# Patient Record
Sex: FEMALE | Race: WHITE | NOT HISPANIC OR LATINO | URBAN - METROPOLITAN AREA
[De-identification: names, ages, dates, MRNs, and addresses within clinical notes are randomized per-mention and may not be internally consistent; named-entity substitution may affect disease eponyms.]

---

## 2017-07-25 ENCOUNTER — HOSPITAL ENCOUNTER (OUTPATIENT)
Dept: OTHER | Facility: HOSPITAL | Age: 62
Discharge: HOME | End: 2017-07-25
Attending: NURSE PRACTITIONER | Admitting: NURSE PRACTITIONER

## 2017-09-12 ENCOUNTER — HOSPITAL ENCOUNTER (OUTPATIENT)
Dept: OTHER | Facility: HOSPITAL | Age: 62
Discharge: HOME | End: 2017-09-12
Attending: NURSE PRACTITIONER | Admitting: NURSE PRACTITIONER

## 2017-11-03 ENCOUNTER — HOSPITAL ENCOUNTER (OUTPATIENT)
Dept: OTHER | Facility: HOSPITAL | Age: 62
Discharge: HOME | End: 2017-11-03
Attending: NURSE PRACTITIONER | Admitting: NURSE PRACTITIONER

## 2019-10-29 RX ORDER — LORATADINE 10 MG/1
TABLET ORAL
Qty: 90 TABLET | Refills: 4 | OUTPATIENT
Start: 2019-10-29

## 2023-09-08 ENCOUNTER — HOSPITAL ENCOUNTER (EMERGENCY)
Facility: HOSPITAL | Age: 68
Discharge: HOME | End: 2023-09-08
Payer: MEDICARE

## 2023-09-08 ENCOUNTER — APPOINTMENT (OUTPATIENT)
Dept: GENERAL RADIOLOGY | Facility: HOSPITAL | Age: 68
End: 2023-09-08
Payer: MEDICARE

## 2023-09-08 VITALS
RESPIRATION RATE: 18 BRPM | SYSTOLIC BLOOD PRESSURE: 137 MMHG | OXYGEN SATURATION: 97 % | HEART RATE: 77 BPM | HEIGHT: 68 IN | DIASTOLIC BLOOD PRESSURE: 75 MMHG | WEIGHT: 210.1 LBS | BODY MASS INDEX: 31.84 KG/M2 | TEMPERATURE: 97.9 F

## 2023-09-08 DIAGNOSIS — M54.6 ACUTE BILATERAL THORACIC BACK PAIN: Primary | ICD-10-CM

## 2023-09-08 LAB
INR PPP: 1.01 (ref 2–3)
PROTHROMBIN TIME: 10.8 SECONDS (ref 19.4–28.5)

## 2023-09-08 PROCEDURE — 36415 COLL VENOUS BLD VENIPUNCTURE: CPT

## 2023-09-08 PROCEDURE — 85610 PROTHROMBIN TIME: CPT | Performed by: PHYSICIAN ASSISTANT

## 2023-09-08 PROCEDURE — 96375 TX/PRO/DX INJ NEW DRUG ADDON: CPT

## 2023-09-08 PROCEDURE — 25010000002 MORPHINE PER 10 MG: Performed by: PHYSICIAN ASSISTANT

## 2023-09-08 PROCEDURE — 96372 THER/PROPH/DIAG INJ SC/IM: CPT

## 2023-09-08 PROCEDURE — 99283 EMERGENCY DEPT VISIT LOW MDM: CPT

## 2023-09-08 PROCEDURE — 72072 X-RAY EXAM THORAC SPINE 3VWS: CPT

## 2023-09-08 PROCEDURE — 25010000002 HYDROMORPHONE 1 MG/ML SOLUTION: Performed by: PHYSICIAN ASSISTANT

## 2023-09-08 PROCEDURE — 72110 X-RAY EXAM L-2 SPINE 4/>VWS: CPT

## 2023-09-08 PROCEDURE — 63710000001 ONDANSETRON ODT 4 MG TABLET DISPERSIBLE: Performed by: PHYSICIAN ASSISTANT

## 2023-09-08 RX ORDER — HYDROCODONE BITARTRATE AND ACETAMINOPHEN 7.5; 325 MG/1; MG/1
1 TABLET ORAL EVERY 6 HOURS PRN
Qty: 15 TABLET | Refills: 0 | Status: SHIPPED | OUTPATIENT
Start: 2023-09-08

## 2023-09-08 RX ORDER — ONDANSETRON 4 MG/1
4 TABLET, ORALLY DISINTEGRATING ORAL ONCE
Status: COMPLETED | OUTPATIENT
Start: 2023-09-08 | End: 2023-09-08

## 2023-09-08 RX ORDER — TIZANIDINE HYDROCHLORIDE 4 MG/1
4 CAPSULE, GELATIN COATED ORAL 3 TIMES DAILY
Qty: 15 CAPSULE | Refills: 0 | Status: SHIPPED | OUTPATIENT
Start: 2023-09-08

## 2023-09-08 RX ADMIN — HYDROMORPHONE HYDROCHLORIDE 0.5 MG: 1 INJECTION, SOLUTION INTRAMUSCULAR; INTRAVENOUS; SUBCUTANEOUS at 11:05

## 2023-09-08 RX ADMIN — MORPHINE SULFATE 4 MG: 4 INJECTION INTRAVENOUS at 10:07

## 2023-09-08 RX ADMIN — ONDANSETRON 4 MG: 4 TABLET, ORALLY DISINTEGRATING ORAL at 10:07

## 2023-09-08 NOTE — CASE MANAGEMENT/SOCIAL WORK
Continued Stay Note   [facility]     Patient Name: Laura Mejia  MRN: [id number]  Today's Date: 9/8/2023    Admit Date: 9/8/2023        Discharge Plan       Row Name 09/08/23 1221       Plan    Provided Post Acute Provider List? Yes    Post Acute Provider List Outpatient Therapy    Plan Comments Notified Patient needed OP PT patient selected [facility]. Referral sent via Epic. Patient notified to call if she does not hear from them by Monday.                        Sandra Martinez RN

## 2023-09-08 NOTE — DISCHARGE INSTRUCTIONS
Take medication as directed.    Follow-up with your primary care provider in 3-5 days.  If you do not have a primary care provider call [phone] for help in finding one, or you may follow up with [facility] at [phone].    Follow-up with neurosurgery if your back pain continues.  Follow-up with physical therapy on an outpatient basis for further management of your back pain    Return to ED for any new or worsening symptoms.

## 2023-09-08 NOTE — ED PROVIDER NOTES
"Subjective   History of Present Illness  Chief Complaint Back Pain    Patient is a 67-year-old white female with history of CHF presenting today with back pain for the last 5 days.  She reports gradual onset and pain.  She describes the pain as achy in her thoracic/lumbar spine with frequent muscle spasms.  She states the pain has been constant and rates it as a 10/10 in severity worse with standing and certain movements.  She has tried muscle relaxers and oral steroids and lidocaine patches with no relief.  Pertinent negatives include saddle anesthesia, bladder bowel incontinence, urinary complaints, weakness, chest pain, or shortness of breath.  She is currently on warfarin but states she has not taken in the past few days that she has been taking Aleve for the her pain and was concerned that it would thin her blood \"too much\".  Does report history of prior back issues in the past denies any history of surgeries on her back.      Review of Systems   Constitutional: Negative.    Respiratory: Negative.     Cardiovascular: Negative.    Gastrointestinal:  Negative for abdominal distention, abdominal pain, diarrhea, nausea and vomiting.   Genitourinary:  Negative for decreased urine volume, difficulty urinating, dysuria, flank pain, frequency, hematuria and urgency.   Musculoskeletal:  Positive for back pain. Negative for neck pain and neck stiffness.   Skin: Negative.    Neurological:  Negative for weakness and numbness.     Past Medical History:   Diagnosis Date    AAA (abdominal aortic aneurysm)     infrarenal- 3.1cm(2010, 3.7x4.2cm(2016), 3.9cm (2017)    Allergic rhinitis     Aspiration pneumonia 05/2017    CHF (congestive heart failure)     Coronary artery disease     DDD (degenerative disc disease), lumbar     lumbar spine- Dr. Churchill     Depression     Diverticulosis     Frequent UTI     Dr. Daniels    GERD (gastroesophageal reflux disease)     Hiatal hernia     HSV (herpes simplex virus) infection     Oral    " Hyperlipidemia     IBS (irritable bowel syndrome)     Constipation predominant    Ischemic cardiomyopathy 09/2017    AICD     Kidney stones     NSTEMI (non-ST elevated myocardial infarction) 04/26/2017    Obstructive sleep apnea 2011    nfsg 2011, ahi 67    Osteoarthritis     Osteopenia     Peripheral neuropathy     feet    Pulmonary embolism, bilateral 05/2017    Rotator cuff tear     Bilateral- Ortho        Allergies   Allergen Reactions    Oxytetracycline Hives    Oxycodone Itching       Past Surgical History:   Procedure Laterality Date    CARDIAC CATHETERIZATION  04/26/2017    99% mid LAD. 90% mid LCX. 60% RCA. Recommended CABG. Dr. Diaz    CARDIAC DEFIBRILLATOR PLACEMENT  12/26/2017    ICD implant/ Dr. Ellis    CATARACT EXTRACTION      CORONARY ARTERY BYPASS GRAFT  04/26/2017    x4 & ASD Closure    CYSTOSCOPY  2002    negative. Dr. Daniels    LAPAROSCOPIC CHOLECYSTECTOMY  04/17/2013    For biliary dyskinesia. Dr. Mccoy.     REPLACEMENT TOTAL KNEE BILATERAL  11/2004    Dr. Herrera    THORACENTESIS Left 05/08/2017    TONSILLECTOMY      TUBAL ABDOMINAL LIGATION Bilateral        Family History   Problem Relation Age of Onset    Heart disease Mother     Other Mother         Hypoglycemia    Osteoporosis Mother     COPD Father     Stroke Father     Hypertension Brother     Diabetes Brother     Heart disease Brother        Social History     Socioeconomic History    Marital status:    Tobacco Use    Smoking status: Never           Objective   Physical Exam  Vitals and nursing note reviewed.   Constitutional:       General: She is not in acute distress.     Appearance: Normal appearance. She is well-developed. She is obese. She is not ill-appearing, toxic-appearing or diaphoretic.   HENT:      Head: Normocephalic and atraumatic.      Mouth/Throat:      Mouth: Mucous membranes are moist.      Pharynx: Oropharynx is clear.   Eyes:      General: No scleral icterus.     Extraocular Movements: Extraocular  "movements intact.      Pupils: Pupils are equal, round, and reactive to light.   Cardiovascular:      Rate and Rhythm: Normal rate and regular rhythm.      Pulses: Normal pulses.      Heart sounds: No murmur heard.    No friction rub. No gallop.   Pulmonary:      Effort: Pulmonary effort is normal. No respiratory distress.      Breath sounds: Normal breath sounds. No stridor. No wheezing, rhonchi or rales.   Chest:      Chest wall: No tenderness.   Abdominal:      General: Bowel sounds are normal. There is no distension. There are no signs of injury.      Palpations: Abdomen is soft.      Tenderness: There is no abdominal tenderness. There is no right CVA tenderness, left CVA tenderness, guarding or rebound.      Hernia: No hernia is present.   Musculoskeletal:      Comments: Back:  Cervical, thoracic, lumbar spine are midline with no midline tenderness or step-offs.  Spinal musculature soft, tenderness across lower thoracic upper lumbar paraspinal musculature, no palpable mass spasm, no overlying erythema, no ecchymosis. Range of motion is present but decreased secondary to pain with increased discomfort noted performing distal muscle strength is 5/5.     Negative straight leg raise BLE   Skin:     General: Skin is warm.      Capillary Refill: Capillary refill takes less than 2 seconds.      Coloration: Skin is not cyanotic, jaundiced or pale.      Findings: No rash.   Neurological:      General: No focal deficit present.      Mental Status: She is alert and oriented to person, place, and time.   Psychiatric:         Mood and Affect: Mood normal.         Behavior: Behavior normal.       Procedures           ED Course    /62   Pulse 78   Temp 97.8 °F (36.6 °C)   Resp 20   Ht 172.7 cm (68\")   Wt 95.3 kg (210 lb 1.6 oz)   SpO2 99%   BMI 31.95 kg/m²   Medications   HYDROmorphone (DILAUDID) injection 0.5 mg (0.5 mg Intravenous Given 9/8/23 1105)   morphine injection 4 mg (4 mg Subcutaneous Given 9/8/23 " 1007)   ondansetron ODT (ZOFRAN-ODT) disintegrating tablet 4 mg (4 mg Oral Given 9/8/23 1007)     Labs Reviewed   PROTIME-INR - Abnormal; Notable for the following components:       Result Value    Protime 10.8 (*)     INR 1.01 (*)     All other components within normal limits     XR Spine Thoracic 3 View   Final Result   Impression:   Compression deformities of T11-L1 appear most likely old.      Compression deformities of T7 and T8 are of indeterminate age.      Degenerative disc disease as detailed.      Electronically Signed: Monica George MD     9/8/2023 11:04 AM EDT     Workstation ID: [id number]      XR Spine Lumbar Complete 4+VW   Final Result   Impression:   Compression deformities of T11-L1 appear most likely old.      Compression deformities of T7 and T8 are of indeterminate age.      Degenerative disc disease as detailed.      Electronically Signed: Monica George MD     9/8/2023 11:04 AM EDT     Workstation ID: [id number]                                               Medical Decision Making  Chart Review: Patient was seen at [facility] yesterday chart was reviewed she had a negative urinalysis    Comorbidity: As per past medical history of  Differentials: Muscle strain, cauda equina, fracture, disc herniation      ;this list is not all inclusive and does not constitute the entirety of considered causes    Radiology:   XR Spine Thoracic 3 View   Final Result    Impression:    Compression deformities of T11-L1 appear most likely old.    Compression deformities of T7 and T8 are of indeterminate age.    Degenerative disc disease as detailed.        Electronically Signed: Monica George MD      9/8/2023 11:04 AM EDT      Workstation ID: [id number]     XR Spine Lumbar Complete 4+VW   Final Result    Impression:    Compression deformities of T11-L1 appear most likely old.    Compression deformities of T7 and T8 are of indeterminate age.    Degenerative disc disease as detailed.        Electronically Signed: Monica  MD Ariel      9/8/2023 11:04 AM EDT      Workstation ID: [id number]     Disposition/Treatment:  Appropriate PPE was worn during exam and throughout all encounters with the patient.  When the ED IV was placed and labs were obtained INR subtherapeutic at 1.01.  Presented with what appears to be muscle skeletal type pain PE was considered but thought less likely cause of her pain as it is reproducible and does seem to be worse with movement she is also not tachycardic or hypoxic while in the ED and continues to deny any chest pain or shortness of breath.  X-rays were obtained which showed likely old compression deformity at T11/L1 and compression deformity at T7 and 8 which was age-indeterminate which could be contributing factor to her pain.  Otherwise there is chronic degenerative changes noted.  Patient had no urinary complaints and had a negative urinalysis yesterday.  Her abdomen is also soft and nontender.  She was given morphine and additional Dilaudid for her pain while in the ED with significant improvement now sitting on the edge of the bed resting comfortably.  Findings were discussed with the patient at bedside voiced understanding of discharge along with signs and symptoms to return.  She will be referred to neurosurgery as she does have prior fractures she also be referred to outpatient physical therapy.  Inspect was queried she will be given short course of Norco for home along with tizanidine.  She is already on steroids and has lidocaine patches.  She was told to stop Robaxin as she states it was not helping and try the tizanidine instead     This document is intended for medical expert use only. Reading of this document by patients and/or patient's family without participating medical staff guidance may result in misinterpretation and unintended morbidity.  Any interpretation of such data is the responsibility of the patient and/or family member responsible for the patient in concert with their  primary or specialist providers, not to be left for sources of online searches such as [employer], [employer] or similar queries. Relying on these approaches to knowledge may result in misinterpretation, misguided goals of care and even death should patients or family members try recommendations outside of the realm of professional medical care in a supervised inpatient environment.       Amount and/or Complexity of Data Reviewed  Radiology: ordered. Decision-making details documented in ED Course.    Risk  Prescription drug management.        Final diagnoses:   Acute bilateral thoracic back pain       ED Disposition  ED Disposition       ED Disposition   Discharge    Condition   Stable    Comment   --               [facility] EMERGENCY DEPARTMENT  [address] [facility] [id number] [phone]  Go to   If symptoms worsen    PATIENT CONNECTION - [facility] [address]  [phone]  Call   If you do not have a primary care provider    Tasneem Martínez, ALEXANDER  [address] [place] [address] [phone]    Schedule an appointment as soon as possible for a visit            Medication List        New Prescriptions      HYDROcodone-acetaminophen 7.5-325 MG per tablet  Commonly known as: NORCO  Take 1 tablet by mouth Every 6 (Six) Hours As Needed for Moderate Pain.     TiZANidine 4 MG capsule  Commonly known as: ZANAFLEX  Take 1 capsule by mouth 3 (Three) Times a Day.               Where to Get Your Medications        These medications were sent to [facility]/pharmacy #[id number] - [place], [place] - [address] AT [facility] [place], [facility] - [phone]  - [phone] [address] [place] [address]      Phone: [phone]   HYDROcodone-acetaminophen 7.5-325 MG per tablet  TiZANidine 4 MG capsule            Gracia Dailey PA  09/08/23 2376

## 2023-09-08 NOTE — ED NOTES
Pt presents to ED via PV through triage with c/o thoracic back pain on both sides and all the way across worsening with spasms since Saturday. Pt has hx of sciatica and disc problems. Pt able to ambulate, but must take breaks. Able to move extremities well, Pedal pulses equal and normal.   not applicable

## 2023-09-08 NOTE — DISCHARGE PLACEMENT REQUEST
"Laura Mejia \"Annie\" (67 y.o. Female)       Date of Birth   1955    Social Security Number       Address   [address] [place] [address] [place] [facility]    Home Phone   [phone]    MRN   [id number]       [employer]aker   Patient Refused    Marital Status                               Admission Date   9/8/23    Admission Type   Emergency    Admitting Provider       Attending Provider       Department, Room/Bed   [facility] EMERGENCY DEPARTMENT, SA05/SA5       Discharge Date       Discharge Disposition       Discharge Destination                                 Attending Provider: (none)   Allergies: Oxytetracycline, Oxycodone    Isolation: None   Infection: None   Code Status: Not on file    Ht: 172.7 cm (68\")   Wt: 95.3 kg (210 lb 1.6 oz)    Admission Cmt: None   Principal Problem: None                  Active Insurance as of 9/8/2023       Primary Coverage       Payor Plan Insurance Group Employer/Plan Group    HUMANA MEDICARE REPLACEMENT HU[employer]NA MEDICARE REPLACEMENT [id number]       Payor Plan Address Payor Plan Phone Number Payor Plan Fax Number Effective Dates    [address] [address] [phone]  9/1/2023 - None Entered    [facility] [address]         Subscriber Name Subscriber Birth Date Member ID       LAURA MEJIA 1955 [id number]                     Emergency Contacts        (Rel.) Home Phone Work Phone Mobile Phone    MEJIABERNARD BURLESON (Spouse) [phone] -- --              "

## 2023-09-18 ENCOUNTER — TRANSCRIBE ORDERS (OUTPATIENT)
Dept: ADMINISTRATIVE | Facility: HOSPITAL | Age: 68
End: 2023-09-18
Payer: MEDICARE

## 2023-09-18 DIAGNOSIS — S32.000A: ICD-10-CM

## 2023-09-18 DIAGNOSIS — M48.44XA: Primary | ICD-10-CM

## 2023-09-18 DIAGNOSIS — M43.04: Primary | ICD-10-CM

## 2024-01-22 ENCOUNTER — APPOINTMENT (OUTPATIENT)
Dept: GENERAL RADIOLOGY | Facility: HOSPITAL | Age: 69
DRG: 309 | End: 2024-01-22
Payer: MEDICARE

## 2024-01-22 ENCOUNTER — APPOINTMENT (OUTPATIENT)
Dept: ULTRASOUND IMAGING | Facility: HOSPITAL | Age: 69
DRG: 309 | End: 2024-01-22
Payer: MEDICARE

## 2024-01-22 ENCOUNTER — HOSPITAL ENCOUNTER (INPATIENT)
Facility: HOSPITAL | Age: 69
LOS: 3 days | Discharge: HOME | DRG: 309 | End: 2024-01-26
Attending: EMERGENCY MEDICINE | Admitting: INTERNAL MEDICINE
Payer: MEDICARE

## 2024-01-22 DIAGNOSIS — R55 NEAR SYNCOPE: Primary | ICD-10-CM

## 2024-01-22 DIAGNOSIS — R94.39 ABNORMAL NUCLEAR STRESS TEST: ICD-10-CM

## 2024-01-22 DIAGNOSIS — I48.91 NEW ONSET ATRIAL FIBRILLATION: ICD-10-CM

## 2024-01-22 DIAGNOSIS — Z95.1 S/P CABG (CORONARY ARTERY BYPASS GRAFT): ICD-10-CM

## 2024-01-22 LAB
ALBUMIN SERPL-MCNC: 4.2 G/DL (ref 3.5–5.2)
ALBUMIN/GLOB SERPL: 1.3 G/DL
ALP SERPL-CCNC: 135 U/L (ref 39–117)
ALT SERPL W P-5'-P-CCNC: 28 U/L (ref 1–33)
ANION GAP SERPL CALCULATED.3IONS-SCNC: 13 MMOL/L (ref 5–15)
AST SERPL-CCNC: 37 U/L (ref 1–32)
BACTERIA UR QL AUTO: ABNORMAL /HPF
BASOPHILS # BLD AUTO: 0.1 10*3/MM3 (ref 0–0.2)
BASOPHILS NFR BLD AUTO: 1.3 % (ref 0–1.5)
BILIRUB SERPL-MCNC: 0.3 MG/DL (ref 0–1.2)
BILIRUB UR QL STRIP: NEGATIVE
BUN SERPL-MCNC: 24 MG/DL (ref 8–23)
BUN/CREAT SERPL: 14.1 (ref 7–25)
CALCIUM SPEC-SCNC: 9.3 MG/DL (ref 8.6–10.5)
CHLORIDE SERPL-SCNC: 108 MMOL/L (ref 98–107)
CHOLEST SERPL-MCNC: 151 MG/DL (ref 0–200)
CK SERPL-CCNC: 38 U/L (ref 20–180)
CLARITY UR: ABNORMAL
CO2 SERPL-SCNC: 19 MMOL/L (ref 22–29)
COLOR UR: YELLOW
CREAT SERPL-MCNC: 1.7 MG/DL (ref 0.57–1)
D DIMER PPP FEU-MCNC: 3.12 MG/L (FEU) (ref 0–0.69)
DEPRECATED RDW RBC AUTO: 56.9 FL (ref 37–54)
EGFRCR SERPLBLD CKD-EPI 2021: 32.3 ML/MIN/1.73
EOSINOPHIL # BLD AUTO: 0.2 10*3/MM3 (ref 0–0.4)
EOSINOPHIL NFR BLD AUTO: 2.4 % (ref 0.3–6.2)
ERYTHROCYTE [DISTWIDTH] IN BLOOD BY AUTOMATED COUNT: 17.5 % (ref 12.3–15.4)
FLUAV RNA RESP QL NAA+PROBE: DETECTED
FLUBV RNA RESP QL NAA+PROBE: NOT DETECTED
GLOBULIN UR ELPH-MCNC: 3.3 GM/DL
GLUCOSE SERPL-MCNC: 95 MG/DL (ref 65–99)
GLUCOSE UR STRIP-MCNC: NEGATIVE MG/DL
HCT VFR BLD AUTO: 41.6 % (ref 34–46.6)
HDLC SERPL-MCNC: 32 MG/DL (ref 40–60)
HGB BLD-MCNC: 13.4 G/DL (ref 12–15.9)
HGB UR QL STRIP.AUTO: NEGATIVE
HYALINE CASTS UR QL AUTO: ABNORMAL /LPF
INR PPP: 1.51 (ref 2–3)
KETONES UR QL STRIP: NEGATIVE
LDLC SERPL CALC-MCNC: 81 MG/DL (ref 0–100)
LDLC/HDLC SERPL: 2.31 {RATIO}
LEUKOCYTE ESTERASE UR QL STRIP.AUTO: ABNORMAL
LYMPHOCYTES # BLD AUTO: 1.3 10*3/MM3 (ref 0.7–3.1)
LYMPHOCYTES NFR BLD AUTO: 13.8 % (ref 19.6–45.3)
MAGNESIUM SERPL-MCNC: 2.2 MG/DL (ref 1.6–2.4)
MCH RBC QN AUTO: 30 PG (ref 26.6–33)
MCHC RBC AUTO-ENTMCNC: 32.3 G/DL (ref 31.5–35.7)
MCV RBC AUTO: 92.7 FL (ref 79–97)
MONOCYTES # BLD AUTO: 0.6 10*3/MM3 (ref 0.1–0.9)
MONOCYTES NFR BLD AUTO: 6.3 % (ref 5–12)
NEUTROPHILS NFR BLD AUTO: 7.2 10*3/MM3 (ref 1.7–7)
NEUTROPHILS NFR BLD AUTO: 76.2 % (ref 42.7–76)
NITRITE UR QL STRIP: NEGATIVE
NRBC BLD AUTO-RTO: 0 /100 WBC (ref 0–0.2)
PH UR STRIP.AUTO: 5.5 [PH] (ref 5–8)
PLATELET # BLD AUTO: 230 10*3/MM3 (ref 140–450)
PMV BLD AUTO: 8.3 FL (ref 6–12)
POTASSIUM SERPL-SCNC: 3.7 MMOL/L (ref 3.5–5.2)
PROT SERPL-MCNC: 7.5 G/DL (ref 6–8.5)
PROT UR QL STRIP: ABNORMAL
PROTHROMBIN TIME: 16 SECONDS (ref 19.4–28.5)
RBC # BLD AUTO: 4.48 10*6/MM3 (ref 3.77–5.28)
RBC # UR STRIP: ABNORMAL /HPF
REF LAB TEST METHOD: ABNORMAL
RSV RNA NPH QL NAA+NON-PROBE: NOT DETECTED
SARS-COV-2 RNA RESP QL NAA+PROBE: NOT DETECTED
SODIUM SERPL-SCNC: 140 MMOL/L (ref 136–145)
SP GR UR STRIP: 1.01 (ref 1–1.03)
SQUAMOUS #/AREA URNS HPF: ABNORMAL /HPF
T4 FREE SERPL-MCNC: 0.87 NG/DL (ref 0.93–1.7)
TRIGL SERPL-MCNC: 226 MG/DL (ref 0–150)
TROPONIN T SERPL HS-MCNC: 16 NG/L
TSH SERPL DL<=0.05 MIU/L-ACNC: 9.78 UIU/ML (ref 0.27–4.2)
UROBILINOGEN UR QL STRIP: ABNORMAL
VLDLC SERPL-MCNC: 38 MG/DL (ref 5–40)
WBC # UR STRIP: ABNORMAL /HPF
WBC NRBC COR # BLD AUTO: 9.5 10*3/MM3 (ref 3.4–10.8)
YEAST URNS QL MICRO: ABNORMAL /HPF

## 2024-01-22 PROCEDURE — 93005 ELECTROCARDIOGRAM TRACING: CPT | Performed by: EMERGENCY MEDICINE

## 2024-01-22 PROCEDURE — 99285 EMERGENCY DEPT VISIT HI MDM: CPT

## 2024-01-22 PROCEDURE — 93005 ELECTROCARDIOGRAM TRACING: CPT

## 2024-01-22 PROCEDURE — 87186 SC STD MICRODIL/AGAR DIL: CPT | Performed by: EMERGENCY MEDICINE

## 2024-01-22 PROCEDURE — 76775 US EXAM ABDO BACK WALL LIM: CPT

## 2024-01-22 PROCEDURE — 82550 ASSAY OF CK (CPK): CPT | Performed by: EMERGENCY MEDICINE

## 2024-01-22 PROCEDURE — 71045 X-RAY EXAM CHEST 1 VIEW: CPT

## 2024-01-22 PROCEDURE — 25810000003 SODIUM CHLORIDE 0.9 % SOLUTION: Performed by: INTERNAL MEDICINE

## 2024-01-22 PROCEDURE — 84443 ASSAY THYROID STIM HORMONE: CPT | Performed by: INTERNAL MEDICINE

## 2024-01-22 PROCEDURE — 87637 SARSCOV2&INF A&B&RSV AMP PRB: CPT | Performed by: INTERNAL MEDICINE

## 2024-01-22 PROCEDURE — 85025 COMPLETE CBC W/AUTO DIFF WBC: CPT | Performed by: EMERGENCY MEDICINE

## 2024-01-22 PROCEDURE — 85379 FIBRIN DEGRADATION QUANT: CPT | Performed by: INTERNAL MEDICINE

## 2024-01-22 PROCEDURE — 87086 URINE CULTURE/COLONY COUNT: CPT | Performed by: EMERGENCY MEDICINE

## 2024-01-22 PROCEDURE — 83735 ASSAY OF MAGNESIUM: CPT | Performed by: EMERGENCY MEDICINE

## 2024-01-22 PROCEDURE — 93005 ELECTROCARDIOGRAM TRACING: CPT | Performed by: INTERNAL MEDICINE

## 2024-01-22 PROCEDURE — 87077 CULTURE AEROBIC IDENTIFY: CPT | Performed by: EMERGENCY MEDICINE

## 2024-01-22 PROCEDURE — 25010000002 ENOXAPARIN PER 10 MG: Performed by: INTERNAL MEDICINE

## 2024-01-22 PROCEDURE — 85610 PROTHROMBIN TIME: CPT | Performed by: EMERGENCY MEDICINE

## 2024-01-22 PROCEDURE — 25010000002 CEFTRIAXONE PER 250 MG: Performed by: EMERGENCY MEDICINE

## 2024-01-22 PROCEDURE — 80061 LIPID PANEL: CPT | Performed by: INTERNAL MEDICINE

## 2024-01-22 PROCEDURE — 84484 ASSAY OF TROPONIN QUANT: CPT | Performed by: EMERGENCY MEDICINE

## 2024-01-22 PROCEDURE — 93010 ELECTROCARDIOGRAM REPORT: CPT | Performed by: INTERNAL MEDICINE

## 2024-01-22 PROCEDURE — 80053 COMPREHEN METABOLIC PANEL: CPT | Performed by: EMERGENCY MEDICINE

## 2024-01-22 PROCEDURE — G0378 HOSPITAL OBSERVATION PER HR: HCPCS

## 2024-01-22 PROCEDURE — 84439 ASSAY OF FREE THYROXINE: CPT | Performed by: INTERNAL MEDICINE

## 2024-01-22 PROCEDURE — 81001 URINALYSIS AUTO W/SCOPE: CPT | Performed by: EMERGENCY MEDICINE

## 2024-01-22 RX ORDER — SODIUM CHLORIDE 0.9 % (FLUSH) 0.9 %
10 SYRINGE (ML) INJECTION AS NEEDED
Status: DISCONTINUED | OUTPATIENT
Start: 2024-01-22 | End: 2024-01-26 | Stop reason: HOSPADM

## 2024-01-22 RX ORDER — WARFARIN SODIUM 3 MG/1
TABLET ORAL SEE ADMIN INSTRUCTIONS
COMMUNITY

## 2024-01-22 RX ORDER — SODIUM CHLORIDE 9 MG/ML
40 INJECTION, SOLUTION INTRAVENOUS AS NEEDED
Status: DISCONTINUED | OUTPATIENT
Start: 2024-01-22 | End: 2024-01-26 | Stop reason: HOSPADM

## 2024-01-22 RX ORDER — ASPIRIN 81 MG/1
81 TABLET, CHEWABLE ORAL DAILY
Status: DISCONTINUED | OUTPATIENT
Start: 2024-01-23 | End: 2024-01-26 | Stop reason: HOSPADM

## 2024-01-22 RX ORDER — AMOXICILLIN 250 MG
2 CAPSULE ORAL 2 TIMES DAILY
Status: DISCONTINUED | OUTPATIENT
Start: 2024-01-22 | End: 2024-01-26 | Stop reason: HOSPADM

## 2024-01-22 RX ORDER — POTASSIUM CHLORIDE 1.5 G/1.58G
20 POWDER, FOR SOLUTION ORAL DAILY
Status: DISCONTINUED | OUTPATIENT
Start: 2024-01-23 | End: 2024-01-26

## 2024-01-22 RX ORDER — TROSPIUM CHLORIDE ER 60 MG/1
20 CAPSULE ORAL EVERY MORNING
COMMUNITY
End: 2024-01-22

## 2024-01-22 RX ORDER — WARFARIN SODIUM 3 MG/1
6 TABLET ORAL 3 TIMES WEEKLY
COMMUNITY
End: 2024-01-26 | Stop reason: HOSPADM

## 2024-01-22 RX ORDER — POLYETHYLENE GLYCOL 3350 17 G/17G
17 POWDER, FOR SOLUTION ORAL DAILY PRN
Status: DISCONTINUED | OUTPATIENT
Start: 2024-01-22 | End: 2024-01-26 | Stop reason: HOSPADM

## 2024-01-22 RX ORDER — ATORVASTATIN CALCIUM 40 MG/1
80 TABLET, FILM COATED ORAL NIGHTLY
Status: DISCONTINUED | OUTPATIENT
Start: 2024-01-22 | End: 2024-01-26 | Stop reason: HOSPADM

## 2024-01-22 RX ORDER — BISACODYL 10 MG
10 SUPPOSITORY, RECTAL RECTAL DAILY PRN
Status: DISCONTINUED | OUTPATIENT
Start: 2024-01-22 | End: 2024-01-26 | Stop reason: HOSPADM

## 2024-01-22 RX ORDER — FUROSEMIDE 40 MG/1
40 TABLET ORAL DAILY PRN
COMMUNITY
End: 2024-01-26 | Stop reason: HOSPADM

## 2024-01-22 RX ORDER — PANTOPRAZOLE SODIUM 40 MG/1
40 TABLET, DELAYED RELEASE ORAL
Status: DISCONTINUED | OUTPATIENT
Start: 2024-01-23 | End: 2024-01-26 | Stop reason: HOSPADM

## 2024-01-22 RX ORDER — PAROXETINE HYDROCHLORIDE 40 MG/1
40 TABLET, FILM COATED ORAL EVERY MORNING
COMMUNITY

## 2024-01-22 RX ORDER — SODIUM CHLORIDE 9 MG/ML
50 INJECTION, SOLUTION INTRAVENOUS CONTINUOUS
Status: DISCONTINUED | OUTPATIENT
Start: 2024-01-22 | End: 2024-01-24

## 2024-01-22 RX ORDER — LEVOCETIRIZINE DIHYDROCHLORIDE 5 MG/1
2.5 TABLET, FILM COATED ORAL EVERY EVENING
COMMUNITY

## 2024-01-22 RX ORDER — SODIUM CHLORIDE 0.9 % (FLUSH) 0.9 %
10 SYRINGE (ML) INJECTION EVERY 12 HOURS SCHEDULED
Status: DISCONTINUED | OUTPATIENT
Start: 2024-01-22 | End: 2024-01-26 | Stop reason: HOSPADM

## 2024-01-22 RX ORDER — ATORVASTATIN CALCIUM 80 MG/1
80 TABLET, FILM COATED ORAL DAILY
COMMUNITY

## 2024-01-22 RX ORDER — NITROGLYCERIN 0.4 MG/1
0.4 TABLET SUBLINGUAL
Status: DISCONTINUED | OUTPATIENT
Start: 2024-01-22 | End: 2024-01-26 | Stop reason: HOSPADM

## 2024-01-22 RX ORDER — TOPIRAMATE 50 MG/1
50 TABLET, FILM COATED ORAL DAILY
COMMUNITY

## 2024-01-22 RX ORDER — OMEPRAZOLE 40 MG/1
40 CAPSULE, DELAYED RELEASE ORAL DAILY
COMMUNITY

## 2024-01-22 RX ORDER — TROSPIUM CHLORIDE 20 MG/1
20 TABLET, FILM COATED ORAL NIGHTLY
COMMUNITY

## 2024-01-22 RX ORDER — PAROXETINE HYDROCHLORIDE 40 MG/1
40 TABLET, FILM COATED ORAL EVERY MORNING
Status: DISCONTINUED | OUTPATIENT
Start: 2024-01-23 | End: 2024-01-26 | Stop reason: HOSPADM

## 2024-01-22 RX ORDER — FAMCICLOVIR 500 MG/1
500 TABLET ORAL 3 TIMES DAILY PRN
COMMUNITY

## 2024-01-22 RX ORDER — ENOXAPARIN SODIUM 100 MG/ML
1 INJECTION SUBCUTANEOUS EVERY 12 HOURS
Status: DISCONTINUED | OUTPATIENT
Start: 2024-01-22 | End: 2024-01-23

## 2024-01-22 RX ORDER — WARFARIN SODIUM 6 MG/1
6 TABLET ORAL
Status: DISCONTINUED | OUTPATIENT
Start: 2024-01-24 | End: 2024-01-22

## 2024-01-22 RX ORDER — ONDANSETRON 2 MG/ML
4 INJECTION INTRAMUSCULAR; INTRAVENOUS EVERY 6 HOURS PRN
Status: DISCONTINUED | OUTPATIENT
Start: 2024-01-22 | End: 2024-01-26 | Stop reason: HOSPADM

## 2024-01-22 RX ORDER — CARVEDILOL 3.12 MG/1
3.12 TABLET ORAL 2 TIMES DAILY WITH MEALS
COMMUNITY

## 2024-01-22 RX ORDER — CHOLECALCIFEROL (VITAMIN D3) 125 MCG
5 CAPSULE ORAL NIGHTLY PRN
Status: DISCONTINUED | OUTPATIENT
Start: 2024-01-22 | End: 2024-01-26 | Stop reason: HOSPADM

## 2024-01-22 RX ORDER — BISACODYL 5 MG/1
5 TABLET, DELAYED RELEASE ORAL DAILY PRN
Status: DISCONTINUED | OUTPATIENT
Start: 2024-01-22 | End: 2024-01-26 | Stop reason: HOSPADM

## 2024-01-22 RX ORDER — CARVEDILOL 3.12 MG/1
3.12 TABLET ORAL 2 TIMES DAILY WITH MEALS
Status: DISCONTINUED | OUTPATIENT
Start: 2024-01-22 | End: 2024-01-26 | Stop reason: HOSPADM

## 2024-01-22 RX ORDER — ASPIRIN 81 MG/1
81 TABLET, CHEWABLE ORAL DAILY
COMMUNITY

## 2024-01-22 RX ORDER — LEVOTHYROXINE SODIUM 0.07 MG/1
75 TABLET ORAL DAILY
COMMUNITY
End: 2024-01-26 | Stop reason: HOSPADM

## 2024-01-22 RX ORDER — EZETIMIBE 10 MG/1
10 TABLET ORAL DAILY
COMMUNITY

## 2024-01-22 RX ORDER — LEVOTHYROXINE SODIUM 88 UG/1
88 TABLET ORAL
Status: DISCONTINUED | OUTPATIENT
Start: 2024-01-23 | End: 2024-01-23

## 2024-01-22 RX ORDER — POTASSIUM CHLORIDE 1.5 G/1.58G
20 POWDER, FOR SOLUTION ORAL DAILY
COMMUNITY
End: 2024-01-26 | Stop reason: HOSPADM

## 2024-01-22 RX ORDER — TOPIRAMATE 25 MG/1
50 TABLET ORAL NIGHTLY
Status: DISCONTINUED | OUTPATIENT
Start: 2024-01-22 | End: 2024-01-26 | Stop reason: HOSPADM

## 2024-01-22 RX ADMIN — SODIUM CHLORIDE 100 ML/HR: 900 INJECTION INTRAVENOUS at 17:21

## 2024-01-22 RX ADMIN — ENOXAPARIN SODIUM 80 MG: 100 INJECTION SUBCUTANEOUS at 18:22

## 2024-01-22 RX ADMIN — TOPIRAMATE 50 MG: 25 TABLET, FILM COATED ORAL at 20:47

## 2024-01-22 RX ADMIN — CEFTRIAXONE 1000 MG: 1 INJECTION, POWDER, FOR SOLUTION INTRAMUSCULAR; INTRAVENOUS at 17:22

## 2024-01-22 RX ADMIN — ATORVASTATIN CALCIUM 80 MG: 40 TABLET, FILM COATED ORAL at 20:47

## 2024-01-22 RX ADMIN — CARVEDILOL 3.12 MG: 3.12 TABLET, FILM COATED ORAL at 18:22

## 2024-01-22 RX ADMIN — WARFARIN SODIUM 9 MG: 6 TABLET ORAL at 20:47

## 2024-01-22 NOTE — ED NOTES
"Pt to ED SA03 accompanied by her . Pt reports numbness of bilateral arms and legs x 3-4 days. She reports that today she tried her husbands home EKG machine and it told her she has possible afib so she came to the ED. Pt reports that she has recently had \"brain fog\" but denies headache and vision changes. Pt reports that her symptoms resolve when she is laying down. Pt reports that she has been getting over bronchitis and has a productive cough with yellow sputum. She has tried using a CBD pen at bedtime and says that does help her symptoms enough to sleep. Pt denies chest pain, abdominal pain, nausea, vomiting and diarrhea. No other complaints at this time.   "

## 2024-01-22 NOTE — ED NOTES
Dr. Keke Casiano at bedside, gives verbal orders for EKG to be done and to discontinue UA due to duplicate orders.

## 2024-01-22 NOTE — H&P
"    Patient Care Team:  Provider, No Known as PCP - General    Chief complaint generalized weakness    Subjective     Patient is a 68 y.o. female with h/o CAD and one prior episode of afib who has felt generally weak x 2 months, following a COVID infection.  [place] she [place] the [employer] [place] and felt extreme weakness in all 4 extremities and was worried she was going to fall.  When she got home, a home (OTC) cardiac monitor showed atrial fib with heart rates from 75 to 130.  She denies any chest pain or palpitations, recent change in weight or other problems.  She recently relocated to [facility] and plans to establish with our office for primary care and with Dr. Olsen for cardiology.  She is chronically anticoagulated with warfarin for h/o bilateral PE and monitors INR at home.  Prior episode of afib was in the immediate post-op period after CABG.  Initial EKG in ER showed sinus rhythm with frequent PAC's.  During my interview she had varying rhythms noted on telemetry, including afib.    Pt reports having CKD in the past - was followed by nephrologist in [place].  \"But then my numbers looked better and he told me my kidneys were back to normal.\"  She has not had any recent bloodwork locally.    Review of Systems   Constitutional:  Positive for activity change and fatigue. Negative for appetite change, chills, diaphoresis and fever.   HENT:  Negative for facial swelling.    Eyes:  Negative for visual disturbance.   Respiratory:  Positive for shortness of breath. Negative for cough, wheezing and stridor.    Cardiovascular:  Negative for chest pain, palpitations and leg swelling.   Gastrointestinal:  Negative for abdominal pain, constipation, diarrhea, nausea and vomiting.   Endocrine: Negative for polyuria.   Genitourinary:  Negative for dysuria.   Musculoskeletal:  Positive for arthralgias, back pain and gait problem.   Skin:  Negative for rash and wound.   Neurological:  Positive for weakness. " Negative for dizziness, tremors and light-headedness.   Psychiatric/Behavioral:  Negative for confusion.           History  Past Medical History:   Diagnosis Date    AAA (abdominal aortic aneurysm)     infrarenal- 3.1cm(2010, 3.7x4.2cm(2016), 3.9cm (2017)    Allergic rhinitis     Aspiration pneumonia 05/2017    CHF (congestive heart failure)     Coronary artery disease     DDD (degenerative disc disease), lumbar     lumbar spine- Dr. Churchill     Depression     Diverticulosis     Frequent UTI     Dr. Daniels    GERD (gastroesophageal reflux disease)     Hiatal hernia     HSV (herpes simplex virus) infection     Oral    Hyperlipidemia     IBS (irritable bowel syndrome)     Constipation predominant    Ischemic cardiomyopathy 09/2017    AICD     Kidney stones     NSTEMI (non-ST elevated myocardial infarction) 04/26/2017    Obstructive sleep apnea 2011    nfsg 2011, ahi 67    Osteoarthritis     Osteopenia     Peripheral neuropathy     feet    Pulmonary embolism, bilateral 05/2017    Rotator cuff tear     Bilateral- Ortho      Past Surgical History:   Procedure Laterality Date    CARDIAC CATHETERIZATION  04/26/2017    99% mid LAD. 90% mid LCX. 60% RCA. Recommended CABG. Dr. Diaz    CARDIAC DEFIBRILLATOR PLACEMENT  12/26/2017    ICD implant/ Dr. Ellis    CATARACT EXTRACTION      CORONARY ARTERY BYPASS GRAFT  04/26/2017    x4 & ASD Closure    CYSTOSCOPY  2002    negative. Dr. Daniels    LAPAROSCOPIC CHOLECYSTECTOMY  04/17/2013    For biliary dyskinesia. Dr. Mccoy.     REPLACEMENT TOTAL KNEE BILATERAL  11/2004    Dr. Herrrea    THORACENTESIS Left 05/08/2017    TONSILLECTOMY      TUBAL ABDOMINAL LIGATION Bilateral      Family History   Problem Relation Age of Onset    Heart disease Mother     Other Mother         Hypoglycemia    Osteoporosis Mother     COPD Father     Stroke Father     Hypertension Brother     Diabetes Brother     Heart disease Brother      Social History     Tobacco Use    Smoking status: Never     (Not in a  hospital admission)    Allergies:  Oxytetracycline and Oxycodone    Objective     Vital Signs  Temp:  [97.7 °F (36.5 °C)] 97.7 °F (36.5 °C)  Heart Rate:  [] 76  Resp:  [13-20] 14  BP: (102-121)/(47-67) 113/67     Physical Exam:      General Appearance:    Alert, cooperative, in no acute distress, pleasant, good historian   Head:    Normocephalic, without obvious abnormality, atraumatic   Eyes:            Lids and lashes normal, conjunctivae and sclerae normal, no   icterus, no pallor, corneas clear, PERRLA   Ears:    Ears appear intact with no abnormalities noted   Throat:   No oral lesions, no thrush, oral mucosa moist   Neck:   No adenopathy, supple, trachea midline, no thyromegaly, no   carotid bruit, no JVD   Lungs:     Clear to auscultation,respirations regular, even and                  unlabored    Heart:    Irregularly irregular   Chest Wall:    No abnormalities observed   Abdomen:     Normal bowel sounds, no masses, no organomegaly, soft        non-tender, non-distended, no guarding, no rebound                tenderness   Extremities:   Moves all extremities well, no edema, no cyanosis, no             redness   Pulses:   Pulses palpable and equal bilaterally   Skin:   No bleeding, bruising or rash   Lymph nodes:   No palpable adenopathy   Neurologic:   Cranial nerves 2 - 12 grossly intact, sensation intact, DTR       present and equal bilaterally       Results Review:     Imaging Results (Last 24 Hours)       Procedure Component Value Units Date/Time    US Renal Bilateral [[id number]] Resulted: 01/22/24 1730     Updated: 01/22/24 1726    XR Chest 1 View [[id number]] Collected: 01/22/24 1344     Updated: 01/22/24 1509    Narrative:      XR CHEST 1 VW    Date of Exam: 1/22/2024 1:40 PM EST    Indication: Weakness Short of breath    Comparison: PA and lateral chest radiograph 9/12/2017    Findings:  The lungs are clear. The heart size is within normal limits with signs of prior median sternotomy and CABG.  Left chest wall pacemaker has been placed since the prior examination with a single lead extending to the expected location of the right   ventricle. Pulmonary vascular distribution is normal without evidence of edema. No pleural effusion, pneumothorax or acute osseous abnormality. Right shoulder replacement changes are new since the prior chest x-ray.      Impression:      Impression:  No acute chest finding.      Electronically Signed: Amber Candelaria MD    1/22/2024 3:07 PM EST    Workstation ID: [id number]             Lab Results (last 24 hours)       Procedure Component Value Units Date/Time    TSH [[id number]]  (Abnormal) Collected: 01/22/24 1355    Specimen: Blood from Arm, Right Updated: 01/22/24 1656     TSH 9.780 uIU/mL     T4, Free [[id number]]  (Abnormal) Collected: 01/22/24 1355    Specimen: Blood from Arm, Right Updated: 01/22/24 1656     Free T4 0.87 ng/dL     Narrative:      Results may be falsely increased if patient taking Biotin.      Lipid Panel [[id number]]  (Abnormal) Collected: 01/22/24 1355    Specimen: Blood from Arm, Right Updated: 01/22/24 1650     Total Cholesterol 151 mg/dL      Triglycerides 226 mg/dL      HDL Cholesterol 32 mg/dL      LDL Cholesterol  81 mg/dL      VLDL Cholesterol 38 mg/dL      LDL/HDL Ratio 2.31    Narrative:      Cholesterol Reference Ranges  (U.S. Department of Health and Human Services ATP III Classifications)    Desirable          <200 mg/dL  Borderline High    200-239 mg/dL  High Risk          >240 mg/dL      Triglyceride Reference Ranges  (U.S. Department of Health and Human Services ATP III Classifications)    Normal           <150 mg/dL  Borderline High  150-199 mg/dL  High             200-499 mg/dL  Very High        >500 mg/dL    HDL Reference Ranges  (U.S. Department of Health and Human Services ATP III Classifications)    Low     <40 mg/dl (major risk factor for CHD)  High    >60 mg/dl ('negative' risk factor for CHD)        LDL Reference Ranges  (U.S.  "Department of Health and Human Services ATP III Classifications)    Optimal          <100 mg/dL  Near Optimal     100-129 mg/dL  Borderline High  130-159 mg/dL  High             160-189 mg/dL  Very High        >189 mg/dL    Protime-INR [[id number]]  (Abnormal) Collected: 01/22/24 1608    Specimen: Blood Updated: 01/22/24 1634     Protime 16.0 Seconds      INR 1.51    D-dimer, Quantitative [[id number]]  (Abnormal) Collected: 01/22/24 1608    Specimen: Blood Updated: 01/22/24 1634     D-Dimer, Quantitative 3.12 mg/L (FEU)     Narrative:      According to the assay 's published package insert, a normal (<0.50 mg/L (FEU)) D-dimer result in conjunction with a non-high clinical probability assessment, excludes deep vein thrombosis (DVT) and pulmonary embolism (PE) with high sensitivity.    D-dimer values increase with age and this can make VTE exclusion of an older population difficult. To address this, the [employer], based on best available evidence and recent guidelines, recommends that clinicians use age-adjusted D-dimer thresholds in patients greater than 50 years of age with: a) a low probability of PE who do not meet all Pulmonary Embolism Rule Out Criteria, or b) in those with intermediate probability of PE.   The formula for an age-adjusted D-dimer cut-off is \"age/100\".  For example, a 60 year old patient would have an age-adjusted cut-off of 0.60 mg/L (FEU) and an 80 year old 0.80 mg/L (FEU).    Urine Culture - Urine, Urine, Clean Catch [[id number]] Collected: 01/22/24 1355    Specimen: Urine, Clean Catch Updated: 01/22/24 1528    Comprehensive Metabolic Panel [[id number]]  (Abnormal) Collected: 01/22/24 1355    Specimen: Blood from Arm, Right Updated: 01/22/24 1443     Glucose 95 mg/dL      BUN 24 mg/dL      Creatinine 1.70 mg/dL      Sodium 140 mmol/L      Potassium 3.7 mmol/L      Comment: Slight hemolysis detected by analyzer. Result may be falsely elevated.        Chloride 108 " mmol/L      CO2 19.0 mmol/L      Calcium 9.3 mg/dL      Total Protein 7.5 g/dL      Albumin 4.2 g/dL      ALT (SGPT) 28 U/L      AST (SGOT) 37 U/L      Comment: Slight hemolysis detected by analyzer. Result may be falsely elevated.        Alkaline Phosphatase 135 U/L      Total Bilirubin 0.3 mg/dL      Globulin 3.3 gm/dL      A/G Ratio 1.3 g/dL      BUN/Creatinine Ratio 14.1     Anion Gap 13.0 mmol/L      eGFR 32.3 mL/min/1.73     Narrative:      GFR Normal >60  Chronic Kidney Disease <60  Kidney Failure <15      Magnesium [[id number]]  (Normal) Collected: 01/22/24 1355    Specimen: Blood from Arm, Right Updated: 01/22/24 1443     Magnesium 2.2 mg/dL     Single High Sensitivity Troponin T [[id number]]  (Abnormal) Collected: 01/22/24 1355    Specimen: Blood from Arm, Right Updated: 01/22/24 1439     HS Troponin T 16 ng/L     Narrative:      High Sensitive Troponin T Reference Range:  <14.0 ng/L- Negative Female for AMI  <22.0 ng/L- Negative Male for AMI  >=14 - Abnormal Female indicating possible myocardial injury.  >=22 - Abnormal Male indicating possible myocardial injury.   Clinicians would have to utilize clinical acumen, EKG, Troponin, and serial changes to determine if it is an Acute Myocardial Infarction or myocardial injury due to an underlying chronic condition.         CK [[id number]]  (Normal) Collected: 01/22/24 1355    Specimen: Blood from Arm, Right Updated: 01/22/24 1439     Creatine Kinase 38 U/L     Urinalysis, Microscopic Only - Urine, Clean Catch [[id number]]  (Abnormal) Collected: 01/22/24 1355    Specimen: Urine, Clean Catch Updated: 01/22/24 1423     RBC, UA 0-2 /HPF      WBC, UA 11-20 /HPF      Bacteria, UA Trace /HPF      Squamous Epithelial Cells, UA 3-6 /HPF      Yeast, UA Small/1+ Yeast /HPF      Hyaline Casts, UA 7-12 /LPF      Methodology Manual Light Microscopy    Urinalysis With Microscopic If Indicated (No Culture) - Urine, Clean Catch [[id number]]  (Abnormal) Collected: 01/22/24 1355     Specimen: Urine, Clean Catch Updated: 01/22/24 1413     Color, UA Yellow     Appearance, UA Cloudy     pH, UA 5.5     Specific Gravity, UA 1.014     Glucose, UA Negative     Ketones, UA Negative     Bilirubin, UA Negative     Blood, UA Negative     Protein, UA Trace     Leuk Esterase, UA Large (3+)     Nitrite, UA Negative     Urobilinogen, UA 1.0 E.U./dL    CBC & Differential [[id number]]  (Abnormal) Collected: 01/22/24 1355    Specimen: Blood from Arm, Right Updated: 01/22/24 1405    Narrative:      The following orders were created for panel order CBC & Differential.  Procedure                               Abnormality         Status                     ---------                               -----------         ------                     CBC Auto Differential[[id number]]        Abnormal            Final result                 Please view results for these tests on the individual orders.    CBC Auto Differential [[id number]]  (Abnormal) Collected: 01/22/24 1355    Specimen: Blood from Arm, Right Updated: 01/22/24 1405     WBC 9.50 10*3/mm3      RBC 4.48 10*6/mm3      Hemoglobin 13.4 g/dL      Hematocrit 41.6 %      MCV 92.7 fL      MCH 30.0 pg      MCHC 32.3 g/dL      RDW 17.5 %      RDW-SD 56.9 fl      MPV 8.3 fL      Platelets 230 10*3/mm3      Neutrophil % 76.2 %      Lymphocyte % 13.8 %      Monocyte % 6.3 %      Eosinophil % 2.4 %      Basophil % 1.3 %      Neutrophils, Absolute 7.20 10*3/mm3      Lymphocytes, Absolute 1.30 10*3/mm3      Monocytes, Absolute 0.60 10*3/mm3      Eosinophils, Absolute 0.20 10*3/mm3      Basophils, Absolute 0.10 10*3/mm3      nRBC 0.0 /100 WBC              I reviewed the patient's new clinical results.    Assessment & Plan     New onset atrial fib - echo pending; rate controlled; will consult cardiology for further management; Lovenox until INR is therapeutic  Chronic CAD - stable without anginal symptoms; continue aspirin, statin, beta blocker  Hypothyroidism - adjust levothyroxine  dose based on TFT's  H/O pulmonary emboli - subtherapeutic INR; Lovenox to bridge  CKD - unclear what baseline renal function is; will avoid nephrotoxins, obtain renal ultrasound;   AAA - needs repeat imaging; ultrasound pending  Chronic migraines - Topamax  Mood disorder - Paxil  GERD - PPI  UTI - Rocephin, follow culture        I discussed the patient's findings and my recommendations with patient.     Keke Casiano MD  01/22/24  17:31 EST

## 2024-01-22 NOTE — Clinical Note
Level of Care: Med/Surg [1]   Diagnosis: New onset atrial fibrillation [060521]   Admitting Physician: VICTOR MANUEL BURT [[id number]]   Attending Physician: VICTOR MANUEL BURT [[id number]]

## 2024-01-22 NOTE — PROGRESS NOTES
"Pharmacy dosing service  Anticoagulant  Warfarin     Subjective:    Laura Mejia is a 68 y.o.female being continued on warfarin for history of PE.    INR Goal: 2 - 3  Home medication?: warfarin 3 mg PO daily, except warfarin 6 mg PO on Mon/Wed/Fri.   Bridge Therapy Present?:  Yes, Enoxaparin 80 mg SQ Q12H  Interacting Medications Evaluation (New/Present/Discontinued): n/a  Additional Contributing Factors: Her PCP in [place] was following with her INR. Just moved to [facility] where she is attempting to get established with PCP      Assessment/Plan:    INR is subtherapeutic at time of consult. Patient reports her diet has changed and she has had a smaller appetite/not eating as much. Patient is due for her higher dose tonight, will give boost dose of 9 mg (increase of 10% of TWD).     Home regimen may need increase with extra day of 6mg added in pending INR trend.    Continue to monitor and adjust based on INR.         Date 1/22           INR 1.51           Dose 9 mg               Objective:  [Ht: 172.7 cm (68\"); Wt: 81.6 kg (180 lb); BMI: Body mass index is 27.37 kg/m².]    Lab Results   Component Value Date    ALBUMIN 4.2 01/22/2024     Lab Results   Component Value Date    INR 1.51 (L) 01/22/2024    INR 1.01 (L) 09/08/2023    INR 3.4 (H) 05/11/2018    PROTIME 16.0 (L) 01/22/2024    PROTIME 10.8 (L) 09/08/2023    PROTIME 34.1 (H) 05/11/2018     Lab Results   Component Value Date    HGB 13.4 01/22/2024    HGB 12.2 05/11/2018    HGB 12.1 09/13/2017     Lab Results   Component Value Date    HCT 41.6 01/22/2024    HCT 37.2 05/11/2018    HCT 36.4 09/13/2017       Yolie Boateng [facility]  01/22/24 18:03 EST     "

## 2024-01-22 NOTE — ED PROVIDER NOTES
Subjective   History of Present Illness  Chief complaint some generalized weakness for like about a pass out    History of present illness this is a 68-year-old female with multiple health problems complaint about a 1 week history of some general weakness every time she stands up she feels like she is going to pass out her arms and legs get numb and tingly.  There is no headache or visual change speech difficulty or paralysis she is able to eat drink talk and walk and function otherwise normally but she has to sit down to relieve this.  There is no chest pain neck arm jaw pain associated with it.  Denies any recent injury illness flus viruses or vaccinations no recent long car ride plane ride immobilization foreign travels denies any urinary symptoms at this time.  Patient states that her  used his Kardia and checked her heart rate today and it showed atrial fibrillation on the monitor.  He states her heart rate fluctuating from 70 up to 130 at times.      Review of Systems   Constitutional:  Negative for chills and fever.   Respiratory:  Negative for chest tightness and shortness of breath.    Cardiovascular:  Negative for chest pain and palpitations.   Gastrointestinal:  Positive for nausea. Negative for abdominal pain and vomiting.   Endocrine: Negative for cold intolerance and heat intolerance.   Genitourinary:  Positive for dysuria. Negative for difficulty urinating.   Musculoskeletal:  Positive for arthralgias.   Skin:  Negative for rash and wound.   Neurological:  Positive for dizziness and light-headedness. Negative for facial asymmetry, speech difficulty and headaches.   Psychiatric/Behavioral:  Negative for confusion.        Past Medical History:   Diagnosis Date    AAA (abdominal aortic aneurysm)     infrarenal- 3.1cm(2010, 3.7x4.2cm(2016), 3.9cm (2017)    Allergic rhinitis     Aspiration pneumonia 05/2017    CHF (congestive heart failure)     Coronary artery disease     DDD (degenerative disc  disease), lumbar     lumbar spine- Dr. Churchill     Depression     Diverticulosis     Frequent UTI     Dr. Daniels    GERD (gastroesophageal reflux disease)     Hiatal hernia     HSV (herpes simplex virus) infection     Oral    Hyperlipidemia     IBS (irritable bowel syndrome)     Constipation predominant    Ischemic cardiomyopathy 09/2017    AICD     Kidney stones     NSTEMI (non-ST elevated myocardial infarction) 04/26/2017    Obstructive sleep apnea 2011    nfsg 2011, ahi 68    Osteoarthritis     Osteopenia     Peripheral neuropathy     feet    Pulmonary embolism, bilateral 05/2017    Rotator cuff tear     Bilateral- Ortho        Allergies   Allergen Reactions    Oxytetracycline Hives    Oxycodone Itching       Past Surgical History:   Procedure Laterality Date    CARDIAC CATHETERIZATION  04/26/2017    99% mid LAD. 90% mid LCX. 60% RCA. Recommended CABG. Dr. Diaz    CARDIAC DEFIBRILLATOR PLACEMENT  12/26/2017    ICD implant/ Dr. Ellis    CATARACT EXTRACTION      CORONARY ARTERY BYPASS GRAFT  04/26/2017    x4 & ASD Closure    CYSTOSCOPY  2002    negative. Dr. Daniels    LAPAROSCOPIC CHOLECYSTECTOMY  04/17/2013    For biliary dyskinesia. Dr. Mccoy.     REPLACEMENT TOTAL KNEE BILATERAL  11/2004    Dr. Herrera    THORACENTESIS Left 05/08/2017    TONSILLECTOMY      TUBAL ABDOMINAL LIGATION Bilateral        Family History   Problem Relation Age of Onset    Heart disease Mother     Other Mother         Hypoglycemia    Osteoporosis Mother     COPD Father     Stroke Father     Hypertension Brother     Diabetes Brother     Heart disease Brother        Social History     Socioeconomic History    Marital status:    Tobacco Use    Smoking status: Never     Prior to Admission medications    Medication Sig Start Date End Date Taking? Authorizing Provider   HYDROcodone-acetaminophen (NORCO) 7.5-325 MG per tablet Take 1 tablet by mouth Every 6 (Six) Hours As Needed for Moderate Pain. 9/8/23   Gracia Dailey PA    TiZANidine (ZANAFLEX) 4 MG capsule Take 1 capsule by mouth 3 (Three) Times a Day. 9/8/23   Gracia Dailey PA   Medications are Lipitor Coreg Famvir Lasix Synthroid warfarin potassium nitroglycerin      Objective   Physical Exam  Constitutional is a 68-year-old female awake alert no acute distress triage vital signs reviewed.  HEENT extraocular muscles are intact pupils equal round react there is no photophobia mouth clear neck supple no adenopathy no JV no bruits lungs clear no retraction heart regular without murmur abdomen soft without tenderness good bowel sounds no peritoneal findings or pulsatile masses extremities pulses equal upper and lower extremities no edema cords or Homans' sign no evidence of DVT skin warm and dry without rashes neurologic awake alert orientated x 4 no facial asymmetry speech normal no drift the arms or legs no truncal ataxia no focal weakness.  Procedures           ED Course      Results for orders placed or performed during the hospital encounter of 01/22/24   Comprehensive Metabolic Panel    Specimen: Arm, Right; Blood   Result Value Ref Range    Glucose 95 65 - 99 mg/dL    BUN 24 (H) 8 - 23 mg/dL    Creatinine 1.70 (H) 0.57 - 1.00 mg/dL    Sodium 140 136 - 145 mmol/L    Potassium 3.7 3.5 - 5.2 mmol/L    Chloride 108 (H) 98 - 107 mmol/L    CO2 19.0 (L) 22.0 - 29.0 mmol/L    Calcium 9.3 8.6 - 10.5 mg/dL    Total Protein 7.5 6.0 - 8.5 g/dL    Albumin 4.2 3.5 - 5.2 g/dL    ALT (SGPT) 28 1 - 33 U/L    AST (SGOT) 37 (H) 1 - 32 U/L    Alkaline Phosphatase 135 (H) 39 - 117 U/L    Total Bilirubin 0.3 0.0 - 1.2 mg/dL    Globulin 3.3 gm/dL    A/G Ratio 1.3 g/dL    BUN/Creatinine Ratio 14.1 7.0 - 25.0    Anion Gap 13.0 5.0 - 15.0 mmol/L    eGFR 32.3 (L) >60.0 mL/min/1.73   Urinalysis With Microscopic If Indicated (No Culture) - Urine, Clean Catch    Specimen: Urine, Clean Catch   Result Value Ref Range    Color, UA Yellow Yellow, Straw    Appearance, UA Cloudy (A) Clear    pH, UA 5.5  5.0 - 8.0    Specific Gravity, UA 1.014 1.005 - 1.030    Glucose, UA Negative Negative    Ketones, UA Negative Negative    Bilirubin, UA Negative Negative    Blood, UA Negative Negative    Protein, UA Trace (A) Negative    Leuk Esterase, UA Large (3+) (A) Negative    Nitrite, UA Negative Negative    Urobilinogen, UA 1.0 E.U./dL 0.2 - 1.0 E.U./dL   Single High Sensitivity Troponin T    Specimen: Arm, Right; Blood   Result Value Ref Range    HS Troponin T 16 (H) <14 ng/L   CK    Specimen: Arm, Right; Blood   Result Value Ref Range    Creatine Kinase 38 20 - 180 U/L   Magnesium    Specimen: Arm, Right; Blood   Result Value Ref Range    Magnesium 2.2 1.6 - 2.4 mg/dL   CBC Auto Differential    Specimen: Arm, Right; Blood   Result Value Ref Range    WBC 9.50 3.40 - 10.80 10*3/mm3    RBC 4.48 3.77 - 5.28 10*6/mm3    Hemoglobin 13.4 12.0 - 15.9 g/dL    Hematocrit 41.6 34.0 - 46.6 %    MCV 92.7 79.0 - 97.0 fL    MCH 30.0 26.6 - 33.0 pg    MCHC 32.3 31.5 - 35.7 g/dL    RDW 17.5 (H) 12.3 - 15.4 %    RDW-SD 56.9 (H) 37.0 - 54.0 fl    MPV 8.3 6.0 - 12.0 fL    Platelets 230 140 - 450 10*3/mm3    Neutrophil % 76.2 (H) 42.7 - 76.0 %    Lymphocyte % 13.8 (L) 19.6 - 45.3 %    Monocyte % 6.3 5.0 - 12.0 %    Eosinophil % 2.4 0.3 - 6.2 %    Basophil % 1.3 0.0 - 1.5 %    Neutrophils, Absolute 7.20 (H) 1.70 - 7.00 10*3/mm3    Lymphocytes, Absolute 1.30 0.70 - 3.10 10*3/mm3    Monocytes, Absolute 0.60 0.10 - 0.90 10*3/mm3    Eosinophils, Absolute 0.20 0.00 - 0.40 10*3/mm3    Basophils, Absolute 0.10 0.00 - 0.20 10*3/mm3    nRBC 0.0 0.0 - 0.2 /100 WBC   Urinalysis, Microscopic Only - Urine, Clean Catch    Specimen: Urine, Clean Catch   Result Value Ref Range    RBC, UA 0-2 None Seen, 0-2 /HPF    WBC, UA 11-20 (A) None Seen, 0-2 /HPF    Bacteria, UA Trace (A) None Seen /HPF    Squamous Epithelial Cells, UA 3-6 (A) None Seen, 0-2 /HPF    Yeast, UA Small/1+ Yeast None Seen /HPF    Hyaline Casts, UA 7-12 None Seen /LPF    Methodology Manual  Light Microscopy    Protime-INR    Specimen: Blood   Result Value Ref Range    Protime 16.0 (L) 19.4 - 28.5 Seconds    INR 1.51 (L) 2.00 - 3.00   D-dimer, Quantitative    Specimen: Blood   Result Value Ref Range    D-Dimer, Quantitative 3.12 (H) 0.00 - 0.69 mg/L (FEU)   ECG 12 Lead Rhythm Change   Result Value Ref Range    QT Interval 390 ms    QTC Interval 489 ms   ECG 12 Lead Rhythm Change   Result Value Ref Range    QT Interval 432 ms    QTC Interval 505 ms     XR Chest 1 View    Result Date: 1/22/2024  Impression: No acute chest finding. Electronically Signed: Amber Candelaria MD  1/22/2024 3:07 PM EST  Workstation ID: [id number]   Medications   sodium chloride 0.9 % flush 10 mL (has no administration in time range)   sodium chloride 0.9 % infusion (has no administration in time range)                                       EKG my interpretation normal sinus rhythm rate of 94 PACs are noted some Q waves anteriorly QTc of 489  Abnormal EKG PVCs are new from previous EKG and this is compared to 4/16/2013       Medical Decision Making  Medical decision making.  IV established monitor placed my review looks like a sinus rhythm EKG my independent review normal sinus rhythm rate 94 PACs with some Q waves anteriorly but really I do not see any significant change from 4/16/2013 other than the PACs are new.  Chest x-ray my independent review I do not see pneumonia pneumothorax or CHF radiology unremarkable labs obtained my independent review comprehensive metabolic profile remarkable BUN of 24 and creatinine 1.7 CO2 of 19 troponin was 16 magnesium CK normal CBC unremarkable.  The patient's urinalysis showed large leukocyte 20 white cells I did culture that.  The patient was given Rocephin 1 g IV INR is 1.5.  All labs chest x-ray EKG obtained independent reviewed by me.  Patient repeat exam is resting comfortably remained a heart rate in the 70s to 80s and looks like sinus rhythm with me.  May be having bouts of A-fib with  RVR.  Certainly the patient's cardia was suspicious for this.  I do not see evidence here of pneumonia pneumothorax stroke meningitis encephalitis acute ST elevation myocardial infarction pericardial effusion DVT pulmonary embolism although not a complete list of all possibilities she will need serial troponins and further workup.  Patient's UTI is currently being treated I talked to Bertha Calixto for Dr. Burt.  Patient is a new patient to Dr. Joseph.  Patient will be admitted for further workup and care stable otherwise unremarkable ER course.  Based on my current clinical findings at this time I do anticipate this being a 2 midnight stay.    Problems Addressed:  Near syncope: complicated acute illness or injury    Amount and/or Complexity of Data Reviewed  External Data Reviewed: ECG.  Labs: ordered. Decision-making details documented in ED Course.  Radiology: ordered and independent interpretation performed.  ECG/medicine tests: ordered and independent interpretation performed. Decision-making details documented in ED Course.    Risk  Prescription drug management.  Decision regarding hospitalization.        Final diagnoses:   Near syncope       ED Disposition  ED Disposition       ED Disposition   Decision to Admit    Condition   --    Comment   Level of Care: Telemetry [5]   Admitting Physician: VICTOR MANUEL BURT [[id number]]   Attending Physician: VICTOR MANUEL BURT [[id number]]                 No follow-up provider specified.       Medication List      No changes were made to your prescriptions during this visit.            Nicholas Phelps MD  01/22/24 [id number]

## 2024-01-23 ENCOUNTER — APPOINTMENT (OUTPATIENT)
Dept: ULTRASOUND IMAGING | Facility: HOSPITAL | Age: 69
DRG: 309 | End: 2024-01-23
Payer: MEDICARE

## 2024-01-23 ENCOUNTER — APPOINTMENT (OUTPATIENT)
Dept: CARDIOLOGY | Facility: HOSPITAL | Age: 69
DRG: 309 | End: 2024-01-23
Payer: MEDICARE

## 2024-01-23 LAB
ANION GAP SERPL CALCULATED.3IONS-SCNC: 9 MMOL/L (ref 5–15)
ANISOCYTOSIS BLD QL: ABNORMAL
BASOPHILS # BLD MANUAL: 0.17 10*3/MM3 (ref 0–0.2)
BASOPHILS NFR BLD MANUAL: 3 % (ref 0–1.5)
BH CV ECHO MEAS - ACS: 1.6 CM
BH CV ECHO MEAS - AI P1/2T: 472.3 MSEC
BH CV ECHO MEAS - AO MAX PG: 15.8 MMHG
BH CV ECHO MEAS - AO MEAN PG: 9 MMHG
BH CV ECHO MEAS - AO V2 MAX: 199 CM/SEC
BH CV ECHO MEAS - AO V2 VTI: 40.3 CM
BH CV ECHO MEAS - AVA(I,D): 1.37 CM2
BH CV ECHO MEAS - EDV(CUBED): 91.1 ML
BH CV ECHO MEAS - EDV(MOD-SP4): 67.9 ML
BH CV ECHO MEAS - EF(MOD-BP): 49 %
BH CV ECHO MEAS - EF(MOD-SP4): 48.5 %
BH CV ECHO MEAS - ESV(CUBED): 42.9 ML
BH CV ECHO MEAS - ESV(MOD-SP4): 35 ML
BH CV ECHO MEAS - FS: 22.2 %
BH CV ECHO MEAS - IVS/LVPW: 1 CM
BH CV ECHO MEAS - IVSD: 1.2 CM
BH CV ECHO MEAS - LA DIMENSION: 4.4 CM
BH CV ECHO MEAS - LAT PEAK E' VEL: 10 CM/SEC
BH CV ECHO MEAS - LV DIASTOLIC VOL/BSA (35-75): 34.7 CM2
BH CV ECHO MEAS - LV MASS(C)D: 198.1 GRAMS
BH CV ECHO MEAS - LV MAX PG: 3.2 MMHG
BH CV ECHO MEAS - LV MEAN PG: 2 MMHG
BH CV ECHO MEAS - LV SYSTOLIC VOL/BSA (12-30): 17.9 CM2
BH CV ECHO MEAS - LV V1 MAX: 89.6 CM/SEC
BH CV ECHO MEAS - LV V1 VTI: 19.5 CM
BH CV ECHO MEAS - LVIDD: 4.5 CM
BH CV ECHO MEAS - LVIDS: 3.5 CM
BH CV ECHO MEAS - LVOT AREA: 2.8 CM2
BH CV ECHO MEAS - LVOT DIAM: 1.9 CM
BH CV ECHO MEAS - LVPWD: 1.2 CM
BH CV ECHO MEAS - MED PEAK E' VEL: 6.5 CM/SEC
BH CV ECHO MEAS - MV A MAX VEL: 103 CM/SEC
BH CV ECHO MEAS - MV DEC SLOPE: 230 CM/SEC2
BH CV ECHO MEAS - MV DEC TIME: 0.25 SEC
BH CV ECHO MEAS - MV E MAX VEL: 73.4 CM/SEC
BH CV ECHO MEAS - MV E/A: 0.71
BH CV ECHO MEAS - MV MAX PG: 4.2 MMHG
BH CV ECHO MEAS - MV MEAN PG: 2 MMHG
BH CV ECHO MEAS - MV P1/2T: 105.4 MSEC
BH CV ECHO MEAS - MV V2 VTI: 27.2 CM
BH CV ECHO MEAS - MVA(P1/2T): 2.09 CM2
BH CV ECHO MEAS - MVA(VTI): 2.03 CM2
BH CV ECHO MEAS - PA ACC TIME: 0.11 SEC
BH CV ECHO MEAS - PA V2 MAX: 103 CM/SEC
BH CV ECHO MEAS - PULM A REVS DUR: 0.14 SEC
BH CV ECHO MEAS - PULM A REVS VEL: 21.4 CM/SEC
BH CV ECHO MEAS - PULM DIAS VEL: 37.9 CM/SEC
BH CV ECHO MEAS - PULM S/D: 1.35
BH CV ECHO MEAS - PULM SYS VEL: 51.1 CM/SEC
BH CV ECHO MEAS - RAP SYSTOLE: 3 MMHG
BH CV ECHO MEAS - RV MAX PG: 2.7 MMHG
BH CV ECHO MEAS - RV V1 MAX: 82.2 CM/SEC
BH CV ECHO MEAS - RV V1 VTI: 13 CM
BH CV ECHO MEAS - RVDD: 3.1 CM
BH CV ECHO MEAS - RVSP: 22.9 MMHG
BH CV ECHO MEAS - SI(MOD-SP4): 16.8 ML/M2
BH CV ECHO MEAS - SV(LVOT): 55.3 ML
BH CV ECHO MEAS - SV(MOD-SP4): 32.9 ML
BH CV ECHO MEAS - TAPSE (>1.6): 1.02 CM
BH CV ECHO MEAS - TR MAX PG: 19.9 MMHG
BH CV ECHO MEAS - TR MAX VEL: 223 CM/SEC
BH CV ECHO MEASUREMENTS AVERAGE E/E' RATIO: 8.9
BH CV XLRA - TDI S': 8.8 CM/SEC
BUN SERPL-MCNC: 19 MG/DL (ref 8–23)
BUN/CREAT SERPL: 15 (ref 7–25)
CALCIUM SPEC-SCNC: 8.2 MG/DL (ref 8.6–10.5)
CHLORIDE SERPL-SCNC: 113 MMOL/L (ref 98–107)
CO2 SERPL-SCNC: 17 MMOL/L (ref 22–29)
CREAT SERPL-MCNC: 1.27 MG/DL (ref 0.57–1)
DEPRECATED RDW RBC AUTO: 53.4 FL (ref 37–54)
EGFRCR SERPLBLD CKD-EPI 2021: 45.9 ML/MIN/1.73
ERYTHROCYTE [DISTWIDTH] IN BLOOD BY AUTOMATED COUNT: 16.7 % (ref 12.3–15.4)
FERRITIN SERPL-MCNC: 358.9 NG/ML (ref 13–150)
GLUCOSE SERPL-MCNC: 83 MG/DL (ref 65–99)
HCT VFR BLD AUTO: 32.9 % (ref 34–46.6)
HGB BLD-MCNC: 10.9 G/DL (ref 12–15.9)
INR PPP: 2.03 (ref 2–3)
IRON 24H UR-MRATE: 54 MCG/DL (ref 37–145)
IRON SATN MFR SERPL: 18 % (ref 20–50)
LEFT ATRIUM VOLUME INDEX: 30.6 ML/M2
LYMPHOCYTES # BLD MANUAL: 1.62 10*3/MM3 (ref 0.7–3.1)
LYMPHOCYTES NFR BLD MANUAL: 1 % (ref 5–12)
MAGNESIUM SERPL-MCNC: 2 MG/DL (ref 1.6–2.4)
MCH RBC QN AUTO: 30.2 PG (ref 26.6–33)
MCHC RBC AUTO-ENTMCNC: 33 G/DL (ref 31.5–35.7)
MCV RBC AUTO: 91.6 FL (ref 79–97)
MONOCYTES # BLD: 0.06 10*3/MM3 (ref 0.1–0.9)
NEUTROPHILS # BLD AUTO: 3.94 10*3/MM3 (ref 1.7–7)
NEUTROPHILS NFR BLD MANUAL: 68 % (ref 42.7–76)
PLAT MORPH BLD: NORMAL
PLATELET # BLD AUTO: 142 10*3/MM3 (ref 140–450)
PMV BLD AUTO: 8.6 FL (ref 6–12)
POTASSIUM SERPL-SCNC: 3.8 MMOL/L (ref 3.5–5.2)
PROTHROMBIN TIME: 21 SECONDS (ref 19.4–28.5)
QT INTERVAL: 390 MS
QT INTERVAL: 432 MS
QTC INTERVAL: 489 MS
QTC INTERVAL: 505 MS
RBC # BLD AUTO: 3.59 10*6/MM3 (ref 3.77–5.28)
SCAN SLIDE: NORMAL
SINUS: 2.9 CM
SODIUM SERPL-SCNC: 139 MMOL/L (ref 136–145)
TIBC SERPL-MCNC: 297 MCG/DL (ref 298–536)
TRANSFERRIN SERPL-MCNC: 199 MG/DL (ref 200–360)
VARIANT LYMPHS NFR BLD MANUAL: 1 % (ref 0–5)
VARIANT LYMPHS NFR BLD MANUAL: 27 % (ref 19.6–45.3)
WBC MORPH BLD: NORMAL
WBC NRBC COR # BLD AUTO: 5.8 10*3/MM3 (ref 3.4–10.8)

## 2024-01-23 PROCEDURE — 25010000002 ONDANSETRON PER 1 MG: Performed by: INTERNAL MEDICINE

## 2024-01-23 PROCEDURE — G0378 HOSPITAL OBSERVATION PER HR: HCPCS

## 2024-01-23 PROCEDURE — 82728 ASSAY OF FERRITIN: CPT | Performed by: INTERNAL MEDICINE

## 2024-01-23 PROCEDURE — 85025 COMPLETE CBC W/AUTO DIFF WBC: CPT | Performed by: INTERNAL MEDICINE

## 2024-01-23 PROCEDURE — 25810000003 SODIUM CHLORIDE 0.9 % SOLUTION: Performed by: INTERNAL MEDICINE

## 2024-01-23 PROCEDURE — 99222 1ST HOSP IP/OBS MODERATE 55: CPT | Performed by: INTERNAL MEDICINE

## 2024-01-23 PROCEDURE — 25010000002 ENOXAPARIN PER 10 MG: Performed by: INTERNAL MEDICINE

## 2024-01-23 PROCEDURE — 93306 TTE W/DOPPLER COMPLETE: CPT

## 2024-01-23 PROCEDURE — 85007 BL SMEAR W/DIFF WBC COUNT: CPT | Performed by: INTERNAL MEDICINE

## 2024-01-23 PROCEDURE — 80048 BASIC METABOLIC PNL TOTAL CA: CPT | Performed by: INTERNAL MEDICINE

## 2024-01-23 PROCEDURE — 25010000002 CEFTRIAXONE PER 250 MG: Performed by: NURSE PRACTITIONER

## 2024-01-23 PROCEDURE — 85610 PROTHROMBIN TIME: CPT | Performed by: INTERNAL MEDICINE

## 2024-01-23 PROCEDURE — 76775 US EXAM ABDO BACK WALL LIM: CPT

## 2024-01-23 PROCEDURE — 93306 TTE W/DOPPLER COMPLETE: CPT | Performed by: INTERNAL MEDICINE

## 2024-01-23 PROCEDURE — 83540 ASSAY OF IRON: CPT | Performed by: INTERNAL MEDICINE

## 2024-01-23 PROCEDURE — 84466 ASSAY OF TRANSFERRIN: CPT | Performed by: INTERNAL MEDICINE

## 2024-01-23 PROCEDURE — 83735 ASSAY OF MAGNESIUM: CPT | Performed by: INTERNAL MEDICINE

## 2024-01-23 RX ORDER — WARFARIN SODIUM 1 MG/1
1.5 TABLET ORAL
Status: COMPLETED | OUTPATIENT
Start: 2024-01-23 | End: 2024-01-23

## 2024-01-23 RX ORDER — OSELTAMIVIR PHOSPHATE 30 MG/1
30 CAPSULE ORAL EVERY 12 HOURS SCHEDULED
Qty: 5 CAPSULE | Refills: 0 | Status: DISCONTINUED | OUTPATIENT
Start: 2024-01-23 | End: 2024-01-25

## 2024-01-23 RX ORDER — OSELTAMIVIR PHOSPHATE 75 MG/1
75 CAPSULE ORAL ONCE
Status: COMPLETED | OUTPATIENT
Start: 2024-01-23 | End: 2024-01-23

## 2024-01-23 RX ORDER — PROCHLORPERAZINE EDISYLATE 5 MG/ML
5 INJECTION INTRAMUSCULAR; INTRAVENOUS EVERY 6 HOURS PRN
Status: DISCONTINUED | OUTPATIENT
Start: 2024-01-23 | End: 2024-01-26 | Stop reason: HOSPADM

## 2024-01-23 RX ORDER — DILTIAZEM HYDROCHLORIDE 240 MG/1
240 CAPSULE, COATED, EXTENDED RELEASE ORAL
Status: DISCONTINUED | OUTPATIENT
Start: 2024-01-23 | End: 2024-01-26 | Stop reason: HOSPADM

## 2024-01-23 RX ORDER — LEVOTHYROXINE SODIUM 0.05 MG/1
100 TABLET ORAL
Status: DISCONTINUED | OUTPATIENT
Start: 2024-01-24 | End: 2024-01-23

## 2024-01-23 RX ORDER — LEVOTHYROXINE SODIUM 88 UG/1
88 TABLET ORAL
Status: DISCONTINUED | OUTPATIENT
Start: 2024-01-24 | End: 2024-01-26 | Stop reason: HOSPADM

## 2024-01-23 RX ADMIN — ENOXAPARIN SODIUM 80 MG: 100 INJECTION SUBCUTANEOUS at 06:33

## 2024-01-23 RX ADMIN — ASPIRIN 81 MG CHEWABLE TABLET 81 MG: 81 TABLET CHEWABLE at 08:45

## 2024-01-23 RX ADMIN — ENOXAPARIN SODIUM 80 MG: 100 INJECTION SUBCUTANEOUS at 16:47

## 2024-01-23 RX ADMIN — OSELTAMIVIR PHOSPHATE 75 MG: 75 CAPSULE ORAL at 16:47

## 2024-01-23 RX ADMIN — LEVOTHYROXINE SODIUM 88 MCG: 0.09 TABLET ORAL at 06:32

## 2024-01-23 RX ADMIN — ATORVASTATIN CALCIUM 80 MG: 40 TABLET, FILM COATED ORAL at 21:10

## 2024-01-23 RX ADMIN — SODIUM CHLORIDE 100 ML/HR: 900 INJECTION INTRAVENOUS at 04:17

## 2024-01-23 RX ADMIN — DILTIAZEM HYDROCHLORIDE 240 MG: 240 CAPSULE, COATED, EXTENDED RELEASE ORAL at 16:54

## 2024-01-23 RX ADMIN — CEFTRIAXONE 1000 MG: 1 INJECTION, POWDER, FOR SOLUTION INTRAMUSCULAR; INTRAVENOUS at 16:47

## 2024-01-23 RX ADMIN — Medication 5 MG: at 19:57

## 2024-01-23 RX ADMIN — OSELTAMIVIR PHOSPHATE 30 MG: 30 CAPSULE ORAL at 21:10

## 2024-01-23 RX ADMIN — ONDANSETRON 4 MG: 2 INJECTION INTRAMUSCULAR; INTRAVENOUS at 19:56

## 2024-01-23 RX ADMIN — TOPIRAMATE 50 MG: 25 TABLET, FILM COATED ORAL at 21:10

## 2024-01-23 RX ADMIN — POTASSIUM CHLORIDE 20 MEQ: 1.5 POWDER, FOR SOLUTION ORAL at 08:44

## 2024-01-23 RX ADMIN — SODIUM CHLORIDE 100 ML/HR: 900 INJECTION INTRAVENOUS at 14:21

## 2024-01-23 RX ADMIN — CARVEDILOL 3.12 MG: 3.12 TABLET, FILM COATED ORAL at 08:45

## 2024-01-23 RX ADMIN — PAROXETINE 40 MG: 40 TABLET, FILM COATED ORAL at 06:32

## 2024-01-23 RX ADMIN — WARFARIN SODIUM 1.5 MG: 1 TABLET ORAL at 21:10

## 2024-01-23 RX ADMIN — PANTOPRAZOLE SODIUM 40 MG: 40 TABLET, DELAYED RELEASE ORAL at 06:32

## 2024-01-23 RX ADMIN — Medication 10 ML: at 21:12

## 2024-01-23 NOTE — CONSULTS
NEPHROLOGY CONSULTATION-----[facility]    [facility]  [phone]  Jozef Otero MD    Patient Care Team:  Luan Joseph FNP as PCP - General (Family Medicine)    CC/REASON FOR CONSULTATION: Elevated creatinine    PHYSICIAN REQUESTING CONSULTATION: Dr Casiano    History of Present Illness  68 year old very pleasant female with PMHx CAD presented to hospital with c/o generalized weakness. She was found to be in a fib. CR 1.7, down to 1.2 today. Renal US without obstructive uropathy. Previously CR 0.9-1.2. Previously followed by Nephrologist in [place] for CKD. No dysuria, gross hematuria, or chest pain. Her BP was soft on admission.    Review of Systems   As noted above, otherwise 10 systems reviewed and were negative    Past Medical History:   Diagnosis Date    AAA (abdominal aortic aneurysm)     infrarenal- 3.1cm(2010, 3.7x4.2cm(2016), 3.9cm (2017)    Allergic rhinitis     Aspiration pneumonia 05/2017    CHF (congestive heart failure)     Coronary artery disease     DDD (degenerative disc disease), lumbar     lumbar spine- Dr. Churchill     Depression     Diverticulosis     Frequent UTI     Dr. Daniels    GERD (gastroesophageal reflux disease)     Hiatal hernia     HSV (herpes simplex virus) infection     Oral    Hyperlipidemia     IBS (irritable bowel syndrome)     Constipation predominant    Ischemic cardiomyopathy 09/2017    AICD     Kidney stones     NSTEMI (non-ST elevated myocardial infarction) 04/26/2017    Obstructive sleep apnea 2011    nfsg 2011, ahi 67    Osteoarthritis     Osteopenia     Peripheral neuropathy     feet    Pulmonary embolism, bilateral 05/2017    Rotator cuff tear     Bilateral- Ortho        Past Surgical History:   Procedure Laterality Date    CARDIAC CATHETERIZATION  04/26/2017    99% mid LAD. 90% mid LCX. 60% RCA. Recommended CABG. Dr. Diaz    CARDIAC DEFIBRILLATOR PLACEMENT  12/26/2017    ICD implant/ Dr. Ellis    CATARACT  EXTRACTION      CORONARY ARTERY BYPASS GRAFT  04/26/2017    x4 & ASD Closure    CYSTOSCOPY  2002    negative. Dr. Daniels    LAPAROSCOPIC CHOLECYSTECTOMY  04/17/2013    For biliary dyskinesia. Dr. Mccoy.     REPLACEMENT TOTAL KNEE BILATERAL  11/2004    Dr. Herrera    THORACENTESIS Left 05/08/2017    TONSILLECTOMY      TUBAL ABDOMINAL LIGATION Bilateral        Family History   Problem Relation Age of Onset    Heart disease Mother     Other Mother         Hypoglycemia    Osteoporosis Mother     COPD Father     Stroke Father     Hypertension Brother     Diabetes Brother     Heart disease Brother        Social History     Tobacco Use    Smoking status: Never   Vaping Use    Vaping Use: Some days    Substances: CBD, nightly, 2-3 weekly   Substance Use Topics    Drug use: Never       Home Meds: (Not in a hospital admission)    Prior to Admission medications    Medication Sig Start Date End Date Taking? Authorizing Provider   aspirin 81 MG chewable tablet Chew 1 tablet Daily.   Yes Amor White MD   atorvastatin (LIPITOR) 80 MG tablet Take 1 tablet by mouth Daily.   Yes ProviderAmor MD   carvedilol (COREG) 3.125 MG tablet Take 1 tablet by mouth 2 (Two) Times a Day With Meals.   Yes ProviderAmor MD   ezetimibe (ZETIA) 10 MG tablet Take 1 tablet by mouth Daily.   Yes ProviderAmor MD   famciclovir (FAMVIR) 500 MG tablet Take 1 tablet by mouth 3 (Three) Times a Day As Needed.   Yes Amor White MD   furosemide (LASIX) 40 MG tablet Take 1 tablet by mouth Daily As Needed.   Yes Amor White MD   levocetirizine (XYZAL) 5 MG tablet Take 0.5 tablets by mouth Every Evening.   Yes Amor White MD   levothyroxine (SYNTHROID, LEVOTHROID) 75 MCG tablet Take 1 tablet by mouth Daily.   Yes Amor White MD   omeprazole (priLOSEC) 40 MG capsule Take 1 capsule by mouth Daily.   Yes Amor White MD   PARoxetine (PAXIL) 40 MG tablet Take 1 tablet by mouth Every Morning.    Yes Amor White MD   potassium chloride (KLOR-CON) 20 MEQ packet Take 20 mEq by mouth Daily.   Yes Amor White MD   topiramate (TOPAMAX) 50 MG tablet Take 1 tablet by mouth Daily.   Yes Amor White MD   trospium (SANCTURA) 20 MG tablet Take 1 tablet by mouth Every Night.   Yes Amor White MD   warfarin (COUMADIN) 3 MG tablet Take 2 tablets by mouth 3 (Three) Times a Week. Take 2 tablet by mouth on Monday, Wednesday, and Friday   Yes Amor White MD   warfarin (COUMADIN) 3 MG tablet See Admin Instructions. Take 1 tablet by mouth on Tuesday, Thursday, Saturday, and Sunday    Amor White MD        Scheduled Meds:  aspirin, 81 mg, Oral, Daily  atorvastatin, 80 mg, Oral, Nightly  carvedilol, 3.125 mg, Oral, BID With Meals  cefTRIAXone, 1,000 mg, Intravenous, Q24H  enoxaparin, 1 mg/kg, Subcutaneous, Q12H  [START ON 1/24/2024] levothyroxine, 88 mcg, Oral, Q AM  oseltamivir, 30 mg, Oral, Q12H  oseltamivir, 75 mg, Oral, Once  pantoprazole, 40 mg, Oral, Q AM  PARoxetine, 40 mg, Oral, QAM  potassium chloride, 20 mEq, Oral, Daily  senna-docusate sodium, 2 tablet, Oral, BID  sodium chloride, 10 mL, Intravenous, Q12H  topiramate, 50 mg, Oral, Nightly        Continuous Infusions:  Pharmacy to dose warfarin,   sodium chloride, 100 mL/hr, Last Rate: 100 mL/hr (01/23/24 1421)        PRN Meds:    senna-docusate sodium **AND** polyethylene glycol **AND** bisacodyl **AND** bisacodyl    melatonin    nitroglycerin    ondansetron    Pharmacy to dose warfarin    [COMPLETED] Insert Peripheral IV **AND** sodium chloride    sodium chloride    sodium chloride    Allergies:  Oxytetracycline and Oxycodone    OBJECTIVE    Vital Signs  Temp:  [97.5 °F (36.4 °C)-98.2 °F (36.8 °C)] 97.5 °F (36.4 °C)  Heart Rate:  [62-79] 66  Resp:  [13-21] 18  BP: ()/(55-67) 93/55    I/O this shift:  In: 240 [P.O.:240]  Out: -   I/O last 3 completed shifts:  In: 120 [P.O.:120]  Out: -     Physical  "Exam:  General Appearance: alert, appears stated age and cooperative  Head: normocephalic, without obvious abnormality and atraumatic  Eyes: conjunctivae and sclerae normal and no icterus  Neck: supple and no JVD  Lungs: clear to auscultation and respirations regular  Heart: regular rhythm & normal rate and normal S1, S2  Chest Wall: no abnormalities observed  Abdomen: normal bowel sounds and soft, nontender  Extremities: moves extremities well, no edema, no cyanosis  Skin: no bleeding, bruising or rash  Neurologic: Alert, and oriented. No focal deficits    Results Review:    I reviewed the patient's new clinical results.    WBC WBC   Date Value Ref Range Status   01/22/2024 9.50 3.40 - 10.80 10*3/mm3 Final      HGB Hemoglobin   Date Value Ref Range Status   01/22/2024 13.4 12.0 - 15.9 g/dL Final      HCT Hematocrit   Date Value Ref Range Status   01/22/2024 41.6 34.0 - 46.6 % Final      Platelets No results found for: \"LABPLAT\"   MCV MCV   Date Value Ref Range Status   01/22/2024 92.7 79.0 - 97.0 fL Final          Sodium Sodium   Date Value Ref Range Status   01/23/2024 139 136 - 145 mmol/L Final   01/22/2024 140 136 - 145 mmol/L Final      Potassium Potassium   Date Value Ref Range Status   01/23/2024 3.8 3.5 - 5.2 mmol/L Final     Comment:     Slight hemolysis detected by analyzer. Result may be falsely elevated.   01/22/2024 3.7 3.5 - 5.2 mmol/L Final     Comment:     Slight hemolysis detected by analyzer. Result may be falsely elevated.      Chloride Chloride   Date Value Ref Range Status   01/23/2024 113 (H) 98 - 107 mmol/L Final   01/22/2024 108 (H) 98 - 107 mmol/L Final      CO2 CO2   Date Value Ref Range Status   01/23/2024 17.0 (L) 22.0 - 29.0 mmol/L Final   01/22/2024 19.0 (L) 22.0 - 29.0 mmol/L Final      BUN BUN   Date Value Ref Range Status   01/23/2024 19 8 - 23 mg/dL Final   01/22/2024 24 (H) 8 - 23 mg/dL Final      Creatinine Creatinine   Date Value Ref Range Status   01/23/2024 1.27 (H) 0.57 - 1.00 " "mg/dL Final   01/22/2024 1.70 (H) 0.57 - 1.00 mg/dL Final      Calcium Calcium   Date Value Ref Range Status   01/23/2024 8.2 (L) 8.6 - 10.5 mg/dL Final   01/22/2024 9.3 8.6 - 10.5 mg/dL Final      PO4 No results found for: \"CAPO4\"   Albumin Albumin   Date Value Ref Range Status   01/22/2024 4.2 3.5 - 5.2 g/dL Final      Magnesium Magnesium   Date Value Ref Range Status   01/23/2024 2.0 1.6 - 2.4 mg/dL Final   01/22/2024 2.2 1.6 - 2.4 mg/dL Final      Uric Acid No results found for: \"URICACID\"       Imaging Results (Last 72 Hours)       Procedure Component Value Units Date/Time    US Aorta Limited [[id number]] Collected: 01/23/24 0723     Updated: 01/23/24 0730    Narrative:      US AORTA LIMITED    Date of Exam: 1/23/2024 5:50 AM EST    Indication: Follow-up abdominal aortic aneurysm, status post repair..    Comparison: No comparisons available.    Technique: Routine gray scale, color flow and spectral Doppler analysis of the abdominal aorta and proximal common iliac arteries was performed.      Findings:  The patient has a abdominal aortic aneurysm with a visible stent graft. The proximal abdominal aorta measures 3.7 x 3.3 cm. The endograft at this level measures 1.6 x 1.8 cm. The mid abdominal aorta measures 2.7 x 2.3 cm. The endograft measures 1.1 x 1.8   cm at this level. The distal abdominal aorta including the endograft have a diameter of 2.6 x 2.8 cm. There is no visible color Doppler flow in the thrombosed aneurysm sac. There is antegrade color Doppler flow within the endograft. The right common   iliac artery has a maximum AP diameter of 1.2 cm. The left common iliac artery has a maximum AP diameter of 1.1 cm. There is antegrade color Doppler flow in both common iliac arteries.        Impression:      Impression:  Status post placement of a stent graft. There is normal antegrade color Doppler flow including the common iliac arteries. There is thrombus within the aneurysm sac. Dimensions are discussed in " detail above.        Electronically Signed: Zheng Doe MD    1/23/2024 7:28 AM EST    Workstation ID: [id number]    US Renal Bilateral [[id number]] Collected: 01/22/24 1730     Updated: 01/22/24 1736    Narrative:      US RENAL BILATERAL    Date of Exam: 1/22/2024 4:05 PM CST    Indication: elevated creatinine.    Comparison: No comparisons available.    Technique: Grayscale and color Doppler ultrasound evaluation of the kidneys and urinary bladder was performed.      Findings:  RIGHT kidney measures 8.1 cm.  Kidney echogenicity, size, and vascularity appear within normal limits. There is no solid kidney mass.  No echogenic shadowing stone.  No hydronephrosis.    LEFT kidney measures 8.2 cm. Kidney echogenicity, size, and vascularity appear within normal limits. There is no solid kidney mass.  No echogenic shadowing stone.  No hydronephrosis.    Limited visualization of the urinary bladder is unremarkable. Total volume is 182 mL.    Incidental note is made of right pleural effusion.        Impression:      Impression:  Unremarkable renal ultrasound.    Right pleural effusion.        Electronically Signed: Nela Elder MD    1/22/2024 4:30 PM CST    Workstation ID: [id number]    XR Chest 1 View [[id number]] Collected: 01/22/24 1344     Updated: 01/22/24 1509    Narrative:      XR CHEST 1 VW    Date of Exam: 1/22/2024 1:40 PM EST    Indication: Weakness Short of breath    Comparison: PA and lateral chest radiograph 9/12/2017    Findings:  The lungs are clear. The heart size is within normal limits with signs of prior median sternotomy and CABG. Left chest wall pacemaker has been placed since the prior examination with a single lead extending to the expected location of the right   ventricle. Pulmonary vascular distribution is normal without evidence of edema. No pleural effusion, pneumothorax or acute osseous abnormality. Right shoulder replacement changes are new since the prior chest x-ray.      Impression:       Impression:  No acute chest finding.      Electronically Signed: Amber Candelaria MD    1/22/2024 3:07 PM EST    Workstation ID: [id number]              Results for orders placed during the hospital encounter of 01/22/24    XR Chest 1 View    Narrative  XR CHEST 1 VW    Date of Exam: 1/22/2024 1:40 PM EST    Indication: Weakness Short of breath    Comparison: PA and lateral chest radiograph 9/12/2017    Findings:  The lungs are clear. The heart size is within normal limits with signs of prior median sternotomy and CABG. Left chest wall pacemaker has been placed since the prior examination with a single lead extending to the expected location of the right  ventricle. Pulmonary vascular distribution is normal without evidence of edema. No pleural effusion, pneumothorax or acute osseous abnormality. Right shoulder replacement changes are new since the prior chest x-ray.    Impression  Impression:  No acute chest finding.      Electronically Signed: Amber Candelaria MD  1/22/2024 3:07 PM EST  Workstation ID: [id number]      Results for orders placed during the hospital encounter of 09/08/23    XR Spine Lumbar Complete 4+VW    Narrative  XR SPINE LUMBAR COMPLETE 4+VW, XR SPINE THORACIC 3 VW    Date of Exam: 9/8/2023 10:25 AM EDT    Indication: low back pain upper and lower back pain    Comparison: None available.    Findings:  Lumbar spine with oblique views: There is moderately severe central and anterior wedging of L1 which appears old. There is mild narrowing of the T12-L1 and L1-2 disc interspaces. There is slight retrolisthesis of L1 on L2 and of L2 on L3. There is mild  narrowing of the L2-3 disc. There is moderate narrowing of the L4-5 disc with grade 1 spondylolisthesis of L4 on L5. There are no definite pars defects. There is a radiopaque aortic biiliac stent.    Thoracic spine: There is a mild right convexity scoliosis. There is severe central and anterior wedging of T7 and T8. There is old mild wedging of T11 and T12.  There is moderate narrowing of mid and lower thoracic disc interspaces with mild spurring.    Impression  Impression:  Compression deformities of T11-L1 appear most likely old.    Compression deformities of T7 and T8 are of indeterminate age.    Degenerative disc disease as detailed.    Electronically Signed: Monica George MD  9/8/2023 11:04 AM EDT  Workstation ID: [id number]      XR Spine Thoracic 3 View    Narrative  XR SPINE LUMBAR COMPLETE 4+VW, XR SPINE THORACIC 3 VW    Date of Exam: 9/8/2023 10:25 AM EDT    Indication: low back pain upper and lower back pain    Comparison: None available.    Findings:  Lumbar spine with oblique views: There is moderately severe central and anterior wedging of L1 which appears old. There is mild narrowing of the T12-L1 and L1-2 disc interspaces. There is slight retrolisthesis of L1 on L2 and of L2 on L3. There is mild  narrowing of the L2-3 disc. There is moderate narrowing of the L4-5 disc with grade 1 spondylolisthesis of L4 on L5. There are no definite pars defects. There is a radiopaque aortic biiliac stent.    Thoracic spine: There is a mild right convexity scoliosis. There is severe central and anterior wedging of T7 and T8. There is old mild wedging of T11 and T12. There is moderate narrowing of mid and lower thoracic disc interspaces with mild spurring.    Impression  Impression:  Compression deformities of T11-L1 appear most likely old.    Compression deformities of T7 and T8 are of indeterminate age.    Degenerative disc disease as detailed.    Electronically Signed: Monica George MD  9/8/2023 11:04 AM EDT  Workstation ID: [id number]            ASSESSMENT / PLAN      New onset atrial fibrillation    TRACEY--likely pre-renal in setting of hypotension and new onset a fib causing reduced renal perfusion  CKD stage 3a--CKD due to nephrosclerosis  Hx CAD s/p CABG  New onset a f ib--per cards  ? UTI--Cx pending  Hx PE  Anemia--check iron/ferritin    CR better, renal US ok, UA  trace bacteria, Cx pending, min protein  Avoid ACEi/ARBs for now  Decrease fluids  Labs in am          I discussed the patient's findings and my recommendations with patient and consulting provider    Will follow along closely. Thank you for allowing us to see this patient in renal consultation.    [facility]  [phone]  MD Jozef Dupree MD  01/23/24  15:36 EST

## 2024-01-23 NOTE — PROGRESS NOTES
"Pharmacy dosing service  Anticoagulant  Warfarin     Subjective:    Laura Mejia is a 68 y.o.female being continued on warfarin for history of PE.    INR Goal: 2 - 3  Home medication?: warfarin 3 mg PO daily, except warfarin 6 mg PO on Mon/Wed/Fri.   Bridge Therapy Present?:  Yes, Enoxaparin 80 mg SQ Q12H  Interacting Medications Evaluation (New/Present/Discontinued): n/a  Additional Contributing Factors: Her PCP in [place] was following with her INR. Just moved to [facility] where she is attempting to get established with PCP      Assessment/Plan:    INR is therapeutic. INR increased sharply. Will give 1.5 mg tonight due to increase in INR. Repeat INR tomorrow to evaluate trend prior to ordering any subsequent doses.    Continue to monitor and adjust based on INR.         Date 1/22 1/23          INR 1.51 2.03          Dose 9 mg 1.5 mg              Objective:  [Ht: 172.7 cm (68\"); Wt: 81.6 kg (180 lb); BMI: Body mass index is 27.37 kg/m².]    Lab Results   Component Value Date    ALBUMIN 4.2 01/22/2024     Lab Results   Component Value Date    INR 2.03 01/23/2024    INR 1.51 (L) 01/22/2024    INR 1.01 (L) 09/08/2023    PROTIME 21.0 01/23/2024    PROTIME 16.0 (L) 01/22/2024    PROTIME 10.8 (L) 09/08/2023     Lab Results   Component Value Date    HGB 10.9 (L) 01/23/2024    HGB 13.4 01/22/2024    HGB 12.2 05/11/2018     Lab Results   Component Value Date    HCT 32.9 (L) 01/23/2024    HCT 41.6 01/22/2024    HCT 37.2 05/11/2018       Yolie Boateng [facility]  01/23/24 17:13 EST       "

## 2024-01-23 NOTE — CONSULTS
Initial spiritual care consult. Pt welcomed  and asked for prayer. Pt had moved to [place] from [place] and has been sick with Afib. She has had it before and is anxious that it's back and doctor's are not sure why. Pt is a [employer] and and asked for prayer that there would be answers.  prayed accordingly and the pt was thankful. She had no other needs after the prayer.

## 2024-01-23 NOTE — CASE MANAGEMENT/SOCIAL WORK
Continued Stay Note   [facility]     Patient Name: Laura Mejia  MRN: [id number]  Today's Date: 1/23/2024    Admit Date: 1/22/2024    Plan: DC PLAN: Return home with      Discharge Plan       Row Name 01/23/24 0933       Plan    Plan DC PLAN: Return home with     Plan Comments DC PLAN: NPO For Aorta ultrasound      Row Name 01/23/24 0921       Plan    Plan DC PLAN: Return home with .    Plan Comments Met with patient at bedside, from routine home with . Independent with ADL's no DME. PCP is Gilberto and pharmacy is [facility] in [place], [place]. Able to afford medications, denies any concerns with food or utilities. Denies any transportation issues, still drives.  will  be the one to transport at discharge. Denies any needs for HHC or SNF. Denies any concerns about return home.     DC BARRIERS: NPO for Aorta ultrasound                      Expected Discharge Date and Time       Expected Discharge Date Expected Discharge Time    Jan 23, 2024           Zaira Vazquez RN   Case Management  [phone]

## 2024-01-23 NOTE — PLAN OF CARE
Goal Outcome Evaluation:  Plan of Care Reviewed With: patient        Progress: no change                                Patient has slept on and off throughout the night with no complaints. Her vitals have been stable. She continues to have a productive cough with thick yellow sputum. Patient has been NPO since midnight awaiting to have an aorta ultrasound. Patient did inform RN that she has not been wearing her home CPAP recently while she's been sick because she can't breathe with it on. Patient currently appears to be resting comfortably at this time.

## 2024-01-23 NOTE — CONSULTS
Referring Provider: Keke Casiano MD  Reason for Consultation:  New onset atrial fibrillation  Status post CABG  Ischemic cardiomyopathy  Status post ICD    Patient Care Team:  Luan Joseph FNP as PCP - General (Family Medicine)    Chief complaint  Weakness and palpitations    Subjective .     History of present illness:  Laura Mejia is a 68 y.o. female who presents with history of multiple cardiac and noncardiac problems was admitted to the hospital with history of generalized weakness for last couple months.  Patient had COVID infection.  Prior to admission patient was standing and she had severe weakness and palpitations.  Patient came to the emergency room and was found to be in atrial fibrillation with RVR.  Patient was started on intravenous Cardizem and has converted to sinus rhythm.  Patient had CABG at [facility] and patient has been followed by Dr. Olsen after she returned from [place].  Patient denies having any chest discomfort but has some shortness of breath.  No other associated aggravating or elevating factors.  Cardiology consultation was requested.        ROS      He patient has been without any chest discomfort, shortness of breath,, dizziness or syncope.  Denies having any headache, abdominal pain, nausea, vomiting, diarrhea, constipation, loss of weight or loss of appetite.  Denies having any excessive bruising, hematuria or blood in the stool.    Review of all systems negative except as indicated      History  Past Medical History:   Diagnosis Date    AAA (abdominal aortic aneurysm)     infrarenal- 3.1cm(2010, 3.7x4.2cm(2016), 3.9cm (2017)    Allergic rhinitis     Aspiration pneumonia 05/2017    CHF (congestive heart failure)     Coronary artery disease     DDD (degenerative disc disease), lumbar     lumbar spine- Dr. Churchill     Depression     Diverticulosis     Frequent UTI     Dr. Daniels    GERD (gastroesophageal reflux disease)     Hiatal hernia     HSV (herpes simplex  virus) infection     Oral    Hyperlipidemia     IBS (irritable bowel syndrome)     Constipation predominant    Ischemic cardiomyopathy 09/2017    AICD     Kidney stones     NSTEMI (non-ST elevated myocardial infarction) 04/26/2017    Obstructive sleep apnea 2011    nfsg 2011, ahi 67    Osteoarthritis     Osteopenia     Peripheral neuropathy     feet    Pulmonary embolism, bilateral 05/2017    Rotator cuff tear     Bilateral- Ortho        Past Surgical History:   Procedure Laterality Date    CARDIAC CATHETERIZATION  04/26/2017    99% mid LAD. 90% mid LCX. 60% RCA. Recommended CABG. Dr. Diaz    CARDIAC DEFIBRILLATOR PLACEMENT  12/26/2017    ICD implant/ Dr. Ellis    CATARACT EXTRACTION      CORONARY ARTERY BYPASS GRAFT  04/26/2017    x4 & ASD Closure    CYSTOSCOPY  2002    negative. Dr. Daniels    LAPAROSCOPIC CHOLECYSTECTOMY  04/17/2013    For biliary dyskinesia. Dr. Mccoy.     REPLACEMENT TOTAL KNEE BILATERAL  11/2004    Dr. Herrera    THORACENTESIS Left 05/08/2017    TONSILLECTOMY      TUBAL ABDOMINAL LIGATION Bilateral        Family History   Problem Relation Age of Onset    Heart disease Mother     Other Mother         Hypoglycemia    Osteoporosis Mother     COPD Father     Stroke Father     Hypertension Brother     Diabetes Brother     Heart disease Brother        Social History     Tobacco Use    Smoking status: Never   Vaping Use    Vaping Use: Some days    Substances: CBD, nightly, 2-3 weekly   Substance Use Topics    Drug use: Never        (Not in a hospital admission)        Oxytetracycline and Oxycodone    Scheduled Meds:aspirin, 81 mg, Oral, Daily  atorvastatin, 80 mg, Oral, Nightly  carvedilol, 3.125 mg, Oral, BID With Meals  enoxaparin, 1 mg/kg, Subcutaneous, Q12H  levothyroxine, 88 mcg, Oral, Q AM  pantoprazole, 40 mg, Oral, Q AM  PARoxetine, 40 mg, Oral, QAM  potassium chloride, 20 mEq, Oral, Daily  senna-docusate sodium, 2 tablet, Oral, BID  sodium chloride, 10 mL, Intravenous, Q12H  topiramate,  "50 mg, Oral, Nightly      Continuous Infusions:Pharmacy to dose warfarin,   sodium chloride, 100 mL/hr, Last Rate: 100 mL/hr (01/23/24 0634)      PRN Meds:.  senna-docusate sodium **AND** polyethylene glycol **AND** bisacodyl **AND** bisacodyl    melatonin    nitroglycerin    ondansetron    Pharmacy to dose warfarin    [COMPLETED] Insert Peripheral IV **AND** sodium chloride    sodium chloride    sodium chloride    Objective     VITAL SIGNS  Vitals:    01/22/24 1749 01/22/24 1943 01/23/24 0000 01/23/24 0418   BP: 107/58 109/67 99/55 101/62   BP Location:  Left arm Left arm Left arm   Patient Position: Lying Lying Lying Lying   Pulse: 72 79 72 69   Resp: 21 19 15 13   Temp: 97.8 °F (36.6 °C) 97.6 °F (36.4 °C) 98.2 °F (36.8 °C) 97.5 °F (36.4 °C)   TempSrc: Oral Oral Oral Oral   SpO2: 92% 93% 96% 96%   Weight:       Height:           Flowsheet Rows      Flowsheet Row First Filed Value   Admission Height 172.7 cm (68\") Documented at 01/22/2024 1240   Admission Weight 81.6 kg (180 lb) Documented at 01/22/2024 1240              Intake/Output Summary (Last 24 hours) at 1/23/2024 0641  Last data filed at 1/23/2024 0000  Gross per 24 hour   Intake 120 ml   Output --   Net 120 ml        TELEMETRY: Sinus rhythm    Physical Exam:  The patient is alert, oriented and in no distress.  Vital signs as noted above.  Head and neck revealed no carotid bruits or jugular venous distention.  No thyromegaly or lymphadenopathy is present  Lungs clear.  No wheezing.  Breath sounds are normal bilaterally.  Heart normal first and second heart sounds. No murmur.  No precordial rub is present.  No gallop is present.  Abdomen soft and nontender.  No organomegaly is present.  Extremities with good peripheral pulses without any pedal edema.  Skin warm and dry.  Musculoskeletal system is grossly normal  CNS grossly normal    Reviewed and updated.    Results Review:   I reviewed the patient's new clinical results.  Lab Results (last 24 hours)       " Procedure Component Value Units Date/Time    COVID-19, FLU A/B, RSV PCR 1 HR TAT - Swab, Nasopharynx [[id number]]  (Abnormal) Collected: 01/22/24 1745    Specimen: Swab from Nasopharynx Updated: 01/22/24 1852     COVID19 Not Detected     Influenza A PCR Detected     Influenza B PCR Not Detected     RSV, PCR Not Detected    Narrative:      Fact sheet for providers: https://www.fda.gov/media/157591/download    Fact sheet for patients: https://www.fda.gov/media/894058/download    Test performed by PCR.    TSH [[id number]]  (Abnormal) Collected: 01/22/24 1355    Specimen: Blood from Arm, Right Updated: 01/22/24 1656     TSH 9.780 uIU/mL     T4, Free [[id number]]  (Abnormal) Collected: 01/22/24 1355    Specimen: Blood from Arm, Right Updated: 01/22/24 1656     Free T4 0.87 ng/dL     Narrative:      Results may be falsely increased if patient taking Biotin.      Lipid Panel [[id number]]  (Abnormal) Collected: 01/22/24 1355    Specimen: Blood from Arm, Right Updated: 01/22/24 1650     Total Cholesterol 151 mg/dL      Triglycerides 226 mg/dL      HDL Cholesterol 32 mg/dL      LDL Cholesterol  81 mg/dL      VLDL Cholesterol 38 mg/dL      LDL/HDL Ratio 2.31    Narrative:      Cholesterol Reference Ranges  (U.S. Department of Health and Human Services ATP III Classifications)    Desirable          <200 mg/dL  Borderline High    200-239 mg/dL  High Risk          >240 mg/dL      Triglyceride Reference Ranges  (U.S. Department of Health and Human Services ATP III Classifications)    Normal           <150 mg/dL  Borderline High  150-199 mg/dL  High             200-499 mg/dL  Very High        >500 mg/dL    HDL Reference Ranges  (U.S. Department of Health and Human Services ATP III Classifications)    Low     <40 mg/dl (major risk factor for CHD)  High    >60 mg/dl ('negative' risk factor for CHD)        LDL Reference Ranges  (U.S. Department of Health and Human Services ATP III Classifications)    Optimal          <100 mg/dL  Near  "Optimal     100-129 mg/dL  Borderline High  130-159 mg/dL  High             160-189 mg/dL  Very High        >189 mg/dL    Protime-INR [[id number]]  (Abnormal) Collected: 01/22/24 1608    Specimen: Blood Updated: 01/22/24 1634     Protime 16.0 Seconds      INR 1.51    D-dimer, Quantitative [[id number]]  (Abnormal) Collected: 01/22/24 1608    Specimen: Blood Updated: 01/22/24 1634     D-Dimer, Quantitative 3.12 mg/L (FEU)     Narrative:      According to the assay 's published package insert, a normal (<0.50 mg/L (FEU)) D-dimer result in conjunction with a non-high clinical probability assessment, excludes deep vein thrombosis (DVT) and pulmonary embolism (PE) with high sensitivity.    D-dimer values increase with age and this can make VTE exclusion of an older population difficult. To address this, the [employer], based on best available evidence and recent guidelines, recommends that clinicians use age-adjusted D-dimer thresholds in patients greater than 50 years of age with: a) a low probability of PE who do not meet all Pulmonary Embolism Rule Out Criteria, or b) in those with intermediate probability of PE.   The formula for an age-adjusted D-dimer cut-off is \"age/100\".  For example, a 60 year old patient would have an age-adjusted cut-off of 0.60 mg/L (FEU) and an 80 year old 0.80 mg/L (FEU).    Urine Culture - Urine, Urine, Clean Catch [[id number]] Collected: 01/22/24 1355    Specimen: Urine, Clean Catch Updated: 01/22/24 1528    Comprehensive Metabolic Panel [[id number]]  (Abnormal) Collected: 01/22/24 1355    Specimen: Blood from Arm, Right Updated: 01/22/24 1443     Glucose 95 mg/dL      BUN 24 mg/dL      Creatinine 1.70 mg/dL      Sodium 140 mmol/L      Potassium 3.7 mmol/L      Comment: Slight hemolysis detected by analyzer. Result may be falsely elevated.        Chloride 108 mmol/L      CO2 19.0 mmol/L      Calcium 9.3 mg/dL      Total Protein 7.5 g/dL      Albumin 4.2 g/dL     "  ALT (SGPT) 28 U/L      AST (SGOT) 37 U/L      Comment: Slight hemolysis detected by analyzer. Result may be falsely elevated.        Alkaline Phosphatase 135 U/L      Total Bilirubin 0.3 mg/dL      Globulin 3.3 gm/dL      A/G Ratio 1.3 g/dL      BUN/Creatinine Ratio 14.1     Anion Gap 13.0 mmol/L      eGFR 32.3 mL/min/1.73     Narrative:      GFR Normal >60  Chronic Kidney Disease <60  Kidney Failure <15      Magnesium [[id number]]  (Normal) Collected: 01/22/24 1355    Specimen: Blood from Arm, Right Updated: 01/22/24 1443     Magnesium 2.2 mg/dL     Single High Sensitivity Troponin T [[id number]]  (Abnormal) Collected: 01/22/24 1355    Specimen: Blood from Arm, Right Updated: 01/22/24 1439     HS Troponin T 16 ng/L     Narrative:      High Sensitive Troponin T Reference Range:  <14.0 ng/L- Negative Female for AMI  <22.0 ng/L- Negative Male for AMI  >=14 - Abnormal Female indicating possible myocardial injury.  >=22 - Abnormal Male indicating possible myocardial injury.   Clinicians would have to utilize clinical acumen, EKG, Troponin, and serial changes to determine if it is an Acute Myocardial Infarction or myocardial injury due to an underlying chronic condition.         CK [[id number]]  (Normal) Collected: 01/22/24 1355    Specimen: Blood from Arm, Right Updated: 01/22/24 1439     Creatine Kinase 38 U/L     Urinalysis, Microscopic Only - Urine, Clean Catch [[id number]]  (Abnormal) Collected: 01/22/24 1355    Specimen: Urine, Clean Catch Updated: 01/22/24 1423     RBC, UA 0-2 /HPF      WBC, UA 11-20 /HPF      Bacteria, UA Trace /HPF      Squamous Epithelial Cells, UA 3-6 /HPF      Yeast, UA Small/1+ Yeast /HPF      Hyaline Casts, UA 7-12 /LPF      Methodology Manual Light Microscopy    Urinalysis With Microscopic If Indicated (No Culture) - Urine, Clean Catch [[id number]]  (Abnormal) Collected: 01/22/24 1355    Specimen: Urine, Clean Catch Updated: 01/22/24 1413     Color, UA Yellow     Appearance, UA Cloudy     pH,  UA 5.5     Specific Gravity, UA 1.014     Glucose, UA Negative     Ketones, UA Negative     Bilirubin, UA Negative     Blood, UA Negative     Protein, UA Trace     Leuk Esterase, UA Large (3+)     Nitrite, UA Negative     Urobilinogen, UA 1.0 E.U./dL    CBC & Differential [[id number]]  (Abnormal) Collected: 01/22/24 1355    Specimen: Blood from Arm, Right Updated: 01/22/24 1405    Narrative:      The following orders were created for panel order CBC & Differential.  Procedure                               Abnormality         Status                     ---------                               -----------         ------                     CBC Auto Differential[[id number]]        Abnormal            Final result                 Please view results for these tests on the individual orders.    CBC Auto Differential [[id number]]  (Abnormal) Collected: 01/22/24 1355    Specimen: Blood from Arm, Right Updated: 01/22/24 1405     WBC 9.50 10*3/mm3      RBC 4.48 10*6/mm3      Hemoglobin 13.4 g/dL      Hematocrit 41.6 %      MCV 92.7 fL      MCH 30.0 pg      MCHC 32.3 g/dL      RDW 17.5 %      RDW-SD 56.9 fl      MPV 8.3 fL      Platelets 230 10*3/mm3      Neutrophil % 76.2 %      Lymphocyte % 13.8 %      Monocyte % 6.3 %      Eosinophil % 2.4 %      Basophil % 1.3 %      Neutrophils, Absolute 7.20 10*3/mm3      Lymphocytes, Absolute 1.30 10*3/mm3      Monocytes, Absolute 0.60 10*3/mm3      Eosinophils, Absolute 0.20 10*3/mm3      Basophils, Absolute 0.10 10*3/mm3      nRBC 0.0 /100 WBC             Imaging Results (Last 24 Hours)       Procedure Component Value Units Date/Time    US Aorta Limited [[id number]] Resulted: 01/23/24 0550     Updated: 01/23/24 0602    US Renal Bilateral [[id number]] Collected: 01/22/24 1730     Updated: 01/22/24 1736    Narrative:      US RENAL BILATERAL    Date of Exam: 1/22/2024 4:05 PM CST    Indication: elevated creatinine.    Comparison: No comparisons available.    Technique: Grayscale and color  Doppler ultrasound evaluation of the kidneys and urinary bladder was performed.      Findings:  RIGHT kidney measures 8.1 cm.  Kidney echogenicity, size, and vascularity appear within normal limits. There is no solid kidney mass.  No echogenic shadowing stone.  No hydronephrosis.    LEFT kidney measures 8.2 cm. Kidney echogenicity, size, and vascularity appear within normal limits. There is no solid kidney mass.  No echogenic shadowing stone.  No hydronephrosis.    Limited visualization of the urinary bladder is unremarkable. Total volume is 182 mL.    Incidental note is made of right pleural effusion.        Impression:      Impression:  Unremarkable renal ultrasound.    Right pleural effusion.        Electronically Signed: Nela Elder MD    1/22/2024 4:30 PM CST    Workstation ID: [id number]    XR Chest 1 View [[id number]] Collected: 01/22/24 1344     Updated: 01/22/24 1509    Narrative:      XR CHEST 1 VW    Date of Exam: 1/22/2024 1:40 PM EST    Indication: Weakness Short of breath    Comparison: PA and lateral chest radiograph 9/12/2017    Findings:  The lungs are clear. The heart size is within normal limits with signs of prior median sternotomy and CABG. Left chest wall pacemaker has been placed since the prior examination with a single lead extending to the expected location of the right   ventricle. Pulmonary vascular distribution is normal without evidence of edema. No pleural effusion, pneumothorax or acute osseous abnormality. Right shoulder replacement changes are new since the prior chest x-ray.      Impression:      Impression:  No acute chest finding.      Electronically Signed: Amber Candelaria MD    1/22/2024 3:07 PM EST    Workstation ID: [id number]        LAB RESULTS (LAST 7 DAYS)    CBC  Results from last 7 days   Lab Units 01/22/24  1355   WBC 10*3/mm3 9.50   RBC 10*6/mm3 4.48   HEMOGLOBIN g/dL 13.4   HEMATOCRIT % 41.6   MCV fL 92.7   PLATELETS 10*3/mm3 230       BMP  Results from last 7 days    Lab Units 01/22/24  1355   SODIUM mmol/L 140   POTASSIUM mmol/L 3.7   CHLORIDE mmol/L 108*   CO2 mmol/L 19.0*   BUN mg/dL 24*   CREATININE mg/dL 1.70*   GLUCOSE mg/dL 95   MAGNESIUM mg/dL 2.2       CMP   Results from last 7 days   Lab Units 01/22/24  1355   SODIUM mmol/L 140   POTASSIUM mmol/L 3.7   CHLORIDE mmol/L 108*   CO2 mmol/L 19.0*   BUN mg/dL 24*   CREATININE mg/dL 1.70*   GLUCOSE mg/dL 95   ALBUMIN g/dL 4.2   BILIRUBIN mg/dL 0.3   ALK PHOS U/L 135*   AST (SGOT) U/L 37*   ALT (SGPT) U/L 28         BNP        TROPONIN  Results from last 7 days   Lab Units 01/22/24  1355   CK TOTAL U/L 38   HSTROP T ng/L 16*       CoAg  Results from last 7 days   Lab Units 01/22/24  1608   INR  1.51*       Creatinine Clearance  Estimated Creatinine Clearance: 35 mL/min (A) (by C-G formula based on SCr of 1.7 mg/dL (H)).    ABG        Radiology  US Renal Bilateral    Result Date: 1/22/2024  Impression: Unremarkable renal ultrasound. Right pleural effusion. Electronically Signed: Nela Elder MD  1/22/2024 4:30 PM CST  Workstation ID: [id number]    XR Chest 1 View    Result Date: 1/22/2024  Impression: No acute chest finding. Electronically Signed: Amber Candelaria MD  1/22/2024 3:07 PM EST  Workstation ID: [id number]       EKG                                  I personally viewed and interpreted the patient's EKG/Telemetry data: Atrial fibrillation followed by sinus rhythm.    ECHOCARDIOGRAM:        STRESS TEST        Cardiolite (Tc-99m sestamibi) stress test    HEART CATHETERIZATION  No results found for this or any previous visit.      OTHER:     Assessment & Plan     Principal Problem:    New onset atrial fibrillation      ]]]]]]]]]]]]]]]]]]  Impression   =========  -Recent weakness and palpitations.    - Atrial fibrillation with RVR.  Has converted to sinus rhythm with intravenous Cardizem.  Patient had postop atrial fibrillation after she had CABG in 2017.    - Status post CABG and ASD closure 2017    - Ischemic  cardiomyopathy    - Status post ICD (single-chamber)-details of the device is not available at this time.    - Chronic anticoagulation-patient on Coumadin.  Home monitoring.  INR 1.51-1/22/2024-subtherapeutic.    - Recent COVID infection.    - Hypothyroidism dyslipidemia obstructive sleep apnea    - Past history of pulmonary emboli    - CKD 3  20/1.7-1/22/2024    - Status post AAA stent repair, cholecystectomy bilateral total knee replacement tonsillectomy abdominal tubal ligation    - Family history of coronary artery disease    - Non-smoker    - Allergic to oxytetracycline and oxycodone.    Echocardiogram 1/23/2024 revealed  Mild tricuspid regurgitation.  Mild to moderate aortic valve stenosis with gradient of 16/9 mmHg and valve area of 1.37 cm².  Left atrial enlargement.  Left ventricle is enlarged with septal anterior wall and apical severe hypokinesis with ejection fraction of 40%.  Possible left ventricular apical thrombus.  ICD lead is present in the right ventricle.  No evidence for pulmonary hypertension is present.  =============  Plan  =============  A-fib with RVR.  Patient has converted to sinus rhythm with intravenous Cardizem.  Start p.o. Cardizem.    Echocardiogram-as above.  Stress Cardiolite test    Current medications include  Aspirin atorvastatin Coreg subcu therapeutic Lovenox levothyroxine Tamiflu pantoprazole paroxetine potassium supplements.  Patient is off warfarin at this time.    Further plan will depend on patient's progress.  ]]]]]]]]]]]]]]]]]]]]]     Allie Hurley MD  01/23/24  06:41 EST

## 2024-01-23 NOTE — PROGRESS NOTES
LOS: 0 days   Patient Care Team:  Luan Joseph FNP as PCP - General (Family Medicine)    Subjective     Patient c/o of diarrhea and anorexia    Review of Systems   Constitutional:  Positive for activity change, appetite change and fatigue.   HENT: Negative.     Respiratory: Negative.     Cardiovascular: Negative.    Gastrointestinal:  Positive for diarrhea.   Genitourinary: Negative.    Musculoskeletal: Negative.    Neurological:  Positive for weakness.   Psychiatric/Behavioral: Negative.             Objective     Vital Signs  Temp:  [97.5 °F (36.4 °C)-98.2 °F (36.8 °C)] 97.5 °F (36.4 °C)  Heart Rate:  [62-80] 66  Resp:  [13-21] 18  BP: ()/(47-67) 93/55      Physical Exam  Vitals reviewed.   Constitutional:       Appearance: She is ill-appearing.   HENT:      Head: Normocephalic and atraumatic.      Right Ear: External ear normal.      Left Ear: External ear normal.      Nose: Nose normal.      Mouth/Throat:      Mouth: Mucous membranes are moist.   Eyes:      General:         Right eye: No discharge.         Left eye: No discharge.   Cardiovascular:      Rate and Rhythm: Normal rate and regular rhythm.      Pulses: Normal pulses.      Heart sounds: Normal heart sounds.   Pulmonary:      Effort: Pulmonary effort is normal.      Breath sounds: Normal breath sounds.   Abdominal:      General: Bowel sounds are normal.      Palpations: Abdomen is soft.   Musculoskeletal:         General: Normal range of motion.      Cervical back: Normal range of motion.   Skin:     General: Skin is warm.   Neurological:      Mental Status: She is alert and oriented to person, place, and time.   Psychiatric:         Behavior: Behavior normal.              Results Review:    Lab Results (last 24 hours)       Procedure Component Value Units Date/Time    Urine Culture - Urine, Urine, Clean Catch [[id number]]  (Abnormal) Collected: 01/22/24 1355    Specimen: Urine, Clean Catch Updated: 01/23/24 0953     Urine Culture >100,000  CFU/mL Gram Negative Bacilli    Narrative:      Colonization of the urinary tract without infection is common. Treatment is discouraged unless the patient is symptomatic, pregnant, or undergoing an invasive urologic procedure.    COVID-19, FLU A/B, RSV PCR 1 HR TAT - Swab, Nasopharynx [[id number]]  (Abnormal) Collected: 01/22/24 1745    Specimen: Swab from Nasopharynx Updated: 01/22/24 1852     COVID19 Not Detected     Influenza A PCR Detected     Influenza B PCR Not Detected     RSV, PCR Not Detected    Narrative:      Fact sheet for providers: https://www.fda.gov/media/201826/download    Fact sheet for patients: https://www.fda.gov/media/289897/download    Test performed by PCR.    TSH [[id number]]  (Abnormal) Collected: 01/22/24 1355    Specimen: Blood from Arm, Right Updated: 01/22/24 1656     TSH 9.780 uIU/mL     T4, Free [[id number]]  (Abnormal) Collected: 01/22/24 1355    Specimen: Blood from Arm, Right Updated: 01/22/24 1656     Free T4 0.87 ng/dL     Narrative:      Results may be falsely increased if patient taking Biotin.      Lipid Panel [[id number]]  (Abnormal) Collected: 01/22/24 1355    Specimen: Blood from Arm, Right Updated: 01/22/24 1650     Total Cholesterol 151 mg/dL      Triglycerides 226 mg/dL      HDL Cholesterol 32 mg/dL      LDL Cholesterol  81 mg/dL      VLDL Cholesterol 38 mg/dL      LDL/HDL Ratio 2.31    Narrative:      Cholesterol Reference Ranges  (U.S. Department of Health and Human Services ATP III Classifications)    Desirable          <200 mg/dL  Borderline High    200-239 mg/dL  High Risk          >240 mg/dL      Triglyceride Reference Ranges  (U.S. Department of Health and Human Services ATP III Classifications)    Normal           <150 mg/dL  Borderline High  150-199 mg/dL  High             200-499 mg/dL  Very High        >500 mg/dL    HDL Reference Ranges  (U.S. Department of Health and Human Services ATP III Classifications)    Low     <40 mg/dl (major risk factor for CHD)  High     ">60 mg/dl ('negative' risk factor for CHD)        LDL Reference Ranges  (U.S. Department of Health and Human Services ATP III Classifications)    Optimal          <100 mg/dL  Near Optimal     100-129 mg/dL  Borderline High  130-159 mg/dL  High             160-189 mg/dL  Very High        >189 mg/dL    Protime-INR [[id number]]  (Abnormal) Collected: 01/22/24 1608    Specimen: Blood Updated: 01/22/24 1634     Protime 16.0 Seconds      INR 1.51    D-dimer, Quantitative [[id number]]  (Abnormal) Collected: 01/22/24 1608    Specimen: Blood Updated: 01/22/24 1634     D-Dimer, Quantitative 3.12 mg/L (FEU)     Narrative:      According to the assay 's published package insert, a normal (<0.50 mg/L (FEU)) D-dimer result in conjunction with a non-high clinical probability assessment, excludes deep vein thrombosis (DVT) and pulmonary embolism (PE) with high sensitivity.    D-dimer values increase with age and this can make VTE exclusion of an older population difficult. To address this, the [employer], based on best available evidence and recent guidelines, recommends that clinicians use age-adjusted D-dimer thresholds in patients greater than 50 years of age with: a) a low probability of PE who do not meet all Pulmonary Embolism Rule Out Criteria, or b) in those with intermediate probability of PE.   The formula for an age-adjusted D-dimer cut-off is \"age/100\".  For example, a 60 year old patient would have an age-adjusted cut-off of 0.60 mg/L (FEU) and an 80 year old 0.80 mg/L (FEU).    Comprehensive Metabolic Panel [[id number]]  (Abnormal) Collected: 01/22/24 1355    Specimen: Blood from Arm, Right Updated: 01/22/24 1443     Glucose 95 mg/dL      BUN 24 mg/dL      Creatinine 1.70 mg/dL      Sodium 140 mmol/L      Potassium 3.7 mmol/L      Comment: Slight hemolysis detected by analyzer. Result may be falsely elevated.        Chloride 108 mmol/L      CO2 19.0 mmol/L      Calcium 9.3 mg/dL      Total " Protein 7.5 g/dL      Albumin 4.2 g/dL      ALT (SGPT) 28 U/L      AST (SGOT) 37 U/L      Comment: Slight hemolysis detected by analyzer. Result may be falsely elevated.        Alkaline Phosphatase 135 U/L      Total Bilirubin 0.3 mg/dL      Globulin 3.3 gm/dL      A/G Ratio 1.3 g/dL      BUN/Creatinine Ratio 14.1     Anion Gap 13.0 mmol/L      eGFR 32.3 mL/min/1.73     Narrative:      GFR Normal >60  Chronic Kidney Disease <60  Kidney Failure <15      Magnesium [[id number]]  (Normal) Collected: 01/22/24 1355    Specimen: Blood from Arm, Right Updated: 01/22/24 1443     Magnesium 2.2 mg/dL     Single High Sensitivity Troponin T [[id number]]  (Abnormal) Collected: 01/22/24 1355    Specimen: Blood from Arm, Right Updated: 01/22/24 1439     HS Troponin T 16 ng/L     Narrative:      High Sensitive Troponin T Reference Range:  <14.0 ng/L- Negative Female for AMI  <22.0 ng/L- Negative Male for AMI  >=14 - Abnormal Female indicating possible myocardial injury.  >=22 - Abnormal Male indicating possible myocardial injury.   Clinicians would have to utilize clinical acumen, EKG, Troponin, and serial changes to determine if it is an Acute Myocardial Infarction or myocardial injury due to an underlying chronic condition.         CK [[id number]]  (Normal) Collected: 01/22/24 1355    Specimen: Blood from Arm, Right Updated: 01/22/24 1439     Creatine Kinase 38 U/L     Urinalysis, Microscopic Only - Urine, Clean Catch [[id number]]  (Abnormal) Collected: 01/22/24 1355    Specimen: Urine, Clean Catch Updated: 01/22/24 1423     RBC, UA 0-2 /HPF      WBC, UA 11-20 /HPF      Bacteria, UA Trace /HPF      Squamous Epithelial Cells, UA 3-6 /HPF      Yeast, UA Small/1+ Yeast /HPF      Hyaline Casts, UA 7-12 /LPF      Methodology Manual Light Microscopy    Urinalysis With Microscopic If Indicated (No Culture) - Urine, Clean Catch [[id number]]  (Abnormal) Collected: 01/22/24 1355    Specimen: Urine, Clean Catch Updated: 01/22/24 1413     Color,  UA Yellow     Appearance, UA Cloudy     pH, UA 5.5     Specific Gravity, UA 1.014     Glucose, UA Negative     Ketones, UA Negative     Bilirubin, UA Negative     Blood, UA Negative     Protein, UA Trace     Leuk Esterase, UA Large (3+)     Nitrite, UA Negative     Urobilinogen, UA 1.0 E.U./dL    CBC & Differential [[id number]]  (Abnormal) Collected: 01/22/24 1355    Specimen: Blood from Arm, Right Updated: 01/22/24 1405    Narrative:      The following orders were created for panel order CBC & Differential.  Procedure                               Abnormality         Status                     ---------                               -----------         ------                     CBC Auto Differential[[id number]]        Abnormal            Final result                 Please view results for these tests on the individual orders.    CBC Auto Differential [[id number]]  (Abnormal) Collected: 01/22/24 1355    Specimen: Blood from Arm, Right Updated: 01/22/24 1405     WBC 9.50 10*3/mm3      RBC 4.48 10*6/mm3      Hemoglobin 13.4 g/dL      Hematocrit 41.6 %      MCV 92.7 fL      MCH 30.0 pg      MCHC 32.3 g/dL      RDW 17.5 %      RDW-SD 56.9 fl      MPV 8.3 fL      Platelets 230 10*3/mm3      Neutrophil % 76.2 %      Lymphocyte % 13.8 %      Monocyte % 6.3 %      Eosinophil % 2.4 %      Basophil % 1.3 %      Neutrophils, Absolute 7.20 10*3/mm3      Lymphocytes, Absolute 1.30 10*3/mm3      Monocytes, Absolute 0.60 10*3/mm3      Eosinophils, Absolute 0.20 10*3/mm3      Basophils, Absolute 0.10 10*3/mm3      nRBC 0.0 /100 WBC              Imaging Results (Last 24 Hours)       Procedure Component Value Units Date/Time     Aorta Limited [[id number]] Collected: 01/23/24 0723     Updated: 01/23/24 0730    Narrative:      US AORTA LIMITED    Date of Exam: 1/23/2024 5:50 AM EST    Indication: Follow-up abdominal aortic aneurysm, status post repair..    Comparison: No comparisons available.    Technique: Routine gray scale, color  flow and spectral Doppler analysis of the abdominal aorta and proximal common iliac arteries was performed.      Findings:  The patient has a abdominal aortic aneurysm with a visible stent graft. The proximal abdominal aorta measures 3.7 x 3.3 cm. The endograft at this level measures 1.6 x 1.8 cm. The mid abdominal aorta measures 2.7 x 2.3 cm. The endograft measures 1.1 x 1.8   cm at this level. The distal abdominal aorta including the endograft have a diameter of 2.6 x 2.8 cm. There is no visible color Doppler flow in the thrombosed aneurysm sac. There is antegrade color Doppler flow within the endograft. The right common   iliac artery has a maximum AP diameter of 1.2 cm. The left common iliac artery has a maximum AP diameter of 1.1 cm. There is antegrade color Doppler flow in both common iliac arteries.        Impression:      Impression:  Status post placement of a stent graft. There is normal antegrade color Doppler flow including the common iliac arteries. There is thrombus within the aneurysm sac. Dimensions are discussed in detail above.        Electronically Signed: Zheng Doe MD    1/23/2024 7:28 AM EST    Workstation ID: [id number]    US Renal Bilateral [[id number]] Collected: 01/22/24 1730     Updated: 01/22/24 1736    Narrative:      US RENAL BILATERAL    Date of Exam: 1/22/2024 4:05 PM CST    Indication: elevated creatinine.    Comparison: No comparisons available.    Technique: Grayscale and color Doppler ultrasound evaluation of the kidneys and urinary bladder was performed.      Findings:  RIGHT kidney measures 8.1 cm.  Kidney echogenicity, size, and vascularity appear within normal limits. There is no solid kidney mass.  No echogenic shadowing stone.  No hydronephrosis.    LEFT kidney measures 8.2 cm. Kidney echogenicity, size, and vascularity appear within normal limits. There is no solid kidney mass.  No echogenic shadowing stone.  No hydronephrosis.    Limited visualization of the urinary bladder  is unremarkable. Total volume is 182 mL.    Incidental note is made of right pleural effusion.        Impression:      Impression:  Unremarkable renal ultrasound.    Right pleural effusion.        Electronically Signed: Nela Elder MD    1/22/2024 4:30 PM CST    Workstation ID: [id number]    XR Chest 1 View [[id number]] Collected: 01/22/24 1344     Updated: 01/22/24 1509    Narrative:      XR CHEST 1 VW    Date of Exam: 1/22/2024 1:40 PM EST    Indication: Weakness Short of breath    Comparison: PA and lateral chest radiograph 9/12/2017    Findings:  The lungs are clear. The heart size is within normal limits with signs of prior median sternotomy and CABG. Left chest wall pacemaker has been placed since the prior examination with a single lead extending to the expected location of the right   ventricle. Pulmonary vascular distribution is normal without evidence of edema. No pleural effusion, pneumothorax or acute osseous abnormality. Right shoulder replacement changes are new since the prior chest x-ray.      Impression:      Impression:  No acute chest finding.      Electronically Signed: Amber Candelaria MD    1/22/2024 3:07 PM EST    Workstation ID: [id number]                 I reviewed the patient's new clinical results.    Medication Review:   Scheduled Meds:aspirin, 81 mg, Oral, Daily  atorvastatin, 80 mg, Oral, Nightly  carvedilol, 3.125 mg, Oral, BID With Meals  cefTRIAXone, 1,000 mg, Intravenous, Q24H  enoxaparin, 1 mg/kg, Subcutaneous, Q12H  levothyroxine, 88 mcg, Oral, Q AM  oseltamivir, 75 mg, Oral, Q12H  pantoprazole, 40 mg, Oral, Q AM  PARoxetine, 40 mg, Oral, QAM  potassium chloride, 20 mEq, Oral, Daily  senna-docusate sodium, 2 tablet, Oral, BID  sodium chloride, 10 mL, Intravenous, Q12H  topiramate, 50 mg, Oral, Nightly      Continuous Infusions:Pharmacy to dose warfarin,   sodium chloride, 100 mL/hr, Last Rate: 100 mL/hr (01/23/24 0634)      PRN Meds:.  senna-docusate sodium **AND** polyethylene  glycol **AND** bisacodyl **AND** bisacodyl    melatonin    nitroglycerin    ondansetron    Pharmacy to dose warfarin    [COMPLETED] Insert Peripheral IV **AND** sodium chloride    sodium chloride    sodium chloride     Interval History:    Assessment & Plan     New onset atrial fib  - echo noted rate controlled   - Lovenox until INR is therapeutic  -cardiology following    UTI-continue rocephin monitor culture  Influenza A-tamiflu    Chronic CAD - stable without anginal symptoms; continue aspirin, statin, beta blocker    Hypothyroidism -  levothyroxine increased with TSH elevated     H/O pulmonary emboli - subtherapeutic INR; Lovenox to bridge    TRACEY/CKD - renal ultrasound unremarkable worsening creatinine  -neph consutl    AAA -u/s Status post placement of a stent graft. There is normal antegrade color Doppler flow including the common iliac arteries. There is thrombus within the aneurysm sac.    Chronic migraines - Topamax    Mood disorder - Paxil    GERD - PPI           Plan for disposition:Home    Bertha Calixto, APRN  01/23/24  13:51 EST

## 2024-01-23 NOTE — CASE MANAGEMENT/SOCIAL WORK
Discharge Planning Assessment   [facility]     Patient Name: Laura Mejia  MRN: [id number]  Today's Date: 1/22/2024    Admit Date: 1/22/2024    Plan: Home with    Discharge Needs Assessment       Row Name 01/22/24 1925       Living Environment    People in Home spouse    Current Living Arrangements home    Potentially Unsafe Housing Conditions none    In the past 12 months has the electric, gas, oil, or water company threatened to shut off services in your home? No    Primary Care Provided by self    Provides Primary Care For no one    Family Caregiver if Needed spouse    Family Caregiver Names  Lamont    Quality of Family Relationships supportive       Resource/Environmental Concerns    Resource/Environmental Concerns none    Transportation Concerns none       Transportation Needs    In the past 12 months, has lack of transportation kept you from medical appointments or from getting medications? no    In the past 12 months, has lack of transportation kept you from meetings, work, or from getting things needed for daily living? No       Food Insecurity    Within the past 12 months, you worried that your food would run out before you got the money to buy more. Never true    Within the past 12 months, the food you bought just didn't last and you didn't have money to get more. Never true       Transition Planning    Patient/Family Anticipates Transition to home with family    Patient/Family Anticipated Services at Transition none    Transportation Anticipated family or friend will provide       Discharge Needs Assessment    Readmission Within the Last 30 Days no previous admission in last 30 days    Equipment Currently Used at Home walker, rolling;wheelchair, motorized;cpap    Concerns to be Addressed denies needs/concerns at this time    Anticipated Changes Related to Illness none    Equipment Needed After Discharge none                   Discharge Plan       Row Name 01/22/24 1926       Plan     "Plan Home with     Plan Comments Met with Patient at bedside Lives at home with . IADL\"s PCP and Pharamcy verified, able to afford medications. Recently relocated from [place]. Home INR stephanie. Has new patient appointments with [facility] and Dr Olsen for cardiology. DC barriers: Cardiology consult, IVF, Echo                  Continued Care and Services - Admitted Since 1/22/2024    Coordination has not been started for this encounter.       Expected Discharge Date and Time       Expected Discharge Date Expected Discharge Time    Jan 23, 2024            Demographic Summary       Row Name 01/22/24 1925       General Information    Admission Type observation    Arrived From emergency department    Required Notices Provided Observation Status Notice    Referral Source admission list    Reason for Consult discharge planning    Preferred Language English                   Functional Status       Row Name 01/22/24 1925       Functional Status    Usual Activity Tolerance good    Current Activity Tolerance good       Functional Status, IADL    Medications independent    Meal Preparation independent    Housekeeping independent    Laundry independent    Shopping independent       Mental Status    General Appearance WDL WDL       Mental Status Summary    Recent Changes in Mental Status/Cognitive Functioning no changes                          Sandra Martinez RN    "

## 2024-01-24 ENCOUNTER — APPOINTMENT (OUTPATIENT)
Dept: NUCLEAR MEDICINE | Facility: HOSPITAL | Age: 69
DRG: 309 | End: 2024-01-24
Payer: MEDICARE

## 2024-01-24 PROBLEM — R55 NEAR SYNCOPE: Status: ACTIVE | Noted: 2024-01-22

## 2024-01-24 PROBLEM — Z95.1 S/P CABG (CORONARY ARTERY BYPASS GRAFT): Status: ACTIVE | Noted: 2024-01-22

## 2024-01-24 PROBLEM — R94.39 ABNORMAL NUCLEAR STRESS TEST: Status: ACTIVE | Noted: 2024-01-22

## 2024-01-24 LAB
ALBUMIN SERPL-MCNC: 3.1 G/DL (ref 3.5–5.2)
ANION GAP SERPL CALCULATED.3IONS-SCNC: 7 MMOL/L (ref 5–15)
BACTERIA SPEC AEROBE CULT: ABNORMAL
BASOPHILS # BLD AUTO: 0.1 10*3/MM3 (ref 0–0.2)
BASOPHILS NFR BLD AUTO: 1.1 % (ref 0–1.5)
BH CV NUCLEAR PRIOR STUDY: 3
BH CV REST NUCLEAR ISOTOPE DOSE: 11 MCI
BH CV STRESS BP STAGE 1: NORMAL
BH CV STRESS BP STAGE 2: NORMAL
BH CV STRESS COMMENTS STAGE 1: NORMAL
BH CV STRESS COMMENTS STAGE 2: NORMAL
BH CV STRESS DOSE REGADENOSON STAGE 1: 0.4
BH CV STRESS DURATION MIN STAGE 1: 0
BH CV STRESS DURATION MIN STAGE 2: 4
BH CV STRESS DURATION SEC STAGE 1: 10
BH CV STRESS DURATION SEC STAGE 2: 0
BH CV STRESS HR STAGE 1: 84
BH CV STRESS HR STAGE 2: 91
BH CV STRESS NUCLEAR ISOTOPE DOSE: 33 MCI
BH CV STRESS PROTOCOL 1: NORMAL
BH CV STRESS RECOVERY BP: NORMAL MMHG
BH CV STRESS RECOVERY HR: 75 BPM
BH CV STRESS STAGE 1: 1
BH CV STRESS STAGE 2: 2
BUN SERPL-MCNC: 12 MG/DL (ref 8–23)
BUN/CREAT SERPL: 12 (ref 7–25)
CALCIUM SPEC-SCNC: 8.2 MG/DL (ref 8.6–10.5)
CHLORIDE SERPL-SCNC: 118 MMOL/L (ref 98–107)
CO2 SERPL-SCNC: 18 MMOL/L (ref 22–29)
CREAT SERPL-MCNC: 1 MG/DL (ref 0.57–1)
DEPRECATED RDW RBC AUTO: 56.4 FL (ref 37–54)
EGFRCR SERPLBLD CKD-EPI 2021: 61.1 ML/MIN/1.73
EOSINOPHIL # BLD AUTO: 0.1 10*3/MM3 (ref 0–0.4)
EOSINOPHIL NFR BLD AUTO: 2.4 % (ref 0.3–6.2)
ERYTHROCYTE [DISTWIDTH] IN BLOOD BY AUTOMATED COUNT: 17.3 % (ref 12.3–15.4)
GEN 5 2HR TROPONIN T REFLEX: 12 NG/L
GLUCOSE SERPL-MCNC: 98 MG/DL (ref 65–99)
HCT VFR BLD AUTO: 30.6 % (ref 34–46.6)
HGB BLD-MCNC: 9.9 G/DL (ref 12–15.9)
INR PPP: 2.74 (ref 2–3)
LYMPHOCYTES # BLD AUTO: 1.1 10*3/MM3 (ref 0.7–3.1)
LYMPHOCYTES NFR BLD AUTO: 21.4 % (ref 19.6–45.3)
MAGNESIUM SERPL-MCNC: 2 MG/DL (ref 1.6–2.4)
MAGNESIUM SERPL-MCNC: 2 MG/DL (ref 1.6–2.4)
MAXIMAL PREDICTED HEART RATE: 151 BPM
MCH RBC QN AUTO: 29.9 PG (ref 26.6–33)
MCHC RBC AUTO-ENTMCNC: 32.3 G/DL (ref 31.5–35.7)
MCV RBC AUTO: 92.4 FL (ref 79–97)
MONOCYTES # BLD AUTO: 0.3 10*3/MM3 (ref 0.1–0.9)
MONOCYTES NFR BLD AUTO: 5.3 % (ref 5–12)
NEUTROPHILS NFR BLD AUTO: 3.7 10*3/MM3 (ref 1.7–7)
NEUTROPHILS NFR BLD AUTO: 69.8 % (ref 42.7–76)
NRBC BLD AUTO-RTO: 0 /100 WBC (ref 0–0.2)
PERCENT MAX PREDICTED HR: 60.26 %
PHOSPHATE SERPL-MCNC: 2.8 MG/DL (ref 2.5–4.5)
PLATELET # BLD AUTO: 109 10*3/MM3 (ref 140–450)
PMV BLD AUTO: 8.3 FL (ref 6–12)
POTASSIUM SERPL-SCNC: 3.5 MMOL/L (ref 3.5–5.2)
PROTHROMBIN TIME: 27.7 SECONDS (ref 19.4–28.5)
RBC # BLD AUTO: 3.31 10*6/MM3 (ref 3.77–5.28)
SODIUM SERPL-SCNC: 143 MMOL/L (ref 136–145)
STRESS BASELINE BP: NORMAL MMHG
STRESS BASELINE HR: 84 BPM
STRESS PERCENT HR: 71 %
STRESS POST PEAK BP: NORMAL MMHG
STRESS POST PEAK HR: 91 BPM
STRESS TARGET HR: 128 BPM
TROPONIN T DELTA: -1 NG/L
TROPONIN T SERPL HS-MCNC: 12 NG/L
TROPONIN T SERPL HS-MCNC: 13 NG/L
WBC NRBC COR # BLD AUTO: 5.3 10*3/MM3 (ref 3.4–10.8)

## 2024-01-24 PROCEDURE — 85610 PROTHROMBIN TIME: CPT | Performed by: INTERNAL MEDICINE

## 2024-01-24 PROCEDURE — 93017 CV STRESS TEST TRACING ONLY: CPT

## 2024-01-24 PROCEDURE — 25810000003 SODIUM CHLORIDE 0.9 % SOLUTION: Performed by: INTERNAL MEDICINE

## 2024-01-24 PROCEDURE — 84484 ASSAY OF TROPONIN QUANT: CPT | Performed by: INTERNAL MEDICINE

## 2024-01-24 PROCEDURE — 25810000003 SODIUM CHLORIDE 0.9 % SOLUTION

## 2024-01-24 PROCEDURE — 0 TECHNETIUM TETROFOSMIN KIT: Performed by: INTERNAL MEDICINE

## 2024-01-24 PROCEDURE — 36415 COLL VENOUS BLD VENIPUNCTURE: CPT | Performed by: INTERNAL MEDICINE

## 2024-01-24 PROCEDURE — 85025 COMPLETE CBC W/AUTO DIFF WBC: CPT | Performed by: INTERNAL MEDICINE

## 2024-01-24 PROCEDURE — 78452 HT MUSCLE IMAGE SPECT MULT: CPT

## 2024-01-24 PROCEDURE — 25010000002 CEFTRIAXONE PER 250 MG: Performed by: NURSE PRACTITIONER

## 2024-01-24 PROCEDURE — A9502 TC99M TETROFOSMIN: HCPCS | Performed by: INTERNAL MEDICINE

## 2024-01-24 PROCEDURE — 93005 ELECTROCARDIOGRAM TRACING: CPT | Performed by: INTERNAL MEDICINE

## 2024-01-24 PROCEDURE — 83735 ASSAY OF MAGNESIUM: CPT | Performed by: INTERNAL MEDICINE

## 2024-01-24 PROCEDURE — 25010000002 REGADENOSON 0.4 MG/5ML SOLUTION: Performed by: INTERNAL MEDICINE

## 2024-01-24 PROCEDURE — 78452 HT MUSCLE IMAGE SPECT MULT: CPT | Performed by: INTERNAL MEDICINE

## 2024-01-24 PROCEDURE — 99233 SBSQ HOSP IP/OBS HIGH 50: CPT | Performed by: INTERNAL MEDICINE

## 2024-01-24 PROCEDURE — 93018 CV STRESS TEST I&R ONLY: CPT | Performed by: INTERNAL MEDICINE

## 2024-01-24 PROCEDURE — 80069 RENAL FUNCTION PANEL: CPT | Performed by: INTERNAL MEDICINE

## 2024-01-24 PROCEDURE — 93010 ELECTROCARDIOGRAM REPORT: CPT | Performed by: INTERNAL MEDICINE

## 2024-01-24 RX ORDER — SODIUM BICARBONATE 650 MG/1
650 TABLET ORAL 3 TIMES DAILY
Status: DISCONTINUED | OUTPATIENT
Start: 2024-01-24 | End: 2024-01-26

## 2024-01-24 RX ORDER — MIDODRINE HYDROCHLORIDE 2.5 MG/1
2.5 TABLET ORAL
Status: DISCONTINUED | OUTPATIENT
Start: 2024-01-24 | End: 2024-01-24

## 2024-01-24 RX ORDER — IBUPROFEN 400 MG/1
400 TABLET ORAL EVERY 6 HOURS PRN
Status: DISCONTINUED | OUTPATIENT
Start: 2024-01-24 | End: 2024-01-24

## 2024-01-24 RX ORDER — MIDODRINE HYDROCHLORIDE 5 MG/1
5 TABLET ORAL EVERY 8 HOURS
Status: DISCONTINUED | OUTPATIENT
Start: 2024-01-24 | End: 2024-01-25

## 2024-01-24 RX ORDER — REGADENOSON 0.08 MG/ML
0.4 INJECTION, SOLUTION INTRAVENOUS
Status: COMPLETED | OUTPATIENT
Start: 2024-01-24 | End: 2024-01-24

## 2024-01-24 RX ORDER — BUTALBITAL, ACETAMINOPHEN AND CAFFEINE 50; 325; 40 MG/1; MG/1; MG/1
1 TABLET ORAL EVERY 4 HOURS PRN
Status: DISCONTINUED | OUTPATIENT
Start: 2024-01-24 | End: 2024-01-26 | Stop reason: HOSPADM

## 2024-01-24 RX ADMIN — MIDODRINE HYDROCHLORIDE 2.5 MG: 2.5 TABLET ORAL at 13:44

## 2024-01-24 RX ADMIN — ATORVASTATIN CALCIUM 80 MG: 40 TABLET, FILM COATED ORAL at 21:26

## 2024-01-24 RX ADMIN — TETROFOSMIN 1 DOSE: 1.38 INJECTION, POWDER, LYOPHILIZED, FOR SOLUTION INTRAVENOUS at 07:00

## 2024-01-24 RX ADMIN — REGADENOSON 0.4 MG: 0.08 INJECTION, SOLUTION INTRAVENOUS at 09:06

## 2024-01-24 RX ADMIN — MIDODRINE HYDROCHLORIDE 2.5 MG: 2.5 TABLET ORAL at 17:17

## 2024-01-24 RX ADMIN — CEFTRIAXONE 1000 MG: 1 INJECTION, POWDER, FOR SOLUTION INTRAMUSCULAR; INTRAVENOUS at 17:51

## 2024-01-24 RX ADMIN — CARVEDILOL 3.12 MG: 3.12 TABLET, FILM COATED ORAL at 10:55

## 2024-01-24 RX ADMIN — OSELTAMIVIR PHOSPHATE 30 MG: 30 CAPSULE ORAL at 21:25

## 2024-01-24 RX ADMIN — TOPIRAMATE 50 MG: 25 TABLET, FILM COATED ORAL at 21:26

## 2024-01-24 RX ADMIN — PAROXETINE 40 MG: 40 TABLET, FILM COATED ORAL at 05:37

## 2024-01-24 RX ADMIN — Medication 10 ML: at 21:33

## 2024-01-24 RX ADMIN — MIDODRINE HYDROCHLORIDE 5 MG: 5 TABLET ORAL at 21:25

## 2024-01-24 RX ADMIN — SODIUM CHLORIDE 500 ML: 0.9 INJECTION, SOLUTION INTRAVENOUS at 21:32

## 2024-01-24 RX ADMIN — LEVOTHYROXINE SODIUM 88 MCG: 0.09 TABLET ORAL at 05:37

## 2024-01-24 RX ADMIN — CARVEDILOL 3.12 MG: 3.12 TABLET, FILM COATED ORAL at 17:17

## 2024-01-24 RX ADMIN — SODIUM BICARBONATE 650 MG: 650 TABLET ORAL at 10:55

## 2024-01-24 RX ADMIN — SODIUM CHLORIDE 50 ML/HR: 900 INJECTION INTRAVENOUS at 05:37

## 2024-01-24 RX ADMIN — TETROFOSMIN 1 DOSE: 1.38 INJECTION, POWDER, LYOPHILIZED, FOR SOLUTION INTRAVENOUS at 09:06

## 2024-01-24 RX ADMIN — DILTIAZEM HYDROCHLORIDE 240 MG: 240 CAPSULE, COATED, EXTENDED RELEASE ORAL at 10:55

## 2024-01-24 RX ADMIN — SODIUM BICARBONATE 650 MG: 650 TABLET ORAL at 17:17

## 2024-01-24 RX ADMIN — PANTOPRAZOLE SODIUM 40 MG: 40 TABLET, DELAYED RELEASE ORAL at 05:37

## 2024-01-24 RX ADMIN — ASPIRIN 81 MG CHEWABLE TABLET 81 MG: 81 TABLET CHEWABLE at 10:54

## 2024-01-24 RX ADMIN — SODIUM BICARBONATE 650 MG: 650 TABLET ORAL at 21:25

## 2024-01-24 NOTE — PLAN OF CARE
Problem: Adult Inpatient Plan of Care  Goal: Plan of Care Review  Outcome: Ongoing, Progressing  Flowsheets (Taken 1/24/2024 0349)  Progress: no change  Plan of Care Reviewed With: patient  Outcome Evaluation: Patients complaints of nausea treated with PRN nausea medications. No other complaints throughout shift. Pt was informed about stress test scheduled im AM and the importance of no caffiene x12 hours and being NPO after midnight, with understanding stated. Will continue to monitor.  Goal: Patient-Specific Goal (Individualized)  Outcome: Ongoing, Progressing  Goal: Absence of Hospital-Acquired Illness or Injury  Outcome: Ongoing, Progressing  Intervention: Identify and Manage Fall Risk  Recent Flowsheet Documentation  Taken 1/24/2024 0200 by Renuka Pastrana RN  Safety Promotion/Fall Prevention: safety round/check completed  Taken 1/24/2024 0022 by Renuka Pastrana RN  Safety Promotion/Fall Prevention: safety round/check completed  Taken 1/23/2024 2200 by Reunka Pastrana RN  Safety Promotion/Fall Prevention: safety round/check completed  Taken 1/23/2024 2026 by Renuka Pastrana RN  Safety Promotion/Fall Prevention: safety round/check completed  Intervention: Prevent Skin Injury  Recent Flowsheet Documentation  Taken 1/24/2024 0022 by Renuka Pastrana RN  Body Position: position changed independently  Taken 1/23/2024 2026 by Renuka Pastrana RN  Body Position: position changed independently  Skin Protection:   adhesive use limited   tubing/devices free from skin contact  Intervention: Prevent and Manage VTE (Venous Thromboembolism) Risk  Recent Flowsheet Documentation  Taken 1/23/2024 2026 by Renuka Pastrana RN  VTE Prevention/Management: (on lovenox and coumadin)   sequential compression devices off   other (see comments)  Intervention: Prevent Infection  Recent Flowsheet Documentation  Taken 1/24/2024 0022 by Renuka Pastrana RN  Infection Prevention: hand hygiene  promoted  Taken 1/23/2024 2026 by Renuka Pastrana RN  Infection Prevention:   hand hygiene promoted   rest/sleep promoted   single patient room provided  Goal: Optimal Comfort and Wellbeing  Outcome: Ongoing, Progressing  Intervention: Provide Person-Centered Care  Recent Flowsheet Documentation  Taken 1/23/2024 2026 by Renuka Pastrana RN  Trust Relationship/Rapport:   care explained   choices provided   questions answered   questions encouraged   reassurance provided   thoughts/feelings acknowledged  Goal: Readiness for Transition of Care  Outcome: Ongoing, Progressing     Problem: Activity Intolerance  Goal: Enhanced Capacity and Energy  Outcome: Ongoing, Progressing     Problem: Fall Injury Risk  Goal: Absence of Fall and Fall-Related Injury  Outcome: Ongoing, Progressing  Intervention: Promote Injury-Free Environment  Recent Flowsheet Documentation  Taken 1/24/2024 0200 by Renuka Pastrana RN  Safety Promotion/Fall Prevention: safety round/check completed  Taken 1/24/2024 0022 by Renuka Pastrana RN  Safety Promotion/Fall Prevention: safety round/check completed  Taken 1/23/2024 2200 by Renuka Pastrana RN  Safety Promotion/Fall Prevention: safety round/check completed  Taken 1/23/2024 2026 by Renuka Pastrana RN  Safety Promotion/Fall Prevention: safety round/check completed     Problem: Nausea and Vomiting  Goal: Fluid and Electrolyte Balance  Outcome: Ongoing, Progressing   Goal Outcome Evaluation:  Plan of Care Reviewed With: patient        Progress: no change  Outcome Evaluation: Patients complaints of nausea treated with PRN nausea medications. No other complaints throughout shift. Pt was informed about stress test scheduled im AM and the importance of no caffiene x12 hours and being NPO after midnight, with understanding stated. Will continue to monitor.

## 2024-01-24 NOTE — PROGRESS NOTES
Referring Provider: Keke Casiano MD    Reason for follow-up:  Atrial fibrillation  Status post CABG  Status post ICD  Near syncope     Patient Care Team:  Luan Joseph FNP as PCP - General (Family Medicine)  Jozef Otero MD as Consulting Physician (Nephrology)    Subjective .      ROS    Since I have last seen, the patient has been without any chest discomfort ,shortness of breath, palpitations, dizziness or syncope.  Denies having any headache ,abdominal pain ,nausea, vomiting , diarrhea constipation, loss of weight or loss of appetite.  Denies having any excessive bruising ,hematuria or blood in the stool.    Review of all systems negative except as indicated    History  Past Medical History:   Diagnosis Date    AAA (abdominal aortic aneurysm)     infrarenal- 3.1cm(2010, 3.7x4.2cm(2016), 3.9cm (2017)    Allergic rhinitis     Aspiration pneumonia 05/2017    CHF (congestive heart failure)     Coronary artery disease     DDD (degenerative disc disease), lumbar     lumbar spine- Dr. Churchill     Depression     Diverticulosis     Frequent UTI     Dr. Daniels    GERD (gastroesophageal reflux disease)     Hiatal hernia     HSV (herpes simplex virus) infection     Oral    Hyperlipidemia     IBS (irritable bowel syndrome)     Constipation predominant    Ischemic cardiomyopathy 09/2017    AICD     Kidney stones     NSTEMI (non-ST elevated myocardial infarction) 04/26/2017    Obstructive sleep apnea 2011    nfsg 2011, ahi 67    Osteoarthritis     Osteopenia     Peripheral neuropathy     feet    Pulmonary embolism, bilateral 05/2017    Rotator cuff tear     Bilateral- Ortho        Past Surgical History:   Procedure Laterality Date    CARDIAC CATHETERIZATION  04/26/2017    99% mid LAD. 90% mid LCX. 60% RCA. Recommended CABG. Dr. Diaz    CARDIAC DEFIBRILLATOR PLACEMENT  12/26/2017    ICD implant/ Dr. Ellis    CATARACT EXTRACTION      CORONARY ARTERY BYPASS GRAFT  04/26/2017    x4 & ASD Closure    CYSTOSCOPY  2002     negative. Dr. Daniels    LAPAROSCOPIC CHOLECYSTECTOMY  04/17/2013    For biliary dyskinesia. Dr. Mccoy.     REPLACEMENT TOTAL KNEE BILATERAL  11/2004    Dr. Herrera    THORACENTESIS Left 05/08/2017    TONSILLECTOMY      TUBAL ABDOMINAL LIGATION Bilateral        Family History   Problem Relation Age of Onset    Heart disease Mother     Other Mother         Hypoglycemia    Osteoporosis Mother     COPD Father     Stroke Father     Hypertension Brother     Diabetes Brother     Heart disease Brother        Social History     Tobacco Use    Smoking status: Never   Vaping Use    Vaping Use: Some days    Substances: CBD, nightly, 2-3 weekly   Substance Use Topics    Drug use: Never        (Not in a hospital admission)      Allergies  Oxytetracycline and Oxycodone    Scheduled Meds:aspirin, 81 mg, Oral, Daily  atorvastatin, 80 mg, Oral, Nightly  carvedilol, 3.125 mg, Oral, BID With Meals  cefTRIAXone, 1,000 mg, Intravenous, Q24H  dilTIAZem CD, 240 mg, Oral, Q24H  levothyroxine, 88 mcg, Oral, Q AM  oseltamivir, 30 mg, Oral, Q12H  pantoprazole, 40 mg, Oral, Q AM  PARoxetine, 40 mg, Oral, QAM  potassium chloride, 20 mEq, Oral, Daily  senna-docusate sodium, 2 tablet, Oral, BID  sodium chloride, 10 mL, Intravenous, Q12H  topiramate, 50 mg, Oral, Nightly      Continuous Infusions:Pharmacy to dose warfarin,   sodium chloride, 50 mL/hr, Last Rate: 50 mL/hr (01/24/24 0537)      PRN Meds:.  senna-docusate sodium **AND** polyethylene glycol **AND** bisacodyl **AND** bisacodyl    melatonin    nitroglycerin    ondansetron    Pharmacy to dose warfarin    prochlorperazine    [COMPLETED] Insert Peripheral IV **AND** sodium chloride    sodium chloride    sodium chloride    Objective     VITAL SIGNS  Vitals:    01/23/24 1639 01/23/24 2011 01/24/24 0022 01/24/24 0500   BP: 97/52 104/61 113/45 91/52   BP Location: Right arm Left arm Right arm Right arm   Patient Position: Sitting Sitting Lying Lying   Pulse: 63 67 63 67   Resp:  18 16 18   Temp:  97  "°F (36.1 °C) 97.6 °F (36.4 °C)    TempSrc:  Oral Axillary    SpO2: 94% 93% 98% 99%   Weight:       Height:           Flowsheet Rows      Flowsheet Row First Filed Value   Admission Height 172.7 cm (68\") Documented at 01/22/2024 1240   Admission Weight 81.6 kg (180 lb) Documented at 01/22/2024 1240              Intake/Output Summary (Last 24 hours) at 1/24/2024 0641  Last data filed at 1/23/2024 0900  Gross per 24 hour   Intake 240 ml   Output --   Net 240 ml        TELEMETRY: Sinus rhythm.    Physical Exam:  The patient is alert, oriented and in no distress.  Vital signs as noted above.  Head and neck revealed no carotid bruits or jugular venous distention.  No thyromegaly or lymphadenopathy is present  Lungs clear.  No wheezing.  Breath sounds are normal bilaterally.  Heart normal first and second heart sounds.  No murmur. No precordial rub is present.  No gallop is present.  Abdomen soft and nontender.  No organomegaly is present.  Extremities with good peripheral pulses without any pedal edema.  Skin warm and dry.  ICD site looks normal.  Musculoskeletal system is grossly normal  CNS grossly normal    Reviewed and updated.    Results Review:   I reviewed the patient's new clinical results.  Lab Results (last 24 hours)       Procedure Component Value Units Date/Time    High Sensitivity Troponin T [[id number]]  (Normal) Collected: 01/24/24 0549    Specimen: Blood from Arm, Right Updated: 01/24/24 0636     HS Troponin T 13 ng/L     Narrative:      High Sensitive Troponin T Reference Range:  <14.0 ng/L- Negative Female for AMI  <22.0 ng/L- Negative Male for AMI  >=14 - Abnormal Female indicating possible myocardial injury.  >=22 - Abnormal Male indicating possible myocardial injury.   Clinicians would have to utilize clinical acumen, EKG, Troponin, and serial changes to determine if it is an Acute Myocardial Infarction or myocardial injury due to an underlying chronic condition.         Magnesium [[id number]]  (Normal) " Collected: 01/24/24 0549    Specimen: Blood from Arm, Right Updated: 01/24/24 0628     Magnesium 2.0 mg/dL     Renal Function Panel [[id number]]  (Abnormal) Collected: 01/24/24 0549    Specimen: Blood from Arm, Right Updated: 01/24/24 0628     Glucose 98 mg/dL      BUN 12 mg/dL      Creatinine 1.00 mg/dL      Sodium 143 mmol/L      Potassium 3.5 mmol/L      Chloride 118 mmol/L      CO2 18.0 mmol/L      Calcium 8.2 mg/dL      Albumin 3.1 g/dL      Phosphorus 2.8 mg/dL      Anion Gap 7.0 mmol/L      BUN/Creatinine Ratio 12.0     eGFR 61.1 mL/min/1.73     Narrative:      GFR Normal >60  Chronic Kidney Disease <60  Kidney Failure <15      CBC & Differential [[id number]]  (Abnormal) Collected: 01/24/24 0549    Specimen: Blood from Arm, Right Updated: 01/24/24 0559    Narrative:      The following orders were created for panel order CBC & Differential.  Procedure                               Abnormality         Status                     ---------                               -----------         ------                     CBC Auto Differential[[id number]]        Abnormal            Final result                 Please view results for these tests on the individual orders.    CBC Auto Differential [[id number]]  (Abnormal) Collected: 01/24/24 0549    Specimen: Blood from Arm, Right Updated: 01/24/24 0559     WBC 5.30 10*3/mm3      RBC 3.31 10*6/mm3      Hemoglobin 9.9 g/dL      Hematocrit 30.6 %      MCV 92.4 fL      MCH 29.9 pg      MCHC 32.3 g/dL      RDW 17.3 %      RDW-SD 56.4 fl      MPV 8.3 fL      Platelets 109 10*3/mm3      Neutrophil % 69.8 %      Lymphocyte % 21.4 %      Monocyte % 5.3 %      Eosinophil % 2.4 %      Basophil % 1.1 %      Neutrophils, Absolute 3.70 10*3/mm3      Lymphocytes, Absolute 1.10 10*3/mm3      Monocytes, Absolute 0.30 10*3/mm3      Eosinophils, Absolute 0.10 10*3/mm3      Basophils, Absolute 0.10 10*3/mm3      nRBC 0.0 /100 WBC     Urine Culture - Urine, Urine, Clean Catch [[id number]]   (Abnormal)  (Susceptibility) Collected: 01/22/24 1355    Specimen: Urine, Clean Catch Updated: 01/24/24 0404     Urine Culture >100,000 CFU/mL Escherichia coli    Narrative:      Colonization of the urinary tract without infection is common. Treatment is discouraged unless the patient is symptomatic, pregnant, or undergoing an invasive urologic procedure.    Susceptibility        Escherichia coli      FRANK      Amoxicillin + Clavulanate Resistant      Ampicillin Resistant      Ampicillin + Sulbactam Resistant      Cefazolin Susceptible      Cefepime Susceptible      Ceftazidime Susceptible      Ceftriaxone Susceptible      Gentamicin Susceptible      Levofloxacin Resistant      Nitrofurantoin Susceptible      Trimethoprim + Sulfamethoxazole Resistant                           Ferritin [[id number]]  (Abnormal) Collected: 01/23/24 1446    Specimen: Blood from Arm, Right Updated: 01/23/24 1714     Ferritin 358.90 ng/mL     Narrative:      Results may be falsely decreased if patient taking Biotin.      Iron Profile [[id number]]  (Abnormal) Collected: 01/23/24 1446    Specimen: Blood from Arm, Right Updated: 01/23/24 1711     Iron 54 mcg/dL      Iron Saturation (TSAT) 18 %      Transferrin 199 mg/dL      TIBC 297 mcg/dL     CBC & Differential [[id number]]  (Abnormal) Collected: 01/23/24 1446    Specimen: Blood from Arm, Right Updated: 01/23/24 1541    Narrative:      The following orders were created for panel order CBC & Differential.  Procedure                               Abnormality         Status                     ---------                               -----------         ------                     CBC Auto Differential[[id number]]        Abnormal            Final result               Scan Slide[[id number]]                                       Final result                 Please view results for these tests on the individual orders.    CBC Auto Differential [[id number]]  (Abnormal) Collected: 01/23/24 1446     Specimen: Blood from Arm, Right Updated: 01/23/24 1541     WBC 5.80 10*3/mm3      RBC 3.59 10*6/mm3      Hemoglobin 10.9 g/dL      Hematocrit 32.9 %      MCV 91.6 fL      MCH 30.2 pg      MCHC 33.0 g/dL      RDW 16.7 %      RDW-SD 53.4 fl      MPV 8.6 fL      Platelets 142 10*3/mm3     Scan Slide [[id number]] Collected: 01/23/24 1446    Specimen: Blood from Arm, Right Updated: 01/23/24 1541     Scan Slide --     Comment: See Manual Differential Results       Manual Differential [[id number]]  (Abnormal) Collected: 01/23/24 1446    Specimen: Blood from Arm, Right Updated: 01/23/24 1541     Neutrophil % 68.0 %      Lymphocyte % 27.0 %      Monocyte % 1.0 %      Basophil % 3.0 %      Atypical Lymphocyte % 1.0 %      Neutrophils Absolute 3.94 10*3/mm3      Lymphocytes Absolute 1.62 10*3/mm3      Monocytes Absolute 0.06 10*3/mm3      Basophils Absolute 0.17 10*3/mm3      Anisocytosis Slight/1+     WBC Morphology Normal     Platelet Morphology Normal    Basic Metabolic Panel [[id number]]  (Abnormal) Collected: 01/23/24 1446    Specimen: Blood from Arm, Right Updated: 01/23/24 1517     Glucose 83 mg/dL      BUN 19 mg/dL      Creatinine 1.27 mg/dL      Sodium 139 mmol/L      Potassium 3.8 mmol/L      Comment: Slight hemolysis detected by analyzer. Result may be falsely elevated.        Chloride 113 mmol/L      CO2 17.0 mmol/L      Calcium 8.2 mg/dL      BUN/Creatinine Ratio 15.0     Anion Gap 9.0 mmol/L      eGFR 45.9 mL/min/1.73     Narrative:      GFR Normal >60  Chronic Kidney Disease <60  Kidney Failure <15      Magnesium [[id number]]  (Normal) Collected: 01/23/24 1446    Specimen: Blood from Arm, Right Updated: 01/23/24 1517     Magnesium 2.0 mg/dL     Protime-INR [[id number]]  (Normal) Collected: 01/23/24 1446    Specimen: Blood from Arm, Right Updated: 01/23/24 1510     Protime 21.0 Seconds      INR 2.03            Imaging Results (Last 24 Hours)       Procedure Component Value Units Date/Time    US Aorta Limited  [[id number]] Collected: 01/23/24 0723     Updated: 01/23/24 0730    Narrative:      US AORTA LIMITED    Date of Exam: 1/23/2024 5:50 AM EST    Indication: Follow-up abdominal aortic aneurysm, status post repair..    Comparison: No comparisons available.    Technique: Routine gray scale, color flow and spectral Doppler analysis of the abdominal aorta and proximal common iliac arteries was performed.      Findings:  The patient has a abdominal aortic aneurysm with a visible stent graft. The proximal abdominal aorta measures 3.7 x 3.3 cm. The endograft at this level measures 1.6 x 1.8 cm. The mid abdominal aorta measures 2.7 x 2.3 cm. The endograft measures 1.1 x 1.8   cm at this level. The distal abdominal aorta including the endograft have a diameter of 2.6 x 2.8 cm. There is no visible color Doppler flow in the thrombosed aneurysm sac. There is antegrade color Doppler flow within the endograft. The right common   iliac artery has a maximum AP diameter of 1.2 cm. The left common iliac artery has a maximum AP diameter of 1.1 cm. There is antegrade color Doppler flow in both common iliac arteries.        Impression:      Impression:  Status post placement of a stent graft. There is normal antegrade color Doppler flow including the common iliac arteries. There is thrombus within the aneurysm sac. Dimensions are discussed in detail above.        Electronically Signed: Zheng Doe MD    1/23/2024 7:28 AM EST    Workstation ID: [id number]        LAB RESULTS (LAST 7 DAYS)    CBC  Results from last 7 days   Lab Units 01/24/24  0549 01/23/24  1446 01/22/24  1355   WBC 10*3/mm3 5.30 5.80 9.50   RBC 10*6/mm3 3.31* 3.59* 4.48   HEMOGLOBIN g/dL 9.9* 10.9* 13.4   HEMATOCRIT % 30.6* 32.9* 41.6   MCV fL 92.4 91.6 92.7   PLATELETS 10*3/mm3 109* 142 230       BMP  Results from last 7 days   Lab Units 01/24/24  0549 01/23/24  1446 01/22/24  1355   SODIUM mmol/L 143 139 140   POTASSIUM mmol/L 3.5 3.8 3.7   CHLORIDE mmol/L 118* 113* 108*    CO2 mmol/L 18.0* 17.0* 19.0*   BUN mg/dL 12 19 24*   CREATININE mg/dL 1.00 1.27* 1.70*   GLUCOSE mg/dL 98 83 95   MAGNESIUM mg/dL 2.0 2.0 2.2   PHOSPHORUS mg/dL 2.8  --   --        CMP   Results from last 7 days   Lab Units 01/24/24  0549 01/23/24  1446 01/22/24  1355   SODIUM mmol/L 143 139 140   POTASSIUM mmol/L 3.5 3.8 3.7   CHLORIDE mmol/L 118* 113* 108*   CO2 mmol/L 18.0* 17.0* 19.0*   BUN mg/dL 12 19 24*   CREATININE mg/dL 1.00 1.27* 1.70*   GLUCOSE mg/dL 98 83 95   ALBUMIN g/dL 3.1*  --  4.2   BILIRUBIN mg/dL  --   --  0.3   ALK PHOS U/L  --   --  135*   AST (SGOT) U/L  --   --  37*   ALT (SGPT) U/L  --   --  28         BNP        TROPONIN  Results from last 7 days   Lab Units 01/24/24  0549 01/22/24  1355   CK TOTAL U/L  --  38   HSTROP T ng/L 13 16*       CoAg  Results from last 7 days   Lab Units 01/23/24  1446 01/22/24  1608   INR  2.03 1.51*       Creatinine Clearance  Estimated Creatinine Clearance: 59.5 mL/min (by C-G formula based on SCr of 1 mg/dL).    ABG        Radiology  US Aorta Limited    Result Date: 1/23/2024  Impression: Status post placement of a stent graft. There is normal antegrade color Doppler flow including the common iliac arteries. There is thrombus within the aneurysm sac. Dimensions are discussed in detail above. Electronically Signed: Zheng Doe MD  1/23/2024 7:28 AM EST  Workstation ID: [id number]    US Renal Bilateral    Result Date: 1/22/2024  Impression: Unremarkable renal ultrasound. Right pleural effusion. Electronically Signed: Nela Elder MD  1/22/2024 4:30 PM CST  Workstation ID: [id number]    XR Chest 1 View    Result Date: 1/22/2024  Impression: No acute chest finding. Electronically Signed: Amber Candelaria MD  1/22/2024 3:07 PM EST  Workstation ID: [id number]         EKG                            I personally viewed and interpreted the patient's EKG/Telemetry data: Atrial fibrillation followed by sinus rhythm.      ECHOCARDIOGRAM:    Results for orders placed during  the hospital encounter of 01/22/24    Adult Transthoracic Echo Complete W/ Cont if Necessary Per Protocol    Interpretation Summary    Left ventricular ejection fraction appears to be 56 - 60%.    Estimated right ventricular systolic pressure from tricuspid regurgitation is normal (<35 mmHg).    Indication  Dyspnea  Palpitations    Technically satisfactory study.  Mitral valve is structurally normal.  Tricuspid valve is structurally normal.  Mild tricuspid regurgitation is present.  Aortic valve is aortic valve with decreased opening motion.  Gradient across the aortic valve is 16/9 mmHg.  Valve area 1.37 cm².  Pulmonic valve could not be well visualized.  Left atrium is enlarged.  Right atrium is normal in size.  Left ventricle is enlarged with septal anterior wall and apical severe hypokinesis with ejection fraction of 40%.  Possible left ventricular apical thrombus.  Right ventricle is normal in size.  Atrial septum is intact.  Aorta is normal.  No pericardial effusion or intracardiac thrombus is seen.  ICD lead is present in the right ventricle.    Impression  Mild tricuspid regurgitation.  Mild to moderate aortic valve stenosis with gradient of 16/9 mmHg and valve area of 1.37 cm².  Left atrial enlargement.  Left ventricle is enlarged with septal anterior wall and apical severe hypokinesis with ejection fraction of 40%.  Possible left ventricular apical thrombus.  ICD lead is present in the right ventricle.  No evidence for pulmonary hypertension is present.          STRESS TEST        Cardiolite (Tc-99m sestamibi) stress test    CARDIAC CATHETERIZATION  No results found for this or any previous visit.                OTHER:         Assessment & Plan     Principal Problem:    New onset atrial fibrillation      ]]]]]]]]]]]]]]]]]]  Impression   =========  -Recent weakness and palpitations.     - Atrial fibrillation with RVR.  Has converted to sinus rhythm with intravenous Cardizem.  Patient had postop atrial  fibrillation after she had CABG in 2017.     - Status post CABG and ASD closure 2017     - Ischemic cardiomyopathy     - Status post ICD (single-chamber)-2017-[place].     - Chronic anticoagulation-patient on Coumadin.  Home monitoring.  INR 1.51-1/22/2024-subtherapeutic.     - Recent COVID infection.     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 There is normal antegrade color Doppler flow including the common iliac arteries. There is thrombus within the aneurysm sac. Dimensions are discussed in detail above.     - Family history of coronary artery disease     - Non-smoker     - Allergic to oxytetracycline and oxycodone.     Echocardiogram 1/23/2024 revealed  Mild tricuspid regurgitation.  Mild to moderate aortic valve stenosis with gradient of 16/9 mmHg and valve area of 1.37 cm².  Left atrial enlargement.  Left ventricle is enlarged with septal anterior wall and apical severe hypokinesis with ejection fraction of 40%.  Possible left ventricular apical thrombus.  ICD lead is present in the right ventricle.  No evidence for pulmonary hypertension is present.  =============  Plan  =============  A-fib with RVR.  Patient has converted to sinus rhythm with intravenous Cardizem.  Patient is on p.o. Cardizem.    Status post CABG  Ischemic cardiomyopathy 2.03    Mild to moderate aortic valve stenosis    Echocardiogram 1/23/2024 revealed  Mild tricuspid regurgitation.  Mild to moderate aortic valve stenosis with gradient of 16/9 mmHg and valve area of 1.37 cm².  Left atrial enlargement.  Left ventricle is enlarged with septal anterior wall and apical severe hypokinesis with ejection fraction of 40%.  Possible left  ventricular apical thrombus.  ICD lead is present in the right ventricle.  No evidence for pulmonary hypertension is present.     Stress Cardiolite test-today    Status post ICD ([employer]) 2017.  Single-chamber.    Anticoagulation  Patient is on midodrine.  INR 2.03.  Will hold Coumadin in anticipation of possibly needing an intervention.     Current medications include  Aspirin atorvastatin Coreg subcu therapeutic Lovenox levothyroxine Tamiflu pantoprazole paroxetine potassium supplements.  Cardizem CD.    Hold warfarin  .  Follow-up labs ordered.     Further plan will depend on patient's progress.  ]]]]]]]]]]]]]]]]]]]]]          Allie Hurley MD  01/24/24  06:41 EST

## 2024-01-24 NOTE — PROGRESS NOTES
"NEPHROLOGY PROGRESS NOTE------[facility]    [facility]  [phone]  Jozef Otero MD      Patient Care Team:  Luan Joseph FNP as PCP - General (Family Medicine)  Jozef Otero MD as Consulting Physician (Nephrology)      Provider:  Jozef Otero MD  Patient Name: Laura Mejia  :  1955    SUBJECTIVE:  Follow-up TRACEY/CKD  No chest pain shortness of breath  Blood pressure remains soft in the low 100s    Medication:  aspirin, 81 mg, Oral, Daily  atorvastatin, 80 mg, Oral, Nightly  carvedilol, 3.125 mg, Oral, BID With Meals  cefTRIAXone, 1,000 mg, Intravenous, Q24H  dilTIAZem CD, 240 mg, Oral, Q24H  levothyroxine, 88 mcg, Oral, Q AM  oseltamivir, 30 mg, Oral, Q12H  pantoprazole, 40 mg, Oral, Q AM  PARoxetine, 40 mg, Oral, QAM  potassium chloride, 20 mEq, Oral, Daily  senna-docusate sodium, 2 tablet, Oral, BID  sodium bicarbonate, 650 mg, Oral, TID  sodium chloride, 10 mL, Intravenous, Q12H  topiramate, 50 mg, Oral, Nightly      Pharmacy to dose warfarin,   sodium chloride, 50 mL/hr, Last Rate: Stopped (24 0828)        OBJECTIVE    Vital Sign Min/Max for last 24 hours  Temp  Min: 97 °F (36.1 °C)  Max: 97.6 °F (36.4 °C)   BP  Min: 91/52  Max: 115/58   Pulse  Min: 59  Max: 67   Resp  Min: 16  Max: 18   SpO2  Min: 92 %  Max: 99 %   No data recorded   No data recorded     Flowsheet Rows      Flowsheet Row First Filed Value   Admission Height 172.7 cm (68\") Documented at 2024 1240   Admission Weight 81.6 kg (180 lb) Documented at 2024 1240            No intake/output data recorded.  I/O last 3 completed shifts:  In: 360 [P.O.:360]  Out: -     Physical Exam:  General Appearance: alert, appears stated age and cooperative  Head: normocephalic, without obvious abnormality and atraumatic  Eyes: conjunctivae and sclerae normal and no icterus  Neck: supple and no JVD  Lungs: clear to auscultation and respirations regular  Heart: " "regular rhythm & normal rate and normal S1, S2  Chest: Wall no abnormalities observed  Abdomen: normal bowel sounds and soft, nontender  Extremities: moves extremities well, no edema, no cyanosis and no redness  Skin: no bleeding, bruising or rash, turgor normal, color normal and no lesions noted  Neurologic: Alert, and oriented. No focal deficits    Labs:    WBC WBC   Date Value Ref Range Status   01/24/2024 5.30 3.40 - 10.80 10*3/mm3 Final   01/23/2024 5.80 3.40 - 10.80 10*3/mm3 Final   01/22/2024 9.50 3.40 - 10.80 10*3/mm3 Final      HGB Hemoglobin   Date Value Ref Range Status   01/24/2024 9.9 (L) 12.0 - 15.9 g/dL Final   01/23/2024 10.9 (L) 12.0 - 15.9 g/dL Final   01/22/2024 13.4 12.0 - 15.9 g/dL Final      HCT Hematocrit   Date Value Ref Range Status   01/24/2024 30.6 (L) 34.0 - 46.6 % Final   01/23/2024 32.9 (L) 34.0 - 46.6 % Final   01/22/2024 41.6 34.0 - 46.6 % Final      Platelets No results found for: \"LABPLAT\"   MCV MCV   Date Value Ref Range Status   01/24/2024 92.4 79.0 - 97.0 fL Final   01/23/2024 91.6 79.0 - 97.0 fL Final   01/22/2024 92.7 79.0 - 97.0 fL Final          Sodium Sodium   Date Value Ref Range Status   01/24/2024 143 136 - 145 mmol/L Final   01/23/2024 139 136 - 145 mmol/L Final   01/22/2024 140 136 - 145 mmol/L Final      Potassium Potassium   Date Value Ref Range Status   01/24/2024 3.5 3.5 - 5.2 mmol/L Final   01/23/2024 3.8 3.5 - 5.2 mmol/L Final     Comment:     Slight hemolysis detected by analyzer. Result may be falsely elevated.   01/22/2024 3.7 3.5 - 5.2 mmol/L Final     Comment:     Slight hemolysis detected by analyzer. Result may be falsely elevated.      Chloride Chloride   Date Value Ref Range Status   01/24/2024 118 (H) 98 - 107 mmol/L Final   01/23/2024 113 (H) 98 - 107 mmol/L Final   01/22/2024 108 (H) 98 - 107 mmol/L Final      CO2 CO2   Date Value Ref Range Status   01/24/2024 18.0 (L) 22.0 - 29.0 mmol/L Final   01/23/2024 17.0 (L) 22.0 - 29.0 mmol/L Final   01/22/2024 " "19.0 (L) 22.0 - 29.0 mmol/L Final      BUN BUN   Date Value Ref Range Status   01/24/2024 12 8 - 23 mg/dL Final   01/23/2024 19 8 - 23 mg/dL Final   01/22/2024 24 (H) 8 - 23 mg/dL Final      Creatinine Creatinine   Date Value Ref Range Status   01/24/2024 1.00 0.57 - 1.00 mg/dL Final   01/23/2024 1.27 (H) 0.57 - 1.00 mg/dL Final   01/22/2024 1.70 (H) 0.57 - 1.00 mg/dL Final      Calcium Calcium   Date Value Ref Range Status   01/24/2024 8.2 (L) 8.6 - 10.5 mg/dL Final   01/23/2024 8.2 (L) 8.6 - 10.5 mg/dL Final   01/22/2024 9.3 8.6 - 10.5 mg/dL Final      PO4 No components found for: \"PO4\"   Albumin Albumin   Date Value Ref Range Status   01/24/2024 3.1 (L) 3.5 - 5.2 g/dL Final   01/22/2024 4.2 3.5 - 5.2 g/dL Final      Magnesium Magnesium   Date Value Ref Range Status   01/24/2024 2.0 1.6 - 2.4 mg/dL Final   01/23/2024 2.0 1.6 - 2.4 mg/dL Final   01/22/2024 2.2 1.6 - 2.4 mg/dL Final      Uric Acid No components found for: \"URIC ACID\"     Imaging Results (Last 72 Hours)       Procedure Component Value Units Date/Time    US Aorta Limited [[id number]] Collected: 01/23/24 0723     Updated: 01/23/24 0730    Narrative:      US AORTA LIMITED    Date of Exam: 1/23/2024 5:50 AM EST    Indication: Follow-up abdominal aortic aneurysm, status post repair..    Comparison: No comparisons available.    Technique: Routine gray scale, color flow and spectral Doppler analysis of the abdominal aorta and proximal common iliac arteries was performed.      Findings:  The patient has a abdominal aortic aneurysm with a visible stent graft. The proximal abdominal aorta measures 3.7 x 3.3 cm. The endograft at this level measures 1.6 x 1.8 cm. The mid abdominal aorta measures 2.7 x 2.3 cm. The endograft measures 1.1 x 1.8   cm at this level. The distal abdominal aorta including the endograft have a diameter of 2.6 x 2.8 cm. There is no visible color Doppler flow in the thrombosed aneurysm sac. There is antegrade color Doppler flow within the " endograft. The right common   iliac artery has a maximum AP diameter of 1.2 cm. The left common iliac artery has a maximum AP diameter of 1.1 cm. There is antegrade color Doppler flow in both common iliac arteries.        Impression:      Impression:  Status post placement of a stent graft. There is normal antegrade color Doppler flow including the common iliac arteries. There is thrombus within the aneurysm sac. Dimensions are discussed in detail above.        Electronically Signed: Zheng Doe MD    1/23/2024 7:28 AM EST    Workstation ID: [id number]    US Renal Bilateral [[id number]] Collected: 01/22/24 1730     Updated: 01/22/24 1736    Narrative:      US RENAL BILATERAL    Date of Exam: 1/22/2024 4:05 PM CST    Indication: elevated creatinine.    Comparison: No comparisons available.    Technique: Grayscale and color Doppler ultrasound evaluation of the kidneys and urinary bladder was performed.      Findings:  RIGHT kidney measures 8.1 cm.  Kidney echogenicity, size, and vascularity appear within normal limits. There is no solid kidney mass.  No echogenic shadowing stone.  No hydronephrosis.    LEFT kidney measures 8.2 cm. Kidney echogenicity, size, and vascularity appear within normal limits. There is no solid kidney mass.  No echogenic shadowing stone.  No hydronephrosis.    Limited visualization of the urinary bladder is unremarkable. Total volume is 182 mL.    Incidental note is made of right pleural effusion.        Impression:      Impression:  Unremarkable renal ultrasound.    Right pleural effusion.        Electronically Signed: Nela Elder MD    1/22/2024 4:30 PM CST    Workstation ID: [id number]    XR Chest 1 View [[id number]] Collected: 01/22/24 1344     Updated: 01/22/24 1509    Narrative:      XR CHEST 1 VW    Date of Exam: 1/22/2024 1:40 PM EST    Indication: Weakness Short of breath    Comparison: PA and lateral chest radiograph 9/12/2017    Findings:  The lungs are clear. The heart size is  within normal limits with signs of prior median sternotomy and CABG. Left chest wall pacemaker has been placed since the prior examination with a single lead extending to the expected location of the right   ventricle. Pulmonary vascular distribution is normal without evidence of edema. No pleural effusion, pneumothorax or acute osseous abnormality. Right shoulder replacement changes are new since the prior chest x-ray.      Impression:      Impression:  No acute chest finding.      Electronically Signed: Amber Candelaria MD    1/22/2024 3:07 PM EST    Workstation ID: [id number]            Results for orders placed during the hospital encounter of 01/22/24    XR Chest 1 View    Narrative  XR CHEST 1 VW    Date of Exam: 1/22/2024 1:40 PM EST    Indication: Weakness Short of breath    Comparison: PA and lateral chest radiograph 9/12/2017    Findings:  The lungs are clear. The heart size is within normal limits with signs of prior median sternotomy and CABG. Left chest wall pacemaker has been placed since the prior examination with a single lead extending to the expected location of the right  ventricle. Pulmonary vascular distribution is normal without evidence of edema. No pleural effusion, pneumothorax or acute osseous abnormality. Right shoulder replacement changes are new since the prior chest x-ray.    Impression  Impression:  No acute chest finding.      Electronically Signed: Amber Candelaria MD  1/22/2024 3:07 PM EST  Workstation ID: [id number]      Results for orders placed during the hospital encounter of 09/08/23    XR Spine Lumbar Complete 4+VW    Narrative  XR SPINE LUMBAR COMPLETE 4+VW, XR SPINE THORACIC 3 VW    Date of Exam: 9/8/2023 10:25 AM EDT    Indication: low back pain upper and lower back pain    Comparison: None available.    Findings:  Lumbar spine with oblique views: There is moderately severe central and anterior wedging of L1 which appears old. There is mild narrowing of the T12-L1 and L1-2 disc  interspaces. There is slight retrolisthesis of L1 on L2 and of L2 on L3. There is mild  narrowing of the L2-3 disc. There is moderate narrowing of the L4-5 disc with grade 1 spondylolisthesis of L4 on L5. There are no definite pars defects. There is a radiopaque aortic biiliac stent.    Thoracic spine: There is a mild right convexity scoliosis. There is severe central and anterior wedging of T7 and T8. There is old mild wedging of T11 and T12. There is moderate narrowing of mid and lower thoracic disc interspaces with mild spurring.    Impression  Impression:  Compression deformities of T11-L1 appear most likely old.    Compression deformities of T7 and T8 are of indeterminate age.    Degenerative disc disease as detailed.    Electronically Signed: Monica George MD  9/8/2023 11:04 AM EDT  Workstation ID: [id number]      XR Spine Thoracic 3 View    Narrative  XR SPINE LUMBAR COMPLETE 4+VW, XR SPINE THORACIC 3 VW    Date of Exam: 9/8/2023 10:25 AM EDT    Indication: low back pain upper and lower back pain    Comparison: None available.    Findings:  Lumbar spine with oblique views: There is moderately severe central and anterior wedging of L1 which appears old. There is mild narrowing of the T12-L1 and L1-2 disc interspaces. There is slight retrolisthesis of L1 on L2 and of L2 on L3. There is mild  narrowing of the L2-3 disc. There is moderate narrowing of the L4-5 disc with grade 1 spondylolisthesis of L4 on L5. There are no definite pars defects. There is a radiopaque aortic biiliac stent.    Thoracic spine: There is a mild right convexity scoliosis. There is severe central and anterior wedging of T7 and T8. There is old mild wedging of T11 and T12. There is moderate narrowing of mid and lower thoracic disc interspaces with mild spurring.    Impression  Impression:  Compression deformities of T11-L1 appear most likely old.    Compression deformities of T7 and T8 are of indeterminate age.    Degenerative disc disease  as detailed.    Electronically Signed: Monica George MD  9/8/2023 11:04 AM EDT  Workstation ID: [id number]            ASSESSMENT / PLAN      New onset atrial fibrillation    TRACEY--likely pre-renal in setting of hypotension and new onset a fib causing reduced renal perfusion  CKD stage 3a--CKD due to nephrosclerosis  Hx CAD s/p CABG  New onset a fib--per cards  UTI--Cx growing E. coli.  On ceftriaxone  Hx PE  Anemia--check iron/ferritin     Creatinine better  Stop IV fluids  Urine culture growing E. coli, on ceftriaxone  Add low-dose midodrine for hypotension  Had stress test this morning, cardiology following           Jozef Otero MD  [facility]  [phone]  01/24/24  10:21 EST

## 2024-01-24 NOTE — PLAN OF CARE
Goal Outcome Evaluation:              Outcome Evaluation: Pt. is A&Ox4, Ax1. Pt. is positive for flu and in droplet precautions. Pt. had abn stress test; possible stress test on friday. Pt. on RA. Plan to DC home with  when medically stable.

## 2024-01-24 NOTE — CASE MANAGEMENT/SOCIAL WORK
Continued Stay Note  SHELLEY Davey     Patient Name: Laura Mejia  MRN: [id number]  Today's Date: 1/24/2024    Admit Date: 1/22/2024    Plan: DC PLAN: Return home with        Discharge Plan       Row Name 01/24/24 1146       Plan    Plan DC PLAN: Return home with     Plan Comments DC BARRIERS: Nauseated pending stress test.                      Expected Discharge Date and Time       Expected Discharge Date Expected Discharge Time    Jan 23, 2024           Zaira Vazquez RN

## 2024-01-25 LAB
ALBUMIN SERPL-MCNC: 3.1 G/DL (ref 3.5–5.2)
ANION GAP SERPL CALCULATED.3IONS-SCNC: 9 MMOL/L (ref 5–15)
BASOPHILS # BLD AUTO: 0 10*3/MM3 (ref 0–0.2)
BASOPHILS NFR BLD AUTO: 0.6 % (ref 0–1.5)
BUN SERPL-MCNC: 14 MG/DL (ref 8–23)
BUN/CREAT SERPL: 14.3 (ref 7–25)
CALCIUM SPEC-SCNC: 8.2 MG/DL (ref 8.6–10.5)
CHLORIDE SERPL-SCNC: 116 MMOL/L (ref 98–107)
CO2 SERPL-SCNC: 17 MMOL/L (ref 22–29)
CREAT SERPL-MCNC: 0.98 MG/DL (ref 0.57–1)
DEPRECATED RDW RBC AUTO: 59.1 FL (ref 37–54)
EGFRCR SERPLBLD CKD-EPI 2021: 62.6 ML/MIN/1.73
EOSINOPHIL # BLD AUTO: 0.1 10*3/MM3 (ref 0–0.4)
EOSINOPHIL NFR BLD AUTO: 2 % (ref 0.3–6.2)
ERYTHROCYTE [DISTWIDTH] IN BLOOD BY AUTOMATED COUNT: 17.6 % (ref 12.3–15.4)
GLUCOSE SERPL-MCNC: 114 MG/DL (ref 65–99)
HCT VFR BLD AUTO: 29.3 % (ref 34–46.6)
HGB BLD-MCNC: 9.6 G/DL (ref 12–15.9)
INR PPP: 2.98 (ref 0.93–1.1)
LYMPHOCYTES # BLD AUTO: 1 10*3/MM3 (ref 0.7–3.1)
LYMPHOCYTES NFR BLD AUTO: 14.5 % (ref 19.6–45.3)
MAGNESIUM SERPL-MCNC: 1.8 MG/DL (ref 1.6–2.4)
MCH RBC QN AUTO: 29.5 PG (ref 26.6–33)
MCHC RBC AUTO-ENTMCNC: 32.8 G/DL (ref 31.5–35.7)
MCV RBC AUTO: 90.1 FL (ref 79–97)
MONOCYTES # BLD AUTO: 0.3 10*3/MM3 (ref 0.1–0.9)
MONOCYTES NFR BLD AUTO: 4.6 % (ref 5–12)
NEUTROPHILS NFR BLD AUTO: 5.6 10*3/MM3 (ref 1.7–7)
NEUTROPHILS NFR BLD AUTO: 78.3 % (ref 42.7–76)
NRBC BLD AUTO-RTO: 0.1 /100 WBC (ref 0–0.2)
PHOSPHATE SERPL-MCNC: 2.9 MG/DL (ref 2.5–4.5)
PLATELET # BLD AUTO: 127 10*3/MM3 (ref 140–450)
PMV BLD AUTO: 8.8 FL (ref 6–12)
POTASSIUM SERPL-SCNC: 3.4 MMOL/L (ref 3.5–5.2)
POTASSIUM SERPL-SCNC: 3.8 MMOL/L (ref 3.5–5.2)
PROTHROMBIN TIME: 30 SECONDS (ref 9.6–11.7)
QT INTERVAL: 492 MS
QTC INTERVAL: 485 MS
RBC # BLD AUTO: 3.26 10*6/MM3 (ref 3.77–5.28)
SODIUM SERPL-SCNC: 142 MMOL/L (ref 136–145)
WBC NRBC COR # BLD AUTO: 7.2 10*3/MM3 (ref 3.4–10.8)

## 2024-01-25 PROCEDURE — 93010 ELECTROCARDIOGRAM REPORT: CPT | Performed by: INTERNAL MEDICINE

## 2024-01-25 PROCEDURE — 85025 COMPLETE CBC W/AUTO DIFF WBC: CPT | Performed by: INTERNAL MEDICINE

## 2024-01-25 PROCEDURE — 93005 ELECTROCARDIOGRAM TRACING: CPT | Performed by: INTERNAL MEDICINE

## 2024-01-25 PROCEDURE — 84132 ASSAY OF SERUM POTASSIUM: CPT | Performed by: INTERNAL MEDICINE

## 2024-01-25 PROCEDURE — 99233 SBSQ HOSP IP/OBS HIGH 50: CPT | Performed by: INTERNAL MEDICINE

## 2024-01-25 PROCEDURE — 85610 PROTHROMBIN TIME: CPT | Performed by: INTERNAL MEDICINE

## 2024-01-25 PROCEDURE — 80069 RENAL FUNCTION PANEL: CPT | Performed by: INTERNAL MEDICINE

## 2024-01-25 PROCEDURE — 25010000002 NA FERRIC GLUC CPLX PER 12.5 MG: Performed by: INTERNAL MEDICINE

## 2024-01-25 PROCEDURE — 25810000003 SODIUM CHLORIDE 0.9 % SOLUTION: Performed by: INTERNAL MEDICINE

## 2024-01-25 PROCEDURE — 63710000001 ONDANSETRON PER 8 MG: Performed by: INTERNAL MEDICINE

## 2024-01-25 PROCEDURE — 83735 ASSAY OF MAGNESIUM: CPT | Performed by: INTERNAL MEDICINE

## 2024-01-25 PROCEDURE — 25010000002 ONDANSETRON PER 1 MG: Performed by: INTERNAL MEDICINE

## 2024-01-25 RX ORDER — ONDANSETRON 4 MG/1
4 TABLET, FILM COATED ORAL EVERY 4 HOURS PRN
Status: DISCONTINUED | OUTPATIENT
Start: 2024-01-25 | End: 2024-01-26 | Stop reason: HOSPADM

## 2024-01-25 RX ORDER — CEPHALEXIN 500 MG/1
500 CAPSULE ORAL 4 TIMES DAILY
Status: DISCONTINUED | OUTPATIENT
Start: 2024-01-25 | End: 2024-01-26 | Stop reason: HOSPADM

## 2024-01-25 RX ORDER — SODIUM CHLORIDE 9 MG/ML
100 INJECTION, SOLUTION INTRAVENOUS CONTINUOUS
Status: DISCONTINUED | OUTPATIENT
Start: 2024-01-26 | End: 2024-01-26 | Stop reason: HOSPADM

## 2024-01-25 RX ORDER — SODIUM CHLORIDE 9 MG/ML
40 INJECTION, SOLUTION INTRAVENOUS AS NEEDED
Status: DISCONTINUED | OUTPATIENT
Start: 2024-01-25 | End: 2024-01-26 | Stop reason: HOSPADM

## 2024-01-25 RX ORDER — SODIUM CHLORIDE 0.9 % (FLUSH) 0.9 %
3-10 SYRINGE (ML) INJECTION AS NEEDED
Status: DISCONTINUED | OUTPATIENT
Start: 2024-01-25 | End: 2024-01-26 | Stop reason: HOSPADM

## 2024-01-25 RX ORDER — MIDODRINE HYDROCHLORIDE 5 MG/1
10 TABLET ORAL EVERY 8 HOURS
Status: DISCONTINUED | OUTPATIENT
Start: 2024-01-25 | End: 2024-01-26 | Stop reason: HOSPADM

## 2024-01-25 RX ORDER — SODIUM CHLORIDE 0.9 % (FLUSH) 0.9 %
3 SYRINGE (ML) INJECTION EVERY 12 HOURS SCHEDULED
Status: DISCONTINUED | OUTPATIENT
Start: 2024-01-25 | End: 2024-01-26 | Stop reason: HOSPADM

## 2024-01-25 RX ORDER — OSELTAMIVIR PHOSPHATE 75 MG/1
75 CAPSULE ORAL EVERY 12 HOURS SCHEDULED
Status: DISCONTINUED | OUTPATIENT
Start: 2024-01-25 | End: 2024-01-26 | Stop reason: HOSPADM

## 2024-01-25 RX ADMIN — Medication 3 ML: at 20:10

## 2024-01-25 RX ADMIN — TOPIRAMATE 50 MG: 25 TABLET, FILM COATED ORAL at 20:09

## 2024-01-25 RX ADMIN — CARVEDILOL 3.12 MG: 3.12 TABLET, FILM COATED ORAL at 08:39

## 2024-01-25 RX ADMIN — Medication 10 ML: at 08:37

## 2024-01-25 RX ADMIN — LEVOTHYROXINE SODIUM 88 MCG: 0.09 TABLET ORAL at 05:31

## 2024-01-25 RX ADMIN — SODIUM CHLORIDE 250 MG: 9 INJECTION, SOLUTION INTRAVENOUS at 15:17

## 2024-01-25 RX ADMIN — ONDANSETRON 4 MG: 2 INJECTION INTRAMUSCULAR; INTRAVENOUS at 05:39

## 2024-01-25 RX ADMIN — SODIUM BICARBONATE 650 MG: 650 TABLET ORAL at 08:39

## 2024-01-25 RX ADMIN — SODIUM BICARBONATE 650 MG: 650 TABLET ORAL at 15:19

## 2024-01-25 RX ADMIN — OSELTAMIVIR PHOSPHATE 75 MG: 75 CAPSULE ORAL at 20:09

## 2024-01-25 RX ADMIN — CEPHALEXIN 500 MG: 500 CAPSULE ORAL at 16:28

## 2024-01-25 RX ADMIN — SODIUM BICARBONATE 650 MG: 650 TABLET ORAL at 20:09

## 2024-01-25 RX ADMIN — ONDANSETRON HYDROCHLORIDE 4 MG: 4 TABLET, FILM COATED ORAL at 10:33

## 2024-01-25 RX ADMIN — MIDODRINE HYDROCHLORIDE 10 MG: 5 TABLET ORAL at 15:17

## 2024-01-25 RX ADMIN — MIDODRINE HYDROCHLORIDE 10 MG: 5 TABLET ORAL at 21:12

## 2024-01-25 RX ADMIN — ATORVASTATIN CALCIUM 80 MG: 40 TABLET, FILM COATED ORAL at 20:09

## 2024-01-25 RX ADMIN — PAROXETINE 40 MG: 40 TABLET, FILM COATED ORAL at 08:36

## 2024-01-25 RX ADMIN — ASPIRIN 81 MG CHEWABLE TABLET 81 MG: 81 TABLET CHEWABLE at 08:39

## 2024-01-25 RX ADMIN — Medication 10 ML: at 20:10

## 2024-01-25 RX ADMIN — DILTIAZEM HYDROCHLORIDE 240 MG: 240 CAPSULE, COATED, EXTENDED RELEASE ORAL at 08:39

## 2024-01-25 RX ADMIN — ONDANSETRON 4 MG: 2 INJECTION INTRAMUSCULAR; INTRAVENOUS at 19:10

## 2024-01-25 RX ADMIN — OSELTAMIVIR PHOSPHATE 30 MG: 30 CAPSULE ORAL at 08:39

## 2024-01-25 RX ADMIN — PANTOPRAZOLE SODIUM 40 MG: 40 TABLET, DELAYED RELEASE ORAL at 05:31

## 2024-01-25 RX ADMIN — CEPHALEXIN 500 MG: 500 CAPSULE ORAL at 20:09

## 2024-01-25 RX ADMIN — DOCUSATE SODIUM 50MG AND SENNOSIDES 8.6MG 2 TABLET: 8.6; 5 TABLET, FILM COATED ORAL at 08:36

## 2024-01-25 RX ADMIN — MIDODRINE HYDROCHLORIDE 5 MG: 5 TABLET ORAL at 05:31

## 2024-01-25 RX ADMIN — POTASSIUM CHLORIDE 20 MEQ: 1.5 POWDER, FOR SOLUTION ORAL at 05:30

## 2024-01-25 NOTE — NURSING NOTE
Consent for left heart cath has not been signed at this time. Patient and daughter have spoke with MD Tobin at the bedside, per his recommendation to not continue with heart cath d/t outweighing risks versus benefits. MD Hurley will be coming to speak further with risks versus benefits tomorrow.

## 2024-01-25 NOTE — PROGRESS NOTES
Referring Provider: Keke Casiano MD    Reason for follow-up:  Atrial fibrillation  Status post CABG  Status post ICD  Near syncope     Patient Care Team:  Luan Joseph FNP as PCP - General (Family Medicine)  Jozef Otero MD as Consulting Physician (Nephrology)    Subjective .      ROS    Since I have last seen, the patient has been without any chest discomfort ,shortness of breath, palpitations, dizziness or syncope.  Denies having any headache ,abdominal pain ,nausea, vomiting , diarrhea constipation, loss of weight or loss of appetite.  Denies having any excessive bruising ,hematuria or blood in the stool.    Review of all systems negative except as indicated    History  Past Medical History:   Diagnosis Date    AAA (abdominal aortic aneurysm)     infrarenal- 3.1cm(2010, 3.7x4.2cm(2016), 3.9cm (2017)    Allergic rhinitis     Aspiration pneumonia 05/2017    CHF (congestive heart failure)     Coronary artery disease     DDD (degenerative disc disease), lumbar     lumbar spine- Dr. Churchill     Depression     Diverticulosis     Frequent UTI     Dr. Daniels    GERD (gastroesophageal reflux disease)     Hiatal hernia     HSV (herpes simplex virus) infection     Oral    Hyperlipidemia     IBS (irritable bowel syndrome)     Constipation predominant    Ischemic cardiomyopathy 09/2017    AICD     Kidney stones     NSTEMI (non-ST elevated myocardial infarction) 04/26/2017    Obstructive sleep apnea 2011    nfsg 2011, ahi 67    Osteoarthritis     Osteopenia     Peripheral neuropathy     feet    Pulmonary embolism, bilateral 05/2017    Rotator cuff tear     Bilateral- Ortho        Past Surgical History:   Procedure Laterality Date    CARDIAC CATHETERIZATION  04/26/2017    99% mid LAD. 90% mid LCX. 60% RCA. Recommended CABG. Dr. Diaz    CARDIAC DEFIBRILLATOR PLACEMENT  12/26/2017    ICD implant/ Dr. Ellis    CATARACT EXTRACTION      CORONARY ARTERY BYPASS GRAFT  04/26/2017    x4 & ASD Closure    CYSTOSCOPY  2002     negative. Dr. Daniels    LAPAROSCOPIC CHOLECYSTECTOMY  04/17/2013    For biliary dyskinesia. Dr. Mccoy.     REPLACEMENT TOTAL KNEE BILATERAL  11/2004    Dr. Herrera    THORACENTESIS Left 05/08/2017    TONSILLECTOMY      TUBAL ABDOMINAL LIGATION Bilateral        Family History   Problem Relation Age of Onset    Heart disease Mother     Other Mother         Hypoglycemia    Osteoporosis Mother     COPD Father     Stroke Father     Hypertension Brother     Diabetes Brother     Heart disease Brother        Social History     Tobacco Use    Smoking status: Never   Vaping Use    Vaping Use: Some days    Substances: CBD, nightly, 2-3 weekly   Substance Use Topics    Drug use: Never        Medications Prior to Admission   Medication Sig Dispense Refill Last Dose    aspirin 81 MG chewable tablet Chew 1 tablet Daily.       atorvastatin (LIPITOR) 80 MG tablet Take 1 tablet by mouth Daily.       carvedilol (COREG) 3.125 MG tablet Take 1 tablet by mouth 2 (Two) Times a Day With Meals.       ezetimibe (ZETIA) 10 MG tablet Take 1 tablet by mouth Daily.       famciclovir (FAMVIR) 500 MG tablet Take 1 tablet by mouth 3 (Three) Times a Day As Needed.       furosemide (LASIX) 40 MG tablet Take 1 tablet by mouth Daily As Needed.       levocetirizine (XYZAL) 5 MG tablet Take 0.5 tablets by mouth Every Evening.       levothyroxine (SYNTHROID, LEVOTHROID) 75 MCG tablet Take 1 tablet by mouth Daily.       omeprazole (priLOSEC) 40 MG capsule Take 1 capsule by mouth Daily.       PARoxetine (PAXIL) 40 MG tablet Take 1 tablet by mouth Every Morning.       potassium chloride (KLOR-CON) 20 MEQ packet Take 20 mEq by mouth Daily.       topiramate (TOPAMAX) 50 MG tablet Take 1 tablet by mouth Daily.       trospium (SANCTURA) 20 MG tablet Take 1 tablet by mouth Every Night.       warfarin (COUMADIN) 3 MG tablet Take 2 tablets by mouth 3 (Three) Times a Week. Take 2 tablet by mouth on Monday, Wednesday, and Friday       warfarin (COUMADIN) 3 MG tablet See  "Admin Instructions. Take 1 tablet by mouth on Tuesday, Thursday, Saturday, and Sunday          Allergies  Oxytetracycline and Oxycodone    Scheduled Meds:aspirin, 81 mg, Oral, Daily  atorvastatin, 80 mg, Oral, Nightly  carvedilol, 3.125 mg, Oral, BID With Meals  cefTRIAXone, 1,000 mg, Intravenous, Q24H  dilTIAZem CD, 240 mg, Oral, Q24H  levothyroxine, 88 mcg, Oral, Q AM  midodrine, 5 mg, Oral, Q8H  oseltamivir, 30 mg, Oral, Q12H  pantoprazole, 40 mg, Oral, Q AM  PARoxetine, 40 mg, Oral, QAM  potassium chloride, 20 mEq, Oral, Daily  senna-docusate sodium, 2 tablet, Oral, BID  sodium bicarbonate, 650 mg, Oral, TID  sodium chloride, 10 mL, Intravenous, Q12H  topiramate, 50 mg, Oral, Nightly      Continuous Infusions:Pharmacy to dose warfarin,       PRN Meds:.  senna-docusate sodium **AND** polyethylene glycol **AND** bisacodyl **AND** bisacodyl    butalbital-acetaminophen-caffeine    melatonin    nitroglycerin    ondansetron    Pharmacy to dose warfarin    prochlorperazine    [COMPLETED] Insert Peripheral IV **AND** sodium chloride    sodium chloride    sodium chloride    Objective     VITAL SIGNS  Vitals:    01/24/24 1710 01/24/24 2031 01/24/24 2359 01/25/24 0533   BP: 100/70 (!) 81/45 93/55 108/59   BP Location: Left arm Left arm Right arm Left arm   Patient Position: Sitting Lying Lying Sitting   Pulse: 64 61 58 64   Resp: 18  18 18   Temp:  98.3 °F (36.8 °C) 98.4 °F (36.9 °C)    TempSrc:  Oral Oral    SpO2: 98% 95% 95% 93%   Weight:       Height:           Flowsheet Rows      Flowsheet Row First Filed Value   Admission Height 172.7 cm (68\") Documented at 01/22/2024 1240   Admission Weight 81.6 kg (180 lb) Documented at 01/22/2024 1240              Intake/Output Summary (Last 24 hours) at 1/25/2024 0603  Last data filed at 1/24/2024 1031  Gross per 24 hour   Intake 3131.67 ml   Output --   Net 3131.67 ml        TELEMETRY: Sinus rhythm.    Physical Exam:  The patient is alert, oriented and in no distress.  Vital " signs as noted above.  Head and neck revealed no carotid bruits or jugular venous distention.  No thyromegaly or lymphadenopathy is present  Lungs clear.  No wheezing.  Breath sounds are normal bilaterally.  Heart normal first and second heart sounds.  No murmur. No precordial rub is present.  No gallop is present.  Abdomen soft and nontender.  No organomegaly is present.  Extremities with good peripheral pulses without any pedal edema.  Skin warm and dry.  ICD site looks normal.  Musculoskeletal system is grossly normal  CNS grossly normal    Reviewed and updated.    Results Review:   I reviewed the patient's new clinical results.  Lab Results (last 24 hours)       Procedure Component Value Units Date/Time    Protime-INR [[id number]]  (Abnormal) Collected: 01/25/24 0014    Specimen: Blood Updated: 01/25/24 0052     Protime 30.0 Seconds      INR 2.98    Renal Function Panel [[id number]]  (Abnormal) Collected: 01/25/24 0014    Specimen: Blood Updated: 01/25/24 0052     Glucose 114 mg/dL      BUN 14 mg/dL      Creatinine 0.98 mg/dL      Sodium 142 mmol/L      Potassium 3.4 mmol/L      Chloride 116 mmol/L      CO2 17.0 mmol/L      Calcium 8.2 mg/dL      Albumin 3.1 g/dL      Phosphorus 2.9 mg/dL      Anion Gap 9.0 mmol/L      BUN/Creatinine Ratio 14.3     eGFR 62.6 mL/min/1.73     Narrative:      GFR Normal >60  Chronic Kidney Disease <60  Kidney Failure <15      Magnesium [[id number]]  (Normal) Collected: 01/25/24 0014    Specimen: Blood Updated: 01/25/24 0052     Magnesium 1.8 mg/dL     CBC & Differential [[id number]]  (Abnormal) Collected: 01/25/24 0014    Specimen: Blood Updated: 01/25/24 0040    Narrative:      The following orders were created for panel order CBC & Differential.  Procedure                               Abnormality         Status                     ---------                               -----------         ------                     CBC Auto Differential[[id number]]        Abnormal            Final  result                 Please view results for these tests on the individual orders.    CBC Auto Differential [[id number]]  (Abnormal) Collected: 01/25/24 0014    Specimen: Blood Updated: 01/25/24 0040     WBC 7.20 10*3/mm3      RBC 3.26 10*6/mm3      Hemoglobin 9.6 g/dL      Hematocrit 29.3 %      MCV 90.1 fL      MCH 29.5 pg      MCHC 32.8 g/dL      RDW 17.6 %      RDW-SD 59.1 fl      MPV 8.8 fL      Platelets 127 10*3/mm3      Neutrophil % 78.3 %      Lymphocyte % 14.5 %      Monocyte % 4.6 %      Eosinophil % 2.0 %      Basophil % 0.6 %      Neutrophils, Absolute 5.60 10*3/mm3      Lymphocytes, Absolute 1.00 10*3/mm3      Monocytes, Absolute 0.30 10*3/mm3      Eosinophils, Absolute 0.10 10*3/mm3      Basophils, Absolute 0.00 10*3/mm3      nRBC 0.1 /100 WBC     High Sensitivity Troponin T [[id number]]  (Normal) Collected: 01/24/24 1724    Specimen: Blood Updated: 01/24/24 1840     HS Troponin T 12 ng/L     Narrative:      High Sensitive Troponin T Reference Range:  <14.0 ng/L- Negative Female for AMI  <22.0 ng/L- Negative Male for AMI  >=14 - Abnormal Female indicating possible myocardial injury.  >=22 - Abnormal Male indicating possible myocardial injury.   Clinicians would have to utilize clinical acumen, EKG, Troponin, and serial changes to determine if it is an Acute Myocardial Infarction or myocardial injury due to an underlying chronic condition.         Magnesium [[id number]]  (Normal) Collected: 01/24/24 1724    Specimen: Blood Updated: 01/24/24 1835     Magnesium 2.0 mg/dL     Protime-INR [[id number]]  (Normal) Collected: 01/24/24 1724    Specimen: Blood Updated: 01/24/24 1833     Protime 27.7 Seconds      INR 2.74    High Sensitivity Troponin T 2Hr [[id number]]  (Normal) Collected: 01/24/24 1114    Specimen: Blood Updated: 01/24/24 1233     HS Troponin T 12 ng/L      Troponin T Delta -1 ng/L     Narrative:      High Sensitive Troponin T Reference Range:  <14.0 ng/L- Negative Female for AMI  <22.0 ng/L-  Negative Male for AMI  >=14 - Abnormal Female indicating possible myocardial injury.  >=22 - Abnormal Male indicating possible myocardial injury.   Clinicians would have to utilize clinical acumen, EKG, Troponin, and serial changes to determine if it is an Acute Myocardial Infarction or myocardial injury due to an underlying chronic condition.         High Sensitivity Troponin T [[id number]]  (Normal) Collected: 01/24/24 0549    Specimen: Blood from Arm, Right Updated: 01/24/24 0636     HS Troponin T 13 ng/L     Narrative:      High Sensitive Troponin T Reference Range:  <14.0 ng/L- Negative Female for AMI  <22.0 ng/L- Negative Male for AMI  >=14 - Abnormal Female indicating possible myocardial injury.  >=22 - Abnormal Male indicating possible myocardial injury.   Clinicians would have to utilize clinical acumen, EKG, Troponin, and serial changes to determine if it is an Acute Myocardial Infarction or myocardial injury due to an underlying chronic condition.         Magnesium [[id number]]  (Normal) Collected: 01/24/24 0549    Specimen: Blood from Arm, Right Updated: 01/24/24 0628     Magnesium 2.0 mg/dL     Renal Function Panel [[id number]]  (Abnormal) Collected: 01/24/24 0549    Specimen: Blood from Arm, Right Updated: 01/24/24 0628     Glucose 98 mg/dL      BUN 12 mg/dL      Creatinine 1.00 mg/dL      Sodium 143 mmol/L      Potassium 3.5 mmol/L      Chloride 118 mmol/L      CO2 18.0 mmol/L      Calcium 8.2 mg/dL      Albumin 3.1 g/dL      Phosphorus 2.8 mg/dL      Anion Gap 7.0 mmol/L      BUN/Creatinine Ratio 12.0     eGFR 61.1 mL/min/1.73     Narrative:      GFR Normal >60  Chronic Kidney Disease <60  Kidney Failure <15              Imaging Results (Last 24 Hours)       ** No results found for the last 24 hours. **        LAB RESULTS (LAST 7 DAYS)    CBC  Results from last 7 days   Lab Units 01/25/24  0014 01/24/24  0549 01/23/24  1446 01/22/24  1355   WBC 10*3/mm3 7.20 5.30 5.80 9.50   RBC 10*6/mm3 3.26* 3.31*  3.59* 4.48   HEMOGLOBIN g/dL 9.6* 9.9* 10.9* 13.4   HEMATOCRIT % 29.3* 30.6* 32.9* 41.6   MCV fL 90.1 92.4 91.6 92.7   PLATELETS 10*3/mm3 127* 109* 142 230       BMP  Results from last 7 days   Lab Units 01/25/24  0014 01/24/24  1724 01/24/24  0549 01/23/24  1446 01/22/24  1355   SODIUM mmol/L 142  --  143 139 140   POTASSIUM mmol/L 3.4*  --  3.5 3.8 3.7   CHLORIDE mmol/L 116*  --  118* 113* 108*   CO2 mmol/L 17.0*  --  18.0* 17.0* 19.0*   BUN mg/dL 14  --  12 19 24*   CREATININE mg/dL 0.98  --  1.00 1.27* 1.70*   GLUCOSE mg/dL 114*  --  98 83 95   MAGNESIUM mg/dL 1.8 2.0 2.0 2.0 2.2   PHOSPHORUS mg/dL 2.9  --  2.8  --   --        CMP   Results from last 7 days   Lab Units 01/25/24  0014 01/24/24  0549 01/23/24  1446 01/22/24  1355   SODIUM mmol/L 142 143 139 140   POTASSIUM mmol/L 3.4* 3.5 3.8 3.7   CHLORIDE mmol/L 116* 118* 113* 108*   CO2 mmol/L 17.0* 18.0* 17.0* 19.0*   BUN mg/dL 14 12 19 24*   CREATININE mg/dL 0.98 1.00 1.27* 1.70*   GLUCOSE mg/dL 114* 98 83 95   ALBUMIN g/dL 3.1* 3.1*  --  4.2   BILIRUBIN mg/dL  --   --   --  0.3   ALK PHOS U/L  --   --   --  135*   AST (SGOT) U/L  --   --   --  37*   ALT (SGPT) U/L  --   --   --  28         BNP        TROPONIN  Results from last 7 days   Lab Units 01/24/24  1724 01/24/24  0549 01/22/24  1355   CK TOTAL U/L  --   --  38   HSTROP T ng/L 12   < > 16*    < > = values in this interval not displayed.       CoAg  Results from last 7 days   Lab Units 01/25/24  0014 01/24/24  1724 01/23/24  1446 01/22/24  1608   INR  2.98* 2.74 2.03 1.51*       Creatinine Clearance  Estimated Creatinine Clearance: 60.7 mL/min (by C-G formula based on SCr of 0.98 mg/dL).    ABG        Radiology  No radiology results for the last day        EKG                            I personally viewed and interpreted the patient's EKG/Telemetry data: Atrial fibrillation followed by sinus rhythm.      ECHOCARDIOGRAM:    Results for orders placed during the hospital encounter of 01/22/24    Adult  Transthoracic Echo Complete W/ Cont if Necessary Per Protocol    Interpretation Summary    Left ventricular ejection fraction appears to be 56 - 60%.    Estimated right ventricular systolic pressure from tricuspid regurgitation is normal (<35 mmHg).    Indication  Dyspnea  Palpitations    Technically satisfactory study.  Mitral valve is structurally normal.  Tricuspid valve is structurally normal.  Mild tricuspid regurgitation is present.  Aortic valve is aortic valve with decreased opening motion.  Gradient across the aortic valve is 16/9 mmHg.  Valve area 1.37 cm².  Pulmonic valve could not be well visualized.  Left atrium is enlarged.  Right atrium is normal in size.  Left ventricle is enlarged with septal anterior wall and apical severe hypokinesis with ejection fraction of 40%.  Possible left ventricular apical thrombus.  Right ventricle is normal in size.  Atrial septum is intact.  Aorta is normal.  No pericardial effusion or intracardiac thrombus is seen.  ICD lead is present in the right ventricle.    Impression  Mild tricuspid regurgitation.  Mild to moderate aortic valve stenosis with gradient of 16/9 mmHg and valve area of 1.37 cm².  Left atrial enlargement.  Left ventricle is enlarged with septal anterior wall and apical severe hypokinesis with ejection fraction of 40%.  Possible left ventricular apical thrombus.  ICD lead is present in the right ventricle.  No evidence for pulmonary hypertension is present.          STRESS TEST  Results for orders placed during the hospital encounter of 01/22/24    Stress Test With Myocardial Perfusion One Day    Interpretation Summary  Indications  Status post CABG  Atrial fibrillation    This study was performed under the direct supervision of Rohini NOLASCO.    Resting ECG  Sinus rhythm    The patient was injected with Lexiscan intravenously while constantly monitoring electrocardiogram and vital signs.  Patient did not have any chest discomfort ST abnormalities or ectopy  with injection of Lexiscan.    Cardiolite was used as an imaging agent.    Cardiolite images showed significantly decreased radionuclide uptake in the proximal posterior segments with partial redistribution in the resting images.    Gated SPECT images revealed normal left ventricle size and diffuse hypocontractility with calculated ejection fraction of 61%.    Impression  ========  Lexiscan Cardiolite test revealed proximal posterior infarction and ischemia.  Gated SPECT images revealed normal left ventricular size and diffuse hypocontractility with calculated ejection fraction of 61%.        Cardiolite (Tc-99m sestamibi) stress test    CARDIAC CATHETERIZATION  No results found for this or any previous visit.                OTHER:         Assessment & Plan     Principal Problem:    New onset atrial fibrillation  Active Problems:    Near syncope    S/P CABG (coronary artery bypass graft)    Abnormal nuclear stress test      ]]]]]]]]]]]]]]]]]]  Impression   =========  -Recent weakness and palpitations.     - Atrial fibrillation with RVR.  Has converted to sinus rhythm with intravenous Cardizem.  Patient had postop atrial fibrillation after she had CABG in 2017.     - Status post CABG and ASD closure 2017     - Ischemic cardiomyopathy     - Status post ICD (single-chamber)-2017-[facility].     - Chronic anticoagulation-patient on Coumadin.  Home monitoring.  INR 1.51-1/22/2024-subtherapeutic.     - Recent COVID infection.     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 There is normal antegrade color Doppler flow including the common iliac arteries. There is thrombus within the aneurysm sac. Dimensions are discussed  in detail above.     - Family history of coronary artery disease     - Non-smoker     - Allergic to oxytetracycline and oxycodone.     Echocardiogram 1/23/2024 revealed  Mild tricuspid regurgitation.  Mild to moderate aortic valve stenosis with gradient of 16/9 mmHg and valve area of 1.37 cm².  Left atrial enlargement.  Left ventricle is enlarged with septal anterior wall and apical severe hypokinesis with ejection fraction of 40%.  Possible left ventricular apical thrombus.  ICD lead is present in the right ventricle.  No evidence for pulmonary hypertension is present.  =============  Plan  =============  A-fib with RVR.  Patient has converted to sinus rhythm with intravenous Cardizem.  Patient is on p.o. Cardizem.    Status post CABG  Ischemic cardiomyopathy     Mild to moderate aortic valve stenosis    Echocardiogram 1/23/2024 revealed  Mild tricuspid regurgitation.  Mild to moderate aortic valve stenosis with gradient of 16/9 mmHg and valve area of 1.37 cm².  Left atrial enlargement.  Left ventricle is enlarged with septal anterior wall and apical severe hypokinesis with ejection fraction of 40%.  Possible left ventricular apical thrombus.  ICD lead is present in the right ventricle.  No evidence for pulmonary hypertension is present.     Stress Cardiolite test-1/24/2024  Lexiscan Cardiolite test revealed proximal posterior infarction and ischemia.  Gated SPECT images revealed normal left ventricular size and diffuse hypocontractility with calculated ejection fraction of 61%.    Status post ICD ([employer]) 2017.  Single-chamber.    Anticoagulation  Patient is on midodrine.  INR 2.03.  2.98-1/25/2024  Restart Coumadin depending on INR level tomorrow..    Current medications include  Aspirin atorvastatin Coreg subcu therapeutic Lovenox levothyroxine Tamiflu pantoprazole paroxetine potassium supplements.  Cardizem CD.    I had a lengthy discussion with patient and patient's family as well as   Mahad.  Patient would benefit from cardiac catheterization although patient is not having typical anginal symptoms.  Stress Cardiolite test did not show posterior lateral ischemia.  Also patient has CT scan that showed a thrombus in the aneurysmal sac of the abdominal aorta.  Access from the femoral area would increase risk of thromboembolic problems.  Brachial access can be done but would be difficult in the presence of grafts.  We have discussed with patient's daughter also.    At this time I would favor conservative medical treatment and control dysrhythmia and continuation of anticoagulation with Coumadin.    Follow-up labs ordered.     Further plan will depend on patient's progress.    Reviewed and updated-1/25/2024  ]]]]]]]]]]]]]]]]]]]]]          Allie Hurley MD  01/25/24  06:03 EST

## 2024-01-25 NOTE — CASE MANAGEMENT/SOCIAL WORK
Continued Stay Note  [facility]     Patient Name: Laura Mejia  MRN: [id number]  Today's Date: 1/25/2024    Admit Date: 1/22/2024    Plan: Anticipate routine home with spouse.   Discharge Plan       Row Name 01/25/24 1147       Plan    Plan Anticipate routine home with spouse.    Plan Comments Barrier to D/C: cardiac cath tomorrow, IV abx, anticipate d/c once cleared by cardiology.                      Expected Discharge Date and Time       Expected Discharge Date Expected Discharge Time    Jan 27, 2024           Phone communication or documentation only - no physical contact with patient or family.     LISSETTE FentonN, [place] [address]    Office: [phone]  Fax: [phone]

## 2024-01-25 NOTE — PROGRESS NOTES
"Pharmacy dosing service  Anticoagulant  Warfarin     Subjective:    Laura Mejia is a 68 y.o.female being continued on warfarin for history of PE.    INR Goal: 2 - 3  Home medication?: warfarin 3 mg PO daily, except warfarin 6 mg PO on Mon/Wed/Fri.   Bridge Therapy Present?:  Yes, Enoxaparin 80 mg SQ Q12H  Interacting Medications Evaluation (New/Present/Discontinued): n/a  Additional Contributing Factors: Her PCP in [place] was following with her INR. Just moved to [facility] where she is attempting to get established with PCP      Assessment/Plan:    INR is therapeutic. INR increased sharply again. Will hold warfarin today. Reassess INR prior to ordering any additional warfarin doses.    Continue to monitor and adjust based on INR.         Date 1/22 1/23 1/24         INR 1.51 2.03 2.74         Dose 9 mg 1.5 mg hold             Objective:  [Ht: 172.7 cm (68\"); Wt: 81.6 kg (180 lb); BMI: Body mass index is 27.37 kg/m².]    Lab Results   Component Value Date    ALBUMIN 3.1 (L) 01/24/2024     Lab Results   Component Value Date    INR 2.74 01/24/2024    INR 2.03 01/23/2024    INR 1.51 (L) 01/22/2024    PROTIME 27.7 01/24/2024    PROTIME 21.0 01/23/2024    PROTIME 16.0 (L) 01/22/2024     Lab Results   Component Value Date    HGB 9.9 (L) 01/24/2024    HGB 10.9 (L) 01/23/2024    HGB 13.4 01/22/2024     Lab Results   Component Value Date    HCT 30.6 (L) 01/24/2024    HCT 32.9 (L) 01/23/2024    HCT 41.6 01/22/2024     Sid Rosario [facility]  01/24/24 19:01 EST     "

## 2024-01-25 NOTE — PROGRESS NOTES
"NEPHROLOGY PROGRESS NOTE------[facility]    [facility]  [phone]  Jozef Otero MD      Patient Care Team:  Luan Joseph FNP as PCP - General (Family Medicine)  Jozef Otero MD as Consulting Physician (Nephrology)      Provider:  Jozef Otero MD  Patient Name: Laura Mejia  :  1955    SUBJECTIVE:  Follow-up TRACEY/CKD  No chest pain shortness of breath  Blood pressure remains soft, on midodrine    Medication:  aspirin, 81 mg, Oral, Daily  atorvastatin, 80 mg, Oral, Nightly  carvedilol, 3.125 mg, Oral, BID With Meals  cefTRIAXone, 1,000 mg, Intravenous, Q24H  dilTIAZem CD, 240 mg, Oral, Q24H  levothyroxine, 88 mcg, Oral, Q AM  midodrine, 5 mg, Oral, Q8H  oseltamivir, 30 mg, Oral, Q12H  pantoprazole, 40 mg, Oral, Q AM  PARoxetine, 40 mg, Oral, QAM  potassium chloride, 20 mEq, Oral, Daily  senna-docusate sodium, 2 tablet, Oral, BID  sodium bicarbonate, 650 mg, Oral, TID  sodium chloride, 10 mL, Intravenous, Q12H  topiramate, 50 mg, Oral, Nightly      Pharmacy to dose warfarin,         OBJECTIVE    Vital Sign Min/Max for last 24 hours  Temp  Min: 97.4 °F (36.3 °C)  Max: 98.4 °F (36.9 °C)   BP  Min: 77/45  Max: 111/53   Pulse  Min: 58  Max: 64   Resp  Min: 11  Max: 18   SpO2  Min: 93 %  Max: 98 %   No data recorded   No data recorded     Flowsheet Rows      Flowsheet Row First Filed Value   Admission Height 172.7 cm (68\") Documented at 2024 1240   Admission Weight 81.6 kg (180 lb) Documented at 2024 1240            No intake/output data recorded.  I/O last 3 completed shifts:  In: 3131.7 [I.V.:3131.7]  Out: -     Physical Exam:  General Appearance: alert, appears stated age and cooperative  Head: normocephalic, without obvious abnormality and atraumatic  Eyes: conjunctivae and sclerae normal and no icterus  Neck: supple and no JVD  Lungs: clear to auscultation and respirations regular  Heart: regular rhythm & normal rate " "and normal S1, S2  Chest: Wall no abnormalities observed  Abdomen: normal bowel sounds and soft, nontender  Extremities: moves extremities well, no edema, no cyanosis and no redness  Skin: no bleeding, bruising or rash, turgor normal, color normal and no lesions noted  Neurologic: Alert, and oriented. No focal deficits    Labs:    WBC WBC   Date Value Ref Range Status   01/25/2024 7.20 3.40 - 10.80 10*3/mm3 Final   01/24/2024 5.30 3.40 - 10.80 10*3/mm3 Final   01/23/2024 5.80 3.40 - 10.80 10*3/mm3 Final   01/22/2024 9.50 3.40 - 10.80 10*3/mm3 Final      HGB Hemoglobin   Date Value Ref Range Status   01/25/2024 9.6 (L) 12.0 - 15.9 g/dL Final   01/24/2024 9.9 (L) 12.0 - 15.9 g/dL Final   01/23/2024 10.9 (L) 12.0 - 15.9 g/dL Final   01/22/2024 13.4 12.0 - 15.9 g/dL Final      HCT Hematocrit   Date Value Ref Range Status   01/25/2024 29.3 (L) 34.0 - 46.6 % Final   01/24/2024 30.6 (L) 34.0 - 46.6 % Final   01/23/2024 32.9 (L) 34.0 - 46.6 % Final   01/22/2024 41.6 34.0 - 46.6 % Final      Platelets No results found for: \"LABPLAT\"   MCV MCV   Date Value Ref Range Status   01/25/2024 90.1 79.0 - 97.0 fL Final   01/24/2024 92.4 79.0 - 97.0 fL Final   01/23/2024 91.6 79.0 - 97.0 fL Final   01/22/2024 92.7 79.0 - 97.0 fL Final          Sodium Sodium   Date Value Ref Range Status   01/25/2024 142 136 - 145 mmol/L Final   01/24/2024 143 136 - 145 mmol/L Final   01/23/2024 139 136 - 145 mmol/L Final   01/22/2024 140 136 - 145 mmol/L Final      Potassium Potassium   Date Value Ref Range Status   01/25/2024 3.8 3.5 - 5.2 mmol/L Final   01/25/2024 3.4 (L) 3.5 - 5.2 mmol/L Final   01/24/2024 3.5 3.5 - 5.2 mmol/L Final   01/23/2024 3.8 3.5 - 5.2 mmol/L Final     Comment:     Slight hemolysis detected by analyzer. Result may be falsely elevated.   01/22/2024 3.7 3.5 - 5.2 mmol/L Final     Comment:     Slight hemolysis detected by analyzer. Result may be falsely elevated.      Chloride Chloride   Date Value Ref Range Status   01/25/2024 " "116 (H) 98 - 107 mmol/L Final   01/24/2024 118 (H) 98 - 107 mmol/L Final   01/23/2024 113 (H) 98 - 107 mmol/L Final   01/22/2024 108 (H) 98 - 107 mmol/L Final      CO2 CO2   Date Value Ref Range Status   01/25/2024 17.0 (L) 22.0 - 29.0 mmol/L Final   01/24/2024 18.0 (L) 22.0 - 29.0 mmol/L Final   01/23/2024 17.0 (L) 22.0 - 29.0 mmol/L Final   01/22/2024 19.0 (L) 22.0 - 29.0 mmol/L Final      BUN BUN   Date Value Ref Range Status   01/25/2024 14 8 - 23 mg/dL Final   01/24/2024 12 8 - 23 mg/dL Final   01/23/2024 19 8 - 23 mg/dL Final   01/22/2024 24 (H) 8 - 23 mg/dL Final      Creatinine Creatinine   Date Value Ref Range Status   01/25/2024 0.98 0.57 - 1.00 mg/dL Final   01/24/2024 1.00 0.57 - 1.00 mg/dL Final   01/23/2024 1.27 (H) 0.57 - 1.00 mg/dL Final   01/22/2024 1.70 (H) 0.57 - 1.00 mg/dL Final      Calcium Calcium   Date Value Ref Range Status   01/25/2024 8.2 (L) 8.6 - 10.5 mg/dL Final   01/24/2024 8.2 (L) 8.6 - 10.5 mg/dL Final   01/23/2024 8.2 (L) 8.6 - 10.5 mg/dL Final   01/22/2024 9.3 8.6 - 10.5 mg/dL Final      PO4 No components found for: \"PO4\"   Albumin Albumin   Date Value Ref Range Status   01/25/2024 3.1 (L) 3.5 - 5.2 g/dL Final   01/24/2024 3.1 (L) 3.5 - 5.2 g/dL Final   01/22/2024 4.2 3.5 - 5.2 g/dL Final      Magnesium Magnesium   Date Value Ref Range Status   01/25/2024 1.8 1.6 - 2.4 mg/dL Final   01/24/2024 2.0 1.6 - 2.4 mg/dL Final   01/24/2024 2.0 1.6 - 2.4 mg/dL Final   01/23/2024 2.0 1.6 - 2.4 mg/dL Final   01/22/2024 2.2 1.6 - 2.4 mg/dL Final      Uric Acid No components found for: \"URIC ACID\"     Imaging Results (Last 72 Hours)       Procedure Component Value Units Date/Time     Aorta Limited [[id number]] Collected: 01/23/24 0723     Updated: 01/23/24 0730    Narrative:      US AORTA LIMITED    Date of Exam: 1/23/2024 5:50 AM EST    Indication: Follow-up abdominal aortic aneurysm, status post repair..    Comparison: No comparisons available.    Technique: Routine gray scale, color flow " and spectral Doppler analysis of the abdominal aorta and proximal common iliac arteries was performed.      Findings:  The patient has a abdominal aortic aneurysm with a visible stent graft. The proximal abdominal aorta measures 3.7 x 3.3 cm. The endograft at this level measures 1.6 x 1.8 cm. The mid abdominal aorta measures 2.7 x 2.3 cm. The endograft measures 1.1 x 1.8   cm at this level. The distal abdominal aorta including the endograft have a diameter of 2.6 x 2.8 cm. There is no visible color Doppler flow in the thrombosed aneurysm sac. There is antegrade color Doppler flow within the endograft. The right common   iliac artery has a maximum AP diameter of 1.2 cm. The left common iliac artery has a maximum AP diameter of 1.1 cm. There is antegrade color Doppler flow in both common iliac arteries.        Impression:      Impression:  Status post placement of a stent graft. There is normal antegrade color Doppler flow including the common iliac arteries. There is thrombus within the aneurysm sac. Dimensions are discussed in detail above.        Electronically Signed: Zheng Doe MD    1/23/2024 7:28 AM EST    Workstation ID: [id number]    US Renal Bilateral [[id number]] Collected: 01/22/24 1730     Updated: 01/22/24 1736    Narrative:      US RENAL BILATERAL    Date of Exam: 1/22/2024 4:05 PM CST    Indication: elevated creatinine.    Comparison: No comparisons available.    Technique: Grayscale and color Doppler ultrasound evaluation of the kidneys and urinary bladder was performed.      Findings:  RIGHT kidney measures 8.1 cm.  Kidney echogenicity, size, and vascularity appear within normal limits. There is no solid kidney mass.  No echogenic shadowing stone.  No hydronephrosis.    LEFT kidney measures 8.2 cm. Kidney echogenicity, size, and vascularity appear within normal limits. There is no solid kidney mass.  No echogenic shadowing stone.  No hydronephrosis.    Limited visualization of the urinary bladder is  unremarkable. Total volume is 182 mL.    Incidental note is made of right pleural effusion.        Impression:      Impression:  Unremarkable renal ultrasound.    Right pleural effusion.        Electronically Signed: Nela Elder MD    1/22/2024 4:30 PM CST    Workstation ID: [id number]    XR Chest 1 View [[id number]] Collected: 01/22/24 1344     Updated: 01/22/24 1509    Narrative:      XR CHEST 1 VW    Date of Exam: 1/22/2024 1:40 PM EST    Indication: Weakness Short of breath    Comparison: PA and lateral chest radiograph 9/12/2017    Findings:  The lungs are clear. The heart size is within normal limits with signs of prior median sternotomy and CABG. Left chest wall pacemaker has been placed since the prior examination with a single lead extending to the expected location of the right   ventricle. Pulmonary vascular distribution is normal without evidence of edema. No pleural effusion, pneumothorax or acute osseous abnormality. Right shoulder replacement changes are new since the prior chest x-ray.      Impression:      Impression:  No acute chest finding.      Electronically Signed: Amber Candelaria MD    1/22/2024 3:07 PM EST    Workstation ID: [id number]            Results for orders placed during the hospital encounter of 01/22/24    XR Chest 1 View    Narrative  XR CHEST 1 VW    Date of Exam: 1/22/2024 1:40 PM EST    Indication: Weakness Short of breath    Comparison: PA and lateral chest radiograph 9/12/2017    Findings:  The lungs are clear. The heart size is within normal limits with signs of prior median sternotomy and CABG. Left chest wall pacemaker has been placed since the prior examination with a single lead extending to the expected location of the right  ventricle. Pulmonary vascular distribution is normal without evidence of edema. No pleural effusion, pneumothorax or acute osseous abnormality. Right shoulder replacement changes are new since the prior chest x-ray.    Impression  Impression:  No  acute chest finding.      Electronically Signed: Amber Candelaria MD  1/22/2024 3:07 PM EST  Workstation ID: [id number]      Results for orders placed during the hospital encounter of 09/08/23    XR Spine Lumbar Complete 4+VW    Narrative  XR SPINE LUMBAR COMPLETE 4+VW, XR SPINE THORACIC 3 VW    Date of Exam: 9/8/2023 10:25 AM EDT    Indication: low back pain upper and lower back pain    Comparison: None available.    Findings:  Lumbar spine with oblique views: There is moderately severe central and anterior wedging of L1 which appears old. There is mild narrowing of the T12-L1 and L1-2 disc interspaces. There is slight retrolisthesis of L1 on L2 and of L2 on L3. There is mild  narrowing of the L2-3 disc. There is moderate narrowing of the L4-5 disc with grade 1 spondylolisthesis of L4 on L5. There are no definite pars defects. There is a radiopaque aortic biiliac stent.    Thoracic spine: There is a mild right convexity scoliosis. There is severe central and anterior wedging of T7 and T8. There is old mild wedging of T11 and T12. There is moderate narrowing of mid and lower thoracic disc interspaces with mild spurring.    Impression  Impression:  Compression deformities of T11-L1 appear most likely old.    Compression deformities of T7 and T8 are of indeterminate age.    Degenerative disc disease as detailed.    Electronically Signed: Monica George MD  9/8/2023 11:04 AM EDT  Workstation ID: [id number]      XR Spine Thoracic 3 View    Narrative  XR SPINE LUMBAR COMPLETE 4+VW, XR SPINE THORACIC 3 VW    Date of Exam: 9/8/2023 10:25 AM EDT    Indication: low back pain upper and lower back pain    Comparison: None available.    Findings:  Lumbar spine with oblique views: There is moderately severe central and anterior wedging of L1 which appears old. There is mild narrowing of the T12-L1 and L1-2 disc interspaces. There is slight retrolisthesis of L1 on L2 and of L2 on L3. There is mild  narrowing of the L2-3 disc. There is  moderate narrowing of the L4-5 disc with grade 1 spondylolisthesis of L4 on L5. There are no definite pars defects. There is a radiopaque aortic biiliac stent.    Thoracic spine: There is a mild right convexity scoliosis. There is severe central and anterior wedging of T7 and T8. There is old mild wedging of T11 and T12. There is moderate narrowing of mid and lower thoracic disc interspaces with mild spurring.    Impression  Impression:  Compression deformities of T11-L1 appear most likely old.    Compression deformities of T7 and T8 are of indeterminate age.    Degenerative disc disease as detailed.    Electronically Signed: Monica George MD  9/8/2023 11:04 AM EDT  Workstation ID: [id number]            ASSESSMENT / PLAN      New onset atrial fibrillation    Near syncope    S/P CABG (coronary artery bypass graft)    Abnormal nuclear stress test    TRACEY--likely pre-renal in setting of hypotension and new onset a fib causing reduced renal perfusion  CKD stage 3a--CKD due to nephrosclerosis  Hx CAD s/p CABG  New onset a fib--per cards  UTI--Cx growing E. coli.  On ceftriaxone  Hx PE  Anemia--TSAT 18.     Creatinine stable, replace potassium  Increase midodrine to 10 mg p.o. 3 times daily for hypotension  Had abnormal stress test, cardiology contemplating heart cath.    Iron and EPO for anemia           Jozef Otero MD  [facility]  [phone]  01/25/24  12:30 EST

## 2024-01-25 NOTE — PROGRESS NOTES
LOS: 1 day   Patient Care Team:  Luan Joseph FNP as PCP - General (Family Medicine)  Jozef Otero MD as Consulting Physician (Nephrology)    Subjective     Interval History: Stable overnight. Converted to sinus rhythm    Patient Complaints: Headache    History taken from: patient    Review of Systems   Constitutional:  Positive for activity change. Negative for appetite change, chills, diaphoresis, fatigue and fever.   HENT:  Negative for facial swelling.    Eyes:  Negative for visual disturbance.   Respiratory:  Positive for cough and shortness of breath. Negative for wheezing and stridor.    Gastrointestinal:  Negative for abdominal pain, constipation, diarrhea and nausea.   Genitourinary:  Negative for urgency.   Musculoskeletal:  Positive for arthralgias.   Neurological:  Negative for tremors and weakness.   Psychiatric/Behavioral:  Negative for confusion.            Objective     Vital Signs  Temp:  [97 °F (36.1 °C)-99.1 °F (37.3 °C)] 99.1 °F (37.3 °C)  Heart Rate:  [58-67] 64  Resp:  [16-18] 18  BP: ()/(45-80) 100/70    Physical Exam:     General Appearance:    Alert, cooperative, in no acute distress,   Head:    Normocephalic, without obvious abnormality, atraumatic   Eyes:            Lids and lashes normal, conjunctivae and sclerae normal, no   icterus, no pallor, corneas clear, PERRLA   Ears:    Ears appear intact with no abnormalities noted   Throat:   No oral lesions, no thrush, oral mucosa moist   Neck:   No adenopathy, supple, trachea midline, no thyromegaly, no   carotid bruit, no JVD   Lungs:     Clear to auscultation,respirations regular, even and                  unlabored    Heart:    Regular rhythm and normal rate, normal S1 and S2, no            murmur, no gallop, no rub, no click   Chest Wall:    No abnormalities observed   Abdomen:     Normal bowel sounds, no masses, no organomegaly, soft        Non-tender non-distended, no guarding,   Extremities:   Moves all extremities  well, no edema, no cyanosis, no             Redness   Pulses:   Pulses palpable and equal bilaterally   Skin:   No bleeding, bruising or rash   Lymph nodes:   No palpable adenopathy   Neurologic:   Cranial nerves 2 - 12 grossly intact, sensation intact, DTR       present and equal bilaterally        Results Review:    Lab Results (last 24 hours)       Procedure Component Value Units Date/Time    High Sensitivity Troponin T [[id number]]  (Normal) Collected: 01/24/24 1724    Specimen: Blood Updated: 01/24/24 1840     HS Troponin T 12 ng/L     Narrative:      High Sensitive Troponin T Reference Range:  <14.0 ng/L- Negative Female for AMI  <22.0 ng/L- Negative Male for AMI  >=14 - Abnormal Female indicating possible myocardial injury.  >=22 - Abnormal Male indicating possible myocardial injury.   Clinicians would have to utilize clinical acumen, EKG, Troponin, and serial changes to determine if it is an Acute Myocardial Infarction or myocardial injury due to an underlying chronic condition.         Magnesium [[id number]]  (Normal) Collected: 01/24/24 1724    Specimen: Blood Updated: 01/24/24 1835     Magnesium 2.0 mg/dL     Protime-INR [[id number]]  (Normal) Collected: 01/24/24 1724    Specimen: Blood Updated: 01/24/24 1833     Protime 27.7 Seconds      INR 2.74    High Sensitivity Troponin T 2Hr [[id number]]  (Normal) Collected: 01/24/24 1114    Specimen: Blood Updated: 01/24/24 1233     HS Troponin T 12 ng/L      Troponin T Delta -1 ng/L     Narrative:      High Sensitive Troponin T Reference Range:  <14.0 ng/L- Negative Female for AMI  <22.0 ng/L- Negative Male for AMI  >=14 - Abnormal Female indicating possible myocardial injury.  >=22 - Abnormal Male indicating possible myocardial injury.   Clinicians would have to utilize clinical acumen, EKG, Troponin, and serial changes to determine if it is an Acute Myocardial Infarction or myocardial injury due to an underlying chronic condition.         High Sensitivity Troponin  T [[id number]]  (Normal) Collected: 01/24/24 0549    Specimen: Blood from Arm, Right Updated: 01/24/24 0636     HS Troponin T 13 ng/L     Narrative:      High Sensitive Troponin T Reference Range:  <14.0 ng/L- Negative Female for AMI  <22.0 ng/L- Negative Male for AMI  >=14 - Abnormal Female indicating possible myocardial injury.  >=22 - Abnormal Male indicating possible myocardial injury.   Clinicians would have to utilize clinical acumen, EKG, Troponin, and serial changes to determine if it is an Acute Myocardial Infarction or myocardial injury due to an underlying chronic condition.         Magnesium [[id number]]  (Normal) Collected: 01/24/24 0549    Specimen: Blood from Arm, Right Updated: 01/24/24 0628     Magnesium 2.0 mg/dL     Renal Function Panel [[id number]]  (Abnormal) Collected: 01/24/24 0549    Specimen: Blood from Arm, Right Updated: 01/24/24 0628     Glucose 98 mg/dL      BUN 12 mg/dL      Creatinine 1.00 mg/dL      Sodium 143 mmol/L      Potassium 3.5 mmol/L      Chloride 118 mmol/L      CO2 18.0 mmol/L      Calcium 8.2 mg/dL      Albumin 3.1 g/dL      Phosphorus 2.8 mg/dL      Anion Gap 7.0 mmol/L      BUN/Creatinine Ratio 12.0     eGFR 61.1 mL/min/1.73     Narrative:      GFR Normal >60  Chronic Kidney Disease <60  Kidney Failure <15      CBC & Differential [[id number]]  (Abnormal) Collected: 01/24/24 0549    Specimen: Blood from Arm, Right Updated: 01/24/24 0559    Narrative:      The following orders were created for panel order CBC & Differential.  Procedure                               Abnormality         Status                     ---------                               -----------         ------                     CBC Auto Differential[[id number]]        Abnormal            Final result                 Please view results for these tests on the individual orders.    CBC Auto Differential [[id number]]  (Abnormal) Collected: 01/24/24 0549    Specimen: Blood from Arm, Right Updated: 01/24/24 0559      WBC 5.30 10*3/mm3      RBC 3.31 10*6/mm3      Hemoglobin 9.9 g/dL      Hematocrit 30.6 %      MCV 92.4 fL      MCH 29.9 pg      MCHC 32.3 g/dL      RDW 17.3 %      RDW-SD 56.4 fl      MPV 8.3 fL      Platelets 109 10*3/mm3      Neutrophil % 69.8 %      Lymphocyte % 21.4 %      Monocyte % 5.3 %      Eosinophil % 2.4 %      Basophil % 1.1 %      Neutrophils, Absolute 3.70 10*3/mm3      Lymphocytes, Absolute 1.10 10*3/mm3      Monocytes, Absolute 0.30 10*3/mm3      Eosinophils, Absolute 0.10 10*3/mm3      Basophils, Absolute 0.10 10*3/mm3      nRBC 0.0 /100 WBC     Urine Culture - Urine, Urine, Clean Catch [[id number]]  (Abnormal)  (Susceptibility) Collected: 01/22/24 1355    Specimen: Urine, Clean Catch Updated: 01/24/24 0404     Urine Culture >100,000 CFU/mL Escherichia coli    Narrative:      Colonization of the urinary tract without infection is common. Treatment is discouraged unless the patient is symptomatic, pregnant, or undergoing an invasive urologic procedure.    Susceptibility        Escherichia coli      FRANK      Amoxicillin + Clavulanate Resistant      Ampicillin Resistant      Ampicillin + Sulbactam Resistant      Cefazolin Susceptible      Cefepime Susceptible      Ceftazidime Susceptible      Ceftriaxone Susceptible      Gentamicin Susceptible      Levofloxacin Resistant      Nitrofurantoin Susceptible      Trimethoprim + Sulfamethoxazole Resistant                                    Imaging Results (Last 24 Hours)       ** No results found for the last 24 hours. **                 I reviewed the patient's new clinical results.    Medication Review:   Scheduled Meds:aspirin, 81 mg, Oral, Daily  atorvastatin, 80 mg, Oral, Nightly  carvedilol, 3.125 mg, Oral, BID With Meals  cefTRIAXone, 1,000 mg, Intravenous, Q24H  dilTIAZem CD, 240 mg, Oral, Q24H  levothyroxine, 88 mcg, Oral, Q AM  midodrine, 2.5 mg, Oral, TID AC  oseltamivir, 30 mg, Oral, Q12H  pantoprazole, 40 mg, Oral, Q AM  PARoxetine, 40 mg, Oral,  QAM  potassium chloride, 20 mEq, Oral, Daily  senna-docusate sodium, 2 tablet, Oral, BID  sodium bicarbonate, 650 mg, Oral, TID  sodium chloride, 10 mL, Intravenous, Q12H  topiramate, 50 mg, Oral, Nightly      Continuous Infusions:Pharmacy to dose warfarin,       PRN Meds:.  senna-docusate sodium **AND** polyethylene glycol **AND** bisacodyl **AND** bisacodyl    butalbital-acetaminophen-caffeine    melatonin    nitroglycerin    ondansetron    Pharmacy to dose warfarin    prochlorperazine    [COMPLETED] Insert Peripheral IV **AND** sodium chloride    sodium chloride    sodium chloride     Assessment & Plan       New onset atrial fibrillation    Near syncope    S/P CABG (coronary artery bypass graft)    Abnormal nuclear stress test  - may need heart cath - will discuss with Dr. Mei Molina - Tamiflu  Atrial fib - already anticoagulated with warfarin; now back in sinus rhythm; continue telemetry. Continue rate controlling agents  UTI - Rocephin  AAA - s/p repair with graft  Elevated creatinine - improved; renal ultrasound was normal; stopping IV fluids  Old vertebral compression fractures - will need outpatient DEXA if not already done    Plan for disposition:Home at discharge    Keke Casiano MD  01/24/24  19:29 EST

## 2024-01-25 NOTE — PROGRESS NOTES
"Pharmacy dosing service  Anticoagulant  Warfarin     Subjective:    Laura Mejia is a 68 y.o.female being continued on warfarin for history of PE.    INR Goal: 2 - 3  Home medication?: warfarin 3 mg PO daily, except warfarin 6 mg PO on Mon/Wed/Fri.   Bridge Therapy Present?:  No  Interacting Medications Evaluation (New/Present/Discontinued): n/a  Additional Contributing Factors: Her PCP in [place] was following with her INR. Just moved to [facility] where she is attempting to get established with PCP      Assessment/Plan:    INR is therapeutic today however there has been a large jump in the INR from 1/22 to today. Will hold warfarin today.    Continue to monitor and adjust based on INR.         Date 1/22 1/23 1/24 1/25        INR 1.51 2.03 2.74 2.98        Dose 9 mg 1.5 mg hold hold            Objective:  [Ht: 172.7 cm (68\"); Wt: 81.6 kg (180 lb); BMI: Body mass index is 27.37 kg/m².]    Lab Results   Component Value Date    ALBUMIN 3.1 (L) 01/25/2024     Lab Results   Component Value Date    INR 2.98 (C) 01/25/2024    INR 2.74 01/24/2024    INR 2.03 01/23/2024    PROTIME 30.0 (H) 01/25/2024    PROTIME 27.7 01/24/2024    PROTIME 21.0 01/23/2024     Lab Results   Component Value Date    HGB 9.6 (L) 01/25/2024    HGB 9.9 (L) 01/24/2024    HGB 10.9 (L) 01/23/2024     Lab Results   Component Value Date    HCT 29.3 (L) 01/25/2024    HCT 30.6 (L) 01/24/2024    HCT 32.9 (L) 01/23/2024     Holli Flores, PharmD  01/25/24 11:46 EST       "

## 2024-01-25 NOTE — PLAN OF CARE
Goal Outcome Evaluation:  Patient is pleasant. Patient was expected to have left heart cath tomorrow, but after speaking with MD Tobin and MD Hurley, patient and family agree that risks of heart cath outweigh benefit. Droplet precautions maintained- Flu A+. PO Keflex continued. No complaints throughout the shift. Will continue to monitor.

## 2024-01-25 NOTE — PROGRESS NOTES
LOS: 2 days   Patient Care Team:  Luan Joseph FNP as PCP - General (Family Medicine)  Jozef Otero MD as Consulting Physician (Nephrology)    Subjective     Interval History: Flu symptoms resolving; remains in sinus rhythm    Patient Complaints: No concerns    History taken from: patient    Review of Systems   Constitutional:  Positive for activity change. Negative for appetite change, chills, diaphoresis, fatigue and fever.   HENT:  Negative for facial swelling.    Eyes:  Negative for visual disturbance.   Respiratory:  Negative for cough and shortness of breath.    Cardiovascular:  Negative for chest pain, palpitations and leg swelling.   Gastrointestinal:  Negative for abdominal distention, blood in stool, constipation and diarrhea.   Genitourinary:  Negative for frequency and urgency.   Musculoskeletal:  Negative for arthralgias, back pain and gait problem.   Skin:  Negative for rash and wound.   Neurological:  Negative for weakness.   Psychiatric/Behavioral:  Negative for confusion.            Objective     Vital Signs  Temp:  [97.3 °F (36.3 °C)-98.4 °F (36.9 °C)] 97.3 °F (36.3 °C)  Heart Rate:  [58-64] 64  Resp:  [11-18] 16  BP: ()/(45-65) 92/52    Physical Exam:     General Appearance:    Alert, cooperative, in no acute distress,   Head:    Normocephalic, without obvious abnormality, atraumatic   Eyes:            Lids and lashes normal, conjunctivae and sclerae normal, no   icterus, no pallor, corneas clear, PERRLA   Ears:    Ears appear intact with no abnormalities noted   Throat:   No oral lesions, no thrush, oral mucosa moist   Neck:   No adenopathy, supple, trachea midline, no thyromegaly, no   carotid bruit, no JVD   Lungs:     Clear to auscultation,respirations regular, even and                  unlabored    Heart:    Regular rhythm and normal rate, normal S1 and S2, no            murmur, no gallop, no rub, no click   Chest Wall:    No abnormalities observed   Abdomen:     Normal  bowel sounds, no masses, no organomegaly, soft        Non-tender non-distended, no guarding,   Extremities:   Moves all extremities well, no edema, no cyanosis, no             Redness   Pulses:   Pulses palpable and equal bilaterally   Skin:   No bleeding, bruising or rash   Lymph nodes:   No palpable adenopathy   Neurologic:   Cranial nerves 2 - 12 grossly intact, sensation intact, DTR       present and equal bilaterally        Results Review:    Lab Results (last 24 hours)       Procedure Component Value Units Date/Time    Potassium [[id number]]  (Normal) Collected: 01/25/24 0814    Specimen: Blood Updated: 01/25/24 0903     Potassium 3.8 mmol/L     Protime-INR [[id number]]  (Abnormal) Collected: 01/25/24 0014    Specimen: Blood Updated: 01/25/24 0052     Protime 30.0 Seconds      INR 2.98    Renal Function Panel [[id number]]  (Abnormal) Collected: 01/25/24 0014    Specimen: Blood Updated: 01/25/24 0052     Glucose 114 mg/dL      BUN 14 mg/dL      Creatinine 0.98 mg/dL      Sodium 142 mmol/L      Potassium 3.4 mmol/L      Chloride 116 mmol/L      CO2 17.0 mmol/L      Calcium 8.2 mg/dL      Albumin 3.1 g/dL      Phosphorus 2.9 mg/dL      Anion Gap 9.0 mmol/L      BUN/Creatinine Ratio 14.3     eGFR 62.6 mL/min/1.73     Narrative:      GFR Normal >60  Chronic Kidney Disease <60  Kidney Failure <15      Magnesium [[id number]]  (Normal) Collected: 01/25/24 0014    Specimen: Blood Updated: 01/25/24 0052     Magnesium 1.8 mg/dL     CBC & Differential [[id number]]  (Abnormal) Collected: 01/25/24 0014    Specimen: Blood Updated: 01/25/24 0040    Narrative:      The following orders were created for panel order CBC & Differential.  Procedure                               Abnormality         Status                     ---------                               -----------         ------                     CBC Auto Differential[[id number]]        Abnormal            Final result                 Please view results for these tests on  the individual orders.    CBC Auto Differential [[id number]]  (Abnormal) Collected: 01/25/24 0014    Specimen: Blood Updated: 01/25/24 0040     WBC 7.20 10*3/mm3      RBC 3.26 10*6/mm3      Hemoglobin 9.6 g/dL      Hematocrit 29.3 %      MCV 90.1 fL      MCH 29.5 pg      MCHC 32.8 g/dL      RDW 17.6 %      RDW-SD 59.1 fl      MPV 8.8 fL      Platelets 127 10*3/mm3      Neutrophil % 78.3 %      Lymphocyte % 14.5 %      Monocyte % 4.6 %      Eosinophil % 2.0 %      Basophil % 0.6 %      Neutrophils, Absolute 5.60 10*3/mm3      Lymphocytes, Absolute 1.00 10*3/mm3      Monocytes, Absolute 0.30 10*3/mm3      Eosinophils, Absolute 0.10 10*3/mm3      Basophils, Absolute 0.00 10*3/mm3      nRBC 0.1 /100 WBC              Imaging Results (Last 24 Hours)       ** No results found for the last 24 hours. **                 I reviewed the patient's new clinical results.    Medication Review:   Scheduled Meds:aspirin, 81 mg, Oral, Daily  atorvastatin, 80 mg, Oral, Nightly  carvedilol, 3.125 mg, Oral, BID With Meals  cephalexin, 500 mg, Oral, 4x Daily  dilTIAZem CD, 240 mg, Oral, Q24H  epoetin ally/ally-epbx, 10,000 Units, Subcutaneous, Once  levothyroxine, 88 mcg, Oral, Q AM  midodrine, 10 mg, Oral, Q8H  oseltamivir, 75 mg, Oral, Q12H  pantoprazole, 40 mg, Oral, Q AM  PARoxetine, 40 mg, Oral, QAM  potassium chloride, 20 mEq, Oral, Daily  senna-docusate sodium, 2 tablet, Oral, BID  sodium bicarbonate, 650 mg, Oral, TID  sodium chloride, 10 mL, Intravenous, Q12H  sodium chloride, 3 mL, Intravenous, Q12H  topiramate, 50 mg, Oral, Nightly      Continuous Infusions:Pharmacy to dose warfarin,   [START ON 1/26/2024] sodium chloride, 100 mL/hr      PRN Meds:.  senna-docusate sodium **AND** polyethylene glycol **AND** bisacodyl **AND** bisacodyl    butalbital-acetaminophen-caffeine    melatonin    nitroglycerin    ondansetron    ondansetron    Pharmacy to dose warfarin    prochlorperazine    [COMPLETED] Insert Peripheral IV **AND** sodium  chloride    sodium chloride    sodium chloride    sodium chloride    sodium chloride     Assessment & Plan       New onset atrial fibrillation    Near syncope    S/P CABG (coronary artery bypass graft)    Abnormal nuclear stress test  - defer decision on heart cath to cardiology service  H/o AAA repair with persistent thrombus in aneurysmal sac - warfarin  H/o PE - continue warfarin with home monitoring  Influenza - finish Tamiflu  Vertebral compression fractures - outpatient DEXA  New onset Atrial fib - remains in sinus rhythm; already anticoagulated  Chronic anemia - follow up outpatient  UTI - Rocephin; will not need oral meds at discharge        Plan for disposition:ELLIOT Casiano MD  01/25/24  18:56 EST

## 2024-01-26 ENCOUNTER — READMISSION MANAGEMENT (OUTPATIENT)
Dept: CALL CENTER | Facility: HOSPITAL | Age: 69
End: 2024-01-26
Payer: MEDICARE

## 2024-01-26 VITALS
SYSTOLIC BLOOD PRESSURE: 95 MMHG | OXYGEN SATURATION: 93 % | HEIGHT: 68 IN | WEIGHT: 180 LBS | BODY MASS INDEX: 27.28 KG/M2 | RESPIRATION RATE: 16 BRPM | DIASTOLIC BLOOD PRESSURE: 76 MMHG | TEMPERATURE: 97.5 F | HEART RATE: 71 BPM

## 2024-01-26 LAB
ALBUMIN SERPL-MCNC: 3.2 G/DL (ref 3.5–5.2)
ANION GAP SERPL CALCULATED.3IONS-SCNC: 10 MMOL/L (ref 5–15)
BASOPHILS # BLD AUTO: 0.1 10*3/MM3 (ref 0–0.2)
BASOPHILS NFR BLD AUTO: 1.2 % (ref 0–1.5)
BUN SERPL-MCNC: 11 MG/DL (ref 8–23)
BUN/CREAT SERPL: 12 (ref 7–25)
CALCIUM SPEC-SCNC: 8.3 MG/DL (ref 8.6–10.5)
CHLORIDE SERPL-SCNC: 116 MMOL/L (ref 98–107)
CO2 SERPL-SCNC: 17 MMOL/L (ref 22–29)
CREAT SERPL-MCNC: 0.92 MG/DL (ref 0.57–1)
DEPRECATED RDW RBC AUTO: 59.1 FL (ref 37–54)
EGFRCR SERPLBLD CKD-EPI 2021: 67.5 ML/MIN/1.73
EOSINOPHIL # BLD AUTO: 0.1 10*3/MM3 (ref 0–0.4)
EOSINOPHIL NFR BLD AUTO: 2.3 % (ref 0.3–6.2)
ERYTHROCYTE [DISTWIDTH] IN BLOOD BY AUTOMATED COUNT: 17.9 % (ref 12.3–15.4)
GLUCOSE SERPL-MCNC: 97 MG/DL (ref 65–99)
HCT VFR BLD AUTO: 31.6 % (ref 34–46.6)
HGB BLD-MCNC: 10.2 G/DL (ref 12–15.9)
INR PPP: 1.97 (ref 2–3)
INR PPP: 2.13 (ref 2–3)
LYMPHOCYTES # BLD AUTO: 1 10*3/MM3 (ref 0.7–3.1)
LYMPHOCYTES NFR BLD AUTO: 15.2 % (ref 19.6–45.3)
MCH RBC QN AUTO: 29.9 PG (ref 26.6–33)
MCHC RBC AUTO-ENTMCNC: 32.1 G/DL (ref 31.5–35.7)
MCV RBC AUTO: 93 FL (ref 79–97)
MONOCYTES # BLD AUTO: 0.3 10*3/MM3 (ref 0.1–0.9)
MONOCYTES NFR BLD AUTO: 5.1 % (ref 5–12)
NEUTROPHILS NFR BLD AUTO: 4.8 10*3/MM3 (ref 1.7–7)
NEUTROPHILS NFR BLD AUTO: 76.2 % (ref 42.7–76)
NRBC BLD AUTO-RTO: 0.1 /100 WBC (ref 0–0.2)
PHOSPHATE SERPL-MCNC: 2.6 MG/DL (ref 2.5–4.5)
PLATELET # BLD AUTO: 142 10*3/MM3 (ref 140–450)
PMV BLD AUTO: 8.8 FL (ref 6–12)
POTASSIUM SERPL-SCNC: 3.6 MMOL/L (ref 3.5–5.2)
PROTHROMBIN TIME: 20.4 SECONDS (ref 19.4–28.5)
PROTHROMBIN TIME: 22 SECONDS (ref 19.4–28.5)
QT INTERVAL: 484 MS
QT INTERVAL: 495 MS
QTC INTERVAL: 504 MS
QTC INTERVAL: 614 MS
RBC # BLD AUTO: 3.4 10*6/MM3 (ref 3.77–5.28)
SODIUM SERPL-SCNC: 143 MMOL/L (ref 136–145)
WBC NRBC COR # BLD AUTO: 6.3 10*3/MM3 (ref 3.4–10.8)

## 2024-01-26 PROCEDURE — 85610 PROTHROMBIN TIME: CPT | Performed by: INTERNAL MEDICINE

## 2024-01-26 PROCEDURE — 99232 SBSQ HOSP IP/OBS MODERATE 35: CPT | Performed by: INTERNAL MEDICINE

## 2024-01-26 PROCEDURE — 36415 COLL VENOUS BLD VENIPUNCTURE: CPT | Performed by: INTERNAL MEDICINE

## 2024-01-26 PROCEDURE — 25810000003 SODIUM CHLORIDE 0.9 % SOLUTION: Performed by: INTERNAL MEDICINE

## 2024-01-26 PROCEDURE — 80069 RENAL FUNCTION PANEL: CPT | Performed by: INTERNAL MEDICINE

## 2024-01-26 PROCEDURE — 25010000002 CALCIUM GLUCONATE 2-0.675 GM/100ML-% SOLUTION: Performed by: INTERNAL MEDICINE

## 2024-01-26 PROCEDURE — 85025 COMPLETE CBC W/AUTO DIFF WBC: CPT | Performed by: INTERNAL MEDICINE

## 2024-01-26 PROCEDURE — 25010000002 MAGNESIUM SULFATE IN D5W 1G/100ML (PREMIX) 1-5 GM/100ML-% SOLUTION: Performed by: INTERNAL MEDICINE

## 2024-01-26 RX ORDER — CALCIUM GLUCONATE 20 MG/ML
2000 INJECTION, SOLUTION INTRAVENOUS ONCE
Status: COMPLETED | OUTPATIENT
Start: 2024-01-26 | End: 2024-01-26

## 2024-01-26 RX ORDER — MAGNESIUM SULFATE 1 G/100ML
1 INJECTION INTRAVENOUS ONCE
Status: COMPLETED | OUTPATIENT
Start: 2024-01-26 | End: 2024-01-26

## 2024-01-26 RX ORDER — MIDODRINE HYDROCHLORIDE 10 MG/1
10 TABLET ORAL EVERY 8 HOURS
Qty: 90 TABLET | Refills: 1 | Status: SHIPPED | OUTPATIENT
Start: 2024-01-26

## 2024-01-26 RX ORDER — DILTIAZEM HYDROCHLORIDE 240 MG/1
240 CAPSULE, COATED, EXTENDED RELEASE ORAL
Qty: 30 CAPSULE | Refills: 1 | Status: SHIPPED | OUTPATIENT
Start: 2024-01-27

## 2024-01-26 RX ORDER — OSELTAMIVIR PHOSPHATE 75 MG/1
75 CAPSULE ORAL EVERY 12 HOURS SCHEDULED
Qty: 4 CAPSULE | Refills: 0 | Status: SHIPPED | OUTPATIENT
Start: 2024-01-26 | End: 2024-01-28

## 2024-01-26 RX ORDER — LEVOTHYROXINE SODIUM 88 UG/1
88 TABLET ORAL
Qty: 30 TABLET | Refills: 1 | Status: SHIPPED | OUTPATIENT
Start: 2024-01-27

## 2024-01-26 RX ORDER — POTASSIUM CHLORIDE 20 MEQ/1
40 TABLET, EXTENDED RELEASE ORAL EVERY 4 HOURS
Status: COMPLETED | OUTPATIENT
Start: 2024-01-26 | End: 2024-01-26

## 2024-01-26 RX ORDER — CEPHALEXIN 500 MG/1
500 CAPSULE ORAL 4 TIMES DAILY
Qty: 5 CAPSULE | Refills: 0 | Status: SHIPPED | OUTPATIENT
Start: 2024-01-26 | End: 2024-01-28

## 2024-01-26 RX ORDER — WARFARIN SODIUM 3 MG/1
3 TABLET ORAL
Status: DISCONTINUED | OUTPATIENT
Start: 2024-01-26 | End: 2024-01-26 | Stop reason: HOSPADM

## 2024-01-26 RX ORDER — SODIUM BICARBONATE 650 MG/1
1300 TABLET ORAL 3 TIMES DAILY
Status: DISCONTINUED | OUTPATIENT
Start: 2024-01-26 | End: 2024-01-26 | Stop reason: HOSPADM

## 2024-01-26 RX ORDER — POTASSIUM CHLORIDE 1.5 G/1.58G
20 POWDER, FOR SOLUTION ORAL DAILY
Status: DISCONTINUED | OUTPATIENT
Start: 2024-01-27 | End: 2024-01-26 | Stop reason: HOSPADM

## 2024-01-26 RX ORDER — SODIUM BICARBONATE 650 MG/1
1300 TABLET ORAL 3 TIMES DAILY
Qty: 90 TABLET | Refills: 1 | Status: SHIPPED | OUTPATIENT
Start: 2024-01-26

## 2024-01-26 RX ADMIN — MIDODRINE HYDROCHLORIDE 10 MG: 5 TABLET ORAL at 06:18

## 2024-01-26 RX ADMIN — Medication 10 ML: at 09:22

## 2024-01-26 RX ADMIN — PANTOPRAZOLE SODIUM 40 MG: 40 TABLET, DELAYED RELEASE ORAL at 06:18

## 2024-01-26 RX ADMIN — CALCIUM GLUCONATE 2000 MG: 20 INJECTION, SOLUTION INTRAVENOUS at 09:22

## 2024-01-26 RX ADMIN — MAGNESIUM SULFATE IN DEXTROSE 1 G: 10 INJECTION, SOLUTION INTRAVENOUS at 12:37

## 2024-01-26 RX ADMIN — POTASSIUM CHLORIDE 40 MEQ: 1500 TABLET, EXTENDED RELEASE ORAL at 09:20

## 2024-01-26 RX ADMIN — CEPHALEXIN 500 MG: 500 CAPSULE ORAL at 13:17

## 2024-01-26 RX ADMIN — LEVOTHYROXINE SODIUM 88 MCG: 0.09 TABLET ORAL at 06:18

## 2024-01-26 RX ADMIN — ASPIRIN 81 MG CHEWABLE TABLET 81 MG: 81 TABLET CHEWABLE at 09:21

## 2024-01-26 RX ADMIN — MIDODRINE HYDROCHLORIDE 10 MG: 5 TABLET ORAL at 13:17

## 2024-01-26 RX ADMIN — OSELTAMIVIR PHOSPHATE 75 MG: 75 CAPSULE ORAL at 09:20

## 2024-01-26 RX ADMIN — CEPHALEXIN 500 MG: 500 CAPSULE ORAL at 09:21

## 2024-01-26 RX ADMIN — SODIUM CHLORIDE 100 ML/HR: 9 INJECTION, SOLUTION INTRAVENOUS at 06:57

## 2024-01-26 RX ADMIN — SODIUM BICARBONATE 1300 MG: 650 TABLET ORAL at 09:21

## 2024-01-26 RX ADMIN — POTASSIUM CHLORIDE 40 MEQ: 1500 TABLET, EXTENDED RELEASE ORAL at 13:17

## 2024-01-26 RX ADMIN — Medication 3 ML: at 09:27

## 2024-01-26 RX ADMIN — PAROXETINE 40 MG: 40 TABLET, FILM COATED ORAL at 06:18

## 2024-01-26 NOTE — DISCHARGE SUMMARY
Date of Discharge:  1/26/2024    Discharge Diagnosis:   New onset atrial fibrillation  Near syncope  Abnormal nuclear stress tox  Influenza A  Chronic anemia  Urinary tract infection  Vertebral compression fractures  History of PE  History of AAA repair    Presenting Problem/History of Present Illness  Active Hospital Problems    Diagnosis  POA    **New onset atrial fibrillation [I48.91]  Yes    Near syncope [R55]  Unknown    S/P CABG (coronary artery bypass graft) [Z95.1]  Not Applicable    Abnormal nuclear stress test [R94.39]  Unknown      Resolved Hospital Problems   No resolved problems to display.          Hospital Course    Patient is a 68 y.o. female presented with h/o CAD and one prior episode of afib who has felt generally weak x 2 months, following a COVID infection.  She felt weak when running errand and when she got home, a home (OTC) cardiac monitor showed atrial fib with heart rates from 75 to 130.  Other past medical history includes AAA repair with persistent thrombus in aneurysmal sac, congestive heart failure, frequent UTIs, GERD, hyperlipidemia, ischemic cardiomyopathy with AICD, CABG, obstructive sleep apnea, osteopenia, and PE.  She denies any chest pain or palpitations, recent change in weight or other problems. She is chronically anticoagulated with warfarin for h/o bilateral PE and monitors INR at home.  Prior episode of afib was in the immediate post-op period after CABG.  Initial EKG in ER showed sinus rhythm with frequent PAC's. Cardiology followed with stress test results; Stress Cardiolite test-1/24/24 Lexiscan Cardiolite test revealed proximal posterior infarction and ischemia. Gated SPECT images revealed normal left ventricular size and diffuse hypocontractility with calculated ejection fraction of 61%.  Patient was also noted to have influenza A treated with supportive care and Tamiflu also urinary tract infection treated with antibiotics. Also noted vertebral compression fracture  will need outpatient DEXA.    Procedures Performed    Procedure(s):  Left Heart Cath and coronary angiogram  -------------------       Consults:   Consults       Date and Time Order Name Status Description    1/23/2024  1:58 PM Inpatient Nephrology Consult Completed     1/22/2024  5:41 PM Inpatient Cardiology Consult      1/22/2024  3:57 PM Family Medicine Consult              Pertinent Test Results:    Lab Results (most recent)       Procedure Component Value Units Date/Time    Protime-INR [[id number]]  (Normal) Collected: 01/26/24 1052    Specimen: Blood Updated: 01/26/24 1210     Protime 22.0 Seconds      INR 2.13    Renal Function Panel [[id number]]  (Abnormal) Collected: 01/26/24 0658    Specimen: Blood Updated: 01/26/24 0744     Glucose 97 mg/dL      BUN 11 mg/dL      Creatinine 0.92 mg/dL      Sodium 143 mmol/L      Potassium 3.6 mmol/L      Chloride 116 mmol/L      CO2 17.0 mmol/L      Calcium 8.3 mg/dL      Albumin 3.2 g/dL      Phosphorus 2.6 mg/dL      Anion Gap 10.0 mmol/L      BUN/Creatinine Ratio 12.0     eGFR 67.5 mL/min/1.73     Narrative:      GFR Normal >60  Chronic Kidney Disease <60  Kidney Failure <15      CBC & Differential [[id number]]  (Abnormal) Collected: 01/26/24 0658    Specimen: Blood Updated: 01/26/24 0725    Narrative:      The following orders were created for panel order CBC & Differential.  Procedure                               Abnormality         Status                     ---------                               -----------         ------                     CBC Auto Differential[[id number]]        Abnormal            Final result                 Please view results for these tests on the individual orders.    CBC Auto Differential [[id number]]  (Abnormal) Collected: 01/26/24 0658    Specimen: Blood Updated: 01/26/24 0725     WBC 6.30 10*3/mm3      RBC 3.40 10*6/mm3      Hemoglobin 10.2 g/dL      Hematocrit 31.6 %      MCV 93.0 fL      MCH 29.9 pg      MCHC 32.1 g/dL      RDW 17.9 %       RDW-SD 59.1 fl      MPV 8.8 fL      Platelets 142 10*3/mm3      Neutrophil % 76.2 %      Lymphocyte % 15.2 %      Monocyte % 5.1 %      Eosinophil % 2.3 %      Basophil % 1.2 %      Neutrophils, Absolute 4.80 10*3/mm3      Lymphocytes, Absolute 1.00 10*3/mm3      Monocytes, Absolute 0.30 10*3/mm3      Eosinophils, Absolute 0.10 10*3/mm3      Basophils, Absolute 0.10 10*3/mm3      nRBC 0.1 /100 WBC     Potassium [[id number]]  (Normal) Collected: 01/25/24 0814    Specimen: Blood Updated: 01/25/24 0903     Potassium 3.8 mmol/L     Protime-INR [[id number]]  (Abnormal) Collected: 01/25/24 0014    Specimen: Blood Updated: 01/25/24 0052     Protime 30.0 Seconds      INR 2.98    Renal Function Panel [[id number]]  (Abnormal) Collected: 01/25/24 0014    Specimen: Blood Updated: 01/25/24 0052     Glucose 114 mg/dL      BUN 14 mg/dL      Creatinine 0.98 mg/dL      Sodium 142 mmol/L      Potassium 3.4 mmol/L      Chloride 116 mmol/L      CO2 17.0 mmol/L      Calcium 8.2 mg/dL      Albumin 3.1 g/dL      Phosphorus 2.9 mg/dL      Anion Gap 9.0 mmol/L      BUN/Creatinine Ratio 14.3     eGFR 62.6 mL/min/1.73     Narrative:      GFR Normal >60  Chronic Kidney Disease <60  Kidney Failure <15      Magnesium [[id number]]  (Normal) Collected: 01/25/24 0014    Specimen: Blood Updated: 01/25/24 0052     Magnesium 1.8 mg/dL     CBC & Differential [[id number]]  (Abnormal) Collected: 01/25/24 0014    Specimen: Blood Updated: 01/25/24 0040    Narrative:      The following orders were created for panel order CBC & Differential.  Procedure                               Abnormality         Status                     ---------                               -----------         ------                     CBC Auto Differential[[id number]]        Abnormal            Final result                 Please view results for these tests on the individual orders.    CBC Auto Differential [[id number]]  (Abnormal) Collected: 01/25/24 0014    Specimen: Blood  Updated: 01/25/24 0040     WBC 7.20 10*3/mm3      RBC 3.26 10*6/mm3      Hemoglobin 9.6 g/dL      Hematocrit 29.3 %      MCV 90.1 fL      MCH 29.5 pg      MCHC 32.8 g/dL      RDW 17.6 %      RDW-SD 59.1 fl      MPV 8.8 fL      Platelets 127 10*3/mm3      Neutrophil % 78.3 %      Lymphocyte % 14.5 %      Monocyte % 4.6 %      Eosinophil % 2.0 %      Basophil % 0.6 %      Neutrophils, Absolute 5.60 10*3/mm3      Lymphocytes, Absolute 1.00 10*3/mm3      Monocytes, Absolute 0.30 10*3/mm3      Eosinophils, Absolute 0.10 10*3/mm3      Basophils, Absolute 0.00 10*3/mm3      nRBC 0.1 /100 WBC     High Sensitivity Troponin T [[id number]]  (Normal) Collected: 01/24/24 1724    Specimen: Blood Updated: 01/24/24 1840     HS Troponin T 12 ng/L     Narrative:      High Sensitive Troponin T Reference Range:  <14.0 ng/L- Negative Female for AMI  <22.0 ng/L- Negative Male for AMI  >=14 - Abnormal Female indicating possible myocardial injury.  >=22 - Abnormal Male indicating possible myocardial injury.   Clinicians would have to utilize clinical acumen, EKG, Troponin, and serial changes to determine if it is an Acute Myocardial Infarction or myocardial injury due to an underlying chronic condition.         Magnesium [[id number]]  (Normal) Collected: 01/24/24 1724    Specimen: Blood Updated: 01/24/24 1835     Magnesium 2.0 mg/dL     High Sensitivity Troponin T 2Hr [[id number]]  (Normal) Collected: 01/24/24 1114    Specimen: Blood Updated: 01/24/24 1233     HS Troponin T 12 ng/L      Troponin T Delta -1 ng/L     Narrative:      High Sensitive Troponin T Reference Range:  <14.0 ng/L- Negative Female for AMI  <22.0 ng/L- Negative Male for AMI  >=14 - Abnormal Female indicating possible myocardial injury.  >=22 - Abnormal Male indicating possible myocardial injury.   Clinicians would have to utilize clinical acumen, EKG, Troponin, and serial changes to determine if it is an Acute Myocardial Infarction or myocardial injury due to an underlying  chronic condition.         High Sensitivity Troponin T [[id number]]  (Normal) Collected: 01/24/24 0549    Specimen: Blood from Arm, Right Updated: 01/24/24 0636     HS Troponin T 13 ng/L     Narrative:      High Sensitive Troponin T Reference Range:  <14.0 ng/L- Negative Female for AMI  <22.0 ng/L- Negative Male for AMI  >=14 - Abnormal Female indicating possible myocardial injury.  >=22 - Abnormal Male indicating possible myocardial injury.   Clinicians would have to utilize clinical acumen, EKG, Troponin, and serial changes to determine if it is an Acute Myocardial Infarction or myocardial injury due to an underlying chronic condition.         Urine Culture - Urine, Urine, Clean Catch [[id number]]  (Abnormal)  (Susceptibility) Collected: 01/22/24 1355    Specimen: Urine, Clean Catch Updated: 01/24/24 0404     Urine Culture >100,000 CFU/mL Escherichia coli    Narrative:      Colonization of the urinary tract without infection is common. Treatment is discouraged unless the patient is symptomatic, pregnant, or undergoing an invasive urologic procedure.    Susceptibility        Escherichia coli      FRANK      Amoxicillin + Clavulanate Resistant      Ampicillin Resistant      Ampicillin + Sulbactam Resistant      Cefazolin Susceptible      Cefepime Susceptible      Ceftazidime Susceptible      Ceftriaxone Susceptible      Gentamicin Susceptible      Levofloxacin Resistant      Nitrofurantoin Susceptible      Trimethoprim + Sulfamethoxazole Resistant                           Ferritin [[id number]]  (Abnormal) Collected: 01/23/24 1446    Specimen: Blood from Arm, Right Updated: 01/23/24 1714     Ferritin 358.90 ng/mL     Narrative:      Results may be falsely decreased if patient taking Biotin.      Iron Profile [[id number]]  (Abnormal) Collected: 01/23/24 1446    Specimen: Blood from Arm, Right Updated: 01/23/24 1711     Iron 54 mcg/dL      Iron Saturation (TSAT) 18 %      Transferrin 199 mg/dL      TIBC 297 mcg/dL     Scan  Slide [[id number]] Collected: 01/23/24 1446    Specimen: Blood from Arm, Right Updated: 01/23/24 1541     Scan Slide --     Comment: See Manual Differential Results       Manual Differential [[id number]]  (Abnormal) Collected: 01/23/24 1446    Specimen: Blood from Arm, Right Updated: 01/23/24 1541     Neutrophil % 68.0 %      Lymphocyte % 27.0 %      Monocyte % 1.0 %      Basophil % 3.0 %      Atypical Lymphocyte % 1.0 %      Neutrophils Absolute 3.94 10*3/mm3      Lymphocytes Absolute 1.62 10*3/mm3      Monocytes Absolute 0.06 10*3/mm3      Basophils Absolute 0.17 10*3/mm3      Anisocytosis Slight/1+     WBC Morphology Normal     Platelet Morphology Normal    Basic Metabolic Panel [[id number]]  (Abnormal) Collected: 01/23/24 1446    Specimen: Blood from Arm, Right Updated: 01/23/24 1517     Glucose 83 mg/dL      BUN 19 mg/dL      Creatinine 1.27 mg/dL      Sodium 139 mmol/L      Potassium 3.8 mmol/L      Comment: Slight hemolysis detected by analyzer. Result may be falsely elevated.        Chloride 113 mmol/L      CO2 17.0 mmol/L      Calcium 8.2 mg/dL      BUN/Creatinine Ratio 15.0     Anion Gap 9.0 mmol/L      eGFR 45.9 mL/min/1.73     Narrative:      GFR Normal >60  Chronic Kidney Disease <60  Kidney Failure <15      COVID-19, FLU A/B, RSV PCR 1 HR TAT - Swab, Nasopharynx [[id number]]  (Abnormal) Collected: 01/22/24 1745    Specimen: Swab from Nasopharynx Updated: 01/22/24 1852     COVID19 Not Detected     Influenza A PCR Detected     Influenza B PCR Not Detected     RSV, PCR Not Detected    Narrative:      Fact sheet for providers: https://www.fda.gov/media/512699/download    Fact sheet for patients: https://www.fda.gov/media/997727/download    Test performed by PCR.    TSH [[id number]]  (Abnormal) Collected: 01/22/24 1355    Specimen: Blood from Arm, Right Updated: 01/22/24 1656     TSH 9.780 uIU/mL     T4, Free [[id number]]  (Abnormal) Collected: 01/22/24 1355    Specimen: Blood from Arm, Right Updated: 01/22/24  1656     Free T4 0.87 ng/dL     Narrative:      Results may be falsely increased if patient taking Biotin.      Lipid Panel [[id number]]  (Abnormal) Collected: 01/22/24 1355    Specimen: Blood from Arm, Right Updated: 01/22/24 1650     Total Cholesterol 151 mg/dL      Triglycerides 226 mg/dL      HDL Cholesterol 32 mg/dL      LDL Cholesterol  81 mg/dL      VLDL Cholesterol 38 mg/dL      LDL/HDL Ratio 2.31    Narrative:      Cholesterol Reference Ranges  (U.S. Department of Health and Human Services ATP III Classifications)    Desirable          <200 mg/dL  Borderline High    200-239 mg/dL  High Risk          >240 mg/dL      Triglyceride Reference Ranges  (U.S. Department of Health and Human Services ATP III Classifications)    Normal           <150 mg/dL  Borderline High  150-199 mg/dL  High             200-499 mg/dL  Very High        >500 mg/dL    HDL Reference Ranges  (U.S. Department of Health and Human Services ATP III Classifications)    Low     <40 mg/dl (major risk factor for CHD)  High    >60 mg/dl ('negative' risk factor for CHD)        LDL Reference Ranges  (U.S. Department of Health and Human Services ATP III Classifications)    Optimal          <100 mg/dL  Near Optimal     100-129 mg/dL  Borderline High  130-159 mg/dL  High             160-189 mg/dL  Very High        >189 mg/dL    D-dimer, Quantitative [[id number]]  (Abnormal) Collected: 01/22/24 1608    Specimen: Blood Updated: 01/22/24 1634     D-Dimer, Quantitative 3.12 mg/L (FEU)     Narrative:      According to the assay 's published package insert, a normal (<0.50 mg/L (FEU)) D-dimer result in conjunction with a non-high clinical probability assessment, excludes deep vein thrombosis (DVT) and pulmonary embolism (PE) with high sensitivity.    D-dimer values increase with age and this can make VTE exclusion of an older population difficult. To address this, the [employer], based on best available evidence and recent  "guidelines, recommends that clinicians use age-adjusted D-dimer thresholds in patients greater than 50 years of age with: a) a low probability of PE who do not meet all Pulmonary Embolism Rule Out Criteria, or b) in those with intermediate probability of PE.   The formula for an age-adjusted D-dimer cut-off is \"age/100\".  For example, a 60 year old patient would have an age-adjusted cut-off of 0.60 mg/L (FEU) and an 80 year old 0.80 mg/L (FEU).    Comprehensive Metabolic Panel [[id number]]  (Abnormal) Collected: 01/22/24 1355    Specimen: Blood from Arm, Right Updated: 01/22/24 1443     Glucose 95 mg/dL      BUN 24 mg/dL      Creatinine 1.70 mg/dL      Sodium 140 mmol/L      Potassium 3.7 mmol/L      Comment: Slight hemolysis detected by analyzer. Result may be falsely elevated.        Chloride 108 mmol/L      CO2 19.0 mmol/L      Calcium 9.3 mg/dL      Total Protein 7.5 g/dL      Albumin 4.2 g/dL      ALT (SGPT) 28 U/L      AST (SGOT) 37 U/L      Comment: Slight hemolysis detected by analyzer. Result may be falsely elevated.        Alkaline Phosphatase 135 U/L      Total Bilirubin 0.3 mg/dL      Globulin 3.3 gm/dL      A/G Ratio 1.3 g/dL      BUN/Creatinine Ratio 14.1     Anion Gap 13.0 mmol/L      eGFR 32.3 mL/min/1.73     Narrative:      GFR Normal >60  Chronic Kidney Disease <60  Kidney Failure <15      Single High Sensitivity Troponin T [[id number]]  (Abnormal) Collected: 01/22/24 1355    Specimen: Blood from Arm, Right Updated: 01/22/24 1439     HS Troponin T 16 ng/L     Narrative:      High Sensitive Troponin T Reference Range:  <14.0 ng/L- Negative Female for AMI  <22.0 ng/L- Negative Male for AMI  >=14 - Abnormal Female indicating possible myocardial injury.  >=22 - Abnormal Male indicating possible myocardial injury.   Clinicians would have to utilize clinical acumen, EKG, Troponin, and serial changes to determine if it is an Acute Myocardial Infarction or myocardial injury due to an underlying chronic " condition.         CK [[id number]]  (Normal) Collected: 01/22/24 1355    Specimen: Blood from Arm, Right Updated: 01/22/24 1439     Creatine Kinase 38 U/L     Urinalysis, Microscopic Only - Urine, Clean Catch [[id number]]  (Abnormal) Collected: 01/22/24 1355    Specimen: Urine, Clean Catch Updated: 01/22/24 1423     RBC, UA 0-2 /HPF      WBC, UA 11-20 /HPF      Bacteria, UA Trace /HPF      Squamous Epithelial Cells, UA 3-6 /HPF      Yeast, UA Small/1+ Yeast /HPF      Hyaline Casts, UA 7-12 /LPF      Methodology Manual Light Microscopy    Urinalysis With Microscopic If Indicated (No Culture) - Urine, Clean Catch [[id number]]  (Abnormal) Collected: 01/22/24 1355    Specimen: Urine, Clean Catch Updated: 01/22/24 1413     Color, UA Yellow     Appearance, UA Cloudy     pH, UA 5.5     Specific Gravity, UA 1.014     Glucose, UA Negative     Ketones, UA Negative     Bilirubin, UA Negative     Blood, UA Negative     Protein, UA Trace     Leuk Esterase, UA Large (3+)     Nitrite, UA Negative     Urobilinogen, UA 1.0 E.U./dL             Results for orders placed during the hospital encounter of 01/22/24    Adult Transthoracic Echo Complete W/ Cont if Necessary Per Protocol    Interpretation Summary    Left ventricular ejection fraction appears to be 56 - 60%.    Estimated right ventricular systolic pressure from tricuspid regurgitation is normal (<35 mmHg).    Indication  Dyspnea  Palpitations    Technically satisfactory study.  Mitral valve is structurally normal.  Tricuspid valve is structurally normal.  Mild tricuspid regurgitation is present.  Aortic valve is aortic valve with decreased opening motion.  Gradient across the aortic valve is 16/9 mmHg.  Valve area 1.37 cm².  Pulmonic valve could not be well visualized.  Left atrium is enlarged.  Right atrium is normal in size.  Left ventricle is enlarged with septal anterior wall and apical severe hypokinesis with ejection fraction of 40%.  Possible left ventricular apical  thrombus.  Right ventricle is normal in size.  Atrial septum is intact.  Aorta is normal.  No pericardial effusion or intracardiac thrombus is seen.  ICD lead is present in the right ventricle.    Impression  Mild tricuspid regurgitation.  Mild to moderate aortic valve stenosis with gradient of 16/9 mmHg and valve area of 1.37 cm².  Left atrial enlargement.  Left ventricle is enlarged with septal anterior wall and apical severe hypokinesis with ejection fraction of 40%.  Possible left ventricular apical thrombus.  ICD lead is present in the right ventricle.  No evidence for pulmonary hypertension is present.       Condition on Discharge:  Stable    Vital Signs  Temp:  [97.3 °F (36.3 °C)-98 °F (36.7 °C)] 97.5 °F (36.4 °C)  Heart Rate:  [56-71] 71  Resp:  [12-16] 16  BP: ()/(37-76) 95/76      Physical Exam  Vitals reviewed.   Constitutional:       Appearance: She is not ill-appearing.   HENT:      Head: Normocephalic and atraumatic.      Right Ear: External ear normal.      Left Ear: External ear normal.      Nose: Nose normal.      Mouth/Throat:      Mouth: Mucous membranes are moist.   Eyes:      General:         Right eye: No discharge.         Left eye: No discharge.   Cardiovascular:      Rate and Rhythm: Normal rate and regular rhythm.      Pulses: Normal pulses.      Heart sounds: Normal heart sounds.   Pulmonary:      Effort: Pulmonary effort is normal.      Breath sounds: Normal breath sounds.   Abdominal:      General: Bowel sounds are normal.      Palpations: Abdomen is soft.   Musculoskeletal:         General: Normal range of motion.      Cervical back: Normal range of motion.   Skin:     General: Skin is warm.   Neurological:      Mental Status: She is alert and oriented to person, place, and time.   Psychiatric:         Behavior: Behavior normal.              Discharge Disposition  Home or Self Care    Discharge Medications     Discharge Medications        New Medications        Instructions Start  Date   cephalexin 500 MG capsule  Commonly known as: KEFLEX   500 mg, Oral, 4 Times Daily      dilTIAZem  MG 24 hr capsule  Commonly known as: CARDIZEM CD   240 mg, Oral, Every 24 Hours Scheduled   Start Date: January 27, 2024     midodrine 10 MG tablet  Commonly known as: PROAMATINE   10 mg, Oral, Every 8 Hours      oseltamivir 75 MG capsule  Commonly known as: TAMIFLU   75 mg, Oral, Every 12 Hours Scheduled      sodium bicarbonate 650 MG tablet   1,300 mg, Oral, 3 Times Daily             Changes to Medications        Instructions Start Date   levothyroxine 88 MCG tablet  Commonly known as: SYNTHROID, LEVOTHROID  What changed:   medication strength  how much to take  when to take this   88 mcg, Oral, Every Early Morning   Start Date: January 27, 2024     warfarin 3 MG tablet  Commonly known as: COUMADIN  What changed: Another medication with the same name was removed. Continue taking this medication, and follow the directions you see here.   See Admin Instructions, Take 1 tablet by mouth on Tuesday, Thursday, Saturday, and Sunday             Continue These Medications        Instructions Start Date   aspirin 81 MG chewable tablet   81 mg, Oral, Daily      atorvastatin 80 MG tablet  Commonly known as: LIPITOR   80 mg, Oral, Daily      carvedilol 3.125 MG tablet  Commonly known as: COREG   3.125 mg, Oral, 2 Times Daily With Meals      ezetimibe 10 MG tablet  Commonly known as: ZETIA   10 mg, Oral, Daily      famciclovir 500 MG tablet  Commonly known as: FAMVIR   500 mg, Oral, 3 Times Daily PRN      levocetirizine 5 MG tablet  Commonly known as: XYZAL   2.5 mg, Oral, Every Evening      omeprazole 40 MG capsule  Commonly known as: priLOSEC   40 mg, Oral, Daily      PARoxetine 40 MG tablet  Commonly known as: PAXIL   40 mg, Oral, Every Morning      topiramate 50 MG tablet  Commonly known as: TOPAMAX   50 mg, Oral, Daily      trospium 20 MG tablet  Commonly known as: SANCTURA   20 mg, Oral, Nightly             Stop  These Medications      furosemide 40 MG tablet  Commonly known as: LASIX     potassium chloride 20 MEQ packet  Commonly known as: KLOR-CON              Discharge Diet:   Diet Instructions       Diet: Cardiac Diets; Healthy Heart (2-3 Na+); Regular Texture (IDDSI 7); Thin (IDDSI 0)      Discharge Diet: Cardiac Diets    Cardiac Diet: Healthy Heart (2-3 Na+)    Texture: Regular Texture (IDDSI 7)    Fluid Consistency: Thin (IDDSI 0)            Activity at Discharge:   Activity Instructions       Gradually Increase Activity Until at Pre-Hospitalization Level              Follow-up Appointments  No future appointments.  Additional Instructions for the Follow-ups that You Need to Schedule       Discharge Follow-up with PCP   As directed       Currently Documented PCP:    Luan Joseph FNP    PCP Phone Number:    [phone]     Follow Up Details: keep current appointment        Discharge Follow-up with Specified Provider: Dr Duarte; 3 Weeks   As directed      To: Dr Duarte   Follow Up: 3 Weeks        Discharge Follow-up with Specified Provider: Dr. Hurley make appointment; 2 Weeks   As directed      To: Dr. Hurley make appointment   Follow Up: 2 Weeks                Test Results Pending at Discharge       ALEXANDER Rain  01/26/24  13:21 EST    Time: Discharge 25 min

## 2024-01-26 NOTE — PLAN OF CARE
Problem: Adult Inpatient Plan of Care  Goal: Plan of Care Review  Outcome: Ongoing, Progressing  Goal: Patient-Specific Goal (Individualized)  Outcome: Ongoing, Progressing  Goal: Absence of Hospital-Acquired Illness or Injury  Outcome: Ongoing, Progressing  Intervention: Identify and Manage Fall Risk  Recent Flowsheet Documentation  Taken 1/26/2024 0417 by Jessica Diaz RN  Safety Promotion/Fall Prevention:   assistive device/personal items within reach   clutter free environment maintained   fall prevention program maintained   lighting adjusted   mobility aid in reach   nonskid shoes/slippers when out of bed   room organization consistent   safety round/check completed  Taken 1/26/2024 0200 by Jessica Diaz RN  Safety Promotion/Fall Prevention:   assistive device/personal items within reach   clutter free environment maintained   fall prevention program maintained   lighting adjusted   mobility aid in reach   nonskid shoes/slippers when out of bed   room organization consistent   safety round/check completed  Taken 1/26/2024 0040 by Jessica Diaz RN  Safety Promotion/Fall Prevention:   assistive device/personal items within reach   clutter free environment maintained   fall prevention program maintained   lighting adjusted   mobility aid in reach   nonskid shoes/slippers when out of bed   room organization consistent   safety round/check completed  Taken 1/25/2024 2300 by Jessica Daiz RN  Safety Promotion/Fall Prevention:   assistive device/personal items within reach   clutter free environment maintained   fall prevention program maintained   lighting adjusted   mobility aid in reach   nonskid shoes/slippers when out of bed   room organization consistent   safety round/check completed  Taken 1/25/2024 2200 by Jessica Diaz RN  Safety Promotion/Fall Prevention:   assistive device/personal items within reach   clutter free environment maintained   fall prevention program maintained   lighting adjusted    mobility aid in reach   nonskid shoes/slippers when out of bed   room organization consistent   safety round/check completed  Taken 1/25/2024 2015 by Jessica Diaz RN  Safety Promotion/Fall Prevention:   assistive device/personal items within reach   clutter free environment maintained   fall prevention program maintained   lighting adjusted   mobility aid in reach   nonskid shoes/slippers when out of bed   room organization consistent   safety round/check completed  Intervention: Prevent Skin Injury  Recent Flowsheet Documentation  Taken 1/26/2024 0417 by Jessica Diaz RN  Body Position:   position changed independently   weight shifting   side-lying   right  Skin Protection:   adhesive use limited   transparent dressing maintained   tubing/devices free from skin contact  Taken 1/26/2024 0040 by Jessica Diaz RN  Body Position:   position changed independently   weight shifting  Skin Protection:   adhesive use limited   transparent dressing maintained   tubing/devices free from skin contact  Taken 1/25/2024 2015 by Jessica Diaz RN  Body Position: position changed independently  Skin Protection:   adhesive use limited   tubing/devices free from skin contact   transparent dressing maintained  Intervention: Prevent and Manage VTE (Venous Thromboembolism) Risk  Recent Flowsheet Documentation  Taken 1/26/2024 0417 by Jessica Diaz, RN  Activity Management: up ad su  Range of Motion: active ROM (range of motion) encouraged  Taken 1/26/2024 0040 by Jessica Diaz RN  Activity Management: up ad us  Range of Motion: active ROM (range of motion) encouraged  Taken 1/25/2024 2015 by Jessica Diaz RN  Activity Management: up ad su  VTE Prevention/Management:   bilateral   sequential compression devices off  Range of Motion: active ROM (range of motion) encouraged  Intervention: Prevent Infection  Recent Flowsheet Documentation  Taken 1/26/2024 0417 by Jessica Diaz, RN  Infection Prevention:   equipment surfaces  disinfected   hand hygiene promoted   personal protective equipment utilized   rest/sleep promoted   single patient room provided  Taken 1/26/2024 0200 by Jessica Diaz RN  Infection Prevention:   equipment surfaces disinfected   hand hygiene promoted   personal protective equipment utilized   rest/sleep promoted   single patient room provided  Taken 1/26/2024 0040 by Jessica Diaz RN  Infection Prevention:   hand hygiene promoted   equipment surfaces disinfected   personal protective equipment utilized   rest/sleep promoted   single patient room provided  Taken 1/25/2024 2300 by Jessica Diaz RN  Infection Prevention:   equipment surfaces disinfected   hand hygiene promoted   rest/sleep promoted   personal protective equipment utilized   single patient room provided  Taken 1/25/2024 2200 by Jessica Diaz RN  Infection Prevention:   equipment surfaces disinfected   personal protective equipment utilized   hand hygiene promoted   rest/sleep promoted   single patient room provided  Taken 1/25/2024 2015 by Jessica Diaz RN  Infection Prevention:   equipment surfaces disinfected   hand hygiene promoted   personal protective equipment utilized   rest/sleep promoted   single patient room provided  Goal: Optimal Comfort and Wellbeing  Outcome: Ongoing, Progressing  Intervention: Provide Person-Centered Care  Recent Flowsheet Documentation  Taken 1/26/2024 0040 by Jessica Diaz, CONSTANCE  Trust Relationship/Rapport:   care explained   thoughts/feelings acknowledged   choices provided  Taken 1/25/2024 2015 by Jessica Diaz RN  Trust Relationship/Rapport:   choices provided   care explained   thoughts/feelings acknowledged  Goal: Readiness for Transition of Care  Outcome: Ongoing, Progressing     Problem: Activity Intolerance  Goal: Enhanced Capacity and Energy  Outcome: Ongoing, Progressing  Intervention: Optimize Activity Tolerance  Recent Flowsheet Documentation  Taken 1/26/2024 0417 by Jessica Diaz, RN  Activity  Management: up ad su  Environmental Support: calm environment promoted  Self-Care Promotion: independence encouraged  Taken 1/26/2024 0040 by Jessica Diaz RN  Activity Management: up ad su  Environmental Support: calm environment promoted  Taken 1/25/2024 2015 by Jessica Diaz RN  Activity Management: up ad su  Environmental Support: calm environment promoted     Problem: Fall Injury Risk  Goal: Absence of Fall and Fall-Related Injury  Outcome: Ongoing, Progressing  Intervention: Identify and Manage Contributors  Recent Flowsheet Documentation  Taken 1/26/2024 0417 by Jessica Diaz RN  Medication Review/Management: medications reviewed  Self-Care Promotion: independence encouraged  Taken 1/26/2024 0200 by Jessica Diaz RN  Medication Review/Management: medications reviewed  Taken 1/26/2024 0040 by Jessica Diaz RN  Medication Review/Management: medications reviewed  Taken 1/25/2024 2300 by Jessica Diaz RN  Medication Review/Management: medications reviewed  Taken 1/25/2024 2200 by Jessica Diaz RN  Medication Review/Management: medications reviewed  Taken 1/25/2024 2015 by Jessica Diaz RN  Medication Review/Management: medications reviewed  Intervention: Promote Injury-Free Environment  Recent Flowsheet Documentation  Taken 1/26/2024 0417 by Jessica Diaz RN  Safety Promotion/Fall Prevention:   assistive device/personal items within reach   clutter free environment maintained   fall prevention program maintained   lighting adjusted   mobility aid in reach   nonskid shoes/slippers when out of bed   room organization consistent   safety round/check completed  Taken 1/26/2024 0200 by Jessica Diaz, RN  Safety Promotion/Fall Prevention:   assistive device/personal items within reach   clutter free environment maintained   fall prevention program maintained   lighting adjusted   mobility aid in reach   nonskid shoes/slippers when out of bed   room organization consistent   safety round/check  completed  Taken 1/26/2024 0040 by Jessica Diaz RN  Safety Promotion/Fall Prevention:   assistive device/personal items within reach   clutter free environment maintained   fall prevention program maintained   lighting adjusted   mobility aid in reach   nonskid shoes/slippers when out of bed   room organization consistent   safety round/check completed  Taken 1/25/2024 2300 by Jessica Diaz RN  Safety Promotion/Fall Prevention:   assistive device/personal items within reach   clutter free environment maintained   fall prevention program maintained   lighting adjusted   mobility aid in reach   nonskid shoes/slippers when out of bed   room organization consistent   safety round/check completed  Taken 1/25/2024 2200 by Jessica Diaz RN  Safety Promotion/Fall Prevention:   assistive device/personal items within reach   clutter free environment maintained   fall prevention program maintained   lighting adjusted   mobility aid in reach   nonskid shoes/slippers when out of bed   room organization consistent   safety round/check completed  Taken 1/25/2024 2015 by Jessica Diaz RN  Safety Promotion/Fall Prevention:   assistive device/personal items within reach   clutter free environment maintained   fall prevention program maintained   lighting adjusted   mobility aid in reach   nonskid shoes/slippers when out of bed   room organization consistent   safety round/check completed     Problem: Nausea and Vomiting  Goal: Fluid and Electrolyte Balance  Outcome: Ongoing, Progressing  Intervention: Prevent and Manage Nausea and Vomiting  Recent Flowsheet Documentation  Taken 1/26/2024 0417 by Jessica Diaz RN  Environmental Support: calm environment promoted  Taken 1/26/2024 0040 by Jessica Diaz RN  Environmental Support: calm environment promoted  Taken 1/25/2024 2015 by Jessica Diaz RN  Environmental Support: calm environment promoted     Problem: Pain Acute  Goal: Acceptable Pain Control and Functional  Ability  Outcome: Ongoing, Progressing  Intervention: Prevent or Manage Pain  Recent Flowsheet Documentation  Taken 1/26/2024 0417 by Jessica Diaz RN  Medication Review/Management: medications reviewed  Taken 1/26/2024 0200 by Jessica Diaz RN  Medication Review/Management: medications reviewed  Taken 1/26/2024 0040 by Jessica Diaz RN  Medication Review/Management: medications reviewed  Taken 1/25/2024 2300 by Jessica Diaz RN  Medication Review/Management: medications reviewed  Taken 1/25/2024 2200 by Jessica Diaz RN  Medication Review/Management: medications reviewed  Taken 1/25/2024 2015 by Jessica Diaz RN  Medication Review/Management: medications reviewed  Intervention: Optimize Psychosocial Wellbeing  Recent Flowsheet Documentation  Taken 1/26/2024 0417 by Jessica Diaz RN  Diversional Activities:   television   smartphone  Taken 1/26/2024 0040 by Jessica Diaz RN  Diversional Activities:   television   smartphone  Taken 1/25/2024 2015 by Jessica Diaz RN  Diversional Activities:   television   smartphone   Goal Outcome Evaluation:

## 2024-01-26 NOTE — OUTREACH NOTE
Prep Survey      Flowsheet Row Responses   [facility] facility patient discharged from? [facility]   Is LACE score < 7 ? No   Eligibility Readm Mgmt   Discharge diagnosis New onset atrial fibrillation-Left heart cath this visit   Does the patient have one of the following disease processes/diagnoses(primary or secondary)? Other   Does the patient have Home health ordered? No   Is there a DME ordered? No   Prep survey completed? Yes            MAURO TAM - Registered Nurse

## 2024-01-26 NOTE — PROGRESS NOTES
"NEPHROLOGY PROGRESS NOTE------[facility]    [facility]  [phone]  Arpita Duarte MD      Patient Care Team:  Luan Joseph FNP as PCP - General (Family Medicine)  Jozef Otero MD as Consulting Physician (Nephrology)      Provider:  Arpita Duarte MD  Patient Name: Laura Mejia  :  1955    SUBJECTIVE:    F/U ARF/TRACEY/CRF/CKD    Comfortable. No SOB, CP, dysuria, palpitations.       Medication:  aspirin, 81 mg, Oral, Daily  atorvastatin, 80 mg, Oral, Nightly  carvedilol, 3.125 mg, Oral, BID With Meals  cephalexin, 500 mg, Oral, 4x Daily  dilTIAZem CD, 240 mg, Oral, Q24H  epoetin ally/ally-epbx, 10,000 Units, Subcutaneous, Once  levothyroxine, 88 mcg, Oral, Q AM  midodrine, 10 mg, Oral, Q8H  oseltamivir, 75 mg, Oral, Q12H  pantoprazole, 40 mg, Oral, Q AM  PARoxetine, 40 mg, Oral, QAM  potassium chloride, 20 mEq, Oral, Daily  senna-docusate sodium, 2 tablet, Oral, BID  sodium bicarbonate, 650 mg, Oral, TID  sodium chloride, 10 mL, Intravenous, Q12H  sodium chloride, 3 mL, Intravenous, Q12H  topiramate, 50 mg, Oral, Nightly      Pharmacy to dose warfarin,   sodium chloride, 100 mL/hr, Last Rate: 100 mL/hr (24 0657)        OBJECTIVE    Vital Sign Min/Max for last 24 hours  Temp  Min: 97.3 °F (36.3 °C)  Max: 98.1 °F (36.7 °C)   BP  Min: 77/45  Max: 111/53   Pulse  Min: 56  Max: 64   Resp  Min: 11  Max: 17   SpO2  Min: 91 %  Max: 96 %   No data recorded   No data recorded     Flowsheet Rows      Flowsheet Row First Filed Value   Admission Height 172.7 cm (68\") Documented at 2024 1240   Admission Weight 81.6 kg (180 lb) Documented at 2024 1240            I/O this shift:  In: 240 [P.O.:240]  Out: -   I/O last 3 completed shifts:  In: 3851.7 [P.O.:720; I.V.:3131.7]  Out: -     Physical Exam:  General Appearance: alert, appears stated age and cooperative  Head: normocephalic, without obvious abnormality and " "atraumatic  Eyes: conjunctivae and sclerae normal and no icterus  Neck: supple and no JVD  Lungs: clear to auscultation and respirations regular  Heart: IRREG IRREG +MICHAEL  Chest: Wall no abnormalities observed  Abdomen: normal bowel sounds and soft, nontender  Extremities: moves extremities well, no edema, no cyanosis and no redness  Skin: no bleeding, bruising or rash, turgor normal, color normal and no lesions noted  Neurologic: Alert, and oriented. No focal deficits    Labs:    WBC WBC   Date Value Ref Range Status   01/25/2024 7.20 3.40 - 10.80 10*3/mm3 Final   01/24/2024 5.30 3.40 - 10.80 10*3/mm3 Final   01/23/2024 5.80 3.40 - 10.80 10*3/mm3 Final      HGB Hemoglobin   Date Value Ref Range Status   01/25/2024 9.6 (L) 12.0 - 15.9 g/dL Final   01/24/2024 9.9 (L) 12.0 - 15.9 g/dL Final   01/23/2024 10.9 (L) 12.0 - 15.9 g/dL Final      HCT Hematocrit   Date Value Ref Range Status   01/25/2024 29.3 (L) 34.0 - 46.6 % Final   01/24/2024 30.6 (L) 34.0 - 46.6 % Final   01/23/2024 32.9 (L) 34.0 - 46.6 % Final      Platelets No results found for: \"LABPLAT\"   MCV MCV   Date Value Ref Range Status   01/25/2024 90.1 79.0 - 97.0 fL Final   01/24/2024 92.4 79.0 - 97.0 fL Final   01/23/2024 91.6 79.0 - 97.0 fL Final          Sodium Sodium   Date Value Ref Range Status   01/25/2024 142 136 - 145 mmol/L Final   01/24/2024 143 136 - 145 mmol/L Final   01/23/2024 139 136 - 145 mmol/L Final      Potassium Potassium   Date Value Ref Range Status   01/25/2024 3.8 3.5 - 5.2 mmol/L Final   01/25/2024 3.4 (L) 3.5 - 5.2 mmol/L Final   01/24/2024 3.5 3.5 - 5.2 mmol/L Final   01/23/2024 3.8 3.5 - 5.2 mmol/L Final     Comment:     Slight hemolysis detected by analyzer. Result may be falsely elevated.      Chloride Chloride   Date Value Ref Range Status   01/25/2024 116 (H) 98 - 107 mmol/L Final   01/24/2024 118 (H) 98 - 107 mmol/L Final   01/23/2024 113 (H) 98 - 107 mmol/L Final      CO2 CO2   Date Value Ref Range Status   01/25/2024 17.0 " "(L) 22.0 - 29.0 mmol/L Final   01/24/2024 18.0 (L) 22.0 - 29.0 mmol/L Final   01/23/2024 17.0 (L) 22.0 - 29.0 mmol/L Final      BUN BUN   Date Value Ref Range Status   01/25/2024 14 8 - 23 mg/dL Final   01/24/2024 12 8 - 23 mg/dL Final   01/23/2024 19 8 - 23 mg/dL Final      Creatinine Creatinine   Date Value Ref Range Status   01/25/2024 0.98 0.57 - 1.00 mg/dL Final   01/24/2024 1.00 0.57 - 1.00 mg/dL Final   01/23/2024 1.27 (H) 0.57 - 1.00 mg/dL Final      Calcium Calcium   Date Value Ref Range Status   01/25/2024 8.2 (L) 8.6 - 10.5 mg/dL Final   01/24/2024 8.2 (L) 8.6 - 10.5 mg/dL Final   01/23/2024 8.2 (L) 8.6 - 10.5 mg/dL Final      PO4 No components found for: \"PO4\"   Albumin Albumin   Date Value Ref Range Status   01/25/2024 3.1 (L) 3.5 - 5.2 g/dL Final   01/24/2024 3.1 (L) 3.5 - 5.2 g/dL Final      Magnesium Magnesium   Date Value Ref Range Status   01/25/2024 1.8 1.6 - 2.4 mg/dL Final   01/24/2024 2.0 1.6 - 2.4 mg/dL Final   01/24/2024 2.0 1.6 - 2.4 mg/dL Final   01/23/2024 2.0 1.6 - 2.4 mg/dL Final      Uric Acid No components found for: \"URIC ACID\"     Imaging Results (Last 72 Hours)       Procedure Component Value Units Date/Time    US Aorta Limited [[id number]] Collected: 01/23/24 0723     Updated: 01/23/24 0730    Narrative:      US AORTA LIMITED    Date of Exam: 1/23/2024 5:50 AM EST    Indication: Follow-up abdominal aortic aneurysm, status post repair..    Comparison: No comparisons available.    Technique: Routine gray scale, color flow and spectral Doppler analysis of the abdominal aorta and proximal common iliac arteries was performed.      Findings:  The patient has a abdominal aortic aneurysm with a visible stent graft. The proximal abdominal aorta measures 3.7 x 3.3 cm. The endograft at this level measures 1.6 x 1.8 cm. The mid abdominal aorta measures 2.7 x 2.3 cm. The endograft measures 1.1 x 1.8   cm at this level. The distal abdominal aorta including the endograft have a diameter of 2.6 x " 2.8 cm. There is no visible color Doppler flow in the thrombosed aneurysm sac. There is antegrade color Doppler flow within the endograft. The right common   iliac artery has a maximum AP diameter of 1.2 cm. The left common iliac artery has a maximum AP diameter of 1.1 cm. There is antegrade color Doppler flow in both common iliac arteries.        Impression:      Impression:  Status post placement of a stent graft. There is normal antegrade color Doppler flow including the common iliac arteries. There is thrombus within the aneurysm sac. Dimensions are discussed in detail above.        Electronically Signed: Zheng Doe MD    1/23/2024 7:28 AM EST    Workstation ID: [id number]            Results for orders placed during the hospital encounter of 01/22/24    XR Chest 1 View    Narrative  XR CHEST 1 VW    Date of Exam: 1/22/2024 1:40 PM EST    Indication: Weakness Short of breath    Comparison: PA and lateral chest radiograph 9/12/2017    Findings:  The lungs are clear. The heart size is within normal limits with signs of prior median sternotomy and CABG. Left chest wall pacemaker has been placed since the prior examination with a single lead extending to the expected location of the right  ventricle. Pulmonary vascular distribution is normal without evidence of edema. No pleural effusion, pneumothorax or acute osseous abnormality. Right shoulder replacement changes are new since the prior chest x-ray.    Impression  Impression:  No acute chest finding.      Electronically Signed: Amber Candelaria MD  1/22/2024 3:07 PM EST  Workstation ID: [id number]      Results for orders placed during the hospital encounter of 09/08/23    XR Spine Lumbar Complete 4+VW    Narrative  XR SPINE LUMBAR COMPLETE 4+VW, XR SPINE THORACIC 3 VW    Date of Exam: 9/8/2023 10:25 AM EDT    Indication: low back pain upper and lower back pain    Comparison: None available.    Findings:  Lumbar spine with oblique views: There is moderately severe  central and anterior wedging of L1 which appears old. There is mild narrowing of the T12-L1 and L1-2 disc interspaces. There is slight retrolisthesis of L1 on L2 and of L2 on L3. There is mild  narrowing of the L2-3 disc. There is moderate narrowing of the L4-5 disc with grade 1 spondylolisthesis of L4 on L5. There are no definite pars defects. There is a radiopaque aortic biiliac stent.    Thoracic spine: There is a mild right convexity scoliosis. There is severe central and anterior wedging of T7 and T8. There is old mild wedging of T11 and T12. There is moderate narrowing of mid and lower thoracic disc interspaces with mild spurring.    Impression  Impression:  Compression deformities of T11-L1 appear most likely old.    Compression deformities of T7 and T8 are of indeterminate age.    Degenerative disc disease as detailed.    Electronically Signed: Monica George MD  9/8/2023 11:04 AM EDT  Workstation ID: [id number]      XR Spine Thoracic 3 View    Narrative  XR SPINE LUMBAR COMPLETE 4+VW, XR SPINE THORACIC 3 VW    Date of Exam: 9/8/2023 10:25 AM EDT    Indication: low back pain upper and lower back pain    Comparison: None available.    Findings:  Lumbar spine with oblique views: There is moderately severe central and anterior wedging of L1 which appears old. There is mild narrowing of the T12-L1 and L1-2 disc interspaces. There is slight retrolisthesis of L1 on L2 and of L2 on L3. There is mild  narrowing of the L2-3 disc. There is moderate narrowing of the L4-5 disc with grade 1 spondylolisthesis of L4 on L5. There are no definite pars defects. There is a radiopaque aortic biiliac stent.    Thoracic spine: There is a mild right convexity scoliosis. There is severe central and anterior wedging of T7 and T8. There is old mild wedging of T11 and T12. There is moderate narrowing of mid and lower thoracic disc interspaces with mild spurring.    Impression  Impression:  Compression deformities of T11-L1 appear most  likely old.    Compression deformities of T7 and T8 are of indeterminate age.    Degenerative disc disease as detailed.    Electronically Signed: Monica George MD  9/8/2023 11:04 AM EDT  Workstation ID: [id number]            ASSESSMENT / PLAN      New onset atrial fibrillation    Near syncope    S/P CABG (coronary artery bypass graft)    Abnormal nuclear stress test    TRACEY--likely pre-renal in setting of hypotension and new onset a fib causing reduced renal perfusion  CKD stage 3a--CKD due to nephrosclerosis  Hx CAD s/p CABG  New onset a fib--per cards  UTI--Cx growing E. coli.  On ceftriaxone  Hx PE  Anemia--TSAT 18.     Creatinine stable, K+ resplaced  Keep on midodrine to 10 mg p.o. 3 times daily for hypotension  Had abnormal stress test, cardiology contemplating heart cath.    Iron and EPO for anemia    To f/u in the office in 2 weeks           Arpita Duarte MD  [facility]  [phone]  01/26/24  06:58 EST

## 2024-01-26 NOTE — PROGRESS NOTES
Referring Provider: Keke Casiano MD    Reason for follow-up:  Atrial fibrillation  Status post CABG  Status post ICD  Near syncope     Patient Care Team:  Luan Joseph FNP as PCP - General (Family Medicine)  Jozef Otero MD as Consulting Physician (Nephrology)    Subjective .      ROS    Since I have last seen, the patient has been without any chest discomfort ,shortness of breath, palpitations, dizziness or syncope.  Denies having any headache ,abdominal pain ,nausea, vomiting , diarrhea constipation, loss of weight or loss of appetite.  Denies having any excessive bruising ,hematuria or blood in the stool.    Review of all systems negative except as indicated    History  Past Medical History:   Diagnosis Date    AAA (abdominal aortic aneurysm)     infrarenal- 3.1cm(2010, 3.7x4.2cm(2016), 3.9cm (2017)    Allergic rhinitis     Aspiration pneumonia 05/2017    CHF (congestive heart failure)     Coronary artery disease     DDD (degenerative disc disease), lumbar     lumbar spine- Dr. Churchill     Depression     Diverticulosis     Frequent UTI     Dr. Daniels    GERD (gastroesophageal reflux disease)     Hiatal hernia     HSV (herpes simplex virus) infection     Oral    Hyperlipidemia     IBS (irritable bowel syndrome)     Constipation predominant    Ischemic cardiomyopathy 09/2017    AICD     Kidney stones     NSTEMI (non-ST elevated myocardial infarction) 04/26/2017    Obstructive sleep apnea 2011    nfsg 2011, ahi 67    Osteoarthritis     Osteopenia     Peripheral neuropathy     feet    Pulmonary embolism, bilateral 05/2017    Rotator cuff tear     Bilateral- Ortho        Past Surgical History:   Procedure Laterality Date    CARDIAC CATHETERIZATION  04/26/2017    99% mid LAD. 90% mid LCX. 60% RCA. Recommended CABG. Dr. Diaz    CARDIAC DEFIBRILLATOR PLACEMENT  12/26/2017    ICD implant/ Dr. Ellis    CATARACT EXTRACTION      CORONARY ARTERY BYPASS GRAFT  04/26/2017    x4 & ASD Closure    CYSTOSCOPY  2002     negative. Dr. Daniels    LAPAROSCOPIC CHOLECYSTECTOMY  04/17/2013    For biliary dyskinesia. Dr. Mccoy.     REPLACEMENT TOTAL KNEE BILATERAL  11/2004    Dr. Herrera    THORACENTESIS Left 05/08/2017    TONSILLECTOMY      TUBAL ABDOMINAL LIGATION Bilateral        Family History   Problem Relation Age of Onset    Heart disease Mother     Other Mother         Hypoglycemia    Osteoporosis Mother     COPD Father     Stroke Father     Hypertension Brother     Diabetes Brother     Heart disease Brother        Social History     Tobacco Use    Smoking status: Never   Vaping Use    Vaping Use: Some days    Substances: CBD, nightly, 2-3 weekly   Substance Use Topics    Drug use: Never        Medications Prior to Admission   Medication Sig Dispense Refill Last Dose    aspirin 81 MG chewable tablet Chew 1 tablet Daily.       atorvastatin (LIPITOR) 80 MG tablet Take 1 tablet by mouth Daily.       carvedilol (COREG) 3.125 MG tablet Take 1 tablet by mouth 2 (Two) Times a Day With Meals.       ezetimibe (ZETIA) 10 MG tablet Take 1 tablet by mouth Daily.       famciclovir (FAMVIR) 500 MG tablet Take 1 tablet by mouth 3 (Three) Times a Day As Needed.       furosemide (LASIX) 40 MG tablet Take 1 tablet by mouth Daily As Needed.       levocetirizine (XYZAL) 5 MG tablet Take 0.5 tablets by mouth Every Evening.       levothyroxine (SYNTHROID, LEVOTHROID) 75 MCG tablet Take 1 tablet by mouth Daily.       omeprazole (priLOSEC) 40 MG capsule Take 1 capsule by mouth Daily.       PARoxetine (PAXIL) 40 MG tablet Take 1 tablet by mouth Every Morning.       potassium chloride (KLOR-CON) 20 MEQ packet Take 20 mEq by mouth Daily.       topiramate (TOPAMAX) 50 MG tablet Take 1 tablet by mouth Daily.       trospium (SANCTURA) 20 MG tablet Take 1 tablet by mouth Every Night.       warfarin (COUMADIN) 3 MG tablet Take 2 tablets by mouth 3 (Three) Times a Week. Take 2 tablet by mouth on Monday, Wednesday, and Friday       warfarin (COUMADIN) 3 MG tablet See  "Admin Instructions. Take 1 tablet by mouth on Tuesday, Thursday, Saturday, and Sunday          Allergies  Oxytetracycline and Oxycodone    Scheduled Meds:aspirin, 81 mg, Oral, Daily  atorvastatin, 80 mg, Oral, Nightly  carvedilol, 3.125 mg, Oral, BID With Meals  cephalexin, 500 mg, Oral, 4x Daily  dilTIAZem CD, 240 mg, Oral, Q24H  epoetin ally/ally-epbx, 10,000 Units, Subcutaneous, Once  levothyroxine, 88 mcg, Oral, Q AM  midodrine, 10 mg, Oral, Q8H  oseltamivir, 75 mg, Oral, Q12H  pantoprazole, 40 mg, Oral, Q AM  PARoxetine, 40 mg, Oral, QAM  potassium chloride, 20 mEq, Oral, Daily  senna-docusate sodium, 2 tablet, Oral, BID  sodium bicarbonate, 650 mg, Oral, TID  sodium chloride, 10 mL, Intravenous, Q12H  sodium chloride, 3 mL, Intravenous, Q12H  topiramate, 50 mg, Oral, Nightly      Continuous Infusions:Pharmacy to dose warfarin,   sodium chloride, 100 mL/hr      PRN Meds:.  senna-docusate sodium **AND** polyethylene glycol **AND** bisacodyl **AND** bisacodyl    butalbital-acetaminophen-caffeine    melatonin    nitroglycerin    ondansetron    ondansetron    Pharmacy to dose warfarin    prochlorperazine    [COMPLETED] Insert Peripheral IV **AND** sodium chloride    sodium chloride    sodium chloride    sodium chloride    sodium chloride    Objective     VITAL SIGNS  Vitals:    01/25/24 2112 01/25/24 2324 01/26/24 0417 01/26/24 0618   BP: 102/55 104/53 91/54 97/56   BP Location:  Left arm Left arm    Patient Position:  Lying Lying    Pulse: 58 56 58 60   Resp:  14 12    Temp:  98 °F (36.7 °C) 97.5 °F (36.4 °C)    TempSrc:  Oral Oral    SpO2:  93% 93%    Weight:       Height:           Flowsheet Rows      Flowsheet Row First Filed Value   Admission Height 172.7 cm (68\") Documented at 01/22/2024 1240   Admission Weight 81.6 kg (180 lb) Documented at 01/22/2024 1240              Intake/Output Summary (Last 24 hours) at 1/26/2024 0645  Last data filed at 1/25/2024 2015  Gross per 24 hour   Intake 960 ml   Output -- "   Net 960 ml        TELEMETRY: Sinus rhythm.    Physical Exam:  The patient is alert, oriented and in no distress.  Vital signs as noted above.  Head and neck revealed no carotid bruits or jugular venous distention.  No thyromegaly or lymphadenopathy is present  Lungs clear.  No wheezing.  Breath sounds are normal bilaterally.  Heart normal first and second heart sounds.  No murmur. No precordial rub is present.  No gallop is present.  Abdomen soft and nontender.  No organomegaly is present.  Extremities with good peripheral pulses without any pedal edema.  Skin warm and dry.  ICD site looks normal.  Musculoskeletal system is grossly normal  CNS grossly normal    Reviewed and updated.    Results Review:   I reviewed the patient's new clinical results.  Lab Results (last 24 hours)       Procedure Component Value Units Date/Time    Potassium [[id number]]  (Normal) Collected: 01/25/24 0814    Specimen: Blood Updated: 01/25/24 0903     Potassium 3.8 mmol/L             Imaging Results (Last 24 Hours)       ** No results found for the last 24 hours. **        LAB RESULTS (LAST 7 DAYS)    CBC  Results from last 7 days   Lab Units 01/25/24  0014 01/24/24  0549 01/23/24  1446 01/22/24  1355   WBC 10*3/mm3 7.20 5.30 5.80 9.50   RBC 10*6/mm3 3.26* 3.31* 3.59* 4.48   HEMOGLOBIN g/dL 9.6* 9.9* 10.9* 13.4   HEMATOCRIT % 29.3* 30.6* 32.9* 41.6   MCV fL 90.1 92.4 91.6 92.7   PLATELETS 10*3/mm3 127* 109* 142 230       BMP  Results from last 7 days   Lab Units 01/25/24  0814 01/25/24  0014 01/24/24  1724 01/24/24  0549 01/23/24  1446 01/22/24  1355   SODIUM mmol/L  --  142  --  143 139 140   POTASSIUM mmol/L 3.8 3.4*  --  3.5 3.8 3.7   CHLORIDE mmol/L  --  116*  --  118* 113* 108*   CO2 mmol/L  --  17.0*  --  18.0* 17.0* 19.0*   BUN mg/dL  --  14  --  12 19 24*   CREATININE mg/dL  --  0.98  --  1.00 1.27* 1.70*   GLUCOSE mg/dL  --  114*  --  98 83 95   MAGNESIUM mg/dL  --  1.8 2.0 2.0 2.0 2.2   PHOSPHORUS mg/dL  --  2.9  --  2.8  --    --        CMP   Results from last 7 days   Lab Units 01/25/24  0814 01/25/24  0014 01/24/24  0549 01/23/24  1446 01/22/24  1355   SODIUM mmol/L  --  142 143 139 140   POTASSIUM mmol/L 3.8 3.4* 3.5 3.8 3.7   CHLORIDE mmol/L  --  116* 118* 113* 108*   CO2 mmol/L  --  17.0* 18.0* 17.0* 19.0*   BUN mg/dL  --  14 12 19 24*   CREATININE mg/dL  --  0.98 1.00 1.27* 1.70*   GLUCOSE mg/dL  --  114* 98 83 95   ALBUMIN g/dL  --  3.1* 3.1*  --  4.2   BILIRUBIN mg/dL  --   --   --   --  0.3   ALK PHOS U/L  --   --   --   --  135*   AST (SGOT) U/L  --   --   --   --  37*   ALT (SGPT) U/L  --   --   --   --  28         BNP        TROPONIN  Results from last 7 days   Lab Units 01/24/24  1724 01/24/24  0549 01/22/24  1355   CK TOTAL U/L  --   --  38   HSTROP T ng/L 12   < > 16*    < > = values in this interval not displayed.       CoAg  Results from last 7 days   Lab Units 01/25/24  0014 01/24/24  1724 01/23/24  1446 01/22/24  1608   INR  2.98* 2.74 2.03 1.51*       Creatinine Clearance  Estimated Creatinine Clearance: 60.7 mL/min (by C-G formula based on SCr of 0.98 mg/dL).    ABG        Radiology  No radiology results for the last day        EKG                            I personally viewed and interpreted the patient's EKG/Telemetry data: Atrial fibrillation followed by sinus rhythm.      ECHOCARDIOGRAM:    Results for orders placed during the hospital encounter of 01/22/24    Adult Transthoracic Echo Complete W/ Cont if Necessary Per Protocol    Interpretation Summary    Left ventricular ejection fraction appears to be 56 - 60%.    Estimated right ventricular systolic pressure from tricuspid regurgitation is normal (<35 mmHg).    Indication  Dyspnea  Palpitations    Technically satisfactory study.  Mitral valve is structurally normal.  Tricuspid valve is structurally normal.  Mild tricuspid regurgitation is present.  Aortic valve is aortic valve with decreased opening motion.  Gradient across the aortic valve is 16/9 mmHg.   Valve area 1.37 cm².  Pulmonic valve could not be well visualized.  Left atrium is enlarged.  Right atrium is normal in size.  Left ventricle is enlarged with septal anterior wall and apical severe hypokinesis with ejection fraction of 40%.  Possible left ventricular apical thrombus.  Right ventricle is normal in size.  Atrial septum is intact.  Aorta is normal.  No pericardial effusion or intracardiac thrombus is seen.  ICD lead is present in the right ventricle.    Impression  Mild tricuspid regurgitation.  Mild to moderate aortic valve stenosis with gradient of 16/9 mmHg and valve area of 1.37 cm².  Left atrial enlargement.  Left ventricle is enlarged with septal anterior wall and apical severe hypokinesis with ejection fraction of 40%.  Possible left ventricular apical thrombus.  ICD lead is present in the right ventricle.  No evidence for pulmonary hypertension is present.          STRESS TEST  Results for orders placed during the hospital encounter of 01/22/24    Stress Test With Myocardial Perfusion One Day    Interpretation Summary  Indications  Status post CABG  Atrial fibrillation    This study was performed under the direct supervision of Rohini NOLASCO.    Resting ECG  Sinus rhythm    The patient was injected with Lexiscan intravenously while constantly monitoring electrocardiogram and vital signs.  Patient did not have any chest discomfort ST abnormalities or ectopy with injection of Lexiscan.    Cardiolite was used as an imaging agent.    Cardiolite images showed significantly decreased radionuclide uptake in the proximal posterior segments with partial redistribution in the resting images.    Gated SPECT images revealed normal left ventricle size and diffuse hypocontractility with calculated ejection fraction of 61%.    Impression  ========  Lexiscan Cardiolite test revealed proximal posterior infarction and ischemia.  Gated SPECT images revealed normal left ventricular size and diffuse hypocontractility  with calculated ejection fraction of 61%.        Cardiolite (Tc-99m sestamibi) stress test    CARDIAC CATHETERIZATION  No results found for this or any previous visit.                OTHER:         Assessment & Plan     Principal Problem:    New onset atrial fibrillation  Active Problems:    Near syncope    S/P CABG (coronary artery bypass graft)    Abnormal nuclear stress test      ]]]]]]]]]]]]]]]]]]  Impression   =========  -Recent weakness and palpitations.     - Atrial fibrillation with RVR.  Has converted to sinus rhythm with intravenous Cardizem.  Patient had postop atrial fibrillation after she had CABG in 2017.     - Status post CABG and ASD closure 2017     - Ischemic cardiomyopathy     - Status post ICD (single-chamber)-2017-Nutrisystem.     - Chronic anticoagulation-patient on Coumadin.  Home monitoring.  INR 1.51-1/22/2024-subtherapeutic.     - Recent COVID infection.     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 There is normal antegrade color Doppler flow including the common iliac arteries. There is thrombus within the aneurysm sac. Dimensions are discussed in detail above.     - Family history of coronary artery disease     - Non-smoker     - Allergic to oxytetracycline and oxycodone.     Echocardiogram 1/23/2024 revealed  Mild tricuspid regurgitation.  Mild to moderate aortic valve stenosis with gradient of 16/9 mmHg and valve area of 1.37 cm².  Left atrial enlargement.  Left ventricle is enlarged with septal anterior wall and apical severe hypokinesis with ejection fraction of 40%.  Possible left ventricular apical thrombus.  ICD lead is present in the right ventricle.  No evidence for pulmonary  hypertension is present.  =============  Plan  =============  A-fib with RVR.  Patient has converted to sinus rhythm with intravenous Cardizem.  Patient is on p.o. Cardizem.    Status post CABG  Ischemic cardiomyopathy     Mild to moderate aortic valve stenosis    Echocardiogram 1/23/2024 revealed  Mild tricuspid regurgitation.  Mild to moderate aortic valve stenosis with gradient of 16/9 mmHg and valve area of 1.37 cm².  Left atrial enlargement.  Left ventricle is enlarged with septal anterior wall and apical severe hypokinesis with ejection fraction of 40%.  Possible left ventricular apical thrombus.  ICD lead is present in the right ventricle.  No evidence for pulmonary hypertension is present.     Stress Cardiolite test-1/24/2024  Lexiscan Cardiolite test revealed proximal posterior infarction and ischemia.  Gated SPECT images revealed normal left ventricular size and diffuse hypocontractility with calculated ejection fraction of 61%.    Status post ICD ([employer]) 2017.  Single-chamber.    Anticoagulation  Patient is on midodrine.  INR 2.03.  2.98-1/25/2024  2.13-1/26/2024  Restart Coumadin at 3 mg a day..    Current medications include  Aspirin atorvastatin Coreg subcu therapeutic Lovenox levothyroxine Tamiflu pantoprazole paroxetine potassium supplements.  Cardizem CD.    I had a lengthy discussion with patient and patient's family as well as Dr. Tobin.  Patient would benefit from cardiac catheterization although patient is not having typical anginal symptoms.  Stress Cardiolite test did not show posterior lateral ischemia.  Also patient has CT scan that showed a thrombus in the aneurysmal sac of the abdominal aorta.  Access from the femoral area would increase risk of thromboembolic problems.  Brachial access can be done but would be difficult in the presence of grafts.  We have discussed with patient's daughter also.    At this time I would favor conservative medical treatment and control  dysrhythmia and continuation of anticoagulation with Coumadin.    Follow-up labs ordered.    Follow-up in the office (cardiologist on [facility]) in 2 weeks.     Further plan will depend on patient's progress.    Event updated-1/26/2024  ]]]]]]]]]]]]]]]]]]]]]          Allie Hurley MD  01/26/24  06:45 EST

## 2024-01-26 NOTE — PROGRESS NOTES
"Pharmacy dosing service  Anticoagulant  Warfarin     Subjective:    Laura Mejia is a 68 y.o.female being continued on warfarin for history of PE.    INR Goal: 2 - 3  Home medication?: warfarin 3 mg PO daily, except warfarin 6 mg PO on Mon/Wed/Fri.   Bridge Therapy Present?:  No  Interacting Medications Evaluation (New/Present/Discontinued): n/a  Additional Contributing Factors: Her PCP in [place] was following with her INR. Just moved to [facility] where she is attempting to get established with PCP      Assessment/Plan:    Warfarin held 1/24 and 1/25 due to large increase in INR. Dr Hurley resumed warfarin today at 3 mg daily.    Continue to monitor and adjust based on INR.         Date 1/22 1/23 1/24 1/25 1/26       INR 1.51 2.03 2.74 2.98 2.13       Dose 9 mg 1.5 mg hold hold 3 mg            Objective:  [Ht: 172.7 cm (68\"); Wt: 81.6 kg (180 lb); BMI: Body mass index is 27.37 kg/m².]    Lab Results   Component Value Date    ALBUMIN 3.2 (L) 01/26/2024     Lab Results   Component Value Date    INR 2.13 01/26/2024    INR 2.98 (C) 01/25/2024    INR 2.74 01/24/2024    PROTIME 22.0 01/26/2024    PROTIME 30.0 (H) 01/25/2024    PROTIME 27.7 01/24/2024     Lab Results   Component Value Date    HGB 10.2 (L) 01/26/2024    HGB 9.6 (L) 01/25/2024    HGB 9.9 (L) 01/24/2024     Lab Results   Component Value Date    HCT 31.6 (L) 01/26/2024    HCT 29.3 (L) 01/25/2024    HCT 30.6 (L) 01/24/2024     Holli Flores, PharmD  01/26/24 12:34 EST         "

## 2024-01-26 NOTE — CASE MANAGEMENT/SOCIAL WORK
Continued Stay Note  SHELLEY Davey     Patient Name: Laura Mejia  MRN: [id number]  Today's Date: 1/26/2024    Admit Date: 1/22/2024    Plan: Anticipate routine home   Discharge Plan       Row Name 01/26/24 0947       Plan    Plan Anticipate routine home    Plan Comments DC Barrier: Cardiac cath 1/26, IV abx, cardiology following             Expected Discharge Date and Time       Expected Discharge Date Expected Discharge Time    Jan 27, 2024         Marnie Cervantes RN    phone [phone]  fax [phone]

## 2024-01-30 ENCOUNTER — READMISSION MANAGEMENT (OUTPATIENT)
Dept: CALL CENTER | Facility: HOSPITAL | Age: 69
End: 2024-01-30
Payer: MEDICARE

## 2024-01-30 NOTE — OUTREACH NOTE
Medical Week 1 Survey      Flowsheet Row Responses   [facility] patient discharged from? [facility]   Does the patient have one of the following disease processes/diagnoses(primary or secondary)? Other   Week 1 attempt successful? Yes   Call start time 0818   Call end time 0822   Meds reviewed with patient/caregiver? Yes   Is the patient having any side effects they believe may be caused by any medication additions or changes? No   Does the patient have all medications ordered at discharge? Yes   Is the patient taking all medications as directed (includes completed medication regime)? Yes   Comments regarding appointments PCP appt 02/06/24.   Does the patient have a primary care provider?  Yes   Does the patient have an appointment with their PCP within 7 days of discharge? Yes   Has the patient kept scheduled appointments due by today? N/A   Has home health visited the patient within 72 hours of discharge? N/A   Psychosocial issues? No   Did the patient receive a copy of their discharge instructions? Yes   Nursing interventions Reviewed instructions with patient   What is the patient's perception of their health status since discharge? Improving   Is the patient/caregiver able to teach back signs and symptoms related to disease process for when to call PCP? Yes   Is the patient/caregiver able to teach back signs and symptoms related to disease process for when to call 911? Yes   Is the patient/caregiver able to teach back the hierarchy of who to call/visit for symptoms/problems? PCP, Specialist, Home health nurse, Urgent Care, ED, 911 Yes   If the patient is a current smoker, are they able to teach back resources for cessation? Not a smoker   Week 1 call completed? Yes   Graduated Yes   Would this patient benefit from a Referral to Amb Social Work? No   Is the patient interested in additional calls from an ambulatory ? No   Wrap up additional comments Patient states is doing well. States HR remains in  rhythm, and monitoring closely. Denies any needs or concerns today.   Call end time [id number]            Lucía GOMES - Registered Nurse

## 2024-02-08 ENCOUNTER — TELEPHONE (OUTPATIENT)
Dept: CARDIOLOGY | Facility: CLINIC | Age: 69
End: 2024-02-08
Payer: MEDICARE

## 2024-02-08 NOTE — TELEPHONE ENCOUNTER
CALLED PATIENT.     SHE HAS [employer] PPO. I ADVISED SHE CALL [employer] AND SEE IF SHE HAS OUT OF NETWORK BENEFITS PRIOR TO SCHEDULING.     SHE WAS CONSULTED BY DR. BENAVIDES, BUT WOULD BE NEW PATIENT FOR DR. PUENTES.

## 2024-02-08 NOTE — TELEPHONE ENCOUNTER
"  Caller: Laura Mejia \"Annie\"    Relationship to patient: Self    Best call back number: [phone]    Chief complaint: CONSULTATION WAS COMPLETED WITH DR CALLOWAY    Type of visit: HOSPITAL FOLLOW UP    Requested date: 2 WEEKS     If rescheduling, when is the original appointment: NA     Additional notes:PATIENT STATES SHE SPOKE WITH YOU IN REGARDS TO BEING A NEW PATIENT. PLEASE REACH OUT TO PATIENT FOR SCHEDULING.    "

## 2024-05-16 ENCOUNTER — APPOINTMENT (OUTPATIENT)
Dept: CT IMAGING | Facility: HOSPITAL | Age: 69
End: 2024-05-16
Payer: MEDICARE

## 2024-05-16 ENCOUNTER — HOSPITAL ENCOUNTER (INPATIENT)
Facility: HOSPITAL | Age: 69
LOS: 5 days | Discharge: HOME | End: 2024-05-22
Attending: EMERGENCY MEDICINE | Admitting: FAMILY MEDICINE
Payer: MEDICARE

## 2024-05-16 ENCOUNTER — APPOINTMENT (OUTPATIENT)
Dept: GENERAL RADIOLOGY | Facility: HOSPITAL | Age: 69
End: 2024-05-16
Payer: MEDICARE

## 2024-05-16 DIAGNOSIS — R41.82 ALTERED MENTAL STATUS, UNSPECIFIED ALTERED MENTAL STATUS TYPE: Primary | ICD-10-CM

## 2024-05-16 LAB
ANION GAP SERPL CALCULATED.3IONS-SCNC: 13 MMOL/L (ref 5–15)
BASOPHILS # BLD AUTO: 0.06 10*3/MM3 (ref 0–0.2)
BASOPHILS # BLD AUTO: 0.07 10*3/MM3 (ref 0–0.2)
BASOPHILS NFR BLD AUTO: 1.4 % (ref 0–1.5)
BASOPHILS NFR BLD AUTO: 1.4 % (ref 0–1.5)
BILIRUB UR QL STRIP: NEGATIVE
BUN SERPL-MCNC: 13 MG/DL (ref 8–23)
BUN/CREAT SERPL: 11.6 (ref 7–25)
CALCIUM SPEC-SCNC: 9.4 MG/DL (ref 8.6–10.5)
CHLORIDE SERPL-SCNC: 103 MMOL/L (ref 98–107)
CLARITY UR: CLEAR
CO2 SERPL-SCNC: 20 MMOL/L (ref 22–29)
COLOR UR: YELLOW
CREAT SERPL-MCNC: 1.12 MG/DL (ref 0.57–1)
DEPRECATED RDW RBC AUTO: 51.4 FL (ref 37–54)
DEPRECATED RDW RBC AUTO: 52.2 FL (ref 37–54)
EGFRCR SERPLBLD CKD-EPI 2021: 53.3 ML/MIN/1.73
EOSINOPHIL # BLD AUTO: 0.03 10*3/MM3 (ref 0–0.4)
EOSINOPHIL # BLD AUTO: 0.06 10*3/MM3 (ref 0–0.4)
EOSINOPHIL NFR BLD AUTO: 0.6 % (ref 0.3–6.2)
EOSINOPHIL NFR BLD AUTO: 1.4 % (ref 0.3–6.2)
ERYTHROCYTE [DISTWIDTH] IN BLOOD BY AUTOMATED COUNT: 14.3 % (ref 12.3–15.4)
ERYTHROCYTE [DISTWIDTH] IN BLOOD BY AUTOMATED COUNT: 14.3 % (ref 12.3–15.4)
GLUCOSE SERPL-MCNC: 104 MG/DL (ref 65–99)
GLUCOSE UR STRIP-MCNC: NEGATIVE MG/DL
HCT VFR BLD AUTO: 37 % (ref 34–46.6)
HCT VFR BLD AUTO: 41.9 % (ref 34–46.6)
HGB BLD-MCNC: 11.7 G/DL (ref 12–15.9)
HGB BLD-MCNC: 13.3 G/DL (ref 12–15.9)
HGB UR QL STRIP.AUTO: NEGATIVE
HOLD SPECIMEN: NORMAL
HOLD SPECIMEN: NORMAL
IMM GRANULOCYTES # BLD AUTO: 0.05 10*3/MM3 (ref 0–0.05)
IMM GRANULOCYTES # BLD AUTO: 0.05 10*3/MM3 (ref 0–0.05)
IMM GRANULOCYTES NFR BLD AUTO: 1 % (ref 0–0.5)
IMM GRANULOCYTES NFR BLD AUTO: 1.2 % (ref 0–0.5)
INR PPP: 1.42 (ref 2–3)
KETONES UR QL STRIP: NEGATIVE
LEUKOCYTE ESTERASE UR QL STRIP.AUTO: NEGATIVE
LYMPHOCYTES # BLD AUTO: 0.32 10*3/MM3 (ref 0.7–3.1)
LYMPHOCYTES # BLD AUTO: 0.49 10*3/MM3 (ref 0.7–3.1)
LYMPHOCYTES NFR BLD AUTO: 11.8 % (ref 19.6–45.3)
LYMPHOCYTES NFR BLD AUTO: 6.6 % (ref 19.6–45.3)
MCH RBC QN AUTO: 31.1 PG (ref 26.6–33)
MCH RBC QN AUTO: 31.1 PG (ref 26.6–33)
MCHC RBC AUTO-ENTMCNC: 31.6 G/DL (ref 31.5–35.7)
MCHC RBC AUTO-ENTMCNC: 31.7 G/DL (ref 31.5–35.7)
MCV RBC AUTO: 98.1 FL (ref 79–97)
MCV RBC AUTO: 98.4 FL (ref 79–97)
MONOCYTES # BLD AUTO: 0.22 10*3/MM3 (ref 0.1–0.9)
MONOCYTES # BLD AUTO: 0.35 10*3/MM3 (ref 0.1–0.9)
MONOCYTES NFR BLD AUTO: 4.5 % (ref 5–12)
MONOCYTES NFR BLD AUTO: 8.4 % (ref 5–12)
NEUTROPHILS NFR BLD AUTO: 3.16 10*3/MM3 (ref 1.7–7)
NEUTROPHILS NFR BLD AUTO: 4.17 10*3/MM3 (ref 1.7–7)
NEUTROPHILS NFR BLD AUTO: 75.8 % (ref 42.7–76)
NEUTROPHILS NFR BLD AUTO: 85.9 % (ref 42.7–76)
NITRITE UR QL STRIP: NEGATIVE
NRBC BLD AUTO-RTO: 0 /100 WBC (ref 0–0.2)
NRBC BLD AUTO-RTO: 0 /100 WBC (ref 0–0.2)
PH UR STRIP.AUTO: <=5 [PH] (ref 5–8)
PLATELET # BLD AUTO: 184 10*3/MM3 (ref 140–450)
PLATELET # BLD AUTO: 208 10*3/MM3 (ref 140–450)
PMV BLD AUTO: 9.6 FL (ref 6–12)
PMV BLD AUTO: 9.8 FL (ref 6–12)
POTASSIUM SERPL-SCNC: 4 MMOL/L (ref 3.5–5.2)
PROT UR QL STRIP: NEGATIVE
PROTHROMBIN TIME: 15.1 SECONDS (ref 19.4–28.5)
RBC # BLD AUTO: 3.76 10*6/MM3 (ref 3.77–5.28)
RBC # BLD AUTO: 4.27 10*6/MM3 (ref 3.77–5.28)
SODIUM SERPL-SCNC: 136 MMOL/L (ref 136–145)
SP GR UR STRIP: 1.01 (ref 1–1.03)
UROBILINOGEN UR QL STRIP: NORMAL
WBC NRBC COR # BLD AUTO: 4.17 10*3/MM3 (ref 3.4–10.8)
WBC NRBC COR # BLD AUTO: 4.86 10*3/MM3 (ref 3.4–10.8)
WHOLE BLOOD HOLD COAG: NORMAL
WHOLE BLOOD HOLD SPECIMEN: NORMAL

## 2024-05-16 PROCEDURE — P9612 CATHETERIZE FOR URINE SPEC: HCPCS

## 2024-05-16 PROCEDURE — 83540 ASSAY OF IRON: CPT | Performed by: FAMILY MEDICINE

## 2024-05-16 PROCEDURE — 25010000002 ONDANSETRON PER 1 MG: Performed by: EMERGENCY MEDICINE

## 2024-05-16 PROCEDURE — 80048 BASIC METABOLIC PNL TOTAL CA: CPT

## 2024-05-16 PROCEDURE — 81003 URINALYSIS AUTO W/O SCOPE: CPT | Performed by: EMERGENCY MEDICINE

## 2024-05-16 PROCEDURE — 85025 COMPLETE CBC W/AUTO DIFF WBC: CPT | Performed by: EMERGENCY MEDICINE

## 2024-05-16 PROCEDURE — 70450 CT HEAD/BRAIN W/O DYE: CPT

## 2024-05-16 PROCEDURE — 99285 EMERGENCY DEPT VISIT HI MDM: CPT

## 2024-05-16 PROCEDURE — 85610 PROTHROMBIN TIME: CPT | Performed by: EMERGENCY MEDICINE

## 2024-05-16 PROCEDURE — 84466 ASSAY OF TRANSFERRIN: CPT | Performed by: FAMILY MEDICINE

## 2024-05-16 PROCEDURE — 85025 COMPLETE CBC W/AUTO DIFF WBC: CPT

## 2024-05-16 PROCEDURE — 71045 X-RAY EXAM CHEST 1 VIEW: CPT

## 2024-05-16 PROCEDURE — 80048 BASIC METABOLIC PNL TOTAL CA: CPT | Performed by: EMERGENCY MEDICINE

## 2024-05-16 RX ORDER — ACETAMINOPHEN 325 MG/1
650 TABLET ORAL EVERY 4 HOURS PRN
Status: DISCONTINUED | OUTPATIENT
Start: 2024-05-16 | End: 2024-05-22 | Stop reason: HOSPADM

## 2024-05-16 RX ORDER — NITROGLYCERIN 0.4 MG/1
0.4 TABLET SUBLINGUAL
Status: DISCONTINUED | OUTPATIENT
Start: 2024-05-16 | End: 2024-05-22 | Stop reason: HOSPADM

## 2024-05-16 RX ORDER — SODIUM CHLORIDE 0.9 % (FLUSH) 0.9 %
10 SYRINGE (ML) INJECTION AS NEEDED
Status: DISCONTINUED | OUTPATIENT
Start: 2024-05-16 | End: 2024-05-22 | Stop reason: HOSPADM

## 2024-05-16 RX ORDER — FAMOTIDINE 20 MG/1
40 TABLET, FILM COATED ORAL DAILY
Status: DISCONTINUED | OUTPATIENT
Start: 2024-05-17 | End: 2024-05-17

## 2024-05-16 RX ORDER — ONDANSETRON 2 MG/ML
4 INJECTION INTRAMUSCULAR; INTRAVENOUS ONCE
Status: COMPLETED | OUTPATIENT
Start: 2024-05-16 | End: 2024-05-16

## 2024-05-16 RX ORDER — SODIUM CHLORIDE 0.9 % (FLUSH) 0.9 %
10 SYRINGE (ML) INJECTION EVERY 12 HOURS SCHEDULED
Status: DISCONTINUED | OUTPATIENT
Start: 2024-05-16 | End: 2024-05-22 | Stop reason: HOSPADM

## 2024-05-16 RX ORDER — SODIUM CHLORIDE 9 MG/ML
40 INJECTION, SOLUTION INTRAVENOUS AS NEEDED
Status: DISCONTINUED | OUTPATIENT
Start: 2024-05-16 | End: 2024-05-22 | Stop reason: HOSPADM

## 2024-05-16 RX ORDER — BISACODYL 10 MG
10 SUPPOSITORY, RECTAL RECTAL DAILY PRN
Status: DISCONTINUED | OUTPATIENT
Start: 2024-05-16 | End: 2024-05-22 | Stop reason: HOSPADM

## 2024-05-16 RX ORDER — POLYETHYLENE GLYCOL 3350 17 G/17G
17 POWDER, FOR SOLUTION ORAL DAILY PRN
Status: DISCONTINUED | OUTPATIENT
Start: 2024-05-16 | End: 2024-05-22 | Stop reason: HOSPADM

## 2024-05-16 RX ORDER — AMOXICILLIN 250 MG
2 CAPSULE ORAL 2 TIMES DAILY PRN
Status: DISCONTINUED | OUTPATIENT
Start: 2024-05-16 | End: 2024-05-22 | Stop reason: HOSPADM

## 2024-05-16 RX ORDER — ONDANSETRON 2 MG/ML
4 INJECTION INTRAMUSCULAR; INTRAVENOUS EVERY 6 HOURS PRN
Status: DISCONTINUED | OUTPATIENT
Start: 2024-05-16 | End: 2024-05-22 | Stop reason: HOSPADM

## 2024-05-16 RX ORDER — BISACODYL 5 MG/1
5 TABLET, DELAYED RELEASE ORAL DAILY PRN
Status: DISCONTINUED | OUTPATIENT
Start: 2024-05-16 | End: 2024-05-22 | Stop reason: HOSPADM

## 2024-05-16 RX ADMIN — ONDANSETRON 4 MG: 2 INJECTION INTRAMUSCULAR; INTRAVENOUS at 21:31

## 2024-05-16 NOTE — ED PROVIDER NOTES
Subjective   History of Present Illness  Patient is a 68-year-old female complaining of weakness.  Family states that she has had mild confusion over the past several days.  Patient has cough fever [place] diarrhea or other complaint.      Review of Systems    Past Medical History:   Diagnosis Date    AAA (abdominal aortic aneurysm)     infrarenal- 3.1cm(2010, 3.7x4.2cm(2016), 3.9cm (2017)    Allergic rhinitis     Aspiration pneumonia 05/2017    CHF (congestive heart failure)     Coronary artery disease     DDD (degenerative disc disease), lumbar     lumbar spine- Dr. Churchill     Depression     Diverticulosis     Frequent UTI     Dr. Daniels    GERD (gastroesophageal reflux disease)     Hiatal hernia     HSV (herpes simplex virus) infection     Oral    Hyperlipidemia     IBS (irritable bowel syndrome)     Constipation predominant    Ischemic cardiomyopathy 09/2017    AICD     Kidney stones     NSTEMI (non-ST elevated myocardial infarction) 04/26/2017    Obstructive sleep apnea 2011    nfsg 2011, ahi 67    Osteoarthritis     Osteopenia     Peripheral neuropathy     feet    Pulmonary embolism, bilateral 05/2017    Rotator cuff tear     Bilateral- Ortho        Allergies   Allergen Reactions    Oxytetracycline Hives    Oxycodone Itching       Past Surgical History:   Procedure Laterality Date    CARDIAC CATHETERIZATION  04/26/2017    99% mid LAD. 90% mid LCX. 60% RCA. Recommended CABG. Dr. Diaz    CARDIAC DEFIBRILLATOR PLACEMENT  12/26/2017    ICD implant/ Dr. Ellis    CATARACT EXTRACTION      CORONARY ARTERY BYPASS GRAFT  04/26/2017    x4 & ASD Closure    CYSTOSCOPY  2002    negative. Dr. Daniels    LAPAROSCOPIC CHOLECYSTECTOMY  04/17/2013    For biliary dyskinesia. Dr. Mccoy.     REPLACEMENT TOTAL KNEE BILATERAL  11/2004    Dr. Herrera    THORACENTESIS Left 05/08/2017    TONSILLECTOMY      TUBAL ABDOMINAL LIGATION Bilateral        Family History   Problem Relation Age of Onset    Heart disease Mother     Other Mother          Hypoglycemia    Osteoporosis Mother     COPD Father     Stroke Father     Hypertension Brother     Diabetes Brother     Heart disease Brother        Social History     Socioeconomic History    Marital status:    Tobacco Use    Smoking status: Never   Vaping Use    Vaping status: Some Days    Substances: CBD, nightly, 2-3 weekly   Substance and Sexual Activity    Drug use: Never           Objective   Physical Exam  Neurologic exam shows patient be mildly confused.  Neck has no adenopathy JVD or bruits.  Lungs are clear.  Heart has a regular rate rhythm without murmur rub or gallop.  Chest is nontender.  Abdomen soft nontender.  Extremity exam unremarkable.  Procedures           ED Course      Results for orders placed or performed during the hospital encounter of 05/16/24   Basic Metabolic Panel    Specimen: Blood   Result Value Ref Range    Glucose 104 (H) 65 - 99 mg/dL    BUN 13 8 - 23 mg/dL    Creatinine 1.12 (H) 0.57 - 1.00 mg/dL    Sodium 136 136 - 145 mmol/L    Potassium 4.0 3.5 - 5.2 mmol/L    Chloride 103 98 - 107 mmol/L    CO2 20.0 (L) 22.0 - 29.0 mmol/L    Calcium 9.4 8.6 - 10.5 mg/dL    BUN/Creatinine Ratio 11.6 7.0 - 25.0    Anion Gap 13.0 5.0 - 15.0 mmol/L    eGFR 53.3 (L) >60.0 mL/min/1.73   Urinalysis With Microscopic If Indicated (No Culture) - Urine, Catheter    Specimen: Urine, Catheter   Result Value Ref Range    Color, UA Yellow Yellow, Straw    Appearance, UA Clear Clear    pH, UA <=5.0 5.0 - 8.0    Specific Gravity, UA 1.011 1.005 - 1.030    Glucose, UA Negative Negative    Ketones, UA Negative Negative    Bilirubin, UA Negative Negative    Blood, UA Negative Negative    Protein, UA Negative Negative    Leuk Esterase, UA Negative Negative    Nitrite, UA Negative Negative    Urobilinogen, UA 1.0 E.U./dL 0.2 - 1.0 E.U./dL   Protime-INR    Specimen: Blood   Result Value Ref Range    Protime 15.1 (L) 19.4 - 28.5 Seconds    INR 1.42 (L) 2.00 - 3.00   CBC Auto Differential    Specimen: Blood    Result Value Ref Range    WBC 4.86 3.40 - 10.80 10*3/mm3    RBC 4.27 3.77 - 5.28 10*6/mm3    Hemoglobin 13.3 12.0 - 15.9 g/dL    Hematocrit 41.9 34.0 - 46.6 %    MCV 98.1 (H) 79.0 - 97.0 fL    MCH 31.1 26.6 - 33.0 pg    MCHC 31.7 31.5 - 35.7 g/dL    RDW 14.3 12.3 - 15.4 %    RDW-SD 51.4 37.0 - 54.0 fl    MPV 9.8 6.0 - 12.0 fL    Platelets 208 140 - 450 10*3/mm3    Neutrophil % 85.9 (H) 42.7 - 76.0 %    Lymphocyte % 6.6 (L) 19.6 - 45.3 %    Monocyte % 4.5 (L) 5.0 - 12.0 %    Eosinophil % 0.6 0.3 - 6.2 %    Basophil % 1.4 0.0 - 1.5 %    Immature Grans % 1.0 (H) 0.0 - 0.5 %    Neutrophils, Absolute 4.17 1.70 - 7.00 10*3/mm3    Lymphocytes, Absolute 0.32 (L) 0.70 - 3.10 10*3/mm3    Monocytes, Absolute 0.22 0.10 - 0.90 10*3/mm3    Eosinophils, Absolute 0.03 0.00 - 0.40 10*3/mm3    Basophils, Absolute 0.07 0.00 - 0.20 10*3/mm3    Immature Grans, Absolute 0.05 0.00 - 0.05 10*3/mm3    nRBC 0.0 0.0 - 0.2 /100 WBC   Green Top (Gel)   Result Value Ref Range    Extra Tube Hold for add-ons.    Lavender Top   Result Value Ref Range    Extra Tube hold for add-on    Gold Top - SST   Result Value Ref Range    Extra Tube Hold for add-ons.    Light Blue Top   Result Value Ref Range    Extra Tube Hold for add-ons.      CT Head Without Contrast    Result Date: 5/16/2024  1. Limited study secondary to motion. 2. No definite hemorrhage noted. Small focus of increased density within the evens is favored to represent calcification. 3. Diffuse atrophy with mild bifrontal predominance Short-term follow-up can always be considered given limitations of exam if clinically indicated Electronically Signed: Tesfaye Antoine MD  5/16/2024 6:41 PM EDT  Workstation ID: [id number]    XR Chest 1 View    Result Date: 5/16/2024  Impression: 1.Cardiomegaly with pulmonary vascular congestion and scattered bibasilar atelectasis or infiltrates. Electronically Signed: Roberto Huddleston MD  5/16/2024 5:50 PM EDT  Workstation ID: [id number]                                             Medical Decision Making  My interpretation patient CT scan head without contrast shows no intracerebral hemorrhage or mass effect.  Chest x-ray shows cardiomegaly without effusion or infiltrate.  BMP shows no renal insufficiency or electrolyte abnormality.  UA is normal.  Patient has no leukocytosis no left shift and no anemia.  Patient will be admitted to the hospital with a diagnosis of mental status change.  I did speak to patient's family physician.    Amount and/or Complexity of Data Reviewed  Labs: ordered. Decision-making details documented in ED Course.  Radiology: ordered and independent interpretation performed.    Risk  Prescription drug management.  Decision regarding hospitalization.        Final diagnoses:   Altered mental status, unspecified altered mental status type       ED Disposition  ED Disposition       ED Disposition   Decision to Admit    Condition   --    Comment   --               No follow-up provider specified.       Medication List      No changes were made to your prescriptions during this visit.            Juan Mar MD  05/16/24 1956

## 2024-05-17 ENCOUNTER — APPOINTMENT (OUTPATIENT)
Dept: CARDIOLOGY | Facility: HOSPITAL | Age: 69
End: 2024-05-17
Payer: MEDICARE

## 2024-05-17 PROBLEM — R41.82 ALTERED MENTAL STATUS: Status: ACTIVE | Noted: 2024-05-17

## 2024-05-17 LAB
ANION GAP SERPL CALCULATED.3IONS-SCNC: 11 MMOL/L (ref 5–15)
ANION GAP SERPL CALCULATED.3IONS-SCNC: 12 MMOL/L (ref 5–15)
BASOPHILS # BLD AUTO: 0.07 10*3/MM3 (ref 0–0.2)
BASOPHILS NFR BLD AUTO: 1.2 % (ref 0–1.5)
BH CV XLRA MEAS LEFT DIST CCA EDV: 13.3 CM/SEC
BH CV XLRA MEAS LEFT DIST CCA PSV: 69.8 CM/SEC
BH CV XLRA MEAS LEFT DIST ICA EDV: -17.7 CM/SEC
BH CV XLRA MEAS LEFT DIST ICA PSV: -67.8 CM/SEC
BH CV XLRA MEAS LEFT ICA/CCA RATIO: -0.94
BH CV XLRA MEAS LEFT PROX CCA EDV: 15.2 CM/SEC
BH CV XLRA MEAS LEFT PROX CCA PSV: 71.8 CM/SEC
BH CV XLRA MEAS LEFT PROX ECA PSV: -100 CM/SEC
BH CV XLRA MEAS LEFT PROX ICA EDV: 13.5 CM/SEC
BH CV XLRA MEAS LEFT PROX ICA PSV: 48.7 CM/SEC
BH CV XLRA MEAS LEFT PROX SCLA PSV: 107 CM/SEC
BH CV XLRA MEAS LEFT VERTEBRAL A EDV: 18.7 CM/SEC
BH CV XLRA MEAS LEFT VERTEBRAL A PSV: 53.1 CM/SEC
BH CV XLRA MEAS RIGHT DIST CCA EDV: 16.2 CM/SEC
BH CV XLRA MEAS RIGHT DIST CCA PSV: 59.6 CM/SEC
BH CV XLRA MEAS RIGHT DIST ICA EDV: -15.1 CM/SEC
BH CV XLRA MEAS RIGHT DIST ICA PSV: -50.1 CM/SEC
BH CV XLRA MEAS RIGHT ICA/CCA RATIO: -0.56
BH CV XLRA MEAS RIGHT PROX CCA EDV: 11.8 CM/SEC
BH CV XLRA MEAS RIGHT PROX CCA PSV: 89.5 CM/SEC
BH CV XLRA MEAS RIGHT PROX ECA PSV: -87 CM/SEC
BH CV XLRA MEAS RIGHT PROX ICA EDV: -9.1 CM/SEC
BH CV XLRA MEAS RIGHT PROX ICA PSV: -39.6 CM/SEC
BH CV XLRA MEAS RIGHT PROX SCLA PSV: 178 CM/SEC
BH CV XLRA MEAS RIGHT VERTEBRAL A EDV: 9.1 CM/SEC
BH CV XLRA MEAS RIGHT VERTEBRAL A PSV: 37.1 CM/SEC
BUN SERPL-MCNC: 11 MG/DL (ref 8–23)
BUN SERPL-MCNC: 11 MG/DL (ref 8–23)
BUN/CREAT SERPL: 10.2 (ref 7–25)
BUN/CREAT SERPL: 9.6 (ref 7–25)
CALCIUM SPEC-SCNC: 9 MG/DL (ref 8.6–10.5)
CALCIUM SPEC-SCNC: 9.2 MG/DL (ref 8.6–10.5)
CHLORIDE SERPL-SCNC: 107 MMOL/L (ref 98–107)
CHLORIDE SERPL-SCNC: 108 MMOL/L (ref 98–107)
CO2 SERPL-SCNC: 20 MMOL/L (ref 22–29)
CO2 SERPL-SCNC: 20 MMOL/L (ref 22–29)
CREAT SERPL-MCNC: 1.08 MG/DL (ref 0.57–1)
CREAT SERPL-MCNC: 1.15 MG/DL (ref 0.57–1)
DEPRECATED RDW RBC AUTO: 51.8 FL (ref 37–54)
EGFRCR SERPLBLD CKD-EPI 2021: 51.7 ML/MIN/1.73
EGFRCR SERPLBLD CKD-EPI 2021: 55.7 ML/MIN/1.73
EOSINOPHIL # BLD AUTO: 0.09 10*3/MM3 (ref 0–0.4)
EOSINOPHIL NFR BLD AUTO: 1.6 % (ref 0.3–6.2)
ERYTHROCYTE [DISTWIDTH] IN BLOOD BY AUTOMATED COUNT: 14.3 % (ref 12.3–15.4)
GLUCOSE SERPL-MCNC: 123 MG/DL (ref 65–99)
GLUCOSE SERPL-MCNC: 128 MG/DL (ref 65–99)
HCT VFR BLD AUTO: 39.7 % (ref 34–46.6)
HGB BLD-MCNC: 12.7 G/DL (ref 12–15.9)
IMM GRANULOCYTES # BLD AUTO: 0.04 10*3/MM3 (ref 0–0.05)
IMM GRANULOCYTES NFR BLD AUTO: 0.7 % (ref 0–0.5)
INR PPP: 1.1 (ref 2–3)
IRON 24H UR-MRATE: 35 MCG/DL (ref 37–145)
IRON SATN MFR SERPL: 12 % (ref 20–50)
LYMPHOCYTES # BLD AUTO: 0.47 10*3/MM3 (ref 0.7–3.1)
LYMPHOCYTES NFR BLD AUTO: 8.3 % (ref 19.6–45.3)
MCH RBC QN AUTO: 31.4 PG (ref 26.6–33)
MCHC RBC AUTO-ENTMCNC: 32 G/DL (ref 31.5–35.7)
MCV RBC AUTO: 98 FL (ref 79–97)
MONOCYTES # BLD AUTO: 0.33 10*3/MM3 (ref 0.1–0.9)
MONOCYTES NFR BLD AUTO: 5.9 % (ref 5–12)
NEUTROPHILS NFR BLD AUTO: 4.64 10*3/MM3 (ref 1.7–7)
NEUTROPHILS NFR BLD AUTO: 82.3 % (ref 42.7–76)
NRBC BLD AUTO-RTO: 0 /100 WBC (ref 0–0.2)
PLATELET # BLD AUTO: 202 10*3/MM3 (ref 140–450)
PMV BLD AUTO: 9.7 FL (ref 6–12)
POTASSIUM SERPL-SCNC: 3.3 MMOL/L (ref 3.5–5.2)
POTASSIUM SERPL-SCNC: 4 MMOL/L (ref 3.5–5.2)
PROTHROMBIN TIME: 11.9 SECONDS (ref 19.4–28.5)
RBC # BLD AUTO: 4.05 10*6/MM3 (ref 3.77–5.28)
SODIUM SERPL-SCNC: 138 MMOL/L (ref 136–145)
SODIUM SERPL-SCNC: 140 MMOL/L (ref 136–145)
TIBC SERPL-MCNC: 301 MCG/DL (ref 298–536)
TRANSFERRIN SERPL-MCNC: 202 MG/DL (ref 200–360)
WBC NRBC COR # BLD AUTO: 5.64 10*3/MM3 (ref 3.4–10.8)

## 2024-05-17 PROCEDURE — G0378 HOSPITAL OBSERVATION PER HR: HCPCS

## 2024-05-17 PROCEDURE — 93005 ELECTROCARDIOGRAM TRACING: CPT | Performed by: INTERNAL MEDICINE

## 2024-05-17 PROCEDURE — 93880 EXTRACRANIAL BILAT STUDY: CPT

## 2024-05-17 PROCEDURE — 93010 ELECTROCARDIOGRAM REPORT: CPT | Performed by: INTERNAL MEDICINE

## 2024-05-17 PROCEDURE — 80048 BASIC METABOLIC PNL TOTAL CA: CPT

## 2024-05-17 PROCEDURE — 85610 PROTHROMBIN TIME: CPT

## 2024-05-17 PROCEDURE — 85025 COMPLETE CBC W/AUTO DIFF WBC: CPT

## 2024-05-17 PROCEDURE — 97162 PT EVAL MOD COMPLEX 30 MIN: CPT

## 2024-05-17 PROCEDURE — 93880 EXTRACRANIAL BILAT STUDY: CPT | Performed by: SURGERY

## 2024-05-17 PROCEDURE — 25010000002 ONDANSETRON PER 1 MG

## 2024-05-17 RX ORDER — LEVOTHYROXINE SODIUM 88 UG/1
88 TABLET ORAL
Status: DISCONTINUED | OUTPATIENT
Start: 2024-05-17 | End: 2024-05-22 | Stop reason: HOSPADM

## 2024-05-17 RX ORDER — WARFARIN SODIUM 3 MG/1
6 TABLET ORAL
Status: DISCONTINUED | OUTPATIENT
Start: 2024-05-17 | End: 2024-05-22

## 2024-05-17 RX ORDER — POTASSIUM CHLORIDE 20 MEQ/1
40 TABLET, EXTENDED RELEASE ORAL EVERY 4 HOURS
Status: COMPLETED | OUTPATIENT
Start: 2024-05-17 | End: 2024-05-17

## 2024-05-17 RX ORDER — TOPIRAMATE 25 MG/1
50 TABLET ORAL DAILY
Status: DISCONTINUED | OUTPATIENT
Start: 2024-05-17 | End: 2024-05-22 | Stop reason: HOSPADM

## 2024-05-17 RX ORDER — OXYBUTYNIN CHLORIDE 5 MG/1
5 TABLET, EXTENDED RELEASE ORAL DAILY
Status: DISCONTINUED | OUTPATIENT
Start: 2024-05-17 | End: 2024-05-22 | Stop reason: HOSPADM

## 2024-05-17 RX ORDER — PAROXETINE HYDROCHLORIDE 20 MG/1
40 TABLET, FILM COATED ORAL EVERY MORNING
Status: DISCONTINUED | OUTPATIENT
Start: 2024-05-17 | End: 2024-05-22 | Stop reason: HOSPADM

## 2024-05-17 RX ORDER — WARFARIN SODIUM 3 MG/1
3 TABLET ORAL
Status: COMPLETED | OUTPATIENT
Start: 2024-05-17 | End: 2024-05-17

## 2024-05-17 RX ORDER — CETIRIZINE HYDROCHLORIDE 10 MG/1
10 TABLET ORAL DAILY
Status: DISCONTINUED | OUTPATIENT
Start: 2024-05-17 | End: 2024-05-22 | Stop reason: HOSPADM

## 2024-05-17 RX ORDER — WARFARIN SODIUM 3 MG/1
3 TABLET ORAL
Status: DISCONTINUED | OUTPATIENT
Start: 2024-05-18 | End: 2024-05-22 | Stop reason: HOSPADM

## 2024-05-17 RX ORDER — CALCIUM CARBONATE 500 MG/1
2 TABLET, CHEWABLE ORAL 3 TIMES DAILY PRN
Status: DISCONTINUED | OUTPATIENT
Start: 2024-05-17 | End: 2024-05-22 | Stop reason: HOSPADM

## 2024-05-17 RX ORDER — DILTIAZEM HYDROCHLORIDE 240 MG/1
240 CAPSULE, COATED, EXTENDED RELEASE ORAL
Status: DISCONTINUED | OUTPATIENT
Start: 2024-05-17 | End: 2024-05-22 | Stop reason: HOSPADM

## 2024-05-17 RX ORDER — ATORVASTATIN CALCIUM 40 MG/1
80 TABLET, FILM COATED ORAL DAILY
Status: DISCONTINUED | OUTPATIENT
Start: 2024-05-17 | End: 2024-05-22 | Stop reason: HOSPADM

## 2024-05-17 RX ORDER — CARVEDILOL 3.12 MG/1
3.12 TABLET ORAL 2 TIMES DAILY WITH MEALS
Status: DISCONTINUED | OUTPATIENT
Start: 2024-05-17 | End: 2024-05-22 | Stop reason: HOSPADM

## 2024-05-17 RX ORDER — WARFARIN SODIUM 3 MG/1
3 TABLET ORAL
COMMUNITY

## 2024-05-17 RX ORDER — PANTOPRAZOLE SODIUM 40 MG/1
40 TABLET, DELAYED RELEASE ORAL
Status: DISCONTINUED | OUTPATIENT
Start: 2024-05-17 | End: 2024-05-22 | Stop reason: HOSPADM

## 2024-05-17 RX ORDER — MIDODRINE HYDROCHLORIDE 5 MG/1
10 TABLET ORAL EVERY 8 HOURS
Status: DISCONTINUED | OUTPATIENT
Start: 2024-05-17 | End: 2024-05-22 | Stop reason: HOSPADM

## 2024-05-17 RX ORDER — SODIUM BICARBONATE 650 MG/1
1300 TABLET ORAL 3 TIMES DAILY
Status: DISCONTINUED | OUTPATIENT
Start: 2024-05-17 | End: 2024-05-22 | Stop reason: HOSPADM

## 2024-05-17 RX ORDER — ASPIRIN 81 MG/1
81 TABLET, CHEWABLE ORAL DAILY
Status: DISCONTINUED | OUTPATIENT
Start: 2024-05-17 | End: 2024-05-22 | Stop reason: HOSPADM

## 2024-05-17 RX ADMIN — POTASSIUM CHLORIDE 40 MEQ: 1500 TABLET, EXTENDED RELEASE ORAL at 08:24

## 2024-05-17 RX ADMIN — FAMOTIDINE 40 MG: 20 TABLET, FILM COATED ORAL at 08:24

## 2024-05-17 RX ADMIN — ATORVASTATIN CALCIUM 80 MG: 40 TABLET, FILM COATED ORAL at 11:07

## 2024-05-17 RX ADMIN — WARFARIN SODIUM 3 MG: 3 TABLET ORAL at 18:05

## 2024-05-17 RX ADMIN — OXYBUTYNIN CHLORIDE 5 MG: 5 TABLET, EXTENDED RELEASE ORAL at 10:42

## 2024-05-17 RX ADMIN — ACETAMINOPHEN 650 MG: 325 TABLET, FILM COATED ORAL at 20:40

## 2024-05-17 RX ADMIN — CALCIUM CARBONATE 2 TABLET: 500 TABLET, CHEWABLE ORAL at 11:07

## 2024-05-17 RX ADMIN — WARFARIN SODIUM 6 MG: 3 TABLET ORAL at 18:05

## 2024-05-17 RX ADMIN — ONDANSETRON 4 MG: 2 INJECTION INTRAMUSCULAR; INTRAVENOUS at 12:48

## 2024-05-17 RX ADMIN — CETIRIZINE HYDROCHLORIDE 10 MG: 10 TABLET, FILM COATED ORAL at 11:07

## 2024-05-17 RX ADMIN — LEVOTHYROXINE SODIUM 88 MCG: 88 TABLET ORAL at 10:42

## 2024-05-17 RX ADMIN — PAROXETINE HYDROCHLORIDE 40 MG: 20 TABLET, FILM COATED ORAL at 10:42

## 2024-05-17 RX ADMIN — TOPIRAMATE 50 MG: 25 TABLET, FILM COATED ORAL at 10:42

## 2024-05-17 RX ADMIN — POTASSIUM CHLORIDE 40 MEQ: 1500 TABLET, EXTENDED RELEASE ORAL at 04:20

## 2024-05-17 RX ADMIN — ASPIRIN 81 MG CHEWABLE TABLET 81 MG: 81 TABLET CHEWABLE at 11:07

## 2024-05-17 RX ADMIN — PANTOPRAZOLE SODIUM 40 MG: 40 TABLET, DELAYED RELEASE ORAL at 10:42

## 2024-05-17 RX ADMIN — SODIUM BICARBONATE 1300 MG: 650 TABLET ORAL at 10:42

## 2024-05-17 NOTE — CASE MANAGEMENT/SOCIAL WORK
Discharge Planning Assessment   [facility]     Patient Name: Laura Mejia  MRN: [id number]  Today's Date: 5/17/2024    Admit Date: 5/16/2024    Plan: Plan to dc home with spouse, pending PT/OT sherice.   Discharge Needs Assessment       Row Name 05/17/24 1349       Living Environment    People in Home spouse    Name(s) of People in Home Lamont - spouse    Current Living Arrangements home    Potentially Unsafe Housing Conditions none    In the past 12 months has the electric, gas, oil, or water company threatened to shut off services in your home? No    Primary Care Provided by self    Provides Primary Care For no one    Family Caregiver if Needed spouse    Family Caregiver Names Lamont - spouse    Quality of Family Relationships helpful;involved;supportive    Able to Return to Prior Arrangements yes       Resource/Environmental Concerns    Resource/Environmental Concerns none    Transportation Concerns none       Transportation Needs    In the past 12 months, has lack of transportation kept you from medical appointments or from getting medications? no    In the past 12 months, has lack of transportation kept you from meetings, work, or from getting things needed for daily living? No       Food Insecurity    Within the past 12 months, you worried that your food would run out before you got the money to buy more. Never true    Within the past 12 months, the food you bought just didn't last and you didn't have money to get more. Never true       Transition Planning    Patient/Family Anticipates Transition to home with family    Patient/Family Anticipated Services at Transition none    Transportation Anticipated car, drives self;family or friend will provide       Discharge Needs Assessment    Readmission Within the Last 30 Days no previous admission in last 30 days    Equipment Currently Used at Home shower chair;grab bar    Concerns to be Addressed discharge planning    Anticipated Changes Related to Illness  none    Equipment Needed After Discharge none                   Discharge Plan       Row Name 05/17/24 1359       Plan    Plan Plan to dc home with spouse, pending PT/OT eval.    Patient/Family in Agreement with Plan yes    Plan Comments Patient lives at home with spouse, Lamont who will transport at discharge. Patient performs ADLs. PCP and pharmacy confirmed. Agreeable to M2B.  Denies financial assistance needs for medication and/or food. Denies any current DME, HH, Caregiver, or rehab services. DC Barrierss:  PT/OT eval, INR 1.08 (below target 2-3).               Expected Discharge Date and Time       Expected Discharge Date Expected Discharge Time    May 20, 2024            Demographic Summary       Row Name 05/17/24 1346       General Information    Admission Type observation    Arrived From emergency department    Required Notices Provided Observation Status Notice    Referral Source admission list    Reason for Consult discharge planning    Preferred Language English                   Functional Status       Row Name 05/17/24 1346       Functional Status    Usual Activity Tolerance good    Current Activity Tolerance moderate       Functional Status, IADL    Medications independent    Meal Preparation independent    Housekeeping independent    Laundry independent    Shopping independent       Mental Status    General Appearance WDL WDL       Mental Status Summary    Recent Changes in Mental Status/Cognitive Functioning no changes           ROMY Quintana RN  SIPS/ICU   O: [phone]  C: [phone]  [email or web address]

## 2024-05-17 NOTE — PLAN OF CARE
Goal Outcome Evaluation:  Plan of Care Reviewed With: patient, daughter        Progress: no change  Outcome Evaluation: Annie Mejia is a 69 y/o F admitted to [facility] on 5/16/24 for general weakness and confusion. UA (-), CXR (-). RN reports orders for CTA carotid arteries and MRI of head. Pt's daughter present throughout to assist with obtaining PLOF. At baseline, pt lives with her spouse and is typically IND with ADLs and mobility without AD. Pt owns RW, uses infrequently. Pt this date oriented to person and situation only. Pt required Bobbi for supine>sit EOB, but once sitting, had variable sitting balance. Pt would lean posteriorly, then spontaneously just lay back, almost hitting her head on the bed-rail. Min-modA required for seated balance, pt denied symptoms of dizziness/lightheadedness and reported she was sitting up fine. BP was WNL. Pt then began to report significant low back pain, requiring minAx2 for STS with RW, with mild symptom improvement. Pt ambulated to bathroom with RW and shuffled, variable stride length. Upon sitting in recliner, occulomotor exam showed impaired smooth pursuit and overshooting with saccades bilat. Coordination tests showed mildly impaired L finger to nose. Pt began attempting to connect HR monitor and O2 monitor to one another, was difficult to redirect. Pt appears below baseline function and would benefit from SNF pending progress while admitted. PT will continue to follow.      Anticipated Discharge Disposition (PT): skilled nursing facility

## 2024-05-17 NOTE — PLAN OF CARE
"Goal Outcome Evaluation:      During admission assessment Pt was A&O x4 and answering my questions appropriately. Spouse at bedside and stated \"She is so much better than she was before. I don't know what happened. Our daughter requested that we check her carotid arteries.\" Last BM was 5/12. Pt stated that was normal for her d/t her IBS. Pt's home meds not verified at this time. She did not have a list to provide. Call light in reach and bed alarm on for patient safety. Plan of care initiated.                                         "

## 2024-05-17 NOTE — PLAN OF CARE
Goal Outcome Evaluation:      Patient remains confused at times, daughter at bedside, care plan ongoing

## 2024-05-17 NOTE — PROGRESS NOTES
"Pharmacy dosing service  Anticoagulant  Warfarin     Subjective:    Laura Mejia is a 68 y.o.female being continued on warfarin for Atrial Fibrillation / Flutter.    INR Goal: 2 - 3  Home medication?: warfarin 3 mg PO daily, except warfarin 6 mg PO on Mon/Wed/Fri.   Bridge Therapy Present?:  No  Interacting Medications Evaluation (New/Present/Discontinued): None at this time      Assessment/Plan:    Patient's INR today is 1.42 which is below the goal of 2-3. The patient's home dose of warfarin has been ordered for this admission. Due to low INR will give an increased dose of 9mg today (50% daily dose increase)     Continue to monitor and adjust based on INR.         Date 5/17           INR 1.42           Dose 9 mg               Objective:  [Ht: 175.5 cm (69.09\"); Wt: 83.6 kg (184 lb 4.9 oz); BMI: Body mass index is 27.14 kg/m².]    Lab Results   Component Value Date    ALBUMIN 3.2 (L) 01/26/2024     Lab Results   Component Value Date    INR 1.42 (L) 05/16/2024    INR 1.97 (L) 01/26/2024    INR 2.13 01/26/2024    PROTIME 15.1 (L) 05/16/2024    PROTIME 20.4 01/26/2024    PROTIME 22.0 01/26/2024     Lab Results   Component Value Date    HGB 11.7 (L) 05/16/2024    HGB 13.3 05/16/2024    HGB 10.2 (L) 01/26/2024     Lab Results   Component Value Date    HCT 37.0 05/16/2024    HCT 41.9 05/16/2024    HCT 31.6 (L) 01/26/2024       Sebas Kang [facility]  05/17/24 10:45 EDT     "

## 2024-05-17 NOTE — PROGRESS NOTES
"Pharmacy dosing service  Anticoagulant  Warfarin     Subjective:    Laura Mejia is a 68 y.o.female being continued on warfarin for Atrial Fibrillation / Flutter.    INR Goal: 2 - 3  Home medication?: warfarin 3 mg PO daily, except warfarin 6 mg PO on Mon/Wed/Fri.   Bridge Therapy Present?:  No  Interacting Medications Evaluation (New/Present/Discontinued): None at this time      Assessment/Plan:    Patient's INR today is 1.10 which is below the goal of 2-3. The patient's home dose of warfarin has been ordered for this admission. Due to low INR will give an increased dose of 9mg today (50% daily dose increase)     Continue to monitor and adjust based on INR.         Date 5/17           INR 1.10           Dose 9 mg               Objective:  [Ht: 175.5 cm (69.09\"); Wt: 83.6 kg (184 lb 4.9 oz); BMI: Body mass index is 27.14 kg/m².]    Lab Results   Component Value Date    ALBUMIN 3.2 (L) 01/26/2024     Lab Results   Component Value Date    INR 1.42 (L) 05/16/2024    INR 1.97 (L) 01/26/2024    INR 2.13 01/26/2024    PROTIME 15.1 (L) 05/16/2024    PROTIME 20.4 01/26/2024    PROTIME 22.0 01/26/2024     Lab Results   Component Value Date    HGB 11.7 (L) 05/16/2024    HGB 13.3 05/16/2024    HGB 10.2 (L) 01/26/2024     Lab Results   Component Value Date    HCT 37.0 05/16/2024    HCT 41.9 05/16/2024    HCT 31.6 (L) 01/26/2024       Sebas Kang [facility]  05/17/24 10:45 EDT       "

## 2024-05-17 NOTE — H&P
Patient Care Team:  Luan Joseph APRN as PCP - General (Family Medicine)  Jozef Otero MD as Consulting Physician (Nephrology)    Chief complaint weakness    Subjective     Patient is a 68 y.o. female  history of atrial fibrillation on Warfarin, CHF, CAD, GERD, who presented to the ER with complaints from family of general weakness and some confusion over the past several days. Patient denies any cough, fever, chills, nausea, vomiting, diarrhea, chest pain, or shortness of breath.   In the ER CT head with no acute findings, CXR unremarkable, UA negative for infection. Labs unremarkable for anything significant. She did become nauseous after ER course and was given zofran with relief.    Onset of symptoms was 3-4 days    Review of Systems   Unable to perform ROS: Mental status change          History  Past Medical History:   Diagnosis Date    AAA (abdominal aortic aneurysm)     infrarenal- 3.1cm(2010, 3.7x4.2cm(2016), 3.9cm (2017)    Allergic rhinitis     Aspiration pneumonia 05/2017    CHF (congestive heart failure)     Coronary artery disease     DDD (degenerative disc disease), lumbar     lumbar spine- Dr. Churchill     Depression     Diverticulosis     Frequent UTI     Dr. Daniels    GERD (gastroesophageal reflux disease)     Hiatal hernia     HSV (herpes simplex virus) infection     Oral    Hyperlipidemia     IBS (irritable bowel syndrome)     Constipation predominant    Ischemic cardiomyopathy 09/2017    AICD     Kidney stones     NSTEMI (non-ST elevated myocardial infarction) 04/26/2017    Obstructive sleep apnea 2011    nfsg 2011, ahi 67    Osteoarthritis     Osteopenia     Peripheral neuropathy     feet    Pulmonary embolism, bilateral 05/2017    Rotator cuff tear     Bilateral- Ortho      Past Surgical History:   Procedure Laterality Date    CARDIAC CATHETERIZATION  04/26/2017    99% mid LAD. 90% mid LCX. 60% RCA. Recommended CABG. Dr. Diaz    CARDIAC DEFIBRILLATOR PLACEMENT  12/26/2017    ICD  implant/ Dr. Ellis    CATARACT EXTRACTION      CORONARY ARTERY BYPASS GRAFT  04/26/2017    x4 & ASD Closure    CYSTOSCOPY  2002    negative. Dr. Daniels    LAPAROSCOPIC CHOLECYSTECTOMY  04/17/2013    For biliary dyskinesia. Dr. Mccoy.     REPLACEMENT TOTAL KNEE BILATERAL  11/2004    Dr. Herrera    THORACENTESIS Left 05/08/2017    TONSILLECTOMY      TUBAL ABDOMINAL LIGATION Bilateral      Family History   Problem Relation Age of Onset    Heart disease Mother     Other Mother         Hypoglycemia    Osteoporosis Mother     COPD Father     Stroke Father     Hypertension Brother     Diabetes Brother     Heart disease Brother      Social History     Tobacco Use    Smoking status: Never   Vaping Use    Vaping status: Some Days    Substances: CBD, nightly, 2-3 weekly   Substance Use Topics    Alcohol use: Never    Drug use: Never     Medications Prior to Admission   Medication Sig Dispense Refill Last Dose    aspirin 81 MG chewable tablet Chew 1 tablet Daily.       atorvastatin (LIPITOR) 80 MG tablet Take 1 tablet by mouth Daily.       carvedilol (COREG) 3.125 MG tablet Take 1 tablet by mouth 2 (Two) Times a Day With Meals.       dilTIAZem CD (CARDIZEM CD) 240 MG 24 hr capsule Take 1 capsule by mouth Daily. 30 capsule 1     ezetimibe (ZETIA) 10 MG tablet Take 1 tablet by mouth Daily.       famciclovir (FAMVIR) 500 MG tablet Take 1 tablet by mouth 3 (Three) Times a Day As Needed.       levocetirizine (XYZAL) 5 MG tablet Take 0.5 tablets by mouth Every Evening.       levothyroxine (SYNTHROID, LEVOTHROID) 88 MCG tablet Take 1 tablet by mouth Every Morning. 30 tablet 1     midodrine (PROAMATINE) 10 MG tablet Take 1 tablet by mouth Every 8 (Eight) Hours. 90 tablet 1     omeprazole (priLOSEC) 40 MG capsule Take 1 capsule by mouth Daily.       PARoxetine (PAXIL) 40 MG tablet Take 1 tablet by mouth Every Morning.       sodium bicarbonate 650 MG tablet Take 2 tablets by mouth 3 (Three) Times a Day. 90 tablet 1     topiramate (TOPAMAX)  50 MG tablet Take 1 tablet by mouth Daily.       trospium (SANCTURA) 20 MG tablet Take 1 tablet by mouth Every Night.       warfarin (COUMADIN) 3 MG tablet See Admin Instructions. Take 1 tablet by mouth on Tuesday, Thursday, Saturday, and Sunday        Allergies:  Oxytetracycline and Oxycodone    Objective     Vital Signs  Temp:  [97.6 °F (36.4 °C)-98.6 °F (37 °C)] 97.6 °F (36.4 °C)  Heart Rate:  [70-93] 91  Resp:  [16-21] 20  BP: (120-134)/(53-80) 121/53     Physical Exam:      General Appearance:    Alert, cooperative, in no acute distress   Head:    Normocephalic, without obvious abnormality, atraumatic   Eyes:            Lids and lashes normal, conjunctivae and sclerae normal, no   icterus, no pallor, corneas clear, PERRLA   Ears:    Ears appear intact with no abnormalities noted   Throat:   No oral lesions, no thrush, oral mucosa moist   Neck:   No adenopathy, supple, trachea midline, no thyromegaly, no   carotid bruit, no JVD   Lungs:     Clear to auscultation,respirations regular, even and                  unlabored    Heart:    Regular rhythm and normal rate, normal S1 and S2, no            murmur, no gallop, no rub, no click   Chest Wall:    No abnormalities observed   Abdomen:     Normal bowel sounds, no masses, no organomegaly, soft        non-tender, non-distended, no guarding, no rebound                tenderness   Extremities:   Moves all extremities well, no edema, no cyanosis, no             redness   Pulses:   Pulses palpable and equal bilaterally   Skin:   No bleeding, bruising or rash   Lymph nodes:   No palpable adenopathy   Neurologic:   No focal deficits noted       Results Review:     Imaging Results (Last 24 Hours)       Procedure Component Value Units Date/Time    CT Head Without Contrast [[id number]] Collected: 05/16/24 1834     Updated: 05/16/24 1843    Narrative:      CT HEAD WO CONTRAST    Date of Exam: 5/16/2024 6:15 PM EDT    Indication: Mental status change.    Comparison: MRI  2/18/2013    Technique: Axial CT images were obtained of the head without contrast administration.  Coronal reconstructions were performed.  Automated exposure control and iterative reconstruction methods were used.        FINDINGS:    Study is somewhat limited by motion. Repeat imaging was obtained.    Brain/Ventricles: Motion limited imaging demonstrates diffuse atrophy with a bifrontal predominance. Findings slightly progressed from comparison from 2013. Given limitations of the exam no definite acute intracranial hemorrhage noted. Increased density   within the evens favored represent calcification. No suspicious extra-axial fluid collection noted. Hypodensity compatible with small vessel ischemic disease noted diffusely.    Orbits: Motion limited imaging demonstrates no gross acute abnormality    Sinuses: Mild mucosal thickening paranasal sinuses. Mastoid air cells grossly clear    Soft Tissues/Skull: No acute abnormality of the visualized skull or soft tissues.      Impression:        1. Limited study secondary to motion.    2. No definite hemorrhage noted. Small focus of increased density within the evens is favored to represent calcification.    3. Diffuse atrophy with mild bifrontal predominance    Short-term follow-up can always be considered given limitations of exam if clinically indicated      Electronically Signed: Tesfaye Antoine MD    5/16/2024 6:41 PM EDT    Workstation ID: [id number]    XR Chest 1 View [[id number]] Collected: 05/16/24 1735     Updated: 05/16/24 1753    Narrative:      XR CHEST 1 VW    Date of Exam: 5/16/2024 5:26 PM EDT    Indication: Chest pain    Comparison: 1/22/2024    Findings:  Patient is status post median sternotomy and CABG. Left chest wall pacemaker is present. Cardiac silhouette is enlarged. Pulmonary vasculature is indistinct. Scattered bibasilar atelectasis or infiltrates noted. No pneumothorax is seen. No acute osseous   lesion is seen. There are senescent changes of the  aorta and skeletal structures. Status post right shoulder arthroplasty.      Impression:      Impression:  1.Cardiomegaly with pulmonary vascular congestion and scattered bibasilar atelectasis or infiltrates.      Electronically Signed: Roberto Huddleston MD    5/16/2024 5:50 PM EDT    Workstation ID: [id number]             Lab Results (last 24 hours)       Procedure Component Value Units Date/Time    Basic Metabolic Panel [[id number]]  (Abnormal) Collected: 05/16/24 2341    Specimen: Blood from Arm, Left Updated: 05/17/24 0006     Glucose 128 mg/dL      BUN 11 mg/dL      Creatinine 1.15 mg/dL      Sodium 138 mmol/L      Potassium 3.3 mmol/L      Chloride 107 mmol/L      CO2 20.0 mmol/L      Calcium 9.0 mg/dL      BUN/Creatinine Ratio 9.6     Anion Gap 11.0 mmol/L      eGFR 51.7 mL/min/1.73     Narrative:      GFR Normal >60  Chronic Kidney Disease <60  Kidney Failure <15      CBC & Differential [[id number]]  (Abnormal) Collected: 05/16/24 2341    Specimen: Blood from Arm, Left Updated: 05/16/24 2346    Narrative:      The following orders were created for panel order CBC & Differential.  Procedure                               Abnormality         Status                     ---------                               -----------         ------                     CBC Auto Differential[[id number]]        Abnormal            Final result                 Please view results for these tests on the individual orders.    CBC Auto Differential [[id number]]  (Abnormal) Collected: 05/16/24 2341    Specimen: Blood from Arm, Left Updated: 05/16/24 2346     WBC 4.17 10*3/mm3      RBC 3.76 10*6/mm3      Hemoglobin 11.7 g/dL      Hematocrit 37.0 %      MCV 98.4 fL      MCH 31.1 pg      MCHC 31.6 g/dL      RDW 14.3 %      RDW-SD 52.2 fl      MPV 9.6 fL      Platelets 184 10*3/mm3      Neutrophil % 75.8 %      Lymphocyte % 11.8 %      Monocyte % 8.4 %      Eosinophil % 1.4 %      Basophil % 1.4 %      Immature Grans % 1.2 %      Neutrophils,  Absolute 3.16 10*3/mm3      Lymphocytes, Absolute 0.49 10*3/mm3      Monocytes, Absolute 0.35 10*3/mm3      Eosinophils, Absolute 0.06 10*3/mm3      Basophils, Absolute 0.06 10*3/mm3      Immature Grans, Absolute 0.05 10*3/mm3      nRBC 0.0 /100 WBC     Basic Metabolic Panel [[id number]]  (Abnormal) Collected: 05/16/24 1642    Specimen: Blood Updated: 05/16/24 1748     Glucose 104 mg/dL      BUN 13 mg/dL      Creatinine 1.12 mg/dL      Sodium 136 mmol/L      Potassium 4.0 mmol/L      Comment: Specimen hemolyzed.  Result may be falsely elevated.        Chloride 103 mmol/L      CO2 20.0 mmol/L      Calcium 9.4 mg/dL      BUN/Creatinine Ratio 11.6     Anion Gap 13.0 mmol/L      eGFR 53.3 mL/min/1.73     Narrative:      GFR Normal >60  Chronic Kidney Disease <60  Kidney Failure <15      Urinalysis With Microscopic If Indicated (No Culture) - Urine, Catheter [[id number]]  (Normal) Collected: 05/16/24 1728    Specimen: Urine, Catheter Updated: 05/16/24 1748     Color, UA Yellow     Appearance, UA Clear     pH, UA <=5.0     Specific Gravity, UA 1.011     Glucose, UA Negative     Ketones, UA Negative     Bilirubin, UA Negative     Blood, UA Negative     Protein, UA Negative     Leuk Esterase, UA Negative     Nitrite, UA Negative     Urobilinogen, UA 1.0 E.U./dL    Narrative:      Urine microscopic not indicated.    CBC & Differential [[id number]]  (Abnormal) Collected: 05/16/24 1642    Specimen: Blood Updated: 05/16/24 1740    Narrative:      The following orders were created for panel order CBC & Differential.  Procedure                               Abnormality         Status                     ---------                               -----------         ------                     CBC Auto Differential[[id number]]        Abnormal            Final result                 Please view results for these tests on the individual orders.    CBC Auto Differential [[id number]]  (Abnormal) Collected: 05/16/24 1642    Specimen: Blood  Updated: 05/16/24 1740     WBC 4.86 10*3/mm3      RBC 4.27 10*6/mm3      Hemoglobin 13.3 g/dL      Hematocrit 41.9 %      MCV 98.1 fL      MCH 31.1 pg      MCHC 31.7 g/dL      RDW 14.3 %      RDW-SD 51.4 fl      MPV 9.8 fL      Platelets 208 10*3/mm3      Neutrophil % 85.9 %      Lymphocyte % 6.6 %      Monocyte % 4.5 %      Eosinophil % 0.6 %      Basophil % 1.4 %      Immature Grans % 1.0 %      Neutrophils, Absolute 4.17 10*3/mm3      Lymphocytes, Absolute 0.32 10*3/mm3      Monocytes, Absolute 0.22 10*3/mm3      Eosinophils, Absolute 0.03 10*3/mm3      Basophils, Absolute 0.07 10*3/mm3      Immature Grans, Absolute 0.05 10*3/mm3      nRBC 0.0 /100 WBC     Protime-INR [[id number]]  (Abnormal) Collected: 05/16/24 1642    Specimen: Blood Updated: 05/16/24 1740     Protime 15.1 Seconds      INR 1.42    [facility] Draw [[id number]] Collected: 05/16/24 1642    Specimen: Blood Updated: 05/16/24 1701    Narrative:      The following orders were created for panel order [facility] Draw.  Procedure                               Abnormality         Status                     ---------                               -----------         ------                     Green Top (Gel)[[id number]]                                  Final result               Lavender Top[[id number]]                                     Final result               Gold Top - SST[[id number]]                                   Final result               Light Blue Top[[id number]]                                   Final result                 Please view results for these tests on the individual orders.    Light Blue Top [[id number]] Collected: 05/16/24 1642    Specimen: Blood Updated: 05/16/24 1701     Extra Tube Hold for add-ons.     Comment: Auto resulted       Green Top (Gel) [[id number]] Collected: 05/16/24 1642    Specimen: Blood Updated: 05/16/24 1646     Extra Tube Hold for add-ons.     Comment: Auto resulted.       Lavender Top [[id number]] Collected: 05/16/24 1642     Specimen: Blood Updated: 05/16/24 1646     Extra Tube hold for add-on     Comment: Auto resulted       Gold Top - SST [[id number]] Collected: 05/16/24 1642    Specimen: Blood Updated: 05/16/24 1646     Extra Tube Hold for add-ons.     Comment: Auto resulted.                I reviewed the patient's new clinical results.    Assessment & Plan       Altered mental status  -CT head with no acute findings  -CXR unremarkable  -UA negative for infection  -Per family patient has improved since she first arrived in the ER      DVT prophylaxis- SCD's  GI prophylaxis- ppi    I discussed the patient's findings and my recommendations with patient.     Iva Dailey, APRN  05/17/24  07:24 EDT

## 2024-05-17 NOTE — THERAPY EVALUATION
Patient Name: Laura Mejia  : 1955    MRN: [id number]                              Today's Date: 2024       Admit Date: 2024    Visit Dx:     ICD-10-CM ICD-9-CM   1. Altered mental status, unspecified altered mental status type  R41.82 780.97     Patient Active Problem List   Diagnosis    New onset atrial fibrillation    Near syncope    S/P CABG (coronary artery bypass graft)    Abnormal nuclear stress test    Altered mental status     Past Medical History:   Diagnosis Date    AAA (abdominal aortic aneurysm)     infrarenal- 3.1cm(, 3.7x4.2cm(), 3.9cm (2017)    Allergic rhinitis     Aspiration pneumonia 2017    CHF (congestive heart failure)     Coronary artery disease     DDD (degenerative disc disease), lumbar     lumbar spine- Dr. Churchill     Depression     Diverticulosis     Frequent UTI     Dr. Daniels    GERD (gastroesophageal reflux disease)     Hiatal hernia     HSV (herpes simplex virus) infection     Oral    Hyperlipidemia     IBS (irritable bowel syndrome)     Constipation predominant    Ischemic cardiomyopathy 2017    AICD     Kidney stones     NSTEMI (non-ST elevated myocardial infarction) 2017    Obstructive sleep apnea 2011    nfsg 2011, ahi 68    Osteoarthritis     Osteopenia     Peripheral neuropathy     feet    Pulmonary embolism, bilateral 2017    Rotator cuff tear     Bilateral- Ortho      Past Surgical History:   Procedure Laterality Date    CARDIAC CATHETERIZATION  2017    99% mid LAD. 90% mid LCX. 60% RCA. Recommended CABG. Dr. Diaz    CARDIAC DEFIBRILLATOR PLACEMENT  2017    ICD implant/ Dr. Ellis    CATARACT EXTRACTION      CORONARY ARTERY BYPASS GRAFT  04/26/2017    x4 & ASD Closure    CYSTOSCOPY      negative. Dr. Daniels    LAPAROSCOPIC CHOLECYSTECTOMY  2013    For biliary dyskinesia. Dr. Mccoy.     REPLACEMENT TOTAL KNEE BILATERAL  2004    Dr. Herrera    THORACENTESIS Left 2017    TONSILLECTOMY      TUBAL  ABDOMINAL LIGATION Bilateral       General Information       Row Name 05/17/24 1559          Physical Therapy Time and Intention    Document Type evaluation  -OD     Mode of Treatment physical therapy  -OD       Row Name 05/17/24 1559          General Information    Patient Profile Reviewed yes  -OD     Prior Level of Function independent:;ADL's;all household mobility;driving  -OD     Existing Precautions/Restrictions fall  -OD     Barriers to Rehab cognitive status  -OD       Row Name 05/17/24 1559          Living Environment    People in Home spouse  -OD       Row Name 05/17/24 1559          Home Main Entrance    Number of Stairs, Main Entrance three;other (see comments)  Per daughter  -OD       Row Name 05/17/24 1559          Stairs Within Home, Primary    Number of Stairs, Within Home, Primary twelve;other (see comments)  Difficult to tell d/t confusion, but pt reports having 17 steps inside.  -OD       Row Name 05/17/24 1559          Cognition    Orientation Status (Cognition) oriented to;person;situation  -OD       Row Name 05/17/24 1559          Safety Issues, Functional Mobility    Safety Issues Affecting Function (Mobility) ability to follow commands;at risk behavior observed;awareness of need for assistance;friction/shear risk;impulsivity;insight into deficits/self-awareness;judgment;positioning of assistive device;problem-solving;safety precaution awareness;sequencing abilities  -OD     Impairments Affecting Function (Mobility) balance;cognition;coordination;endurance/activity tolerance;pain;postural/trunk control;strength  -OD     Cognitive Impairments, Mobility Safety/Performance attention;awareness, need for assistance;impulsivity;insight into deficits/self-awareness;judgment;problem-solving/reasoning;safety precaution awareness;safety precaution follow-through;sequencing abilities  -OD               User Key  (r) = Recorded By, (t) = Taken By, (c) = Cosigned By      Initials Name Provider Type    OD  Meggan Rivera, PT Physical Therapist                   Mobility       Row Name 05/17/24 1601          Bed Mobility    Bed Mobility bed mobility (all) activities  -OD     All Activities, Hall (Bed Mobility) minimum assist (75% patient effort)  -OD     Assistive Device (Bed Mobility) bed rails;draw sheet  -OD       Row Name 05/17/24 1601          Sit-Stand Transfer    Sit-Stand Hall (Transfers) minimum assist (75% patient effort);2 person assist  -OD     Assistive Device (Sit-Stand Transfers) walker, front-wheeled  -OD       Row Name 05/17/24 1601          Gait/Stairs (Locomotion)    Hall Level (Gait) minimum assist (75% patient effort)  -OD     Assistive Device (Gait) walker, front-wheeled  -OD     Patient was able to Ambulate yes  -OD     Distance in Feet (Gait) 20  -OD     Deviations/Abnormal Patterns (Gait) festinating/shuffling;gait speed decreased;base of support, narrow;stride length decreased;weight shifting decreased  -OD               User Key  (r) = Recorded By, (t) = Taken By, (c) = Cosigned By      Initials Name Provider Type    OD Meggan Rivera, PT Physical Therapist                   Obj/Interventions       Row Name 05/17/24 1601          Range of Motion Comprehensive    General Range of Motion no range of motion deficits identified  -OD       Row Name 05/17/24 1601          Strength Comprehensive (MMT)    General Manual Muscle Testing (MMT) Assessment lower extremity strength deficits identified  -OD     Comment, General Manual Muscle Testing (MMT) Assessment Pt demo weakness functionally 3+/5 BLE  -OD       Row Name 05/17/24 1601          Motor Skills    Motor Skills coordination  -OD     Coordination left;finger to nose;minimal impairment  -OD       Row Name 05/17/24 1601          Balance    Balance Assessment sitting static balance  -OD     Static Sitting Balance minimal assist;other (see comments)  Frequently / spontaneously leans and lays back when sitting EOB  -OD      Balance Interventions sitting;moderate challenge;standing;supported  -OD       Row Name 05/17/24 1601          Sensory Assessment (Somatosensory)    Sensory Assessment (Somatosensory) unable/difficult to assess  -OD               User Key  (r) = Recorded By, (t) = Taken By, (c) = Cosigned By      Initials Name Provider Type    Meggan Claire, PT Physical Therapist                   Goals/Plan       Row Name 05/17/24 1610          Bed Mobility Goal 1 (PT)    Activity/Assistive Device (Bed Mobility Goal 1, PT) bed mobility activities, all  -OD     [place] Level/Cues Needed (Bed Mobility Goal 1, PT) independent  -OD     Time Frame (Bed Mobility Goal 1, PT) long term goal (LTG);2 weeks  -OD       Row Name 05/17/24 1610          Transfer Goal 1 (PT)    Activity/Assistive Device (Transfer Goal 1, PT) transfers, all;walker, rolling  -OD     [place] Level/Cues Needed (Transfer Goal 1, PT) modified independence  -OD     Time Frame (Transfer Goal 1, PT) long term goal (LTG);2 weeks  -OD       Row Name 05/17/24 1610          Gait Training Goal 1 (PT)    Activity/Assistive Device (Gait Training Goal 1, PT) gait (walking locomotion);assistive device use;walker, rolling  -OD     [place] Level (Gait Training Goal 1, PT) modified independence  -OD     Distance (Gait Training Goal 1, PT) 50  -OD     Time Frame (Gait Training Goal 1, PT) long term goal (LTG);2 weeks  -OD       Row Name 05/17/24 1610          Stairs Goal 1 (PT)    Activity/Assistive Device (Stairs Goal 1, PT) stairs, all skills  -OD     [place] Level/Cues Needed (Stairs Goal 1, PT) independent  -OD     Number of Stairs (Stairs Goal 1, PT) 12  -OD     Time Frame (Stairs Goal 1, PT) long term goal (LTG);2 weeks  -OD       Row Name 05/17/24 1610          Therapy Assessment/Plan (PT)    Planned Therapy Interventions (PT) balance training;bed mobility training;gait training;home exercise program;neuromuscular re-education;ROM (range of motion);stair  training;strengthening;stretching;patient/family education;transfer training;postural re-education;vestibular therapy  -OD               User Key  (r) = Recorded By, (t) = Taken By, (c) = Cosigned By      Initials Name Provider Type    OD Meggan Rivera, PT Physical Therapist                   Clinical Impression       Row Name 05/17/24 1607          Pain    Additional Documentation Pain Scale: FACES Pre/Post-Treatment (Group)  -OD       Row Name 05/17/24 1603          Pain Scale: FACES Pre/Post-Treatment    Pain: FACES Scale, Pretreatment 4-->hurts little more  -OD     Posttreatment Pain Rating 4-->hurts little more  -OD     Pain Location - Side/Orientation Bilateral  -OD     Pain Location lower  -OD     Pain Location - extremity;back  -OD       Row Name 05/17/24 1606          Plan of Care Review    Plan of Care Reviewed With patient;daughter  -OD     Progress no change  -OD     Outcome Evaluation Annie Mejia is a 69 y/o F admitted to [facility] on 5/16/24 for general weakness and confusion. UA (-), CXR (-). RN reports orders for CTA carotid arteries and MRI of head. Pt's daughter present throughout to assist with obtaining PLOF. At baseline, pt lives with her spouse and is typically IND with ADLs and mobility without AD. Pt owns RW, uses infrequently. Pt this date oriented to person and situation only. Pt required Bobbi for supine>sit EOB, but once sitting, had variable sitting balance. Pt would lean posteriorly, then spontaneously just lay back, almost hitting her head on the bed-rail. Min-modA required for seated balance, pt denied symptoms of dizziness/lightheadedness and reported she was sitting up fine. BP was WNL. Pt then began to report significant low back pain, requiring minAx2 for STS with RW, with mild symptom improvement. Pt ambulated to bathroom with RW and shuffled, variable stride length. Upon sitting in recliner, occulomotor exam showed impaired smooth pursuit and overshooting with saccades bilat.  Coordination tests showed mildly impaired L finger to nose. Pt began attempting to connect HR monitor and O2 monitor to one another, was difficult to redirect. Pt appears below baseline function and would benefit from SNF pending progress while admitted. PT will continue to follow.  -OD       Row Name 05/17/24 1603          Therapy Assessment/Plan (PT)    Rehab Potential (PT) good, to achieve stated therapy goals  -OD     Criteria for Skilled Interventions Met (PT) yes;meets criteria  -OD     Therapy Frequency (PT) 5 times/wk  -OD     Predicted Duration of Therapy Intervention (PT) until d/c  -OD       Row Name 05/17/24 1603          Vital Signs    Pre Systolic BP Rehab 99  -OD     Pre Treatment Diastolic BP 60  -OD     Intra Systolic BP Rehab 140  -OD     Intra Treatment Diastolic BP 79  -OD     O2 Delivery Pre Treatment room air  -OD     O2 Delivery Intra Treatment room air  -OD     O2 Delivery Post Treatment room air  -OD     Pre Patient Position Supine  -OD     Intra Patient Position Standing  -OD     Post Patient Position Sitting  -OD       Row Name 05/17/24 1603          Positioning and Restraints    Pre-Treatment Position in bed  -OD     Post Treatment Position chair  -OD     In Chair notified nsg;reclined;call light within reach;encouraged to call for assist;exit alarm on;with family/caregiver  -OD               User Key  (r) = Recorded By, (t) = Taken By, (c) = Cosigned By      Initials Name Provider Type    OD Meggan Rivera, PT Physical Therapist                   Outcome Measures       Row Name 05/17/24 1611 05/17/24 0856       How much help from another person do you currently need...    Turning from your back to your side while in flat bed without using bedrails? 4  -OD 4  -EY    Moving from lying on back to sitting on the side of a flat bed without bedrails? 3  -OD 4  -EY    Moving to and from a bed to a chair (including a wheelchair)? 3  -OD 4  -EY    Standing up from a chair using your arms (e.g.,  wheelchair, bedside chair)? 2  -OD 4  -EY    Climbing 3-5 steps with a railing? 2  -OD 4  -EY    To walk in hospital room? 3  -OD 4  -EY    AM-PAC 6 Clicks Score (PT) 17  -OD 24  -EY    Highest Level of Mobility Goal 5 --> Static standing  -OD 8 --> Walked 250 feet or more  -EY      Row Name 05/17/24 1611          Functional Assessment    Outcome Measure Options AM-PAC 6 Clicks Basic Mobility (PT)  -OD               User Key  (r) = Recorded By, (t) = Taken By, (c) = Cosigned By      Initials Name Provider Type    EY Leigh Ann Rivers RN Registered Nurse    OD Meggan Rivera, PT Physical Therapist                                 Physical Therapy Education       Title: PT OT SLP Therapies (Done)       Topic: Physical Therapy (Done)       Point: Mobility training (Done)       Learning Progress Summary             Patient Acceptance, E, VU by OD at 5/17/2024 1619                         Point: Home exercise program (Done)       Learning Progress Summary             Patient Acceptance, E, VU by OD at 5/17/2024 1619                         Point: Body mechanics (Done)       Learning Progress Summary             Patient Acceptance, E, VU by OD at 5/17/2024 1619                         Point: Precautions (Done)       Learning Progress Summary             Patient Acceptance, E, VU by OD at 5/17/2024 1619                                         User Key       Initials Effective Dates Name Provider Type Discipline    OD 05/11/23 -  Meggan Rivera, PT Physical Therapist PT                  PT Recommendation and Plan  Planned Therapy Interventions (PT): balance training, bed mobility training, gait training, home exercise program, neuromuscular re-education, ROM (range of motion), stair training, strengthening, stretching, patient/family education, transfer training, postural re-education, vestibular therapy  Plan of Care Reviewed With: patient, daughter  Progress: no change  Outcome Evaluation: Annie Mejia is a 69 y/o F admitted  to [facility] on 5/16/24 for general weakness and confusion. UA (-), CXR (-). RN reports orders for CTA carotid arteries and MRI of head. Pt's daughter present throughout to assist with obtaining PLOF. At baseline, pt lives with her spouse and is typically IND with ADLs and mobility without AD. Pt owns RW, uses infrequently. Pt this date oriented to person and situation only. Pt required Bobbi for supine>sit EOB, but once sitting, had variable sitting balance. Pt would lean posteriorly, then spontaneously just lay back, almost hitting her head on the bed-rail. Min-modA required for seated balance, pt denied symptoms of dizziness/lightheadedness and reported she was sitting up fine. BP was WNL. Pt then began to report significant low back pain, requiring minAx2 for STS with RW, with mild symptom improvement. Pt ambulated to bathroom with RW and shuffled, variable stride length. Upon sitting in recliner, occulomotor exam showed impaired smooth pursuit and overshooting with saccades bilat. Coordination tests showed mildly impaired L finger to nose. Pt began attempting to connect HR monitor and O2 monitor to one another, was difficult to redirect. Pt appears below baseline function and would benefit from SNF pending progress while admitted. PT will continue to follow.     Time Calculation:         PT Charges       Row Name 05/17/24 1610             Time Calculation    Start Time 1300  -OD      Stop Time 1330  -OD      Time Calculation (min) 30 min  -OD      PT Received On 05/17/24  -OD      PT - Next Appointment 05/20/24  -OD      PT Goal Re-Cert Due Date 05/31/24  -OD         Time Calculation- PT    Total Timed Code Minutes- PT 0 minute(s)  -OD                User Key  (r) = Recorded By, (t) = Taken By, (c) = Cosigned By      Initials Name Provider Type    OD Meggan Rivera PT Physical Therapist                  Therapy Charges for Today       Code Description Service Date Service Provider Modifiers Qty    [id number]  PT  EVAL MOD COMPLEXITY 4 5/17/2024 Meggan Rivera, PT GP 1            PT G-Codes  Outcome Measure Options: AM-PAC 6 Clicks Basic Mobility (PT)  AM-PAC 6 Clicks Score (PT): 17  PT Discharge Summary  Anticipated Discharge Disposition (PT): skilled nursing facility    Meggan Rivera, PRACHI  5/17/2024

## 2024-05-18 PROBLEM — R41.82 ALTERED MENTAL STATUS, UNSPECIFIED: Status: ACTIVE | Noted: 2024-05-18

## 2024-05-18 LAB
ANION GAP SERPL CALCULATED.3IONS-SCNC: 10 MMOL/L (ref 5–15)
BACTERIA UR QL AUTO: ABNORMAL /HPF
BASOPHILS # BLD AUTO: 0.05 10*3/MM3 (ref 0–0.2)
BASOPHILS NFR BLD AUTO: 1 % (ref 0–1.5)
BILIRUB UR QL STRIP: NEGATIVE
BUN SERPL-MCNC: 11 MG/DL (ref 8–23)
BUN/CREAT SERPL: 11.2 (ref 7–25)
CALCIUM SPEC-SCNC: 8.9 MG/DL (ref 8.6–10.5)
CHLORIDE SERPL-SCNC: 111 MMOL/L (ref 98–107)
CLARITY UR: ABNORMAL
CO2 SERPL-SCNC: 20 MMOL/L (ref 22–29)
COLOR UR: YELLOW
CREAT SERPL-MCNC: 0.98 MG/DL (ref 0.57–1)
DEPRECATED RDW RBC AUTO: 50 FL (ref 37–54)
EGFRCR SERPLBLD CKD-EPI 2021: 62.6 ML/MIN/1.73
EOSINOPHIL # BLD AUTO: 0.07 10*3/MM3 (ref 0–0.4)
EOSINOPHIL NFR BLD AUTO: 1.4 % (ref 0.3–6.2)
ERYTHROCYTE [DISTWIDTH] IN BLOOD BY AUTOMATED COUNT: 14.3 % (ref 12.3–15.4)
GLUCOSE SERPL-MCNC: 121 MG/DL (ref 65–99)
GLUCOSE UR STRIP-MCNC: NEGATIVE MG/DL
HCT VFR BLD AUTO: 36.4 % (ref 34–46.6)
HGB BLD-MCNC: 11.9 G/DL (ref 12–15.9)
HGB UR QL STRIP.AUTO: ABNORMAL
HYALINE CASTS UR QL AUTO: ABNORMAL /LPF
IMM GRANULOCYTES # BLD AUTO: 0.04 10*3/MM3 (ref 0–0.05)
IMM GRANULOCYTES NFR BLD AUTO: 0.8 % (ref 0–0.5)
INR PPP: 1.05 (ref 2–3)
KETONES UR QL STRIP: NEGATIVE
LEUKOCYTE ESTERASE UR QL STRIP.AUTO: ABNORMAL
LYMPHOCYTES # BLD AUTO: 0.62 10*3/MM3 (ref 0.7–3.1)
LYMPHOCYTES NFR BLD AUTO: 12.4 % (ref 19.6–45.3)
MCH RBC QN AUTO: 31.3 PG (ref 26.6–33)
MCHC RBC AUTO-ENTMCNC: 32.7 G/DL (ref 31.5–35.7)
MCV RBC AUTO: 95.8 FL (ref 79–97)
MONOCYTES # BLD AUTO: 0.38 10*3/MM3 (ref 0.1–0.9)
MONOCYTES NFR BLD AUTO: 7.6 % (ref 5–12)
NEUTROPHILS NFR BLD AUTO: 3.83 10*3/MM3 (ref 1.7–7)
NEUTROPHILS NFR BLD AUTO: 76.8 % (ref 42.7–76)
NITRITE UR QL STRIP: POSITIVE
NRBC BLD AUTO-RTO: 0 /100 WBC (ref 0–0.2)
NT-PROBNP SERPL-MCNC: 1307 PG/ML (ref 0–900)
PH UR STRIP.AUTO: 8 [PH] (ref 5–8)
PLATELET # BLD AUTO: 170 10*3/MM3 (ref 140–450)
PMV BLD AUTO: 9.8 FL (ref 6–12)
POTASSIUM SERPL-SCNC: 4 MMOL/L (ref 3.5–5.2)
PROT UR QL STRIP: ABNORMAL
PROTHROMBIN TIME: 11.4 SECONDS (ref 19.4–28.5)
RBC # BLD AUTO: 3.8 10*6/MM3 (ref 3.77–5.28)
RBC # UR STRIP: ABNORMAL /HPF
REF LAB TEST METHOD: ABNORMAL
SODIUM SERPL-SCNC: 141 MMOL/L (ref 136–145)
SP GR UR STRIP: 1.02 (ref 1–1.03)
SQUAMOUS #/AREA URNS HPF: ABNORMAL /HPF
UROBILINOGEN UR QL STRIP: ABNORMAL
WBC # UR STRIP: ABNORMAL /HPF
WBC NRBC COR # BLD AUTO: 4.99 10*3/MM3 (ref 3.4–10.8)

## 2024-05-18 PROCEDURE — 85025 COMPLETE CBC W/AUTO DIFF WBC: CPT

## 2024-05-18 PROCEDURE — 93010 ELECTROCARDIOGRAM REPORT: CPT | Performed by: INTERNAL MEDICINE

## 2024-05-18 PROCEDURE — 86618 LYME DISEASE ANTIBODY: CPT | Performed by: NURSE PRACTITIONER

## 2024-05-18 PROCEDURE — 87077 CULTURE AEROBIC IDENTIFY: CPT | Performed by: NURSE PRACTITIONER

## 2024-05-18 PROCEDURE — 87186 SC STD MICRODIL/AGAR DIL: CPT | Performed by: NURSE PRACTITIONER

## 2024-05-18 PROCEDURE — 82040 ASSAY OF SERUM ALBUMIN: CPT | Performed by: NURSE PRACTITIONER

## 2024-05-18 PROCEDURE — 93005 ELECTROCARDIOGRAM TRACING: CPT | Performed by: INTERNAL MEDICINE

## 2024-05-18 PROCEDURE — 87484 EHRLICHA CHAFFEENSIS AMP PRB: CPT | Performed by: NURSE PRACTITIONER

## 2024-05-18 PROCEDURE — 25010000002 CEFTRIAXONE PER 250 MG: Performed by: FAMILY MEDICINE

## 2024-05-18 PROCEDURE — 87798 DETECT AGENT NOS DNA AMP: CPT | Performed by: NURSE PRACTITIONER

## 2024-05-18 PROCEDURE — 85610 PROTHROMBIN TIME: CPT

## 2024-05-18 PROCEDURE — 81001 URINALYSIS AUTO W/SCOPE: CPT | Performed by: NURSE PRACTITIONER

## 2024-05-18 PROCEDURE — 80048 BASIC METABOLIC PNL TOTAL CA: CPT

## 2024-05-18 PROCEDURE — 99222 1ST HOSP IP/OBS MODERATE 55: CPT

## 2024-05-18 PROCEDURE — 99222 1ST HOSP IP/OBS MODERATE 55: CPT | Performed by: INTERNAL MEDICINE

## 2024-05-18 PROCEDURE — 83880 ASSAY OF NATRIURETIC PEPTIDE: CPT | Performed by: INTERNAL MEDICINE

## 2024-05-18 PROCEDURE — 87086 URINE CULTURE/COLONY COUNT: CPT | Performed by: NURSE PRACTITIONER

## 2024-05-18 PROCEDURE — 87468 ANAPLSMA PHGCYTOPHLM AMP PRB: CPT | Performed by: NURSE PRACTITIONER

## 2024-05-18 RX ORDER — UREA 10 %
5 LOTION (ML) TOPICAL NIGHTLY
Status: DISCONTINUED | OUTPATIENT
Start: 2024-05-18 | End: 2024-05-22 | Stop reason: HOSPADM

## 2024-05-18 RX ADMIN — Medication 5 MG: at 22:46

## 2024-05-18 RX ADMIN — WARFARIN SODIUM 3 MG: 3 TABLET ORAL at 17:58

## 2024-05-18 RX ADMIN — CETIRIZINE HYDROCHLORIDE 10 MG: 10 TABLET, FILM COATED ORAL at 08:11

## 2024-05-18 RX ADMIN — SODIUM BICARBONATE 1300 MG: 650 TABLET ORAL at 21:34

## 2024-05-18 RX ADMIN — PANTOPRAZOLE SODIUM 40 MG: 40 TABLET, DELAYED RELEASE ORAL at 06:22

## 2024-05-18 RX ADMIN — ATORVASTATIN CALCIUM 80 MG: 40 TABLET, FILM COATED ORAL at 08:11

## 2024-05-18 RX ADMIN — ACETAMINOPHEN 650 MG: 325 TABLET, FILM COATED ORAL at 06:25

## 2024-05-18 RX ADMIN — OXYBUTYNIN CHLORIDE 5 MG: 5 TABLET, EXTENDED RELEASE ORAL at 08:11

## 2024-05-18 RX ADMIN — SODIUM BICARBONATE 1300 MG: 650 TABLET ORAL at 08:11

## 2024-05-18 RX ADMIN — Medication 10 ML: at 21:34

## 2024-05-18 RX ADMIN — Medication 10 ML: at 08:11

## 2024-05-18 RX ADMIN — LEVOTHYROXINE SODIUM 88 MCG: 88 TABLET ORAL at 06:22

## 2024-05-18 RX ADMIN — PAROXETINE HYDROCHLORIDE 40 MG: 20 TABLET, FILM COATED ORAL at 08:11

## 2024-05-18 RX ADMIN — ASPIRIN 81 MG CHEWABLE TABLET 81 MG: 81 TABLET CHEWABLE at 08:11

## 2024-05-18 RX ADMIN — CEFTRIAXONE 1000 MG: 1 INJECTION, POWDER, FOR SOLUTION INTRAMUSCULAR; INTRAVENOUS at 22:45

## 2024-05-18 RX ADMIN — TOPIRAMATE 50 MG: 25 TABLET, FILM COATED ORAL at 08:11

## 2024-05-18 RX ADMIN — MIDODRINE HYDROCHLORIDE 10 MG: 5 TABLET ORAL at 21:34

## 2024-05-18 RX ADMIN — DILTIAZEM HYDROCHLORIDE 240 MG: 240 CAPSULE, EXTENDED RELEASE ORAL at 08:11

## 2024-05-18 RX ADMIN — IBUPROFEN 600 MG: 200 TABLET, FILM COATED ORAL at 21:34

## 2024-05-18 NOTE — PROGRESS NOTES
"Pharmacy dosing service  Anticoagulant  Warfarin     Subjective:    Laura Mejia is a 68 y.o.female being continued on warfarin for Atrial Fibrillation / Flutter.    INR Goal: 2 - 3  Home medication?: warfarin 3 mg PO daily, except warfarin 6 mg PO on Mon/Wed/Fri.   Bridge Therapy Present?:  No  Interacting Medications Evaluation (New/Present/Discontinued): None at this time      Assessment/Plan:    Patient received an increased dose of warfarin 9mg yesterday (50% daily dose increase). Will resume home dosing today and plan to give warfarin 3 mg.     Continue to monitor and adjust based on INR.         Date 5/17 5/18          INR 1.10 1.05          Dose 9 mg 3 mg              Objective:  [Ht: 175.5 cm (69.09\"); Wt: 87.8 kg (193 lb 9 oz); BMI: Body mass index is 28.51 kg/m².]    Lab Results   Component Value Date    ALBUMIN 3.2 (L) 01/26/2024     Lab Results   Component Value Date    INR 1.05 (L) 05/18/2024    INR 1.10 (L) 05/17/2024    INR 1.42 (L) 05/16/2024    PROTIME 11.4 (L) 05/18/2024    PROTIME 11.9 (L) 05/17/2024    PROTIME 15.1 (L) 05/16/2024     Lab Results   Component Value Date    HGB 11.9 (L) 05/18/2024    HGB 12.7 05/17/2024    HGB 11.7 (L) 05/16/2024     Lab Results   Component Value Date    HCT 36.4 05/18/2024    HCT 39.7 05/17/2024    HCT 37.0 05/16/2024       Destinee Leon [facility]  05/18/24 08:55 EDT         "

## 2024-05-18 NOTE — CONSULTS
Cardiology Consult Note    Patient Identification:  Name: Laura Mejia  Age: 68 y.o.  Sex: female  :  1955  MRN: [id number]             Requesting Physician :  rhythm change, concern for atrial flutter    Reason for Consultation / Chief Complaint :   Keke Casiano    History of Present Illness:      Ms. Mejia is a 67 yo female who is a patient of Dr. Mondragon. Pmh includes     # CAD s/p prior MI and CABG and ASD closure   # pAfib on warfarin  # CHADS Vasc at least 3, on warfarin  # prior PE on warfarin  # ICM / systolic HF with now improved EF per ECHO 2024 LVEF 50%  # s/p ICD, single chamber  # AAA   # hypothyroidism  # CKD III  # former smoker  # mild-moderate AS     Ms. Mejia presented to ER with weakness and confusion. Patient reportedly had been hallucinating. Patient has been weak for the last 3-4 days. She had negative CT head, UA unremarkable. Cardiology has been consulted with concerns for rhythm change / atrial flutter on ECG.    Data:  Labs 2024 and 2024 reveal creatinine 1.08, glucose elevated,   EKG done 2024 reviewed/interpreted by me reveals sinus rhythm with frequent PACs at the rate of 98 bpm    Review of records:  Echocardiogram 2024 reveals mild to moderate aortic valve stenosis, left atrial enlargement, mild TR, EF of 40%, possible left ventricular apical thrombus, pressure lately did not    Assessment:  :    Altered mental status  CAD, history of MI, CABG, ASD closure  Paroxysmal A-fib on long-term warfarin  Prior history of PE on long-term warfarin  History of ischemic cardiomyopathy and CHF-improved  ICD  AAA, stent repair  CKD  Aortic stenosis      Recommendations / Plan:        Telemetry to monitor rhythm  Patient currently has controlled ventricular response  Patient had echo done in January which showed possible apical thrombus will schedule for transesophageal echo next week.  Continue anticoagulation to keep INR between 2 and 3.  Patient  does not have any cardiac etiologies which can explain her altered mental status.  Will defer to primary team to evaluate and treat.           Diagnosis Plan   1. Altered mental status, unspecified altered mental status type                   Past Medical History:  Past Medical History:   Diagnosis Date    AAA (abdominal aortic aneurysm)     infrarenal- 3.1cm(2010, 3.7x4.2cm(2016), 3.9cm (2017)    Allergic rhinitis     Aspiration pneumonia 05/2017    CHF (congestive heart failure)     Coronary artery disease     DDD (degenerative disc disease), lumbar     lumbar spine- Dr. Churchill     Depression     Diverticulosis     Frequent UTI     Dr. Daniels    GERD (gastroesophageal reflux disease)     Hiatal hernia     HSV (herpes simplex virus) infection     Oral    Hyperlipidemia     IBS (irritable bowel syndrome)     Constipation predominant    Ischemic cardiomyopathy 09/2017    AICD     Kidney stones     NSTEMI (non-ST elevated myocardial infarction) 04/26/2017    Obstructive sleep apnea 2011    nfsg 2011, ahi 67    Osteoarthritis     Osteopenia     Peripheral neuropathy     feet    Pulmonary embolism, bilateral 05/2017    Rotator cuff tear     Bilateral- Ortho      Past Surgical History:  Past Surgical History:   Procedure Laterality Date    CARDIAC CATHETERIZATION  04/26/2017    99% mid LAD. 90% mid LCX. 60% RCA. Recommended CABG. Dr. Diaz    CARDIAC DEFIBRILLATOR PLACEMENT  12/26/2017    ICD implant/ Dr. Ellis    CATARACT EXTRACTION      CORONARY ARTERY BYPASS GRAFT  04/26/2017    x4 & ASD Closure    CYSTOSCOPY  2002    negative. Dr. Daniels    LAPAROSCOPIC CHOLECYSTECTOMY  04/17/2013    For biliary dyskinesia. Dr. Mccoy.     REPLACEMENT TOTAL KNEE BILATERAL  11/2004    Dr. Herrera    THORACENTESIS Left 05/08/2017    TONSILLECTOMY      TUBAL ABDOMINAL LIGATION Bilateral       Allergies:  Allergies   Allergen Reactions    Oxytetracycline Hives    Oxycodone Itching     Home Meds:  Medications Prior to Admission    Medication Sig Dispense Refill Last Dose    aspirin 81 MG chewable tablet Chew 1 tablet Daily.   Past Week    carvedilol (COREG) 3.125 MG tablet Take 1 tablet by mouth 2 (Two) Times a Day With Meals.   Past Week    dilTIAZem CD (CARDIZEM CD) 240 MG 24 hr capsule Take 1 capsule by mouth Daily. 30 capsule 1 Past Week    famciclovir (FAMVIR) 500 MG tablet Take 1 tablet by mouth 3 (Three) Times a Day As Needed.   Past Month    levocetirizine (XYZAL) 5 MG tablet Take 1 tablet by mouth Every Evening.   Past Week    levothyroxine (SYNTHROID, LEVOTHROID) 88 MCG tablet Take 1 tablet by mouth Every Morning. 30 tablet 1 Past Week    linaclotide (LINZESS) 72 MCG capsule capsule Take 1 capsule by mouth Every Morning Before Breakfast.       omeprazole (priLOSEC) 40 MG capsule Take 1 capsule by mouth Daily.   Past Week    PARoxetine (PAXIL) 40 MG tablet Take 1 tablet by mouth Every Morning.   Past Week    topiramate (TOPAMAX) 50 MG tablet Take 1 tablet by mouth Daily.   Past Week    trospium (SANCTURA) 20 MG tablet Take 1 tablet by mouth Every Night.   Past Week    warfarin (COUMADIN) 3 MG tablet See Admin Instructions. Take 1 tablet by mouth on Tuesday, Thursday, Saturday, and Sunday   Past Week    warfarin (COUMADIN) 3 MG tablet Take 2 tablets by mouth Daily. Monday Wednesday, Friday       atorvastatin (LIPITOR) 80 MG tablet Take 1 tablet by mouth Daily.   Unknown    ezetimibe (ZETIA) 10 MG tablet Take 1 tablet by mouth Daily.   Unknown    midodrine (PROAMATINE) 10 MG tablet Take 1 tablet by mouth Every 8 (Eight) Hours. 90 tablet 1 Unknown    sodium bicarbonate 650 MG tablet Take 2 tablets by mouth 3 (Three) Times a Day. 90 tablet 1 Unknown     Current Meds:     Current Facility-Administered Medications:     acetaminophen (TYLENOL) tablet 650 mg, 650 mg, Oral, Q4H PRN, Iva Dailey APRN, 650 mg at 05/18/24 0625    aspirin chewable tablet 81 mg, 81 mg, Oral, Daily, Iva Dailey APRN, 81 mg at 05/18/24 0811     atorvastatin (LIPITOR) tablet 80 mg, 80 mg, Oral, Daily, Iva Dailey APRN, 80 mg at 05/18/24 0811    sennosides-docusate (PERICOLACE) 8.6-50 MG per tablet 2 tablet, 2 tablet, Oral, BID PRN **AND** polyethylene glycol (MIRALAX) packet 17 g, 17 g, Oral, Daily PRN **AND** bisacodyl (DULCOLAX) EC tablet 5 mg, 5 mg, Oral, Daily PRN **AND** bisacodyl (DULCOLAX) suppository 10 mg, 10 mg, Rectal, Daily PRN, Iva Dailey APRN    calcium carbonate (TUMS) chewable tablet 500 mg (200 mg elemental), 2 tablet, Oral, TID PRN, Carolin Hatfield MD, 2 tablet at 05/17/24 1107    Calcium Replacement - Follow Nurse / BPA Driven Protocol, , Does not apply, PRN, Iva Dailey APRN    carvedilol (COREG) tablet 3.125 mg, 3.125 mg, Oral, BID With Meals, Iva Dailey APRN    cetirizine (zyrTEC) tablet 10 mg, 10 mg, Oral, Daily, Iva Dailey APRN, 10 mg at 05/18/24 0811    dilTIAZem CD (CARDIZEM CD) 24 hr capsule 240 mg, 240 mg, Oral, Q24H, Iva Dailey APRN, 240 mg at 05/18/24 0811    ibuprofen (ADVIL,MOTRIN) tablet 600 mg, 600 mg, Oral, Q8H PRN, Carolin Hatfield MD    levothyroxine (SYNTHROID, LEVOTHROID) tablet 88 mcg, 88 mcg, Oral, Q AM, Iva Dailey APRN, 88 mcg at 05/18/24 0622    Magnesium Standard Dose Replacement - Follow Nurse / BPA Driven Protocol, , Does not apply, PRN, Iva Dailey APRN    midodrine (PROAMATINE) tablet 10 mg, 10 mg, Oral, Q8H, Iva Dailey APRN    nitroglycerin (NITROSTAT) SL tablet 0.4 mg, 0.4 mg, Sublingual, Q5 Min PRN, Iva Dailey, APRN    ondansetron (ZOFRAN) injection 4 mg, 4 mg, Intravenous, Q6H PRN, Iva Dailey APRN, 4 mg at 05/17/24 1248    oxybutynin XL (DITROPAN-XL) 24 hr tablet 5 mg, 5 mg, Oral, Daily, Iva Dailey APRN, 5 mg at 05/18/24 0811    pantoprazole (PROTONIX) EC tablet 40 mg, 40 mg, Oral, Q AM, Iva Dailey APRN, 40 mg at 05/18/24 0622    PARoxetine (PAXIL) tablet 40 mg, 40 mg, Oral, QAM, Iva Dailey APRN, 40 mg at 05/18/24 0811     Pharmacy to dose warfarin, , Does not apply, Continuous PRN, Carolin Hatfield MD    Phosphorus Replacement - Follow Nurse / BPA Driven Protocol, , Does not apply, PRN, Iva Dailey APRN    Potassium Replacement - Follow Nurse / BPA Driven Protocol, , Does not apply, PRN, Iva Dailey L, APRN    sodium bicarbonate tablet 1,300 mg, 1,300 mg, Oral, TID, Iva Dailey, APRN, 1,300 mg at 05/18/24 0811    sodium chloride 0.9 % flush 10 mL, 10 mL, Intravenous, PRN, Emergency, Triage Protocol, MD    [COMPLETED] Insert Peripheral IV, , , Once **AND** sodium chloride 0.9 % flush 10 mL, 10 mL, Intravenous, PRN, Juan Mar MD    sodium chloride 0.9 % flush 10 mL, 10 mL, Intravenous, Q12H, Iva Dailey, APRN, 10 mL at 05/18/24 0811    sodium chloride 0.9 % flush 10 mL, 10 mL, Intravenous, PRN, Iva Dailey, APRN    sodium chloride 0.9 % infusion 40 mL, 40 mL, Intravenous, PRN, Iva Dailey L, APRN    topiramate (TOPAMAX) tablet 50 mg, 50 mg, Oral, Daily, Iva Dailey APRN, 50 mg at 05/18/24 0811    warfarin (COUMADIN) tablet 3 mg, 3 mg, Oral, Once per day on Sunday Tuesday Thursday Saturday, Iva Dailey APRN, 3 mg at 05/18/24 1758    warfarin (COUMADIN) tablet 6 mg, 6 mg, Oral, Once per day on Monday Wednesday Friday, Carolin Hatfield MD, 6 mg at 05/17/24 1805  Social History:   Social History     Tobacco Use    Smoking status: Never    Smokeless tobacco: Not on file   Substance Use Topics    Alcohol use: Never      Family History:  Family History   Problem Relation Age of Onset    Heart disease Mother     Other Mother         Hypoglycemia    Osteoporosis Mother     COPD Father     Stroke Father     Hypertension Brother     Diabetes Brother     Heart disease Brother         Review of Systems : Review of Systems   All other systems reviewed and are negative.                 Constitutional:  Temp:  [97.4 °F (36.3 °C)-98.9 °F (37.2 °C)] 98 °F (36.7 °C)  Heart Rate:  [] 106  Resp:  [16-18] 18  BP:  "(104-122)/(60-65) 104/62    Physical Exam   /62 (BP Location: Left arm, Patient Position: Lying)   Pulse 106   Temp 98 °F (36.7 °C) (Oral)   Resp 18   Ht 175.5 cm (69.09\")   Wt 87.8 kg (193 lb 9 oz)   SpO2 94%   BMI 28.51 kg/m²   Physical Exam  General:  Appears in no acute distress  Eyes: Sclerae are anicteric,  conjunctivae are clear   HEENT:  No JVD. Thyroid not visibly enlarged. No mucosal pallor or cyanosis  Respiratory: Respirations regular and unlabored at rest.  Bilaterally good breath sounds with good air entry in all fields. No crackles, rubs or wheezes auscultated  Cardiovascular: S1,S2 Regular rate and rhythm. No murmur, rub or gallop auscultated. No pretibial pitting edema  Gastrointestinal: Abdomen soft, flat, nontender. Bowel sounds present.   Musculoskeletal:  No abnormal movements  Extremities: No digital clubbing or cyanosis  Skin: Color pink. Skin warm and dry to touch. No rashes  No xanthoma  Neuro: Alert and awake, no lateralizing deficits appreciated    Cardiographics  ECG: EKG tracing was  personally reviewed/interpreted by me  ECG 12 Lead Rhythm Change   Preliminary Result   HEART RATE= 98  bpm   RR Interval= 628  ms   PA Interval= 117  ms   P Horizontal Axis= 143  deg   P Front Axis= 37  deg   QRSD Interval= 82  ms   QT Interval= 383  ms   QTcB= 483  ms   QRS Axis= 32  deg   T Wave Axis= 21  deg   - ABNORMAL ECG -   Sinus rhythm   Atrial premature complexes   Probable anteroseptal infarct, old   When compared with ECG of 18-May-2024 15:18:25,   No significant change   Electronically Signed By:    Date and Time of Study: 2024-05-18 16:16:42      ECG 12 Lead Rhythm Change   Preliminary Result   HEART RATE= 96  bpm   RR Interval= 593  ms   PA Interval=   ms   P Horizontal Axis=   deg   P Front Axis=   deg   QRSD Interval= 80  ms   QT Interval= 354  ms   QTcB= 460  ms   QRS Axis= 18  deg   T Wave Axis= 49  deg   - ABNORMAL ECG -   Atrial flutter with predominant 3:1 AV block "   Anteroseptal infarct, age indeterminate   Electronically Signed By:    Date and Time of Study: 2024-05-18 15:18:25      ECG 12 Lead Rhythm Change   Preliminary Result   HEART RATE= 81  bpm   RR Interval= 736  ms   WV Interval= 156  ms   P Horizontal Axis= 23  deg   P Front Axis= 40  deg   QRSD Interval= 89  ms   QT Interval= 416  ms   QTcB= 485  ms   QRS Axis= 18  deg   T Wave Axis= 41  deg   - ABNORMAL ECG -   Sinus rhythm   Anteroseptal infarct, age indeterminate   When compared with ECG of 25-Jan-2024 13:34:30,   Significant change in rhythm   New or worsened ischemia or infarction   Electronically Signed By:    Date and Time of Study: 2024-05-17 09:44:47          Telemetry: Sinus rhythm with frequent PACs    Echocardiogram:   Results for orders placed during the hospital encounter of 01/22/24    Adult Transthoracic Echo Complete W/ Cont if Necessary Per Protocol    Interpretation Summary    Left ventricular ejection fraction appears to be 56 - 60%.    Estimated right ventricular systolic pressure from tricuspid regurgitation is normal (<35 mmHg).    Indication  Dyspnea  Palpitations    Technically satisfactory study.  Mitral valve is structurally normal.  Tricuspid valve is structurally normal.  Mild tricuspid regurgitation is present.  Aortic valve is aortic valve with decreased opening motion.  Gradient across the aortic valve is 16/9 mmHg.  Valve area 1.37 cm².  Pulmonic valve could not be well visualized.  Left atrium is enlarged.  Right atrium is normal in size.  Left ventricle is enlarged with septal anterior wall and apical severe hypokinesis with ejection fraction of 40%.  Possible left ventricular apical thrombus.  Right ventricle is normal in size.  Atrial septum is intact.  Aorta is normal.  No pericardial effusion or intracardiac thrombus is seen.  ICD lead is present in the right ventricle.    Impression  Mild tricuspid regurgitation.  Mild to moderate aortic valve stenosis with gradient of 16/9  mmHg and valve area of 1.37 cm².  Left atrial enlargement.  Left ventricle is enlarged with septal anterior wall and apical severe hypokinesis with ejection fraction of 40%.  Possible left ventricular apical thrombus.  ICD lead is present in the right ventricle.  No evidence for pulmonary hypertension is present.      Imaging  Chest X-ray:   Imaging Results (Last 24 Hours)       ** No results found for the last 24 hours. **            Lab Review: I have reviewed the labs          Results from last 7 days   Lab Units 05/18/24  0110   SODIUM mmol/L 141   POTASSIUM mmol/L 4.0   BUN mg/dL 11   CREATININE mg/dL 0.98   CALCIUM mg/dL 8.9             Results from last 7 days   Lab Units 05/18/24  0110 05/17/24  1110 05/16/24  2341   WBC 10*3/mm3 4.99 5.64 4.17   HEMOGLOBIN g/dL 11.9* 12.7 11.7*   HEMATOCRIT % 36.4 39.7 37.0   PLATELETS 10*3/mm3 170 202 184     Results from last 7 days   Lab Units 05/18/24  0110 05/17/24  1110 05/16/24  1642   INR  1.05* 1.10* 1.42*             Urbano Cochran MD  5/18/2024, 19:07 EDT      EMR Dragon/Transcription:   Dictated utilizing Dragon dictation

## 2024-05-18 NOTE — PROGRESS NOTES
LOS: 1 day   Patient Care Team:  Luan Joseph APRN as PCP - General (Family Medicine)  Jozef Otero MD as Consulting Physician (Nephrology)    Subjective:  Seen and examined at bedside.   Spouse and 2 dgts present.   Pt continues to have confusion but has improved a little.        Review of Systems:   Review of Systems   Constitutional:  Positive for activity change and fatigue. Negative for appetite change, chills, diaphoresis and fever.   HENT:  Negative for trouble swallowing.    Eyes:  Negative for visual disturbance.   Respiratory: Negative.     Cardiovascular: Negative.    Gastrointestinal: Negative.    Endocrine: Negative.    Genitourinary:  Positive for dysuria.        Foul odor of urine   Musculoskeletal:  Positive for arthralgias and gait problem.   Skin: Negative.    Neurological:  Positive for weakness.   Hematological:  Bruises/bleeds easily.        Chronic warfarin therapy   Psychiatric/Behavioral:  Positive for confusion.            Vital Signs  Temp:  [97.4 °F (36.3 °C)-98.9 °F (37.2 °C)] 98 °F (36.7 °C)  Heart Rate:  [] 106  Resp:  [16-19] 18  BP: ()/(57-65) 104/62    Physical Exam:  Physical Exam  Constitutional:       General: She is not in acute distress.     Appearance: She is obese.      Comments: Chronically ill appearing   HENT:      Head: Normocephalic and atraumatic.      Ears:      Comments: No hearing impairment     Nose: Nose normal.      Mouth/Throat:      Mouth: Mucous membranes are moist.      Pharynx: Oropharynx is clear.   Eyes:      General: No scleral icterus.     Conjunctiva/sclera: Conjunctivae normal.   Cardiovascular:      Rate and Rhythm: Normal rate and regular rhythm.      Pulses: Normal pulses.      Heart sounds: Normal heart sounds. No murmur heard.  Pulmonary:      Effort: Pulmonary effort is normal. No respiratory distress.      Breath sounds: Normal breath sounds. No wheezing, rhonchi or rales.   Abdominal:      General: Bowel sounds are  normal. There is no distension.      Palpations: Abdomen is soft.      Tenderness: There is no abdominal tenderness. There is no right CVA tenderness, left CVA tenderness, guarding or rebound.   Musculoskeletal:      Cervical back: Normal range of motion and neck supple. No rigidity.      Right lower leg: No edema.      Left lower leg: No edema.      Comments: Able to move all extremities in bed   Skin:     General: Skin is warm and dry.      Capillary Refill: Capillary refill takes less than 2 seconds.      Comments: Has a noted tick bite to left flank area that has mild erythema- looks like healing  No erythem migrans noted   Neurological:      Mental Status: She is alert.      Cranial Nerves: No cranial nerve deficit.      Comments: Alert to person and date  Not oriented to place  Able to introduce family members correctly  Has some noted short term memory impairment that is not normal for patient per family   Psychiatric:         Mood and Affect: Mood normal.         Behavior: Behavior normal.          Radiology:  CT Head Without Contrast    Result Date: 5/16/2024  1. Limited study secondary to motion. 2. No definite hemorrhage noted. Small focus of increased density within the evens is favored to represent calcification. 3. Diffuse atrophy with mild bifrontal predominance Short-term follow-up can always be considered given limitations of exam if clinically indicated Electronically Signed: Tesfaye Antoine MD  5/16/2024 6:41 PM EDT  Workstation ID: [id number]    XR Chest 1 View    Result Date: 5/16/2024  Impression: 1.Cardiomegaly with pulmonary vascular congestion and scattered bibasilar atelectasis or infiltrates. Electronically Signed: Roberto Huddleston MD  5/16/2024 5:50 PM EDT  Workstation ID: [id number]        Results Review:     I reviewed the patient's new clinical results.  I reviewed the patient's new imaging results and agree with the interpretation.    Medication Review:   Scheduled Meds:aspirin, 81 mg,  Oral, Daily  atorvastatin, 80 mg, Oral, Daily  carvedilol, 3.125 mg, Oral, BID With Meals  cetirizine, 10 mg, Oral, Daily  dilTIAZem CD, 240 mg, Oral, Q24H  levothyroxine, 88 mcg, Oral, Q AM  midodrine, 10 mg, Oral, Q8H  oxybutynin XL, 5 mg, Oral, Daily  pantoprazole, 40 mg, Oral, Q AM  PARoxetine, 40 mg, Oral, QAM  sodium bicarbonate, 1,300 mg, Oral, TID  sodium chloride, 10 mL, Intravenous, Q12H  topiramate, 50 mg, Oral, Daily  warfarin, 3 mg, Oral, Once per day on Sunday Tuesday Thursday Saturday  warfarin, 6 mg, Oral, Once per day on Monday Wednesday Friday      Continuous Infusions:Pharmacy to dose warfarin,       PRN Meds:.  acetaminophen    senna-docusate sodium **AND** polyethylene glycol **AND** bisacodyl **AND** bisacodyl    calcium carbonate    Calcium Replacement - Follow Nurse / BPA Driven Protocol    ibuprofen    Magnesium Standard Dose Replacement - Follow Nurse / BPA Driven Protocol    nitroglycerin    ondansetron    Pharmacy to dose warfarin    Phosphorus Replacement - Follow Nurse / BPA Driven Protocol    Potassium Replacement - Follow Nurse / BPA Driven Protocol    sodium chloride    [COMPLETED] Insert Peripheral IV **AND** sodium chloride    sodium chloride    sodium chloride    Labs:    CBC    Results from last 7 days   Lab Units 05/18/24  0110 05/17/24  1110 05/16/24  2341 05/16/24  1642   WBC 10*3/mm3 4.99 5.64 4.17 4.86   HEMOGLOBIN g/dL 11.9* 12.7 11.7* 13.3   PLATELETS 10*3/mm3 170 202 184 208     BMP   Results from last 7 days   Lab Units 05/18/24  0110 05/17/24  1110 05/16/24  2341 05/16/24  1642   SODIUM mmol/L 141 140 138 136   POTASSIUM mmol/L 4.0 4.0 3.3* 4.0   CHLORIDE mmol/L 111* 108* 107 103   CO2 mmol/L 20.0* 20.0* 20.0* 20.0*   BUN mg/dL 11 11 11 13   CREATININE mg/dL 0.98 1.08* 1.15* 1.12*   GLUCOSE mg/dL 121* 123* 128* 104*     Cr Clearance Estimated Creatinine Clearance: 64.1 mL/min (by C-G formula based on SCr of 0.98 mg/dL).  Coag   Results from last 7 days   Lab Units  "05/18/24  0110 05/17/24  1110 05/16/24  1642   INR  1.05* 1.10* 1.42*     HbA1C No results found for: \"HGBA1C\"  Blood Glucose No results found for: \"POCGLU\"  Infection     CMP   Results from last 7 days   Lab Units 05/18/24  0110 05/17/24  1110 05/16/24  2341 05/16/24  1642   SODIUM mmol/L 141 140 138 136   POTASSIUM mmol/L 4.0 4.0 3.3* 4.0   CHLORIDE mmol/L 111* 108* 107 103   CO2 mmol/L 20.0* 20.0* 20.0* 20.0*   BUN mg/dL 11 11 11 13   CREATININE mg/dL 0.98 1.08* 1.15* 1.12*   GLUCOSE mg/dL 121* 123* 128* 104*     UA    Results from last 7 days   Lab Units 05/16/24  1728   NITRITE UA  Negative     Radiology(recent) CT Head Without Contrast    Result Date: 5/16/2024  1. Limited study secondary to motion. 2. No definite hemorrhage noted. Small focus of increased density within the evens is favored to represent calcification. 3. Diffuse atrophy with mild bifrontal predominance Short-term follow-up can always be considered given limitations of exam if clinically indicated Electronically Signed: Tesfaye Antoine MD  5/16/2024 6:41 PM EDT  Workstation ID: [id number]    XR Chest 1 View    Result Date: 5/16/2024  Impression: 1.Cardiomegaly with pulmonary vascular congestion and scattered bibasilar atelectasis or infiltrates. Electronically Signed: Roberto Huddleston MD  5/16/2024 5:50 PM EDT  Workstation ID: [id number]        Assessment:  Altered Mental Status  Tick Bite  Atrial Fibrillation  Chronic warfarin therapy for anticoagulation  CHF  CAD  GERD    Plan:  Urine culture ordered due to reports of foul smelling urine with mental status changes.     Pt continues to have confusion and is not at baseline per family.     Had embedded tick that was removed about 5 days ago.  Ordered tick panel.      Cardiology consulted due to staff reporting HR irregularities    Plan for MRI brain on Monday 5/20/2024- pt has presence of pacer    INR today 1.05- pharmacy is managing  INR goal: 2.0-3.0    VTE Prophylaxis:  on warfarin therapy for " Afib  GI Prophylaxis: PPI    DC PLAN:  Noted PT recommending rehab.   consult entered to assess for rehab vs HHC needs.            Taina Bui, APRN  05/18/24  16:34 EDT

## 2024-05-18 NOTE — CONSULTS
Referring Provider: Isabella Hatfield MD  Reason for Consultation: mental status changes, hallucinations       Chief complaint : mental status changes, hallucinations     Subjective .     History of present illness:  The patient is a 68 y.o. female who was admitted secondary to generalized weakness and confusion for several days. She was admitted to the hospital, but workup has been negative for anything significant.     PMH: atrial fibrillation on Warfarain, CHF, CAD, GERD.     Patient was seen today. She was alert, oriented to self, time, and place. It took her some time to answer the questions, but she was able to eventually answer all of them. She remained somewhat confused as to what brought her into the hospital. She said she was here to be xrayed for metal in her body.     She was able to tell me she lives with her  Lam[place] and she is normally independent.She denies any psychiatric history, but her chart does show she takes paroxetine.     Denies any suicidal thoughts, homicidal thoughts, AVH. She did not appear to be responding to any stimuli when I was there. She was not agitated at all.       Review of Systems   Pertinent items are noted in HPI, all other systems reviewed and negative    The following portions of the patient's history were reviewed and updated as appropriate: allergies, current medications, past family history, past medical history, past social history, past surgical history and problem list.    History    Past psychiatric history : depression? Patient denies history but is on paroxetine.     Past Medical History:   Diagnosis Date    AAA (abdominal aortic aneurysm)     infrarenal- 3.1cm(2010, 3.7x4.2cm(2016), 3.9cm (2017)    Allergic rhinitis     Aspiration pneumonia 05/2017    CHF (congestive heart failure)     Coronary artery disease     DDD (degenerative disc disease), lumbar     lumbar spine- Dr. Churchill     Depression     Diverticulosis     Frequent UTI     Dr. Daniels    GERD  (gastroesophageal reflux disease)     Hiatal hernia     HSV (herpes simplex virus) infection     Oral    Hyperlipidemia     IBS (irritable bowel syndrome)     Constipation predominant    Ischemic cardiomyopathy 09/2017    AICD     Kidney stones     NSTEMI (non-ST elevated myocardial infarction) 04/26/2017    Obstructive sleep apnea 2011    nfsg 2011, ahi 67    Osteoarthritis     Osteopenia     Peripheral neuropathy     feet    Pulmonary embolism, bilateral 05/2017    Rotator cuff tear     Bilateral- Ortho           Family History   Problem Relation Age of Onset    Heart disease Mother     Other Mother         Hypoglycemia    Osteoporosis Mother     COPD Father     Stroke Father     Hypertension Brother     Diabetes Brother     Heart disease Brother         Social History     Tobacco Use    Smoking status: Never   Vaping Use    Vaping status: Some Days    Substances: CBD, nightly, 2-3 weekly   Substance Use Topics    Alcohol use: Never    Drug use: Never          Medications Prior to Admission   Medication Sig Dispense Refill Last Dose    aspirin 81 MG chewable tablet Chew 1 tablet Daily.   Past Week    carvedilol (COREG) 3.125 MG tablet Take 1 tablet by mouth 2 (Two) Times a Day With Meals.   Past Week    dilTIAZem CD (CARDIZEM CD) 240 MG 24 hr capsule Take 1 capsule by mouth Daily. 30 capsule 1 Past Week    famciclovir (FAMVIR) 500 MG tablet Take 1 tablet by mouth 3 (Three) Times a Day As Needed.   Past Month    levocetirizine (XYZAL) 5 MG tablet Take 1 tablet by mouth Every Evening.   Past Week    levothyroxine (SYNTHROID, LEVOTHROID) 88 MCG tablet Take 1 tablet by mouth Every Morning. 30 tablet 1 Past Week    linaclotide (LINZESS) 72 MCG capsule capsule Take 1 capsule by mouth Every Morning Before Breakfast.       omeprazole (priLOSEC) 40 MG capsule Take 1 capsule by mouth Daily.   Past Week    PARoxetine (PAXIL) 40 MG tablet Take 1 tablet by mouth Every Morning.   Past Week    topiramate (TOPAMAX) 50 MG tablet  Take 1 tablet by mouth Daily.   Past Week    trospium (SANCTURA) 20 MG tablet Take 1 tablet by mouth Every Night.   Past Week    warfarin (COUMADIN) 3 MG tablet See Admin Instructions. Take 1 tablet by mouth on Tuesday, Thursday, Saturday, and Sunday   Past Week    warfarin (COUMADIN) 3 MG tablet Take 2 tablets by mouth Daily. Monday Wednesday, Friday       atorvastatin (LIPITOR) 80 MG tablet Take 1 tablet by mouth Daily.   Unknown    ezetimibe (ZETIA) 10 MG tablet Take 1 tablet by mouth Daily.   Unknown    midodrine (PROAMATINE) 10 MG tablet Take 1 tablet by mouth Every 8 (Eight) Hours. 90 tablet 1 Unknown    sodium bicarbonate 650 MG tablet Take 2 tablets by mouth 3 (Three) Times a Day. 90 tablet 1 Unknown        Scheduled Meds:  aspirin, 81 mg, Oral, Daily  atorvastatin, 80 mg, Oral, Daily  carvedilol, 3.125 mg, Oral, BID With Meals  cetirizine, 10 mg, Oral, Daily  dilTIAZem CD, 240 mg, Oral, Q24H  levothyroxine, 88 mcg, Oral, Q AM  midodrine, 10 mg, Oral, Q8H  oxybutynin XL, 5 mg, Oral, Daily  pantoprazole, 40 mg, Oral, Q AM  PARoxetine, 40 mg, Oral, QAM  sodium bicarbonate, 1,300 mg, Oral, TID  sodium chloride, 10 mL, Intravenous, Q12H  topiramate, 50 mg, Oral, Daily  warfarin, 3 mg, Oral, Once per day on Sunday Tuesday Thursday Saturday  warfarin, 6 mg, Oral, Once per day on Monday Wednesday Friday         Continuous Infusions:  Pharmacy to dose warfarin,         PRN Meds:    acetaminophen    senna-docusate sodium **AND** polyethylene glycol **AND** bisacodyl **AND** bisacodyl    calcium carbonate    Calcium Replacement - Follow Nurse / BPA Driven Protocol    ibuprofen    Magnesium Standard Dose Replacement - Follow Nurse / BPA Driven Protocol    nitroglycerin    ondansetron    Pharmacy to dose warfarin    Phosphorus Replacement - Follow Nurse / BPA Driven Protocol    Potassium Replacement - Follow Nurse / BPA Driven Protocol    sodium chloride    [COMPLETED] Insert Peripheral IV **AND** sodium chloride     "sodium chloride    sodium chloride      Allergies:  Oxytetracycline and Oxycodone      Objective     Vital Signs   /62 (BP Location: Left arm, Patient Position: Lying)   Pulse 106   Temp 98 °F (36.7 °C) (Oral)   Resp 18   Ht 175.5 cm (69.09\")   Wt 87.8 kg (193 lb 9 oz)   SpO2 94%   BMI 28.51 kg/m²     Physical Exam:    Musculoskeletal:   Muscle strength and tone: equal bilaterally  Abnormal Movements: None noted.   Gait: JEFERSON, patient in bed.      General Appearance:    In bed, in NAD.      Mental Status Exam:   Hygiene:   good  Cooperation:  Cooperative  Eye Contact:  Good  Psychomotor Behavior:  Appropriate  Affect:  Appropriate  Mood: normal  Hopelessness: Denies  Speech:  Normal  Thought Process:  Goal directed and Linear  Thought Content:  Normal and Mood congruent  Suicidal:  None  Homicidal:  None  Hallucinations:  None  Delusion:  None  Memory:  Deficits  Orientation:  Person, Place, and Time  Reliability:  fair  Insight:  Fair  Judgement:  Fair  Impulse Control:  Fair  Physical/Medical Issues:  Yes      Medications and allergies reviewed.    Result Review:  I have personally reviewed the results from the time of this admission to 5/18/2024 13:35 EDT and agree with these findings:  [x]  Laboratory  []  Microbiology  []  Radiology  [x]  EKG/Telemetry   []  Cardiology/Vascular   []  Pathology  []  Old records  []  Other:      Assessment & Plan       Altered mental status    Altered mental status, unspecified      Assessment: altered mental status   Treatment Plan: Patient presented with weakness and altered mental status. She has been intermittently confused throughout her stay. Today she was alert and oriented x3. She was slow to respond, and sometimes had to answer twice, but would eventually get it. She seemed to still be disoriented to situation, and did not know why she was in the hospital.     Patient doesn't have any psych history, and doesn't have any recent medication changes. In the " absence of infection causing delirium, I'm not able to say what may be causing her confusion.    She was not agitated or combative, so would not recommend starting any other psychotropic medications.     Could consider neuropsych eval outpatient if symptoms do not resolve fully and no other source for the symptoms can be found.     Continue supportive treatment.     Will follow PRN.   Treatment Plan discussed with: Patient    I discussed the patients findings and my recommendations with patient    I have reviewed and approved the behavioral health treatment plans and problem list. Yes  Thank you for the consult   Referring MD has access to consult report and progress notes in EMR     Lisette Cuellar, ALEXANDER  05/18/24  13:35 EDT

## 2024-05-18 NOTE — PLAN OF CARE
Goal Outcome Evaluation: Pt disoriented to situation and time. Pain treated per MAR. Family remained at bedside throughout shift. Pt up to shower and bedside commode and tolerated well. Care plan ongoing.

## 2024-05-18 NOTE — PLAN OF CARE
Goal Outcome Evaluation:      Patient with confusion this shift. Appears to be sinus on the monitor. Incontinent. Daughter is at bedside. Plan of care is ongoing. Call light within reach.

## 2024-05-19 LAB
ALBUMIN SERPL-MCNC: 3 G/DL (ref 3.5–5.2)
ALBUMIN SERPL-MCNC: 3.4 G/DL (ref 3.5–5.2)
ALBUMIN SERPL-MCNC: 3.7 G/DL (ref 3.5–5.2)
ALBUMIN/GLOB SERPL: 1.2 G/DL
ALBUMIN/GLOB SERPL: 1.2 G/DL
ALP SERPL-CCNC: 108 U/L (ref 39–117)
ALP SERPL-CCNC: 125 U/L (ref 39–117)
ALT SERPL W P-5'-P-CCNC: 37 U/L (ref 1–33)
ALT SERPL W P-5'-P-CCNC: 38 U/L (ref 1–33)
ANION GAP SERPL CALCULATED.3IONS-SCNC: 10.3 MMOL/L (ref 5–15)
ANION GAP SERPL CALCULATED.3IONS-SCNC: 12 MMOL/L (ref 5–15)
AST SERPL-CCNC: 57 U/L (ref 1–32)
AST SERPL-CCNC: 75 U/L (ref 1–32)
BASOPHILS # BLD AUTO: 0.05 10*3/MM3 (ref 0–0.2)
BASOPHILS # BLD MANUAL: 0.05 10*3/MM3 (ref 0–0.2)
BASOPHILS NFR BLD AUTO: 1.1 % (ref 0–1.5)
BASOPHILS NFR BLD MANUAL: 1 % (ref 0–1.5)
BILIRUB SERPL-MCNC: 0.5 MG/DL (ref 0–1.2)
BILIRUB SERPL-MCNC: 0.9 MG/DL (ref 0–1.2)
BUN SERPL-MCNC: 16 MG/DL (ref 8–23)
BUN SERPL-MCNC: 17 MG/DL (ref 8–23)
BUN/CREAT SERPL: 13.9 (ref 7–25)
BUN/CREAT SERPL: 15.7 (ref 7–25)
CALCIUM SPEC-SCNC: 8.5 MG/DL (ref 8.6–10.5)
CALCIUM SPEC-SCNC: 9 MG/DL (ref 8.6–10.5)
CHLORIDE SERPL-SCNC: 102 MMOL/L (ref 98–107)
CHLORIDE SERPL-SCNC: 105 MMOL/L (ref 98–107)
CO2 SERPL-SCNC: 18.7 MMOL/L (ref 22–29)
CO2 SERPL-SCNC: 20 MMOL/L (ref 22–29)
CREAT SERPL-MCNC: 1.08 MG/DL (ref 0.57–1)
CREAT SERPL-MCNC: 1.15 MG/DL (ref 0.57–1)
DEPRECATED RDW RBC AUTO: 52.1 FL (ref 37–54)
DEPRECATED RDW RBC AUTO: 54.1 FL (ref 37–54)
EGFRCR SERPLBLD CKD-EPI 2021: 51.7 ML/MIN/1.73
EGFRCR SERPLBLD CKD-EPI 2021: 55.7 ML/MIN/1.73
EOSINOPHIL # BLD AUTO: 0.02 10*3/MM3 (ref 0–0.4)
EOSINOPHIL NFR BLD AUTO: 0.4 % (ref 0.3–6.2)
ERYTHROCYTE [DISTWIDTH] IN BLOOD BY AUTOMATED COUNT: 14.5 % (ref 12.3–15.4)
ERYTHROCYTE [DISTWIDTH] IN BLOOD BY AUTOMATED COUNT: 14.5 % (ref 12.3–15.4)
GLOBULIN UR ELPH-MCNC: 2.6 GM/DL
GLOBULIN UR ELPH-MCNC: 3 GM/DL
GLUCOSE SERPL-MCNC: 112 MG/DL (ref 65–99)
GLUCOSE SERPL-MCNC: 115 MG/DL (ref 65–99)
HCT VFR BLD AUTO: 34.8 % (ref 34–46.6)
HCT VFR BLD AUTO: 41.6 % (ref 34–46.6)
HGB BLD-MCNC: 11 G/DL (ref 12–15.9)
HGB BLD-MCNC: 12.8 G/DL (ref 12–15.9)
IMM GRANULOCYTES # BLD AUTO: 0.05 10*3/MM3 (ref 0–0.05)
IMM GRANULOCYTES NFR BLD AUTO: 1.1 % (ref 0–0.5)
INR PPP: 1.42 (ref 2–3)
INR PPP: 1.75 (ref 2–3)
LYMPHOCYTES # BLD AUTO: 0.42 10*3/MM3 (ref 0.7–3.1)
LYMPHOCYTES # BLD MANUAL: 0.32 10*3/MM3 (ref 0.7–3.1)
LYMPHOCYTES NFR BLD AUTO: 9.2 % (ref 19.6–45.3)
LYMPHOCYTES NFR BLD MANUAL: 4 % (ref 5–12)
MCH RBC QN AUTO: 31.1 PG (ref 26.6–33)
MCH RBC QN AUTO: 31.1 PG (ref 26.6–33)
MCHC RBC AUTO-ENTMCNC: 30.8 G/DL (ref 31.5–35.7)
MCHC RBC AUTO-ENTMCNC: 31.6 G/DL (ref 31.5–35.7)
MCV RBC AUTO: 101 FL (ref 79–97)
MCV RBC AUTO: 98.3 FL (ref 79–97)
METAMYELOCYTES NFR BLD MANUAL: 3 % (ref 0–0)
MONOCYTES # BLD AUTO: 0.24 10*3/MM3 (ref 0.1–0.9)
MONOCYTES # BLD: 0.21 10*3/MM3 (ref 0.1–0.9)
MONOCYTES NFR BLD AUTO: 5.3 % (ref 5–12)
NEUTROPHILS # BLD AUTO: 4.56 10*3/MM3 (ref 1.7–7)
NEUTROPHILS NFR BLD AUTO: 3.79 10*3/MM3 (ref 1.7–7)
NEUTROPHILS NFR BLD AUTO: 82.9 % (ref 42.7–76)
NEUTROPHILS NFR BLD MANUAL: 77 % (ref 42.7–76)
NEUTS BAND NFR BLD MANUAL: 9 % (ref 0–5)
NRBC BLD AUTO-RTO: 0 /100 WBC (ref 0–0.2)
PLATELET # BLD AUTO: 139 10*3/MM3 (ref 140–450)
PLATELET # BLD AUTO: 145 10*3/MM3 (ref 140–450)
PMV BLD AUTO: 10 FL (ref 6–12)
PMV BLD AUTO: 10.2 FL (ref 6–12)
POTASSIUM SERPL-SCNC: 3.3 MMOL/L (ref 3.5–5.2)
POTASSIUM SERPL-SCNC: 3.9 MMOL/L (ref 3.5–5.2)
PROT SERPL-MCNC: 5.6 G/DL (ref 6–8.5)
PROT SERPL-MCNC: 6.7 G/DL (ref 6–8.5)
PROTHROMBIN TIME: 15.1 SECONDS (ref 19.4–28.5)
PROTHROMBIN TIME: 18.3 SECONDS (ref 19.4–28.5)
RBC # BLD AUTO: 3.54 10*6/MM3 (ref 3.77–5.28)
RBC # BLD AUTO: 4.12 10*6/MM3 (ref 3.77–5.28)
RBC MORPH BLD: NORMAL
SCAN SLIDE: NORMAL
SMALL PLATELETS BLD QL SMEAR: ABNORMAL
SODIUM SERPL-SCNC: 134 MMOL/L (ref 136–145)
SODIUM SERPL-SCNC: 134 MMOL/L (ref 136–145)
VARIANT LYMPHS NFR BLD MANUAL: 6 % (ref 19.6–45.3)
WBC MORPH BLD: NORMAL
WBC NRBC COR # BLD AUTO: 4.57 10*3/MM3 (ref 3.4–10.8)
WBC NRBC COR # BLD AUTO: 5.3 10*3/MM3 (ref 3.4–10.8)
WHOLE BLOOD HOLD SPECIMEN: NORMAL

## 2024-05-19 PROCEDURE — 85025 COMPLETE CBC W/AUTO DIFF WBC: CPT | Performed by: NURSE PRACTITIONER

## 2024-05-19 PROCEDURE — 85007 BL SMEAR W/DIFF WBC COUNT: CPT | Performed by: NURSE PRACTITIONER

## 2024-05-19 PROCEDURE — 99232 SBSQ HOSP IP/OBS MODERATE 35: CPT | Performed by: INTERNAL MEDICINE

## 2024-05-19 PROCEDURE — 80053 COMPREHEN METABOLIC PANEL: CPT | Performed by: NURSE PRACTITIONER

## 2024-05-19 PROCEDURE — 25810000003 SODIUM CHLORIDE 0.9 % SOLUTION: Performed by: NURSE PRACTITIONER

## 2024-05-19 PROCEDURE — 85610 PROTHROMBIN TIME: CPT

## 2024-05-19 PROCEDURE — 25010000002 ONDANSETRON PER 1 MG

## 2024-05-19 PROCEDURE — 25010000002 CEFTRIAXONE PER 250 MG: Performed by: NURSE PRACTITIONER

## 2024-05-19 PROCEDURE — 87040 BLOOD CULTURE FOR BACTERIA: CPT | Performed by: NURSE PRACTITIONER

## 2024-05-19 RX ORDER — HYDROCODONE BITARTRATE AND ACETAMINOPHEN 5; 325 MG/1; MG/1
1 TABLET ORAL EVERY 6 HOURS PRN
Status: DISCONTINUED | OUTPATIENT
Start: 2024-05-19 | End: 2024-05-22 | Stop reason: HOSPADM

## 2024-05-19 RX ORDER — POTASSIUM CHLORIDE 20 MEQ/1
40 TABLET, EXTENDED RELEASE ORAL EVERY 4 HOURS
Status: DISPENSED | OUTPATIENT
Start: 2024-05-19 | End: 2024-05-19

## 2024-05-19 RX ADMIN — PANTOPRAZOLE SODIUM 40 MG: 40 TABLET, DELAYED RELEASE ORAL at 05:36

## 2024-05-19 RX ADMIN — TOPIRAMATE 50 MG: 25 TABLET, FILM COATED ORAL at 08:39

## 2024-05-19 RX ADMIN — IBUPROFEN 600 MG: 200 TABLET, FILM COATED ORAL at 18:19

## 2024-05-19 RX ADMIN — MIDODRINE HYDROCHLORIDE 10 MG: 5 TABLET ORAL at 21:07

## 2024-05-19 RX ADMIN — ACETAMINOPHEN 650 MG: 325 TABLET, FILM COATED ORAL at 05:36

## 2024-05-19 RX ADMIN — PAROXETINE HYDROCHLORIDE 40 MG: 20 TABLET, FILM COATED ORAL at 08:39

## 2024-05-19 RX ADMIN — WARFARIN SODIUM 3 MG: 3 TABLET ORAL at 17:47

## 2024-05-19 RX ADMIN — MIDODRINE HYDROCHLORIDE 10 MG: 5 TABLET ORAL at 14:29

## 2024-05-19 RX ADMIN — Medication 5 MG: at 21:07

## 2024-05-19 RX ADMIN — LEVOTHYROXINE SODIUM 88 MCG: 88 TABLET ORAL at 05:36

## 2024-05-19 RX ADMIN — SODIUM BICARBONATE 1300 MG: 650 TABLET ORAL at 08:38

## 2024-05-19 RX ADMIN — ASPIRIN 81 MG CHEWABLE TABLET 81 MG: 81 TABLET CHEWABLE at 08:39

## 2024-05-19 RX ADMIN — POTASSIUM CHLORIDE 40 MEQ: 1500 TABLET, EXTENDED RELEASE ORAL at 14:29

## 2024-05-19 RX ADMIN — Medication 10 ML: at 21:14

## 2024-05-19 RX ADMIN — CEFTRIAXONE 2000 MG: 2 INJECTION, POWDER, FOR SOLUTION INTRAMUSCULAR; INTRAVENOUS at 21:07

## 2024-05-19 RX ADMIN — ATORVASTATIN CALCIUM 80 MG: 40 TABLET, FILM COATED ORAL at 08:39

## 2024-05-19 RX ADMIN — SODIUM BICARBONATE 1300 MG: 650 TABLET ORAL at 21:07

## 2024-05-19 RX ADMIN — ONDANSETRON 4 MG: 2 INJECTION INTRAMUSCULAR; INTRAVENOUS at 07:51

## 2024-05-19 RX ADMIN — HYDROCODONE BITARTRATE AND ACETAMINOPHEN 1 TABLET: 5; 325 TABLET ORAL at 19:34

## 2024-05-19 RX ADMIN — Medication 10 ML: at 08:40

## 2024-05-19 RX ADMIN — SODIUM CHLORIDE 500 ML: 9 INJECTION, SOLUTION INTRAVENOUS at 10:09

## 2024-05-19 RX ADMIN — CETIRIZINE HYDROCHLORIDE 10 MG: 10 TABLET, FILM COATED ORAL at 08:39

## 2024-05-19 RX ADMIN — OXYBUTYNIN CHLORIDE 5 MG: 5 TABLET, EXTENDED RELEASE ORAL at 08:39

## 2024-05-19 RX ADMIN — MIDODRINE HYDROCHLORIDE 10 MG: 5 TABLET ORAL at 05:36

## 2024-05-19 NOTE — PROGRESS NOTES
"Pharmacy dosing service  Anticoagulant  Warfarin     Subjective:    Laura Mejia is a 68 y.o.female being continued on warfarin for Atrial Fibrillation / Flutter.    INR Goal: 2 - 3  Home medication?: warfarin 3 mg PO daily, except warfarin 6 mg PO on Mon/Wed/Fri.   Bridge Therapy Present?:  No  Interacting Medications Evaluation (New/Present/Discontinued): None at this time      Assessment/Plan:    INR increasing appropriately after receiving bolus dose on 5/17 but still subtherapeutic. Will continue with home dosing and plan to give warfarin 3 mg.     Continue to monitor and adjust based on INR.         Date 5/17 5/18 5/19         INR 1.10 1.05 1.42         Dose 9 mg 3 mg 3 mg             Objective:  [Ht: 175.5 cm (69.09\"); Wt: 87.8 kg (193 lb 9 oz); BMI: Body mass index is 28.51 kg/m².]    Lab Results   Component Value Date    ALBUMIN 3.2 (L) 01/26/2024     Lab Results   Component Value Date    INR 1.42 (L) 05/19/2024    INR 1.05 (L) 05/18/2024    INR 1.10 (L) 05/17/2024    PROTIME 15.1 (L) 05/19/2024    PROTIME 11.4 (L) 05/18/2024    PROTIME 11.9 (L) 05/17/2024     Lab Results   Component Value Date    HGB 11.9 (L) 05/18/2024    HGB 12.7 05/17/2024    HGB 11.7 (L) 05/16/2024     Lab Results   Component Value Date    HCT 36.4 05/18/2024    HCT 39.7 05/17/2024    HCT 37.0 05/16/2024       Destinee Leon [facility]  05/19/24 09:44 EDT           "

## 2024-05-19 NOTE — PLAN OF CARE
Goal Outcome Evaluation:           Progress: no change  Patient disoriented to time and situation. Able to make needs known. Family at bedside. Continue on IV ABT. Healthy heart diet, Cardiac monitored. Patient rested better tonight. Slept for several hours comfortable. Bed in low position, call light in reach, room near nursing station.

## 2024-05-19 NOTE — PROGRESS NOTES
LOS: 2 days   Patient Care Team:  Luan Joseph APRN as PCP - General (Family Medicine)  Jozef Otero MD as Consulting Physician (Nephrology)    Subjective:  Pt seen and examined at bedside.   Multiple family members present.   Tolerating IV abx.  Remains confused.          Review of Systems:   Review of Systems   Constitutional:  Positive for activity change and appetite change. Negative for chills, diaphoresis, fatigue and fever.   HENT:  Negative for trouble swallowing.    Eyes:  Negative for visual disturbance.   Respiratory: Negative.     Cardiovascular: Negative.    Gastrointestinal: Negative.    Endocrine: Negative.    Genitourinary:  Positive for dysuria.        Foul smelling urine   Musculoskeletal:  Positive for arthralgias and gait problem.   Skin: Negative.    Neurological:  Positive for weakness.   Hematological: Negative.    Psychiatric/Behavioral:  Positive for confusion.            Vital Signs  Temp:  [97.7 °F (36.5 °C)-98 °F (36.7 °C)] 97.7 °F (36.5 °C)  Heart Rate:  [] 74  Resp:  [13-18] 13  BP: ()/(45-62) 93/47    Physical Exam:  Physical Exam  Constitutional:       General: She is not in acute distress.     Appearance: She is obese.   HENT:      Head: Normocephalic and atraumatic.      Ears:      Comments: No hearing impairment     Nose: Nose normal.      Mouth/Throat:      Mouth: Mucous membranes are moist.      Pharynx: Oropharynx is clear.   Eyes:      General: No scleral icterus.     Conjunctiva/sclera: Conjunctivae normal.   Cardiovascular:      Rate and Rhythm: Normal rate and regular rhythm.      Pulses: Normal pulses.      Heart sounds: Normal heart sounds. No murmur heard.  Pulmonary:      Effort: Pulmonary effort is normal. No respiratory distress.      Breath sounds: Normal breath sounds. No wheezing, rhonchi or rales.   Abdominal:      General: Bowel sounds are normal. There is no distension.      Palpations: Abdomen is soft.      Tenderness: There is no  abdominal tenderness. There is no guarding or rebound.   Musculoskeletal:      Cervical back: Normal range of motion and neck supple. No rigidity.      Right lower leg: No edema.      Left lower leg: No edema.      Comments: Able to move all extremities in bed   Skin:     General: Skin is warm and dry.      Capillary Refill: Capillary refill takes less than 2 seconds.   Neurological:      Mental Status: She is alert.      Cranial Nerves: No cranial nerve deficit.      Comments: Oriented to person  Speech clear and easily understood  Confusion noted  Pleasant and cooperative   Psychiatric:         Mood and Affect: Mood normal.         Behavior: Behavior normal.      Comments: Confusion noted          Radiology:  CT Head Without Contrast    Result Date: 5/16/2024  1. Limited study secondary to motion. 2. No definite hemorrhage noted. Small focus of increased density within the evens is favored to represent calcification. 3. Diffuse atrophy with mild bifrontal predominance Short-term follow-up can always be considered given limitations of exam if clinically indicated Electronically Signed: Tesfaye Antoine MD  5/16/2024 6:41 PM EDT  Workstation ID: [id number]    XR Chest 1 View    Result Date: 5/16/2024  Impression: 1.Cardiomegaly with pulmonary vascular congestion and scattered bibasilar atelectasis or infiltrates. Electronically Signed: Roberto Huddleston MD  5/16/2024 5:50 PM EDT  Workstation ID: [id number]        Results Review:     I reviewed the patient's new clinical results.  I reviewed the patient's new imaging results and agree with the interpretation.    Medication Review:   Scheduled Meds:aspirin, 81 mg, Oral, Daily  atorvastatin, 80 mg, Oral, Daily  carvedilol, 3.125 mg, Oral, BID With Meals  cefTRIAXone, 1,000 mg, Intravenous, Q24H  cetirizine, 10 mg, Oral, Daily  dilTIAZem CD, 240 mg, Oral, Q24H  levothyroxine, 88 mcg, Oral, Q AM  melatonin, 5 mg, Oral, Nightly  midodrine, 10 mg, Oral, Q8H  oxybutynin XL, 5 mg,  "Oral, Daily  pantoprazole, 40 mg, Oral, Q AM  PARoxetine, 40 mg, Oral, QAM  sodium bicarbonate, 1,300 mg, Oral, TID  sodium chloride, 10 mL, Intravenous, Q12H  topiramate, 50 mg, Oral, Daily  warfarin, 3 mg, Oral, Once per day on Sunday Tuesday Thursday Saturday  warfarin, 6 mg, Oral, Once per day on Monday Wednesday Friday      Continuous Infusions:Pharmacy to dose warfarin,       PRN Meds:.  acetaminophen    senna-docusate sodium **AND** polyethylene glycol **AND** bisacodyl **AND** bisacodyl    calcium carbonate    Calcium Replacement - Follow Nurse / BPA Driven Protocol    ibuprofen    Magnesium Standard Dose Replacement - Follow Nurse / BPA Driven Protocol    nitroglycerin    ondansetron    Pharmacy to dose warfarin    Phosphorus Replacement - Follow Nurse / BPA Driven Protocol    Potassium Replacement - Follow Nurse / BPA Driven Protocol    sodium chloride    [COMPLETED] Insert Peripheral IV **AND** sodium chloride    sodium chloride    sodium chloride    Labs:    CBC    Results from last 7 days   Lab Units 05/18/24  0110 05/17/24  1110 05/16/24  2341 05/16/24  1642   WBC 10*3/mm3 4.99 5.64 4.17 4.86   HEMOGLOBIN g/dL 11.9* 12.7 11.7* 13.3   PLATELETS 10*3/mm3 170 202 184 208     BMP   Results from last 7 days   Lab Units 05/18/24  0110 05/17/24  1110 05/16/24  2341 05/16/24  1642   SODIUM mmol/L 141 140 138 136   POTASSIUM mmol/L 4.0 4.0 3.3* 4.0   CHLORIDE mmol/L 111* 108* 107 103   CO2 mmol/L 20.0* 20.0* 20.0* 20.0*   BUN mg/dL 11 11 11 13   CREATININE mg/dL 0.98 1.08* 1.15* 1.12*   GLUCOSE mg/dL 121* 123* 128* 104*     Cr Clearance Estimated Creatinine Clearance: 64.1 mL/min (by C-G formula based on SCr of 0.98 mg/dL).  Coag   Results from last 7 days   Lab Units 05/19/24  0706 05/18/24  0110 05/17/24  1110 05/16/24  1642   INR  1.42* 1.05* 1.10* 1.42*     HbA1C No results found for: \"HGBA1C\"  Blood Glucose No results found for: \"POCGLU\"  Infection     CMP   Results from last 7 days   Lab Units " 05/18/24  0110 05/17/24  1110 05/16/24  2341 05/16/24  1642   SODIUM mmol/L 141 140 138 136   POTASSIUM mmol/L 4.0 4.0 3.3* 4.0   CHLORIDE mmol/L 111* 108* 107 103   CO2 mmol/L 20.0* 20.0* 20.0* 20.0*   BUN mg/dL 11 11 11 13   CREATININE mg/dL 0.98 1.08* 1.15* 1.12*   GLUCOSE mg/dL 121* 123* 128* 104*     UA    Results from last 7 days   Lab Units 05/18/24  1805   NITRITE UA  Positive*   WBC UA /HPF Too Numerous to Count*   BACTERIA UA /HPF 4+*   SQUAM EPITHEL UA /HPF 3-6*     Radiology(recent) No radiology results for the last day       Assessment:  Altered Mental Status  Acute Cystitis with Hematuria  Hypotension  Tick Bite  Atrial Fibrillation  Chronic warfarin therapy for anticoagulation  Systolic CHF  Ischemic Cardiomyopathy  CAD  GERD  CKD 3  Acquired Hypothyroidism  History of MI  History of CABG  History of PE    Plan:  IV 500mL fluid bolus over 30 min due to low BP.  Watch for fluid overload- BNP was elevated 5/19/2024   Hold betablocker and cardizem  Patient has had her midordrine  Reposition patient in bed with head of bed at 35-45 degree angle.     Checking albumin level- if low may give to help increase pressure    Kidney function is stable    MRI is planned for Monday due to presence of pacer.     Tick panel is pending    Urine culture is pending  IV Rocephin 1g started on 5/18/2024. Increased dose to 2g on 5/19/2024.     INR 1.42 today- goal is 2.0-3.0    CBC and CMP daily  Blood cultures ordered    Cardiology consulted 5/18/2024- planning for MERLE next week due to ECHO 01/2024 showed possible apical thrombus.     Noted BNP was elevated 1307 on 5/19/2024- due to low pressures holding off on diuretic at this time.      VTE Prophylaxis:  on warfarin therapy for Afib  GI Prophylaxis: PPI     DC PLAN:  Noted PT recommending rehab.   consult entered to assess for rehab vs C needs.            Taina Bui, ALEXANDER  05/19/24  11:34 EDT

## 2024-05-19 NOTE — PROGRESS NOTES
Cardiology Progress Note    Patient Identification:  Name: Laura Mejia  Age: 68 y.o.  Sex: female  :  1955  MRN: [id number]                 Follow Up / Chief Complaint: A-fib, CAD  Chief Complaint   Patient presents with    Altered Mental Status     Confusion, UTI, dizziness, started feeling bad yesterday. Was sent in by Urgent care for evaluation.         Interval History: Patient with CAD, MI CABG ASD closure A-fib warfarin therapy presented with weakness and confusion.  Was given antibiotics is less confused today.       Subjective: Patient seen and examined.  Chart reviewed.  Labs reviewed.  Discussed with RN taking care of patient      Objective:  2024: proBNP is 1307, INR 1.42    History of present illness:      Ms. Mejia is a 69 yo female who is a patient of Dr. Mondragon. Pmh includes      # CAD s/p prior MI and CABG and ASD closure   # pAfib on warfarin  # CHADS Vasc at least 3, on warfarin  # prior PE on warfarin  # ICM / systolic HF with now improved EF per ECHO 2024 LVEF 50%  # s/p ICD, single chamber  # AAA   # hypothyroidism  # CKD III  # former smoker  # mild-moderate AS      Ms. Mejia presented to ER with weakness and confusion. Patient reportedly had been hallucinating. Patient has been weak for the last 3-4 days. She had negative CT head, UA unremarkable. Cardiology has been consulted with concerns for rhythm change / atrial flutter on ECG.     Data:  Labs 2024 and 2024 reveal creatinine 1.08, glucose elevated,   EKG done 2024 reviewed/interpreted by me reveals sinus rhythm with frequent PACs at the rate of 98 bpm     Review of records:  Echocardiogram 2024 reveals mild to moderate aortic valve stenosis, left atrial enlargement, mild TR, EF of 40%, possible left ventricular apical thrombus, pressure lately did not     Assessment:  :     Altered mental status  CAD, history of MI, CABG, ASD closure  Paroxysmal A-fib on long-term warfarin  Prior  history of PE on long-term warfarin  History of ischemic cardiomyopathy and CHF-improved  ICD  AAA, stent repair  CKD  Aortic stenosis        Recommendations / Plan:         Telemetry is revealing A-fib with controlled ventricular response.  Patient currently has controlled ventricular response  Patient had echo done in January which showed possible apical thrombus will schedule for transesophageal echo next week.  Continue anticoagulation to keep INR between 2 and 3.  Patient does not have any cardiac etiologies which can explain her altered mental status.  Will defer to primary team to evaluate and treat.  Discussed with patient's  by bedside.    Copied text in this portion of the note has been reviewed and is accurate as of 5/19/2024    Past Medical History:  Past Medical History:   Diagnosis Date    AAA (abdominal aortic aneurysm)     infrarenal- 3.1cm(2010, 3.7x4.2cm(2016), 3.9cm (2017)    Allergic rhinitis     Aspiration pneumonia 05/2017    CHF (congestive heart failure)     Coronary artery disease     DDD (degenerative disc disease), lumbar     lumbar spine- Dr. Churchill     Depression     Diverticulosis     Frequent UTI     Dr. Daniels    GERD (gastroesophageal reflux disease)     Hiatal hernia     HSV (herpes simplex virus) infection     Oral    Hyperlipidemia     IBS (irritable bowel syndrome)     Constipation predominant    Ischemic cardiomyopathy 09/2017    AICD     Kidney stones     NSTEMI (non-ST elevated myocardial infarction) 04/26/2017    Obstructive sleep apnea 2011    nfsg 2011, ahi 68    Osteoarthritis     Osteopenia     Peripheral neuropathy     feet    Pulmonary embolism, bilateral 05/2017    Rotator cuff tear     Bilateral- Ortho      Past Surgical History:  Past Surgical History:   Procedure Laterality Date    CARDIAC CATHETERIZATION  04/26/2017    99% mid LAD. 90% mid LCX. 60% RCA. Recommended CABG. Dr. Diaz    CARDIAC DEFIBRILLATOR PLACEMENT  12/26/2017    ICD implant/ Dr. Ellis     CATARACT EXTRACTION      CORONARY ARTERY BYPASS GRAFT  04/26/2017    x4 & ASD Closure    CYSTOSCOPY  2002    negative. Dr. Daniels    LAPAROSCOPIC CHOLECYSTECTOMY  04/17/2013    For biliary dyskinesia. Dr. Mccoy.     REPLACEMENT TOTAL KNEE BILATERAL  11/2004    Dr. Herrera    THORACENTESIS Left 05/08/2017    TONSILLECTOMY      TUBAL ABDOMINAL LIGATION Bilateral         Social History:   Social History     Tobacco Use    Smoking status: Never    Smokeless tobacco: Not on file   Substance Use Topics    Alcohol use: Never      Family History:  Family History   Problem Relation Age of Onset    Heart disease Mother     Other Mother         Hypoglycemia    Osteoporosis Mother     COPD Father     Stroke Father     Hypertension Brother     Diabetes Brother     Heart disease Brother           Allergies:  Allergies   Allergen Reactions    Oxytetracycline Hives    Oxycodone Itching     Scheduled Meds:  aspirin, 81 mg, Daily  atorvastatin, 80 mg, Daily  carvedilol, 3.125 mg, BID With Meals  cefTRIAXone, 2,000 mg, Q24H  cetirizine, 10 mg, Daily  dilTIAZem CD, 240 mg, Q24H  levothyroxine, 88 mcg, Q AM  melatonin, 5 mg, Nightly  midodrine, 10 mg, Q8H  oxybutynin XL, 5 mg, Daily  pantoprazole, 40 mg, Q AM  PARoxetine, 40 mg, QAM  sodium bicarbonate, 1,300 mg, TID  sodium chloride, 10 mL, Q12H  topiramate, 50 mg, Daily  warfarin, 3 mg, Once per day on Sunday Tuesday Thursday Saturday  warfarin, 6 mg, Once per day on Monday Wednesday Friday          Review of Systems:   ROS  Review of Systems   Constitution: Negative for chills and fever.   Cardiovascular: Negative for chest pain and palpitations.   Respiratory: Negative for cough and hemoptysis.    Gastrointestinal: Negative for nausea.        Constitutional:  Temp:  [97.7 °F (36.5 °C)-98 °F (36.7 °C)] 97.7 °F (36.5 °C)  Heart Rate:  [] 74  Resp:  [13-18] 13  BP: ()/(45-62) 93/47    Physical Exam   BP 93/47 (BP Location: Left arm)   Pulse 74   Temp 97.7 °F (36.5 °C)  "(Oral)   Resp 13   Ht 175.5 cm (69.09\")   Wt 87.8 kg (193 lb 9 oz)   SpO2 98%   BMI 28.51 kg/m²   General:  Appears in no acute distress  Eyes: Sclera is anicteric,  conjunctiva is clear   HEENT:  No JVD. Thyroid not visibly enlarged. No mucosal pallor or cyanosis  Respiratory: Respirations regular and unlabored at rest.  Clear to auscultation  Cardiovascular: S1,S2 irregularly irregular rate and rhythm  Gastrointestinal: Abdomen nondistended.  Musculoskeletal:  No abnormal movements  Extremities: No digital clubbing or cyanosis  Skin: Color pink.   Neuro: Alert and awake.    INTAKE AND OUTPUT:    Intake/Output Summary (Last 24 hours) at 5/19/2024 1152  Last data filed at 5/19/2024 0927  Gross per 24 hour   Intake 480 ml   Output --   Net 480 ml       Cardiographics  Telemetry: A-fib with controlled ventricular response    ECG:   ECG 12 Lead Rhythm Change   Preliminary Result   HEART RATE= 98  bpm   RR Interval= 628  ms   AL Interval= 117  ms   P Horizontal Axis= 143  deg   P Front Axis= 37  deg   QRSD Interval= 82  ms   QT Interval= 383  ms   QTcB= 483  ms   QRS Axis= 32  deg   T Wave Axis= 21  deg   - ABNORMAL ECG -   Sinus rhythm   Atrial premature complexes   Probable anteroseptal infarct, old   When compared with ECG of 18-May-2024 15:18:25,   No significant change   Electronically Signed By:    Date and Time of Study: 2024-05-18 16:16:42      ECG 12 Lead Rhythm Change   Preliminary Result   HEART RATE= 96  bpm   RR Interval= 593  ms   AL Interval=   ms   P Horizontal Axis=   deg   P Front Axis=   deg   QRSD Interval= 80  ms   QT Interval= 354  ms   QTcB= 460  ms   QRS Axis= 18  deg   T Wave Axis= 49  deg   - ABNORMAL ECG -   Atrial flutter with predominant 3:1 AV block   Anteroseptal infarct, age indeterminate   Electronically Signed By:    Date and Time of Study: 2024-05-18 15:18:25      ECG 12 Lead Rhythm Change   Preliminary Result   HEART RATE= 81  bpm   RR Interval= 736  ms   AL Interval= 156  ms   P " Horizontal Axis= 23  deg   P Front Axis= 40  deg   QRSD Interval= 89  ms   QT Interval= 416  ms   QTcB= 485  ms   QRS Axis= 18  deg   T Wave Axis= 41  deg   - ABNORMAL ECG -   Sinus rhythm   Anteroseptal infarct, age indeterminate   When compared with ECG of 25-Jan-2024 13:34:30,   Significant change in rhythm   New or worsened ischemia or infarction   Electronically Signed By:    Date and Time of Study: 2024-05-17 09:44:47        I have personally reviewed EKG    Echocardiogram: Results for orders placed during the hospital encounter of 01/22/24    Adult Transthoracic Echo Complete W/ Cont if Necessary Per Protocol    Interpretation Summary    Left ventricular ejection fraction appears to be 56 - 60%.    Estimated right ventricular systolic pressure from tricuspid regurgitation is normal (<35 mmHg).    Indication  Dyspnea  Palpitations    Technically satisfactory study.  Mitral valve is structurally normal.  Tricuspid valve is structurally normal.  Mild tricuspid regurgitation is present.  Aortic valve is aortic valve with decreased opening motion.  Gradient across the aortic valve is 16/9 mmHg.  Valve area 1.37 cm².  Pulmonic valve could not be well visualized.  Left atrium is enlarged.  Right atrium is normal in size.  Left ventricle is enlarged with septal anterior wall and apical severe hypokinesis with ejection fraction of 40%.  Possible left ventricular apical thrombus.  Right ventricle is normal in size.  Atrial septum is intact.  Aorta is normal.  No pericardial effusion or intracardiac thrombus is seen.  ICD lead is present in the right ventricle.    Impression  Mild tricuspid regurgitation.  Mild to moderate aortic valve stenosis with gradient of 16/9 mmHg and valve area of 1.37 cm².  Left atrial enlargement.  Left ventricle is enlarged with septal anterior wall and apical severe hypokinesis with ejection fraction of 40%.  Possible left ventricular apical thrombus.  ICD lead is present in the right  "ventricle.  No evidence for pulmonary hypertension is present.      Lab Review   I have reviewed the labs          Results from last 7 days   Lab Units 05/18/24  0110   SODIUM mmol/L 141   POTASSIUM mmol/L 4.0   BUN mg/dL 11   CREATININE mg/dL 0.98   CALCIUM mg/dL 8.9         Results from last 7 days   Lab Units 05/18/24  0110 05/17/24  1110 05/16/24  2341   WBC 10*3/mm3 4.99 5.64 4.17   HEMOGLOBIN g/dL 11.9* 12.7 11.7*   HEMATOCRIT % 36.4 39.7 37.0   PLATELETS 10*3/mm3 170 202 184     Results from last 7 days   Lab Units 05/19/24  0706 05/18/24  0110 05/17/24  1110   INR  1.42* 1.05* 1.10*       RADIOLOGY:  Imaging Results (Last 24 Hours)       ** No results found for the last 24 hours. **                  )5/19/2024  MD JAMEEL Leal/Transcription:   \"Dictated utilizing Dragon dictation\".   "

## 2024-05-19 NOTE — PLAN OF CARE
Goal Outcome Evaluation:  Plan of Care Reviewed With: patient, spouse        Progress: no change  Outcome Evaluation: Patient alert to self, VSS.  Family at bedside , call light in reach

## 2024-05-20 ENCOUNTER — APPOINTMENT (OUTPATIENT)
Dept: CT IMAGING | Facility: HOSPITAL | Age: 69
End: 2024-05-20
Payer: MEDICARE

## 2024-05-20 LAB
B BURGDOR IGG SER QL: NEGATIVE
BACTERIA SPEC AEROBE CULT: ABNORMAL

## 2024-05-20 PROCEDURE — 25010000002 DIPHENHYDRAMINE PER 50 MG

## 2024-05-20 PROCEDURE — P9047 ALBUMIN (HUMAN), 25%, 50ML: HCPCS

## 2024-05-20 PROCEDURE — 97530 THERAPEUTIC ACTIVITIES: CPT

## 2024-05-20 PROCEDURE — 74176 CT ABD & PELVIS W/O CONTRAST: CPT

## 2024-05-20 PROCEDURE — 99232 SBSQ HOSP IP/OBS MODERATE 35: CPT | Performed by: INTERNAL MEDICINE

## 2024-05-20 PROCEDURE — 25010000002 ONDANSETRON PER 1 MG

## 2024-05-20 PROCEDURE — 25010000002 PROCHLORPERAZINE 10 MG/2ML SOLUTION

## 2024-05-20 PROCEDURE — 25010000002 ALBUMIN HUMAN 25% PER 50 ML

## 2024-05-20 RX ORDER — DIPHENHYDRAMINE HYDROCHLORIDE 50 MG/ML
50 INJECTION INTRAMUSCULAR; INTRAVENOUS ONCE
Status: COMPLETED | OUTPATIENT
Start: 2024-05-20 | End: 2024-05-20

## 2024-05-20 RX ORDER — PROCHLORPERAZINE EDISYLATE 5 MG/ML
5 INJECTION INTRAMUSCULAR; INTRAVENOUS EVERY 6 HOURS PRN
Status: DISCONTINUED | OUTPATIENT
Start: 2024-05-20 | End: 2024-05-22 | Stop reason: HOSPADM

## 2024-05-20 RX ORDER — AMOXICILLIN AND CLAVULANATE POTASSIUM 875; 125 MG/1; MG/1
1 TABLET, FILM COATED ORAL EVERY 12 HOURS SCHEDULED
Status: DISCONTINUED | OUTPATIENT
Start: 2024-05-20 | End: 2024-05-22 | Stop reason: HOSPADM

## 2024-05-20 RX ORDER — ALBUMIN (HUMAN) 12.5 G/50ML
12.5 SOLUTION INTRAVENOUS ONCE
Status: COMPLETED | OUTPATIENT
Start: 2024-05-20 | End: 2024-05-20

## 2024-05-20 RX ADMIN — MIDODRINE HYDROCHLORIDE 10 MG: 5 TABLET ORAL at 21:07

## 2024-05-20 RX ADMIN — CARVEDILOL 3.12 MG: 3.12 TABLET, FILM COATED ORAL at 12:46

## 2024-05-20 RX ADMIN — DILTIAZEM HYDROCHLORIDE 240 MG: 240 CAPSULE, EXTENDED RELEASE ORAL at 12:46

## 2024-05-20 RX ADMIN — SODIUM BICARBONATE 1300 MG: 650 TABLET ORAL at 21:07

## 2024-05-20 RX ADMIN — PROCHLORPERAZINE EDISYLATE 5 MG: 5 INJECTION INTRAMUSCULAR; INTRAVENOUS at 03:29

## 2024-05-20 RX ADMIN — HYDROCODONE BITARTRATE AND ACETAMINOPHEN 1 TABLET: 5; 325 TABLET ORAL at 18:55

## 2024-05-20 RX ADMIN — PROCHLORPERAZINE EDISYLATE 5 MG: 5 INJECTION INTRAMUSCULAR; INTRAVENOUS at 12:46

## 2024-05-20 RX ADMIN — ATORVASTATIN CALCIUM 80 MG: 40 TABLET, FILM COATED ORAL at 12:46

## 2024-05-20 RX ADMIN — SODIUM BICARBONATE 1300 MG: 650 TABLET ORAL at 15:01

## 2024-05-20 RX ADMIN — AMOXICILLIN AND CLAVULANATE POTASSIUM 1 TABLET: 875; 125 TABLET, FILM COATED ORAL at 21:07

## 2024-05-20 RX ADMIN — TOPIRAMATE 50 MG: 25 TABLET, FILM COATED ORAL at 12:46

## 2024-05-20 RX ADMIN — CETIRIZINE HYDROCHLORIDE 10 MG: 10 TABLET, FILM COATED ORAL at 12:47

## 2024-05-20 RX ADMIN — MIDODRINE HYDROCHLORIDE 10 MG: 5 TABLET ORAL at 15:01

## 2024-05-20 RX ADMIN — Medication 10 ML: at 12:47

## 2024-05-20 RX ADMIN — IBUPROFEN 600 MG: 200 TABLET, FILM COATED ORAL at 12:46

## 2024-05-20 RX ADMIN — IBUPROFEN 600 MG: 200 TABLET, FILM COATED ORAL at 03:21

## 2024-05-20 RX ADMIN — ASPIRIN 81 MG CHEWABLE TABLET 81 MG: 81 TABLET CHEWABLE at 12:46

## 2024-05-20 RX ADMIN — Medication 10 ML: at 21:10

## 2024-05-20 RX ADMIN — ONDANSETRON 4 MG: 2 INJECTION INTRAMUSCULAR; INTRAVENOUS at 02:49

## 2024-05-20 RX ADMIN — SODIUM BICARBONATE 1300 MG: 650 TABLET ORAL at 12:46

## 2024-05-20 RX ADMIN — WARFARIN SODIUM 6 MG: 3 TABLET ORAL at 18:55

## 2024-05-20 RX ADMIN — ALBUMIN (HUMAN) 12.5 G: 0.25 INJECTION, SOLUTION INTRAVENOUS at 12:45

## 2024-05-20 RX ADMIN — DIPHENHYDRAMINE HYDROCHLORIDE 50 MG: 50 INJECTION, SOLUTION INTRAMUSCULAR; INTRAVENOUS at 03:30

## 2024-05-20 RX ADMIN — PAROXETINE HYDROCHLORIDE 40 MG: 20 TABLET, FILM COATED ORAL at 06:33

## 2024-05-20 RX ADMIN — CARVEDILOL 3.12 MG: 3.12 TABLET, FILM COATED ORAL at 18:55

## 2024-05-20 RX ADMIN — MIDODRINE HYDROCHLORIDE 10 MG: 5 TABLET ORAL at 06:33

## 2024-05-20 RX ADMIN — PANTOPRAZOLE SODIUM 40 MG: 40 TABLET, DELAYED RELEASE ORAL at 06:33

## 2024-05-20 RX ADMIN — LEVOTHYROXINE SODIUM 88 MCG: 88 TABLET ORAL at 06:33

## 2024-05-20 RX ADMIN — OXYBUTYNIN CHLORIDE 5 MG: 5 TABLET, EXTENDED RELEASE ORAL at 12:46

## 2024-05-20 RX ADMIN — Medication 5 MG: at 21:07

## 2024-05-20 NOTE — PROGRESS NOTES
LOS: 3 days   Patient Care Team:  Luan Joseph APRN as PCP - General (Family Medicine)  Jozef Otero MD as Consulting Physician (Nephrology)    Subjective:  Pt seen and examined at bedside.      Tolerating IV abx.  Remains confused.      Review of Systems:   Review of Systems   Constitutional:  Positive for activity change and appetite change. Negative for chills, diaphoresis, fatigue and fever.   HENT:  Negative for trouble swallowing.    Eyes:  Negative for visual disturbance.   Respiratory: Negative.     Cardiovascular: Negative.    Gastrointestinal: Negative.    Endocrine: Negative.    Genitourinary:  Positive for dysuria.        Foul smelling urine   Musculoskeletal:  Positive for arthralgias and gait problem.   Skin: Negative.    Neurological:  Positive for weakness.   Hematological: Negative.    Psychiatric/Behavioral:  Positive for confusion.            Vital Signs  Temp:  [97.7 °F (36.5 °C)-98 °F (36.7 °C)] 98 °F (36.7 °C)  Heart Rate:  [67-79] 67  Resp:  [17-22] 17  BP: ()/(44-68) 91/53    Physical Exam:  Physical Exam  Constitutional:       General: She is not in acute distress.     Appearance: She is obese.   HENT:      Head: Normocephalic and atraumatic.      Ears:      Comments: No hearing impairment     Nose: Nose normal.      Mouth/Throat:      Mouth: Mucous membranes are moist.      Pharynx: Oropharynx is clear.   Eyes:      General: No scleral icterus.     Conjunctiva/sclera: Conjunctivae normal.   Cardiovascular:      Rate and Rhythm: Normal rate and regular rhythm.      Pulses: Normal pulses.      Heart sounds: Normal heart sounds. No murmur heard.  Pulmonary:      Effort: Pulmonary effort is normal. No respiratory distress.      Breath sounds: Normal breath sounds. No wheezing, rhonchi or rales.   Abdominal:      General: Bowel sounds are normal. There is no distension.      Palpations: Abdomen is soft.      Tenderness: There is no abdominal tenderness. There is no guarding  or rebound.   Musculoskeletal:      Cervical back: Normal range of motion and neck supple. No rigidity.      Right lower leg: No edema.      Left lower leg: No edema.      Comments: Able to move all extremities in bed   Skin:     General: Skin is warm and dry.      Capillary Refill: Capillary refill takes less than 2 seconds.   Neurological:      Mental Status: She is alert.      Cranial Nerves: No cranial nerve deficit.      Comments: Oriented to person  Speech clear and easily understood  Confusion noted  Pleasant and cooperative   Psychiatric:         Mood and Affect: Mood normal.         Behavior: Behavior normal.      Comments: Confusion noted          Radiology:  CT Abdomen Pelvis Without Contrast    Result Date: 5/20/2024  Impression: 1. Nonobstructing bilateral renal stones and mild bilateral renal cortical scarring. No ureteral stone or hydronephrosis. 2. Aortobiiliac endograft repair of abdominal aortic aneurysm. In the sac measures 4.3 cm. 3. Uncomplicated colonic diverticulosis. 4. Chronic thoracic and lumbar compression fractures. 5. Cholecystectomy. 6. Moderate to large stool burden in the ascending colon, transverse colon and proximal descending colon. Electronically Signed: Amber Candelaria MD  5/20/2024 11:55 AM EDT  Workstation ID: [id number]    CT Head Without Contrast    Result Date: 5/16/2024  1. Limited study secondary to motion. 2. No definite hemorrhage noted. Small focus of increased density within the evens is favored to represent calcification. 3. Diffuse atrophy with mild bifrontal predominance Short-term follow-up can always be considered given limitations of exam if clinically indicated Electronically Signed: Tesfaye Antoine MD  5/16/2024 6:41 PM EDT  Workstation ID: [id number]    XR Chest 1 View    Result Date: 5/16/2024  Impression: 1.Cardiomegaly with pulmonary vascular congestion and scattered bibasilar atelectasis or infiltrates. Electronically Signed: Roberto Huddleston MD  5/16/2024 5:50  PM EDT  Workstation ID: [id number]        Results Review:     I reviewed the patient's new clinical results.  I reviewed the patient's new imaging results and agree with the interpretation.    Medication Review:   Scheduled Meds:aspirin, 81 mg, Oral, Daily  atorvastatin, 80 mg, Oral, Daily  carvedilol, 3.125 mg, Oral, BID With Meals  cefTRIAXone, 2,000 mg, Intravenous, Q24H  cetirizine, 10 mg, Oral, Daily  dilTIAZem CD, 240 mg, Oral, Q24H  levothyroxine, 88 mcg, Oral, Q AM  melatonin, 5 mg, Oral, Nightly  midodrine, 10 mg, Oral, Q8H  oxybutynin XL, 5 mg, Oral, Daily  pantoprazole, 40 mg, Oral, Q AM  PARoxetine, 40 mg, Oral, QAM  sodium bicarbonate, 1,300 mg, Oral, TID  sodium chloride, 10 mL, Intravenous, Q12H  topiramate, 50 mg, Oral, Daily  warfarin, 3 mg, Oral, Once per day on Sunday Tuesday Thursday Saturday  warfarin, 6 mg, Oral, Once per day on Monday Wednesday Friday      Continuous Infusions:Pharmacy to dose warfarin,       PRN Meds:.  acetaminophen    senna-docusate sodium **AND** polyethylene glycol **AND** bisacodyl **AND** bisacodyl    calcium carbonate    Calcium Replacement - Follow Nurse / BPA Driven Protocol    HYDROcodone-acetaminophen    ibuprofen    Magnesium Standard Dose Replacement - Follow Nurse / BPA Driven Protocol    nitroglycerin    ondansetron    Pharmacy to dose warfarin    Phosphorus Replacement - Follow Nurse / BPA Driven Protocol    Potassium Replacement - Follow Nurse / BPA Driven Protocol    prochlorperazine    sodium chloride    [COMPLETED] Insert Peripheral IV **AND** sodium chloride    sodium chloride    sodium chloride    Labs:    CBC    Results from last 7 days   Lab Units 05/19/24 2308 05/19/24  1308 05/18/24  0110 05/17/24  1110 05/16/24  2341 05/16/24  1642   WBC 10*3/mm3 4.57 5.30 4.99 5.64 4.17 4.86   HEMOGLOBIN g/dL 11.0* 12.8 11.9* 12.7 11.7* 13.3   PLATELETS 10*3/mm3 145 139* 170 202 184 208     BMP   Results from last 7 days   Lab Units 05/19/24 2308 05/19/24  2944  "05/18/24  0110 05/17/24  1110 05/16/24  2341 05/16/24  1642   SODIUM mmol/L 134* 134* 141 140 138 136   POTASSIUM mmol/L 3.9 3.3* 4.0 4.0 3.3* 4.0   CHLORIDE mmol/L 105 102 111* 108* 107 103   CO2 mmol/L 18.7* 20.0* 20.0* 20.0* 20.0* 20.0*   BUN mg/dL 17 16 11 11 11 13   CREATININE mg/dL 1.08* 1.15* 0.98 1.08* 1.15* 1.12*   GLUCOSE mg/dL 115* 112* 121* 123* 128* 104*     Cr Clearance Estimated Creatinine Clearance: 58.2 mL/min (A) (by C-G formula based on SCr of 1.08 mg/dL (H)).  Coag   Results from last 7 days   Lab Units 05/19/24 2308 05/19/24  0706 05/18/24  0110 05/17/24  1110 05/16/24  1642   INR  1.75* 1.42* 1.05* 1.10* 1.42*     HbA1C No results found for: \"HGBA1C\"  Blood Glucose No results found for: \"POCGLU\"  Infection   Results from last 7 days   Lab Units 05/19/24  1308 05/18/24  1805   BLOODCX  No growth at 24 hours  --    URINECX   --  >100,000 CFU/mL Escherichia coli*     CMP   Results from last 7 days   Lab Units 05/19/24 2308 05/19/24  1308 05/18/24 2018 05/18/24  0110 05/17/24  1110 05/16/24  2341 05/16/24  1642   SODIUM mmol/L 134* 134*  --  141 140 138 136   POTASSIUM mmol/L 3.9 3.3*  --  4.0 4.0 3.3* 4.0   CHLORIDE mmol/L 105 102  --  111* 108* 107 103   CO2 mmol/L 18.7* 20.0*  --  20.0* 20.0* 20.0* 20.0*   BUN mg/dL 17 16  --  11 11 11 13   CREATININE mg/dL 1.08* 1.15*  --  0.98 1.08* 1.15* 1.12*   GLUCOSE mg/dL 115* 112*  --  121* 123* 128* 104*   ALBUMIN g/dL 3.0* 3.7 3.4*  --   --   --   --    BILIRUBIN mg/dL 0.5 0.9  --   --   --   --   --    ALK PHOS U/L 108 125*  --   --   --   --   --    AST (SGOT) U/L 75* 57*  --   --   --   --   --    ALT (SGPT) U/L 37* 38*  --   --   --   --   --      UA    Results from last 7 days   Lab Units 05/18/24  1805   NITRITE UA  Positive*   WBC UA /HPF Too Numerous to Count*   BACTERIA UA /HPF 4+*   SQUAM EPITHEL UA /HPF 3-6*   URINECX  >100,000 CFU/mL Escherichia coli*     Radiology(recent) CT Abdomen Pelvis Without Contrast    Result Date: " 5/20/2024  Impression: 1. Nonobstructing bilateral renal stones and mild bilateral renal cortical scarring. No ureteral stone or hydronephrosis. 2. Aortobiiliac endograft repair of abdominal aortic aneurysm. In the sac measures 4.3 cm. 3. Uncomplicated colonic diverticulosis. 4. Chronic thoracic and lumbar compression fractures. 5. Cholecystectomy. 6. Moderate to large stool burden in the ascending colon, transverse colon and proximal descending colon. Electronically Signed: Amber Candelaria MD  5/20/2024 11:55 AM EDT  Workstation ID: [id number]        Assessment:  Altered Mental Status  Acute Cystitis with Hematuria  Hypotension  Tick Bite  Atrial Fibrillation  Chronic warfarin therapy for anticoagulation  Systolic CHF  Ischemic Cardiomyopathy  CAD  GERD  CKD 3  Acquired Hypothyroidism  History of MI  History of CABG  History of PE    Plan:    Abdominal CT today  Albumin replacement today  Hold betablocker and cardizem for hypotension  MRI, when able due to presence of pacer  continue IV Rocephin, E. coli UTI  Tick panel pending    Cardiology consulted 5/18/2024-considering MERLE due to ECHO 01/2024 showed possible apical thrombus.     VTE Prophylaxis:  on warfarin therapy for Afib  GI Prophylaxis: PPI     DC PLAN:  Noted PT recommending rehab.   consult entered to assess for rehab vs HHC needs.        ALEXANDER Spangler  05/20/24  14:05 EDT

## 2024-05-20 NOTE — CASE MANAGEMENT/SOCIAL WORK
Continued Stay Note   [facility]     Patient Name: Laura Mejia  MRN: [id number]  Today's Date: 5/20/2024    Admit Date: 5/16/2024    Plan: DC PLAN: Routine home with spouse. Declines SNF       Discharge Plan       Row Name 05/20/24 1145       Plan    Plan DC PLAN: Routine home with spouse. Declines SNF    Plan Comments Went to bedside and talked with patient and . Provided list of SNF facilities. Strongly declines. Left list at bedside.     DC BARRIERS: A, Fib. INR 1.75, pending Cardiology and psych consults.                      Expected Discharge Date and Time       Expected Discharge Date Expected Discharge Time    May 21, 2024           Zaira Vazquez RN   Case Management

## 2024-05-20 NOTE — SIGNIFICANT NOTE
05/20/24 1449   OTHER   Discipline physical therapist   Rehab Time/Intention   Session Not Performed patient/family declined, not feeling well  (pt has been up for breakfast, now back in bed asleep, will f/u later today or in AM for therapy treatment.)   Recommendation   PT - Next Appointment 05/21/24

## 2024-05-20 NOTE — PROGRESS NOTES
Referring Provider: Keke Casiano MD    Reason for follow-up:  Status post CABG  Status post ICD     Patient Care Team:  Luan Joseph APRN as PCP - General (Family Medicine)  Jozef Otero MD as Consulting Physician (Nephrology)    Subjective .      ROS    Today, the patient has been without any chest discomfort ,shortness of breath, palpitations, dizziness or syncope.  Denies having any headache ,abdominal pain ,nausea, vomiting , diarrhea constipation, loss of weight or loss of appetite.  Denies having any excessive bruising ,hematuria or blood in the stool.    Review of all systems negative except as indicated    History  Past Medical History:   Diagnosis Date    AAA (abdominal aortic aneurysm)     infrarenal- 3.1cm(2010, 3.7x4.2cm(2016), 3.9cm (2017)    Allergic rhinitis     Aspiration pneumonia 05/2017    CHF (congestive heart failure)     Coronary artery disease     DDD (degenerative disc disease), lumbar     lumbar spine- Dr. Churchill     Depression     Diverticulosis     Frequent UTI     Dr. Daniels    GERD (gastroesophageal reflux disease)     Hiatal hernia     HSV (herpes simplex virus) infection     Oral    Hyperlipidemia     IBS (irritable bowel syndrome)     Constipation predominant    Ischemic cardiomyopathy 09/2017    AICD     Kidney stones     NSTEMI (non-ST elevated myocardial infarction) 04/26/2017    Obstructive sleep apnea 2011    nfsg 2011, ahi 67    Osteoarthritis     Osteopenia     Peripheral neuropathy     feet    Pulmonary embolism, bilateral 05/2017    Rotator cuff tear     Bilateral- Ortho        Past Surgical History:   Procedure Laterality Date    CARDIAC CATHETERIZATION  04/26/2017    99% mid LAD. 90% mid LCX. 60% RCA. Recommended CABG. Dr. Diaz    CARDIAC DEFIBRILLATOR PLACEMENT  12/26/2017    ICD implant/ Dr. Ellis    CATARACT EXTRACTION      CORONARY ARTERY BYPASS GRAFT  04/26/2017    x4 & ASD Closure    CYSTOSCOPY  2002    negative. Dr. Daniels    LAPAROSCOPIC CHOLECYSTECTOMY   04/17/2013    For biliary dyskinesia. Dr. Mccoy.     REPLACEMENT TOTAL KNEE BILATERAL  11/2004    Dr. Herrera    THORACENTESIS Left 05/08/2017    TONSILLECTOMY      TUBAL ABDOMINAL LIGATION Bilateral        Family History   Problem Relation Age of Onset    Heart disease Mother     Other Mother         Hypoglycemia    Osteoporosis Mother     COPD Father     Stroke Father     Hypertension Brother     Diabetes Brother     Heart disease Brother        Social History     Tobacco Use    Smoking status: Never   Vaping Use    Vaping status: Some Days    Substances: CBD, nightly, 2-3 weekly   Substance Use Topics    Alcohol use: Never    Drug use: Never        Medications Prior to Admission   Medication Sig Dispense Refill Last Dose    aspirin 81 MG chewable tablet Chew 1 tablet Daily.   Past Week    carvedilol (COREG) 3.125 MG tablet Take 1 tablet by mouth 2 (Two) Times a Day With Meals.   Past Week    dilTIAZem CD (CARDIZEM CD) 240 MG 24 hr capsule Take 1 capsule by mouth Daily. 30 capsule 1 Past Week    famciclovir (FAMVIR) 500 MG tablet Take 1 tablet by mouth 3 (Three) Times a Day As Needed.   Past Month    levocetirizine (XYZAL) 5 MG tablet Take 1 tablet by mouth Every Evening.   Past Week    levothyroxine (SYNTHROID, LEVOTHROID) 88 MCG tablet Take 1 tablet by mouth Every Morning. 30 tablet 1 Past Week    linaclotide (LINZESS) 72 MCG capsule capsule Take 1 capsule by mouth Every Morning Before Breakfast.       omeprazole (priLOSEC) 40 MG capsule Take 1 capsule by mouth Daily.   Past Week    PARoxetine (PAXIL) 40 MG tablet Take 1 tablet by mouth Every Morning.   Past Week    topiramate (TOPAMAX) 50 MG tablet Take 1 tablet by mouth Daily.   Past Week    trospium (SANCTURA) 20 MG tablet Take 1 tablet by mouth Every Night.   Past Week    warfarin (COUMADIN) 3 MG tablet See Admin Instructions. Take 1 tablet by mouth on Tuesday, Thursday, Saturday, and Sunday   Past Week    warfarin (COUMADIN) 3 MG tablet Take 2 tablets by  mouth Daily. Monday Wednesday, Friday       atorvastatin (LIPITOR) 80 MG tablet Take 1 tablet by mouth Daily.   Unknown    ezetimibe (ZETIA) 10 MG tablet Take 1 tablet by mouth Daily.   Unknown    midodrine (PROAMATINE) 10 MG tablet Take 1 tablet by mouth Every 8 (Eight) Hours. 90 tablet 1 Unknown    sodium bicarbonate 650 MG tablet Take 2 tablets by mouth 3 (Three) Times a Day. 90 tablet 1 Unknown       Allergies  Oxytetracycline and Oxycodone    Scheduled Meds:aspirin, 81 mg, Oral, Daily  atorvastatin, 80 mg, Oral, Daily  carvedilol, 3.125 mg, Oral, BID With Meals  cefTRIAXone, 2,000 mg, Intravenous, Q24H  cetirizine, 10 mg, Oral, Daily  dilTIAZem CD, 240 mg, Oral, Q24H  levothyroxine, 88 mcg, Oral, Q AM  melatonin, 5 mg, Oral, Nightly  midodrine, 10 mg, Oral, Q8H  oxybutynin XL, 5 mg, Oral, Daily  pantoprazole, 40 mg, Oral, Q AM  PARoxetine, 40 mg, Oral, QAM  sodium bicarbonate, 1,300 mg, Oral, TID  sodium chloride, 10 mL, Intravenous, Q12H  topiramate, 50 mg, Oral, Daily  warfarin, 3 mg, Oral, Once per day on Sunday Tuesday Thursday Saturday  warfarin, 6 mg, Oral, Once per day on Monday Wednesday Friday      Continuous Infusions:Pharmacy to dose warfarin,       PRN Meds:.  acetaminophen    senna-docusate sodium **AND** polyethylene glycol **AND** bisacodyl **AND** bisacodyl    calcium carbonate    Calcium Replacement - Follow Nurse / BPA Driven Protocol    HYDROcodone-acetaminophen    ibuprofen    Magnesium Standard Dose Replacement - Follow Nurse / BPA Driven Protocol    nitroglycerin    ondansetron    Pharmacy to dose warfarin    Phosphorus Replacement - Follow Nurse / BPA Driven Protocol    Potassium Replacement - Follow Nurse / BPA Driven Protocol    prochlorperazine    sodium chloride    [COMPLETED] Insert Peripheral IV **AND** sodium chloride    sodium chloride    sodium chloride    Objective     VITAL SIGNS  Vitals:    05/19/24 2107 05/19/24 2123 05/20/24 0511 05/20/24 0633   BP: 91/44 91/44 105/50 (!)  "88/68   BP Location:  Left arm Left arm    Patient Position:  Lying Lying    Pulse: 75 67 79 74   Resp:  22 19    Temp:  97.7 °F (36.5 °C) 97.9 °F (36.6 °C)    TempSrc:  Oral Oral    SpO2:  97% 90%    Weight:       Height:           Flowsheet Rows      Flowsheet Row First Filed Value   Admission Height 175.3 cm (69\") Documented at 05/16/2024 1533   Admission Weight 83.6 kg (184 lb 4.9 oz) Documented at 05/16/2024 1533              Intake/Output Summary (Last 24 hours) at 5/20/2024 0642  Last data filed at 5/19/2024 2123  Gross per 24 hour   Intake 600 ml   Output --   Net 600 ml        TELEMETRY: Sinus rhythm    Physical Exam:  The patient is alert, oriented and in no distress.  Vital signs as noted above.  Head and neck revealed no carotid bruits or jugular venous distention.  No thyromegaly or lymphadenopathy is present  Lungs clear.  No wheezing.  Breath sounds are normal bilaterally.  Heart normal first and second heart sounds.  No murmur. No precordial rub is present.  No gallop is present.  Abdomen soft and nontender.  No organomegaly is present.  Extremities with good peripheral pulses without any pedal edema.  Skin warm and dry.  ICD site looks normal.  Musculoskeletal system is grossly normal  CNS grossly normal    Reviewed and updated.    Results Review:   I reviewed the patient's new clinical results.  Lab Results (last 24 hours)       Procedure Component Value Units Date/Time    Comprehensive Metabolic Panel [[id number]]  (Abnormal) Collected: 05/19/24 2308    Specimen: Blood Updated: 05/19/24 2358     Glucose 115 mg/dL      BUN 17 mg/dL      Creatinine 1.08 mg/dL      Sodium 134 mmol/L      Potassium 3.9 mmol/L      Chloride 105 mmol/L      CO2 18.7 mmol/L      Calcium 8.5 mg/dL      Total Protein 5.6 g/dL      Albumin 3.0 g/dL      ALT (SGPT) 37 U/L      AST (SGOT) 75 U/L      Alkaline Phosphatase 108 U/L      Total Bilirubin 0.5 mg/dL      Globulin 2.6 gm/dL      A/G Ratio 1.2 g/dL      BUN/Creatinine " Ratio 15.7     Anion Gap 10.3 mmol/L      eGFR 55.7 mL/min/1.73     Narrative:      GFR Normal >60  Chronic Kidney Disease <60  Kidney Failure <15      Protime-INR [[id number]]  (Abnormal) Collected: 05/19/24 2308    Specimen: Blood Updated: 05/19/24 2351     Protime 18.3 Seconds      INR 1.75    CBC & Differential [[id number]]  (Abnormal) Collected: 05/19/24 2308    Specimen: Blood Updated: 05/19/24 2331    Narrative:      The following orders were created for panel order CBC & Differential.  Procedure                               Abnormality         Status                     ---------                               -----------         ------                     CBC Auto Differential[[id number]]        Abnormal            Final result                 Please view results for these tests on the individual orders.    CBC Auto Differential [[id number]]  (Abnormal) Collected: 05/19/24 2308    Specimen: Blood Updated: 05/19/24 2331     WBC 4.57 10*3/mm3      RBC 3.54 10*6/mm3      Hemoglobin 11.0 g/dL      Hematocrit 34.8 %      MCV 98.3 fL      MCH 31.1 pg      MCHC 31.6 g/dL      RDW 14.5 %      RDW-SD 52.1 fl      MPV 10.2 fL      Platelets 145 10*3/mm3      Neutrophil % 82.9 %      Lymphocyte % 9.2 %      Monocyte % 5.3 %      Eosinophil % 0.4 %      Basophil % 1.1 %      Immature Grans % 1.1 %      Neutrophils, Absolute 3.79 10*3/mm3      Lymphocytes, Absolute 0.42 10*3/mm3      Monocytes, Absolute 0.24 10*3/mm3      Eosinophils, Absolute 0.02 10*3/mm3      Basophils, Absolute 0.05 10*3/mm3      Immature Grans, Absolute 0.05 10*3/mm3      nRBC 0.0 /100 WBC     Blood Culture - Blood, Arm, Left [[id number]] Collected: 05/19/24 2042    Specimen: Blood from Arm, Left Updated: 05/19/24 2050    CBC & Differential [[id number]]  (Abnormal) Collected: 05/19/24 1308    Specimen: Blood Updated: 05/19/24 1409    Narrative:      The following orders were created for panel order CBC & Differential.  Procedure                                Abnormality         Status                     ---------                               -----------         ------                     CBC Auto Differential[[id number]]        Abnormal            Final result               Scan Slide[[id number]]                                       Final result                 Please view results for these tests on the individual orders.    CBC Auto Differential [[id number]]  (Abnormal) Collected: 05/19/24 1308    Specimen: Blood Updated: 05/19/24 1409     WBC 5.30 10*3/mm3      RBC 4.12 10*6/mm3      Hemoglobin 12.8 g/dL      Hematocrit 41.6 %      .0 fL      MCH 31.1 pg      MCHC 30.8 g/dL      RDW 14.5 %      RDW-SD 54.1 fl      MPV 10.0 fL      Platelets 139 10*3/mm3     Narrative:      The previously reported component NRBC is no longer being reported. Previous result was 0.0 /100 WBC (Reference Range: 0.0-0.2 /100 WBC) on 5/19/2024 at 1331 EDT.    Scan Slide [[id number]] Collected: 05/19/24 1308    Specimen: Blood Updated: 05/19/24 1409     Scan Slide --     Comment: See Manual Differential Results       Manual Differential [[id number]]  (Abnormal) Collected: 05/19/24 1308    Specimen: Blood Updated: 05/19/24 1409     Neutrophil % 77.0 %      Lymphocyte % 6.0 %      Monocyte % 4.0 %      Basophil % 1.0 %      Bands %  9.0 %      Metamyelocyte % 3.0 %      Neutrophils Absolute 4.56 10*3/mm3      Lymphocytes Absolute 0.32 10*3/mm3      Monocytes Absolute 0.21 10*3/mm3      Basophils Absolute 0.05 10*3/mm3      RBC Morphology Normal     WBC Morphology Normal     Platelet Estimate Decreased    Comprehensive Metabolic Panel [[id number]]  (Abnormal) Collected: 05/19/24 1308    Specimen: Blood Updated: 05/19/24 1336     Glucose 112 mg/dL      BUN 16 mg/dL      Creatinine 1.15 mg/dL      Sodium 134 mmol/L      Potassium 3.3 mmol/L      Chloride 102 mmol/L      CO2 20.0 mmol/L      Calcium 9.0 mg/dL      Total Protein 6.7 g/dL      Albumin 3.7 g/dL      ALT (SGPT) 38 U/L      AST  (SGOT) 57 U/L      Alkaline Phosphatase 125 U/L      Total Bilirubin 0.9 mg/dL      Globulin 3.0 gm/dL      A/G Ratio 1.2 g/dL      BUN/Creatinine Ratio 13.9     Anion Gap 12.0 mmol/L      eGFR 51.7 mL/min/1.73     Narrative:      GFR Normal >60  Chronic Kidney Disease <60  Kidney Failure <15      Blood Culture - Blood, Hand, Left [[id number]] Collected: 05/19/24 1308    Specimen: Blood from Hand, Left Updated: 05/19/24 1314    Urine Culture - Urine, Urine, Clean Catch [[id number]]  (Abnormal) Collected: 05/18/24 1805    Specimen: Urine, Clean Catch Updated: 05/19/24 1236     Urine Culture >100,000 CFU/mL Gram Negative Bacilli    Narrative:      Colonization of the urinary tract without infection is common. Treatment is discouraged unless the patient is symptomatic, pregnant, or undergoing an invasive urologic procedure.    Albumin [[id number]]  (Abnormal) Collected: 05/18/24 2018    Specimen: Blood from Arm, Left Updated: 05/19/24 1223     Albumin 3.4 g/dL     Protime-INR [[id number]]  (Abnormal) Collected: 05/19/24 0706    Specimen: Blood Updated: 05/19/24 0729     Protime 15.1 Seconds      Comment: Result checked          INR 1.42    Extra Tubes [[id number]] Collected: 05/19/24 0706    Specimen: Blood, Venous Line Updated: 05/19/24 0716    Narrative:      The following orders were created for panel order Extra Tubes.  Procedure                               Abnormality         Status                     ---------                               -----------         ------                     Lavender Top[[id number]]                                     Final result                 Please view results for these tests on the individual orders.    Lavender Top [[id number]] Collected: 05/19/24 0706    Specimen: Blood Updated: 05/19/24 0716     Extra Tube hold for add-on     Comment: Auto resulted               Imaging Results (Last 24 Hours)       ** No results found for the last 24 hours. **        LAB RESULTS (LAST 7  DAYS)    CBC  Results from last 7 days   Lab Units 05/19/24 2308 05/19/24  1308 05/18/24  0110 05/17/24  1110 05/16/24  2341 05/16/24  1642   WBC 10*3/mm3 4.57 5.30 4.99 5.64 4.17 4.86   RBC 10*6/mm3 3.54* 4.12 3.80 4.05 3.76* 4.27   HEMOGLOBIN g/dL 11.0* 12.8 11.9* 12.7 11.7* 13.3   HEMATOCRIT % 34.8 41.6 36.4 39.7 37.0 41.9   MCV fL 98.3* 101.0* 95.8 98.0* 98.4* 98.1*   PLATELETS 10*3/mm3 145 139* 170 202 184 208       BMP  Results from last 7 days   Lab Units 05/19/24 2308 05/19/24 1308 05/18/24 0110 05/17/24 1110 05/16/24 2341 05/16/24  1642   SODIUM mmol/L 134* 134* 141 140 138 136   POTASSIUM mmol/L 3.9 3.3* 4.0 4.0 3.3* 4.0   CHLORIDE mmol/L 105 102 111* 108* 107 103   CO2 mmol/L 18.7* 20.0* 20.0* 20.0* 20.0* 20.0*   BUN mg/dL 17 16 11 11 11 13   CREATININE mg/dL 1.08* 1.15* 0.98 1.08* 1.15* 1.12*   GLUCOSE mg/dL 115* 112* 121* 123* 128* 104*       CMP   Results from last 7 days   Lab Units 05/19/24 2308 05/19/24  1308 05/18/24 2018 05/18/24 0110 05/17/24 1110 05/16/24  2341 05/16/24  1642   SODIUM mmol/L 134* 134*  --  141 140 138 136   POTASSIUM mmol/L 3.9 3.3*  --  4.0 4.0 3.3* 4.0   CHLORIDE mmol/L 105 102  --  111* 108* 107 103   CO2 mmol/L 18.7* 20.0*  --  20.0* 20.0* 20.0* 20.0*   BUN mg/dL 17 16  --  11 11 11 13   CREATININE mg/dL 1.08* 1.15*  --  0.98 1.08* 1.15* 1.12*   GLUCOSE mg/dL 115* 112*  --  121* 123* 128* 104*   ALBUMIN g/dL 3.0* 3.7 3.4*  --   --   --   --    BILIRUBIN mg/dL 0.5 0.9  --   --   --   --   --    ALK PHOS U/L 108 125*  --   --   --   --   --    AST (SGOT) U/L 75* 57*  --   --   --   --   --    ALT (SGPT) U/L 37* 38*  --   --   --   --   --          BNP        TROPONIN        CoAg  Results from last 7 days   Lab Units 05/19/24  2308 05/19/24  0706 05/18/24  0110 05/17/24  1110 05/16/24  1642   INR  1.75* 1.42* 1.05* 1.10* 1.42*       Creatinine Clearance  Estimated Creatinine Clearance: 58.2 mL/min (A) (by C-G formula based on SCr of 1.08 mg/dL (H)).    ABG         Radiology  No radiology results for the last day        EKG                I personally viewed and interpreted the patient's EKG/Telemetry data:    ECHOCARDIOGRAM:    Results for orders placed during the hospital encounter of 01/22/24    Adult Transthoracic Echo Complete W/ Cont if Necessary Per Protocol    Interpretation Summary    Left ventricular ejection fraction appears to be 56 - 60%.    Estimated right ventricular systolic pressure from tricuspid regurgitation is normal (<35 mmHg).    Indication  Dyspnea  Palpitations    Technically satisfactory study.  Mitral valve is structurally normal.  Tricuspid valve is structurally normal.  Mild tricuspid regurgitation is present.  Aortic valve is aortic valve with decreased opening motion.  Gradient across the aortic valve is 16/9 mmHg.  Valve area 1.37 cm².  Pulmonic valve could not be well visualized.  Left atrium is enlarged.  Right atrium is normal in size.  Left ventricle is enlarged with septal anterior wall and apical severe hypokinesis with ejection fraction of 40%.  Possible left ventricular apical thrombus.  Right ventricle is normal in size.  Atrial septum is intact.  Aorta is normal.  No pericardial effusion or intracardiac thrombus is seen.  ICD lead is present in the right ventricle.    Impression  Mild tricuspid regurgitation.  Mild to moderate aortic valve stenosis with gradient of 16/9 mmHg and valve area of 1.37 cm².  Left atrial enlargement.  Left ventricle is enlarged with septal anterior wall and apical severe hypokinesis with ejection fraction of 40%.  Possible left ventricular apical thrombus.  ICD lead is present in the right ventricle.  No evidence for pulmonary hypertension is present.          STRESS TEST  Results for orders placed during the hospital encounter of 01/22/24    Stress Test With Myocardial Perfusion One Day    Interpretation Summary  Indications  Status post CABG  Atrial fibrillation    This study was performed under the  direct supervision of Rohini NOLASCO.    Resting ECG  Sinus rhythm    The patient was injected with Lexiscan intravenously while constantly monitoring electrocardiogram and vital signs.  Patient did not have any chest discomfort ST abnormalities or ectopy with injection of Lexiscan.    Cardiolite was used as an imaging agent.    Cardiolite images showed significantly decreased radionuclide uptake in the proximal posterior segments with partial redistribution in the resting images.    Gated SPECT images revealed normal left ventricle size and diffuse hypocontractility with calculated ejection fraction of 61%.    Impression  ========  Lexiscan Cardiolite test revealed proximal posterior infarction and ischemia.  Gated SPECT images revealed normal left ventricular size and diffuse hypocontractility with calculated ejection fraction of 61%.        Cardiolite (Tc-99m sestamibi) stress test    CARDIAC CATHETERIZATION  No results found for this or any previous visit.                OTHER:         Assessment & Plan     Principal Problem:    Altered mental status  Active Problems:    Altered mental status, unspecified      ]]]]]]]]]]]]]]]]]  History  =========   - History of atrial fibrillation with RVR.  Patient had postop atrial fibrillation after she had CABG in 2017.  Patient is in sinus rhythm.     - Status post CABG and ASD closure 2017     - Ischemic cardiomyopathy    -Moderate aortic valve stenosis     - Status post ICD (single-chamber)-2017-[employer].     - Chronic anticoagulation-patient on Coumadin.  Home monitoring.  INR 1.75-5/19/2024.     - Hypothyroidism dyslipidemia obstructive sleep apnea     - Past history of pulmonary emboli     - CKD 3  20/1.7-1/22/2024  17/1.08-5/19/2024.     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 There is  normal antegrade color Doppler flow including the common iliac arteries. There is thrombus within the aneurysm sac. Dimensions are discussed in detail above.     - Family history of coronary artery disease     - Non-smoker     - Allergic to oxytetracycline and oxycodone.     Echocardiogram 1/23/2024 revealed  Mild tricuspid regurgitation.  Mild to moderate aortic valve stenosis with gradient of 16/9 mmHg and valve area of 1.37 cm².  Left atrial enlargement.  Left ventricle is enlarged with septal anterior wall and apical severe hypokinesis with ejection fraction of 40%.  Possible left ventricular apical thrombus.  ICD lead is present in the right ventricle.  No evidence for pulmonary hypertension is present.  =============  Plan  =============  Altered mental status-better.  Does not appear to be cardiac related.    Status post CABG and ASD closure 2017.  Patient is not having any angina pectoris or congestive heart failure.    History of A-fib with RVR.    Patient is in sinus rhythm    Ischemic cardiomyopathy      Mild to moderate aortic valve stenosis     Echocardiogram 1/23/2024 revealed  Mild tricuspid regurgitation.  Mild to moderate aortic valve stenosis with gradient of 16/9 mmHg and valve area of 1.37 cm².  Left atrial enlargement.  Left ventricle is enlarged with septal anterior wall and apical severe hypokinesis with ejection fraction of 40%.  Possible left ventricular apical thrombus.  ICD lead is present in the right ventricle.  No evidence for pulmonary hypertension is present.     Stress Cardiolite test-1/24/2024  Lexiscan Cardiolite test revealed proximal posterior infarction and ischemia.  Gated SPECT images revealed normal left ventricular size and diffuse hypocontractility with calculated ejection fraction of 61%.     Status post ICD ([employer]) 2017.  Single-chamber.    History of left ventricular possible apical thrombus.  Continue anticoagulation.  Consider MERLE in the future if needed.      Anticoagulation  INR 1.75-5/28/2024.  Patient is on Coumadin.      Further plan will depend on patient's progress.     Reviewed and updated-5/20/2024.  ]]]]]]]]]]]]]]]]]]]]]           Allie Hurley MD  05/20/24  06:42 EDT

## 2024-05-20 NOTE — PLAN OF CARE
Goal Outcome Evaluation:         Patient is doing well. Has been somewhat painful and nauseous tonight, treated per MAR. Family has taken her to the bathroom with x1 assist every time. Remains confused, alert and oriented only to herself. Care continuing.

## 2024-05-20 NOTE — PLAN OF CARE
Goal Outcome Evaluation:         Laura Mejia presents with functional mobility impairments which indicate the need for skilled intervention. Pt has progressed with mobility and is getting up in room with A from family, not using an AD.  Assisted pt in bathroom this date and pt able to get in shower. Safe for home with family A based on functional progress. Tolerating session today without incident. Will continue to follow and progress as tolerated.             Anticipated Discharge Disposition (PT): home with 24/7 care, home with home health

## 2024-05-20 NOTE — PLAN OF CARE
Goal Outcome Evaluation:                              VSS on room air. Pt able to make needs known. Safety measures in place. Family has remained at bedside, assisting with care. Cardiac device interrogated today. MRI to be completed 5/21. Plan of care ongoing.

## 2024-05-20 NOTE — THERAPY TREATMENT NOTE
"Subjective: Pt agreeable to therapeutic plan of care.    Objective:   Pt up in bathroom with spouse and nsg aide, on commode and wants to take a shower.   Much more alert today but still some underlying confusion.     Bed mobility - N/A or Not attempted.  Transfers - Supervision, on/off commode in bathroom  Ambulation - 10 feet CGA and Min-A, in bathroom to assist pt getting onto shower seat, increased A for safety due to wet floor       Pain: 0 VAS   Location:   Intervention for pain: N/A    Education: Transfer Training    Assessment: Laura Mejia presents with functional mobility impairments which indicate the need for skilled intervention. Pt has progressed with mobility and is getting up in room with A from family, not using an AD.  Assisted pt in bathroom this date and pt able to get in shower. Safe for home with family A based on functional progress. Tolerating session today without incident. Will continue to follow and progress as tolerated.     Plan/Recommendations:   If medically appropriate, Low Intensity Therapy recommended post-acute care - This is recommended as therapy feels this patient would require 2-3 visits per week. OP or HH would be the best option depending on patient's home bound status. Consider, if the patient has other  \"skilled\" needs such as wounds, IV antibiotics, etc. Combined with \"low intensity\" could also equate to a SNF. If patient is medically complex, consider LTAC. Pt requires no DME at discharge.     Pt desires Home with family assist and Home Health at discharge. Pt cooperative; agreeable to therapeutic recommendations and plan of care.           Modified Hudson: 4 = Moderately severe disability (Unable to attend to own bodily needs without assistance, and unable to walk unassisted)     Post-Tx Position: In bathroom and Staff Present  PPE: gloves and surgical mask      "

## 2024-05-21 ENCOUNTER — APPOINTMENT (OUTPATIENT)
Dept: MRI IMAGING | Facility: HOSPITAL | Age: 69
End: 2024-05-21
Payer: MEDICARE

## 2024-05-21 LAB
ALBUMIN SERPL-MCNC: 3.3 G/DL (ref 3.5–5.2)
ALBUMIN/GLOB SERPL: 1.3 G/DL
ALP SERPL-CCNC: 106 U/L (ref 39–117)
ALT SERPL W P-5'-P-CCNC: 36 U/L (ref 1–33)
ANION GAP SERPL CALCULATED.3IONS-SCNC: 10 MMOL/L (ref 5–15)
AST SERPL-CCNC: 62 U/L (ref 1–32)
BASOPHILS # BLD MANUAL: 0.05 10*3/MM3 (ref 0–0.2)
BASOPHILS NFR BLD MANUAL: 1 % (ref 0–1.5)
BILIRUB SERPL-MCNC: 0.7 MG/DL (ref 0–1.2)
BUN SERPL-MCNC: 24 MG/DL (ref 8–23)
BUN/CREAT SERPL: 16.7 (ref 7–25)
CALCIUM SPEC-SCNC: 8.6 MG/DL (ref 8.6–10.5)
CHLORIDE SERPL-SCNC: 103 MMOL/L (ref 98–107)
CO2 SERPL-SCNC: 22 MMOL/L (ref 22–29)
CREAT SERPL-MCNC: 1.44 MG/DL (ref 0.57–1)
DEPRECATED RDW RBC AUTO: 51.4 FL (ref 37–54)
EGFRCR SERPLBLD CKD-EPI 2021: 39.5 ML/MIN/1.73
ERYTHROCYTE [DISTWIDTH] IN BLOOD BY AUTOMATED COUNT: 14.6 % (ref 12.3–15.4)
GLOBULIN UR ELPH-MCNC: 2.5 GM/DL
GLUCOSE SERPL-MCNC: 100 MG/DL (ref 65–99)
HCT VFR BLD AUTO: 31.4 % (ref 34–46.6)
HGB BLD-MCNC: 10 G/DL (ref 12–15.9)
INR PPP: 1.92 (ref 2–3)
LYMPHOCYTES # BLD MANUAL: 0.24 10*3/MM3 (ref 0.7–3.1)
LYMPHOCYTES NFR BLD MANUAL: 3 % (ref 5–12)
MCH RBC QN AUTO: 30.6 PG (ref 26.6–33)
MCHC RBC AUTO-ENTMCNC: 31.8 G/DL (ref 31.5–35.7)
MCV RBC AUTO: 96 FL (ref 79–97)
METAMYELOCYTES NFR BLD MANUAL: 1 % (ref 0–0)
MONOCYTES # BLD: 0.15 10*3/MM3 (ref 0.1–0.9)
MYELOCYTES NFR BLD MANUAL: 1 % (ref 0–0)
NEUTROPHILS # BLD AUTO: 4.32 10*3/MM3 (ref 1.7–7)
NEUTROPHILS NFR BLD MANUAL: 79 % (ref 42.7–76)
NEUTS BAND NFR BLD MANUAL: 10 % (ref 0–5)
PLATELET # BLD AUTO: 118 10*3/MM3 (ref 140–450)
PMV BLD AUTO: 10.7 FL (ref 6–12)
POTASSIUM SERPL-SCNC: 3.9 MMOL/L (ref 3.5–5.2)
PROT SERPL-MCNC: 5.8 G/DL (ref 6–8.5)
PROTHROMBIN TIME: 19.9 SECONDS (ref 19.4–28.5)
QT INTERVAL: 416 MS
QTC INTERVAL: 485 MS
RBC # BLD AUTO: 3.27 10*6/MM3 (ref 3.77–5.28)
RBC MORPH BLD: NORMAL
SCAN SLIDE: NORMAL
SMALL PLATELETS BLD QL SMEAR: ABNORMAL
SODIUM SERPL-SCNC: 135 MMOL/L (ref 136–145)
VARIANT LYMPHS NFR BLD MANUAL: 5 % (ref 19.6–45.3)
WBC MORPH BLD: NORMAL
WBC NRBC COR # BLD AUTO: 4.85 10*3/MM3 (ref 3.4–10.8)

## 2024-05-21 PROCEDURE — 97530 THERAPEUTIC ACTIVITIES: CPT

## 2024-05-21 PROCEDURE — 85025 COMPLETE CBC W/AUTO DIFF WBC: CPT | Performed by: NURSE PRACTITIONER

## 2024-05-21 PROCEDURE — 70551 MRI BRAIN STEM W/O DYE: CPT

## 2024-05-21 PROCEDURE — 99232 SBSQ HOSP IP/OBS MODERATE 35: CPT | Performed by: INTERNAL MEDICINE

## 2024-05-21 PROCEDURE — 97116 GAIT TRAINING THERAPY: CPT

## 2024-05-21 PROCEDURE — 85007 BL SMEAR W/DIFF WBC COUNT: CPT | Performed by: NURSE PRACTITIONER

## 2024-05-21 PROCEDURE — 80053 COMPREHEN METABOLIC PANEL: CPT | Performed by: NURSE PRACTITIONER

## 2024-05-21 PROCEDURE — 85610 PROTHROMBIN TIME: CPT

## 2024-05-21 RX ORDER — SODIUM CHLORIDE 9 MG/ML
100 INJECTION, SOLUTION INTRAVENOUS CONTINUOUS
Status: DISPENSED | OUTPATIENT
Start: 2024-05-21 | End: 2024-05-21

## 2024-05-21 RX ADMIN — ATORVASTATIN CALCIUM 80 MG: 40 TABLET, FILM COATED ORAL at 09:02

## 2024-05-21 RX ADMIN — DILTIAZEM HYDROCHLORIDE 240 MG: 240 CAPSULE, EXTENDED RELEASE ORAL at 09:02

## 2024-05-21 RX ADMIN — IBUPROFEN 600 MG: 200 TABLET, FILM COATED ORAL at 19:34

## 2024-05-21 RX ADMIN — ASPIRIN 81 MG CHEWABLE TABLET 81 MG: 81 TABLET CHEWABLE at 09:02

## 2024-05-21 RX ADMIN — WARFARIN SODIUM 3 MG: 3 TABLET ORAL at 18:34

## 2024-05-21 RX ADMIN — CARVEDILOL 3.12 MG: 3.12 TABLET, FILM COATED ORAL at 09:02

## 2024-05-21 RX ADMIN — LEVOTHYROXINE SODIUM 88 MCG: 88 TABLET ORAL at 06:04

## 2024-05-21 RX ADMIN — SODIUM BICARBONATE 1300 MG: 650 TABLET ORAL at 16:01

## 2024-05-21 RX ADMIN — MIDODRINE HYDROCHLORIDE 10 MG: 5 TABLET ORAL at 06:04

## 2024-05-21 RX ADMIN — SODIUM BICARBONATE 1300 MG: 650 TABLET ORAL at 09:02

## 2024-05-21 RX ADMIN — MIDODRINE HYDROCHLORIDE 10 MG: 5 TABLET ORAL at 16:01

## 2024-05-21 RX ADMIN — AMOXICILLIN AND CLAVULANATE POTASSIUM 1 TABLET: 875; 125 TABLET, FILM COATED ORAL at 21:53

## 2024-05-21 RX ADMIN — Medication 5 MG: at 21:53

## 2024-05-21 RX ADMIN — HYDROCODONE BITARTRATE AND ACETAMINOPHEN 1 TABLET: 5; 325 TABLET ORAL at 06:04

## 2024-05-21 RX ADMIN — TOPIRAMATE 50 MG: 25 TABLET, FILM COATED ORAL at 09:02

## 2024-05-21 RX ADMIN — OXYBUTYNIN CHLORIDE 5 MG: 5 TABLET, EXTENDED RELEASE ORAL at 09:02

## 2024-05-21 RX ADMIN — MIDODRINE HYDROCHLORIDE 10 MG: 5 TABLET ORAL at 21:53

## 2024-05-21 RX ADMIN — PANTOPRAZOLE SODIUM 40 MG: 40 TABLET, DELAYED RELEASE ORAL at 06:04

## 2024-05-21 RX ADMIN — PAROXETINE HYDROCHLORIDE 40 MG: 20 TABLET, FILM COATED ORAL at 06:04

## 2024-05-21 RX ADMIN — CETIRIZINE HYDROCHLORIDE 10 MG: 10 TABLET, FILM COATED ORAL at 09:02

## 2024-05-21 RX ADMIN — SODIUM BICARBONATE 1300 MG: 650 TABLET ORAL at 21:53

## 2024-05-21 RX ADMIN — IBUPROFEN 600 MG: 200 TABLET, FILM COATED ORAL at 09:02

## 2024-05-21 RX ADMIN — AMOXICILLIN AND CLAVULANATE POTASSIUM 1 TABLET: 875; 125 TABLET, FILM COATED ORAL at 09:02

## 2024-05-21 NOTE — PROGRESS NOTES
"Pharmacy dosing service  Anticoagulant  Warfarin     Subjective:    Laura Mejia is a 68 y.o.female being continued on warfarin for Atrial Fibrillation / Flutter.    INR Goal: 2 - 3  Home medication?: warfarin 3 mg PO daily, except warfarin 6 mg PO on Mon/Wed/Fri.   Bridge Therapy Present?:  No  Interacting Medications Evaluation (New/Present/Discontinued):   Home medications: aspirin and levothyroxine  New inpatient medications: amoxicillin-clavulanate (increase anticoagulation effect)       Assessment/Plan:    INR is subtherapeutic today however INR is increasing appropriately. Will continue with home dosing and plan to give warfarin 3 mg today.     Continue to monitor and adjust based on INR.         Date 5/17 5/18 5/19 5/20 5/21       INR 1.10 1.05 1.42 1.75 1.92       Dose 9 mg 3 mg 3 mg 6 mg 3 mg           Objective:  [Ht: 175.5 cm (69.09\"); Wt: 87.8 kg (193 lb 9 oz); BMI: Body mass index is 28.51 kg/m².]    Lab Results   Component Value Date    ALBUMIN 3.3 (L) 05/21/2024     Lab Results   Component Value Date    INR 1.92 (L) 05/21/2024    INR 1.75 (L) 05/19/2024    INR 1.42 (L) 05/19/2024    PROTIME 19.9 05/21/2024    PROTIME 18.3 (L) 05/19/2024    PROTIME 15.1 (L) 05/19/2024     Lab Results   Component Value Date    HGB 10.0 (L) 05/21/2024    HGB 11.0 (L) 05/19/2024    HGB 12.8 05/19/2024     Lab Results   Component Value Date    HCT 31.4 (L) 05/21/2024    HCT 34.8 05/19/2024    HCT 41.6 05/19/2024       Yenifer Ascencio, [place]  05/21/24 09:09 EDT         "

## 2024-05-21 NOTE — PROGRESS NOTES
LOS: 4 days   Patient Care Team:  Luan Joseph APRN as PCP - General (Family Medicine)  Jozef Otero MD as Consulting Physician (Nephrology)    Subjective:    Resting with spouse at bedside.  More alert and oriented today.  Complains of back pain and fatigue.    Review of Systems:   Review of Systems   Constitutional:  Positive for activity change and appetite change. Negative for chills, diaphoresis, fatigue and fever.   HENT:  Negative for trouble swallowing.    Eyes:  Negative for visual disturbance.   Respiratory: Negative.     Cardiovascular: Negative.    Gastrointestinal: Negative.    Endocrine: Negative.    Genitourinary:  Positive for dysuria.        Foul smelling urine   Musculoskeletal:  Positive for arthralgias and myalgias.   Skin: Negative.    Neurological:  Positive for weakness.   Hematological: Negative.            Vital Signs  Temp:  [97.6 °F (36.4 °C)-98.7 °F (37.1 °C)] 97.6 °F (36.4 °C)  Heart Rate:  [52-78] 52  Resp:  [11-20] 11  BP: ()/(49-61) 104/55    Physical Exam:  Physical Exam  Constitutional:       General: She is not in acute distress.     Appearance: She is obese.   HENT:      Head: Normocephalic and atraumatic.      Ears:      Comments: No hearing impairment     Nose: Nose normal.      Mouth/Throat:      Mouth: Mucous membranes are moist.      Pharynx: Oropharynx is clear.   Eyes:      General: No scleral icterus.     Conjunctiva/sclera: Conjunctivae normal.   Cardiovascular:      Rate and Rhythm: Normal rate and regular rhythm.      Pulses: Normal pulses.      Heart sounds: Normal heart sounds. No murmur heard.  Pulmonary:      Effort: Pulmonary effort is normal. No respiratory distress.      Breath sounds: Normal breath sounds. No wheezing, rhonchi or rales.   Abdominal:      General: Bowel sounds are normal. There is no distension.      Palpations: Abdomen is soft.      Tenderness: There is no abdominal tenderness. There is no guarding or rebound.    Musculoskeletal:      Cervical back: Normal range of motion and neck supple. No rigidity.      Right lower leg: No edema.      Left lower leg: No edema.      Comments: Able to move all extremities in bed   Skin:     General: Skin is warm and dry.      Capillary Refill: Capillary refill takes less than 2 seconds.   Neurological:      Mental Status: She is alert.      Cranial Nerves: No cranial nerve deficit.      Comments: Oriented to person  Speech clear and easily understood  Confusion noted  Pleasant and cooperative   Psychiatric:         Mood and Affect: Mood normal.         Behavior: Behavior normal.      Comments: Confusion noted          Radiology:  MRI Brain Without Contrast    Result Date: 5/21/2024  Impression: 1. Tiny 2 mm questionable focus of diffusion signal change within the high left frontal lobe cortex, may represent a tiny focal acute or subacute lacunar infarct. 2. Mild to moderate chronic microvascular disease, progressed since the 2013 MRI. 3. Moderate generalized brain parenchymal atrophy, progressed since the 2013 MRI. Electronically Signed: Amber Candelaria MD  5/21/2024 11:34 AM EDT  Workstation ID: [id number]    CT Abdomen Pelvis Without Contrast    Result Date: 5/20/2024  Impression: 1. Nonobstructing bilateral renal stones and mild bilateral renal cortical scarring. No ureteral stone or hydronephrosis. 2. Aortobiiliac endograft repair of abdominal aortic aneurysm. In the sac measures 4.3 cm. 3. Uncomplicated colonic diverticulosis. 4. Chronic thoracic and lumbar compression fractures. 5. Cholecystectomy. 6. Moderate to large stool burden in the ascending colon, transverse colon and proximal descending colon. Electronically Signed: Amber Candelaria MD  5/20/2024 11:55 AM EDT  Workstation ID: [id number]    CT Head Without Contrast    Result Date: 5/16/2024  1. Limited study secondary to motion. 2. No definite hemorrhage noted. Small focus of increased density within the evens is favored to  represent calcification. 3. Diffuse atrophy with mild bifrontal predominance Short-term follow-up can always be considered given limitations of exam if clinically indicated Electronically Signed: Tesfaye Antoine MD  5/16/2024 6:41 PM EDT  Workstation ID: [id number]    XR Chest 1 View    Result Date: 5/16/2024  Impression: 1.Cardiomegaly with pulmonary vascular congestion and scattered bibasilar atelectasis or infiltrates. Electronically Signed: Roberto Huddleston MD  5/16/2024 5:50 PM EDT  Workstation ID: [id number]        Results Review:     I reviewed the patient's new clinical results.  I reviewed the patient's new imaging results and agree with the interpretation.    Medication Review:   Scheduled Meds:amoxicillin-clavulanate, 1 tablet, Oral, Q12H  aspirin, 81 mg, Oral, Daily  atorvastatin, 80 mg, Oral, Daily  carvedilol, 3.125 mg, Oral, BID With Meals  cetirizine, 10 mg, Oral, Daily  dilTIAZem CD, 240 mg, Oral, Q24H  levothyroxine, 88 mcg, Oral, Q AM  melatonin, 5 mg, Oral, Nightly  midodrine, 10 mg, Oral, Q8H  oxybutynin XL, 5 mg, Oral, Daily  pantoprazole, 40 mg, Oral, Q AM  PARoxetine, 40 mg, Oral, QAM  sodium bicarbonate, 1,300 mg, Oral, TID  sodium chloride, 10 mL, Intravenous, Q12H  topiramate, 50 mg, Oral, Daily  warfarin, 3 mg, Oral, Once per day on Sunday Tuesday Thursday Saturday  warfarin, 6 mg, Oral, Once per day on Monday Wednesday Friday      Continuous Infusions:Pharmacy to dose warfarin,       PRN Meds:.  acetaminophen    senna-docusate sodium **AND** polyethylene glycol **AND** bisacodyl **AND** bisacodyl    calcium carbonate    Calcium Replacement - Follow Nurse / BPA Driven Protocol    HYDROcodone-acetaminophen    ibuprofen    Magnesium Standard Dose Replacement - Follow Nurse / BPA Driven Protocol    nitroglycerin    ondansetron    Pharmacy to dose warfarin    Phosphorus Replacement - Follow Nurse / BPA Driven Protocol    Potassium Replacement - Follow Nurse / BPA Driven Protocol     "prochlorperazine    sodium chloride    [COMPLETED] Insert Peripheral IV **AND** sodium chloride    sodium chloride    sodium chloride    Labs:    CBC    Results from last 7 days   Lab Units 05/21/24 0041 05/19/24 2308 05/19/24  1308 05/18/24  0110 05/17/24  1110 05/16/24  2341 05/16/24  1642   WBC 10*3/mm3 4.85 4.57 5.30 4.99 5.64 4.17 4.86   HEMOGLOBIN g/dL 10.0* 11.0* 12.8 11.9* 12.7 11.7* 13.3   PLATELETS 10*3/mm3 118* 145 139* 170 202 184 208     BMP   Results from last 7 days   Lab Units 05/21/24 0041 05/19/24 2308 05/19/24  1308 05/18/24  0110 05/17/24  1110 05/16/24  2341 05/16/24  1642   SODIUM mmol/L 135* 134* 134* 141 140 138 136   POTASSIUM mmol/L 3.9 3.9 3.3* 4.0 4.0 3.3* 4.0   CHLORIDE mmol/L 103 105 102 111* 108* 107 103   CO2 mmol/L 22.0 18.7* 20.0* 20.0* 20.0* 20.0* 20.0*   BUN mg/dL 24* 17 16 11 11 11 13   CREATININE mg/dL 1.44* 1.08* 1.15* 0.98 1.08* 1.15* 1.12*   GLUCOSE mg/dL 100* 115* 112* 121* 123* 128* 104*     Cr Clearance Estimated Creatinine Clearance: 43.7 mL/min (A) (by C-G formula based on SCr of 1.44 mg/dL (H)).  Coag   Results from last 7 days   Lab Units 05/21/24 0041 05/19/24 2308 05/19/24  0706 05/18/24  0110 05/17/24  1110 05/16/24  1642   INR  1.92* 1.75* 1.42* 1.05* 1.10* 1.42*     HbA1C No results found for: \"HGBA1C\"  Blood Glucose No results found for: \"POCGLU\"  Infection   Results from last 7 days   Lab Units 05/19/24  2042 05/19/24  1308 05/18/24  1805   BLOODCX  No growth at 24 hours No growth at 24 hours  --    URINECX   --   --  >100,000 CFU/mL Escherichia coli*     CMP   Results from last 7 days   Lab Units 05/21/24  0041 05/19/24  2308 05/19/24  1308 05/18/24  2018 05/18/24  0110 05/17/24  1110 05/16/24  2341 05/16/24  1642   SODIUM mmol/L 135* 134* 134*  --  141 140 138 136   POTASSIUM mmol/L 3.9 3.9 3.3*  --  4.0 4.0 3.3* 4.0   CHLORIDE mmol/L 103 105 102  --  111* 108* 107 103   CO2 mmol/L 22.0 18.7* 20.0*  --  20.0* 20.0* 20.0* 20.0*   BUN mg/dL 24* 17 16  --  " 11 11 11 13   CREATININE mg/dL 1.44* 1.08* 1.15*  --  0.98 1.08* 1.15* 1.12*   GLUCOSE mg/dL 100* 115* 112*  --  121* 123* 128* 104*   ALBUMIN g/dL 3.3* 3.0* 3.7 3.4*  --   --   --   --    BILIRUBIN mg/dL 0.7 0.5 0.9  --   --   --   --   --    ALK PHOS U/L 106 108 125*  --   --   --   --   --    AST (SGOT) U/L 62* 75* 57*  --   --   --   --   --    ALT (SGPT) U/L 36* 37* 38*  --   --   --   --   --      UA    Results from last 7 days   Lab Units 05/18/24  1805   NITRITE UA  Positive*   WBC UA /HPF Too Numerous to Count*   BACTERIA UA /HPF 4+*   SQUAM EPITHEL UA /HPF 3-6*   URINECX  >100,000 CFU/mL Escherichia coli*     Radiology(recent) MRI Brain Without Contrast    Result Date: 5/21/2024  Impression: 1. Tiny 2 mm questionable focus of diffusion signal change within the high left frontal lobe cortex, may represent a tiny focal acute or subacute lacunar infarct. 2. Mild to moderate chronic microvascular disease, progressed since the 2013 MRI. 3. Moderate generalized brain parenchymal atrophy, progressed since the 2013 MRI. Electronically Signed: Amber Candelaria MD  5/21/2024 11:34 AM EDT  Workstation ID: [id number]    CT Abdomen Pelvis Without Contrast    Result Date: 5/20/2024  Impression: 1. Nonobstructing bilateral renal stones and mild bilateral renal cortical scarring. No ureteral stone or hydronephrosis. 2. Aortobiiliac endograft repair of abdominal aortic aneurysm. In the sac measures 4.3 cm. 3. Uncomplicated colonic diverticulosis. 4. Chronic thoracic and lumbar compression fractures. 5. Cholecystectomy. 6. Moderate to large stool burden in the ascending colon, transverse colon and proximal descending colon. Electronically Signed: Amber Candelaria MD  5/20/2024 11:55 AM EDT  Workstation ID: [id number]        Assessment:  Altered Mental Status  Acute Cystitis with Hematuria  Hypotension  Tick Bite  Atrial Fibrillation  Chronic warfarin therapy for anticoagulation  Systolic CHF  Ischemic Cardiomyopathy  CAD  GERD  CKD  3  Acquired Hypothyroidism  History of MI  History of CABG  History of PE    Plan:  Antibiotic, Augmentin p.o. for UTI E. Coli  MRI completed this a.m. showing tiny 2 mm questionable foci for acute\subacute infarct, will consult neurology  BP control, midodrine 10 mg every 8 hours  Monitor creatinine, noted increase at 1.44, avoid hypotension, will give small course of normal saline at 100 cc an hour for total of 500 cc  Anticoagulated on warfarin    Cardiology consulted 5/18/2024-considering [facility] as outpatient -ECHO 01/2024 showed possible apical thrombus.  Anticoagulated on warfarin    VTE Prophylaxis:  on warfarin therapy for Afib  GI Prophylaxis: PPI     DC PLAN: Home with spouse, declines SNF    ALEXANDER Spangler  05/21/24  12:10 EDT

## 2024-05-21 NOTE — PROGRESS NOTES
Referring Provider: Keke Casiano MD    Reason for follow-up:  Status post CABG  Status post ICD     Patient Care Team:  Luan Joseph APRN as PCP - General (Family Medicine)  Jozef Otero MD as Consulting Physician (Nephrology)    Subjective .      ROS    Today, the patient has been without any chest discomfort ,shortness of breath, palpitations, dizziness or syncope.  Denies having any headache ,abdominal pain ,nausea, vomiting , diarrhea constipation, loss of weight or loss of appetite.  Denies having any excessive bruising ,hematuria or blood in the stool.    Review of all systems negative except as indicated    History  Past Medical History:   Diagnosis Date    AAA (abdominal aortic aneurysm)     infrarenal- 3.1cm(2010, 3.7x4.2cm(2016), 3.9cm (2017)    Allergic rhinitis     Aspiration pneumonia 05/2017    CHF (congestive heart failure)     Coronary artery disease     DDD (degenerative disc disease), lumbar     lumbar spine- Dr. Churchill     Depression     Diverticulosis     Frequent UTI     Dr. Daniels    GERD (gastroesophageal reflux disease)     Hiatal hernia     HSV (herpes simplex virus) infection     Oral    Hyperlipidemia     IBS (irritable bowel syndrome)     Constipation predominant    Ischemic cardiomyopathy 09/2017    AICD     Kidney stones     NSTEMI (non-ST elevated myocardial infarction) 04/26/2017    Obstructive sleep apnea 2011    nfsg 2011, ahi 67    Osteoarthritis     Osteopenia     Peripheral neuropathy     feet    Pulmonary embolism, bilateral 05/2017    Rotator cuff tear     Bilateral- Ortho        Past Surgical History:   Procedure Laterality Date    CARDIAC CATHETERIZATION  04/26/2017    99% mid LAD. 90% mid LCX. 60% RCA. Recommended CABG. Dr. Diaz    CARDIAC DEFIBRILLATOR PLACEMENT  12/26/2017    ICD implant/ Dr. Ellis    CATARACT EXTRACTION      CORONARY ARTERY BYPASS GRAFT  04/26/2017    x4 & ASD Closure    CYSTOSCOPY  2002    negative. Dr. Daniels    LAPAROSCOPIC CHOLECYSTECTOMY   04/17/2013    For biliary dyskinesia. Dr. Mccoy.     REPLACEMENT TOTAL KNEE BILATERAL  11/2004    Dr. Herrera    THORACENTESIS Left 05/08/2017    TONSILLECTOMY      TUBAL ABDOMINAL LIGATION Bilateral        Family History   Problem Relation Age of Onset    Heart disease Mother     Other Mother         Hypoglycemia    Osteoporosis Mother     COPD Father     Stroke Father     Hypertension Brother     Diabetes Brother     Heart disease Brother        Social History     Tobacco Use    Smoking status: Never   Vaping Use    Vaping status: Some Days    Substances: CBD, nightly, 2-3 weekly   Substance Use Topics    Alcohol use: Never    Drug use: Never        Medications Prior to Admission   Medication Sig Dispense Refill Last Dose    aspirin 81 MG chewable tablet Chew 1 tablet Daily.   Past Week    carvedilol (COREG) 3.125 MG tablet Take 1 tablet by mouth 2 (Two) Times a Day With Meals.   Past Week    dilTIAZem CD (CARDIZEM CD) 240 MG 24 hr capsule Take 1 capsule by mouth Daily. 30 capsule 1 Past Week    famciclovir (FAMVIR) 500 MG tablet Take 1 tablet by mouth 3 (Three) Times a Day As Needed.   Past Month    levocetirizine (XYZAL) 5 MG tablet Take 1 tablet by mouth Every Evening.   Past Week    levothyroxine (SYNTHROID, LEVOTHROID) 88 MCG tablet Take 1 tablet by mouth Every Morning. 30 tablet 1 Past Week    linaclotide (LINZESS) 72 MCG capsule capsule Take 1 capsule by mouth Every Morning Before Breakfast.       omeprazole (priLOSEC) 40 MG capsule Take 1 capsule by mouth Daily.   Past Week    PARoxetine (PAXIL) 40 MG tablet Take 1 tablet by mouth Every Morning.   Past Week    topiramate (TOPAMAX) 50 MG tablet Take 1 tablet by mouth Daily.   Past Week    trospium (SANCTURA) 20 MG tablet Take 1 tablet by mouth Every Night.   Past Week    warfarin (COUMADIN) 3 MG tablet See Admin Instructions. Take 1 tablet by mouth on Tuesday, Thursday, Saturday, and Sunday   Past Week    warfarin (COUMADIN) 3 MG tablet Take 2 tablets by  mouth Daily. Monday Wednesday, Friday       atorvastatin (LIPITOR) 80 MG tablet Take 1 tablet by mouth Daily.   Unknown    ezetimibe (ZETIA) 10 MG tablet Take 1 tablet by mouth Daily.   Unknown    midodrine (PROAMATINE) 10 MG tablet Take 1 tablet by mouth Every 8 (Eight) Hours. 90 tablet 1 Unknown    sodium bicarbonate 650 MG tablet Take 2 tablets by mouth 3 (Three) Times a Day. 90 tablet 1 Unknown       Allergies  Oxytetracycline and Oxycodone    Scheduled Meds:amoxicillin-clavulanate, 1 tablet, Oral, Q12H  aspirin, 81 mg, Oral, Daily  atorvastatin, 80 mg, Oral, Daily  carvedilol, 3.125 mg, Oral, BID With Meals  cetirizine, 10 mg, Oral, Daily  dilTIAZem CD, 240 mg, Oral, Q24H  levothyroxine, 88 mcg, Oral, Q AM  melatonin, 5 mg, Oral, Nightly  midodrine, 10 mg, Oral, Q8H  oxybutynin XL, 5 mg, Oral, Daily  pantoprazole, 40 mg, Oral, Q AM  PARoxetine, 40 mg, Oral, QAM  sodium bicarbonate, 1,300 mg, Oral, TID  sodium chloride, 10 mL, Intravenous, Q12H  topiramate, 50 mg, Oral, Daily  warfarin, 3 mg, Oral, Once per day on Sunday Tuesday Thursday Saturday  warfarin, 6 mg, Oral, Once per day on Monday Wednesday Friday      Continuous Infusions:Pharmacy to dose warfarin,       PRN Meds:.  acetaminophen    senna-docusate sodium **AND** polyethylene glycol **AND** bisacodyl **AND** bisacodyl    calcium carbonate    Calcium Replacement - Follow Nurse / BPA Driven Protocol    HYDROcodone-acetaminophen    ibuprofen    Magnesium Standard Dose Replacement - Follow Nurse / BPA Driven Protocol    nitroglycerin    ondansetron    Pharmacy to dose warfarin    Phosphorus Replacement - Follow Nurse / BPA Driven Protocol    Potassium Replacement - Follow Nurse / BPA Driven Protocol    prochlorperazine    sodium chloride    [COMPLETED] Insert Peripheral IV **AND** sodium chloride    sodium chloride    sodium chloride    Objective     VITAL SIGNS  Vitals:    05/20/24 1855 05/20/24 2041 05/21/24 0451 05/21/24 0604   BP: (!) 87/55 91/61  "101/55 (!) 85/49   BP Location:  Left arm Left arm    Patient Position:  Lying Lying    Pulse: 71 72 73 78   Resp:  18 20    Temp:  98.2 °F (36.8 °C) 98.7 °F (37.1 °C)    TempSrc:  Oral Oral    SpO2:  94% 94%    Weight:       Height:           Flowsheet Rows      Flowsheet Row First Filed Value   Admission Height 175.3 cm (69\") Documented at 05/16/2024 1533   Admission Weight 83.6 kg (184 lb 4.9 oz) Documented at 05/16/2024 1533              Intake/Output Summary (Last 24 hours) at 5/21/2024 0645  Last data filed at 5/20/2024 1500  Gross per 24 hour   Intake 480 ml   Output --   Net 480 ml        TELEMETRY: Sinus rhythm    Physical Exam:  The patient is alert, oriented and in no distress.  Vital signs as noted above.  Head and neck revealed no carotid bruits or jugular venous distention.  No thyromegaly or lymphadenopathy is present  Lungs clear.  No wheezing.  Breath sounds are normal bilaterally.  Heart normal first and second heart sounds.  Grade 2 or 6 systolic murmur. No precordial rub is present.  No gallop is present.  Abdomen soft and nontender.  No organomegaly is present.  Extremities with good peripheral pulses without any pedal edema.  Skin warm and dry.  ICD site looks normal.  Musculoskeletal system is grossly normal  CNS grossly normal    Reviewed and updated.    Results Review:   I reviewed the patient's new clinical results.  Lab Results (last 24 hours)       Procedure Component Value Units Date/Time    CBC & Differential [[id number]]  (Abnormal) Collected: 05/21/24 0041    Specimen: Blood from Arm, Left Updated: 05/21/24 0145    Narrative:      The following orders were created for panel order CBC & Differential.  Procedure                               Abnormality         Status                     ---------                               -----------         ------                     CBC Auto Differential[[id number]]        Abnormal            Final result               Scan Slide[[id number]]         "                               Final result                 Please view results for these tests on the individual orders.    CBC Auto Differential [[id number]]  (Abnormal) Collected: 05/21/24 0041    Specimen: Blood from Arm, Left Updated: 05/21/24 0145     WBC 4.85 10*3/mm3      RBC 3.27 10*6/mm3      Hemoglobin 10.0 g/dL      Hematocrit 31.4 %      MCV 96.0 fL      MCH 30.6 pg      MCHC 31.8 g/dL      RDW 14.6 %      RDW-SD 51.4 fl      MPV 10.7 fL      Platelets 118 10*3/mm3     Scan Slide [[id number]] Collected: 05/21/24 0041    Specimen: Blood from Arm, Left Updated: 05/21/24 0145     Scan Slide --     Comment: See Manual Differential Results       Manual Differential [[id number]]  (Abnormal) Collected: 05/21/24 0041    Specimen: Blood from Arm, Left Updated: 05/21/24 0145     Neutrophil % 79.0 %      Lymphocyte % 5.0 %      Monocyte % 3.0 %      Basophil % 1.0 %      Bands %  10.0 %      Metamyelocyte % 1.0 %      Myelocyte % 1.0 %      Neutrophils Absolute 4.32 10*3/mm3      Lymphocytes Absolute 0.24 10*3/mm3      Monocytes Absolute 0.15 10*3/mm3      Basophils Absolute 0.05 10*3/mm3      RBC Morphology Normal     WBC Morphology Normal     Platelet Estimate Decreased    Comprehensive Metabolic Panel [[id number]]  (Abnormal) Collected: 05/21/24 0041    Specimen: Blood from Arm, Left Updated: 05/21/24 0131     Glucose 100 mg/dL      BUN 24 mg/dL      Creatinine 1.44 mg/dL      Sodium 135 mmol/L      Potassium 3.9 mmol/L      Comment: Slight hemolysis detected by analyzer. Result may be falsely elevated.        Chloride 103 mmol/L      CO2 22.0 mmol/L      Calcium 8.6 mg/dL      Total Protein 5.8 g/dL      Albumin 3.3 g/dL      ALT (SGPT) 36 U/L      AST (SGOT) 62 U/L      Comment: Slight hemolysis detected by analyzer. Result may be falsely elevated.        Alkaline Phosphatase 106 U/L      Total Bilirubin 0.7 mg/dL      Globulin 2.5 gm/dL      A/G Ratio 1.3 g/dL      BUN/Creatinine Ratio 16.7     Anion Gap 10.0  mmol/L      eGFR 39.5 mL/min/1.73     Narrative:      GFR Normal >60  Chronic Kidney Disease <60  Kidney Failure <15      Protime-INR [[id number]]  (Abnormal) Collected: 05/21/24 0041    Specimen: Blood from Arm, Left Updated: 05/21/24 0115     Protime 19.9 Seconds      INR 1.92    Blood Culture - Blood, Arm, Left [[id number]]  (Normal) Collected: 05/19/24 2042    Specimen: Blood from Arm, Left Updated: 05/20/24 2100     Blood Culture No growth at 24 hours    Narrative:      Less than seven (7) mL's of blood was collected.  Insufficient quantity may yield false negative results.    Blood Culture - Blood, Hand, Left [[id number]]  (Normal) Collected: 05/19/24 1308    Specimen: Blood from Hand, Left Updated: 05/20/24 1315     Blood Culture No growth at 24 hours    Narrative:      Less than seven (7) mL's of blood was collected.  Insufficient quantity may yield false negative results.    Lyme Antibody Total with Reflex (LISA) [[id number]]  (Normal) Collected: 05/18/24 1418    Specimen: Blood from Arm, Left Updated: 05/20/24 1044     Lyme Ab IgG Negative    Urine Culture - Urine, Urine, Clean Catch [[id number]]  (Abnormal)  (Susceptibility) Collected: 05/18/24 1805    Specimen: Urine, Clean Catch Updated: 05/20/24 1040     Urine Culture >100,000 CFU/mL Escherichia coli    Narrative:      Colonization of the urinary tract without infection is common. Treatment is discouraged unless the patient is symptomatic, pregnant, or undergoing an invasive urologic procedure.    Susceptibility        Escherichia coli      FRANK      Amoxicillin + Clavulanate Susceptible      Ampicillin Resistant      Ampicillin + Sulbactam Susceptible      Cefazolin Susceptible      Cefepime Susceptible      Ceftazidime Susceptible      Ceftriaxone Susceptible      Gentamicin Susceptible      Levofloxacin Intermediate      Nitrofurantoin Susceptible      Piperacillin + Tazobactam Susceptible      Trimethoprim + Sulfamethoxazole Susceptible                                    Imaging Results (Last 24 Hours)       Procedure Component Value Units Date/Time    CT Abdomen Pelvis Without Contrast [[id number]] Collected: 05/20/24 1151     Updated: 05/20/24 1157    Narrative:      CT ABDOMEN PELVIS WO CONTRAST    Date of Exam: 5/20/2024 11:35 AM EDT    Indication: UTI, hypotension, AMS.    Comparison: Bilateral renal ultrasound 1/22/2024. No prior CT abdomen pelvis for comparison at this institution.    Technique: Axial CT images were obtained of the abdomen and pelvis without the administration of contrast. Sagittal and coronal reconstructions were performed.  Automated exposure control and iterative reconstruction methods were used.      Findings:  Liver, spleen, pancreas, adrenals are normal. Cholecystectomy. Multiple small nonobstructing bilateral renal stones. Mild bilateral renal cortical scarring. No ureteral stone or hydronephrosis. Aortobifemoral bypass graft is present, patency of which   cannot be assessed without the benefit of IV contrast. The Endosac measures about 4.3 cm. Uncomplicated diverticular changes are present in the sigmoid colon. Moderate to large stool burden is demonstrated in the ascending colon, transverse colon, and   proximal descending colon. The urinary bladder, uterus, and rectum are unremarkable.    Heart size is normal. Pacemaker device is in place. Lung bases are clear. There is linear scarring in the lower lung zones.    Chronic appearing compression fractures of L1, L5, T8, T10, T11. Accentuation of lumbar lordosis. Mild lumbar levoscoliosis. Advanced facet arthropathy at L4-5 and L5-S1.        Impression:      Impression:    1. Nonobstructing bilateral renal stones and mild bilateral renal cortical scarring. No ureteral stone or hydronephrosis.  2. Aortobiiliac endograft repair of abdominal aortic aneurysm. In the sac measures 4.3 cm.  3. Uncomplicated colonic diverticulosis.  4. Chronic thoracic and lumbar compression fractures.  5.  Cholecystectomy.  6. Moderate to large stool burden in the ascending colon, transverse colon and proximal descending colon.            Electronically Signed: Amber Candelaria MD    5/20/2024 11:55 AM EDT    Workstation ID: [id number]        LAB RESULTS (LAST 7 DAYS)    CBC  Results from last 7 days   Lab Units 05/21/24 0041 05/19/24 2308 05/19/24 1308 05/18/24  0110 05/17/24 1110 05/16/24 2341 05/16/24  1642   WBC 10*3/mm3 4.85 4.57 5.30 4.99 5.64 4.17 4.86   RBC 10*6/mm3 3.27* 3.54* 4.12 3.80 4.05 3.76* 4.27   HEMOGLOBIN g/dL 10.0* 11.0* 12.8 11.9* 12.7 11.7* 13.3   HEMATOCRIT % 31.4* 34.8 41.6 36.4 39.7 37.0 41.9   MCV fL 96.0 98.3* 101.0* 95.8 98.0* 98.4* 98.1*   PLATELETS 10*3/mm3 118* 145 139* 170 202 184 208       BMP  Results from last 7 days   Lab Units 05/21/24 0041 05/19/24 2308 05/19/24 1308 05/18/24 0110 05/17/24 1110 05/16/24  2341 05/16/24  1642   SODIUM mmol/L 135* 134* 134* 141 140 138 136   POTASSIUM mmol/L 3.9 3.9 3.3* 4.0 4.0 3.3* 4.0   CHLORIDE mmol/L 103 105 102 111* 108* 107 103   CO2 mmol/L 22.0 18.7* 20.0* 20.0* 20.0* 20.0* 20.0*   BUN mg/dL 24* 17 16 11 11 11 13   CREATININE mg/dL 1.44* 1.08* 1.15* 0.98 1.08* 1.15* 1.12*   GLUCOSE mg/dL 100* 115* 112* 121* 123* 128* 104*       CMP   Results from last 7 days   Lab Units 05/21/24 0041 05/19/24 2308 05/19/24 1308 05/18/24 2018 05/18/24  0110 05/17/24  1110 05/16/24  2341 05/16/24  1642   SODIUM mmol/L 135* 134* 134*  --  141 140 138 136   POTASSIUM mmol/L 3.9 3.9 3.3*  --  4.0 4.0 3.3* 4.0   CHLORIDE mmol/L 103 105 102  --  111* 108* 107 103   CO2 mmol/L 22.0 18.7* 20.0*  --  20.0* 20.0* 20.0* 20.0*   BUN mg/dL 24* 17 16  --  11 11 11 13   CREATININE mg/dL 1.44* 1.08* 1.15*  --  0.98 1.08* 1.15* 1.12*   GLUCOSE mg/dL 100* 115* 112*  --  121* 123* 128* 104*   ALBUMIN g/dL 3.3* 3.0* 3.7 3.4*  --   --   --   --    BILIRUBIN mg/dL 0.7 0.5 0.9  --   --   --   --   --    ALK PHOS U/L 106 108 125*  --   --   --   --   --    AST (SGOT) U/L  62* 75* 57*  --   --   --   --   --    ALT (SGPT) U/L 36* 37* 38*  --   --   --   --   --          BNP        TROPONIN        CoAg  Results from last 7 days   Lab Units 05/21/24  0041 05/19/24  2308 05/19/24  0706 05/18/24  0110 05/17/24  1110 05/16/24  1642   INR  1.92* 1.75* 1.42* 1.05* 1.10* 1.42*       Creatinine Clearance  Estimated Creatinine Clearance: 43.7 mL/min (A) (by C-G formula based on SCr of 1.44 mg/dL (H)).    ABG        Radiology  CT Abdomen Pelvis Without Contrast    Result Date: 5/20/2024  Impression: 1. Nonobstructing bilateral renal stones and mild bilateral renal cortical scarring. No ureteral stone or hydronephrosis. 2. Aortobiiliac endograft repair of abdominal aortic aneurysm. In the sac measures 4.3 cm. 3. Uncomplicated colonic diverticulosis. 4. Chronic thoracic and lumbar compression fractures. 5. Cholecystectomy. 6. Moderate to large stool burden in the ascending colon, transverse colon and proximal descending colon. Electronically Signed: Amber Candelaria MD  5/20/2024 11:55 AM EDT  Workstation ID: [id number]         EKG                I personally viewed and interpreted the patient's EKG/Telemetry data: Sinus rhythm    ECHOCARDIOGRAM:    Results for orders placed during the hospital encounter of 01/22/24    Adult Transthoracic Echo Complete W/ Cont if Necessary Per Protocol    Interpretation Summary    Left ventricular ejection fraction appears to be 56 - 60%.    Estimated right ventricular systolic pressure from tricuspid regurgitation is normal (<35 mmHg).    Indication  Dyspnea  Palpitations    Technically satisfactory study.  Mitral valve is structurally normal.  Tricuspid valve is structurally normal.  Mild tricuspid regurgitation is present.  Aortic valve is aortic valve with decreased opening motion.  Gradient across the aortic valve is 16/9 mmHg.  Valve area 1.37 cm².  Pulmonic valve could not be well visualized.  Left atrium is enlarged.  Right atrium is normal in size.  Left  ventricle is enlarged with septal anterior wall and apical severe hypokinesis with ejection fraction of 40%.  Possible left ventricular apical thrombus.  Right ventricle is normal in size.  Atrial septum is intact.  Aorta is normal.  No pericardial effusion or intracardiac thrombus is seen.  ICD lead is present in the right ventricle.    Impression  Mild tricuspid regurgitation.  Mild to moderate aortic valve stenosis with gradient of 16/9 mmHg and valve area of 1.37 cm².  Left atrial enlargement.  Left ventricle is enlarged with septal anterior wall and apical severe hypokinesis with ejection fraction of 40%.  Possible left ventricular apical thrombus.  ICD lead is present in the right ventricle.  No evidence for pulmonary hypertension is present.          STRESS TEST  Results for orders placed during the hospital encounter of 01/22/24    Stress Test With Myocardial Perfusion One Day    Interpretation Summary  Indications  Status post CABG  Atrial fibrillation    This study was performed under the direct supervision of Rohini NOLASCO.    Resting ECG  Sinus rhythm    The patient was injected with Lexiscan intravenously while constantly monitoring electrocardiogram and vital signs.  Patient did not have any chest discomfort ST abnormalities or ectopy with injection of Lexiscan.    Cardiolite was used as an imaging agent.    Cardiolite images showed significantly decreased radionuclide uptake in the proximal posterior segments with partial redistribution in the resting images.    Gated SPECT images revealed normal left ventricle size and diffuse hypocontractility with calculated ejection fraction of 61%.    Impression  ========  Lexiscan Cardiolite test revealed proximal posterior infarction and ischemia.  Gated SPECT images revealed normal left ventricular size and diffuse hypocontractility with calculated ejection fraction of 61%.        Cardiolite (Tc-99m sestamibi) stress test    CARDIAC CATHETERIZATION  No results  found for this or any previous visit.                OTHER:         Assessment & Plan     Principal Problem:    Altered mental status  Active Problems:    Altered mental status, unspecified      ]]]]]]]]]]]]]]]]]  History  =========   - History of atrial fibrillation with RVR.  Patient had postop atrial fibrillation after she had CABG in 2017.  Patient is in sinus rhythm.     - Status post CABG and ASD closure 2017     - Ischemic cardiomyopathy    -Moderate aortic valve stenosis     - Status post ICD (single-chamber)-2017-[place].     - Chronic anticoagulation-patient on Coumadin.  Home monitoring.  INR 1.75-5/19/2024.     - Hypothyroidism dyslipidemia obstructive sleep apnea     - Past history of pulmonary emboli     - CKD 3  20/1.7-1/22/2024  17/1.08-5/19/2024.     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 There is normal antegrade color Doppler flow including the common iliac arteries. There is thrombus within the aneurysm sac. Dimensions are discussed in detail above.     - Family history of coronary artery disease     - Non-smoker     - Allergic to oxytetracycline and oxycodone.     Echocardiogram 1/23/2024 revealed  Mild tricuspid regurgitation.  Mild to moderate aortic valve stenosis with gradient of 16/9 mmHg and valve area of 1.37 cm².  Left atrial enlargement.  Left ventricle is enlarged with septal anterior wall and apical severe hypokinesis with ejection fraction of 40%.  Possible left ventricular apical thrombus.  ICD lead is present in the right ventricle.  No evidence for pulmonary hypertension is present.  =============  Plan  =============  Altered mental status-better.  Does not appear to be cardiac related.    Status post CABG and ASD closure 2017.  Patient is not having any angina pectoris or congestive heart  failure.    History of A-fib with RVR.    Patient is in sinus rhythm    Ischemic cardiomyopathy      Mild to moderate aortic valve stenosis     Echocardiogram 1/23/2024 revealed  Mild tricuspid regurgitation.  Mild to moderate aortic valve stenosis with gradient of 16/9 mmHg and valve area of 1.37 cm².  Left atrial enlargement.  Left ventricle is enlarged with septal anterior wall and apical severe hypokinesis with ejection fraction of 40%.  Possible left ventricular apical thrombus.  ICD lead is present in the right ventricle.  No evidence for pulmonary hypertension is present.     Stress Cardiolite test-1/24/2024  Lexiscan Cardiolite test revealed proximal posterior infarction and ischemia.  Gated SPECT images revealed normal left ventricular size and diffuse hypocontractility with calculated ejection fraction of 61%.     Status post ICD ([place] [employer]) 2017.  Single-chamber.    History of left ventricular possible apical thrombus.  Continue anticoagulation.  Consider MERLE in the future if needed.  Patient is already anticoagulated.     Anticoagulation  INR 1.75-5/20/2024.  1.92-5/21/2024  Patient is on Coumadin.    Renal dysfunction  17/1.08  24/1.44  Observe renal function closely.  Patient is not on diuretics at this time.    Medications were reviewed and updated.  Patient is on aspirin atorvastatin Coreg diltiazem CD levothyroxine midodrine pantoprazole and warfarin.      Further plan will depend on patient's progress.     Reviewed and updated 5/21/2024.  ]]]]]]]]]]]]]]]]]]]]]           Allie Hurley MD  05/21/24  06:45 EDT

## 2024-05-21 NOTE — PLAN OF CARE
Goal Outcome Evaluation:      Patient has done well tonight. Has been more alert and better oriented than yesterday. Has been able to hold conversations and has been very pleasant. Family to remain at bedside. Care continuing.

## 2024-05-21 NOTE — PLAN OF CARE
"Assessment: Laura Mejia presents with functional mobility impairments which indicate the need for skilled intervention. Tolerating session today without incident. Pt A&Ox3 this session (reports year is 2143). Pt able to increase ambulation distance this session; however, is notably unsteady, holding onto nearby surfaces for balance and min A. Recommending use of RW at home. Pt also demonstrates impaired safety and deficit awareness. Recommending 24/7 supervision and HHPT at discharge. Will continue to follow and progress as tolerated.     Plan/Recommendations:   If medically appropriate, Low Intensity Therapy recommended post-acute care - This is recommended as therapy feels this patient would require 2-3 visits per week. OP or HH would be the best option depending on patient's home bound status. Consider, if the patient has other  \"skilled\" needs such as wounds, IV antibiotics, etc. Combined with \"low intensity\" could also equate to a SNF. If patient is medically complex, consider LTAC. Pt requires no DME at discharge.     Pt desires Home with family assist and Home Health at discharge. Pt cooperative; agreeable to therapeutic recommendations and plan of care.        "

## 2024-05-21 NOTE — PROGRESS NOTES
Continued Stay Note  [facility]     Patient Name: Laura Mejia  MRN: [id number]  Today's Date: 5/21/2024    Admit Date: 5/16/2024    Plan: DC PLAN: Routine home with spouse. Declines SNF   Discharge Plan       Row Name 05/21/24 1141       Plan    Plan DC PLAN: Routine home with spouse. Declines SNF    Plan Comments DC Barriers:  MRI Brain 5/21 - pending, HGB 10, Cardio/Psych following.                 Expected Discharge Date and Time       Expected Discharge Date Expected Discharge Time    May 21, 2024               ROMY Quintana RN  SIPS/ICU   O: [phone]  C: [phone]  [email or web address]

## 2024-05-22 ENCOUNTER — READMISSION MANAGEMENT (OUTPATIENT)
Dept: CALL CENTER | Facility: HOSPITAL | Age: 69
End: 2024-05-22
Payer: MEDICARE

## 2024-05-22 ENCOUNTER — TELEPHONE (OUTPATIENT)
Dept: CARDIOLOGY | Facility: CLINIC | Age: 69
End: 2024-05-22
Payer: MEDICARE

## 2024-05-22 VITALS
HEIGHT: 69 IN | BODY MASS INDEX: 28.67 KG/M2 | WEIGHT: 193.56 LBS | RESPIRATION RATE: 11 BRPM | SYSTOLIC BLOOD PRESSURE: 125 MMHG | TEMPERATURE: 97.6 F | HEART RATE: 70 BPM | DIASTOLIC BLOOD PRESSURE: 76 MMHG | OXYGEN SATURATION: 99 %

## 2024-05-22 LAB
A PHAGOCYTOPH DNA BLD QL NAA+PROBE: NEGATIVE
ALBUMIN SERPL-MCNC: 3.1 G/DL (ref 3.5–5.2)
ALBUMIN/GLOB SERPL: 1.2 G/DL
ALP SERPL-CCNC: 113 U/L (ref 39–117)
ALT SERPL W P-5'-P-CCNC: 33 U/L (ref 1–33)
ANION GAP SERPL CALCULATED.3IONS-SCNC: 12 MMOL/L (ref 5–15)
AST SERPL-CCNC: 51 U/L (ref 1–32)
BASOPHILS # BLD AUTO: 0.03 10*3/MM3 (ref 0–0.2)
BASOPHILS NFR BLD AUTO: 0.6 % (ref 0–1.5)
BILIRUB SERPL-MCNC: 0.7 MG/DL (ref 0–1.2)
BUN SERPL-MCNC: 25 MG/DL (ref 8–23)
BUN/CREAT SERPL: 20 (ref 7–25)
CALCIUM SPEC-SCNC: 8.5 MG/DL (ref 8.6–10.5)
CHLORIDE SERPL-SCNC: 105 MMOL/L (ref 98–107)
CO2 SERPL-SCNC: 21 MMOL/L (ref 22–29)
CREAT SERPL-MCNC: 1.25 MG/DL (ref 0.57–1)
DEPRECATED RDW RBC AUTO: 52.1 FL (ref 37–54)
E CHAFFEENSIS DNA BLD QL NAA+PROBE: NEGATIVE
EGFRCR SERPLBLD CKD-EPI 2021: 46.8 ML/MIN/1.73
EOSINOPHIL # BLD AUTO: 0.05 10*3/MM3 (ref 0–0.4)
EOSINOPHIL NFR BLD AUTO: 1.1 % (ref 0.3–6.2)
ERYTHROCYTE [DISTWIDTH] IN BLOOD BY AUTOMATED COUNT: 14.7 % (ref 12.3–15.4)
GLOBULIN UR ELPH-MCNC: 2.6 GM/DL
GLUCOSE SERPL-MCNC: 102 MG/DL (ref 65–99)
HCT VFR BLD AUTO: 30.6 % (ref 34–46.6)
HGB BLD-MCNC: 9.9 G/DL (ref 12–15.9)
IMM GRANULOCYTES # BLD AUTO: 0.06 10*3/MM3 (ref 0–0.05)
IMM GRANULOCYTES NFR BLD AUTO: 1.3 % (ref 0–0.5)
INR PPP: 2.74 (ref 2–3)
LYMPHOCYTES # BLD AUTO: 0.41 10*3/MM3 (ref 0.7–3.1)
LYMPHOCYTES NFR BLD AUTO: 8.7 % (ref 19.6–45.3)
MCH RBC QN AUTO: 30.7 PG (ref 26.6–33)
MCHC RBC AUTO-ENTMCNC: 32.4 G/DL (ref 31.5–35.7)
MCV RBC AUTO: 95 FL (ref 79–97)
MONOCYTES # BLD AUTO: 0.25 10*3/MM3 (ref 0.1–0.9)
MONOCYTES NFR BLD AUTO: 5.3 % (ref 5–12)
NEUTROPHILS NFR BLD AUTO: 3.89 10*3/MM3 (ref 1.7–7)
NEUTROPHILS NFR BLD AUTO: 83 % (ref 42.7–76)
NRBC BLD AUTO-RTO: 0 /100 WBC (ref 0–0.2)
PLATELET # BLD AUTO: 133 10*3/MM3 (ref 140–450)
PMV BLD AUTO: 10.5 FL (ref 6–12)
POTASSIUM SERPL-SCNC: 3.2 MMOL/L (ref 3.5–5.2)
PROT SERPL-MCNC: 5.7 G/DL (ref 6–8.5)
PROTHROMBIN TIME: 27.7 SECONDS (ref 19.4–28.5)
RBC # BLD AUTO: 3.22 10*6/MM3 (ref 3.77–5.28)
SODIUM SERPL-SCNC: 138 MMOL/L (ref 136–145)
WBC NRBC COR # BLD AUTO: 4.69 10*3/MM3 (ref 3.4–10.8)

## 2024-05-22 PROCEDURE — 85025 COMPLETE CBC W/AUTO DIFF WBC: CPT | Performed by: NURSE PRACTITIONER

## 2024-05-22 PROCEDURE — 85610 PROTHROMBIN TIME: CPT

## 2024-05-22 PROCEDURE — 80053 COMPREHEN METABOLIC PANEL: CPT | Performed by: NURSE PRACTITIONER

## 2024-05-22 PROCEDURE — 99232 SBSQ HOSP IP/OBS MODERATE 35: CPT | Performed by: INTERNAL MEDICINE

## 2024-05-22 RX ORDER — AMOXICILLIN AND CLAVULANATE POTASSIUM 875; 125 MG/1; MG/1
1 TABLET, FILM COATED ORAL EVERY 12 HOURS SCHEDULED
Qty: 2 TABLET | Refills: 0 | Status: SHIPPED | OUTPATIENT
Start: 2024-05-22 | End: 2024-05-23

## 2024-05-22 RX ORDER — WARFARIN SODIUM 3 MG/1
6 TABLET ORAL
Status: DISCONTINUED | OUTPATIENT
Start: 2024-05-24 | End: 2024-05-22 | Stop reason: HOSPADM

## 2024-05-22 RX ORDER — WARFARIN SODIUM 2 MG/1
2 TABLET ORAL
Status: DISCONTINUED | OUTPATIENT
Start: 2024-05-22 | End: 2024-05-22

## 2024-05-22 RX ORDER — POTASSIUM CHLORIDE 20 MEQ/1
40 TABLET, EXTENDED RELEASE ORAL EVERY 4 HOURS
Status: COMPLETED | OUTPATIENT
Start: 2024-05-22 | End: 2024-05-22

## 2024-05-22 RX ADMIN — MIDODRINE HYDROCHLORIDE 10 MG: 5 TABLET ORAL at 06:11

## 2024-05-22 RX ADMIN — POTASSIUM CHLORIDE 40 MEQ: 1500 TABLET, EXTENDED RELEASE ORAL at 10:37

## 2024-05-22 RX ADMIN — POTASSIUM CHLORIDE 40 MEQ: 1500 TABLET, EXTENDED RELEASE ORAL at 06:11

## 2024-05-22 RX ADMIN — LEVOTHYROXINE SODIUM 88 MCG: 88 TABLET ORAL at 06:11

## 2024-05-22 RX ADMIN — PAROXETINE HYDROCHLORIDE 40 MG: 20 TABLET, FILM COATED ORAL at 06:11

## 2024-05-22 RX ADMIN — AMOXICILLIN AND CLAVULANATE POTASSIUM 1 TABLET: 875; 125 TABLET, FILM COATED ORAL at 10:18

## 2024-05-22 RX ADMIN — ATORVASTATIN CALCIUM 80 MG: 40 TABLET, FILM COATED ORAL at 10:18

## 2024-05-22 RX ADMIN — ASPIRIN 81 MG CHEWABLE TABLET 81 MG: 81 TABLET CHEWABLE at 08:24

## 2024-05-22 RX ADMIN — PANTOPRAZOLE SODIUM 40 MG: 40 TABLET, DELAYED RELEASE ORAL at 06:11

## 2024-05-22 RX ADMIN — SODIUM BICARBONATE 1300 MG: 650 TABLET ORAL at 08:24

## 2024-05-22 RX ADMIN — OXYBUTYNIN CHLORIDE 5 MG: 5 TABLET, EXTENDED RELEASE ORAL at 10:18

## 2024-05-22 RX ADMIN — TOPIRAMATE 50 MG: 25 TABLET, FILM COATED ORAL at 08:24

## 2024-05-22 RX ADMIN — DILTIAZEM HYDROCHLORIDE 240 MG: 240 CAPSULE, EXTENDED RELEASE ORAL at 10:18

## 2024-05-22 RX ADMIN — CETIRIZINE HYDROCHLORIDE 10 MG: 10 TABLET, FILM COATED ORAL at 10:18

## 2024-05-22 NOTE — CASE MANAGEMENT/SOCIAL WORK
Case Management Discharge Note      Final Note: Home         Selected Continued Care - Discharged on 5/22/2024 Admission date: 5/16/2024 - Discharge disposition: Home or Self Care       Transportation Services  Private: Car    Final Discharge Disposition Code: 01 - home or self-care    ROMY Quintana RN  SIPS/ICU   O: [phone]  C: [phone]  [email or web address]

## 2024-05-22 NOTE — CASE MANAGEMENT/SOCIAL WORK
Continued Stay Note   [facility]     Patient Name: Laura Mejia  MRN: [id number]  Today's Date: 5/22/2024    Admit Date: 5/16/2024    Plan: DC PLAN: Routine home with spouse. Declines SNF.   Discharge Plan       Row Name 05/22/24 1033       Plan    Plan DC PLAN: Routine home with spouse. Declines SNF.    Plan Comments DC Barriers:  Neurology eval pending (s/p MRI Brain - tiny infarct), Cardio/Psych/Neuro following.               Expected Discharge Date and Time       Expected Discharge Date Expected Discharge Time    May 22, 2024               ROMY Quintana RN  SIPS/ICU   O: [phone]  C: [phone]  [email or web address]

## 2024-05-22 NOTE — PROGRESS NOTES
Referring Provider: Keke Casiano MD    Reason for follow-up:  Status post CABG  Status post ICD     Patient Care Team:  Luan Joseph APRN as PCP - General (Family Medicine)  Jozef Otero MD as Consulting Physician (Nephrology)    Subjective .      ROS    Today, the patient has been without any chest discomfort ,shortness of breath, palpitations, dizziness or syncope.  Denies having any headache ,abdominal pain ,nausea, vomiting , diarrhea constipation, loss of weight or loss of appetite.  Denies having any excessive bruising ,hematuria or blood in the stool.    Review of all systems negative except as indicated    History  Past Medical History:   Diagnosis Date    AAA (abdominal aortic aneurysm)     infrarenal- 3.1cm(2010, 3.7x4.2cm(2016), 3.9cm (2017)    Allergic rhinitis     Aspiration pneumonia 05/2017    CHF (congestive heart failure)     Coronary artery disease     DDD (degenerative disc disease), lumbar     lumbar spine- Dr. Churchill     Depression     Diverticulosis     Frequent UTI     Dr. Daniels    GERD (gastroesophageal reflux disease)     Hiatal hernia     HSV (herpes simplex virus) infection     Oral    Hyperlipidemia     IBS (irritable bowel syndrome)     Constipation predominant    Ischemic cardiomyopathy 09/2017    AICD     Kidney stones     NSTEMI (non-ST elevated myocardial infarction) 04/26/2017    Obstructive sleep apnea 2011    nfsg 2011, ahi 67    Osteoarthritis     Osteopenia     Peripheral neuropathy     feet    Pulmonary embolism, bilateral 05/2017    Rotator cuff tear     Bilateral- Ortho        Past Surgical History:   Procedure Laterality Date    CARDIAC CATHETERIZATION  04/26/2017    99% mid LAD. 90% mid LCX. 60% RCA. Recommended CABG. Dr. Diaz    CARDIAC DEFIBRILLATOR PLACEMENT  12/26/2017    ICD implant/ Dr. Ellis    CATARACT EXTRACTION      CORONARY ARTERY BYPASS GRAFT  04/26/2017    x4 & ASD Closure    CYSTOSCOPY  2002    negative. Dr. Daniels    LAPAROSCOPIC CHOLECYSTECTOMY   04/17/2013    For biliary dyskinesia. Dr. Mccoy.     REPLACEMENT TOTAL KNEE BILATERAL  11/2004    Dr. Herrera    THORACENTESIS Left 05/08/2017    TONSILLECTOMY      TUBAL ABDOMINAL LIGATION Bilateral        Family History   Problem Relation Age of Onset    Heart disease Mother     Other Mother         Hypoglycemia    Osteoporosis Mother     COPD Father     Stroke Father     Hypertension Brother     Diabetes Brother     Heart disease Brother        Social History     Tobacco Use    Smoking status: Never   Vaping Use    Vaping status: Some Days    Substances: CBD, nightly, 2-3 weekly   Substance Use Topics    Alcohol use: Never    Drug use: Never        Medications Prior to Admission   Medication Sig Dispense Refill Last Dose    aspirin 81 MG chewable tablet Chew 1 tablet Daily.   Past Week    carvedilol (COREG) 3.125 MG tablet Take 1 tablet by mouth 2 (Two) Times a Day With Meals.   Past Week    dilTIAZem CD (CARDIZEM CD) 240 MG 24 hr capsule Take 1 capsule by mouth Daily. 30 capsule 1 Past Week    famciclovir (FAMVIR) 500 MG tablet Take 1 tablet by mouth 3 (Three) Times a Day As Needed.   Past Month    levocetirizine (XYZAL) 5 MG tablet Take 1 tablet by mouth Every Evening.   Past Week    levothyroxine (SYNTHROID, LEVOTHROID) 88 MCG tablet Take 1 tablet by mouth Every Morning. 30 tablet 1 Past Week    linaclotide (LINZESS) 72 MCG capsule capsule Take 1 capsule by mouth Every Morning Before Breakfast.       omeprazole (priLOSEC) 40 MG capsule Take 1 capsule by mouth Daily.   Past Week    PARoxetine (PAXIL) 40 MG tablet Take 1 tablet by mouth Every Morning.   Past Week    topiramate (TOPAMAX) 50 MG tablet Take 1 tablet by mouth Daily.   Past Week    trospium (SANCTURA) 20 MG tablet Take 1 tablet by mouth Every Night.   Past Week    warfarin (COUMADIN) 3 MG tablet See Admin Instructions. Take 1 tablet by mouth on Tuesday, Thursday, Saturday, and Sunday   Past Week    warfarin (COUMADIN) 3 MG tablet Take 2 tablets by  mouth Daily. Monday Wednesday, Friday       atorvastatin (LIPITOR) 80 MG tablet Take 1 tablet by mouth Daily.   Unknown    ezetimibe (ZETIA) 10 MG tablet Take 1 tablet by mouth Daily.   Unknown    midodrine (PROAMATINE) 10 MG tablet Take 1 tablet by mouth Every 8 (Eight) Hours. 90 tablet 1 Unknown    sodium bicarbonate 650 MG tablet Take 2 tablets by mouth 3 (Three) Times a Day. 90 tablet 1 Unknown       Allergies  Oxytetracycline and Oxycodone    Scheduled Meds:amoxicillin-clavulanate, 1 tablet, Oral, Q12H  aspirin, 81 mg, Oral, Daily  atorvastatin, 80 mg, Oral, Daily  carvedilol, 3.125 mg, Oral, BID With Meals  cetirizine, 10 mg, Oral, Daily  dilTIAZem CD, 240 mg, Oral, Q24H  levothyroxine, 88 mcg, Oral, Q AM  melatonin, 5 mg, Oral, Nightly  midodrine, 10 mg, Oral, Q8H  oxybutynin XL, 5 mg, Oral, Daily  pantoprazole, 40 mg, Oral, Q AM  PARoxetine, 40 mg, Oral, QAM  potassium chloride ER, 40 mEq, Oral, Q4H  sodium bicarbonate, 1,300 mg, Oral, TID  sodium chloride, 10 mL, Intravenous, Q12H  topiramate, 50 mg, Oral, Daily  warfarin, 3 mg, Oral, Once per day on Sunday Tuesday Thursday Saturday  warfarin, 6 mg, Oral, Once per day on Monday Wednesday Friday      Continuous Infusions:Pharmacy to dose warfarin,       PRN Meds:.  acetaminophen    senna-docusate sodium **AND** polyethylene glycol **AND** bisacodyl **AND** bisacodyl    calcium carbonate    Calcium Replacement - Follow Nurse / BPA Driven Protocol    HYDROcodone-acetaminophen    ibuprofen    Magnesium Standard Dose Replacement - Follow Nurse / BPA Driven Protocol    nitroglycerin    ondansetron    Pharmacy to dose warfarin    Phosphorus Replacement - Follow Nurse / BPA Driven Protocol    Potassium Replacement - Follow Nurse / BPA Driven Protocol    prochlorperazine    sodium chloride    [COMPLETED] Insert Peripheral IV **AND** sodium chloride    sodium chloride    sodium chloride    Objective     VITAL SIGNS  Vitals:    05/21/24 1154 05/21/24 2144 05/22/24  "0500 05/22/24 0608   BP: 104/55 (!) 84/53 90/59 (!) 88/59   BP Location:  Left arm Left arm Left arm   Patient Position:  Lying Lying Lying   Pulse:   64 60   Resp:  12 11    Temp:  97.7 °F (36.5 °C) 97.6 °F (36.4 °C)    TempSrc:  Axillary Axillary    SpO2:  93% 99%    Weight:       Height:           Flowsheet Rows      Flowsheet Row First Filed Value   Admission Height 175.3 cm (69\") Documented at 05/16/2024 1533   Admission Weight 83.6 kg (184 lb 4.9 oz) Documented at 05/16/2024 1533              Intake/Output Summary (Last 24 hours) at 5/22/2024 0622  Last data filed at 5/21/2024 2144  Gross per 24 hour   Intake 480 ml   Output --   Net 480 ml        TELEMETRY: Sinus rhythm    Physical Exam:  The patient is alert, oriented and in no distress.  Vital signs as noted above.  Head and neck revealed no carotid bruits or jugular venous distention.  No thyromegaly or lymphadenopathy is present  Lungs clear.  No wheezing.  Breath sounds are normal bilaterally.  Heart normal first and second heart sounds.  Grade 2 or 6 systolic murmur. No precordial rub is present.  No gallop is present.  Abdomen soft and nontender.  No organomegaly is present.  Extremities with good peripheral pulses without any pedal edema.  Skin warm and dry.  ICD site looks normal.  Musculoskeletal system is grossly normal  CNS grossly normal    Reviewed and updated.  Results Review:   I reviewed the patient's new clinical results.  Lab Results (last 24 hours)       Procedure Component Value Units Date/Time    Protime-INR [[id number]]  (Normal) Collected: 05/22/24 0247    Specimen: Blood Updated: 05/22/24 0402     Protime 27.7 Seconds      INR 2.74    Comprehensive Metabolic Panel [[id number]]  (Abnormal) Collected: 05/22/24 0247    Specimen: Blood Updated: 05/22/24 0318     Glucose 102 mg/dL      BUN 25 mg/dL      Creatinine 1.25 mg/dL      Sodium 138 mmol/L      Potassium 3.2 mmol/L      Chloride 105 mmol/L      CO2 21.0 mmol/L      Calcium 8.5 mg/dL  "     Total Protein 5.7 g/dL      Albumin 3.1 g/dL      ALT (SGPT) 33 U/L      AST (SGOT) 51 U/L      Alkaline Phosphatase 113 U/L      Total Bilirubin 0.7 mg/dL      Globulin 2.6 gm/dL      A/G Ratio 1.2 g/dL      BUN/Creatinine Ratio 20.0     Anion Gap 12.0 mmol/L      eGFR 46.8 mL/min/1.73     Narrative:      GFR Normal >60  Chronic Kidney Disease <60  Kidney Failure <15      CBC & Differential [[id number]]  (Abnormal) Collected: 05/22/24 0247    Specimen: Blood Updated: 05/22/24 0301    Narrative:      The following orders were created for panel order CBC & Differential.  Procedure                               Abnormality         Status                     ---------                               -----------         ------                     CBC Auto Differential[[id number]]        Abnormal            Final result               Scan Slide[[id number]]                                                                    Please view results for these tests on the individual orders.    CBC Auto Differential [[id number]]  (Abnormal) Collected: 05/22/24 0247    Specimen: Blood Updated: 05/22/24 0301     WBC 4.69 10*3/mm3      RBC 3.22 10*6/mm3      Hemoglobin 9.9 g/dL      Hematocrit 30.6 %      MCV 95.0 fL      MCH 30.7 pg      MCHC 32.4 g/dL      RDW 14.7 %      RDW-SD 52.1 fl      MPV 10.5 fL      Platelets 133 10*3/mm3      Neutrophil % 83.0 %      Lymphocyte % 8.7 %      Monocyte % 5.3 %      Eosinophil % 1.1 %      Basophil % 0.6 %      Immature Grans % 1.3 %      Neutrophils, Absolute 3.89 10*3/mm3      Lymphocytes, Absolute 0.41 10*3/mm3      Monocytes, Absolute 0.25 10*3/mm3      Eosinophils, Absolute 0.05 10*3/mm3      Basophils, Absolute 0.03 10*3/mm3      Immature Grans, Absolute 0.06 10*3/mm3      nRBC 0.0 /100 WBC     Blood Culture - Blood, Arm, Left [[id number]]  (Normal) Collected: 05/19/24 2042    Specimen: Blood from Arm, Left Updated: 05/21/24 2100     Blood Culture No growth at 2 days    Narrative:       Less than seven (7) mL's of blood was collected.  Insufficient quantity may yield false negative results.    Blood Culture - Blood, Hand, Left [[id number]]  (Normal) Collected: 05/19/24 1308    Specimen: Blood from Hand, Left Updated: 05/21/24 1315     Blood Culture No growth at 2 days    Narrative:      Less than seven (7) mL's of blood was collected.  Insufficient quantity may yield false negative results.            Imaging Results (Last 24 Hours)       Procedure Component Value Units Date/Time    MRI Brain Without Contrast [[id number]] Collected: 05/21/24 1126     Updated: 05/21/24 1136    Narrative:      MRI BRAIN WO CONTRAST    Date of Exam: 5/21/2024 11:05 AM EDT    Indication: mental status changes.     Comparison: CT head without contrast 5/16/2024. MRI brain 12/18/2013.    Technique:  Routine multiplanar/multisequence sequence images of the brain were obtained without contrast administration.      Findings:  Questionable 2 mm focus of increased diffusion signal within the high left frontal lobe cortex (series 2 image 73), without ADC correlate, and without FLAIR correlate. No large territory infarct is identified.    There is moderate generalized atrophy with compensatory prominence of the ventricles and extra-axial spaces. The atrophy is significantly progressed since the 2013 comparison MRI.    The major vascular flow voids are preserved. Mild to moderate FLAIR signal intensity changes are scattered elsewhere within the deep white matter of the brain which are nonspecific but favored to reflect changes of chronic microvascular disease these   FLAIR changes have significantly progressed when compared to the MRI from 2013.    No mass lesion, mass effect, midline shift, or evidence of intracranial hemorrhage.    Calvarium is within normal limits. Paranasal sinuses and mastoid air cells are clear.      Impression:      Impression:    1. Tiny 2 mm questionable focus of diffusion signal change within the high  left frontal lobe cortex, may represent a tiny focal acute or subacute lacunar infarct.  2. Mild to moderate chronic microvascular disease, progressed since the 2013 MRI.  3. Moderate generalized brain parenchymal atrophy, progressed since the 2013 MRI.        Electronically Signed: Amber Candelaria MD    5/21/2024 11:34 AM EDT    Workstation ID: [id number]        LAB RESULTS (LAST 7 DAYS)    CBC  Results from last 7 days   Lab Units 05/22/24 0247 05/21/24 0041 05/19/24 2308 05/19/24  1308 05/18/24  0110 05/17/24  1110 05/16/24  2341   WBC 10*3/mm3 4.69 4.85 4.57 5.30 4.99 5.64 4.17   RBC 10*6/mm3 3.22* 3.27* 3.54* 4.12 3.80 4.05 3.76*   HEMOGLOBIN g/dL 9.9* 10.0* 11.0* 12.8 11.9* 12.7 11.7*   HEMATOCRIT % 30.6* 31.4* 34.8 41.6 36.4 39.7 37.0   MCV fL 95.0 96.0 98.3* 101.0* 95.8 98.0* 98.4*   PLATELETS 10*3/mm3 133* 118* 145 139* 170 202 184       BMP  Results from last 7 days   Lab Units 05/22/24 0247 05/21/24 0041 05/19/24 2308 05/19/24  1308 05/18/24  0110 05/17/24  1110 05/16/24  2341   SODIUM mmol/L 138 135* 134* 134* 141 140 138   POTASSIUM mmol/L 3.2* 3.9 3.9 3.3* 4.0 4.0 3.3*   CHLORIDE mmol/L 105 103 105 102 111* 108* 107   CO2 mmol/L 21.0* 22.0 18.7* 20.0* 20.0* 20.0* 20.0*   BUN mg/dL 25* 24* 17 16 11 11 11   CREATININE mg/dL 1.25* 1.44* 1.08* 1.15* 0.98 1.08* 1.15*   GLUCOSE mg/dL 102* 100* 115* 112* 121* 123* 128*       CMP   Results from last 7 days   Lab Units 05/22/24  0247 05/21/24  0041 05/19/24  2308 05/19/24  1308 05/18/24  2018 05/18/24  0110 05/17/24  1110 05/16/24  2341   SODIUM mmol/L 138 135* 134* 134*  --  141 140 138   POTASSIUM mmol/L 3.2* 3.9 3.9 3.3*  --  4.0 4.0 3.3*   CHLORIDE mmol/L 105 103 105 102  --  111* 108* 107   CO2 mmol/L 21.0* 22.0 18.7* 20.0*  --  20.0* 20.0* 20.0*   BUN mg/dL 25* 24* 17 16  --  11 11 11   CREATININE mg/dL 1.25* 1.44* 1.08* 1.15*  --  0.98 1.08* 1.15*   GLUCOSE mg/dL 102* 100* 115* 112*  --  121* 123* 128*   ALBUMIN g/dL 3.1* 3.3* 3.0* 3.7 3.4*  --   --    --    BILIRUBIN mg/dL 0.7 0.7 0.5 0.9  --   --   --   --    ALK PHOS U/L 113 106 108 125*  --   --   --   --    AST (SGOT) U/L 51* 62* 75* 57*  --   --   --   --    ALT (SGPT) U/L 33 36* 37* 38*  --   --   --   --          BNP        TROPONIN        CoAg  Results from last 7 days   Lab Units 05/22/24  0247 05/21/24  0041 05/19/24  2308 05/19/24  0706 05/18/24  0110 05/17/24  1110 05/16/24  1642   INR  2.74 1.92* 1.75* 1.42* 1.05* 1.10* 1.42*       Creatinine Clearance  Estimated Creatinine Clearance: 50.3 mL/min (A) (by C-G formula based on SCr of 1.25 mg/dL (H)).    ABG        Radiology  MRI Brain Without Contrast    Result Date: 5/21/2024  Impression: 1. Tiny 2 mm questionable focus of diffusion signal change within the high left frontal lobe cortex, may represent a tiny focal acute or subacute lacunar infarct. 2. Mild to moderate chronic microvascular disease, progressed since the 2013 MRI. 3. Moderate generalized brain parenchymal atrophy, progressed since the 2013 MRI. Electronically Signed: Amber Candelaria MD  5/21/2024 11:34 AM EDT  Workstation ID: [id number]    CT Abdomen Pelvis Without Contrast    Result Date: 5/20/2024  Impression: 1. Nonobstructing bilateral renal stones and mild bilateral renal cortical scarring. No ureteral stone or hydronephrosis. 2. Aortobiiliac endograft repair of abdominal aortic aneurysm. In the sac measures 4.3 cm. 3. Uncomplicated colonic diverticulosis. 4. Chronic thoracic and lumbar compression fractures. 5. Cholecystectomy. 6. Moderate to large stool burden in the ascending colon, transverse colon and proximal descending colon. Electronically Signed: Amber Candelaria MD  5/20/2024 11:55 AM EDT  Workstation ID: [id number]         EKG                I personally viewed and interpreted the patient's EKG/Telemetry data: Sinus rhythm    ECHOCARDIOGRAM:    Results for orders placed during the hospital encounter of 01/22/24    Adult Transthoracic Echo Complete W/ Cont if Necessary Per  Protocol    Interpretation Summary    Left ventricular ejection fraction appears to be 56 - 60%.    Estimated right ventricular systolic pressure from tricuspid regurgitation is normal (<35 mmHg).    Indication  Dyspnea  Palpitations    Technically satisfactory study.  Mitral valve is structurally normal.  Tricuspid valve is structurally normal.  Mild tricuspid regurgitation is present.  Aortic valve is aortic valve with decreased opening motion.  Gradient across the aortic valve is 16/9 mmHg.  Valve area 1.37 cm².  Pulmonic valve could not be well visualized.  Left atrium is enlarged.  Right atrium is normal in size.  Left ventricle is enlarged with septal anterior wall and apical severe hypokinesis with ejection fraction of 40%.  Possible left ventricular apical thrombus.  Right ventricle is normal in size.  Atrial septum is intact.  Aorta is normal.  No pericardial effusion or intracardiac thrombus is seen.  ICD lead is present in the right ventricle.    Impression  Mild tricuspid regurgitation.  Mild to moderate aortic valve stenosis with gradient of 16/9 mmHg and valve area of 1.37 cm².  Left atrial enlargement.  Left ventricle is enlarged with septal anterior wall and apical severe hypokinesis with ejection fraction of 40%.  Possible left ventricular apical thrombus.  ICD lead is present in the right ventricle.  No evidence for pulmonary hypertension is present.          STRESS TEST  Results for orders placed during the hospital encounter of 01/22/24    Stress Test With Myocardial Perfusion One Day    Interpretation Summary  Indications  Status post CABG  Atrial fibrillation    This study was performed under the direct supervision of Rohini NOLASCO.    Resting ECG  Sinus rhythm    The patient was injected with Lexiscan intravenously while constantly monitoring electrocardiogram and vital signs.  Patient did not have any chest discomfort ST abnormalities or ectopy with injection of Lexiscan.    Cardiolite was used  as an imaging agent.    Cardiolite images showed significantly decreased radionuclide uptake in the proximal posterior segments with partial redistribution in the resting images.    Gated SPECT images revealed normal left ventricle size and diffuse hypocontractility with calculated ejection fraction of 61%.    Impression  ========  Lexiscan Cardiolite test revealed proximal posterior infarction and ischemia.  Gated SPECT images revealed normal left ventricular size and diffuse hypocontractility with calculated ejection fraction of 61%.        Cardiolite (Tc-99m sestamibi) stress test    CARDIAC CATHETERIZATION  No results found for this or any previous visit.                OTHER:         Assessment & Plan     Principal Problem:    Altered mental status  Active Problems:    Altered mental status, unspecified      ]]]]]]]]]]]]]]]]]  History  =========   - History of atrial fibrillation with RVR.  Patient had postop atrial fibrillation after she had CABG in 2017.  Patient is in sinus rhythm.     - Status post CABG and ASD closure 2017     - Ischemic cardiomyopathy    -Moderate aortic valve stenosis     - Status post ICD (single-chamber)-2017-[facility].     - Chronic anticoagulation-patient on Coumadin.  Home monitoring.  INR 1.75-5/19/2024.     - Hypothyroidism dyslipidemia obstructive sleep apnea     - Past history of pulmonary emboli     - CKD 3  20/1.7-1/22/2024  17/1.08-5/19/2024.     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 There is normal antegrade color Doppler flow including the common iliac arteries. There is thrombus within the aneurysm sac. Dimensions are discussed in detail above.     - Family history of coronary artery disease     - Non-smoker     - Allergic to oxytetracycline and oxycodone.     Echocardiogram 1/23/2024  revealed  Mild tricuspid regurgitation.  Mild to moderate aortic valve stenosis with gradient of 16/9 mmHg and valve area of 1.37 cm².  Left atrial enlargement.  Left ventricle is enlarged with septal anterior wall and apical severe hypokinesis with ejection fraction of 40%.  Possible left ventricular apical thrombus.  ICD lead is present in the right ventricle.  No evidence for pulmonary hypertension is present.  =============  Plan  =============  Altered mental status-better.  Does not appear to be cardiac related.    Status post CABG and ASD closure 2017.  Patient is not having any angina pectoris or congestive heart failure.    History of A-fib with RVR.    Patient is in sinus rhythm    Ischemic cardiomyopathy      Mild to moderate aortic valve stenosis     Echocardiogram 1/23/2024 revealed  Mild tricuspid regurgitation.  Mild to moderate aortic valve stenosis with gradient of 16/9 mmHg and valve area of 1.37 cm².  Left atrial enlargement.  Left ventricle is enlarged with septal anterior wall and apical severe hypokinesis with ejection fraction of 40%.  Possible left ventricular apical thrombus.  ICD lead is present in the right ventricle.  No evidence for pulmonary hypertension is present.     Stress Cardiolite test-1/24/2024  Lexiscan Cardiolite test revealed proximal posterior infarction and ischemia.  Gated SPECT images revealed normal left ventricular size and diffuse hypocontractility with calculated ejection fraction of 61%.     Status post ICD ([employer]) 2017.  Single-chamber.    History of left ventricular possible apical thrombus.  Continue anticoagulation.  Consider MERLE in the future if needed.  Patient is already anticoagulated.     Anticoagulation  INR 1.75-5/20/2024.  1.92-5/21/2024  2.74-5/22/2024  Patient is on Coumadin.    Renal dysfunction  17/1.08  24/1.44  25/1.25-5/22/2024  Observe renal function closely.  Patient is not on diuretics at this time.    Medications were reviewed and  updated.  Patient is on aspirin atorvastatin Coreg diltiazem CD levothyroxine midodrine pantoprazole and warfarin.      Further plan will depend on patient's progress.     Reviewed and updated-5/22/2024.  ]]]]]]]]]]]]]]]]]]]]]           Allie Hurley MD  05/22/24  06:22 EDT

## 2024-05-22 NOTE — DISCHARGE SUMMARY
Date of Discharge:  5/22/2024    Discharge Diagnosis:     Altered Mental Status  Acute Cystitis with Hematuria  Hypotension  Tick Bite  Atrial Fibrillation  Chronic warfarin therapy for anticoagulation  Systolic CHF  Ischemic Cardiomyopathy  CAD  GERD  CKD 3  Acquired Hypothyroidism  History of MI  History of CABG  History of PE    Presenting Problem/History of Present Illness  Active Hospital Problems    Diagnosis  POA    **Altered mental status [R41.82]  Unknown    Altered mental status, unspecified [R41.82]  Yes      Resolved Hospital Problems   No resolved problems to display.          Hospital Course    Laura Mejia is a 68-year-old female who presented to [facility] ED on 5/16/2024 and will discharge on 5/22/2024.  Patient reported to ER with generalized weakness and confusion that had progressed.  While in ER patient had head CT that showed no acute findings, chest x-ray was unremarkable.  Patient was noted to have a urinary tract infection.  Culture did confirm E. coli.  Patient was treated with IV Rocephin and transition to oral Augmentin.  Patient did develop hallucinations that would come and go.  Bilateral carotid duplex showed both right and left carotid less than 50% stenosis.  MRI of brain was completed after pacer interrogation.  MRI showed tiny 2 mm questionable focus of diffusion signal change within the high left frontal lobe cortex, may represent a tiny focal acute or subacute infarct.  Patient also had mild to moderate chronic vascular progression since 2013 with atrophy.  Neurology was consulted.  Patient and spouse would like to discharge before seeing neurology.  Confusion has improved significantly and patient has not had any hallucinations.  We discussed the MRI results in detail.  Patient is adamant about discharge.  PT recommends SNF or home health PT.  Patient refuses at this time.  She does have good family support at home.  Patient is hemodynamically stable at time of  discharge    Patient will follow-up with her PCP in 1 week and discuss MRI results.  Referral for outpatient neurology can be made at that time.    Patient does follow with cardiology, Dr. Hurley.  He did follow during course of hospital stay.  She has a past medical history of A-fib on long-term warfarin.  Patient does have a history of left ventricular possible apical thrombus.  He will consider MERLE in the future if needed.  She is anticoagulated on warfarin.  Her INR will be followed with PCP.  Cardiology did not feel cardiac etiologies to explain her altered mental status during stay.    Procedures Performed         Consults:   Consults       Date and Time Order Name Status Description    5/21/2024 12:08 PM Inpatient Neurology Consult General      5/18/2024  3:24 PM Inpatient Cardiology Consult Completed     5/17/2024 12:46 PM Inpatient Psychiatrist Consult Completed             Pertinent Test Results:    Lab Results (most recent)       Procedure Component Value Units Date/Time    Protime-INR [[id number]]  (Normal) Collected: 05/22/24 0247    Specimen: Blood Updated: 05/22/24 0402     Protime 27.7 Seconds      INR 2.74    Comprehensive Metabolic Panel [[id number]]  (Abnormal) Collected: 05/22/24 0247    Specimen: Blood Updated: 05/22/24 0318     Glucose 102 mg/dL      BUN 25 mg/dL      Creatinine 1.25 mg/dL      Sodium 138 mmol/L      Potassium 3.2 mmol/L      Chloride 105 mmol/L      CO2 21.0 mmol/L      Calcium 8.5 mg/dL      Total Protein 5.7 g/dL      Albumin 3.1 g/dL      ALT (SGPT) 33 U/L      AST (SGOT) 51 U/L      Alkaline Phosphatase 113 U/L      Total Bilirubin 0.7 mg/dL      Globulin 2.6 gm/dL      A/G Ratio 1.2 g/dL      BUN/Creatinine Ratio 20.0     Anion Gap 12.0 mmol/L      eGFR 46.8 mL/min/1.73     Narrative:      GFR Normal >60  Chronic Kidney Disease <60  Kidney Failure <15      CBC & Differential [[id number]]  (Abnormal) Collected: 05/22/24 0247    Specimen: Blood Updated: 05/22/24 0301     Narrative:      The following orders were created for panel order CBC & Differential.  Procedure                               Abnormality         Status                     ---------                               -----------         ------                     CBC Auto Differential[[id number]]        Abnormal            Final result               Scan Slide[[id number]]                                                                    Please view results for these tests on the individual orders.    CBC Auto Differential [[id number]]  (Abnormal) Collected: 05/22/24 0247    Specimen: Blood Updated: 05/22/24 0301     WBC 4.69 10*3/mm3      RBC 3.22 10*6/mm3      Hemoglobin 9.9 g/dL      Hematocrit 30.6 %      MCV 95.0 fL      MCH 30.7 pg      MCHC 32.4 g/dL      RDW 14.7 %      RDW-SD 52.1 fl      MPV 10.5 fL      Platelets 133 10*3/mm3      Neutrophil % 83.0 %      Lymphocyte % 8.7 %      Monocyte % 5.3 %      Eosinophil % 1.1 %      Basophil % 0.6 %      Immature Grans % 1.3 %      Neutrophils, Absolute 3.89 10*3/mm3      Lymphocytes, Absolute 0.41 10*3/mm3      Monocytes, Absolute 0.25 10*3/mm3      Eosinophils, Absolute 0.05 10*3/mm3      Basophils, Absolute 0.03 10*3/mm3      Immature Grans, Absolute 0.06 10*3/mm3      nRBC 0.0 /100 WBC     Blood Culture - Blood, Arm, Left [[id number]]  (Normal) Collected: 05/19/24 2042    Specimen: Blood from Arm, Left Updated: 05/21/24 2100     Blood Culture No growth at 2 days    Narrative:      Less than seven (7) mL's of blood was collected.  Insufficient quantity may yield false negative results.    Blood Culture - Blood, Hand, Left [[id number]]  (Normal) Collected: 05/19/24 1308    Specimen: Blood from Hand, Left Updated: 05/21/24 1315     Blood Culture No growth at 2 days    Narrative:      Less than seven (7) mL's of blood was collected.  Insufficient quantity may yield false negative results.    CBC & Differential [[id number]]  (Abnormal) Collected: 05/21/24 0041    Specimen:  Blood from Arm, Left Updated: 05/21/24 0145    Narrative:      The following orders were created for panel order CBC & Differential.  Procedure                               Abnormality         Status                     ---------                               -----------         ------                     CBC Auto Differential[[id number]]        Abnormal            Final result               Scan Slide[[id number]]                                       Final result                 Please view results for these tests on the individual orders.    CBC Auto Differential [[id number]]  (Abnormal) Collected: 05/21/24 0041    Specimen: Blood from Arm, Left Updated: 05/21/24 0145     WBC 4.85 10*3/mm3      RBC 3.27 10*6/mm3      Hemoglobin 10.0 g/dL      Hematocrit 31.4 %      MCV 96.0 fL      MCH 30.6 pg      MCHC 31.8 g/dL      RDW 14.6 %      RDW-SD 51.4 fl      MPV 10.7 fL      Platelets 118 10*3/mm3     Scan Slide [[id number]] Collected: 05/21/24 0041    Specimen: Blood from Arm, Left Updated: 05/21/24 0145     Scan Slide --     Comment: See Manual Differential Results       Manual Differential [[id number]]  (Abnormal) Collected: 05/21/24 0041    Specimen: Blood from Arm, Left Updated: 05/21/24 0145     Neutrophil % 79.0 %      Lymphocyte % 5.0 %      Monocyte % 3.0 %      Basophil % 1.0 %      Bands %  10.0 %      Metamyelocyte % 1.0 %      Myelocyte % 1.0 %      Neutrophils Absolute 4.32 10*3/mm3      Lymphocytes Absolute 0.24 10*3/mm3      Monocytes Absolute 0.15 10*3/mm3      Basophils Absolute 0.05 10*3/mm3      RBC Morphology Normal     WBC Morphology Normal     Platelet Estimate Decreased    Comprehensive Metabolic Panel [[id number]]  (Abnormal) Collected: 05/21/24 0041    Specimen: Blood from Arm, Left Updated: 05/21/24 0131     Glucose 100 mg/dL      BUN 24 mg/dL      Creatinine 1.44 mg/dL      Sodium 135 mmol/L      Potassium 3.9 mmol/L      Comment: Slight hemolysis detected by analyzer. Result may be falsely  elevated.        Chloride 103 mmol/L      CO2 22.0 mmol/L      Calcium 8.6 mg/dL      Total Protein 5.8 g/dL      Albumin 3.3 g/dL      ALT (SGPT) 36 U/L      AST (SGOT) 62 U/L      Comment: Slight hemolysis detected by analyzer. Result may be falsely elevated.        Alkaline Phosphatase 106 U/L      Total Bilirubin 0.7 mg/dL      Globulin 2.5 gm/dL      A/G Ratio 1.3 g/dL      BUN/Creatinine Ratio 16.7     Anion Gap 10.0 mmol/L      eGFR 39.5 mL/min/1.73     Narrative:      GFR Normal >60  Chronic Kidney Disease <60  Kidney Failure <15      Protime-INR [[id number]]  (Abnormal) Collected: 05/21/24 0041    Specimen: Blood from Arm, Left Updated: 05/21/24 0115     Protime 19.9 Seconds      INR 1.92    Lyme Antibody Total with Reflex (LISA) [[id number]]  (Normal) Collected: 05/18/24 1418    Specimen: Blood from Arm, Left Updated: 05/20/24 1044     Lyme Ab IgG Negative    Urine Culture - Urine, Urine, Clean Catch [[id number]]  (Abnormal)  (Susceptibility) Collected: 05/18/24 1805    Specimen: Urine, Clean Catch Updated: 05/20/24 1040     Urine Culture >100,000 CFU/mL Escherichia coli    Narrative:      Colonization of the urinary tract without infection is common. Treatment is discouraged unless the patient is symptomatic, pregnant, or undergoing an invasive urologic procedure.    Susceptibility        Escherichia coli      FRANK      Amoxicillin + Clavulanate Susceptible      Ampicillin Resistant      Ampicillin + Sulbactam Susceptible      Cefazolin Susceptible      Cefepime Susceptible      Ceftazidime Susceptible      Ceftriaxone Susceptible      Gentamicin Susceptible      Levofloxacin Intermediate      Nitrofurantoin Susceptible      Piperacillin + Tazobactam Susceptible      Trimethoprim + Sulfamethoxazole Susceptible                           Scan Slide [[id number]] Collected: 05/19/24 1308    Specimen: Blood Updated: 05/19/24 1409     Scan Slide --     Comment: See Manual Differential Results       Manual  Differential [[id number]]  (Abnormal) Collected: 05/19/24 1308    Specimen: Blood Updated: 05/19/24 1409     Neutrophil % 77.0 %      Lymphocyte % 6.0 %      Monocyte % 4.0 %      Basophil % 1.0 %      Bands %  9.0 %      Metamyelocyte % 3.0 %      Neutrophils Absolute 4.56 10*3/mm3      Lymphocytes Absolute 0.32 10*3/mm3      Monocytes Absolute 0.21 10*3/mm3      Basophils Absolute 0.05 10*3/mm3      RBC Morphology Normal     WBC Morphology Normal     Platelet Estimate Decreased    Albumin [[id number]]  (Abnormal) Collected: 05/18/24 2018    Specimen: Blood from Arm, Left Updated: 05/19/24 1223     Albumin 3.4 g/dL     Extra Tubes [[id number]] Collected: 05/19/24 0706    Specimen: Blood, Venous Line Updated: 05/19/24 0716    Narrative:      The following orders were created for panel order Extra Tubes.  Procedure                               Abnormality         Status                     ---------                               -----------         ------                     Lavender Top[[id number]]                                     Final result                 Please view results for these tests on the individual orders.    Lavender Top [[id number]] Collected: 05/19/24 0706    Specimen: Blood Updated: 05/19/24 0716     Extra Tube hold for add-on     Comment: Auto resulted       BNP [[id number]]  (Abnormal) Collected: 05/18/24 2018    Specimen: Blood from Arm, Left Updated: 05/18/24 2051     proBNP 1,307.0 pg/mL     Narrative:      This assay is used as an aid in the diagnosis of individuals suspected of having heart failure. It can be used as an aid in the diagnosis of acute decompensated heart failure (ADHF) in patients presenting with signs and symptoms of ADHF to the emergency department (ED). In addition, NT-proBNP of <300 pg/mL indicates ADHF is not likely.    Age Range Result Interpretation  NT-proBNP Concentration (pg/mL:      <50             Positive            >450                   Melgar                  300-450                    Negative             <300    50-75           Positive            >900                  Gray                300-900                  Negative            <300      >75             Positive            >1800                  Gray                300-1800                  Negative            <300    Urinalysis, Microscopic Only - Urine, Clean Catch [[id number]]  (Abnormal) Collected: 05/18/24 1805    Specimen: Urine, Clean Catch Updated: 05/18/24 1843     RBC, UA 0-2 /HPF      WBC, UA Too Numerous to Count /HPF      Bacteria, UA 4+ /HPF      Squamous Epithelial Cells, UA 3-6 /HPF      Hyaline Casts, UA 13-20 /LPF      Methodology Manual Light Microscopy    Urinalysis With Culture If Indicated - Urine, Clean Catch [[id number]]  (Abnormal) Collected: 05/18/24 1805    Specimen: Urine, Clean Catch Updated: 05/18/24 1824     Color, UA Yellow     Appearance, UA Turbid     pH, UA 8.0     Specific Gravity, UA 1.018     Glucose, UA Negative     Ketones, UA Negative     Bilirubin, UA Negative     Blood, UA Trace     Protein,  mg/dL (2+)     Leuk Esterase, UA Large (3+)     Nitrite, UA Positive     Urobilinogen, UA 2.0 E.U./dL    Narrative:      In absence of clinical symptoms, the presence of pyuria, bacteria, and/or nitrites on the urinalysis result does not correlate with infection.    Ehrlichia Profile DNA PCR [[id number]] Collected: 05/18/24 1418    Specimen: Blood from Arm, Left Updated: 05/18/24 1420    Rickettsia Species DNA, Real-Time PCR [[id number]] Collected: 05/18/24 1418    Specimen: Blood from Arm, Left Updated: 05/18/24 1420    Basic Metabolic Panel [[id number]]  (Abnormal) Collected: 05/18/24 0110    Specimen: Blood Updated: 05/18/24 0141     Glucose 121 mg/dL      BUN 11 mg/dL      Creatinine 0.98 mg/dL      Sodium 141 mmol/L      Potassium 4.0 mmol/L      Chloride 111 mmol/L      CO2 20.0 mmol/L      Calcium 8.9 mg/dL      BUN/Creatinine Ratio 11.2     Anion Gap 10.0 mmol/L      eGFR  62.6 mL/min/1.73     Narrative:      GFR Normal >60  Chronic Kidney Disease <60  Kidney Failure <15      Basic Metabolic Panel [[id number]]  (Abnormal) Collected: 05/17/24 1110    Specimen: Blood Updated: 05/17/24 1136     Glucose 123 mg/dL      BUN 11 mg/dL      Creatinine 1.08 mg/dL      Sodium 140 mmol/L      Potassium 4.0 mmol/L      Comment: Slight hemolysis detected by analyzer. Result may be falsely elevated.        Chloride 108 mmol/L      CO2 20.0 mmol/L      Calcium 9.2 mg/dL      BUN/Creatinine Ratio 10.2     Anion Gap 12.0 mmol/L      eGFR 55.7 mL/min/1.73     Narrative:      GFR Normal >60  Chronic Kidney Disease <60  Kidney Failure <15      Iron Profile [[id number]]  (Abnormal) Collected: 05/16/24 2341    Specimen: Blood from Arm, Left Updated: 05/17/24 1036     Iron 35 mcg/dL      Iron Saturation (TSAT) 12 %      Transferrin 202 mg/dL      TIBC 301 mcg/dL     Urinalysis With Microscopic If Indicated (No Culture) - Urine, Catheter [[id number]]  (Normal) Collected: 05/16/24 1728    Specimen: Urine, Catheter Updated: 05/16/24 1748     Color, UA Yellow     Appearance, UA Clear     pH, UA <=5.0     Specific Gravity, UA 1.011     Glucose, UA Negative     Ketones, UA Negative     Bilirubin, UA Negative     Blood, UA Negative     Protein, UA Negative     Leuk Esterase, UA Negative     Nitrite, UA Negative     Urobilinogen, UA 1.0 E.U./dL    Narrative:      Urine microscopic not indicated.    [place] Draw [[id number]] Collected: 05/16/24 1642    Specimen: Blood Updated: 05/16/24 1701    Narrative:      The following orders were created for panel order [place] Draw.  Procedure                               Abnormality         Status                     ---------                               -----------         ------                     Green Top (Gel)[[id number]]                                  Final result               Lavender Top[[id number]]                                     Final result               Gold  Top - SST[[id number]]                                   Final result               Light Blue Top[[id number]]                                   Final result                 Please view results for these tests on the individual orders.    Light Blue Top [[id number]] Collected: 05/16/24 1642    Specimen: Blood Updated: 05/16/24 1701     Extra Tube Hold for add-ons.     Comment: Auto resulted       Green Top (Gel) [[id number]] Collected: 05/16/24 1642    Specimen: Blood Updated: 05/16/24 1646     Extra Tube Hold for add-ons.     Comment: Auto resulted.       Lavender Top [[id number]] Collected: 05/16/24 1642    Specimen: Blood Updated: 05/16/24 1646     Extra Tube hold for add-on     Comment: Auto resulted       Gold Top - SST [[id number]] Collected: 05/16/24 1642    Specimen: Blood Updated: 05/16/24 1646     Extra Tube Hold for add-ons.     Comment: Auto resulted.                Results for orders placed during the hospital encounter of 01/22/24    Adult Transthoracic Echo Complete W/ Cont if Necessary Per Protocol    Interpretation Summary    Left ventricular ejection fraction appears to be 56 - 60%.    Estimated right ventricular systolic pressure from tricuspid regurgitation is normal (<35 mmHg).    Indication  Dyspnea  Palpitations    Technically satisfactory study.  Mitral valve is structurally normal.  Tricuspid valve is structurally normal.  Mild tricuspid regurgitation is present.  Aortic valve is aortic valve with decreased opening motion.  Gradient across the aortic valve is 16/9 mmHg.  Valve area 1.37 cm².  Pulmonic valve could not be well visualized.  Left atrium is enlarged.  Right atrium is normal in size.  Left ventricle is enlarged with septal anterior wall and apical severe hypokinesis with ejection fraction of 40%.  Possible left ventricular apical thrombus.  Right ventricle is normal in size.  Atrial septum is intact.  Aorta is normal.  No pericardial effusion or intracardiac thrombus is seen.  ICD lead  is present in the right ventricle.    Impression  Mild tricuspid regurgitation.  Mild to moderate aortic valve stenosis with gradient of 16/9 mmHg and valve area of 1.37 cm².  Left atrial enlargement.  Left ventricle is enlarged with septal anterior wall and apical severe hypokinesis with ejection fraction of 40%.  Possible left ventricular apical thrombus.  ICD lead is present in the right ventricle.  No evidence for pulmonary hypertension is present.          Condition on Discharge: Stable    Vital Signs  Temp:  [97.6 °F (36.4 °C)-97.7 °F (36.5 °C)] 97.6 °F (36.4 °C)  Heart Rate:  [52-70] 70  Resp:  [11-12] 11  BP: ()/(53-76) 125/76      Physical Exam  Vitals reviewed.   HENT:      Head: Normocephalic.      Right Ear: External ear normal.      Left Ear: External ear normal.      Nose: Nose normal.      Mouth/Throat:      Mouth: Mucous membranes are moist.   Eyes:      General:         Right eye: No discharge.         Left eye: No discharge.   Cardiovascular:      Rate and Rhythm: Normal rate.   Pulmonary:      Effort: Pulmonary effort is normal.   Abdominal:      General: Bowel sounds are normal.      Palpations: Abdomen is soft.   Musculoskeletal:         General: Normal range of motion.   Skin:     General: Skin is warm and dry.   Neurological:      Mental Status: She is alert and oriented to person, place, and time. Mental status is at baseline.   Psychiatric:         Mood and Affect: Mood normal.         Behavior: Behavior normal.              Discharge Disposition  Home or Self Care    Discharge Medications     Discharge Medications        New Medications        Instructions Start Date   amoxicillin-clavulanate 875-125 MG per tablet  Commonly known as: AUGMENTIN   1 tablet, Oral, Every 12 Hours Scheduled             Continue These Medications        Instructions Start Date   aspirin 81 MG chewable tablet   81 mg, Oral, Daily      atorvastatin 80 MG tablet  Commonly known as: LIPITOR   80 mg, Oral,  Daily      carvedilol 3.125 MG tablet  Commonly known as: COREG   3.125 mg, Oral, 2 Times Daily With Meals      dilTIAZem  MG 24 hr capsule  Commonly known as: CARDIZEM CD   240 mg, Oral, Every 24 Hours Scheduled      ezetimibe 10 MG tablet  Commonly known as: ZETIA   10 mg, Oral, Daily      famciclovir 500 MG tablet  Commonly known as: FAMVIR   500 mg, Oral, 3 Times Daily PRN      levocetirizine 5 MG tablet  Commonly known as: XYZAL   5 mg, Oral, Every Evening      levothyroxine 88 MCG tablet  Commonly known as: SYNTHROID, LEVOTHROID   88 mcg, Oral, Every Early Morning      linaclotide 72 MCG capsule capsule  Commonly known as: LINZESS   72 mcg, Oral, Every Morning Before Breakfast      midodrine 10 MG tablet  Commonly known as: PROAMATINE   10 mg, Oral, Every 8 Hours      omeprazole 40 MG capsule  Commonly known as: priLOSEC   40 mg, Oral, Daily      PARoxetine 40 MG tablet  Commonly known as: PAXIL   40 mg, Oral, Every Morning      sodium bicarbonate 650 MG tablet   1,300 mg, Oral, 3 Times Daily      topiramate 50 MG tablet  Commonly known as: TOPAMAX   50 mg, Oral, Daily      trospium 20 MG tablet  Commonly known as: SANCTURA   20 mg, Oral, Nightly      warfarin 3 MG tablet  Commonly known as: COUMADIN   See Admin Instructions, Take 1 tablet by mouth on Tuesday, Thursday, Saturday, and Sunday      warfarin 3 MG tablet  Commonly known as: COUMADIN   6 mg, Oral, Daily Warfarin, Monday Wednesday, Friday               Discharge Diet:   Diet Instructions       Diet: Regular/House Diet, Cardiac Diets; Healthy Heart (2-3 Na+); Regular (IDDSI 7); Thin (IDDSI 0)      Discharge Diet:  Regular/House Diet  Cardiac Diets       Cardiac Diet: Healthy Heart (2-3 Na+)    Texture: Regular (IDDSI 7)    Fluid Consistency: Thin (IDDSI 0)            Activity at Discharge:   Activity Instructions       Activity as Tolerated              Follow-up Appointments  No future appointments.  Additional Instructions for the Follow-ups  that You Need to Schedule       Discharge Follow-up with PCP   As directed       Currently Documented PCP:    Luan Joseph APRN    PCP Phone Number:    [phone]     Follow Up Details: 1 week        Discharge Follow-up with Specified Provider: Dr Hurley; 2 Weeks   As directed      To: Dr Hurley   Follow Up: 2 Weeks                Test Results Pending at Discharge  Pending Labs       Order Current Status    Ehrlichia Profile DNA PCR In process    Rickettsia Species DNA, Real-Time PCR In process    Blood Culture - Blood, Arm, Left Preliminary result    Blood Culture - Blood, Hand, Left Preliminary result             ALEXANDER Spangler  05/22/24  10:44 EDT    Time: Discharge 29 min

## 2024-05-22 NOTE — PLAN OF CARE
Goal Outcome Evaluation:         Patient is doing well. Has had a migraine tonight, treated per MAR. Has been less confused than yesterday and seems oriented today. Family at bedside. Care continuing.

## 2024-05-22 NOTE — TELEPHONE ENCOUNTER
"    Caller: Laura Mejia \"Annie\"    Relationship to patient: Self    Best call back number: [phone]    Patient is needing: HUB SCHEDULED FIRST AVAILABLE WITH DR. BENAVIDES ON 06.10.24 FOR HOSPITAL FOLLOW UP. PATIENT WAS DISCHARGED FROM [facility] ON 05.22.24 FOR ATRIAL FIBRILLATION AND HYPOTENSION. PLEASE CONTACT PATIENT IF PATIENT NEEDS TO BE SEEN SOONER. THANK YOU.           "

## 2024-05-22 NOTE — PROGRESS NOTES
"Pharmacy dosing service  Anticoagulant  Warfarin     Subjective:    Laura Mejia is a 68 y.o.female being continued on warfarin for Atrial Fibrillation / Flutter.    INR Goal: 2 - 3  Home medication?: warfarin 3 mg PO daily, except warfarin 6 mg PO on Mon/Wed/Fri.   Bridge Therapy Present?:  No  Interacting Medications Evaluation (New/Present/Discontinued):   Home medications: aspirin and levothyroxine  New inpatient medications: amoxicillin-clavulanate (increase anticoagulation effect)       Assessment/Plan:    INR is therapeutic today, however, there was a 0.82 jump in INR from yesterday. Given this large jump, will hold warfarin for today. Patient has discharge orders and I notified the [facility] on this recommendation.     Continue to monitor and adjust based on INR.         Date 5/17 5/18 5/19 5/20 5/21 5/22      INR 1.10 1.05 1.42 1.75 1.92 2.74      Dose 9 mg 3 mg 3 mg 6 mg 3 mg hold          Objective:  [Ht: 175.5 cm (69.09\"); Wt: 87.8 kg (193 lb 9 oz); BMI: Body mass index is 28.51 kg/m².]    Lab Results   Component Value Date    ALBUMIN 3.1 (L) 05/22/2024     Lab Results   Component Value Date    INR 2.74 05/22/2024    INR 1.92 (L) 05/21/2024    INR 1.75 (L) 05/19/2024    PROTIME 27.7 05/22/2024    PROTIME 19.9 05/21/2024    PROTIME 18.3 (L) 05/19/2024     Lab Results   Component Value Date    HGB 9.9 (L) 05/22/2024    HGB 10.0 (L) 05/21/2024    HGB 11.0 (L) 05/19/2024     Lab Results   Component Value Date    HCT 30.6 (L) 05/22/2024    HCT 31.4 (L) 05/21/2024    HCT 34.8 05/19/2024       Yenifer Ascencio, PharmD  05/22/24 11:26 EDT                   "

## 2024-05-22 NOTE — OUTREACH NOTE
Prep Survey      Flowsheet Row Responses   [facility]   Is LACE score < 7 ? No   Eligibility Readm Mgmt   Discharge diagnosis Altered mental status   Does the patient have one of the following disease processes/diagnoses(primary or secondary)? Other   Does the patient have Home health ordered? No   Is there a DME ordered? No   Prep survey completed? Yes            Brenna GOMES - Registered Nurse

## 2024-05-22 NOTE — PLAN OF CARE
Problem: Hypertension Comorbidity  Goal: Blood Pressure in Desired Range  Outcome: Ongoing, Progressing     Problem: Heart Failure Comorbidity  Goal: Maintenance of Heart Failure Symptom Control  Outcome: Ongoing, Progressing     Problem: Fall Injury Risk  Goal: Absence of Fall and Fall-Related Injury  Outcome: Ongoing, Progressing   Goal Outcome Evaluation:  Plan of Care Reviewed With: patient        Progress: improving

## 2024-05-23 LAB
QT INTERVAL: 354 MS
QT INTERVAL: 383 MS
QTC INTERVAL: 460 MS
QTC INTERVAL: 483 MS
RICKETTSIA RICKETTSII DNA, RT: NOT DETECTED

## 2024-05-24 LAB
BACTERIA SPEC AEROBE CULT: NORMAL
BACTERIA SPEC AEROBE CULT: NORMAL

## 2024-05-29 ENCOUNTER — READMISSION MANAGEMENT (OUTPATIENT)
Dept: CALL CENTER | Facility: HOSPITAL | Age: 69
End: 2024-05-29
Payer: MEDICARE

## 2024-05-29 ENCOUNTER — APPOINTMENT (OUTPATIENT)
Dept: GENERAL RADIOLOGY | Facility: HOSPITAL | Age: 69
DRG: 308 | End: 2024-05-29
Payer: MEDICARE

## 2024-05-29 ENCOUNTER — HOSPITAL ENCOUNTER (INPATIENT)
Facility: HOSPITAL | Age: 69
LOS: 4 days | Discharge: HOME HEALTH | DRG: 308 | End: 2024-06-03
Attending: EMERGENCY MEDICINE | Admitting: INTERNAL MEDICINE
Payer: MEDICARE

## 2024-05-29 DIAGNOSIS — N39.0 URINARY TRACT INFECTION WITHOUT HEMATURIA, SITE UNSPECIFIED: ICD-10-CM

## 2024-05-29 DIAGNOSIS — I48.91 ATRIAL FIBRILLATION WITH RVR: Primary | ICD-10-CM

## 2024-05-29 DIAGNOSIS — I25.5 ISCHEMIC CARDIOMYOPATHY: ICD-10-CM

## 2024-05-29 DIAGNOSIS — D72.829 LEUKOCYTOSIS, UNSPECIFIED TYPE: ICD-10-CM

## 2024-05-29 DIAGNOSIS — R00.0 TACHYCARDIA: ICD-10-CM

## 2024-05-29 LAB
ALBUMIN SERPL-MCNC: 3.1 G/DL (ref 3.5–5.2)
ALBUMIN/GLOB SERPL: 0.8 G/DL
ALP SERPL-CCNC: 162 U/L (ref 39–117)
ALT SERPL W P-5'-P-CCNC: 54 U/L (ref 1–33)
ANION GAP SERPL CALCULATED.3IONS-SCNC: 15.5 MMOL/L (ref 5–15)
APTT PPP: 42.7 SECONDS (ref 61–76.5)
AST SERPL-CCNC: 104 U/L (ref 1–32)
BACTERIA UR QL AUTO: ABNORMAL /HPF
BASOPHILS # BLD AUTO: 0.14 10*3/MM3 (ref 0–0.2)
BASOPHILS NFR BLD AUTO: 0.7 % (ref 0–1.5)
BILIRUB SERPL-MCNC: 0.8 MG/DL (ref 0–1.2)
BILIRUB UR QL STRIP: ABNORMAL
BUN SERPL-MCNC: 19 MG/DL (ref 8–23)
BUN/CREAT SERPL: 15 (ref 7–25)
CALCIUM SPEC-SCNC: 9 MG/DL (ref 8.6–10.5)
CHLORIDE SERPL-SCNC: 100 MMOL/L (ref 98–107)
CLARITY UR: ABNORMAL
CO2 SERPL-SCNC: 13.5 MMOL/L (ref 22–29)
COLOR UR: ABNORMAL
CREAT SERPL-MCNC: 1.27 MG/DL (ref 0.57–1)
D-LACTATE SERPL-SCNC: 1.3 MMOL/L (ref 0.5–2)
D-LACTATE SERPL-SCNC: 1.6 MMOL/L (ref 0.3–2)
DEPRECATED RDW RBC AUTO: 51.7 FL (ref 37–54)
EGFRCR SERPLBLD CKD-EPI 2021: 45.9 ML/MIN/1.73
EOSINOPHIL # BLD AUTO: 0.05 10*3/MM3 (ref 0–0.4)
EOSINOPHIL NFR BLD AUTO: 0.3 % (ref 0.3–6.2)
ERYTHROCYTE [DISTWIDTH] IN BLOOD BY AUTOMATED COUNT: 14.6 % (ref 12.3–15.4)
GLOBULIN UR ELPH-MCNC: 3.8 GM/DL
GLUCOSE SERPL-MCNC: 131 MG/DL (ref 65–99)
GLUCOSE UR STRIP-MCNC: NEGATIVE MG/DL
GRAN CASTS URNS QL MICRO: ABNORMAL /LPF
HCT VFR BLD AUTO: 37.3 % (ref 34–46.6)
HGB BLD-MCNC: 11.9 G/DL (ref 12–15.9)
HGB UR QL STRIP.AUTO: NEGATIVE
HOLD SPECIMEN: NORMAL
HOLD SPECIMEN: NORMAL
HYALINE CASTS UR QL AUTO: ABNORMAL /LPF
IMM GRANULOCYTES # BLD AUTO: 0.22 10*3/MM3 (ref 0–0.05)
IMM GRANULOCYTES NFR BLD AUTO: 1.1 % (ref 0–0.5)
INR PPP: 4.45 (ref 0.93–1.1)
KETONES UR QL STRIP: ABNORMAL
LEUKOCYTE ESTERASE UR QL STRIP.AUTO: ABNORMAL
LYMPHOCYTES # BLD AUTO: 0.63 10*3/MM3 (ref 0.7–3.1)
LYMPHOCYTES NFR BLD AUTO: 3.3 % (ref 19.6–45.3)
MCH RBC QN AUTO: 30.9 PG (ref 26.6–33)
MCHC RBC AUTO-ENTMCNC: 31.9 G/DL (ref 31.5–35.7)
MCV RBC AUTO: 96.9 FL (ref 79–97)
MONOCYTES # BLD AUTO: 1.04 10*3/MM3 (ref 0.1–0.9)
MONOCYTES NFR BLD AUTO: 5.4 % (ref 5–12)
NEUTROPHILS NFR BLD AUTO: 17.14 10*3/MM3 (ref 1.7–7)
NEUTROPHILS NFR BLD AUTO: 89.2 % (ref 42.7–76)
NITRITE UR QL STRIP: NEGATIVE
NRBC BLD AUTO-RTO: 0 /100 WBC (ref 0–0.2)
PH UR STRIP.AUTO: <=5 [PH] (ref 5–8)
PLATELET # BLD AUTO: 354 10*3/MM3 (ref 140–450)
PMV BLD AUTO: 10 FL (ref 6–12)
POTASSIUM SERPL-SCNC: 3.4 MMOL/L (ref 3.5–5.2)
PROT SERPL-MCNC: 6.9 G/DL (ref 6–8.5)
PROT UR QL STRIP: ABNORMAL
PROTHROMBIN TIME: 43.4 SECONDS (ref 9.6–11.7)
RBC # BLD AUTO: 3.85 10*6/MM3 (ref 3.77–5.28)
RBC # UR STRIP: ABNORMAL /HPF
REF LAB TEST METHOD: ABNORMAL
SODIUM SERPL-SCNC: 129 MMOL/L (ref 136–145)
SP GR UR STRIP: 1.02 (ref 1–1.03)
SQUAMOUS #/AREA URNS HPF: ABNORMAL /HPF
TROPONIN T SERPL HS-MCNC: 30 NG/L
UROBILINOGEN UR QL STRIP: ABNORMAL
WBC # UR STRIP: ABNORMAL /HPF
WBC NRBC COR # BLD AUTO: 19.22 10*3/MM3 (ref 3.4–10.8)
WHOLE BLOOD HOLD COAG: NORMAL
WHOLE BLOOD HOLD SPECIMEN: NORMAL
YEAST URNS QL MICRO: ABNORMAL /HPF

## 2024-05-29 PROCEDURE — 25010000002 CEFTRIAXONE PER 250 MG: Performed by: EMERGENCY MEDICINE

## 2024-05-29 PROCEDURE — P9612 CATHETERIZE FOR URINE SPEC: HCPCS

## 2024-05-29 PROCEDURE — 85610 PROTHROMBIN TIME: CPT | Performed by: EMERGENCY MEDICINE

## 2024-05-29 PROCEDURE — 25810000003 SODIUM CHLORIDE 0.9 % SOLUTION: Performed by: EMERGENCY MEDICINE

## 2024-05-29 PROCEDURE — 93005 ELECTROCARDIOGRAM TRACING: CPT | Performed by: EMERGENCY MEDICINE

## 2024-05-29 PROCEDURE — 87040 BLOOD CULTURE FOR BACTERIA: CPT | Performed by: EMERGENCY MEDICINE

## 2024-05-29 PROCEDURE — 80053 COMPREHEN METABOLIC PANEL: CPT | Performed by: EMERGENCY MEDICINE

## 2024-05-29 PROCEDURE — 85025 COMPLETE CBC W/AUTO DIFF WBC: CPT | Performed by: EMERGENCY MEDICINE

## 2024-05-29 PROCEDURE — 87086 URINE CULTURE/COLONY COUNT: CPT | Performed by: EMERGENCY MEDICINE

## 2024-05-29 PROCEDURE — 81001 URINALYSIS AUTO W/SCOPE: CPT | Performed by: EMERGENCY MEDICINE

## 2024-05-29 PROCEDURE — G0378 HOSPITAL OBSERVATION PER HR: HCPCS

## 2024-05-29 PROCEDURE — 99285 EMERGENCY DEPT VISIT HI MDM: CPT

## 2024-05-29 PROCEDURE — 84484 ASSAY OF TROPONIN QUANT: CPT | Performed by: EMERGENCY MEDICINE

## 2024-05-29 PROCEDURE — 83605 ASSAY OF LACTIC ACID: CPT | Performed by: EMERGENCY MEDICINE

## 2024-05-29 PROCEDURE — 83605 ASSAY OF LACTIC ACID: CPT

## 2024-05-29 PROCEDURE — 36415 COLL VENOUS BLD VENIPUNCTURE: CPT

## 2024-05-29 PROCEDURE — 85730 THROMBOPLASTIN TIME PARTIAL: CPT | Performed by: EMERGENCY MEDICINE

## 2024-05-29 PROCEDURE — 99214 OFFICE O/P EST MOD 30 MIN: CPT | Performed by: INTERNAL MEDICINE

## 2024-05-29 PROCEDURE — 71045 X-RAY EXAM CHEST 1 VIEW: CPT

## 2024-05-29 RX ORDER — SODIUM CHLORIDE 0.9 % (FLUSH) 0.9 %
10 SYRINGE (ML) INJECTION EVERY 12 HOURS SCHEDULED
Status: DISCONTINUED | OUTPATIENT
Start: 2024-05-29 | End: 2024-06-03 | Stop reason: HOSPADM

## 2024-05-29 RX ORDER — TOPIRAMATE 25 MG/1
50 TABLET ORAL DAILY
Status: DISCONTINUED | OUTPATIENT
Start: 2024-05-30 | End: 2024-06-03 | Stop reason: HOSPADM

## 2024-05-29 RX ORDER — PAROXETINE HYDROCHLORIDE 40 MG/1
40 TABLET, FILM COATED ORAL EVERY MORNING
Status: DISCONTINUED | OUTPATIENT
Start: 2024-05-30 | End: 2024-06-03 | Stop reason: HOSPADM

## 2024-05-29 RX ORDER — ACETAMINOPHEN 325 MG/1
650 TABLET ORAL EVERY 4 HOURS PRN
Status: DISCONTINUED | OUTPATIENT
Start: 2024-05-29 | End: 2024-06-03 | Stop reason: HOSPADM

## 2024-05-29 RX ORDER — UREA 10 %
5 LOTION (ML) TOPICAL NIGHTLY PRN
Status: DISCONTINUED | OUTPATIENT
Start: 2024-05-29 | End: 2024-06-03 | Stop reason: HOSPADM

## 2024-05-29 RX ORDER — SODIUM CHLORIDE 9 MG/ML
40 INJECTION, SOLUTION INTRAVENOUS AS NEEDED
Status: DISCONTINUED | OUTPATIENT
Start: 2024-05-29 | End: 2024-06-03 | Stop reason: HOSPADM

## 2024-05-29 RX ORDER — LEVOTHYROXINE SODIUM 88 UG/1
88 TABLET ORAL
Status: DISCONTINUED | OUTPATIENT
Start: 2024-05-30 | End: 2024-06-03 | Stop reason: HOSPADM

## 2024-05-29 RX ORDER — CALCIUM CARBONATE 500 MG/1
2 TABLET, CHEWABLE ORAL 3 TIMES DAILY PRN
Status: DISCONTINUED | OUTPATIENT
Start: 2024-05-29 | End: 2024-06-03 | Stop reason: HOSPADM

## 2024-05-29 RX ORDER — PANTOPRAZOLE SODIUM 40 MG/10ML
40 INJECTION, POWDER, LYOPHILIZED, FOR SOLUTION INTRAVENOUS
Status: DISCONTINUED | OUTPATIENT
Start: 2024-05-30 | End: 2024-06-02

## 2024-05-29 RX ORDER — SODIUM CHLORIDE 0.9 % (FLUSH) 0.9 %
10 SYRINGE (ML) INJECTION AS NEEDED
Status: DISCONTINUED | OUTPATIENT
Start: 2024-05-29 | End: 2024-06-03 | Stop reason: HOSPADM

## 2024-05-29 RX ORDER — ONDANSETRON 2 MG/ML
4 INJECTION INTRAMUSCULAR; INTRAVENOUS EVERY 6 HOURS PRN
Status: DISCONTINUED | OUTPATIENT
Start: 2024-05-29 | End: 2024-06-03 | Stop reason: HOSPADM

## 2024-05-29 RX ORDER — POTASSIUM CHLORIDE 20 MEQ/1
40 TABLET, EXTENDED RELEASE ORAL EVERY 4 HOURS
Status: COMPLETED | OUTPATIENT
Start: 2024-05-29 | End: 2024-05-29

## 2024-05-29 RX ORDER — OXYBUTYNIN CHLORIDE 10 MG/1
10 TABLET, EXTENDED RELEASE ORAL DAILY
COMMUNITY
End: 2024-06-03 | Stop reason: HOSPADM

## 2024-05-29 RX ORDER — ATORVASTATIN CALCIUM 40 MG/1
80 TABLET, FILM COATED ORAL DAILY
Status: DISCONTINUED | OUTPATIENT
Start: 2024-05-30 | End: 2024-06-03 | Stop reason: HOSPADM

## 2024-05-29 RX ORDER — DILTIAZEM HCL/D5W 125 MG/125
5-15 PLASTIC BAG, INJECTION (ML) INTRAVENOUS CONTINUOUS
Status: DISCONTINUED | OUTPATIENT
Start: 2024-05-29 | End: 2024-06-02

## 2024-05-29 RX ADMIN — Medication 5 MG/HR: at 13:01

## 2024-05-29 RX ADMIN — BENZOCAINE 6 MG-MENTHOL 10 MG LOZENGES 1 EACH: at 20:35

## 2024-05-29 RX ADMIN — SODIUM CHLORIDE 1000 ML: 9 INJECTION, SOLUTION INTRAVENOUS at 13:06

## 2024-05-29 RX ADMIN — CALCIUM CARBONATE 2 TABLET: 500 TABLET, CHEWABLE ORAL at 20:34

## 2024-05-29 RX ADMIN — POTASSIUM CHLORIDE 40 MEQ: 1500 TABLET, EXTENDED RELEASE ORAL at 23:44

## 2024-05-29 RX ADMIN — POTASSIUM CHLORIDE 40 MEQ: 1500 TABLET, EXTENDED RELEASE ORAL at 19:24

## 2024-05-29 RX ADMIN — CEFTRIAXONE 1000 MG: 1 INJECTION, POWDER, FOR SOLUTION INTRAMUSCULAR; INTRAVENOUS at 17:20

## 2024-05-29 NOTE — ED NOTES
Pt reports was recently discharged from here last week for dx of UTI-pt has completed last round of oral abx today, pt reports continued general weakness and fatigue since discharge, was seen at PCP today for f/u appointment and was sent here d/t increased HR. Pt denies any CP or SOA at this time.

## 2024-05-29 NOTE — CONSULTS
Referring Provider: Keke Casiano MD  Reason for Consultation:  Status post CABG  Possible atrial fibrillation    Patient Care Team:  Luan Joseph APRN as PCP - General (Family Medicine)  Jozef Otero MD as Consulting Physician (Nephrology)    Chief complaint  Increased heart rate    Subjective .     History of present illness:  Laura Mejia is a 68 y.o. female who presents with history of multiple cardiac and noncardiac problems was admitted to the hospital with increased heart rate.  Patient has history of atrial fibrillation.  Patient has been on warfarin with therapeutic PT/INR.  Patient denies having any chest discomfort heaviness or tightness in the chest.  Patient was found to have UTI.      ROS      Today, the patient has been without any chest discomfort, shortness of breath, palpitations, dizziness or syncope.  Denies having any headache, abdominal pain, nausea, vomiting, diarrhea, constipation, loss of weight or loss of appetite.  Denies having any excessive bruising, hematuria or blood in the stool.    Review of all systems negative except as indicated      History  Past Medical History:   Diagnosis Date    AAA (abdominal aortic aneurysm)     infrarenal- 3.1cm(2010, 3.7x4.2cm(2016), 3.9cm (2017)    Allergic rhinitis     Aspiration pneumonia 05/2017    CHF (congestive heart failure)     Coronary artery disease     DDD (degenerative disc disease), lumbar     lumbar spine- Dr. Churchill     Depression     Diverticulosis     Frequent UTI     Dr. Daniels    GERD (gastroesophageal reflux disease)     Hiatal hernia     HSV (herpes simplex virus) infection     Oral    Hyperlipidemia     IBS (irritable bowel syndrome)     Constipation predominant    Ischemic cardiomyopathy 09/2017    AICD     Kidney stones     NSTEMI (non-ST elevated myocardial infarction) 04/26/2017    Obstructive sleep apnea 2011    nfsg 2011, ahi 67    Osteoarthritis     Osteopenia     Peripheral neuropathy     feet    Pulmonary  "embolism, bilateral 05/2017    Rotator cuff tear     Bilateral- Ortho        Past Surgical History:   Procedure Laterality Date    CARDIAC CATHETERIZATION  04/26/2017    99% mid LAD. 90% mid LCX. 60% RCA. Recommended CABG. Dr. Diaz    CARDIAC DEFIBRILLATOR PLACEMENT  12/26/2017    ICD implant/ Dr. Ellis    CATARACT EXTRACTION      CORONARY ARTERY BYPASS GRAFT  04/26/2017    x4 & ASD Closure    CYSTOSCOPY  2002    negative. Dr. Daniels    LAPAROSCOPIC CHOLECYSTECTOMY  04/17/2013    For biliary dyskinesia. Dr. Mccoy.     REPLACEMENT TOTAL KNEE BILATERAL  11/2004    Dr. Herrera    THORACENTESIS Left 05/08/2017    TONSILLECTOMY      TUBAL ABDOMINAL LIGATION Bilateral        Family History   Problem Relation Age of Onset    Heart disease Mother     Other Mother         Hypoglycemia    Osteoporosis Mother     COPD Father     Stroke Father     Hypertension Brother     Diabetes Brother     Heart disease Brother        Social History     Tobacco Use    Smoking status: Never   Vaping Use    Vaping status: Some Days    Substances: CBD, nightly, 2-3 weekly   Substance Use Topics    Alcohol use: Never    Drug use: Never        (Not in a hospital admission)        Oxytetracycline and Oxycodone    Scheduled Meds:   Continuous Infusions:dilTIAZem, 5-15 mg/hr, Last Rate: 7.5 mg/hr (05/29/24 1556)      PRN Meds:.  [COMPLETED] Insert Peripheral IV **AND** sodium chloride    Objective     VITAL SIGNS  Vitals:    05/29/24 1630 05/29/24 1645 05/29/24 1730 05/29/24 1745   BP: 97/58 94/63 (!) 88/52 106/69   BP Location:       Patient Position:       Pulse: (!) 133 (!) 138 101 (!) 122   Resp:       Temp:       TempSrc:       SpO2: 91% (!) 78%     Weight:       Height:           Flowsheet Rows      Flowsheet Row First Filed Value   Admission Height 172.7 cm (68\") Documented at 05/29/2024 1123   Admission Weight 77.1 kg (170 lb) Documented at 05/29/2024 1123              Intake/Output Summary (Last 24 hours) at 5/29/2024 8478  Last data " filed at 5/29/2024 1519  Gross per 24 hour   Intake 1000 ml   Output --   Net 1000 ml        TELEMETRY: Sinus tachycardia with possible intermittent atrial fibrillation.    Physical Exam:  The patient is alert, oriented and in no distress.  Vital signs as noted above.  Head and neck revealed no carotid bruits or jugular venous distention.  No thyromegaly or lymphadenopathy is present  Lungs clear.  No wheezing.  Breath sounds are normal bilaterally.  Heart normal first and second heart sounds. No murmur.  No precordial rub is present.  No gallop is present.  Abdomen soft and nontender.  No organomegaly is present.  Extremities with good peripheral pulses without any pedal edema.  Skin warm and dry.  Musculoskeletal system is grossly normal  CNS grossly normal    Reviewed and updated.    Results Review:   I reviewed the patient's new clinical results.  Lab Results (last 24 hours)       Procedure Component Value Units Date/Time    Blood Culture - Blood, Arm, Right [[id number]] Collected: 05/29/24 1708    Specimen: Blood from Arm, Right Updated: 05/29/24 1713    POC Lactate [[id number]]  (Normal) Collected: 05/29/24 1536    Specimen: Blood Updated: 05/29/24 1538     Lactate 1.6 mmol/L      Comment: Serial Number: [id number]:  [id number]       Blood Culture - Blood, Arm, Right [[id number]] Collected: 05/29/24 1532    Specimen: Blood from Arm, Right Updated: 05/29/24 1538    Protime-INR [[id number]]  (Abnormal) Collected: 05/29/24 1340    Specimen: Blood from Hand, Right Updated: 05/29/24 1404     Protime 43.4 Seconds      INR 4.45    aPTT [[id number]]  (Abnormal) Collected: 05/29/24 1340    Specimen: Blood from Hand, Right Updated: 05/29/24 1404     PTT 42.7 seconds     Urinalysis, Microscopic Only - Straight Cath [[id number]]  (Abnormal) Collected: 05/29/24 1313    Specimen: Urine from Straight Cath Updated: 05/29/24 1400     RBC, UA 3-5 /HPF      WBC, UA 21-50 /HPF      Bacteria, UA 1+ /HPF      Squamous Epithelial  Cells, UA 0-2 /HPF      Yeast, UA       Moderate/2+ Budding Yeast w/Hyphae     /HPF     Hyaline Casts, UA 0-2 /LPF      Granular Casts, UA 3-6 /LPF      Methodology Manual Light Microscopy    Urine Culture - Urine, Straight Cath [[id number]] Collected: 05/29/24 1313    Specimen: Urine from Straight Cath Updated: 05/29/24 1400    Urinalysis With Culture If Indicated - Straight Cath [[id number]]  (Abnormal) Collected: 05/29/24 1313    Specimen: Urine from Straight Cath Updated: 05/29/24 1333     Color, UA Dark Yellow     Appearance, UA Hazy     pH, UA <=5.0     Specific Gravity, UA 1.020     Glucose, UA Negative     Ketones, UA Trace     Bilirubin, UA Small (1+)     Comment: Confirmation testing is unavailable.  A serum bilirubin is recommended for further assessment.        Blood, UA Negative     Protein, UA Trace     Leuk Esterase, UA Moderate (2+)     Nitrite, UA Negative     Urobilinogen, UA 1.0 E.U./dL    Narrative:      In absence of clinical symptoms, the presence of pyuria, bacteria, and/or nitrites on the urinalysis result does not correlate with infection.    Comprehensive Metabolic Panel [[id number]]  (Abnormal) Collected: 05/29/24 1231    Specimen: Blood from Arm, Left Updated: 05/29/24 1310     Glucose 131 mg/dL      BUN 19 mg/dL      Creatinine 1.27 mg/dL      Sodium 129 mmol/L      Potassium 3.4 mmol/L      Comment: Slight hemolysis detected by analyzer. Result may be falsely elevated.        Chloride 100 mmol/L      CO2 13.5 mmol/L      Calcium 9.0 mg/dL      Total Protein 6.9 g/dL      Albumin 3.1 g/dL      ALT (SGPT) 54 U/L      AST (SGOT) 104 U/L      Comment: Slight hemolysis detected by analyzer. Result may be falsely elevated.        Alkaline Phosphatase 162 U/L      Total Bilirubin 0.8 mg/dL      Globulin 3.8 gm/dL      A/G Ratio 0.8 g/dL      BUN/Creatinine Ratio 15.0     Anion Gap 15.5 mmol/L      eGFR 45.9 mL/min/1.73     Narrative:      GFR Normal >60  Chronic Kidney Disease <60  Kidney Failure  <15      Single High Sensitivity Troponin T [[id number]]  (Abnormal) Collected: 05/29/24 1231    Specimen: Blood from Arm, Left Updated: 05/29/24 1309     HS Troponin T 30 ng/L     Narrative:      High Sensitive Troponin T Reference Range:  <14.0 ng/L- Negative Female for AMI  <22.0 ng/L- Negative Male for AMI  >=14 - Abnormal Female indicating possible myocardial injury.  >=22 - Abnormal Male indicating possible myocardial injury.   Clinicians would have to utilize clinical acumen, EKG, Troponin, and serial changes to determine if it is an Acute Myocardial Infarction or myocardial injury due to an underlying chronic condition.         Watson Draw [[id number]] Collected: 05/29/24 1231    Specimen: Blood from Arm, Left Updated: 05/29/24 1245    Narrative:      The following orders were created for panel order Watson Draw.  Procedure                               Abnormality         Status                     ---------                               -----------         ------                     Green Top (Gel)[[id number]]                                  Final result               Lavender Top[[id number]]                                     Final result               Gold Top - SST[[id number]]                                   Final result               Light Blue Top[[id number]]                                   Final result                 Please view results for these tests on the individual orders.    Green Top (Gel) [[id number]] Collected: 05/29/24 1231    Specimen: Blood from Arm, Left Updated: 05/29/24 1245     Extra Tube Hold for add-ons.     Comment: Auto resulted.       Lavender Top [[id number]] Collected: 05/29/24 1231    Specimen: Blood from Arm, Left Updated: 05/29/24 1245     Extra Tube hold for add-on     Comment: Auto resulted       Gold Top - SST [[id number]] Collected: 05/29/24 1231    Specimen: Blood from Arm, Left Updated: 05/29/24 1245     Extra Tube Hold for add-ons.     Comment: Auto resulted.        Light Blue Top [[id number]] Collected: 05/29/24 1231    Specimen: Blood from Arm, Left Updated: 05/29/24 1245     Extra Tube Hold for add-ons.     Comment: Auto resulted       CBC & Differential [[id number]]  (Abnormal) Collected: 05/29/24 1231    Specimen: Blood from Arm, Left Updated: 05/29/24 1243    Narrative:      The following orders were created for panel order CBC & Differential.  Procedure                               Abnormality         Status                     ---------                               -----------         ------                     CBC Auto Differential[[id number]]        Abnormal            Final result                 Please view results for these tests on the individual orders.    CBC Auto Differential [[id number]]  (Abnormal) Collected: 05/29/24 1231    Specimen: Blood from Arm, Left Updated: 05/29/24 1243     WBC 19.22 10*3/mm3      RBC 3.85 10*6/mm3      Hemoglobin 11.9 g/dL      Hematocrit 37.3 %      MCV 96.9 fL      MCH 30.9 pg      MCHC 31.9 g/dL      RDW 14.6 %      RDW-SD 51.7 fl      MPV 10.0 fL      Platelets 354 10*3/mm3      Neutrophil % 89.2 %      Lymphocyte % 3.3 %      Monocyte % 5.4 %      Eosinophil % 0.3 %      Basophil % 0.7 %      Immature Grans % 1.1 %      Neutrophils, Absolute 17.14 10*3/mm3      Lymphocytes, Absolute 0.63 10*3/mm3      Monocytes, Absolute 1.04 10*3/mm3      Eosinophils, Absolute 0.05 10*3/mm3      Basophils, Absolute 0.14 10*3/mm3      Immature Grans, Absolute 0.22 10*3/mm3      nRBC 0.0 /100 WBC             Imaging Results (Last 24 Hours)       Procedure Component Value Units Date/Time    XR Chest 1 View [[id number]] Collected: 05/29/24 1438     Updated: 05/29/24 1442    Narrative:      XR CHEST 1 VW    Date of Exam: 5/29/2024 12:54 PM EDT    Indication: tachycardia    Comparison: Portable chest 5/16/2024    Findings:  Left-sided AICD noted. Prior sternotomy. Stable cardiomegaly and central pulmonary vascular congestion. No pneumothorax. No  pleural effusion. Right reverse shoulder prosthesis partially imaged.      Impression:      Impression:  Cardiomegaly and central pulmonary vascular congestion.      Electronically Signed: Ronak Birmingham MD    5/29/2024 2:39 PM EDT    Workstation ID: [id number]        LAB RESULTS (LAST 7 DAYS)    CBC  Results from last 7 days   Lab Units 05/29/24  1231   WBC 10*3/mm3 19.22*   RBC 10*6/mm3 3.85   HEMOGLOBIN g/dL 11.9*   HEMATOCRIT % 37.3   MCV fL 96.9   PLATELETS 10*3/mm3 354       BMP  Results from last 7 days   Lab Units 05/29/24  1231   SODIUM mmol/L 129*   POTASSIUM mmol/L 3.4*   CHLORIDE mmol/L 100   CO2 mmol/L 13.5*   BUN mg/dL 19   CREATININE mg/dL 1.27*   GLUCOSE mg/dL 131*       CMP   Results from last 7 days   Lab Units 05/29/24  1231   SODIUM mmol/L 129*   POTASSIUM mmol/L 3.4*   CHLORIDE mmol/L 100   CO2 mmol/L 13.5*   BUN mg/dL 19   CREATININE mg/dL 1.27*   GLUCOSE mg/dL 131*   ALBUMIN g/dL 3.1*   BILIRUBIN mg/dL 0.8   ALK PHOS U/L 162*   AST (SGOT) U/L 104*   ALT (SGPT) U/L 54*         BNP        TROPONIN  Results from last 7 days   Lab Units 05/29/24  1231   HSTROP T ng/L 30*       CoAg  Results from last 7 days   Lab Units 05/29/24  1340   INR  4.45*   APTT seconds 42.7*       Creatinine Clearance  Estimated Creatinine Clearance: 45.7 mL/min (A) (by C-G formula based on SCr of 1.27 mg/dL (H)).    ABG        Radiology  XR Chest 1 View    Result Date: 5/29/2024  Impression: Cardiomegaly and central pulmonary vascular congestion. Electronically Signed: Ronak Birmingham MD  5/29/2024 2:39 PM EDT  Workstation ID: [id number]       EKG            I personally viewed and interpreted the patient's EKG/Telemetry data: Sinus rhythm premature atrial contractions    ECHOCARDIOGRAM:    Results for orders placed during the hospital encounter of 01/22/24    Adult Transthoracic Echo Complete W/ Cont if Necessary Per Protocol    Interpretation Summary    Left ventricular ejection fraction appears to be 56 - 60%.    Estimated  right ventricular systolic pressure from tricuspid regurgitation is normal (<35 mmHg).    Indication  Dyspnea  Palpitations    Technically satisfactory study.  Mitral valve is structurally normal.  Tricuspid valve is structurally normal.  Mild tricuspid regurgitation is present.  Aortic valve is aortic valve with decreased opening motion.  Gradient across the aortic valve is 16/9 mmHg.  Valve area 1.37 cm².  Pulmonic valve could not be well visualized.  Left atrium is enlarged.  Right atrium is normal in size.  Left ventricle is enlarged with septal anterior wall and apical severe hypokinesis with ejection fraction of 40%.  Possible left ventricular apical thrombus.  Right ventricle is normal in size.  Atrial septum is intact.  Aorta is normal.  No pericardial effusion or intracardiac thrombus is seen.  ICD lead is present in the right ventricle.    Impression  Mild tricuspid regurgitation.  Mild to moderate aortic valve stenosis with gradient of 16/9 mmHg and valve area of 1.37 cm².  Left atrial enlargement.  Left ventricle is enlarged with septal anterior wall and apical severe hypokinesis with ejection fraction of 40%.  Possible left ventricular apical thrombus.  ICD lead is present in the right ventricle.  No evidence for pulmonary hypertension is present.      STRESS TEST  Results for orders placed during the hospital encounter of 01/22/24    Stress Test With Myocardial Perfusion One Day    Interpretation Summary  Indications  Status post CABG  Atrial fibrillation    This study was performed under the direct supervision of Rohini NOLASCO.    Resting ECG  Sinus rhythm    The patient was injected with Lexiscan intravenously while constantly monitoring electrocardiogram and vital signs.  Patient did not have any chest discomfort ST abnormalities or ectopy with injection of Lexiscan.    Cardiolite was used as an imaging agent.    Cardiolite images showed significantly decreased radionuclide uptake in the proximal  posterior segments with partial redistribution in the resting images.    Gated SPECT images revealed normal left ventricle size and diffuse hypocontractility with calculated ejection fraction of 61%.    Impression  ========  Lexiscan Cardiolite test revealed proximal posterior infarction and ischemia.  Gated SPECT images revealed normal left ventricular size and diffuse hypocontractility with calculated ejection fraction of 61%.        Cardiolite (Tc-99m sestamibi) stress test    HEART CATHETERIZATION  No results found for this or any previous visit.      OTHER:     Assessment & Plan     Principal Problem:    Atrial fibrillation with RVR    ]]]]]]]]]]]]]]]]]  History  =========  - Palpitations  Sinus tachycardia and premature atrial contractions.  Intermittent atrial fibrillation is present.    - History of atrial fibrillation with RVR.  Patient had postop atrial fibrillation after she had CABG in 2017.  Patient was in sinus rhythm.     - Status post CABG and ASD closure 2017     - Ischemic cardiomyopathy     -Moderate aortic valve stenosis     - Status post ICD (single-chamber)-2017-[employer].     - Chronic anticoagulation-patient on Coumadin.  Home monitoring.  INR 1.75-5/19/2024.  INR 4.45-5/29/2024     - Hypothyroidism dyslipidemia obstructive sleep apnea     - Past history of pulmonary emboli     - CKD 3  20/1.7-1/22/2024  17/1.08-5/19/2024.     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 There is normal antegrade color Doppler flow including the common iliac arteries. There is thrombus within the aneurysm sac. Dimensions are discussed in detail above.     - Family history of coronary artery disease     - Non-smoker     - Allergic to oxytetracycline and oxycodone.     Echocardiogram 1/23/2024 revealed  Mild tricuspid  regurgitation.  Mild to moderate aortic valve stenosis with gradient of 16/9 mmHg and valve area of 1.37 cm².  Left atrial enlargement.  Left ventricle is enlarged with septal anterior wall and apical severe hypokinesis with ejection fraction of 40%.  Possible left ventricular apical thrombus.  ICD lead is present in the right ventricle.  No evidence for pulmonary hypertension is present.  =============  Plan  =============  Sinus tachycardia.  Urinary tract infection  Intermittent atrial fibrillation     Status post CABG and ASD closure 2017.  Patient is not having any angina pectoris or congestive heart failure.     Past history of history of A-fib with RVR.     Patient was in sinus rhythm     Ischemic cardiomyopathy      Mild to moderate aortic valve stenosis     Echocardiogram 1/23/2024 revealed  Mild tricuspid regurgitation.  Mild to moderate aortic valve stenosis with gradient of 16/9 mmHg and valve area of 1.37 cm².  Left atrial enlargement.  Left ventricle is enlarged with septal anterior wall and apical severe hypokinesis with ejection fraction of 40%.  Possible left ventricular apical thrombus.  ICD lead is present in the right ventricle.  No evidence for pulmonary hypertension is present.     Stress Cardiolite test-1/24/2024  Lexiscan Cardiolite test revealed proximal posterior infarction and ischemia.  Gated SPECT images revealed normal left ventricular size and diffuse hypocontractility with calculated ejection fraction of 61%.     Status post ICD ([employer]) 2017.  Single-chamber.     History of left ventricular possible apical thrombus.  Continue anticoagulation.  Consider MERLE in the future if needed.  Patient is already anticoagulated.     Anticoagulation  INR 1.75-5/20/2024.  1.92-5/21/2024  2.74-5/22/2024  INR 4.45-5/29/2024.  Hold Coumadin until INR comes down.     Renal dysfunction  17/1.08  24/1.44  25/1.25-5/22/2024  19/1.27-5/29/2024.    Medications were reviewed and updated.  Current  medications include Lipitor levothyroxine pantoprazole topiramate IV Cardizem  Coumadin is on hold.      Further plan will depend on patient's progress.     Reviewed and updated-5/29/2024.  ]]]]]]]]]]]]]]]]]]]]]       Allie Hurley MD  05/29/24  18:29 EDT

## 2024-05-29 NOTE — ED PROVIDER NOTES
Subjective   History of Present Illness  Chief complaint: Rapid heart rate    68-year-old female presents with rapid heart rate.  Patient does have a history of atrial fibrillation and is on warfarin.  She was recently in the hospital for altered mental status and UTI.  She was discharged about a week ago.  She followed up today in the doctor's office and was found to be tachycardic into the 140s and 150s.  Family states she has also had some continued intermittent confusion.  She has had no known fever.  She reports some mild lower abdominal pain.  She denies any other complaints of pain.  She has had no headache.  She denies chest pain or shortness of breath.  She has had no vomiting.        Review of Systems   Constitutional:  Negative for fever.   HENT:  Negative for congestion.    Respiratory:  Negative for cough and shortness of breath.    Cardiovascular:  Negative for chest pain.   Gastrointestinal:  Positive for abdominal pain. Negative for vomiting.   Musculoskeletal:  Negative for back pain.   Neurological:  Negative for headaches.   Psychiatric/Behavioral:  Positive for confusion.        Past Medical History:   Diagnosis Date    AAA (abdominal aortic aneurysm)     infrarenal- 3.1cm(2010, 3.7x4.2cm(2016), 3.9cm (2017)    Allergic rhinitis     Aspiration pneumonia 05/2017    CHF (congestive heart failure)     Coronary artery disease     DDD (degenerative disc disease), lumbar     lumbar spine- Dr. Churchill     Depression     Diverticulosis     Frequent UTI     Dr. Daniels    GERD (gastroesophageal reflux disease)     Hiatal hernia     HSV (herpes simplex virus) infection     Oral    Hyperlipidemia     IBS (irritable bowel syndrome)     Constipation predominant    Ischemic cardiomyopathy 09/2017    AICD     Kidney stones     NSTEMI (non-ST elevated myocardial infarction) 04/26/2017    Obstructive sleep apnea 2011    nfsg 2011, ahi 68    Osteoarthritis     Osteopenia     Peripheral neuropathy     feet    Pulmonary  "embolism, bilateral 05/2017    Rotator cuff tear     Bilateral- Ortho        Allergies   Allergen Reactions    Oxytetracycline Hives    Oxycodone Itching       Past Surgical History:   Procedure Laterality Date    CARDIAC CATHETERIZATION  04/26/2017    99% mid LAD. 90% mid LCX. 60% RCA. Recommended CABG. Dr. Diaz    CARDIAC DEFIBRILLATOR PLACEMENT  12/26/2017    ICD implant/ Dr. Ellis    CATARACT EXTRACTION      CORONARY ARTERY BYPASS GRAFT  04/26/2017    x4 & ASD Closure    CYSTOSCOPY  2002    negative. Dr. Daniels    LAPAROSCOPIC CHOLECYSTECTOMY  04/17/2013    For biliary dyskinesia. Dr. Mccoy.     REPLACEMENT TOTAL KNEE BILATERAL  11/2004    Dr. Herrera    THORACENTESIS Left 05/08/2017    TONSILLECTOMY      TUBAL ABDOMINAL LIGATION Bilateral        Family History   Problem Relation Age of Onset    Heart disease Mother     Other Mother         Hypoglycemia    Osteoporosis Mother     COPD Father     Stroke Father     Hypertension Brother     Diabetes Brother     Heart disease Brother        Social History     Socioeconomic History    Marital status:    Tobacco Use    Smoking status: Never   Vaping Use    Vaping status: Some Days    Substances: CBD, nightly, 2-3 weekly   Substance and Sexual Activity    Alcohol use: Never    Drug use: Never    Sexual activity: Defer       /56   Pulse (!) 129   Temp 98.4 °F (36.9 °C) (Temporal)   Resp 20   Ht 172.7 cm (68\")   Wt 77.1 kg (170 lb)   SpO2 95%   BMI 25.85 kg/m²       Objective   Physical Exam  Vitals and nursing note reviewed.   Constitutional:       Appearance: Normal appearance.   HENT:      Head: Normocephalic and atraumatic.      Mouth/Throat:      Mouth: Mucous membranes are moist.   Cardiovascular:      Rate and Rhythm: Tachycardia present. Rhythm irregular.      Heart sounds: Normal heart sounds.   Pulmonary:      Effort: Pulmonary effort is normal. No respiratory distress.      Breath sounds: Normal breath sounds.   Abdominal:      " General: Bowel sounds are normal.      Palpations: Abdomen is soft.      Tenderness: There is no abdominal tenderness.   Musculoskeletal:      Right lower leg: No edema.      Left lower leg: No edema.   Skin:     General: Skin is warm and dry.   Neurological:      Mental Status: She is alert and oriented to person, place, and time.         Procedures           ED Course      My interpretation of EKG shows sinus tachycardia, rate 139, there is supraventricular bigeminy, no STEMI                           Results for orders placed or performed during the hospital encounter of 05/29/24   Comprehensive Metabolic Panel    Specimen: Arm, Left; Blood   Result Value Ref Range    Glucose 131 (H) 65 - 99 mg/dL    BUN 19 8 - 23 mg/dL    Creatinine 1.27 (H) 0.57 - 1.00 mg/dL    Sodium 129 (L) 136 - 145 mmol/L    Potassium 3.4 (L) 3.5 - 5.2 mmol/L    Chloride 100 98 - 107 mmol/L    CO2 13.5 (L) 22.0 - 29.0 mmol/L    Calcium 9.0 8.6 - 10.5 mg/dL    Total Protein 6.9 6.0 - 8.5 g/dL    Albumin 3.1 (L) 3.5 - 5.2 g/dL    ALT (SGPT) 54 (H) 1 - 33 U/L    AST (SGOT) 104 (H) 1 - 32 U/L    Alkaline Phosphatase 162 (H) 39 - 117 U/L    Total Bilirubin 0.8 0.0 - 1.2 mg/dL    Globulin 3.8 gm/dL    A/G Ratio 0.8 g/dL    BUN/Creatinine Ratio 15.0 7.0 - 25.0    Anion Gap 15.5 (H) 5.0 - 15.0 mmol/L    eGFR 45.9 (L) >60.0 mL/min/1.73   Protime-INR    Specimen: Hand, Right; Blood   Result Value Ref Range    Protime 43.4 (H) 9.6 - 11.7 Seconds    INR 4.45 (C) 0.93 - 1.10   aPTT    Specimen: Hand, Right; Blood   Result Value Ref Range    PTT 42.7 (L) 61.0 - 76.5 seconds   Single High Sensitivity Troponin T    Specimen: Arm, Left; Blood   Result Value Ref Range    HS Troponin T 30 (H) <14 ng/L   Urinalysis With Culture If Indicated - Straight Cath    Specimen: Straight Cath; Urine   Result Value Ref Range    Color, UA Dark Yellow (A) Yellow, Straw    Appearance, UA Hazy (A) Clear    pH, UA <=5.0 5.0 - 8.0    Specific Gravity, UA 1.020 1.005 - 1.030     Glucose, UA Negative Negative    Ketones, UA Trace (A) Negative    Bilirubin, UA Small (1+) (A) Negative    Blood, UA Negative Negative    Protein, UA Trace (A) Negative    Leuk Esterase, UA Moderate (2+) (A) Negative    Nitrite, UA Negative Negative    Urobilinogen, UA 1.0 E.U./dL 0.2 - 1.0 E.U./dL   CBC Auto Differential    Specimen: Arm, Left; Blood   Result Value Ref Range    WBC 19.22 (H) 3.40 - 10.80 10*3/mm3    RBC 3.85 3.77 - 5.28 10*6/mm3    Hemoglobin 11.9 (L) 12.0 - 15.9 g/dL    Hematocrit 37.3 34.0 - 46.6 %    MCV 96.9 79.0 - 97.0 fL    MCH 30.9 26.6 - 33.0 pg    MCHC 31.9 31.5 - 35.7 g/dL    RDW 14.6 12.3 - 15.4 %    RDW-SD 51.7 37.0 - 54.0 fl    MPV 10.0 6.0 - 12.0 fL    Platelets 354 140 - 450 10*3/mm3    Neutrophil % 89.2 (H) 42.7 - 76.0 %    Lymphocyte % 3.3 (L) 19.6 - 45.3 %    Monocyte % 5.4 5.0 - 12.0 %    Eosinophil % 0.3 0.3 - 6.2 %    Basophil % 0.7 0.0 - 1.5 %    Immature Grans % 1.1 (H) 0.0 - 0.5 %    Neutrophils, Absolute 17.14 (H) 1.70 - 7.00 10*3/mm3    Lymphocytes, Absolute 0.63 (L) 0.70 - 3.10 10*3/mm3    Monocytes, Absolute 1.04 (H) 0.10 - 0.90 10*3/mm3    Eosinophils, Absolute 0.05 0.00 - 0.40 10*3/mm3    Basophils, Absolute 0.14 0.00 - 0.20 10*3/mm3    Immature Grans, Absolute 0.22 (H) 0.00 - 0.05 10*3/mm3    nRBC 0.0 0.0 - 0.2 /100 WBC   Urinalysis, Microscopic Only - Straight Cath    Specimen: Straight Cath; Urine   Result Value Ref Range    RBC, UA 3-5 (A) None Seen, 0-2 /HPF    WBC, UA 21-50 (A) None Seen, 0-2 /HPF    Bacteria, UA 1+ (A) None Seen /HPF    Squamous Epithelial Cells, UA 0-2 None Seen, 0-2 /HPF    Yeast, UA Moderate/2+ Budding Yeast w/Hyphae None Seen /HPF    Hyaline Casts, UA 0-2 None Seen /LPF    Granular Casts, UA 3-6 None Seen /LPF    Methodology Manual Light Microscopy    ECG 12 Lead Tachycardia   Result Value Ref Range    QT Interval 264 ms    QTC Interval 403 ms   Green Top (Gel)   Result Value Ref Range    Extra Tube Hold for add-ons.    Lavender Top    Result Value Ref Range    Extra Tube hold for add-on    Gold Top - SST   Result Value Ref Range    Extra Tube Hold for add-ons.    Light Blue Top   Result Value Ref Range    Extra Tube Hold for add-ons.      XR Chest 1 View    Result Date: 5/29/2024  Impression: Cardiomegaly and central pulmonary vascular congestion. Electronically Signed: Ronak Birmingham MD  5/29/2024 2:39 PM EDT  Workstation ID: [id number]               Medical Decision Making  Amount and/or Complexity of Data Reviewed  Labs: ordered.  Radiology: ordered.  ECG/medicine tests: ordered.    Risk  Prescription drug management.      Patient had the above evaluation.  Results were discussed with the patient.  My interpretation of chest x-ray shows no infiltrate or effusion.  EKG shows sinus tachycardia with supraventricular bigeminy.  At times on the monitor and looks more like atrial fibrillation with RVR.  Patient was started on Cardizem.  White blood cell count is elevated at 19.2.  Patient does appear to have a urinary tract infection with moderate leukocyte esterase, 21-50 white blood cells, 1+ bacteria.  She was started on Rocephin.  Blood cultures were obtained.  CMP significant for sodium of 129 and mild elevation of LFTs.  I discussed with the nurse practitioner on-call for the primary doctor and the patient will be admitted for further evaluation and management.      Final diagnoses:   Tachycardia   Urinary tract infection without hematuria, site unspecified   Leukocytosis, unspecified type       ED Disposition  ED Disposition       ED Disposition   Decision to Admit    Condition   --    Comment   Level of Care: Telemetry [5]   Admitting Physician: VICTOR MANUEL BURT [[id number]]   Attending Physician: VICTOR MANUEL BURT [[id number]]                 No follow-up provider specified.       Medication List      No changes were made to your prescriptions during this visit.            Lorenzo Conklin MD  05/29/24 [id number]

## 2024-05-29 NOTE — OUTREACH NOTE
Medical Week 2 Survey      Flowsheet Row Responses   [facility] facility patient discharged from? [facility]   Does the patient have one of the following disease processes/diagnoses(primary or secondary)? Other   Week 2 attempt successful? No   Unsuccessful attempts Attempt 1  [Pt in ED]            [place]and[place] BARRERA - Licensed Nurse

## 2024-05-29 NOTE — PROGRESS NOTES
"Pharmacy dosing service  Anticoagulant  Warfarin     Subjective:    Laura Mejia is a 68 y.o.female being continued on warfarin for Atrial Fibrillation / Flutter.    INR Goal: 2 - 3  Home medication?: warfarin 3 mg PO daily, except warfarin 6 mg PO on Su/Tu/Th/Sa.   Bridge Therapy Present?:  No  Interacting Medications Evaluation (New/Present/Discontinued): n/a  Additional Contributing Factors: last dose 6mg on 5/28      Assessment/Plan:    INR supratherapeutic at time of consult. Will hold warfarin today.     Continue to monitor and adjust based on INR.         Date 5/29           INR 4.45           Dose hold               Objective:  [Ht: 172.7 cm (68\"); Wt: 77.1 kg (170 lb); BMI: Body mass index is 25.85 kg/m².]    Lab Results   Component Value Date    ALBUMIN 3.1 (L) 05/29/2024     Lab Results   Component Value Date    INR 4.45 (C) 05/29/2024    INR 2.74 05/22/2024    INR 1.92 (L) 05/21/2024    PROTIME 43.4 (H) 05/29/2024    PROTIME 27.7 05/22/2024    PROTIME 19.9 05/21/2024     Lab Results   Component Value Date    HGB 11.9 (L) 05/29/2024    HGB 9.9 (L) 05/22/2024    HGB 10.0 (L) 05/21/2024     Lab Results   Component Value Date    HCT 37.3 05/29/2024    HCT 30.6 (L) 05/22/2024    HCT 31.4 (L) 05/21/2024       Yolie Boateng RP  05/29/24 18:57 EDT     "

## 2024-05-29 NOTE — Clinical Note
Level of Care: Telemetry [5]   Diagnosis: Atrial fibrillation with RVR [[id number]]   Admitting Physician: VICTOR MANUEL BURT [[id number]]   Attending Physician: VICTOR MANUEL BURT [[id number]]

## 2024-05-30 LAB
ANION GAP SERPL CALCULATED.3IONS-SCNC: 12.1 MMOL/L (ref 5–15)
B PARAPERT DNA SPEC QL NAA+PROBE: NOT DETECTED
B PERT DNA SPEC QL NAA+PROBE: NOT DETECTED
BACTERIA SPEC AEROBE CULT: ABNORMAL
BASOPHILS # BLD AUTO: 0.14 10*3/MM3 (ref 0–0.2)
BASOPHILS NFR BLD AUTO: 1 % (ref 0–1.5)
BUN SERPL-MCNC: 13 MG/DL (ref 8–23)
BUN/CREAT SERPL: 13.4 (ref 7–25)
C PNEUM DNA NPH QL NAA+NON-PROBE: NOT DETECTED
CALCIUM SPEC-SCNC: 8.5 MG/DL (ref 8.6–10.5)
CHLORIDE SERPL-SCNC: 106 MMOL/L (ref 98–107)
CO2 SERPL-SCNC: 13.9 MMOL/L (ref 22–29)
CREAT SERPL-MCNC: 0.97 MG/DL (ref 0.57–1)
DEPRECATED RDW RBC AUTO: 54 FL (ref 37–54)
EGFRCR SERPLBLD CKD-EPI 2021: 63.4 ML/MIN/1.73
EOSINOPHIL # BLD AUTO: 0.13 10*3/MM3 (ref 0–0.4)
EOSINOPHIL NFR BLD AUTO: 0.9 % (ref 0.3–6.2)
ERYTHROCYTE [DISTWIDTH] IN BLOOD BY AUTOMATED COUNT: 14.9 % (ref 12.3–15.4)
FLUAV SUBTYP SPEC NAA+PROBE: NOT DETECTED
FLUBV RNA ISLT QL NAA+PROBE: NOT DETECTED
GLUCOSE SERPL-MCNC: 97 MG/DL (ref 65–99)
HADV DNA SPEC NAA+PROBE: NOT DETECTED
HCOV 229E RNA SPEC QL NAA+PROBE: NOT DETECTED
HCOV HKU1 RNA SPEC QL NAA+PROBE: NOT DETECTED
HCOV NL63 RNA SPEC QL NAA+PROBE: NOT DETECTED
HCOV OC43 RNA SPEC QL NAA+PROBE: NOT DETECTED
HCT VFR BLD AUTO: 35 % (ref 34–46.6)
HGB BLD-MCNC: 10.7 G/DL (ref 12–15.9)
HMPV RNA NPH QL NAA+NON-PROBE: NOT DETECTED
HPIV1 RNA ISLT QL NAA+PROBE: NOT DETECTED
HPIV2 RNA SPEC QL NAA+PROBE: NOT DETECTED
HPIV3 RNA NPH QL NAA+PROBE: NOT DETECTED
HPIV4 P GENE NPH QL NAA+PROBE: NOT DETECTED
IMM GRANULOCYTES # BLD AUTO: 0.11 10*3/MM3 (ref 0–0.05)
IMM GRANULOCYTES NFR BLD AUTO: 0.8 % (ref 0–0.5)
INR PPP: 4.14 (ref 2–3)
LYMPHOCYTES # BLD AUTO: 0.6 10*3/MM3 (ref 0.7–3.1)
LYMPHOCYTES NFR BLD AUTO: 4.2 % (ref 19.6–45.3)
M PNEUMO IGG SER IA-ACNC: NOT DETECTED
MCH RBC QN AUTO: 30.3 PG (ref 26.6–33)
MCHC RBC AUTO-ENTMCNC: 30.6 G/DL (ref 31.5–35.7)
MCV RBC AUTO: 99.2 FL (ref 79–97)
MONOCYTES # BLD AUTO: 0.56 10*3/MM3 (ref 0.1–0.9)
MONOCYTES NFR BLD AUTO: 3.9 % (ref 5–12)
NEUTROPHILS NFR BLD AUTO: 12.86 10*3/MM3 (ref 1.7–7)
NEUTROPHILS NFR BLD AUTO: 89.2 % (ref 42.7–76)
NRBC BLD AUTO-RTO: 0 /100 WBC (ref 0–0.2)
PLATELET # BLD AUTO: 362 10*3/MM3 (ref 140–450)
PMV BLD AUTO: 10.1 FL (ref 6–12)
POTASSIUM SERPL-SCNC: 4.3 MMOL/L (ref 3.5–5.2)
PROTHROMBIN TIME: 40.6 SECONDS (ref 19.4–28.5)
QT INTERVAL: 264 MS
QTC INTERVAL: 403 MS
RBC # BLD AUTO: 3.53 10*6/MM3 (ref 3.77–5.28)
RHINOVIRUS RNA SPEC NAA+PROBE: NOT DETECTED
RSV RNA NPH QL NAA+NON-PROBE: NOT DETECTED
SARS-COV-2 RNA NPH QL NAA+NON-PROBE: NOT DETECTED
SODIUM SERPL-SCNC: 132 MMOL/L (ref 136–145)
WBC NRBC COR # BLD AUTO: 14.4 10*3/MM3 (ref 3.4–10.8)

## 2024-05-30 PROCEDURE — 85610 PROTHROMBIN TIME: CPT | Performed by: INTERNAL MEDICINE

## 2024-05-30 PROCEDURE — 25010000002 DIGOXIN PER 500 MCG: Performed by: INTERNAL MEDICINE

## 2024-05-30 PROCEDURE — 99232 SBSQ HOSP IP/OBS MODERATE 35: CPT | Performed by: INTERNAL MEDICINE

## 2024-05-30 PROCEDURE — 25010000002 ONDANSETRON PER 1 MG

## 2024-05-30 PROCEDURE — 0202U NFCT DS 22 TRGT SARS-COV-2: CPT | Performed by: INTERNAL MEDICINE

## 2024-05-30 PROCEDURE — 80048 BASIC METABOLIC PNL TOTAL CA: CPT

## 2024-05-30 PROCEDURE — 85025 COMPLETE CBC W/AUTO DIFF WBC: CPT

## 2024-05-30 PROCEDURE — 25010000002 CEFTRIAXONE PER 250 MG

## 2024-05-30 RX ORDER — DIGOXIN 0.25 MG/ML
250 INJECTION INTRAMUSCULAR; INTRAVENOUS ONCE
Status: COMPLETED | OUTPATIENT
Start: 2024-05-30 | End: 2024-05-30

## 2024-05-30 RX ORDER — DIGOXIN 125 MCG
125 TABLET ORAL
Status: DISCONTINUED | OUTPATIENT
Start: 2024-05-31 | End: 2024-06-03 | Stop reason: HOSPADM

## 2024-05-30 RX ORDER — FLUCONAZOLE 100 MG/1
100 TABLET ORAL EVERY 24 HOURS
Status: DISCONTINUED | OUTPATIENT
Start: 2024-05-30 | End: 2024-06-03 | Stop reason: HOSPADM

## 2024-05-30 RX ORDER — HYDROCODONE BITARTRATE AND ACETAMINOPHEN 5; 325 MG/1; MG/1
1 TABLET ORAL EVERY 6 HOURS PRN
Status: DISCONTINUED | OUTPATIENT
Start: 2024-05-30 | End: 2024-06-03 | Stop reason: HOSPADM

## 2024-05-30 RX ADMIN — HYDROCODONE BITARTRATE AND ACETAMINOPHEN 1 TABLET: 5; 325 TABLET ORAL at 09:19

## 2024-05-30 RX ADMIN — Medication 10 ML: at 21:37

## 2024-05-30 RX ADMIN — BENZOCAINE 6 MG-MENTHOL 10 MG LOZENGES 1 EACH: at 06:03

## 2024-05-30 RX ADMIN — HYDROCODONE BITARTRATE AND ACETAMINOPHEN 1 TABLET: 5; 325 TABLET ORAL at 18:03

## 2024-05-30 RX ADMIN — TOPIRAMATE 50 MG: 25 TABLET, FILM COATED ORAL at 08:17

## 2024-05-30 RX ADMIN — DIGOXIN 250 MCG: 0.25 INJECTION INTRAMUSCULAR; INTRAVENOUS at 15:57

## 2024-05-30 RX ADMIN — Medication 10 ML: at 08:17

## 2024-05-30 RX ADMIN — Medication 7.5 MG/HR: at 02:26

## 2024-05-30 RX ADMIN — LEVOTHYROXINE SODIUM 88 MCG: 88 TABLET ORAL at 05:18

## 2024-05-30 RX ADMIN — PAROXETINE 40 MG: 40 TABLET, FILM COATED ORAL at 06:03

## 2024-05-30 RX ADMIN — FLUCONAZOLE 100 MG: 100 TABLET ORAL at 21:37

## 2024-05-30 RX ADMIN — PANTOPRAZOLE SODIUM 40 MG: 40 INJECTION, POWDER, FOR SOLUTION INTRAVENOUS at 05:18

## 2024-05-30 RX ADMIN — ATORVASTATIN CALCIUM 80 MG: 40 TABLET, FILM COATED ORAL at 08:17

## 2024-05-30 RX ADMIN — ONDANSETRON 4 MG: 2 INJECTION INTRAMUSCULAR; INTRAVENOUS at 09:19

## 2024-05-30 RX ADMIN — NYSTATIN 500000 UNITS: 100000 SUSPENSION ORAL at 21:37

## 2024-05-30 RX ADMIN — CEFTRIAXONE 2000 MG: 2 INJECTION, POWDER, FOR SOLUTION INTRAMUSCULAR; INTRAVENOUS at 16:02

## 2024-05-30 NOTE — PROGRESS NOTES
"Pharmacy dosing service  Anticoagulant  Warfarin     Subjective:    Laura Mejia is a 68 y.o.female being continued on warfarin for Atrial Fibrillation / Flutter.    INR Goal: 2 - 3  Home medication?: warfarin 3 mg PO daily, except warfarin 6 mg PO on Su/Tu/Th/Sa.   Bridge Therapy Present?:  No  Interacting Medications Evaluation (New/Present/Discontinued): ceftriaxone (may increase INR)  Additional Contributing Factors: last dose 6mg on 5/28      Assessment/Plan:    INR is supratherapeutic again today, however is trending down. Will hold warfarin today. Patient will likely need home regimen change on discharge.     Continue to monitor and adjust based on INR.         Date 5/29 5/30          INR 4.45 4.14          Dose hold hold              Objective:  [Ht: 172.7 cm (68\"); Wt: 77.1 kg (170 lb); BMI: Body mass index is 25.85 kg/m².]    Lab Results   Component Value Date    ALBUMIN 3.1 (L) 05/29/2024     Lab Results   Component Value Date    INR 4.14 (H) 05/30/2024    INR 4.45 (C) 05/29/2024    INR 2.74 05/22/2024    PROTIME 40.6 (H) 05/30/2024    PROTIME 43.4 (H) 05/29/2024    PROTIME 27.7 05/22/2024     Lab Results   Component Value Date    HGB 10.7 (L) 05/30/2024    HGB 11.9 (L) 05/29/2024    HGB 9.9 (L) 05/22/2024     Lab Results   Component Value Date    HCT 35.0 05/30/2024    HCT 37.3 05/29/2024    HCT 30.6 (L) 05/22/2024       Vale Chapman, PharmD  05/30/24 11:18 EDT       "

## 2024-05-30 NOTE — H&P
Patient Care Team:  Luan Joseph APRN as PCP - General (Family Medicine)  Jozef Otero MD as Consulting Physician (Nephrology)    Chief complaint rapid heart rate    Subjective     Patient is a 68 y.o. female with history of CHF, CAD, and atrial fibrillation on warfarin who presented to the ER with complaints of rapid heart rate. Patient had recent hospital admission for altered mental status and UTI and was discharged on 5/22. She followed up today in the office and was found to be tachycardic into the 140s and 150s. Family states she has also had some continued intermittent confusion. She has had no known fever. She reports some mild lower abdominal pain. She denies any other complaints of pain. Denies any shortness of breath or chest pain.   In the ER CXR unremarkable, EKG sinus tach with supraventricular bigeminy versus afib with RVR on the monitor. She was started on Cardizem gtt. White blood cell count is elevated at 19.2. Patient does appear to have a urinary tract infection with moderate leukocyte esterase, 21-50 white blood cells, 1+ bacteria. She was started on Rocephin. Blood cultures were obtained. CMP significant for sodium of 129 and mild elevation of LFTs.     Onset of symptoms was ongoing    Chart review:  Discharge summary/ Hospital Course   5/22/24  Laura Mejia is a 68-year-old female who presented to [facility] [place] ED on 5/16/2024 and will discharge on 5/22/2024.  Patient reported to ER with generalized weakness and confusion that had progressed.  While in ER patient had head CT that showed no acute findings, chest x-ray was unremarkable.  Patient was noted to have a urinary tract infection.  Culture did confirm E. coli.  Patient was treated with IV Rocephin and transition to oral Augmentin.  Patient did develop hallucinations that would come and go.  Bilateral carotid duplex showed both right and left carotid less than 50% stenosis.  MRI of brain was completed after pacer  interrogation.  MRI showed tiny 2 mm questionable focus of diffusion signal change within the high left frontal lobe cortex, may represent a tiny focal acute or subacute infarct.  Patient also had mild to moderate chronic vascular progression since 2013 with atrophy.  Neurology was consulted.  Patient and spouse would like to discharge before seeing neurology.  Confusion has improved significantly and patient has not had any hallucinations.  We discussed the MRI results in detail.  Patient is adamant about discharge.  PT recommends SNF or home health PT.  Patient refuses at this time.  She does have good family support at home.  Patient is hemodynamically stable at time of discharge    Review of Systems   Constitutional: Negative.    HENT: Negative.     Eyes: Negative.    Cardiovascular: Negative.    Gastrointestinal:  Positive for abdominal pain.   Endocrine: Negative.    Genitourinary: Negative.    Musculoskeletal: Negative.    Neurological: Negative.    Psychiatric/Behavioral:  Positive for confusion.           History  Past Medical History:   Diagnosis Date    AAA (abdominal aortic aneurysm)     infrarenal- 3.1cm(2010, 3.7x4.2cm(2016), 3.9cm (2017)    Allergic rhinitis     Aspiration pneumonia 05/2017    CHF (congestive heart failure)     Coronary artery disease     DDD (degenerative disc disease), lumbar     lumbar spine- Dr. Churchill     Depression     Diverticulosis     Frequent UTI     Dr. Daniels    GERD (gastroesophageal reflux disease)     Hiatal hernia     HSV (herpes simplex virus) infection     Oral    Hyperlipidemia     IBS (irritable bowel syndrome)     Constipation predominant    Ischemic cardiomyopathy 09/2017    AICD     Kidney stones     NSTEMI (non-ST elevated myocardial infarction) 04/26/2017    Obstructive sleep apnea 2011    nfsg 2011, ahi 67    Osteoarthritis     Osteopenia     Peripheral neuropathy     feet    Pulmonary embolism, bilateral 05/2017    Rotator cuff tear     Bilateral- Ortho       Past Surgical History:   Procedure Laterality Date    CARDIAC CATHETERIZATION  04/26/2017    99% mid LAD. 90% mid LCX. 60% RCA. Recommended CABG. Dr. Diaz    CARDIAC DEFIBRILLATOR PLACEMENT  12/26/2017    ICD implant/ Dr. Ellis    CATARACT EXTRACTION      CORONARY ARTERY BYPASS GRAFT  04/26/2017    x4 & ASD Closure    CYSTOSCOPY  2002    negative. Dr. Daniels    LAPAROSCOPIC CHOLECYSTECTOMY  04/17/2013    For biliary dyskinesia. Dr. Mccoy.     REPLACEMENT TOTAL KNEE BILATERAL  11/2004    Dr. Herrera    THORACENTESIS Left 05/08/2017    TONSILLECTOMY      TUBAL ABDOMINAL LIGATION Bilateral      Family History   Problem Relation Age of Onset    Heart disease Mother     Other Mother         Hypoglycemia    Osteoporosis Mother     COPD Father     Stroke Father     Hypertension Brother     Diabetes Brother     Heart disease Brother      Social History     Tobacco Use    Smoking status: Never   Vaping Use    Vaping status: Some Days    Substances: CBD, nightly, 2-3 weekly   Substance Use Topics    Alcohol use: Never    Drug use: Never     (Not in a hospital admission)    Allergies:  Oxytetracycline and Oxycodone    Objective     Vital Signs  Temp:  [97.3 °F (36.3 °C)-98.4 °F (36.9 °C)] 97.3 °F (36.3 °C)  Heart Rate:  [101-142] 118  Resp:  [20-22] 20  BP: ()/(43-90) 97/53     Physical Exam:      General Appearance:    Alert, cooperative, in no acute distress   Head:    Normocephalic, without obvious abnormality, atraumatic   Eyes:            Lids and lashes normal, conjunctivae and sclerae normal, no   icterus, no pallor, corneas clear, PERRLA   Ears:    Ears appear intact with no abnormalities noted   Throat:   No oral lesions, no thrush, oral mucosa moist   Neck:   No adenopathy, supple, trachea midline, no thyromegaly, no   carotid bruit, no JVD   Lungs:     Clear to auscultation,respirations regular, even and                  unlabored    Heart:    Regular rhythm and tachy, normal S1 and S2, no             murmur, no gallop, no rub, no click   Chest Wall:    No abnormalities observed   Abdomen:     Normal bowel sounds, no masses, no organomegaly, soft        non-tender, non-distended, no guarding, no rebound                tenderness   Extremities:   Moves all extremities well, no edema, no cyanosis, no             redness   Pulses:   Pulses palpable and equal bilaterally   Skin:   No bleeding, bruising or rash   Lymph nodes:   No palpable adenopathy   Neurologic:  No focal deficits noted       Results Review:     Imaging Results (Last 24 Hours)       Procedure Component Value Units Date/Time    XR Chest 1 View [[id number]] Collected: 05/29/24 1438     Updated: 05/29/24 1442    Narrative:      XR CHEST 1 VW    Date of Exam: 5/29/2024 12:54 PM EDT    Indication: tachycardia    Comparison: Portable chest 5/16/2024    Findings:  Left-sided AICD noted. Prior sternotomy. Stable cardiomegaly and central pulmonary vascular congestion. No pneumothorax. No pleural effusion. Right reverse shoulder prosthesis partially imaged.      Impression:      Impression:  Cardiomegaly and central pulmonary vascular congestion.      Electronically Signed: Ronak Birmingham MD    5/29/2024 2:39 PM EDT    Workstation ID: [id number]             Lab Results (last 24 hours)       Procedure Component Value Units Date/Time    Lactic Acid, Plasma [[id number]]  (Normal) Collected: 05/29/24 1930    Specimen: Blood Updated: 05/29/24 2011     Lactate 1.3 mmol/L     Blood Culture - Blood, Arm, Right [[id number]] Collected: 05/29/24 1708    Specimen: Blood from Arm, Right Updated: 05/29/24 1713    POC Lactate [[id number]]  (Normal) Collected: 05/29/24 1536    Specimen: Blood Updated: 05/29/24 1538     Lactate 1.6 mmol/L      Comment: Serial Number: [id number]:  [id number]       Blood Culture - Blood, Arm, Right [[id number]] Collected: 05/29/24 1532    Specimen: Blood from Arm, Right Updated: 05/29/24 1538    Protime-INR [[id number]]  (Abnormal) Collected:  05/29/24 1340    Specimen: Blood from Hand, Right Updated: 05/29/24 1404     Protime 43.4 Seconds      INR 4.45    aPTT [[id number]]  (Abnormal) Collected: 05/29/24 1340    Specimen: Blood from Hand, Right Updated: 05/29/24 1404     PTT 42.7 seconds     Urinalysis, Microscopic Only - Straight Cath [[id number]]  (Abnormal) Collected: 05/29/24 1313    Specimen: Urine from Straight Cath Updated: 05/29/24 1400     RBC, UA 3-5 /HPF      WBC, UA 21-50 /HPF      Bacteria, UA 1+ /HPF      Squamous Epithelial Cells, UA 0-2 /HPF      Yeast, UA       Moderate/2+ Budding Yeast w/Hyphae     /HPF     Hyaline Casts, UA 0-2 /LPF      Granular Casts, UA 3-6 /LPF      Methodology Manual Light Microscopy    Urine Culture - Urine, Straight Cath [[id number]] Collected: 05/29/24 1313    Specimen: Urine from Straight Cath Updated: 05/29/24 1400    Urinalysis With Culture If Indicated - Straight Cath [[id number]]  (Abnormal) Collected: 05/29/24 1313    Specimen: Urine from Straight Cath Updated: 05/29/24 1333     Color, UA Dark Yellow     Appearance, UA Hazy     pH, UA <=5.0     Specific Gravity, UA 1.020     Glucose, UA Negative     Ketones, UA Trace     Bilirubin, UA Small (1+)     Comment: Confirmation testing is unavailable.  A serum bilirubin is recommended for further assessment.        Blood, UA Negative     Protein, UA Trace     Leuk Esterase, UA Moderate (2+)     Nitrite, UA Negative     Urobilinogen, UA 1.0 E.U./dL    Narrative:      In absence of clinical symptoms, the presence of pyuria, bacteria, and/or nitrites on the urinalysis result does not correlate with infection.    Comprehensive Metabolic Panel [[id number]]  (Abnormal) Collected: 05/29/24 1231    Specimen: Blood from Arm, Left Updated: 05/29/24 1310     Glucose 131 mg/dL      BUN 19 mg/dL      Creatinine 1.27 mg/dL      Sodium 129 mmol/L      Potassium 3.4 mmol/L      Comment: Slight hemolysis detected by analyzer. Result may be falsely elevated.        Chloride 100 mmol/L       CO2 13.5 mmol/L      Calcium 9.0 mg/dL      Total Protein 6.9 g/dL      Albumin 3.1 g/dL      ALT (SGPT) 54 U/L      AST (SGOT) 104 U/L      Comment: Slight hemolysis detected by analyzer. Result may be falsely elevated.        Alkaline Phosphatase 162 U/L      Total Bilirubin 0.8 mg/dL      Globulin 3.8 gm/dL      A/G Ratio 0.8 g/dL      BUN/Creatinine Ratio 15.0     Anion Gap 15.5 mmol/L      eGFR 45.9 mL/min/1.73     Narrative:      GFR Normal >60  Chronic Kidney Disease <60  Kidney Failure <15      Single High Sensitivity Troponin T [[id number]]  (Abnormal) Collected: 05/29/24 1231    Specimen: Blood from Arm, Left Updated: 05/29/24 1309     HS Troponin T 30 ng/L     Narrative:      High Sensitive Troponin T Reference Range:  <14.0 ng/L- Negative Female for AMI  <22.0 ng/L- Negative Male for AMI  >=14 - Abnormal Female indicating possible myocardial injury.  >=22 - Abnormal Male indicating possible myocardial injury.   Clinicians would have to utilize clinical acumen, EKG, Troponin, and serial changes to determine if it is an Acute Myocardial Infarction or myocardial injury due to an underlying chronic condition.         [place] Draw [[id number]] Collected: 05/29/24 1231    Specimen: Blood from Arm, Left Updated: 05/29/24 1245    Narrative:      The following orders were created for panel order [place] Draw.  Procedure                               Abnormality         Status                     ---------                               -----------         ------                     Green Top (Gel)[[id number]]                                  Final result               Lavender Top[[id number]]                                     Final result               Gold Top - SST[[id number]]                                   Final result               Light Blue Top[[id number]]                                   Final result                 Please view results for these tests on the individual orders.    Green Top (Gel)  [[id number]] Collected: 05/29/24 1231    Specimen: Blood from Arm, Left Updated: 05/29/24 1245     Extra Tube Hold for add-ons.     Comment: Auto resulted.       Lavender Top [[id number]] Collected: 05/29/24 1231    Specimen: Blood from Arm, Left Updated: 05/29/24 1245     Extra Tube hold for add-on     Comment: Auto resulted       Gold Top - SST [[id number]] Collected: 05/29/24 1231    Specimen: Blood from Arm, Left Updated: 05/29/24 1245     Extra Tube Hold for add-ons.     Comment: Auto resulted.       Light Blue Top [[id number]] Collected: 05/29/24 1231    Specimen: Blood from Arm, Left Updated: 05/29/24 1245     Extra Tube Hold for add-ons.     Comment: Auto resulted       CBC & Differential [[id number]]  (Abnormal) Collected: 05/29/24 1231    Specimen: Blood from Arm, Left Updated: 05/29/24 1243    Narrative:      The following orders were created for panel order CBC & Differential.  Procedure                               Abnormality         Status                     ---------                               -----------         ------                     CBC Auto Differential[[id number]]        Abnormal            Final result                 Please view results for these tests on the individual orders.    CBC Auto Differential [[id number]]  (Abnormal) Collected: 05/29/24 1231    Specimen: Blood from Arm, Left Updated: 05/29/24 1243     WBC 19.22 10*3/mm3      RBC 3.85 10*6/mm3      Hemoglobin 11.9 g/dL      Hematocrit 37.3 %      MCV 96.9 fL      MCH 30.9 pg      MCHC 31.9 g/dL      RDW 14.6 %      RDW-SD 51.7 fl      MPV 10.0 fL      Platelets 354 10*3/mm3      Neutrophil % 89.2 %      Lymphocyte % 3.3 %      Monocyte % 5.4 %      Eosinophil % 0.3 %      Basophil % 0.7 %      Immature Grans % 1.1 %      Neutrophils, Absolute 17.14 10*3/mm3      Lymphocytes, Absolute 0.63 10*3/mm3      Monocytes, Absolute 1.04 10*3/mm3      Eosinophils, Absolute 0.05 10*3/mm3      Basophils, Absolute 0.14 10*3/mm3      Immature  Grans, Absolute 0.22 10*3/mm3      nRBC 0.0 /100 WBC              I reviewed the patient's new clinical results.    Assessment & Plan       Atrial fibrillation with RVR    Urinary tract infection  Tachycardia  Leukocytosis  -EKG shows sinus tach with supraventricular bigeminy, and monitor looks more like afib RVR  -started on cardizem gtt  -Cardiology consulted  -wbc 19.2  -UA with infection- started on Rocephin      DVT prophylaxis- SCD's  GI prophylaxis- ppi    I discussed the patient's findings and my recommendations with patient.     Iva Dailey, APRN  05/29/24  22:23 EDT

## 2024-05-30 NOTE — OUTREACH NOTE
Medical Week 2 Survey      Flowsheet Row Responses   [facility] facility patient discharged from? [facility]   Does the patient have one of the following disease processes/diagnoses(primary or secondary)? Other   Week 2 attempt successful? No   Unsuccessful attempts Attempt 2   Revoke Readmitted            MAURO TAM - Registered Nurse

## 2024-05-30 NOTE — PROGRESS NOTES
Referring Provider: Keke Casiano MD    Reason for follow-up:  CABG  Atrial fibrillation     Patient Care Team:  Luan Joseph APRN as PCP - General (Family Medicine)  Jozef Otero MD as Consulting Physician (Nephrology)    Subjective .      ROS  Patient is feeling better today.  Today, the patient has been without any chest discomfort ,shortness of breath, palpitations, dizziness or syncope.  Denies having any headache ,abdominal pain ,nausea, vomiting , diarrhea constipation, loss of weight or loss of appetite.  Denies having any excessive bruising ,hematuria or blood in the stool.    Review of all systems negative except as indicated    History  Past Medical History:   Diagnosis Date    AAA (abdominal aortic aneurysm)     infrarenal- 3.1cm(2010, 3.7x4.2cm(2016), 3.9cm (2017)    Allergic rhinitis     Aspiration pneumonia 05/2017    CHF (congestive heart failure)     Coronary artery disease     DDD (degenerative disc disease), lumbar     lumbar spine- Dr. Churchill     Depression     Diverticulosis     Frequent UTI     Dr. Daniels    GERD (gastroesophageal reflux disease)     Hiatal hernia     HSV (herpes simplex virus) infection     Oral    Hyperlipidemia     IBS (irritable bowel syndrome)     Constipation predominant    Ischemic cardiomyopathy 09/2017    AICD     Kidney stones     NSTEMI (non-ST elevated myocardial infarction) 04/26/2017    Obstructive sleep apnea 2011    nfsg 2011, ahi 67    Osteoarthritis     Osteopenia     Peripheral neuropathy     feet    Pulmonary embolism, bilateral 05/2017    Rotator cuff tear     Bilateral- Ortho        Past Surgical History:   Procedure Laterality Date    CARDIAC CATHETERIZATION  04/26/2017    99% mid LAD. 90% mid LCX. 60% RCA. Recommended CABG. Dr. Diaz    CARDIAC DEFIBRILLATOR PLACEMENT  12/26/2017    ICD implant/ Dr. Ellis    CATARACT EXTRACTION      CORONARY ARTERY BYPASS GRAFT  04/26/2017    x4 & ASD Closure    CYSTOSCOPY  2002    negative. Dr. Daniels     LAPAROSCOPIC CHOLECYSTECTOMY  04/17/2013    For biliary dyskinesia. Dr. Mccoy.     REPLACEMENT TOTAL KNEE BILATERAL  11/2004    Dr. Herrera    THORACENTESIS Left 05/08/2017    TONSILLECTOMY      TUBAL ABDOMINAL LIGATION Bilateral        Family History   Problem Relation Age of Onset    Heart disease Mother     Other Mother         Hypoglycemia    Osteoporosis Mother     COPD Father     Stroke Father     Hypertension Brother     Diabetes Brother     Heart disease Brother        Social History     Tobacco Use    Smoking status: Never   Vaping Use    Vaping status: Some Days    Substances: CBD, nightly, 2-3 weekly   Substance Use Topics    Alcohol use: Never    Drug use: Never        (Not in a hospital admission)      Allergies  Oxytetracycline and Oxycodone    Scheduled Meds:atorvastatin, 80 mg, Oral, Daily  cefTRIAXone, 2,000 mg, Intravenous, Q24H  levothyroxine, 88 mcg, Oral, Q AM  pantoprazole, 40 mg, Intravenous, Q AM  PARoxetine, 40 mg, Oral, QAM  sodium chloride, 10 mL, Intravenous, Q12H  topiramate, 50 mg, Oral, Daily      Continuous Infusions:dilTIAZem, 5-15 mg/hr, Last Rate: 7.5 mg/hr (05/30/24 0439)  Pharmacy to dose warfarin,       PRN Meds:.  acetaminophen    benzocaine-menthol    calcium carbonate    Calcium Replacement - Follow Nurse / BPA Driven Protocol    Magnesium Standard Dose Replacement - Follow Nurse / BPA Driven Protocol    melatonin    ondansetron    Pharmacy to dose warfarin    Phosphorus Replacement - Follow Nurse / BPA Driven Protocol    Potassium Replacement - Follow Nurse / BPA Driven Protocol    [COMPLETED] Insert Peripheral IV **AND** sodium chloride    sodium chloride    sodium chloride    Objective     VITAL SIGNS  Vitals:    05/30/24 0430 05/30/24 0500 05/30/24 0530 05/30/24 0600   BP: 106/57 109/63 99/65 106/55   BP Location:       Patient Position:       Pulse: 117 111 103 106   Resp:       Temp:       TempSrc:       SpO2:   92% 92%   Weight:       Height:           Flowsheet Rows   "    Flowsheet Row First Filed Value   Admission Height 172.7 cm (68\") Documented at 05/29/2024 1123   Admission Weight 77.1 kg (170 lb) Documented at 05/29/2024 1123              Intake/Output Summary (Last 24 hours) at 5/30/2024 0641  Last data filed at 5/29/2024 1519  Gross per 24 hour   Intake 1000 ml   Output --   Net 1000 ml        TELEMETRY: Atrial fibrillation    Physical Exam:  The patient is alert, oriented and in no distress.  Vital signs as noted above.  Exogenous obesity.  Head and neck revealed no carotid bruits or jugular venous distention.  No thyromegaly or lymphadenopathy is present  Lungs clear.  No wheezing.  Breath sounds are normal bilaterally.  Heart normal first and second heart sounds.  No murmur. No precordial rub is present.  No gallop is present.  Abdomen soft and nontender.  No organomegaly is present.  Extremities with good peripheral pulses without any pedal edema.  Skin warm and dry.  Musculoskeletal system is grossly normal  CNS grossly normal    Reviewed and updated.    Results Review:   I reviewed the patient's new clinical results.  Lab Results (last 24 hours)       Procedure Component Value Units Date/Time    Basic Metabolic Panel [[id number]]  (Abnormal) Collected: 05/30/24 0243    Specimen: Blood from Arm, Right Updated: 05/30/24 0321     Glucose 97 mg/dL      BUN 13 mg/dL      Creatinine 0.97 mg/dL      Sodium 132 mmol/L      Potassium 4.3 mmol/L      Comment: Specimen hemolyzed.  Result may be falsely elevated.  Result checked          Chloride 106 mmol/L      CO2 13.9 mmol/L      Calcium 8.5 mg/dL      BUN/Creatinine Ratio 13.4     Anion Gap 12.1 mmol/L      eGFR 63.4 mL/min/1.73     Narrative:      GFR Normal >60  Chronic Kidney Disease <60  Kidney Failure <15      Protime-INR [[id number]]  (Abnormal) Collected: 05/30/24 0243    Specimen: Blood from Arm, Right Updated: 05/30/24 0307     Protime 40.6 Seconds      INR 4.14    CBC & Differential [[id number]]  (Abnormal) Collected: " 05/30/24 0243    Specimen: Blood from Arm, Right Updated: 05/30/24 0253    Narrative:      The following orders were created for panel order CBC & Differential.  Procedure                               Abnormality         Status                     ---------                               -----------         ------                     CBC Auto Differential[[id number]]        Abnormal            Final result                 Please view results for these tests on the individual orders.    CBC Auto Differential [[id number]]  (Abnormal) Collected: 05/30/24 0243    Specimen: Blood from Arm, Right Updated: 05/30/24 0253     WBC 14.40 10*3/mm3      RBC 3.53 10*6/mm3      Hemoglobin 10.7 g/dL      Hematocrit 35.0 %      MCV 99.2 fL      MCH 30.3 pg      MCHC 30.6 g/dL      RDW 14.9 %      RDW-SD 54.0 fl      MPV 10.1 fL      Platelets 362 10*3/mm3      Neutrophil % 89.2 %      Lymphocyte % 4.2 %      Monocyte % 3.9 %      Eosinophil % 0.9 %      Basophil % 1.0 %      Immature Grans % 0.8 %      Neutrophils, Absolute 12.86 10*3/mm3      Lymphocytes, Absolute 0.60 10*3/mm3      Monocytes, Absolute 0.56 10*3/mm3      Eosinophils, Absolute 0.13 10*3/mm3      Basophils, Absolute 0.14 10*3/mm3      Immature Grans, Absolute 0.11 10*3/mm3      nRBC 0.0 /100 WBC     Lactic Acid, Plasma [[id number]]  (Normal) Collected: 05/29/24 1930    Specimen: Blood Updated: 05/29/24 2011     Lactate 1.3 mmol/L     Blood Culture - Blood, Arm, Right [[id number]] Collected: 05/29/24 1708    Specimen: Blood from Arm, Right Updated: 05/29/24 1713    POC Lactate [[id number]]  (Normal) Collected: 05/29/24 1536    Specimen: Blood Updated: 05/29/24 1538     Lactate 1.6 mmol/L      Comment: Serial Number: [id number]:  [id number]       Blood Culture - Blood, Arm, Right [[id number]] Collected: 05/29/24 1532    Specimen: Blood from Arm, Right Updated: 05/29/24 1538    Protime-INR [[id number]]  (Abnormal) Collected: 05/29/24 1340    Specimen: Blood from Hand,  Right Updated: 05/29/24 1404     Protime 43.4 Seconds      INR 4.45    aPTT [[id number]]  (Abnormal) Collected: 05/29/24 1340    Specimen: Blood from Hand, Right Updated: 05/29/24 1404     PTT 42.7 seconds     Urinalysis, Microscopic Only - Straight Cath [[id number]]  (Abnormal) Collected: 05/29/24 1313    Specimen: Urine from Straight Cath Updated: 05/29/24 1400     RBC, UA 3-5 /HPF      WBC, UA 21-50 /HPF      Bacteria, UA 1+ /HPF      Squamous Epithelial Cells, UA 0-2 /HPF      Yeast, UA       Moderate/2+ Budding Yeast w/Hyphae     /HPF     Hyaline Casts, UA 0-2 /LPF      Granular Casts, UA 3-6 /LPF      Methodology Manual Light Microscopy    Urine Culture - Urine, Straight Cath [[id number]] Collected: 05/29/24 1313    Specimen: Urine from Straight Cath Updated: 05/29/24 1400    Urinalysis With Culture If Indicated - Straight Cath [[id number]]  (Abnormal) Collected: 05/29/24 1313    Specimen: Urine from Straight Cath Updated: 05/29/24 1333     Color, UA Dark Yellow     Appearance, UA Hazy     pH, UA <=5.0     Specific Gravity, UA 1.020     Glucose, UA Negative     Ketones, UA Trace     Bilirubin, UA Small (1+)     Comment: Confirmation testing is unavailable.  A serum bilirubin is recommended for further assessment.        Blood, UA Negative     Protein, UA Trace     Leuk Esterase, UA Moderate (2+)     Nitrite, UA Negative     Urobilinogen, UA 1.0 E.U./dL    Narrative:      In absence of clinical symptoms, the presence of pyuria, bacteria, and/or nitrites on the urinalysis result does not correlate with infection.    Comprehensive Metabolic Panel [[id number]]  (Abnormal) Collected: 05/29/24 1231    Specimen: Blood from Arm, Left Updated: 05/29/24 1310     Glucose 131 mg/dL      BUN 19 mg/dL      Creatinine 1.27 mg/dL      Sodium 129 mmol/L      Potassium 3.4 mmol/L      Comment: Slight hemolysis detected by analyzer. Result may be falsely elevated.        Chloride 100 mmol/L      CO2 13.5 mmol/L      Calcium 9.0 mg/dL       Total Protein 6.9 g/dL      Albumin 3.1 g/dL      ALT (SGPT) 54 U/L      AST (SGOT) 104 U/L      Comment: Slight hemolysis detected by analyzer. Result may be falsely elevated.        Alkaline Phosphatase 162 U/L      Total Bilirubin 0.8 mg/dL      Globulin 3.8 gm/dL      A/G Ratio 0.8 g/dL      BUN/Creatinine Ratio 15.0     Anion Gap 15.5 mmol/L      eGFR 45.9 mL/min/1.73     Narrative:      GFR Normal >60  Chronic Kidney Disease <60  Kidney Failure <15      Single High Sensitivity Troponin T [[id number]]  (Abnormal) Collected: 05/29/24 1231    Specimen: Blood from Arm, Left Updated: 05/29/24 1309     HS Troponin T 30 ng/L     Narrative:      High Sensitive Troponin T Reference Range:  <14.0 ng/L- Negative Female for AMI  <22.0 ng/L- Negative Male for AMI  >=14 - Abnormal Female indicating possible myocardial injury.  >=22 - Abnormal Male indicating possible myocardial injury.   Clinicians would have to utilize clinical acumen, EKG, Troponin, and serial changes to determine if it is an Acute Myocardial Infarction or myocardial injury due to an underlying chronic condition.         [place] Draw [[id number]] Collected: 05/29/24 1231    Specimen: Blood from Arm, Left Updated: 05/29/24 1245    Narrative:      The following orders were created for panel order [place] Draw.  Procedure                               Abnormality         Status                     ---------                               -----------         ------                     Green Top (Gel)[[id number]]                                  Final result               Lavender Top[[id number]]                                     Final result               Gold Top - SST[[id number]]                                   Final result               Light Blue Top[[id number]]                                   Final result                 Please view results for these tests on the individual orders.    Green Top (Gel) [[id number]] Collected: 05/29/24 1231    Specimen:  Blood from Arm, Left Updated: 05/29/24 1245     Extra Tube Hold for add-ons.     Comment: Auto resulted.       Lavender Top [[id number]] Collected: 05/29/24 1231    Specimen: Blood from Arm, Left Updated: 05/29/24 1245     Extra Tube hold for add-on     Comment: Auto resulted       Gold Top - SST [[id number]] Collected: 05/29/24 1231    Specimen: Blood from Arm, Left Updated: 05/29/24 1245     Extra Tube Hold for add-ons.     Comment: Auto resulted.       Light Blue Top [[id number]] Collected: 05/29/24 1231    Specimen: Blood from Arm, Left Updated: 05/29/24 1245     Extra Tube Hold for add-ons.     Comment: Auto resulted       CBC & Differential [[id number]]  (Abnormal) Collected: 05/29/24 1231    Specimen: Blood from Arm, Left Updated: 05/29/24 1243    Narrative:      The following orders were created for panel order CBC & Differential.  Procedure                               Abnormality         Status                     ---------                               -----------         ------                     CBC Auto Differential[[id number]]        Abnormal            Final result                 Please view results for these tests on the individual orders.    CBC Auto Differential [[id number]]  (Abnormal) Collected: 05/29/24 1231    Specimen: Blood from Arm, Left Updated: 05/29/24 1243     WBC 19.22 10*3/mm3      RBC 3.85 10*6/mm3      Hemoglobin 11.9 g/dL      Hematocrit 37.3 %      MCV 96.9 fL      MCH 30.9 pg      MCHC 31.9 g/dL      RDW 14.6 %      RDW-SD 51.7 fl      MPV 10.0 fL      Platelets 354 10*3/mm3      Neutrophil % 89.2 %      Lymphocyte % 3.3 %      Monocyte % 5.4 %      Eosinophil % 0.3 %      Basophil % 0.7 %      Immature Grans % 1.1 %      Neutrophils, Absolute 17.14 10*3/mm3      Lymphocytes, Absolute 0.63 10*3/mm3      Monocytes, Absolute 1.04 10*3/mm3      Eosinophils, Absolute 0.05 10*3/mm3      Basophils, Absolute 0.14 10*3/mm3      Immature Grans, Absolute 0.22 10*3/mm3      nRBC 0.0 /100 WBC              Imaging Results (Last 24 Hours)       Procedure Component Value Units Date/Time    XR Chest 1 View [[id number]] Collected: 05/29/24 1438     Updated: 05/29/24 1442    Narrative:      XR CHEST 1 VW    Date of Exam: 5/29/2024 12:54 PM EDT    Indication: tachycardia    Comparison: Portable chest 5/16/2024    Findings:  Left-sided AICD noted. Prior sternotomy. Stable cardiomegaly and central pulmonary vascular congestion. No pneumothorax. No pleural effusion. Right reverse shoulder prosthesis partially imaged.      Impression:      Impression:  Cardiomegaly and central pulmonary vascular congestion.      Electronically Signed: Ronak Birmingham MD    5/29/2024 2:39 PM EDT    Workstation ID: [id number]        LAB RESULTS (LAST 7 DAYS)    CBC  Results from last 7 days   Lab Units 05/30/24  0243 05/29/24  1231   WBC 10*3/mm3 14.40* 19.22*   RBC 10*6/mm3 3.53* 3.85   HEMOGLOBIN g/dL 10.7* 11.9*   HEMATOCRIT % 35.0 37.3   MCV fL 99.2* 96.9   PLATELETS 10*3/mm3 362 354       BMP  Results from last 7 days   Lab Units 05/30/24  0243 05/29/24  1231   SODIUM mmol/L 132* 129*   POTASSIUM mmol/L 4.3 3.4*   CHLORIDE mmol/L 106 100   CO2 mmol/L 13.9* 13.5*   BUN mg/dL 13 19   CREATININE mg/dL 0.97 1.27*   GLUCOSE mg/dL 97 131*       CMP   Results from last 7 days   Lab Units 05/30/24  0243 05/29/24  1231   SODIUM mmol/L 132* 129*   POTASSIUM mmol/L 4.3 3.4*   CHLORIDE mmol/L 106 100   CO2 mmol/L 13.9* 13.5*   BUN mg/dL 13 19   CREATININE mg/dL 0.97 1.27*   GLUCOSE mg/dL 97 131*   ALBUMIN g/dL  --  3.1*   BILIRUBIN mg/dL  --  0.8   ALK PHOS U/L  --  162*   AST (SGOT) U/L  --  104*   ALT (SGPT) U/L  --  54*         BNP        TROPONIN  Results from last 7 days   Lab Units 05/29/24  1231   HSTROP T ng/L 30*       CoAg  Results from last 7 days   Lab Units 05/30/24  0243 05/29/24  1340   INR  4.14* 4.45*   APTT seconds  --  42.7*       Creatinine Clearance  Estimated Creatinine Clearance: 59.8 mL/min (by C-G formula based on SCr of  0.97 mg/dL).    ABG        Radiology  XR Chest 1 View    Result Date: 5/29/2024  Impression: Cardiomegaly and central pulmonary vascular congestion. Electronically Signed: Ronak Birmingham MD  5/29/2024 2:39 PM EDT  Workstation ID: [id number]         EKG            I personally viewed and interpreted the patient's EKG/Telemetry data:    ECHOCARDIOGRAM:    Results for orders placed during the hospital encounter of 01/22/24    Adult Transthoracic Echo Complete W/ Cont if Necessary Per Protocol    Interpretation Summary    Left ventricular ejection fraction appears to be 56 - 60%.    Estimated right ventricular systolic pressure from tricuspid regurgitation is normal (<35 mmHg).    Indication  Dyspnea  Palpitations    Technically satisfactory study.  Mitral valve is structurally normal.  Tricuspid valve is structurally normal.  Mild tricuspid regurgitation is present.  Aortic valve is aortic valve with decreased opening motion.  Gradient across the aortic valve is 16/9 mmHg.  Valve area 1.37 cm².  Pulmonic valve could not be well visualized.  Left atrium is enlarged.  Right atrium is normal in size.  Left ventricle is enlarged with septal anterior wall and apical severe hypokinesis with ejection fraction of 40%.  Possible left ventricular apical thrombus.  Right ventricle is normal in size.  Atrial septum is intact.  Aorta is normal.  No pericardial effusion or intracardiac thrombus is seen.  ICD lead is present in the right ventricle.    Impression  Mild tricuspid regurgitation.  Mild to moderate aortic valve stenosis with gradient of 16/9 mmHg and valve area of 1.37 cm².  Left atrial enlargement.  Left ventricle is enlarged with septal anterior wall and apical severe hypokinesis with ejection fraction of 40%.  Possible left ventricular apical thrombus.  ICD lead is present in the right ventricle.  No evidence for pulmonary hypertension is present.          STRESS TEST  Results for orders placed during the hospital  encounter of 01/22/24    Stress Test With Myocardial Perfusion One Day    Interpretation Summary  Indications  Status post CABG  Atrial fibrillation    This study was performed under the direct supervision of Rohini NOLASCO.    Resting ECG  Sinus rhythm    The patient was injected with Lexiscan intravenously while constantly monitoring electrocardiogram and vital signs.  Patient did not have any chest discomfort ST abnormalities or ectopy with injection of Lexiscan.    Cardiolite was used as an imaging agent.    Cardiolite images showed significantly decreased radionuclide uptake in the proximal posterior segments with partial redistribution in the resting images.    Gated SPECT images revealed normal left ventricle size and diffuse hypocontractility with calculated ejection fraction of 61%.    Impression  ========  Lexiscan Cardiolite test revealed proximal posterior infarction and ischemia.  Gated SPECT images revealed normal left ventricular size and diffuse hypocontractility with calculated ejection fraction of 61%.        Cardiolite (Tc-99m sestamibi) stress test    CARDIAC CATHETERIZATION  No results found for this or any previous visit.                OTHER:         Assessment & Plan     Principal Problem:    Atrial fibrillation with RVR  Active Problems:    Urinary tract infection      ]]]]]]]]]]]]]]]]]  History  =========  - Palpitations  Sinus tachycardia and premature atrial contractions.  Intermittent atrial fibrillation is present.     - History of atrial fibrillation with RVR.  Patient had postop atrial fibrillation after she had CABG in 2017.  Patient was in sinus rhythm.    - Anticoagulation-on Coumadin.     - Status post CABG and ASD closure 2017     - Ischemic cardiomyopathy     -Moderate aortic valve stenosis     - Status post ICD (single-chamber)-2017-[employer].     - Chronic anticoagulation-patient on Coumadin.  Home monitoring.  INR 1.75-5/19/2024.  INR 4.45-5/29/2024     - Hypothyroidism  dyslipidemia obstructive sleep apnea     - Past history of pulmonary emboli     - CKD 3  20/1.7-1/22/2024  17/1.08-5/19/2024.     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 There is normal antegrade color Doppler flow including the common iliac arteries. There is thrombus within the aneurysm sac. Dimensions are discussed in detail above.     - Family history of coronary artery disease     - Non-smoker     - Allergic to oxytetracycline and oxycodone.     Echocardiogram 1/23/2024 revealed  Mild tricuspid regurgitation.  Mild to moderate aortic valve stenosis with gradient of 16/9 mmHg and valve area of 1.37 cm².  Left atrial enlargement.  Left ventricle is enlarged with septal anterior wall and apical severe hypokinesis with ejection fraction of 40%.  Possible left ventricular apical thrombus.  ICD lead is present in the right ventricle.  No evidence for pulmonary hypertension is present.  =============  Plan  =============  Sinus tachycardia.  Urinary tract infection  Intermittent atrial fibrillation     Status post CABG and ASD closure 2017.  Patient is not having any angina pectoris or congestive heart failure.     Past history of history of A-fib with RVR.     Patient was in sinus rhythm     Ischemic cardiomyopathy      Mild to moderate aortic valve stenosis     Echocardiogram 1/23/2024 revealed  Mild tricuspid regurgitation.  Mild to moderate aortic valve stenosis with gradient of 16/9 mmHg and valve area of 1.37 cm².  Left atrial enlargement.  Left ventricle is enlarged with septal anterior wall and apical severe hypokinesis with ejection fraction of 40%.  Possible left ventricular apical thrombus.  ICD lead is present in the right ventricle.  No evidence for pulmonary hypertension is present.     Stress Cardiolite test-1/24/2024  Lexiscan  Cardiolite test revealed proximal posterior infarction and ischemia.  Gated SPECT images revealed normal left ventricular size and diffuse hypocontractility with calculated ejection fraction of 61%.     Status post ICD ([employer]) 2017.  Single-chamber.     History of left ventricular possible apical thrombus.  Continue anticoagulation.  Consider MERLE in the future if needed.  Patient is already anticoagulated.     Anticoagulation  INR 1.75-5/20/2024.  1.92-5/21/2024  2.74-5/22/2024  INR 4.45-5/29/2024.  4.14-5/30/2024  Hold Coumadin until INR comes down.     Renal dysfunction  17/1.08  24/1.44  25/1.25-5/22/2024  19/1.27-5/29/2024.  13/0.97-5/30/2024     Medications were reviewed and updated.  Current medications include Lipitor levothyroxine pantoprazole topiramate IV Cardizem  Coumadin is on hold.      Further plan will depend on patient's progress.     Reviewed and updated-5/30/2024.  ]]]]]]]]]]]]]]]]]]]]]           Allie Hurley MD  05/30/24  06:41 EDT

## 2024-05-31 ENCOUNTER — APPOINTMENT (OUTPATIENT)
Dept: CT IMAGING | Facility: HOSPITAL | Age: 69
DRG: 308 | End: 2024-05-31
Payer: MEDICARE

## 2024-05-31 LAB
ANION GAP SERPL CALCULATED.3IONS-SCNC: 9.2 MMOL/L (ref 5–15)
BASOPHILS # BLD AUTO: 0.16 10*3/MM3 (ref 0–0.2)
BASOPHILS NFR BLD AUTO: 1.3 % (ref 0–1.5)
BUN SERPL-MCNC: 9 MG/DL (ref 8–23)
BUN/CREAT SERPL: 9.4 (ref 7–25)
CALCIUM SPEC-SCNC: 8.5 MG/DL (ref 8.6–10.5)
CHLORIDE SERPL-SCNC: 107 MMOL/L (ref 98–107)
CHOLEST SERPL-MCNC: 145 MG/DL (ref 0–200)
CO2 SERPL-SCNC: 18.8 MMOL/L (ref 22–29)
CREAT SERPL-MCNC: 0.96 MG/DL (ref 0.57–1)
DEPRECATED RDW RBC AUTO: 52.5 FL (ref 37–54)
EGFRCR SERPLBLD CKD-EPI 2021: 64.2 ML/MIN/1.73
EOSINOPHIL # BLD AUTO: 0.21 10*3/MM3 (ref 0–0.4)
EOSINOPHIL NFR BLD AUTO: 1.8 % (ref 0.3–6.2)
ERYTHROCYTE [DISTWIDTH] IN BLOOD BY AUTOMATED COUNT: 14.8 % (ref 12.3–15.4)
GLUCOSE SERPL-MCNC: 141 MG/DL (ref 65–99)
HBA1C MFR BLD: 6.2 % (ref 4.8–5.6)
HCT VFR BLD AUTO: 31.6 % (ref 34–46.6)
HDLC SERPL-MCNC: 32 MG/DL (ref 40–60)
HGB BLD-MCNC: 9.9 G/DL (ref 12–15.9)
IMM GRANULOCYTES # BLD AUTO: 0.09 10*3/MM3 (ref 0–0.05)
IMM GRANULOCYTES NFR BLD AUTO: 0.8 % (ref 0–0.5)
INR PPP: 3.51 (ref 2–3)
LDLC SERPL CALC-MCNC: 88 MG/DL (ref 0–100)
LDLC/HDLC SERPL: 2.66 {RATIO}
LYMPHOCYTES # BLD AUTO: 0.73 10*3/MM3 (ref 0.7–3.1)
LYMPHOCYTES NFR BLD AUTO: 6.1 % (ref 19.6–45.3)
MCH RBC QN AUTO: 30.5 PG (ref 26.6–33)
MCHC RBC AUTO-ENTMCNC: 31.3 G/DL (ref 31.5–35.7)
MCV RBC AUTO: 97.2 FL (ref 79–97)
MONOCYTES # BLD AUTO: 0.67 10*3/MM3 (ref 0.1–0.9)
MONOCYTES NFR BLD AUTO: 5.6 % (ref 5–12)
NEUTROPHILS NFR BLD AUTO: 10.01 10*3/MM3 (ref 1.7–7)
NEUTROPHILS NFR BLD AUTO: 84.4 % (ref 42.7–76)
NRBC BLD AUTO-RTO: 0 /100 WBC (ref 0–0.2)
PLATELET # BLD AUTO: 321 10*3/MM3 (ref 140–450)
PMV BLD AUTO: 10.3 FL (ref 6–12)
POTASSIUM SERPL-SCNC: 4.4 MMOL/L (ref 3.5–5.2)
PROTHROMBIN TIME: 34.9 SECONDS (ref 19.4–28.5)
RBC # BLD AUTO: 3.25 10*6/MM3 (ref 3.77–5.28)
SODIUM SERPL-SCNC: 135 MMOL/L (ref 136–145)
TRIGL SERPL-MCNC: 140 MG/DL (ref 0–150)
VIT B12 BLD-MCNC: 734 PG/ML (ref 211–946)
VLDLC SERPL-MCNC: 25 MG/DL (ref 5–40)
WBC NRBC COR # BLD AUTO: 11.87 10*3/MM3 (ref 3.4–10.8)

## 2024-05-31 PROCEDURE — 85610 PROTHROMBIN TIME: CPT | Performed by: INTERNAL MEDICINE

## 2024-05-31 PROCEDURE — 99222 1ST HOSP IP/OBS MODERATE 55: CPT | Performed by: NURSE PRACTITIONER

## 2024-05-31 PROCEDURE — 25510000001 IOPAMIDOL PER 1 ML: Performed by: INTERNAL MEDICINE

## 2024-05-31 PROCEDURE — 25010000002 CEFTRIAXONE PER 250 MG

## 2024-05-31 PROCEDURE — 36415 COLL VENOUS BLD VENIPUNCTURE: CPT | Performed by: INTERNAL MEDICINE

## 2024-05-31 PROCEDURE — 83036 HEMOGLOBIN GLYCOSYLATED A1C: CPT | Performed by: NURSE PRACTITIONER

## 2024-05-31 PROCEDURE — 70496 CT ANGIOGRAPHY HEAD: CPT

## 2024-05-31 PROCEDURE — 80061 LIPID PANEL: CPT | Performed by: NURSE PRACTITIONER

## 2024-05-31 PROCEDURE — 97166 OT EVAL MOD COMPLEX 45 MIN: CPT

## 2024-05-31 PROCEDURE — 80048 BASIC METABOLIC PNL TOTAL CA: CPT

## 2024-05-31 PROCEDURE — 85025 COMPLETE CBC W/AUTO DIFF WBC: CPT

## 2024-05-31 PROCEDURE — 97162 PT EVAL MOD COMPLEX 30 MIN: CPT

## 2024-05-31 PROCEDURE — 70498 CT ANGIOGRAPHY NECK: CPT

## 2024-05-31 PROCEDURE — 99232 SBSQ HOSP IP/OBS MODERATE 35: CPT | Performed by: INTERNAL MEDICINE

## 2024-05-31 PROCEDURE — 82607 VITAMIN B-12: CPT | Performed by: NURSE PRACTITIONER

## 2024-05-31 RX ORDER — METOPROLOL SUCCINATE 25 MG/1
25 TABLET, EXTENDED RELEASE ORAL
Status: DISCONTINUED | OUTPATIENT
Start: 2024-05-31 | End: 2024-06-02

## 2024-05-31 RX ORDER — LORAZEPAM 0.5 MG/1
0.5 TABLET ORAL EVERY 6 HOURS PRN
Status: DISCONTINUED | OUTPATIENT
Start: 2024-05-31 | End: 2024-06-03 | Stop reason: HOSPADM

## 2024-05-31 RX ADMIN — ATORVASTATIN CALCIUM 80 MG: 40 TABLET, FILM COATED ORAL at 07:47

## 2024-05-31 RX ADMIN — NYSTATIN 500000 UNITS: 100000 SUSPENSION ORAL at 17:21

## 2024-05-31 RX ADMIN — CEFTRIAXONE 2000 MG: 2 INJECTION, POWDER, FOR SOLUTION INTRAMUSCULAR; INTRAVENOUS at 16:44

## 2024-05-31 RX ADMIN — NYSTATIN 500000 UNITS: 100000 SUSPENSION ORAL at 11:41

## 2024-05-31 RX ADMIN — NYSTATIN 500000 UNITS: 100000 SUSPENSION ORAL at 07:47

## 2024-05-31 RX ADMIN — TOPIRAMATE 50 MG: 25 TABLET, FILM COATED ORAL at 07:47

## 2024-05-31 RX ADMIN — NYSTATIN 500000 UNITS: 100000 SUSPENSION ORAL at 20:44

## 2024-05-31 RX ADMIN — HYDROCODONE BITARTRATE AND ACETAMINOPHEN 1 TABLET: 5; 325 TABLET ORAL at 14:38

## 2024-05-31 RX ADMIN — PANTOPRAZOLE SODIUM 40 MG: 40 INJECTION, POWDER, FOR SOLUTION INTRAVENOUS at 05:57

## 2024-05-31 RX ADMIN — IOPAMIDOL 100 ML: 755 INJECTION, SOLUTION INTRAVENOUS at 19:37

## 2024-05-31 RX ADMIN — PAROXETINE 40 MG: 40 TABLET, FILM COATED ORAL at 06:00

## 2024-05-31 RX ADMIN — LORAZEPAM 0.5 MG: 0.5 TABLET ORAL at 14:38

## 2024-05-31 RX ADMIN — LEVOTHYROXINE SODIUM 88 MCG: 88 TABLET ORAL at 05:57

## 2024-05-31 RX ADMIN — METOPROLOL SUCCINATE 25 MG: 25 TABLET, EXTENDED RELEASE ORAL at 11:37

## 2024-05-31 RX ADMIN — Medication 10 ML: at 07:47

## 2024-05-31 RX ADMIN — FLUCONAZOLE 100 MG: 100 TABLET ORAL at 20:44

## 2024-05-31 RX ADMIN — Medication 10 ML: at 20:44

## 2024-05-31 RX ADMIN — DIGOXIN 125 MCG: 125 TABLET ORAL at 11:37

## 2024-05-31 NOTE — PLAN OF CARE
Goal Outcome Evaluation:   Pt is alert to person but is confused. Pt denies pain/soa at this time. Falls/safety precautions in place.

## 2024-05-31 NOTE — CASE MANAGEMENT/SOCIAL WORK
Discharge Planning Assessment   [facility]     Patient Name: Laura Mejia  MRN: [id number]  Today's Date: 5/30/2024    Admit Date: 5/29/2024    Plan: home   Discharge Needs Assessment       Row Name 05/30/24 2002       Living Environment    People in Home spouse    Name(s) of People in Home Spouse Lamont    Current Living Arrangements home    Potentially Unsafe Housing Conditions none    In the past 12 months has the electric, gas, oil, or water company threatened to shut off services in your home? No    Primary Care Provided by self    Provides Primary Care For spouse    Family Caregiver if Needed spouse    Family Caregiver Names Lamont    Quality of Family Relationships helpful;involved;supportive    Able to Return to Prior Arrangements no       Resource/Environmental Concerns    Resource/Environmental Concerns none    Transportation Concerns none       Transportation Needs    In the past 12 months, has lack of transportation kept you from medical appointments or from getting medications? no       Food Insecurity    Within the past 12 months, you worried that your food would run out before you got the money to buy more. Never true    Within the past 12 months, the food you bought just didn't last and you didn't have money to get more. Never true       Transition Planning    Patient/Family Anticipates Transition to home with family    Transportation Anticipated family or friend will provide  Spouse: Lamont can transport at d/c       Discharge Needs Assessment    Equipment Currently Used at Home cpap;glucometer;shower chair;cane, straight                   Discharge Plan       Row Name 05/30/24 2009       Plan    Plan home    Patient/Family in Agreement with Plan yes    Plan Comments CM spoke to pt. at bedside. Pt. denied financial, medication, or transporation issues. pt. wants M2B. PCP and pharmacy confirmed. Pt's spouse Lamont can transport at d/c. DC barriers: Cardiology consult, IV meds.                   Continued Care and Services - Admitted Since 5/29/2024    No active coordination exists for this encounter.          Demographic Summary       Row Name 05/30/24 2000       General Information    Admission Type inpatient    Arrived From home    Required Notices Provided Important Message from Medicare    Referral Source admission list    Reason for Consult discharge planning    Preferred Language English       Contact Information    Permission Granted to Share Info With     Contact Information Obtained for                    Functional Status       Row Name 05/30/24 2001       Functional Status    Usual Activity Tolerance good    Current Activity Tolerance moderate       Physical Activity    On average, how many days per week do you engage in moderate to strenuous exercise (like a brisk walk)? 7 days    On average, how many minutes do you engage in exercise at this level? 60 min    Number of minutes of exercise per week 420       Functional Status, IADL    Medications independent    Meal Preparation independent    Housekeeping independent    Laundry independent    Shopping independent                Patient Forms       Row Name 05/30/24 2011       Patient Forms    Important Message from Medicare (IMM) Delivered  per registration on 5/30/2024                    Xin Roman RN    Office: [phone]  Fax: [phone]  [email or web address]

## 2024-05-31 NOTE — THERAPY EVALUATION
Patient Name: Laura Mejia  : 1955    MRN: [id number]                              Today's Date: 2024       Admit Date: 2024    Visit Dx:     ICD-10-CM ICD-9-CM   1. Tachycardia  R00.0 785.0   2. Urinary tract infection without hematuria, site unspecified  N39.0 599.0   3. Leukocytosis, unspecified type  D72.829 288.60     Patient Active Problem List   Diagnosis    New onset atrial fibrillation    Near syncope    S/P CABG (coronary artery bypass graft)    Abnormal nuclear stress test    Altered mental status    Altered mental status, unspecified    Atrial fibrillation with RVR    Urinary tract infection     Past Medical History:   Diagnosis Date    AAA (abdominal aortic aneurysm)     infrarenal- 3.1cm(, 3.7x4.2cm(), 3.9cm ()    Allergic rhinitis     Aspiration pneumonia 2017    CHF (congestive heart failure)     Coronary artery disease     DDD (degenerative disc disease), lumbar     lumbar spine- Dr. Churchill     Depression     Diverticulosis     Frequent UTI     Dr. Daniels    GERD (gastroesophageal reflux disease)     Hiatal hernia     HSV (herpes simplex virus) infection     Oral    Hyperlipidemia     IBS (irritable bowel syndrome)     Constipation predominant    Ischemic cardiomyopathy 2017    AICD     Kidney stones     NSTEMI (non-ST elevated myocardial infarction) 2017    Obstructive sleep apnea 2011    nfsg 2011, ahi 68    Osteoarthritis     Osteopenia     Peripheral neuropathy     feet    Pulmonary embolism, bilateral 2017    Rotator cuff tear     Bilateral- Ortho      Past Surgical History:   Procedure Laterality Date    CARDIAC CATHETERIZATION  2017    99% mid LAD. 90% mid LCX. 60% RCA. Recommended CABG. Dr. Diaz    CARDIAC DEFIBRILLATOR PLACEMENT  2017    ICD implant/ Dr. Ellis    CATARACT EXTRACTION      CORONARY ARTERY BYPASS GRAFT  04/26/2017    x4 & ASD Closure    CYSTOSCOPY      negative. Dr. Daniels    LAPAROSCOPIC CHOLECYSTECTOMY   04/17/2013    For biliary dyskinesia. Dr. Mccoy.     REPLACEMENT TOTAL KNEE BILATERAL  11/2004    Dr. Herrera    THORACENTESIS Left 05/08/2017    TONSILLECTOMY      TUBAL ABDOMINAL LIGATION Bilateral       General Information       Row Name 05/31/24 1515          OT Time and Intention    Document Type evaluation  -MS     Mode of Treatment occupational therapy  -MS       Row Name 05/31/24 1515          General Information    Patient Profile Reviewed yes  -MS     Prior Level of Function independent:;ADL's;all household mobility;community mobility  -MS     Existing Precautions/Restrictions fall  -MS     Barriers to Rehab medically complex;cognitive status;previous functional deficit  -MS       Row Name 05/31/24 1515          Occupational Profile    Reason for Services/Referral (Occupational Profile) Pt is a 69 [facility] follow up PCP appointment with tachycardia, found to be in A.fib with RVR. Pt recently admitted 5/16/24-5/22/24 d/t AMS, hallucinations, MRI brain indicated 2mm acute/subacute cortical high L frontal lobe, likely d/t subtherapeutic INR. Neurology was consulted, pt left prior to consult. PT recommended SNF at dc, pt and spouse declined and dc home, PT recommended use of RW, pt noncompliant at dc. PMHx significant for hx CABG, hx PE, frequent UTI and hx A.fib on warfarin. PLOF obtained from previous PT evaluation: At baseline pt resides with spouse in multilevel home with 3 LEO. Pt typically (I) with ADLs, no use of AD for mobility, has RW uses infrequently.  -MS       Row Name 05/31/24 1515          Living Environment    People in Home spouse  -MS       Row Name 05/31/24 1515          Home Main Entrance    Number of Stairs, Main Entrance three  -MS       Row Name 05/31/24 1515          Stairs Within Home, Primary    Number of Stairs, Within Home, Primary other (see comments)  per chart review, appears to have multi-level home  -MS       Row Name 05/31/24 1515          Cognition     Orientation Status (Cognition) oriented x 3;other (see comments);disoriented to;place  pt oriented and reports hospital, however later reports she is in a nurses home, reports she does not know who's room she is in. unable to be re-oriented  -MS       Row Name 05/31/24 1515          Safety Issues, Functional Mobility    Safety Issues Affecting Function (Mobility) ability to follow commands;awareness of need for assistance;impulsivity;insight into deficits/self-awareness;judgment;positioning of assistive device;problem-solving;safety precautions follow-through/compliance  -MS     Impairments Affecting Function (Mobility) balance;cognition;endurance/activity tolerance;pain;postural/trunk control;strength  -MS     Cognitive Impairments, Mobility Safety/Performance attention;awareness, need for assistance;impulsivity;insight into deficits/self-awareness;judgment;problem-solving/reasoning;safety precaution follow-through  -MS               User Key  (r) = Recorded By, (t) = Taken By, (c) = Cosigned By      Initials Name Provider Type    MS Isabel Walsh, OT Occupational Therapist                     Mobility/ADL's       Row Name 05/31/24 1517          Bed Mobility    Bed Mobility supine-sit;sit-supine  -MS     Supine-Sit [place] (Bed Mobility) minimum assist (75% patient effort);verbal cues  -MS     Sit-Supine [place] (Bed Mobility) standby assist  -MS     Bed Mobility, Safety Issues cognitive deficits limit understanding  -MS     Assistive Device (Bed Mobility) bed rails;head of bed elevated  -MS       Row Name 05/31/24 1517          Transfers    Transfers sit-stand transfer;toilet transfer  -MS       Row Name 05/31/24 1517          Sit-Stand Transfer    Sit-Stand [place] (Transfers) moderate assist (50% patient effort);2 person assist  -MS     Assistive Device (Sit-Stand Transfers) walker, front-wheeled  -MS     Comment, (Sit-Stand Transfer) cues for hand placement for safety  -MS       Row Name  05/31/24 1517          Toilet Transfer    [place] Level (Toilet Transfer) moderate assist (50% patient effort);2 person assist  -MS     Assistive Device (Toilet Transfer) walker, front-wheeled;commode, bedside without drop arms  -MS     Comment, (Toilet Transfer) poor eccentric control, no hands on BSC when sitting, RW tilting back, therapist assist in stabilizing  -MS       Row Name 05/31/24 1517          Activities of Daily Living    BADL Assessment/Intervention lower body dressing;toileting  -MS       Row Name 05/31/24 1517          Lower Body Dressing Assessment/Training    [place] Level (Lower Body Dressing) don;doff;pants/bottoms;maximum assist (25% patient effort)  -MS     Comment, (Lower Body Dressing) don/doff brief, cognition and weakness limits (I)  -MS       Row Name 05/31/24 1517          Toileting Assessment/Training    [place] Level (Toileting) moderate assist (50% patient effort)  -MS     Assistive Devices (Toileting) commode, bedside without drop arms  -MS     Position (Toileting) unsupported sitting  -MS     Comment, (Toileting) cognition and weakness limits (I), soiled upon arrival, transferred to [facility] [place]  -MS               User Key  (r) = Recorded By, (t) = Taken By, (c) = Cosigned By      Initials Name Provider Type    Isabel Eddy OT Occupational Therapist                   Obj/Interventions       [facility] Name 05/31/24 1520          Sensory Assessment (Somatosensory)    Sensory Assessment (Somatosensory) unable/difficult to assess  -MS       [facility] Name 05/31/24 1520          Vision Assessment/Intervention    Visual Impairment/Limitations unable/difficult to assess  -MS       [facility] Name 05/31/24 1520          Range of Motion Comprehensive    Comment, General Range of Motion BUE ROM WFL  -MS       Row Name 05/31/24 1520          Strength Comprehensive (MMT)    Comment, General Manual Muscle Testing (MMT) Assessment BUE functionally 3+/5  -MS       [facility] Name 05/31/24 1520           Balance    Balance Assessment sitting static balance;sitting dynamic balance;standing static balance;standing dynamic balance  -MS     Static Sitting Balance contact guard  -MS     Dynamic Sitting Balance contact guard;minimal assist  -MS     Position, Sitting Balance unsupported;sitting edge of bed  -MS     Static Standing Balance moderate assist;2-person assist  -MS     Dynamic Standing Balance moderate assist;2-person assist  -MS     Position/Device Used, Standing Balance supported;walker, front-wheeled  -MS               User Key  (r) = Recorded By, (t) = Taken By, (c) = Cosigned By      Initials Name Provider Type    Isabel Eddy, OT Occupational Therapist                   Goals/Plan       Row Name 05/31/24 1526          Bed Mobility Goal 1 (OT)    Activity/Assistive Device (Bed Mobility Goal 1, OT) bed mobility activities, all  -MS     [place] Level/Cues Needed (Bed Mobility Goal 1, OT) modified independence  -MS     Time Frame (Bed Mobility Goal 1, OT) long term goal (LTG);2 weeks  -MS     Progress/Outcomes (Bed Mobility Goal 1, OT) new goal  -MS       Row Name 05/31/24 1526          Transfer Goal 1 (OT)    Activity/Assistive Device (Transfer Goal 1, OT) transfers, all  -MS     [place] Level/Cues Needed (Transfer Goal 1, OT) modified independence  -MS     Time Frame (Transfer Goal 1, OT) long term goal (LTG);2 weeks  -MS     Progress/Outcome (Transfer Goal 1, OT) new goal  -MS       Row Name 05/31/24 1526          Toileting Goal 1 (OT)    Activity/Device (Toileting Goal 1, OT) toileting skills, all  -MS     [place] Level/Cues Needed (Toileting Goal 1, OT) modified independence  -MS     Time Frame (Toileting Goal 1, OT) long term goal (LTG);2 weeks  -MS     Progress/Outcome (Toileting Goal 1, OT) new goal  -MS       Row Name 05/31/24 1526          Problem Specific Goal 1 (OT)    Problem Specific Goal 1 (OT) increase activity tolerance needed for ADL routine >5 minutes without rest break   -MS     Time Frame (Problem Specific Goal 1, OT) long term goal (LTG);2 weeks  -MS     Progress/Outcome (Problem Specific Goal 1, OT) new goal  -MS       Row Name 05/31/24 1526          Therapy Assessment/Plan (OT)    Planned Therapy Interventions (OT) activity tolerance training;adaptive equipment training;BADL retraining;functional balance retraining;IADL retraining;occupation/activity based interventions;passive ROM/stretching;patient/caregiver education/training;transfer/mobility retraining;strengthening exercise;ROM/therapeutic exercise;neuromuscular control/coordination retraining;cognitive/visual perception retraining  -MS               User Key  (r) = Recorded By, (t) = Taken By, (c) = Cosigned By      Initials Name Provider Type    MS Isabel Walsh, OT Occupational Therapist                   Clinical Impression       Row Name 05/31/24 1521          Pain Assessment    Pain Intervention(s) Repositioned;Emotional support  -MS     Additional Documentation Pain Scale: FACES Pre/Post-Treatment (Group)  -MS       Row Name 05/31/24 1521          Pain Scale: FACES Pre/Post-Treatment    Pain: FACES Scale, Pretreatment 2-->hurts little bit  -MS     Posttreatment Pain Rating 2-->hurts little bit  -MS     Pain Location - Side/Orientation Bilateral;Left;Right  -MS     Pain Location upper  -MS     Pain Location - extremity  -MS     Pre/Posttreatment Pain Comment c/o pain in neck, BUE and BLE  -MS       Row Name 05/31/24 1521          Plan of Care Review    Plan of Care Reviewed With patient  -MS     Progress no change  -MS     Outcome Evaluation Pt is a 69 y/o F admitted to [facility] 5/29/24 from follow up PCP appointment with tachycardia, found to be in A.fib with RVR. Pt recently admitted 5/16/24-5/22/24 d/t AMS, hallucinations, MRI brain indicated 2mm acute/subacute infarct of cortical high L frontal lobe, likely d/t subtherapeutic INR. PT recommended SNF at dc, pt and spouse declined and dc home, PT recommended use of RW,  pt noncompliant at dc. PLOF obtained from previous PT evaluation d/t cognition: At baseline pt resides with spouse in multilevel home with 3 LEO. Pt typically (I) with ADLs, no use of AD for mobility, has RW uses infrequently. This date pt A&Ox3, reports she is in hospital, however later reports this is a nurse's home. Pt confused throughout, unable to be re-oriented to situation/place dispite explanation. Pt requires min A supine to sit,CGA-min A for seated balance, mod A x2 to come to standing and mod A x2 RW to transfer from bed to BSC. Pt demo poor safety awareness with transfer as pt unable to follow commands for hand placement and poor eccentric control to BSC, causing therapist to stabilize RW to prevent tilting on pt. Pt noted to be tachycardic throughout. Pt functioning far below baseline and is at significantly high risk for falls. OT recommending SNF when medically appropriate for dc, OT will follow while admitted.  -MS       Row Name 05/31/24 1521          Therapy Assessment/Plan (OT)    Rehab Potential (OT) good, to achieve stated therapy goals  -MS     Criteria for Skilled Therapeutic Interventions Met (OT) yes;meets criteria;skilled treatment is necessary  -MS     Therapy Frequency (OT) 5 times/wk  -MS     Predicted Duration of Therapy Intervention (OT) until d/c  -MS       Row Name 05/31/24 1521          Therapy Plan Review/Discharge Plan (OT)    Anticipated Discharge Disposition (OT) skilled nursing facility  -MS       Row Name 05/31/24 1521          Vital Signs    Pretreatment Heart Rate (beats/min) 102  -MS     Intratreatment Heart Rate (beats/min) 154  -MS     O2 Delivery Pre Treatment room air  -MS     O2 Delivery Intra Treatment room air  -MS     O2 Delivery Post Treatment room air  -MS     Pre Patient Position Supine  -MS     Intra Patient Position Standing  -MS     Post Patient Position Supine  -MS       Row Name 05/31/24 1521          Positioning and Restraints    Pre-Treatment Position in  bed  -MS     Post Treatment Position bed  -MS     In Bed notified nsg;supine;call light within reach;encouraged to call for assist;exit alarm on  -MS               User Key  (r) = Recorded By, (t) = Taken By, (c) = Cosigned By      Initials Name Provider Type    Isabel Eddy, OT Occupational Therapist                   Outcome Measures       Row Name 05/31/24 1527          How much help from another is currently needed...    Putting on and taking off regular lower body clothing? 2  -MS     Bathing (including washing, rinsing, and drying) 2  -MS     Toileting (which includes using toilet bed pan or urinal) 2  -MS     Putting on and taking off regular upper body clothing 2  -MS     Taking care of personal grooming (such as brushing teeth) 3  -MS     Eating meals 3  -MS     AM-PAC 6 Clicks Score (OT) 14  -MS       Row Name 05/31/24 0805          How much help from another person do you currently need...    Turning from your back to your side while in flat bed without using bedrails? 4  -SERAFIN     Moving from lying on back to sitting on the side of a flat bed without bedrails? 4  -SERAFIN     Moving to and from a bed to a chair (including a wheelchair)? 3  -SERAFIN     Standing up from a chair using your arms (e.g., wheelchair, bedside chair)? 3  -SERAFIN     Climbing 3-5 steps with a railing? 2  -SERAFIN     To walk in hospital room? 2  -SERAFIN     AM-PAC 6 Clicks Score (PT) 18  -SERAFIN     Highest Level of Mobility Goal 6 --> Walk 10 steps or more  -SERAFIN       Row Name 05/31/24 1527          Modified [place] Scale    Pre-Stroke Modified [place] Scale 3 - Moderate disability.  Requiring some help, but able to walk without assistance.  -MS     Modified [place] Scale 4 - Moderately severe disability.  Unable to walk without assistance, and unable to attend to own bodily needs without assistance.  -MS       Row Name 05/31/24 1527          Functional Assessment    Outcome Measure Options AM-PAC 6 Clicks Daily Activity (OT);Modified [place]  -MS                User Key  (r) = Recorded By, (t) = Taken By, (c) = Cosigned By      Initials Name Provider Type    Ewa Garcia RN Registered Nurse    MS Isabel Walsh OT Occupational Therapist                    Occupational Therapy Education       Title: PT OT SLP Therapies (Done)       Topic: Occupational Therapy (Done)       Point: ADL training (Done)       Description:   Instruct learner(s) on proper safety adaptation and remediation techniques during self care or transfers.   Instruct in proper use of assistive devices.                  Learning Progress Summary             Patient Acceptance, E,TB, VU,NR by MS at 5/31/2024 1527                         Point: Precautions (Done)       Description:   Instruct learner(s) on prescribed precautions during self-care and functional transfers.                  Learning Progress Summary             Patient Acceptance, E,TB, VU,NR by MS at 5/31/2024 1527                         Point: Body mechanics (Done)       Description:   Instruct learner(s) on proper positioning and spine alignment during self-care, functional mobility activities and/or exercises.                  Learning Progress Summary             Patient Acceptance, E,TB, VU,NR by MS at 5/31/2024 1527                                         User Key       Initials Effective Dates Name Provider Type Discipline    MS 07/13/22 -  Isabel Walsh OT Occupational Therapist OT                  OT Recommendation and Plan  Planned Therapy Interventions (OT): activity tolerance training, adaptive equipment training, BADL retraining, functional balance retraining, IADL retraining, occupation/activity based interventions, passive ROM/stretching, patient/caregiver education/training, transfer/mobility retraining, strengthening exercise, ROM/therapeutic exercise, neuromuscular control/coordination retraining, cognitive/visual perception retraining  Therapy Frequency (OT): 5 times/wk  Plan of Care Review  Plan of Care  Reviewed With: patient  Progress: no change  Outcome Evaluation: Pt is a 67 y/o F admitted to [facility] 5/29/24 from follow up PCP appointment with tachycardia, found to be in A.fib with RVR. Pt recently admitted 5/16/24-5/22/24 d/t AMS, hallucinations, MRI brain indicated 2mm acute/subacute infarct of cortical high L frontal lobe, likely d/t subtherapeutic INR. PT recommended SNF at dc, pt and spouse declined and dc home, PT recommended use of RW, pt noncompliant at dc. PLOF obtained from previous PT evaluation d/t cognition: At baseline pt resides with spouse in multilevel home with 3 LEO. Pt typically (I) with ADLs, no use of AD for mobility, has RW uses infrequently. This date pt A&Ox3, reports she is in hospital, however later reports this is a nurse's home. Pt confused throughout, unable to be re-oriented to situation/place dispite explanation. Pt requires min A supine to sit,CGA-min A for seated balance, mod A x2 to come to standing and mod A x2 RW to transfer from bed to BSC. Pt demo poor safety awareness with transfer as pt unable to follow commands for hand placement and poor eccentric control to BSC, causing therapist to stabilize RW to prevent tilting on pt. Pt noted to be tachycardic throughout. Pt functioning far below baseline and is at significantly high risk for falls. OT recommending SNF when medically appropriate for dc, OT will follow while admitted.     Time Calculation:                   Isabel Walsh OT  5/31/2024

## 2024-05-31 NOTE — PROGRESS NOTES
LOS: 1 day   Patient Care Team:  Luan Joseph APRN as PCP - General (Family Medicine)  Jozef Otero MD as Consulting Physician (Nephrology)    Subjective     Interval History:     Patient Complaints: c/o back pain and fatigue.  Persistent tachycardia    History taken from: patient    Review of Systems   Constitutional:  Positive for activity change, appetite change and fatigue. Negative for fever.   HENT: Negative.     Respiratory: Negative.     Cardiovascular:  Positive for palpitations. Negative for chest pain.   Gastrointestinal: Negative.    Musculoskeletal:  Positive for arthralgias and back pain.   Neurological:  Positive for weakness.           Objective     Vital Signs  Temp:  [97.8 °F (36.6 °C)-98.3 °F (36.8 °C)] 97.8 °F (36.6 °C)  Heart Rate:  [] 117  Resp:  [16-18] 16  BP: ()/(47-75) 114/75    Physical Exam:     General Appearance:    Alert, cooperative, in no acute distress   Head:    Normocephalic, without obvious abnormality, atraumatic   Eyes:            Lids and lashes normal, conjunctivae and sclerae normal, no   icterus, no pallor, corneas clear, PERRLA   Ears:    Ears appear intact with no abnormalities noted   Throat:   No oral lesions, no thrush, oral mucosa moist   Neck:   No adenopathy, supple, trachea midline, no thyromegaly, no   carotid bruit, no JVD   Lungs:     Clear to auscultation,respirations regular, even and                  unlabored    Heart:    Tachy but regular   Chest Wall:    No abnormalities observed   Abdomen:     Normal bowel sounds, no masses, no organomegaly, soft        non-tender, non-distended, no guarding, no rebound                tenderness   Extremities:   Moves all extremities well, no edema, no cyanosis, no             redness   Pulses:   Pulses palpable and equal bilaterally   Skin:   No bleeding, bruising or rash   Lymph nodes:   No palpable adenopathy   Neurologic:   Cranial nerves 2 - 12 grossly intact, sensation intact, DTR       present  and equal bilaterally        Results Review:    Lab Results (last 24 hours)       Procedure Component Value Units Date/Time    Basic Metabolic Panel [[id number]]  (Abnormal) Collected: 05/31/24 0456    Specimen: Blood from Arm, Left Updated: 05/31/24 0555     Glucose 141 mg/dL      BUN 9 mg/dL      Creatinine 0.96 mg/dL      Sodium 135 mmol/L      Potassium 4.4 mmol/L      Chloride 107 mmol/L      CO2 18.8 mmol/L      Calcium 8.5 mg/dL      BUN/Creatinine Ratio 9.4     Anion Gap 9.2 mmol/L      eGFR 64.2 mL/min/1.73     Narrative:      GFR Normal >60  Chronic Kidney Disease <60  Kidney Failure <15      Protime-INR [[id number]]  (Abnormal) Collected: 05/31/24 0456    Specimen: Blood from Arm, Left Updated: 05/31/24 0543     Protime 34.9 Seconds      INR 3.51    CBC & Differential [[id number]]  (Abnormal) Collected: 05/31/24 0456    Specimen: Blood from Arm, Left Updated: 05/31/24 0533    Narrative:      The following orders were created for panel order CBC & Differential.  Procedure                               Abnormality         Status                     ---------                               -----------         ------                     CBC Auto Differential[[id number]]        Abnormal            Final result                 Please view results for these tests on the individual orders.    CBC Auto Differential [[id number]]  (Abnormal) Collected: 05/31/24 0456    Specimen: Blood from Arm, Left Updated: 05/31/24 0533     WBC 11.87 10*3/mm3      RBC 3.25 10*6/mm3      Hemoglobin 9.9 g/dL      Hematocrit 31.6 %      MCV 97.2 fL      MCH 30.5 pg      MCHC 31.3 g/dL      RDW 14.8 %      RDW-SD 52.5 fl      MPV 10.3 fL      Platelets 321 10*3/mm3      Neutrophil % 84.4 %      Lymphocyte % 6.1 %      Monocyte % 5.6 %      Eosinophil % 1.8 %      Basophil % 1.3 %      Immature Grans % 0.8 %      Neutrophils, Absolute 10.01 10*3/mm3      Lymphocytes, Absolute 0.73 10*3/mm3      Monocytes, Absolute 0.67 10*3/mm3       Eosinophils, Absolute 0.21 10*3/mm3      Basophils, Absolute 0.16 10*3/mm3      Immature Grans, Absolute 0.09 10*3/mm3      nRBC 0.0 /100 WBC     Blood Culture - Blood, Arm, Right [[id number]]  (Normal) Collected: 05/29/24 1708    Specimen: Blood from Arm, Right Updated: 05/30/24 1715     Blood Culture No growth at 24 hours    Blood Culture - Blood, Arm, Right [[id number]]  (Normal) Collected: 05/29/24 1532    Specimen: Blood from Arm, Right Updated: 05/30/24 1545     Blood Culture No growth at 24 hours    Narrative:      Less than seven (7) mL's of blood was collected.  Insufficient quantity may yield false negative results.    Respiratory Panel PCR w/COVID-19(SARS-CoV-2) [facility]/NATO/LISA/[facility] In-House, NP Swab in UTM/VTM, 2 HR TAT - Swab, Nasopharynx [[id number]]  (Normal) Collected: 05/30/24 1031    Specimen: Swab from Nasopharynx Updated: 05/30/24 1135     ADENOVIRUS, PCR Not Detected     Coronavirus 229E Not Detected     Coronavirus HKU1 Not Detected     Coronavirus NL63 Not Detected     Coronavirus OC43 Not Detected     COVID19 Not Detected     Human Metapneumovirus Not Detected     Human Rhinovirus/Enterovirus Not Detected     Influenza A PCR Not Detected     Influenza B PCR Not Detected     Parainfluenza Virus 1 Not Detected     Parainfluenza Virus 2 Not Detected     Parainfluenza Virus 3 Not Detected     Parainfluenza Virus 4 Not Detected     RSV, PCR Not Detected     Bordetella pertussis pcr Not Detected     Bordetella parapertussis PCR Not Detected     Chlamydophila pneumoniae PCR Not Detected     Mycoplasma pneumo by PCR Not Detected    Narrative:      In the setting of a positive respiratory panel with a viral infection PLUS a negative procalcitonin without other underlying concern for bacterial infection, consider observing off antibiotics or discontinuation of antibiotics and continue supportive care. If the respiratory panel is positive for atypical bacterial infection (Bordetella pertussis,  Chlamydophila pneumoniae, or Mycoplasma pneumoniae), consider antibiotic de-escalation to target atypical bacterial infection.             Imaging Results (Last 24 Hours)       ** No results found for the last 24 hours. **                 I reviewed the patient's new clinical results.    Medication Review:   Scheduled Meds:atorvastatin, 80 mg, Oral, Daily  cefTRIAXone, 2,000 mg, Intravenous, Q24H  digoxin, 125 mcg, Oral, Daily  fluconazole, 100 mg, Oral, Q24H  levothyroxine, 88 mcg, Oral, Q AM  nystatin, 5 mL, Oral, 4x Daily  pantoprazole, 40 mg, Intravenous, Q AM  PARoxetine, 40 mg, Oral, QAM  sodium chloride, 10 mL, Intravenous, Q12H  topiramate, 50 mg, Oral, Daily      Continuous Infusions:dilTIAZem, 5-15 mg/hr, Last Rate: Stopped (05/30/24 1239)  Pharmacy to dose warfarin,       PRN Meds:.  acetaminophen    benzocaine-menthol    calcium carbonate    Calcium Replacement - Follow Nurse / BPA Driven Protocol    HYDROcodone-acetaminophen    Magnesium Standard Dose Replacement - Follow Nurse / BPA Driven Protocol    melatonin    ondansetron    Pharmacy to dose warfarin    Phosphorus Replacement - Follow Nurse / BPA Driven Protocol    Potassium Replacement - Follow Nurse / BPA Driven Protocol    [COMPLETED] Insert Peripheral IV **AND** sodium chloride    sodium chloride    sodium chloride     Assessment & Plan       Atrial fibrillation with RVR    Urinary tract infection    Thrush  H/O PE  H/o CAD  H/o AAA  Presence of ICD/pacer  Warfarin toxicitiy  Ischemic cardiomyopathy  Hypothyroidism  ANNIE  Possible left atrial thrombus  MRI brain 5/21 with Tiny 2 mm questionable focus of diffusion signal change within the high left frontal lobe cortex, may represent a tiny focal acute or subacute lacunar infarct.   - family missed f/u apt this past week to discuss this.  They are requesting neuro eval in the hospital     diltiazem drip stopped secondary to hypotension. Followed by cardiology.  On digoxin and starting toprol  today.  Holding warfarin due to elevated INR.  Will d/c Rocephin as urine culture shows only yeast.  She also has thrush, so was started on fluconazole and nystatin.      Plan for disposition:home when able. Will consult PT    Carolin Hatfield MD  05/31/24  10:13 EDT

## 2024-05-31 NOTE — THERAPY EVALUATION
Patient Name: Laura Mejia  : 1955    MRN: [id number]                              Today's Date: 2024       Admit Date: 2024    Visit Dx:     ICD-10-CM ICD-9-CM   1. Tachycardia  R00.0 785.0   2. Urinary tract infection without hematuria, site unspecified  N39.0 599.0   3. Leukocytosis, unspecified type  D72.829 288.60     Patient Active Problem List   Diagnosis    New onset atrial fibrillation    Near syncope    S/P CABG (coronary artery bypass graft)    Abnormal nuclear stress test    Altered mental status    Altered mental status, unspecified    Atrial fibrillation with RVR    Urinary tract infection     Past Medical History:   Diagnosis Date    AAA (abdominal aortic aneurysm)     infrarenal- 3.1cm(, 3.7x4.2cm(), 3.9cm ()    Allergic rhinitis     Aspiration pneumonia 2017    CHF (congestive heart failure)     Coronary artery disease     DDD (degenerative disc disease), lumbar     lumbar spine- Dr. Churchill     Depression     Diverticulosis     Frequent UTI     Dr. Daniels    GERD (gastroesophageal reflux disease)     Hiatal hernia     HSV (herpes simplex virus) infection     Oral    Hyperlipidemia     IBS (irritable bowel syndrome)     Constipation predominant    Ischemic cardiomyopathy 2017    AICD     Kidney stones     NSTEMI (non-ST elevated myocardial infarction) 2017    Obstructive sleep apnea 2011    nfsg 2011, ahi 67    Osteoarthritis     Osteopenia     Peripheral neuropathy     feet    Pulmonary embolism, bilateral 2017    Rotator cuff tear     Bilateral- Ortho      Past Surgical History:   Procedure Laterality Date    CARDIAC CATHETERIZATION  2017    99% mid LAD. 90% mid LCX. 60% RCA. Recommended CABG. Dr. Diaz    CARDIAC DEFIBRILLATOR PLACEMENT  2017    ICD implant/ Dr. Ellis    CATARACT EXTRACTION      CORONARY ARTERY BYPASS GRAFT  04/26/2017    x4 & ASD Closure    CYSTOSCOPY      negative. Dr. Daniels    LAPAROSCOPIC CHOLECYSTECTOMY   04/17/2013    For biliary dyskinesia. Dr. Mccoy.     REPLACEMENT TOTAL KNEE BILATERAL  11/2004    Dr. Herrera    THORACENTESIS Left 05/08/2017    TONSILLECTOMY      TUBAL ABDOMINAL LIGATION Bilateral       General Information       Row Name 05/31/24 1529          Physical Therapy Time and Intention    Document Type evaluation  -BR     Mode of Treatment physical therapy  -BR       Row Name 05/31/24 1529          General Information    Patient Profile Reviewed yes  -BR     Prior Level of Function independent:;all household mobility;gait;transfer  Pt reports she has a rolling walker but does not use it .  -BR     Existing Precautions/Restrictions fall  -BR     Barriers to Rehab cognitive status;medically complex;previous functional deficit  -BR       Row Name 05/31/24 1529          Living Environment    People in Home spouse  -BR       Row Name 05/31/24 1529          Home Main Entrance    Number of Stairs, Main Entrance two  -BR       Row Name 05/31/24 1529          Cognition    Orientation Status (Cognition) oriented to;person;situation;time  pt oriented and reports hospital, however later reports she is in a nurse's home, reports she does not know whose room she is in. Pt unable to be re-oriented.  -BR       Row Name 05/31/24 1529          Safety Issues, Functional Mobility    Impairments Affecting Function (Mobility) cognition;balance;endurance/activity tolerance;strength;coordination;pain  -BR     Cognitive Impairments, Mobility Safety/Performance attention;awareness, need for assistance;impulsivity;insight into deficits/self-awareness;judgment;problem-solving/reasoning;safety precaution awareness;safety precaution follow-through;sequencing abilities  -BR               User Key  (r) = Recorded By, (t) = Taken By, (c) = Cosigned By      Initials Name Provider Type    BR Trinity Mas, PT Physical Therapist                   Mobility       Row Name 05/31/24 1535          Bed Mobility    Bed Mobility  supine-sit;sit-supine  -BR     Supine-Sit [place] (Bed Mobility) minimum assist (75% patient effort);verbal cues;1 person assist  -BR     Sit-Supine [place] (Bed Mobility) standby assist  -BR     Assistive Device (Bed Mobility) bed rails;head of bed elevated  -BR       Row Name 05/31/24 1535          Sit-Stand Transfer    Sit-Stand [place] (Transfers) moderate assist (50% patient effort);2 person assist;verbal cues  -BR     Assistive Device (Sit-Stand Transfers) walker, front-wheeled  -BR       Row Name 05/31/24 1535          Gait/Stairs (Locomotion)    [place] Level (Gait) moderate assist (50% patient effort);2 person assist  -BR     Assistive Device (Gait) walker, front-wheeled  -BR     Patient was able to Ambulate yes  -BR     Distance in Feet (Gait) 5  -BR     Deviations/Abnormal Patterns (Gait) base of support, wide;jin decreased;weight shifting decreased  -BR     Bilateral Gait Deviations forward flexed posture;heel strike decreased  -BR               User Key  (r) = Recorded By, (t) = Taken By, (c) = Cosigned By      Initials Name Provider Type    BR Trinity Mas PT Physical Therapist                   Obj/Interventions       Row Name 05/31/24 1536          Range of Motion Comprehensive    General Range of Motion bilateral lower extremity ROM WFL  -BR       Row Name 05/31/24 1536          Strength Comprehensive (MMT)    Comment, General Manual Muscle Testing (MMT) Assessment BLE strength grossly 3+/5  -BR       Row Name 05/31/24 1536          Balance    Balance Assessment sitting static balance;sitting dynamic balance;standing static balance  -BR     Static Sitting Balance contact guard  -BR     Dynamic Sitting Balance contact guard;minimal assist  -BR     Position, Sitting Balance sitting edge of bed;unsupported  -BR     Static Standing Balance moderate assist;2-person assist  -BR     Position/Device Used, Standing Balance supported;walker, rolling  -BR       Row Name 05/31/24  1536          Sensory Assessment (Somatosensory)    Sensory Assessment (Somatosensory) unable/difficult to assess  -BR               User Key  (r) = Recorded By, (t) = Taken By, (c) = Cosigned By      Initials Name Provider Type    Trinity Gunn PT Physical Therapist                   Goals/Plan       Row Name 05/31/24 1547          Bed Mobility Goal 1 (PT)    Activity/Assistive Device (Bed Mobility Goal 1, PT) bed mobility activities, all  -BR     [place] Level/Cues Needed (Bed Mobility Goal 1, PT) modified independence  -BR     Time Frame (Bed Mobility Goal 1, PT) long term goal (LTG);2 weeks  -BR       Row Name 05/31/24 1547          Transfer Goal 1 (PT)    Activity/Assistive Device (Transfer Goal 1, PT) transfers, all  -BR     [place] Level/Cues Needed (Transfer Goal 1, PT) supervision required  -BR     Time Frame (Transfer Goal 1, PT) long term goal (LTG);2 weeks  -BR       Row Name 05/31/24 1547          Gait Training Goal 1 (PT)    Activity/Assistive Device (Gait Training Goal 1, PT) gait (walking locomotion);assistive device use  -BR     [place] Level (Gait Training Goal 1, PT) supervision required  -BR     Distance (Gait Training Goal 1, PT) 100  -BR     Time Frame (Gait Training Goal 1, PT) long term goal (LTG);2 weeks  -BR       Row Name 05/31/24 1547          Therapy Assessment/Plan (PT)    Planned Therapy Interventions (PT) balance training;bed mobility training;gait training;patient/family education;transfer training;strengthening;neuromuscular re-education;vestibular therapy;postural re-education;ROM (range of motion)  -BR               User Key  (r) = Recorded By, (t) = Taken By, (c) = Cosigned By      Initials Name Provider Type    Trinity Gunn, PT Physical Therapist                   Clinical Impression       Row Name 05/31/24 1537          Pain Scale: FACES Pre/Post-Treatment    Pain: FACES Scale, Pretreatment 2-->hurts little bit  -BR     Posttreatment Pain Rating  2-->hurts little bit  -BR     Pain Location - Side/Orientation Bilateral  -BR     Pain Location - hip;shoulder  -BR       Row Name 05/31/24 3548          Plan of Care Review    Outcome Evaluation Pt presents as a 67 y/o F admitted to [facility] on 5/29/24 from follow up PCP appointment with tachycardia, found to be in A.fib with RVR. Pt recently admitted 5/16/24-5/22/24 d/t AMS, hallucinations.  MRI brain indicated 2mm acute/subacute infarct of cortical high L frontal lobe. PLOF obtained from previous PT evaluation d/t cognition: At baseline pt resides with spouse in multilevel home with 3 LEO. Pt typically independent with household mobility; pt has a rolling walker but pt reports she does not use it. On prior admission, PT strongly recommended that pt use a rolling walker.  This date,  pt disoriented to place. Pt reports she is in hospital, however later reports this is a nurse's home and she and her spouse are just here visiting friends.Pt requires min A supine to sit,CGA-min A for seated balance, mod A x2 to come to standing and mod A x2 RW to transfer from bed to BSC. Patient is a high risk of injurious falls and unsafe to return to prior living environment at this time.   Pt has poor safety awareness and was impulsive with all mobility. Pt noted to be tachycardic throughout transfer to [facility] and back to bed. Pt functioning far below baseline and pt is at high risk for falls. Pt has poor awareness of her functional deficits. PT will follow pt with PT recommendation of Skilled Nursing Facility.  -BR       Row Name 05/31/24 8205          Therapy Assessment/Plan (PT)    Rehab Potential (PT) good, to achieve stated therapy goals  -BR     Criteria for Skilled Interventions Met (PT) yes;meets criteria;skilled treatment is necessary  -BR     Therapy Frequency (PT) 5 times/wk  -BR     Predicted Duration of Therapy Intervention (PT) until D/C  -BR       Row Name 05/31/24 3483          Vital Signs    Pre Systolic BP Rehab 106   -BR     Pre Treatment Diastolic BP 58  -BR     Pretreatment Heart Rate (beats/min) 102  -BR     Intratreatment Heart Rate (beats/min) 154  -BR     Posttreatment Heart Rate (beats/min) 108  -BR     O2 Delivery Pre Treatment room air  -BR     Pre Patient Position Supine  -BR     Intra Patient Position Standing  -BR     Post Patient Position Supine  -BR       Row Name 05/31/24 1537          Positioning and Restraints    Pre-Treatment Position in bed  -BR     Post Treatment Position bed  -BR     In Bed notified nsg;supine;call light within reach;encouraged to call for assist;side rails up x3;exit alarm on  -BR               User Key  (r) = Recorded By, (t) = Taken By, (c) = Cosigned By      Initials Name Provider Type    BR Trinity Mas, PT Physical Therapist                   Outcome Measures       Row Name 05/31/24 1547 05/31/24 0805       How much help from another person do you currently need...    Turning from your back to your side while in flat bed without using bedrails? 3  -BR 4  -SERAFIN    Moving from lying on back to sitting on the side of a flat bed without bedrails? 3  -BR 4  -SERAFIN    Moving to and from a bed to a chair (including a wheelchair)? 2  -BR 3  -SERAFIN    Standing up from a chair using your arms (e.g., wheelchair, bedside chair)? 2  -BR 3  -SERAFIN    Climbing 3-5 steps with a railing? 1  -BR 2  -SERAFIN    To walk in hospital room? 2  -BR 2  -SERAFIN    AM-PAC 6 Clicks Score (PT) 13  -BR 18  -SERAFIN    Highest Level of Mobility Goal 4 --> Transfer to chair/commode  -BR 6 --> Walk 10 steps or more  -SERAFIN      Row Name 05/31/24 1549 05/31/24 1527       Modified [place] Scale    Pre-Stroke Modified [place] Scale 1 - No significant disability despite symptoms.  Able to carry out all usual duties and activities.  -BR 3 - Moderate disability.  Requiring some help, but able to walk without assistance.  -MS    Modified [place] Scale 4 - Moderately severe disability.  Unable to walk without assistance, and unable to attend to own  bodily needs without assistance.  -BR 4 - Moderately severe disability.  Unable to walk without assistance, and unable to attend to own bodily needs without assistance.  -MS      Row Name 05/31/24 1549 05/31/24 1547       Functional Assessment    Outcome Measure Options Modified [place]  -BR AM-PAC 6 Clicks Basic Mobility (PT)  -BR      Row Name 05/31/24 1527          Functional Assessment    Outcome Measure Options AM-PAC 6 Clicks Daily Activity (OT);Modified [place]  -MS               User Key  (r) = Recorded By, (t) = Taken By, (c) = Cosigned By      Initials Name Provider Type    Ewa Garcia, RN Registered Nurse    Isabel Eddy, OT Occupational Therapist    BR Trinity Mas, PT Physical Therapist                                   PT Recommendation and Plan  Planned Therapy Interventions (PT): balance training, bed mobility training, gait training, patient/family education, transfer training, strengthening, neuromuscular re-education, vestibular therapy, postural re-education, ROM (range of motion)  Outcome Evaluation: Pt presents as a 69 y/o F admitted to [facility] on 5/29/24 from follow up PCP appointment with tachycardia, found to be in A.fib with RVR. Pt recently admitted 5/16/24-5/22/24 d/t AMS, hallucinations.  MRI brain indicated 2mm acute/subacute infarct of cortical high L frontal lobe. PLOF obtained from previous PT evaluation d/t cognition: At baseline pt resides with spouse in multilevel home with 3 LEO. Pt typically independent with household mobility; pt has a rolling walker but pt reports she does not use it. On prior admission, PT strongly recommended that pt use a rolling walker.  This date,  pt disoriented to place. Pt reports she is in hospital, however later reports this is a nurse's home and she and her spouse are just here visiting friends.Pt requires min A supine to sit,CGA-min A for seated balance, mod A x2 to come to standing and mod A x2 RW to transfer from bed to [facility]. Patient is  a high risk of injurious falls and unsafe to return to prior living environment at this time.   Pt has poor safety awareness and was impulsive with all mobility. Pt noted to be tachycardic throughout transfer to [facility] and back to bed. Pt functioning far below baseline and pt is at high risk for falls. Pt has poor awareness of her functional deficits. PT will follow pt with PT recommendation of Skilled Nursing Facility.     Time Calculation:         PT Charges       Row Name 05/31/24 1548             Time Calculation    Start Time 1410  -BR      Stop Time 1437  -BR      Time Calculation (min) 27 min  -BR      PT Received On 05/31/24  -BR      PT - Next Appointment 06/01/24  -BR      PT Goal Re-Cert Due Date 06/14/24  -BR         Time Calculation- PT    Total Timed Code Minutes- PT 0 minute(s)  -BR                User Key  (r) = Recorded By, (t) = Taken By, (c) = Cosigned By      Initials Name Provider Type    BR Trinity Mas, PT Physical Therapist                  Therapy Charges for Today       Code Description Service Date Service Provider Modifiers Qty    32126045415 HC PT EVAL MOD COMPLEXITY 4 5/31/2024 Trinity Mas, PT GP 1            PT G-Codes  Outcome Measure Options: Modified [place]  AM-PAC 6 Clicks Score (PT): 13  AM-PAC 6 Clicks Score (OT): 14  Modified [place] Scale: 4 - Moderately severe disability.  Unable to walk without assistance, and unable to attend to own bodily needs without assistance.  PT Discharge Summary  Anticipated Discharge Disposition (PT): skilled nursing facility    Trinity Mas PT  5/31/2024

## 2024-05-31 NOTE — CASE MANAGEMENT/SOCIAL WORK
Continued Stay Note   [facility]     Patient Name: Laura Mejia  MRN: [id number]  Today's Date: 5/31/2024    Admit Date: 5/29/2024    Plan: PT recc SNF.  Pending choices.  Will need Precert and PASRR.   From home with spouse.   Discharge Plan       Row Name 05/31/24 1529       Plan    Plan PT recc SNF.  Pending choices.  Will need Precert and PASRR.   From home with spouse.    Plan Comments Notified by OT that recc for SNF.  Will need choices.   Spouse not at bedside at this time.  Readmission assessment completed per chart review.  BArriers to discharge: Cardizem gtt.  IV abx.  Cardio following. Neuro consult             Sandra Singh RN    Office Phone [phone]  Office Cell [phone]        Case Management Readmission Assessment Note    Case Management Readmission Assessment (all recorded)       Readmission Interview       Row Name 05/31/24 1522             Readmission Indications    Is the patient and/or family able to complete the readmission assessment questions? No  patient confused, no family available. Questions answered per chart review      Is this hospitalization related to the prior hospital diagnosis? No        Row Name 05/31/24 1522             Recommendation for rehospitalization    Did you speak with your physician prior to coming to the hospital Yes      If yes, what physician did you speak with? Dr Casiano at PCP appointment        Row Name 05/31/24 1522             Follow-up Appointments    Do you have a PCP? Yes      Did you have an appointment with PCP after your hospitalization? Yes      When was your appointment scheduled? 05/30/24      Did you go to appointment? Yes      Did you have an appointment with a Specialist? No      Are you current with the Pulmonary Clinic? No      Are you current with the CHF Clinic? No        Row Name 05/31/24 1522             Medications    Did you have newly prescribed medications at discharge? Yes      Did you understand the  reasons for your medications at discharge and how to take them? Yes      Did you understand the side effects of your medications? Yes      Are you taking all of you prescribed medications? Yes      What pharmacy was used to fill prescription(s)? [facility]      Were medications picked up? Yes        Row Name 05/31/24 1522             Discharge Instructions    Did you understand your discharge instructions? Yes      Did your family/caregiver hear your instructions? Yes      Were you told to eat a special diet? Yes      Did you adhere to the diet? Yes      Were you given a number of someone to call if you had questions or concerns? Yes        Row Name 05/31/24 1522             Index discharge location/services    Where did you go upon discharge? Home  Home with family.  PT recc fro SNF vs Home with HH- declined both.      Do you have supportive family or friends in the home? Yes        Row Name 05/31/24 1522             Discharge Readiness    On a scale of 1-5 (5 being well prepared), how ready were you for discharge 4      Recommendation based on interview Goals of care discussion/advanced care planning        Row Name 05/31/24 1522             Palliative Care/Hospice    Are you current with Palliative Care? No      Are you current with Hospice Care? No        Row Name 05/31/24 1522 05/29/24 2042          Advance Directives (For Healthcare)    Pre-existing AND/MOST/POLST Order No No     Advance Directive Status Patient does not have advance directive Patient does not have advance directive     Have you reviewed your Advance Directive and is it valid for this stay? Not applicable Not applicable     Literature Provided on Advance Directives No --       Row Name 05/31/24 1522             Readmission Assessment Final Comments    Final Comments Last admit- confusion 2/2 UTI.  IV abx during admisison, DC home with PT abx.  MRI  brain done, plan to follow up with at 1 week pcp appt and OP neuro.  Patient insistent on dc  on day of dc.  This admit- Patient went to PCP appointment on 5/30. Annen had elevated HR. sent to ER dx with Afib RVR.  Started on cardizem gtt.  Cardio and neuro consults.  INR elevated.

## 2024-05-31 NOTE — PLAN OF CARE
Goal Outcome Evaluation:  Plan of Care Reviewed With: patient        Progress: no change  Outcome Evaluation: Pt is a 69 y/o F admitted to [facility] 5/29/24 from follow up PCP appointment with tachycardia, found to be in A.fib with RVR. Pt recently admitted 5/16/24-5/22/24 d/t AMS, hallucinations, MRI brain indicated 2mm acute/subacute infarct of cortical high L frontal lobe, likely d/t subtherapeutic INR. PT recommended SNF at dc, pt and spouse declined and dc home, PT recommended use of RW, pt noncompliant at dc. PLOF obtained from previous PT evaluation d/t cognition: At baseline pt resides with spouse in multilevel home with 3 LEO. Pt typically (I) with ADLs, no use of AD for mobility, has RW uses infrequently. This date pt A&Ox3, reports she is in hospital, however later reports this is a nurse's home. Pt confused throughout, unable to be re-oriented to situation/place dispite explanation. Pt requires min A supine to sit,CGA-min A for seated balance, mod A x2 to come to standing and mod A x2 RW to transfer from bed to BSC. Pt demo poor safety awareness with transfer as pt unable to follow commands for hand placement and poor eccentric control to BSC, causing therapist to stabilize RW to prevent tilting on pt. Pt noted to be tachycardic throughout. Pt functioning far below baseline and is at significantly high risk for falls. OT recommending SNF when medically appropriate for dc, OT will follow while admitted.      Anticipated Discharge Disposition (OT): skilled nursing facility

## 2024-05-31 NOTE — PROGRESS NOTES
Referring Provider: Keke Casiano MD    Reason for follow-up: Atrial fibrillation, coronary artery disease status post CABG     Patient Care Team:  Luan Joseph APRN as PCP - General (Family Medicine)  Jozef Otero MD as Consulting Physician (Nephrology)      SUBJECTIVE  Patient is resting comfortably in bed.  Multiple family members at bedside with questions regarding management of atrial fibrillation.     ROS  Review of all systems negative except as indicated.    Since I have last seen, the patient has been without any chest discomfort, shortness of breath, palpitations, dizziness or syncope.  Denies having any headache, abdominal pain, nausea, vomiting, diarrhea, constipation, loss of weight or loss of appetite.  Denies having any excessive bruising, hematuria or blood in the stool.        Personal History:    Past Medical History:   Diagnosis Date    AAA (abdominal aortic aneurysm)     infrarenal- 3.1cm(2010, 3.7x4.2cm(2016), 3.9cm (2017)    Allergic rhinitis     Aspiration pneumonia 05/2017    CHF (congestive heart failure)     Coronary artery disease     DDD (degenerative disc disease), lumbar     lumbar spine- Dr. Churchill     Depression     Diverticulosis     Frequent UTI     Dr. Daniels    GERD (gastroesophageal reflux disease)     Hiatal hernia     HSV (herpes simplex virus) infection     Oral    Hyperlipidemia     IBS (irritable bowel syndrome)     Constipation predominant    Ischemic cardiomyopathy 09/2017    AICD     Kidney stones     NSTEMI (non-ST elevated myocardial infarction) 04/26/2017    Obstructive sleep apnea 2011    nfsg 2011, ahi 67    Osteoarthritis     Osteopenia     Peripheral neuropathy     feet    Pulmonary embolism, bilateral 05/2017    Rotator cuff tear     Bilateral- Ortho        Past Surgical History:   Procedure Laterality Date    CARDIAC CATHETERIZATION  04/26/2017    99% mid LAD. 90% mid LCX. 60% RCA. Recommended CABG. Dr. Diaz    CARDIAC DEFIBRILLATOR PLACEMENT   12/26/2017    ICD implant/ Dr. Ellis    CATARACT EXTRACTION      CORONARY ARTERY BYPASS GRAFT  04/26/2017    x4 & ASD Closure    CYSTOSCOPY  2002    negative. Dr. Daniels    LAPAROSCOPIC CHOLECYSTECTOMY  04/17/2013    For biliary dyskinesia. Dr. Mccoy.     REPLACEMENT TOTAL KNEE BILATERAL  11/2004    Dr. Herrera    THORACENTESIS Left 05/08/2017    TONSILLECTOMY      TUBAL ABDOMINAL LIGATION Bilateral        Family History   Problem Relation Age of Onset    Heart disease Mother     Other Mother         Hypoglycemia    Osteoporosis Mother     COPD Father     Stroke Father     Hypertension Brother     Diabetes Brother     Heart disease Brother        Social History     Tobacco Use    Smoking status: Never   Vaping Use    Vaping status: Some Days    Substances: CBD, nightly, 2-3 weekly   Substance Use Topics    Alcohol use: Never    Drug use: Never        Home meds:  Prior to Admission medications    Medication Sig Start Date End Date Taking? Authorizing Provider   levocetirizine (XYZAL) 5 MG tablet Take 1 tablet by mouth Every Evening.   Yes Amor White MD   levothyroxine (SYNTHROID, LEVOTHROID) 88 MCG tablet Take 1 tablet by mouth Every Morning. 1/27/24  Yes Bertha Calixto APRN   omeprazole (priLOSEC) 40 MG capsule Take 1 capsule by mouth Daily.   Yes Amor White MD   oxybutynin XL (DITROPAN-XL) 10 MG 24 hr tablet Take 1 tablet by mouth Daily.   Yes Amor White MD   PARoxetine (PAXIL) 40 MG tablet Take 1 tablet by mouth Every Morning.   Yes Amor White MD   topiramate (TOPAMAX) 50 MG tablet Take 1 tablet by mouth Daily.   Yes Amor White MD   warfarin (COUMADIN) 3 MG tablet 2 tablets See Admin Instructions. Take 1 tablet by mouth on Tuesday, Thursday, Saturday, and Sunday   Yes Amor White MD   warfarin (COUMADIN) 3 MG tablet Take 1 tablet by mouth Daily. Monday Wednesday, Friday   Yes Amor White MD   atorvastatin (LIPITOR) 80 MG tablet Take 1  "tablet by mouth Daily.    Provider, MD Amor   linaclotide (LINZESS) 72 MCG capsule capsule Take 1 capsule by mouth As Needed.    Provider, MD Amor       Allergies:  Oxytetracycline and Oxycodone    Scheduled Meds:atorvastatin, 80 mg, Oral, Daily  cefTRIAXone, 2,000 mg, Intravenous, Q24H  digoxin, 125 mcg, Oral, Daily  fluconazole, 100 mg, Oral, Q24H  levothyroxine, 88 mcg, Oral, Q AM  nystatin, 5 mL, Oral, 4x Daily  pantoprazole, 40 mg, Intravenous, Q AM  PARoxetine, 40 mg, Oral, QAM  sodium chloride, 10 mL, Intravenous, Q12H  topiramate, 50 mg, Oral, Daily      Continuous Infusions:dilTIAZem, 5-15 mg/hr, Last Rate: Stopped (05/30/24 1239)  Pharmacy to dose warfarin,       PRN Meds:.  acetaminophen    benzocaine-menthol    calcium carbonate    Calcium Replacement - Follow Nurse / BPA Driven Protocol    HYDROcodone-acetaminophen    Magnesium Standard Dose Replacement - Follow Nurse / BPA Driven Protocol    melatonin    ondansetron    Pharmacy to dose warfarin    Phosphorus Replacement - Follow Nurse / BPA Driven Protocol    Potassium Replacement - Follow Nurse / BPA Driven Protocol    [COMPLETED] Insert Peripheral IV **AND** sodium chloride    sodium chloride    sodium chloride      OBJECTIVE    Vital Signs  Vitals:    05/30/24 1957 05/30/24 2126 05/30/24 2315 05/31/24 0319   BP: 98/47 107/62 104/69 119/66   BP Location: Right arm Right arm Right arm Right arm   Patient Position: Lying Lying Lying Lying   Pulse: 97 100 97 94   Resp: 16 16 18 17   Temp: 97.8 °F (36.6 °C) 98.3 °F (36.8 °C) 98.1 °F (36.7 °C) 98.3 °F (36.8 °C)   TempSrc: Oral Oral Oral Oral   SpO2: 95% 95%     Weight:  81.7 kg (180 lb 1.9 oz)     Height:  172.7 cm (68\")         Flowsheet Rows      Flowsheet Row First Filed Value   Admission Height 172.7 cm (68\") Documented at 05/29/2024 1123   Admission Weight 77.1 kg (170 lb) Documented at 05/29/2024 1123              Intake/Output Summary (Last 24 hours) at 5/31/2024 0741  Last data filed " at 5/30/2024 2125  Gross per 24 hour   Intake 120 ml   Output --   Net 120 ml          Telemetry: Atrial fibrillation with heart rate in the 100s    Physical Exam:  The patient is alert, oriented and in no distress.  Vital signs as noted above.  Head and neck revealed no carotid bruits or jugular venous distention.  No thyromegaly or lymphadenopathy is present  Lungs clear.  No wheezing.  Breath sounds are normal bilaterally.  Heart normal first and second heart sounds.  No murmur. No precordial rub is present.  No gallop is present.  Abdomen soft and nontender.  No organomegaly is present.  Extremities with good peripheral pulses without any pedal edema.  Skin warm and dry.  Musculoskeletal system is grossly normal.  CNS grossly normal.       Results Review:  I have personally reviewed the results from the time of this admission to 5/31/2024 07:41 EDT and agree with these findings:  []  Laboratory  []  Microbiology  []  Radiology  []  EKG/Telemetry   []  Cardiology/Vascular   []  Pathology  []  Old records  []  Other:    Most notable findings include:    Lab Results (last 24 hours)       Procedure Component Value Units Date/Time    Basic Metabolic Panel [[id number]]  (Abnormal) Collected: 05/31/24 0456    Specimen: Blood from Arm, Left Updated: 05/31/24 0555     Glucose 141 mg/dL      BUN 9 mg/dL      Creatinine 0.96 mg/dL      Sodium 135 mmol/L      Potassium 4.4 mmol/L      Chloride 107 mmol/L      CO2 18.8 mmol/L      Calcium 8.5 mg/dL      BUN/Creatinine Ratio 9.4     Anion Gap 9.2 mmol/L      eGFR 64.2 mL/min/1.73     Narrative:      GFR Normal >60  Chronic Kidney Disease <60  Kidney Failure <15      Protime-INR [[id number]]  (Abnormal) Collected: 05/31/24 0456    Specimen: Blood from Arm, Left Updated: 05/31/24 0543     Protime 34.9 Seconds      INR 3.51    CBC & Differential [[id number]]  (Abnormal) Collected: 05/31/24 0456    Specimen: Blood from Arm, Left Updated: 05/31/24 0533    Narrative:      The  following orders were created for panel order CBC & Differential.  Procedure                               Abnormality         Status                     ---------                               -----------         ------                     CBC Auto Differential[[id number]]        Abnormal            Final result                 Please view results for these tests on the individual orders.    CBC Auto Differential [[id number]]  (Abnormal) Collected: 05/31/24 0456    Specimen: Blood from Arm, Left Updated: 05/31/24 0533     WBC 11.87 10*3/mm3      RBC 3.25 10*6/mm3      Hemoglobin 9.9 g/dL      Hematocrit 31.6 %      MCV 97.2 fL      MCH 30.5 pg      MCHC 31.3 g/dL      RDW 14.8 %      RDW-SD 52.5 fl      MPV 10.3 fL      Platelets 321 10*3/mm3      Neutrophil % 84.4 %      Lymphocyte % 6.1 %      Monocyte % 5.6 %      Eosinophil % 1.8 %      Basophil % 1.3 %      Immature Grans % 0.8 %      Neutrophils, Absolute 10.01 10*3/mm3      Lymphocytes, Absolute 0.73 10*3/mm3      Monocytes, Absolute 0.67 10*3/mm3      Eosinophils, Absolute 0.21 10*3/mm3      Basophils, Absolute 0.16 10*3/mm3      Immature Grans, Absolute 0.09 10*3/mm3      nRBC 0.0 /100 WBC     Blood Culture - Blood, Arm, Right [[id number]]  (Normal) Collected: 05/29/24 1708    Specimen: Blood from Arm, Right Updated: 05/30/24 1715     Blood Culture No growth at 24 hours    Blood Culture - Blood, Arm, Right [[id number]]  (Normal) Collected: 05/29/24 1532    Specimen: Blood from Arm, Right Updated: 05/30/24 1545     Blood Culture No growth at 24 hours    Narrative:      Less than seven (7) mL's of blood was collected.  Insufficient quantity may yield false negative results.    Respiratory Panel PCR w/COVID-19(SARS-CoV-2) MELISSA/NATO/LISA/PAD/COR/LUCHO In-House, NP Swab in [facility], 2 HR TAT - Swab, Nasopharynx [[id number]]  (Normal) Collected: 05/30/24 1031    Specimen: Swab from Nasopharynx Updated: 05/30/24 1135     ADENOVIRUS, PCR Not Detected     Coronavirus 229E Not  Detected     Coronavirus HKU1 Not Detected     Coronavirus NL63 Not Detected     Coronavirus OC43 Not Detected     COVID19 Not Detected     Human Metapneumovirus Not Detected     Human Rhinovirus/Enterovirus Not Detected     Influenza A PCR Not Detected     Influenza B PCR Not Detected     Parainfluenza Virus 1 Not Detected     Parainfluenza Virus 2 Not Detected     Parainfluenza Virus 3 Not Detected     Parainfluenza Virus 4 Not Detected     RSV, PCR Not Detected     Bordetella pertussis pcr Not Detected     Bordetella parapertussis PCR Not Detected     Chlamydophila pneumoniae PCR Not Detected     Mycoplasma pneumo by PCR Not Detected    Narrative:      In the setting of a positive respiratory panel with a viral infection PLUS a negative procalcitonin without other underlying concern for bacterial infection, consider observing off antibiotics or discontinuation of antibiotics and continue supportive care. If the respiratory panel is positive for atypical bacterial infection (Bordetella pertussis, Chlamydophila pneumoniae, or Mycoplasma pneumoniae), consider antibiotic de-escalation to target atypical bacterial infection.    Urine Culture - Urine, Straight Cath [[id number]]  (Abnormal) Collected: 05/29/24 1313    Specimen: Urine from Straight Cath Updated: 05/30/24 1008     Urine Culture Yeast isolated    Narrative:      No further workup.    Colonization of the urinary tract without infection is common. Treatment is discouraged unless the patient is symptomatic, pregnant, or undergoing an invasive urologic procedure.            Imaging Results (Last 24 Hours)       ** No results found for the last 24 hours. **            LAB RESULTS (LAST 7 DAYS)    CBC  Results from last 7 days   Lab Units 05/31/24  0456 05/30/24  0243 05/29/24  1231   WBC 10*3/mm3 11.87* 14.40* 19.22*   RBC 10*6/mm3 3.25* 3.53* 3.85   HEMOGLOBIN g/dL 9.9* 10.7* 11.9*   HEMATOCRIT % 31.6* 35.0 37.3   MCV fL 97.2* 99.2* 96.9   PLATELETS 10*3/mm3  321 362 354       BMP  Results from last 7 days   Lab Units 05/31/24  0456 05/30/24  0243 05/29/24  1231   SODIUM mmol/L 135* 132* 129*   POTASSIUM mmol/L 4.4 4.3 3.4*   CHLORIDE mmol/L 107 106 100   CO2 mmol/L 18.8* 13.9* 13.5*   BUN mg/dL 9 13 19   CREATININE mg/dL 0.96 0.97 1.27*   GLUCOSE mg/dL 141* 97 131*       CMP   Results from last 7 days   Lab Units 05/31/24  0456 05/30/24  0243 05/29/24  1231   SODIUM mmol/L 135* 132* 129*   POTASSIUM mmol/L 4.4 4.3 3.4*   CHLORIDE mmol/L 107 106 100   CO2 mmol/L 18.8* 13.9* 13.5*   BUN mg/dL 9 13 19   CREATININE mg/dL 0.96 0.97 1.27*   GLUCOSE mg/dL 141* 97 131*   ALBUMIN g/dL  --   --  3.1*   BILIRUBIN mg/dL  --   --  0.8   ALK PHOS U/L  --   --  162*   AST (SGOT) U/L  --   --  104*   ALT (SGPT) U/L  --   --  54*       BNP        TROPONIN  Results from last 7 days   Lab Units 05/29/24  1231   HSTROP T ng/L 30*       CoAg  Results from last 7 days   Lab Units 05/31/24  0456 05/30/24  0243 05/29/24  1340   INR  3.51* 4.14* 4.45*   APTT seconds  --   --  42.7*       Creatinine Clearance  Estimated Creatinine Clearance: 62 mL/min (by C-G formula based on SCr of 0.96 mg/dL).    ABG        Radiology  XR Chest 1 View    Result Date: 5/29/2024  Impression: Cardiomegaly and central pulmonary vascular congestion. Electronically Signed: Ronak Birmingham MD  5/29/2024 2:39 PM EDT  Workstation ID: [id number]       EKG  I personally viewed and interpreted the patient's EKG/Telemetry data:  ECG 12 Lead Tachycardia   Final Result   HEART RATE= 139  bpm   RR Interval= 430  ms   NV Interval= 138  ms   P Horizontal Axis= -12  deg   P Front Axis= -11  deg   QRSD Interval= 94  ms   QT Interval= 264  ms   QTcB= 403  ms   QRS Axis= -1  deg   T Wave Axis= 42  deg   - ABNORMAL ECG -   Sinus tachycardia   Supraventricular bigeminy   Consider anteroseptal infarct   When compared with ECG of 18-May-2024 16:16:42,   Significant change in rhythm   Electronically Signed By: Lorenzo Conklin (Janusz) 30-May-2024  08:10:12   Date and Time of Study: 2024-05-29 12:43:06      Telemetry Scan   Final Result      Telemetry Scan   Final Result      Telemetry Scan   Final Result      Telemetry Scan   Final Result      Telemetry Scan   Final Result      Telemetry Scan   Final Result            Echocardiogram:    Results for orders placed during the hospital encounter of 01/22/24    Adult Transthoracic Echo Complete W/ Cont if Necessary Per Protocol    Interpretation Summary    Left ventricular ejection fraction appears to be 56 - 60%.    Estimated right ventricular systolic pressure from tricuspid regurgitation is normal (<35 mmHg).    Indication  Dyspnea  Palpitations    Technically satisfactory study.  Mitral valve is structurally normal.  Tricuspid valve is structurally normal.  Mild tricuspid regurgitation is present.  Aortic valve is aortic valve with decreased opening motion.  Gradient across the aortic valve is 16/9 mmHg.  Valve area 1.37 cm².  Pulmonic valve could not be well visualized.  Left atrium is enlarged.  Right atrium is normal in size.  Left ventricle is enlarged with septal anterior wall and apical severe hypokinesis with ejection fraction of 40%.  Possible left ventricular apical thrombus.  Right ventricle is normal in size.  Atrial septum is intact.  Aorta is normal.  No pericardial effusion or intracardiac thrombus is seen.  ICD lead is present in the right ventricle.    Impression  Mild tricuspid regurgitation.  Mild to moderate aortic valve stenosis with gradient of 16/9 mmHg and valve area of 1.37 cm².  Left atrial enlargement.  Left ventricle is enlarged with septal anterior wall and apical severe hypokinesis with ejection fraction of 40%.  Possible left ventricular apical thrombus.  ICD lead is present in the right ventricle.  No evidence for pulmonary hypertension is present.        Stress Test:  Results for orders placed during the hospital encounter of 01/22/24    Stress Test With Myocardial Perfusion One  Day    Interpretation Summary  Indications  Status post CABG  Atrial fibrillation    This study was performed under the direct supervision of Rohini NOLASCO.    Resting ECG  Sinus rhythm    The patient was injected with Lexiscan intravenously while constantly monitoring electrocardiogram and vital signs.  Patient did not have any chest discomfort ST abnormalities or ectopy with injection of Lexiscan.    Cardiolite was used as an imaging agent.    Cardiolite images showed significantly decreased radionuclide uptake in the proximal posterior segments with partial redistribution in the resting images.    Gated SPECT images revealed normal left ventricle size and diffuse hypocontractility with calculated ejection fraction of 61%.    Impression  ========  Lexiscan Cardiolite test revealed proximal posterior infarction and ischemia.  Gated SPECT images revealed normal left ventricular size and diffuse hypocontractility with calculated ejection fraction of 61%.         Cardiac Catheterization:  No results found for this or any previous visit.         Other:         ASSESSMENT & PLAN:    Principal Problem:    Atrial fibrillation with RVR  Active Problems:    Urinary tract infection    A-fib RVR  [id number] score is 4  Resume warfarin when INR is less than 2  Currently on digoxin  Starting Toprol-XL 25 mg p.o. daily  Stop Cardizem drip due to hypotension    HFpEF  Echocardiogram with EF of 40%  HFpEF secondary to aortic stenosis, atrial fibrillation, coronary artery disease  Serial echocardiograms to follow-up on mild to moderate aortic valve stenosis.  Currently appears euvolemic.  Diuretics as needed.  ICD in place [employer].    Coronary artery disease  Status post CABG and ASD closure in 2017  Abnormal nuclear stress test noted  Continue statin and beta-blocker  Currently chest angina pain-free    Peripheral arterial disease  Status post stenting of aneurysmal sac.  Thrombus within the aneurysmal sac  Doppler shows  good flow in both common iliac arteries    Chronic kidney disease  Creatinine is now normal    Hypothyroidism  Continue Synthroid    Thrush  Started on fluconazole         Keith Tobin MD  05/31/24  07:41 EDT

## 2024-05-31 NOTE — PLAN OF CARE
Goal Outcome Evaluation:   Pt presents as a 69 y/o F admitted to [facility] on 5/29/24 from follow up PCP appointment with tachycardia, found to be in A.fib with RVR. Pt recently admitted 5/16/24-5/22/24 d/t AMS, hallucinations.  MRI brain indicated 2mm acute/subacute infarct of cortical high L frontal lobe. PLOF obtained from previous PT evaluation d/t cognition: At baseline pt resides with spouse in multilevel home with 3 LEO. Pt typically independent with household mobility; pt has a rolling walker but pt reports she does not use it. On prior admission, PT strongly recommended that pt use a rolling walker.  This date,  pt disoriented to place. Pt reports she is in hospital, however later reports this is a nurse's home and she and her spouse are just here visiting friends.Pt requires min A supine to sit,CGA-min A for seated balance, mod A x2 to come to standing and mod A x2 RW to transfer from bed to BSC. Patient is a high risk of injurious falls and unsafe to return to prior living environment at this time.   Pt has poor safety awareness and was impulsive with all mobility. Pt noted to be tachycardic throughout transfer to BSC and back to bed. Pt functioning far below baseline and pt is at high risk for falls. Pt has poor awareness of her functional deficits. PT will follow pt with PT recommendation of Skilled Nursing Facility.              Anticipated Discharge Disposition (PT): skilled nursing facility

## 2024-05-31 NOTE — PLAN OF CARE
Goal Outcome Evaluation:  Plan of Care Reviewed With: patient           Outcome Evaluation: Pt admitted for Afib w/ RVR and was put on Cardizem gtt but has now been switched to PO Digoxin. Cardio currently following, will continue to monitor.

## 2024-05-31 NOTE — PROGRESS NOTES
"     LOS: 0 days   Patient Care Team:  Luan Joseph APRN as PCP - General (Family Medicine)  Jozef Otero MD as Consulting Physician (Nephrology)    Subjective     Interval History: Stable overnight    Patient Complaints: Just feels \"off\"; chronic dry cough; no chest pain or soa.  Dysuria has improved.    History taken from: patient    Review of Systems   Constitutional:  Positive for activity change, appetite change and fatigue. Negative for chills, diaphoresis and fever.   HENT:  Negative for facial swelling.    Eyes:  Negative for visual disturbance.   Respiratory:  Positive for cough. Negative for shortness of breath, wheezing and stridor.    Cardiovascular:  Negative for chest pain, palpitations and leg swelling.   Gastrointestinal:  Negative for abdominal pain, constipation, diarrhea, nausea and vomiting.   Genitourinary:  Negative for hematuria.   Musculoskeletal:  Negative for arthralgias.   Neurological:  Positive for weakness.   Psychiatric/Behavioral:  Negative for confusion.            Objective     Vital Signs  Temp:  [97.3 °F (36.3 °C)-98 °F (36.7 °C)] 97.8 °F (36.6 °C)  Heart Rate:  [] 97  Resp:  [16-20] 16  BP: ()/(34-72) 98/47    Physical Exam:     General Appearance:    Alert, cooperative, in no acute distress,   Head:    Normocephalic, without obvious abnormality, atraumatic   Eyes:            Lids and lashes normal, conjunctivae and sclerae normal, no   icterus, no pallor, corneas clear, PERRLA   Ears:    Ears appear intact with no abnormalities noted   Throat:    White plaque on tongue   Neck:   No adenopathy, supple, trachea midline, no thyromegaly, no   carotid bruit, no JVD   Lungs:     Clear to auscultation,respirations regular, even and                  unlabored    Heart:    Irregularly irregular   Chest Wall:    No abnormalities observed   Abdomen:     Normal bowel sounds, no masses, no organomegaly, soft        Non-tender non-distended, no guarding,   Extremities:   " Moves all extremities well, no edema, no cyanosis, no             Redness   Pulses:   Pulses palpable and equal bilaterally   Skin:   No bleeding, bruising or rash   Lymph nodes:   No palpable adenopathy   Neurologic:   Cranial nerves 2 - 12 grossly intact, sensation intact, DTR       present and equal bilaterally        Results Review:    Lab Results (last 24 hours)       Procedure Component Value Units Date/Time    Blood Culture - Blood, Arm, Right [[id number]]  (Normal) Collected: 05/29/24 1708    Specimen: Blood from Arm, Right Updated: 05/30/24 1715     Blood Culture No growth at 24 hours    Blood Culture - Blood, Arm, Right [[id number]]  (Normal) Collected: 05/29/24 1532    Specimen: Blood from Arm, Right Updated: 05/30/24 1545     Blood Culture No growth at 24 hours    Narrative:      Less than seven (7) mL's of blood was collected.  Insufficient quantity may yield false negative results.    Respiratory Panel PCR w/COVID-19(SARS-CoV-2) MEL[facility]/NAOT/LISA/PAD/[facility] In-House, NP Swab in UTM/VTM, 2 HR TAT - Swab, Nasopharynx [[id number]]  (Normal) Collected: 05/30/24 1031    Specimen: Swab from Nasopharynx Updated: 05/30/24 1135     ADENOVIRUS, PCR Not Detected     Coronavirus 229E Not Detected     Coronavirus HKU1 Not Detected     Coronavirus NL63 Not Detected     Coronavirus OC43 Not Detected     COVID19 Not Detected     Human Metapneumovirus Not Detected     Human Rhinovirus/Enterovirus Not Detected     Influenza A PCR Not Detected     Influenza B PCR Not Detected     Parainfluenza Virus 1 Not Detected     Parainfluenza Virus 2 Not Detected     Parainfluenza Virus 3 Not Detected     Parainfluenza Virus 4 Not Detected     RSV, PCR Not Detected     Bordetella pertussis pcr Not Detected     Bordetella parapertussis PCR Not Detected     Chlamydophila pneumoniae PCR Not Detected     Mycoplasma pneumo by PCR Not Detected    Narrative:      In the setting of a positive respiratory panel with a viral infection PLUS a  negative procalcitonin without other underlying concern for bacterial infection, consider observing off antibiotics or discontinuation of antibiotics and continue supportive care. If the respiratory panel is positive for atypical bacterial infection (Bordetella pertussis, Chlamydophila pneumoniae, or Mycoplasma pneumoniae), consider antibiotic de-escalation to target atypical bacterial infection.    Urine Culture - Urine, Straight Cath [[id number]]  (Abnormal) Collected: 05/29/24 1313    Specimen: Urine from Straight Cath Updated: 05/30/24 1008     Urine Culture Yeast isolated    Narrative:      No further workup.    Colonization of the urinary tract without infection is common. Treatment is discouraged unless the patient is symptomatic, pregnant, or undergoing an invasive urologic procedure.    Basic Metabolic Panel [[id number]]  (Abnormal) Collected: 05/30/24 0243    Specimen: Blood from Arm, Right Updated: 05/30/24 0321     Glucose 97 mg/dL      BUN 13 mg/dL      Creatinine 0.97 mg/dL      Sodium 132 mmol/L      Potassium 4.3 mmol/L      Comment: Specimen hemolyzed.  Result may be falsely elevated.  Result checked          Chloride 106 mmol/L      CO2 13.9 mmol/L      Calcium 8.5 mg/dL      BUN/Creatinine Ratio 13.4     Anion Gap 12.1 mmol/L      eGFR 63.4 mL/min/1.73     Narrative:      GFR Normal >60  Chronic Kidney Disease <60  Kidney Failure <15      Protime-INR [[id number]]  (Abnormal) Collected: 05/30/24 0243    Specimen: Blood from Arm, Right Updated: 05/30/24 0307     Protime 40.6 Seconds      INR 4.14    CBC & Differential [[id number]]  (Abnormal) Collected: 05/30/24 0243    Specimen: Blood from Arm, Right Updated: 05/30/24 0253    Narrative:      The following orders were created for panel order CBC & Differential.  Procedure                               Abnormality         Status                     ---------                               -----------         ------                     CBC Auto  Differential[[id number]]        Abnormal            Final result                 Please view results for these tests on the individual orders.    CBC Auto Differential [[id number]]  (Abnormal) Collected: 05/30/24 0243    Specimen: Blood from Arm, Right Updated: 05/30/24 0253     WBC 14.40 10*3/mm3      RBC 3.53 10*6/mm3      Hemoglobin 10.7 g/dL      Hematocrit 35.0 %      MCV 99.2 fL      MCH 30.3 pg      MCHC 30.6 g/dL      RDW 14.9 %      RDW-SD 54.0 fl      MPV 10.1 fL      Platelets 362 10*3/mm3      Neutrophil % 89.2 %      Lymphocyte % 4.2 %      Monocyte % 3.9 %      Eosinophil % 0.9 %      Basophil % 1.0 %      Immature Grans % 0.8 %      Neutrophils, Absolute 12.86 10*3/mm3      Lymphocytes, Absolute 0.60 10*3/mm3      Monocytes, Absolute 0.56 10*3/mm3      Eosinophils, Absolute 0.13 10*3/mm3      Basophils, Absolute 0.14 10*3/mm3      Immature Grans, Absolute 0.11 10*3/mm3      nRBC 0.0 /100 WBC     Lactic Acid, Plasma [[id number]]  (Normal) Collected: 05/29/24 1930    Specimen: Blood Updated: 05/29/24 2011     Lactate 1.3 mmol/L              Imaging Results (Last 24 Hours)       ** No results found for the last 24 hours. **                 I reviewed the patient's new clinical results.    Medication Review:   Scheduled Meds:atorvastatin, 80 mg, Oral, Daily  cefTRIAXone, 2,000 mg, Intravenous, Q24H  [START ON 5/31/2024] digoxin, 125 mcg, Oral, Daily  fluconazole, 100 mg, Oral, Q24H  levothyroxine, 88 mcg, Oral, Q AM  nystatin, 5 mL, Oral, 4x Daily  pantoprazole, 40 mg, Intravenous, Q AM  PARoxetine, 40 mg, Oral, QAM  sodium chloride, 10 mL, Intravenous, Q12H  topiramate, 50 mg, Oral, Daily      Continuous Infusions:dilTIAZem, 5-15 mg/hr, Last Rate: Stopped (05/30/24 1239)  Pharmacy to dose warfarin,       PRN Meds:.  acetaminophen    benzocaine-menthol    calcium carbonate    Calcium Replacement - Follow Nurse / BPA Driven Protocol    HYDROcodone-acetaminophen    Magnesium Standard Dose Replacement - Follow  Nurse / BPA Driven Protocol    melatonin    ondansetron    Pharmacy to dose warfarin    Phosphorus Replacement - Follow Nurse / BPA Driven Protocol    Potassium Replacement - Follow Nurse / BPA Driven Protocol    [COMPLETED] Insert Peripheral IV **AND** sodium chloride    sodium chloride    sodium chloride     Assessment & Plan       Atrial fibrillation with RVR    Urinary tract infection  Thrush  H/O PE  H/o CAD  H/o AAA  Presence of ICD/pacer  Warfarin toxicitiy  Ischemic cardiomyopathy  Hypothyroidism  ANNIE  Possible left atrial thrombus    Currently in sinus rhythm on diltiazem drip.  Holding warfarin due to elevated INR.  Will d/c Rocephin as urine culture shows only yeast.  She also has thrush, so will start fluconazole and nystatin.  Further plans for control of afib per Dr. Hurley.        Plan for disposition:Home at discharge    Keke Casiano MD  05/30/24  20:03 EDT

## 2024-05-31 NOTE — PROGRESS NOTES
"Pharmacy dosing service  Anticoagulant  Warfarin     Subjective:    Laura Mejia is a 68 y.o.female being continued on warfarin for Atrial Fibrillation / Flutter.    INR Goal: 2 - 3  Home medication?: warfarin 3 mg PO daily, except warfarin 6 mg PO on Su/Tu/Th/Sa.   Bridge Therapy Present?:  No  Interacting Medications Evaluation (New/Present/Discontinued): ceftriaxone (may increase INR), fluconazole(may increase INR), digoxin and paroxetine (home meds)  Additional Contributing Factors: last dose 6mg on 5/28      Assessment/Plan:    INR is supratherapeutic again today, however is trending down. Will hold warfarin today. Patient will likely need home regimen change on discharge.     Continue to monitor and adjust based on INR.         Date 5/29 5/30 5/31         INR 4.45 4.14 3.51         Dose hold hold hold             Objective:  [Ht: 172.7 cm (68\"); Wt: 81.7 kg (180 lb 1.9 oz); BMI: Body mass index is 27.39 kg/m².]    Lab Results   Component Value Date    ALBUMIN 3.1 (L) 05/29/2024     Lab Results   Component Value Date    INR 3.51 (H) 05/31/2024    INR 4.14 (H) 05/30/2024    INR 4.45 (C) 05/29/2024    PROTIME 34.9 (H) 05/31/2024    PROTIME 40.6 (H) 05/30/2024    PROTIME 43.4 (H) 05/29/2024     Lab Results   Component Value Date    HGB 9.9 (L) 05/31/2024    HGB 10.7 (L) 05/30/2024    HGB 11.9 (L) 05/29/2024     Lab Results   Component Value Date    HCT 31.6 (L) 05/31/2024    HCT 35.0 05/30/2024    HCT 37.3 05/29/2024       Destinee Leon RP  05/31/24 10:13 EDT         "

## 2024-05-31 NOTE — CONSULTS
Primary Care Provider: Luan Joseph APRN     Consult requested by:  Dr. Hatfield    Reason for Consultation: Neurological evaluation,  stroke on MRI     History taken from: patient chart RN    Chief complaint: rapid heart rate        SUBJECTIVE:    History of present illness: Background per H&P: Laura Mejia is a 68 y.o. female with history of CHF, CAD, and atrial fibrillation on warfarin who presented to the ER with complaints of rapid heart rate. Patient had recent hospital admission for altered mental status and UTI and was discharged on 5/22. She followed up today in the office and was found to be tachycardic into the 140s and 150s. Family states she has also had some continued intermittent confusion. She has had no known fever. She reports some mild lower abdominal pain. She denies any other complaints of pain. Denies any shortness of breath or chest pain.   In the ER CXR unremarkable, EKG sinus tach with supraventricular bigeminy versus afib with RVR on the monitor. She was started on Cardizem gtt. White blood cell count is elevated at 19.2. Patient does appear to have a urinary tract infection with moderate leukocyte esterase, 21-50 white blood cells, 1+ bacteria. She was started on Rocephin. Blood cultures were obtained. CMP significant for sodium of 129 and mild elevation of LFTs.      - Portions of the above HPI were copied from previous encounters and edited as appropriate. PMH as detailed below.     Chart reviewed, patient is awake and alert.  No family at bedside.  Nurses at bedside said there has been some confusion but patient also has a UTI.    I discussed with patient that the stroke was very tiny and likely due to subtherapeutic INR.  Patient states she has been having issues with her INR is for the past several years.  She denies any blurred vision, focal deficits, speech issues.  No history of stroke.     Review of Systems   Constitutional: Negative.    HENT: Negative.     Eyes:   Negative for visual disturbance.   Respiratory: Negative.     Cardiovascular:  Positive for palpitations.   Gastrointestinal:  Negative for nausea and vomiting.   Endocrine: Negative.    Genitourinary: Negative.    Musculoskeletal:  Positive for back pain.   Skin: Negative.    Allergic/Immunologic: Negative.    Neurological:  Negative for dizziness, tremors, seizures, syncope, facial asymmetry, speech difficulty, weakness, light-headedness, numbness and headaches.   Hematological: Negative.    Psychiatric/Behavioral: Negative.  Negative for confusion.           PATIENT HISTORY:  Past Medical History:   Diagnosis Date    AAA (abdominal aortic aneurysm)     infrarenal- 3.1cm(2010, 3.7x4.2cm(2016), 3.9cm (2017)    Allergic rhinitis     Aspiration pneumonia 05/2017    CHF (congestive heart failure)     Coronary artery disease     DDD (degenerative disc disease), lumbar     lumbar spine- Dr. Churchill     Depression     Diverticulosis     Frequent UTI     Dr. Daniels    GERD (gastroesophageal reflux disease)     Hiatal hernia     HSV (herpes simplex virus) infection     Oral    Hyperlipidemia     IBS (irritable bowel syndrome)     Constipation predominant    Ischemic cardiomyopathy 09/2017    AICD     Kidney stones     NSTEMI (non-ST elevated myocardial infarction) 04/26/2017    Obstructive sleep apnea 2011    nfsg 2011, ahi 67    Osteoarthritis     Osteopenia     Peripheral neuropathy     feet    Pulmonary embolism, bilateral 05/2017    Rotator cuff tear     Bilateral- Ortho    ,   Past Surgical History:   Procedure Laterality Date    CARDIAC CATHETERIZATION  04/26/2017    99% mid LAD. 90% mid LCX. 60% RCA. Recommended CABG. Dr. Diaz    CARDIAC DEFIBRILLATOR PLACEMENT  12/26/2017    ICD implant/ Dr. Ellis    CATARACT EXTRACTION      CORONARY ARTERY BYPASS GRAFT  04/26/2017    x4 & ASD Closure    CYSTOSCOPY  2002    negative. Dr. Daniels    LAPAROSCOPIC CHOLECYSTECTOMY  04/17/2013    For biliary dyskinesia. Dr. Mccoy.      REPLACEMENT TOTAL KNEE BILATERAL  11/2004    Dr. Herrera    THORACENTESIS Left 05/08/2017    TONSILLECTOMY      TUBAL ABDOMINAL LIGATION Bilateral    ,   Family History   Problem Relation Age of Onset    Heart disease Mother     Other Mother         Hypoglycemia    Osteoporosis Mother     COPD Father     Stroke Father     Hypertension Brother     Diabetes Brother     Heart disease Brother    ,   Social History     Tobacco Use    Smoking status: Never   Vaping Use    Vaping status: Some Days    Substances: CBD, nightly, 2-3 weekly   Substance Use Topics    Alcohol use: Never    Drug use: Never   ,   Prior to Admission medications    Medication Sig Start Date End Date Taking? Authorizing Provider   levocetirizine (XYZAL) 5 MG tablet Take 1 tablet by mouth Every Evening.   Yes Amor White MD   levothyroxine (SYNTHROID, LEVOTHROID) 88 MCG tablet Take 1 tablet by mouth Every Morning. 1/27/24  Yes Bertha Calixto APRN   omeprazole (priLOSEC) 40 MG capsule Take 1 capsule by mouth Daily.   Yes Amor White MD   oxybutynin XL (DITROPAN-XL) 10 MG 24 hr tablet Take 1 tablet by mouth Daily.   Yes Amor White MD   PARoxetine (PAXIL) 40 MG tablet Take 1 tablet by mouth Every Morning.   Yes Amor White MD   topiramate (TOPAMAX) 50 MG tablet Take 1 tablet by mouth Daily.   Yes Amor White MD   warfarin (COUMADIN) 3 MG tablet 2 tablets See Admin Instructions. Take 1 tablet by mouth on Tuesday, Thursday, Saturday, and Sunday   Yes Amor White MD   warfarin (COUMADIN) 3 MG tablet Take 1 tablet by mouth Daily. Monday Wednesday, Friday   Yes Amor White MD   atorvastatin (LIPITOR) 80 MG tablet Take 1 tablet by mouth Daily.    Amor White MD   linaclotide (LINZESS) 72 MCG capsule capsule Take 1 capsule by mouth As Needed.    Amor White MD    Allergies:  Oxytetracycline and Oxycodone    Current Facility-Administered Medications   Medication Dose  Route Frequency Provider Last Rate Last Admin    acetaminophen (TYLENOL) tablet 650 mg  650 mg Oral Q4H PRN Rachael Hull APRN        atorvastatin (LIPITOR) tablet 80 mg  80 mg Oral Daily Rachael Hull APRN   80 mg at 05/31/24 0747    benzocaine-menthol (CHLORASEPTIC) lozenge 1 each  1 lozenge Mouth/Throat Q4H PRN Keke Casiano MD   1 each at 05/30/24 0603    calcium carbonate (TUMS) chewable tablet 500 mg (200 mg elemental)  2 tablet Oral TID PRN Carolin Hatfield MD   2 tablet at 05/29/24 2034    Calcium Replacement - Follow Nurse / BPA Driven Protocol   Does not apply PRN Rachael Hull APRN        cefTRIAXone (ROCEPHIN) 2,000 mg in sodium chloride 0.9 % 100 mL MBP  2,000 mg Intravenous Q24H Rachael Hull APRN 200 mL/hr at 05/30/24 1602 2,000 mg at 05/30/24 1602    digoxin (LANOXIN) tablet 125 mcg  125 mcg Oral Daily Allie Hurley MD   125 mcg at 05/31/24 1137    dilTIAZem (CARDIZEM) 125 mg in 125 mL D5W infusion  5-15 mg/hr Intravenous Continuous Lorenzo Conklin MD   Stopped at 05/30/24 1239    fluconazole (DIFLUCAN) tablet 100 mg  100 mg Oral Q24H Keke Casiano MD   100 mg at 05/30/24 2137    HYDROcodone-acetaminophen (NORCO) 5-325 MG per tablet 1 tablet  1 tablet Oral Q6H PRN Keke Casiano MD   1 tablet at 05/30/24 1803    levothyroxine (SYNTHROID, LEVOTHROID) tablet 88 mcg  88 mcg Oral Q AM Rachael Hull APRN   88 mcg at 05/31/24 0557    LORazepam (ATIVAN) tablet 0.5 mg  0.5 mg Oral Q6H PRN Carolin Hatfield MD        Magnesium Standard Dose Replacement - Follow Nurse / BPA Driven Protocol   Does not apply PRN Rachael Hull APRN        melatonin tablet 5 mg  5 mg Oral Nightly PRN Rachael Hull APRN        metoprolol succinate XL (TOPROL-XL) 24 hr tablet 25 mg  25 mg Oral Q24H Leigh Ann Chowdhury APRN   25 mg at 05/31/24 1137    nystatin (MYCOSTATIN) 100,000 unit/mL suspension 500,000 Units  5 mL Oral 4x Daily Keke Casiano MD   500,000 Units at 05/31/24 1141     ondansetron (ZOFRAN) injection 4 mg  4 mg Intravenous Q6H PRN Della, Rachaelrocio Orantes APRN   4 mg at 05/30/24 0919    pantoprazole (PROTONIX) injection 40 mg  40 mg Intravenous Q AM Della, Rachael Orantes APRN   40 mg at 05/31/24 0557    PARoxetine (PAXIL) tablet 40 mg  40 mg Oral QAM Della, Rachaelrocio Orantes APRN   40 mg at 05/31/24 0600    Pharmacy to dose warfarin   Does not apply Continuous PRN Della, Rachael Orantes APRN        Phosphorus Replacement - Follow Nurse / BPA Driven Protocol   Does not apply PRN Della, Rachael Orantes, APRN        Potassium Replacement - Follow Nurse / BPA Driven Protocol   Does not apply PRN DellaRachael APRN        sodium chloride 0.9 % flush 10 mL  10 mL Intravenous PRN Lorenzo Conklin MD        sodium chloride 0.9 % flush 10 mL  10 mL Intravenous Q12H DellaRachael APRN   10 mL at 05/31/24 0747    sodium chloride 0.9 % flush 10 mL  10 mL Intravenous PRN DellaRachael alcala APRPATRICIO        sodium chloride 0.9 % infusion 40 mL  40 mL Intravenous PRN DellaRachael alcala APRN        topiramate (TOPAMAX) tablet 50 mg  50 mg Oral Daily Della, Rachael Orantes APRN   50 mg at 05/31/24 0747        ________________________________________________________        OBJECTIVE:    PHYSICAL EXAM:    Constitutional: The patient is in no apparent distress, bright awake and alert. There is no shortness of breath.     PSYCHIATRIC: Mood/affect normal, judgement normal, appropriate    HEENT:   Normocephalic, atraumatic.     Chest: Breathing unlabored    Cardiac: Regular rate and rhythm.     Extremities:  No clubbing, cyanosis or edema.    NEUROLOGICAL:    Cognition:   Oriented to name, hospital- says she is at [facility]- year and month   Fund of knowledge decent.  Concentration and attention normal.   Language normal with normal comprehension, fluent speech, intact repetition and naming.       Cranial nerves;    II - pupils bilaterally equal reacting to light,  No new Visual field  deficits;  Fundoscopic exam- Not able to be done, non-dilated exam  III,IV,VI: EOMI with no diplopia  V: Normal facial sensations  VII: No facial asymmetry,  VIII: No New hearing abnormality  IX, X, XI: normal gag and shoulder shrug;  XII: tongue is in the midline.    Sensory:  Intact to light touch in all extremities.     Motor: Strength 5/5 bilaterally upper and lower extremities. No involuntary movements present. Normal tone and bulk.    Cerebellar: Finger to nose and mirror movements normal bilaterally.    Gait and balance: Deferred.     Physical exam performed by KIRILL Zheng.  ________________________________________________________   RESULTS REVIEW:    VITAL SIGNS:   Temp:  [97.8 °F (36.6 °C)-98.3 °F (36.8 °C)] 97.9 °F (36.6 °C)  Heart Rate:  [] 134  Resp:  [16-20] 20  BP: ()/(47-75) 106/58     LABS:      Lab 05/31/24  0456 05/30/24  0243 05/29/24  1930 05/29/24  1536 05/29/24  1340 05/29/24  1231   WBC 11.87* 14.40*  --   --   --  19.22*   HEMOGLOBIN 9.9* 10.7*  --   --   --  11.9*   HEMATOCRIT 31.6* 35.0  --   --   --  37.3   PLATELETS 321 362  --   --   --  354   NEUTROS ABS 10.01* 12.86*  --   --   --  17.14*   IMMATURE GRANS (ABS) 0.09* 0.11*  --   --   --  0.22*   LYMPHS ABS 0.73 0.60*  --   --   --  0.63*   MONOS ABS 0.67 0.56  --   --   --  1.04*   EOS ABS 0.21 0.13  --   --   --  0.05   MCV 97.2* 99.2*  --   --   --  96.9   LACTATE  --   --  1.3 1.6  --   --    PROTIME 34.9* 40.6*  --   --  43.4*  --    APTT  --   --   --   --  42.7*  --          Lab 05/31/24  0456 05/30/24  0243 05/29/24  1231   SODIUM 135* 132* 129*   POTASSIUM 4.4 4.3 3.4*   CHLORIDE 107 106 100   CO2 18.8* 13.9* 13.5*   ANION GAP 9.2 12.1 15.5*   BUN 9 13 19   CREATININE 0.96 0.97 1.27*   EGFR 64.2 63.4 45.9*   GLUCOSE 141* 97 131*   CALCIUM 8.5* 8.5* 9.0         Lab 05/29/24  1231   TOTAL PROTEIN 6.9   ALBUMIN 3.1*   GLOBULIN 3.8   ALT (SGPT) 54*   AST (SGOT) 104*   BILIRUBIN 0.8   ALK PHOS 162*         Lab  05/31/24  0456 05/30/24  0243 05/29/24  1340 05/29/24  1231   HSTROP T  --   --   --  30*   PROTIME 34.9* 40.6* 43.4*  --    INR 3.51* 4.14* 4.45*  --                  UA          5/16/2024    17:28 5/18/2024    18:05 5/29/2024    13:13   Urinalysis   Squamous Epithelial Cells, UA  3-6  0-2    Specific Gravity, UA 1.011  1.018  1.020    Ketones, UA Negative  Negative  Trace    Blood, UA Negative  Trace  Negative    Leukocytes, UA Negative  Large (3+)  Moderate (2+)    Nitrite, UA Negative  Positive  Negative    RBC, UA  0-2  3-5    WBC, UA  Too Numerous to Count  21-50    Bacteria, UA  4+  1+        Lab Results   Component Value Date    TSH 9.780 (H) 01/22/2024    LDL 81 01/22/2024       IMAGING STUDIES:  No radiology results for the last day    I reviewed the patient's new clinical results.    ________________________________________________________     PROBLEM LIST:    Atrial fibrillation with RVR    Urinary tract infection            ASSESSMENT/PLAN:    Very tiny acute to subacute cortical high left frontal lobe stroke on MRI from 5/17. INR on 5/16 was subtherapeutic at 1.42 which is likely the reason.    - MRI brain reviewed   - Check CTA head and neck:  - Carotids: <50% stenosis L & R carotid   - Echo (1/23/2024) EF 40%, possible left ventricular apical thrombus- cardiology following.   - EKG: Sinus tachycardia, rate 139  - Labs: A1C: P, B12: P, LDL:  P, TSH: P  - INR 3.51 today- coumadin being held- goal 2-3  - Statin: Lipitor 80   - PT/OT/ST as appropriate, Neuro checks per protocol, DVT prophylaxis, Stroke education    2. Urinary tract infection  -Culture pending, antibiotics per primary    3.  Acute encephalopathy, more so due to urinary tract infection then very small stroke  -Management per primary    4.  Subtherapeutic INR-atrial fibrillation with RVR-Per cardiology    5. Heart failure-cardiology following    6. Hypothyroidism-continue Synthroid, TSH pending     I discussed the patient's findings and my  recommendations with patient and nursing staff    Bárbara Mcgowan, ALEXANDER  05/31/24  14:11 EDT

## 2024-06-01 PROBLEM — I25.5 ISCHEMIC CARDIOMYOPATHY: Status: ACTIVE | Noted: 2017-12-12

## 2024-06-01 PROBLEM — M43.10 DEGENERATIVE SPONDYLOLISTHESIS: Status: ACTIVE | Noted: 2017-10-16

## 2024-06-01 PROBLEM — K21.9 GASTROESOPHAGEAL REFLUX DISEASE: Status: ACTIVE | Noted: 2024-06-01

## 2024-06-01 PROBLEM — J44.9 COPD (CHRONIC OBSTRUCTIVE PULMONARY DISEASE): Status: ACTIVE | Noted: 2017-05-07

## 2024-06-01 PROBLEM — M51.34 DEGENERATION OF THORACIC INTERVERTEBRAL DISC: Status: ACTIVE | Noted: 2023-09-18

## 2024-06-01 PROBLEM — I25.810 ATHEROSCLEROSIS OF CORONARY ARTERY BYPASS GRAFT: Status: ACTIVE | Noted: 2017-05-18

## 2024-06-01 PROBLEM — Z86.711 HISTORY OF PULMONARY EMBOLISM: Status: ACTIVE | Noted: 2017-05-08

## 2024-06-01 PROBLEM — J90 PLEURAL EFFUSION: Status: ACTIVE | Noted: 2017-05-08

## 2024-06-01 PROBLEM — M54.16 LUMBAR RADICULOPATHY: Status: ACTIVE | Noted: 2023-12-14

## 2024-06-01 PROBLEM — I50.23 ACUTE ON CHRONIC SYSTOLIC HEART FAILURE: Status: ACTIVE | Noted: 2017-05-07

## 2024-06-01 PROBLEM — Z95.1 S/P CABG X 4: Status: ACTIVE | Noted: 2017-06-18

## 2024-06-01 PROBLEM — S32.000A COMPRESSION FRACTURE OF LUMBAR VERTEBRA: Status: ACTIVE | Noted: 2023-09-18

## 2024-06-01 PROBLEM — E03.9 HYPOTHYROIDISM: Status: ACTIVE | Noted: 2017-08-24

## 2024-06-01 PROBLEM — I26.99 PULMONARY EMBOLI: Status: ACTIVE | Noted: 2017-05-08

## 2024-06-01 PROBLEM — I50.9 CONGESTIVE HEART FAILURE: Status: ACTIVE | Noted: 2017-05-18

## 2024-06-01 LAB
AMMONIA BLD-SCNC: 34 UMOL/L (ref 11–51)
ANION GAP SERPL CALCULATED.3IONS-SCNC: 10.8 MMOL/L (ref 5–15)
BASOPHILS # BLD AUTO: 0.14 10*3/MM3 (ref 0–0.2)
BASOPHILS NFR BLD AUTO: 1.2 % (ref 0–1.5)
BUN SERPL-MCNC: 8 MG/DL (ref 8–23)
BUN/CREAT SERPL: 9.8 (ref 7–25)
CALCIUM SPEC-SCNC: 8.4 MG/DL (ref 8.6–10.5)
CHLORIDE SERPL-SCNC: 108 MMOL/L (ref 98–107)
CO2 SERPL-SCNC: 15.2 MMOL/L (ref 22–29)
CREAT SERPL-MCNC: 0.82 MG/DL (ref 0.57–1)
DEPRECATED RDW RBC AUTO: 52.9 FL (ref 37–54)
EGFRCR SERPLBLD CKD-EPI 2021: 77.5 ML/MIN/1.73
EOSINOPHIL # BLD AUTO: 0.17 10*3/MM3 (ref 0–0.4)
EOSINOPHIL NFR BLD AUTO: 1.5 % (ref 0.3–6.2)
ERYTHROCYTE [DISTWIDTH] IN BLOOD BY AUTOMATED COUNT: 14.9 % (ref 12.3–15.4)
GLUCOSE SERPL-MCNC: 102 MG/DL (ref 65–99)
HCT VFR BLD AUTO: 32.5 % (ref 34–46.6)
HGB BLD-MCNC: 10.1 G/DL (ref 12–15.9)
IMM GRANULOCYTES # BLD AUTO: 0.11 10*3/MM3 (ref 0–0.05)
IMM GRANULOCYTES NFR BLD AUTO: 1 % (ref 0–0.5)
INR PPP: 2.55 (ref 2–3)
LYMPHOCYTES # BLD AUTO: 0.74 10*3/MM3 (ref 0.7–3.1)
LYMPHOCYTES NFR BLD AUTO: 6.6 % (ref 19.6–45.3)
MCH RBC QN AUTO: 30.3 PG (ref 26.6–33)
MCHC RBC AUTO-ENTMCNC: 31.1 G/DL (ref 31.5–35.7)
MCV RBC AUTO: 97.6 FL (ref 79–97)
MONOCYTES # BLD AUTO: 0.65 10*3/MM3 (ref 0.1–0.9)
MONOCYTES NFR BLD AUTO: 5.8 % (ref 5–12)
NEUTROPHILS NFR BLD AUTO: 83.9 % (ref 42.7–76)
NEUTROPHILS NFR BLD AUTO: 9.46 10*3/MM3 (ref 1.7–7)
NRBC BLD AUTO-RTO: 0 /100 WBC (ref 0–0.2)
PLATELET # BLD AUTO: 269 10*3/MM3 (ref 140–450)
PMV BLD AUTO: 10.4 FL (ref 6–12)
POTASSIUM SERPL-SCNC: 3.6 MMOL/L (ref 3.5–5.2)
PROTHROMBIN TIME: 25.9 SECONDS (ref 19.4–28.5)
RBC # BLD AUTO: 3.33 10*6/MM3 (ref 3.77–5.28)
SODIUM SERPL-SCNC: 134 MMOL/L (ref 136–145)
TSH SERPL DL<=0.05 MIU/L-ACNC: 0.98 UIU/ML (ref 0.27–4.2)
WBC NRBC COR # BLD AUTO: 11.27 10*3/MM3 (ref 3.4–10.8)

## 2024-06-01 PROCEDURE — 25010000002 CEFTRIAXONE PER 250 MG

## 2024-06-01 PROCEDURE — 99232 SBSQ HOSP IP/OBS MODERATE 35: CPT | Performed by: NURSE PRACTITIONER

## 2024-06-01 PROCEDURE — 82140 ASSAY OF AMMONIA: CPT | Performed by: FAMILY MEDICINE

## 2024-06-01 PROCEDURE — 85610 PROTHROMBIN TIME: CPT | Performed by: INTERNAL MEDICINE

## 2024-06-01 PROCEDURE — 84443 ASSAY THYROID STIM HORMONE: CPT | Performed by: NURSE PRACTITIONER

## 2024-06-01 PROCEDURE — 85025 COMPLETE CBC W/AUTO DIFF WBC: CPT

## 2024-06-01 PROCEDURE — 99232 SBSQ HOSP IP/OBS MODERATE 35: CPT | Performed by: INTERNAL MEDICINE

## 2024-06-01 PROCEDURE — 80048 BASIC METABOLIC PNL TOTAL CA: CPT

## 2024-06-01 RX ORDER — WARFARIN SODIUM 6 MG/1
6 TABLET ORAL
Status: COMPLETED | OUTPATIENT
Start: 2024-06-01 | End: 2024-06-01

## 2024-06-01 RX ADMIN — NYSTATIN 500000 UNITS: 100000 SUSPENSION ORAL at 21:01

## 2024-06-01 RX ADMIN — LEVOTHYROXINE SODIUM 88 MCG: 88 TABLET ORAL at 05:25

## 2024-06-01 RX ADMIN — FLUCONAZOLE 100 MG: 100 TABLET ORAL at 21:01

## 2024-06-01 RX ADMIN — NYSTATIN 500000 UNITS: 100000 SUSPENSION ORAL at 08:58

## 2024-06-01 RX ADMIN — Medication 10 ML: at 08:58

## 2024-06-01 RX ADMIN — TOPIRAMATE 50 MG: 25 TABLET, FILM COATED ORAL at 08:58

## 2024-06-01 RX ADMIN — WARFARIN SODIUM 6 MG: 6 TABLET ORAL at 16:35

## 2024-06-01 RX ADMIN — NYSTATIN 500000 UNITS: 100000 SUSPENSION ORAL at 16:35

## 2024-06-01 RX ADMIN — Medication 10 ML: at 21:02

## 2024-06-01 RX ADMIN — ATORVASTATIN CALCIUM 80 MG: 40 TABLET, FILM COATED ORAL at 08:58

## 2024-06-01 RX ADMIN — NYSTATIN 500000 UNITS: 100000 SUSPENSION ORAL at 12:21

## 2024-06-01 RX ADMIN — DIGOXIN 125 MCG: 125 TABLET ORAL at 12:21

## 2024-06-01 RX ADMIN — HYDROCODONE BITARTRATE AND ACETAMINOPHEN 1 TABLET: 5; 325 TABLET ORAL at 11:06

## 2024-06-01 RX ADMIN — PAROXETINE 40 MG: 40 TABLET, FILM COATED ORAL at 06:20

## 2024-06-01 RX ADMIN — PANTOPRAZOLE SODIUM 40 MG: 40 INJECTION, POWDER, FOR SOLUTION INTRAVENOUS at 05:25

## 2024-06-01 RX ADMIN — CEFTRIAXONE 2000 MG: 2 INJECTION, POWDER, FOR SOLUTION INTRAMUSCULAR; INTRAVENOUS at 16:33

## 2024-06-01 NOTE — PLAN OF CARE
Goal Outcome Evaluation:   Pt slept throughout the night with no complaints identified. Pt was oriented x4 throughout shift with no problems. Pt's spouse continuing to decide placement for rehab. IV antibx.

## 2024-06-01 NOTE — PROGRESS NOTES
LOS: 2 days     Chief Complaint: Confusion       SUBJECTIVE:    History taken from: patient chart family    Interval History: No complaints, patient and her  at bedside.  Both want to go home today, patient states she is exhausted and wants to sleep in her bed.  She is having sciatica pain which is chronic for her.  Confusion better according to .        Review of Systems   Constitutional: Negative.    Eyes:  Negative for visual disturbance.   Musculoskeletal:  Positive for back pain.   Neurological:  Negative for dizziness, tremors, seizures, syncope, facial asymmetry, speech difficulty, weakness, light-headedness, numbness and headaches.        Pertinent PMH:  has a past medical history of AAA (abdominal aortic aneurysm), Allergic rhinitis, Aspiration pneumonia (05/2017), CHF (congestive heart failure), Coronary artery disease, DDD (degenerative disc disease), lumbar, Depression, Diverticulosis, Frequent UTI, GERD (gastroesophageal reflux disease), Hiatal hernia, HSV (herpes simplex virus) infection, Hyperlipidemia, IBS (irritable bowel syndrome), Ischemic cardiomyopathy (09/2017), Kidney stones, NSTEMI (non-ST elevated myocardial infarction) (04/26/2017), Obstructive sleep apnea (2011), Osteoarthritis, Osteopenia, Peripheral neuropathy, Pulmonary embolism, bilateral (05/2017), and Rotator cuff tear.   ________________________________________________     OBJECTIVE:    On exam:  GENERAL: NAD  CARDIO: RRR  NEURO:  Oriented x3  EOMI, PERRL, no visual field deficits  CN 2-12 intact, No facial asymmetry  Speech clear without dysarthria  Sensations intact and equal bilaterally  Strength 5/5 and equal in all extremities  No ataxia    ________________________________________________   RESULTS REVIEW    VITAL SIGNS:  Temp:  [97.5 °F (36.4 °C)-98.5 °F (36.9 °C)] 97.5 °F (36.4 °C)  Heart Rate:  [] 103  Resp:  [15-16] 16  BP: ()/(51-58) 88/58    LABS:       Lab 06/01/24  0526 05/31/24  0456  05/30/24  0243 05/29/24  1930 05/29/24  1536 05/29/24  1340 05/29/24  1231   WBC 11.27* 11.87* 14.40*  --   --   --  19.22*   HEMOGLOBIN 10.1* 9.9* 10.7*  --   --   --  11.9*   HEMATOCRIT 32.5* 31.6* 35.0  --   --   --  37.3   PLATELETS 269 321 362  --   --   --  354   NEUTROS ABS 9.46* 10.01* 12.86*  --   --   --  17.14*   IMMATURE GRANS (ABS) 0.11* 0.09* 0.11*  --   --   --  0.22*   LYMPHS ABS 0.74 0.73 0.60*  --   --   --  0.63*   MONOS ABS 0.65 0.67 0.56  --   --   --  1.04*   EOS ABS 0.17 0.21 0.13  --   --   --  0.05   MCV 97.6* 97.2* 99.2*  --   --   --  96.9   LACTATE  --   --   --  1.3 1.6  --   --    PROTIME 25.9 34.9* 40.6*  --   --  43.4*  --    APTT  --   --   --   --   --  42.7*  --          Lab 06/01/24  0526 05/31/24  1615 05/31/24  0456 05/30/24  0243 05/29/24  1231   SODIUM 134*  --  135* 132* 129*   POTASSIUM 3.6  --  4.4 4.3 3.4*   CHLORIDE 108*  --  107 106 100   CO2 15.2*  --  18.8* 13.9* 13.5*   ANION GAP 10.8  --  9.2 12.1 15.5*   BUN 8  --  9 13 19   CREATININE 0.82  --  0.96 0.97 1.27*   EGFR 77.5  --  64.2 63.4 45.9*   GLUCOSE 102*  --  141* 97 131*   CALCIUM 8.4*  --  8.5* 8.5* 9.0   HEMOGLOBIN A1C  --  6.20*  --   --   --          Lab 05/29/24  1231   TOTAL PROTEIN 6.9   ALBUMIN 3.1*   GLOBULIN 3.8   ALT (SGPT) 54*   AST (SGOT) 104*   BILIRUBIN 0.8   ALK PHOS 162*         Lab 06/01/24  0526 05/31/24  0456 05/30/24  0243 05/29/24  1340 05/29/24  1231   HSTROP T  --   --   --   --  30*   PROTIME 25.9 34.9* 40.6* 43.4*  --    INR 2.55 3.51* 4.14* 4.45*  --          Lab 05/31/24  1615   CHOLESTEROL 145   LDL CHOL 88   HDL CHOL 32*   TRIGLYCERIDES 140         Lab 05/31/24  1615   VITAMIN B 12 734         UA          5/16/2024    17:28 5/18/2024    18:05 5/29/2024    13:13   Urinalysis   Squamous Epithelial Cells, UA  3-6  0-2    Specific Gravity, UA 1.011  1.018  1.020    Ketones, UA Negative  Negative  Trace    Blood, UA Negative  Trace  Negative    Leukocytes, UA Negative  Large (3+)   Moderate (2+)    Nitrite, UA Negative  Positive  Negative    RBC, UA  0-2  3-5    WBC, UA  Too Numerous to Count  21-50    Bacteria, UA  4+  1+        Lab Results   Component Value Date    TSH 9.780 (H) 01/22/2024    LDL 88 05/31/2024    HGBA1C 6.20 (H) 05/31/2024    [id number] 734 05/31/2024         IMAGING STUDIES:  CT Angiogram Head    Result Date: 5/31/2024  Atheromatous disease without significant stenosis. No vessel occlusion. Electronically Signed: El Rivero MD  5/31/2024 7:56 PM EDT  Workstation ID: [id number]    CT Angiogram Carotids    Result Date: 5/31/2024  Atheromatous disease without significant stenosis. No vessel occlusion. Electronically Signed: El Rivero MD  5/31/2024 7:56 PM EDT  Workstation ID: [id number]     I reviewed the patient's new clinical results.    ________________________________________________      PROBLEM LIST:    Atrial fibrillation with RVR    Urinary tract infection        ASSESSMENT/PLAN:    Very tiny acute to subacute cortical high left frontal lobe stroke on MRI from 5/17. INR on 5/16 was subtherapeutic at 1.42 which is likely the reason.    - MRI brain reviewed   - CTA head and neck: Reviewed, atherosclerotic disease without any significant stenosis or occlusion.  No aneurysm or dissection.  - Carotids: <50% stenosis L & R carotid   - Echo (1/23/2024) EF 40%, possible left ventricular apical thrombus- cardiology following.   - EKG: Sinus tachycardia, rate 139  - Labs: A1C: 6.20, B12: 734, LDL: 88, TSH: 0.  976  - coumadin per primary team- planning for d/c home.   - Statin: Lipitor 80   - PT/OT/ST as appropriate, Neuro checks per protocol, DVT prophylaxis, Stroke education     2. Urinary tract infection  -Antibiotics per primary     3.  Acute encephalopathy, improving. Due to urinary tract infection    -Management per primary     4.  Subtherapeutic INR-atrial fibrillation with RVR-Per cardiology     5. Heart failure-cardiology following     6. Hypothyroidism-continue  Synthroid, TSH pending     There are no further recommendations. Will sign off, please call with any questions or concerns.    I discussed the patient's findings and my recommendations with patient, family, and nursing staff    ALEXANDER Gambino  06/01/24  11:12 EDT

## 2024-06-01 NOTE — PROGRESS NOTES
LOS: 2 days   Patient Care Team:  Luan Joseph APRN as PCP - General (Family Medicine)  Jozef Otero MD as Consulting Physician (Nephrology)    Subjective:  Slightly confused    Objective:   Afebrile      Review of Systems:   Review of Systems   Unable to perform ROS: Mental status change           Vital Signs  Temp:  [97.5 °F (36.4 °C)-98.5 °F (36.9 °C)] 97.5 °F (36.4 °C)  Heart Rate:  [] 103  Resp:  [15-20] 16  BP: ()/(51-58) 88/58    Physical Exam:  Physical Exam  Vitals and nursing note reviewed.   Cardiovascular:      Rate and Rhythm: Rhythm irregular.      Heart sounds: Normal heart sounds.   Pulmonary:      Breath sounds: Normal breath sounds.   Skin:     General: Skin is warm.   Neurological:      Mental Status: She is alert.          Radiology:  CT Angiogram Head    Result Date: 5/31/2024  Atheromatous disease without significant stenosis. No vessel occlusion. Electronically Signed: El Rivero MD  5/31/2024 7:56 PM EDT  Workstation ID: [id number]    CT Angiogram Carotids    Result Date: 5/31/2024  Atheromatous disease without significant stenosis. No vessel occlusion. Electronically Signed: El Rivero MD  5/31/2024 7:56 PM EDT  Workstation ID: [id number]    XR Chest 1 View    Result Date: 5/29/2024  Impression: Cardiomegaly and central pulmonary vascular congestion. Electronically Signed: Ronak Birmingham MD  5/29/2024 2:39 PM EDT  Workstation ID: [id number]        Results Review:     I reviewed the patient's new clinical results.  I reviewed the patient's new imaging results and agree with the interpretation.    Medication Review:   Scheduled Meds:atorvastatin, 80 mg, Oral, Daily  cefTRIAXone, 2,000 mg, Intravenous, Q24H  digoxin, 125 mcg, Oral, Daily  fluconazole, 100 mg, Oral, Q24H  levothyroxine, 88 mcg, Oral, Q AM  metoprolol succinate XL, 25 mg, Oral, Q24H  nystatin, 5 mL, Oral, 4x Daily  pantoprazole, 40 mg, Intravenous, Q AM  PARoxetine, 40 mg, Oral, QAM  sodium chloride, 10 mL,  "Intravenous, Q12H  topiramate, 50 mg, Oral, Daily  warfarin, 6 mg, Oral, Once      Continuous Infusions:dilTIAZem, 5-15 mg/hr, Last Rate: Stopped (05/30/24 1239)  Pharmacy to dose warfarin,       PRN Meds:.  acetaminophen    benzocaine-menthol    calcium carbonate    Calcium Replacement - Follow Nurse / BPA Driven Protocol    HYDROcodone-acetaminophen    LORazepam    Magnesium Standard Dose Replacement - Follow Nurse / BPA Driven Protocol    melatonin    ondansetron    Pharmacy to dose warfarin    Phosphorus Replacement - Follow Nurse / BPA Driven Protocol    Potassium Replacement - Follow Nurse / BPA Driven Protocol    [COMPLETED] Insert Peripheral IV **AND** sodium chloride    sodium chloride    sodium chloride    Labs:    CBC    Results from last 7 days   Lab Units 06/01/24 0526 05/31/24 0456 05/30/24 0243 05/29/24  1231   WBC 10*3/mm3 11.27* 11.87* 14.40* 19.22*   HEMOGLOBIN g/dL 10.1* 9.9* 10.7* 11.9*   PLATELETS 10*3/mm3 269 321 362 354     BMP   Results from last 7 days   Lab Units 06/01/24 0526 05/31/24 0456 05/30/24 0243 05/29/24  1231   SODIUM mmol/L 134* 135* 132* 129*   POTASSIUM mmol/L 3.6 4.4 4.3 3.4*   CHLORIDE mmol/L 108* 107 106 100   CO2 mmol/L 15.2* 18.8* 13.9* 13.5*   BUN mg/dL 8 9 13 19   CREATININE mg/dL 0.82 0.96 0.97 1.27*   GLUCOSE mg/dL 102* 141* 97 131*     Cr Clearance Estimated Creatinine Clearance: 72.6 mL/min (by C-G formula based on SCr of 0.82 mg/dL).  Coag   Results from last 7 days   Lab Units 06/01/24 0526 05/31/24 0456 05/30/24 0243 05/29/24  1340   INR  2.55 3.51* 4.14* 4.45*   APTT seconds  --   --   --  42.7*     HbA1C   Lab Results   Component Value Date    HGBA1C 6.20 (H) 05/31/2024     Blood Glucose No results found for: \"POCGLU\"  Infection   Results from last 7 days   Lab Units 05/29/24  1708 05/29/24  1532 05/29/24  1313   BLOODCX  No growth at 2 days No growth at 2 days  --    URINECX   --   --  Yeast isolated*     CMP   Results from last 7 days   Lab Units " 06/01/24  0526 05/31/24  0456 05/30/24  0243 05/29/24  1231   SODIUM mmol/L 134* 135* 132* 129*   POTASSIUM mmol/L 3.6 4.4 4.3 3.4*   CHLORIDE mmol/L 108* 107 106 100   CO2 mmol/L 15.2* 18.8* 13.9* 13.5*   BUN mg/dL 8 9 13 19   CREATININE mg/dL 0.82 0.96 0.97 1.27*   GLUCOSE mg/dL 102* 141* 97 131*   ALBUMIN g/dL  --   --   --  3.1*   BILIRUBIN mg/dL  --   --   --  0.8   ALK PHOS U/L  --   --   --  162*   AST (SGOT) U/L  --   --   --  104*   ALT (SGPT) U/L  --   --   --  54*     UA    Results from last 7 days   Lab Units 05/29/24  1313   NITRITE UA  Negative   WBC UA /HPF 21-50*   BACTERIA UA /HPF 1+*   SQUAM EPITHEL UA /HPF 0-2   URINECX  Yeast isolated*     Radiology(recent) CT Angiogram Head    Result Date: 5/31/2024  Atheromatous disease without significant stenosis. No vessel occlusion. Electronically Signed: El Rivero MD  5/31/2024 7:56 PM EDT  Workstation ID: [id number]    CT Angiogram Carotids    Result Date: 5/31/2024  Atheromatous disease without significant stenosis. No vessel occlusion. 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

## 2024-06-01 NOTE — PROGRESS NOTES
"Pharmacy dosing service  Anticoagulant  Warfarin     Subjective:    Laura Mejia is a 68 y.o.female being continued on warfarin for Atrial Fibrillation / Flutter.    INR Goal: 2 - 3  Home medication?: warfarin 3 mg PO daily, except warfarin 6 mg PO on Su/Tu/Th/Sa.   Bridge Therapy Present?:  No  Interacting Medications Evaluation (New/Present/Discontinued): ceftriaxone (may increase INR), fluconazole(may increase INR), digoxin and paroxetine (home meds)  Additional Contributing Factors: last dose 6mg on 5/28      Assessment/Plan:    INR is therapeutic today after holding from 5/29-5/31. Will resume warfarin today with a 6 mg dose.  Patient will likely need home regimen change on discharge.     Continue to monitor and adjust based on INR.         Date 5/29 5/30 5/31 6/1        INR 4.45 4.14 3.51 2.55        Dose hold hold hold 6 mg            Objective:  [Ht: 172.7 cm (68\"); Wt: 81.7 kg (180 lb 1.9 oz); BMI: Body mass index is 27.39 kg/m².]    Lab Results   Component Value Date    ALBUMIN 3.1 (L) 05/29/2024     Lab Results   Component Value Date    INR 2.55 06/01/2024    INR 3.51 (H) 05/31/2024    INR 4.14 (H) 05/30/2024    PROTIME 25.9 06/01/2024    PROTIME 34.9 (H) 05/31/2024    PROTIME 40.6 (H) 05/30/2024     Lab Results   Component Value Date    HGB 10.1 (L) 06/01/2024    HGB 9.9 (L) 05/31/2024    HGB 10.7 (L) 05/30/2024     Lab Results   Component Value Date    HCT 32.5 (L) 06/01/2024    HCT 31.6 (L) 05/31/2024    HCT 35.0 05/30/2024       Sebas Kang RPH  06/01/24 10:18 EDT           "

## 2024-06-01 NOTE — PROGRESS NOTES
Referring Provider: Keke Casiano MD    Reason for follow-up: Atrial fibrillation, coronary artery disease status post CABG     Patient Care Team:  Luan Joseph APRN as PCP - General (Family Medicine)  Jozef Otero MD as Consulting Physician (Nephrology)      SUBJECTIVE  Patient is resting comfortably in bed.  Blood pressure and heart rate are well-controlled.     ROS  Review of all systems negative except as indicated.    Since I have last seen, the patient has been without any chest discomfort, shortness of breath, palpitations, dizziness or syncope.  Denies having any headache, abdominal pain, nausea, vomiting, diarrhea, constipation, loss of weight or loss of appetite.  Denies having any excessive bruising, hematuria or blood in the stool.        Personal History:    Past Medical History:   Diagnosis Date    AAA (abdominal aortic aneurysm)     infrarenal- 3.1cm(2010, 3.7x4.2cm(2016), 3.9cm (2017)    Allergic rhinitis     Aspiration pneumonia 05/2017    CHF (congestive heart failure)     Coronary artery disease     DDD (degenerative disc disease), lumbar     lumbar spine- Dr. Churchill     Depression     Diverticulosis     Frequent UTI     Dr. Daniels    GERD (gastroesophageal reflux disease)     Hiatal hernia     HSV (herpes simplex virus) infection     Oral    Hyperlipidemia     IBS (irritable bowel syndrome)     Constipation predominant    Ischemic cardiomyopathy 09/2017    AICD     Kidney stones     NSTEMI (non-ST elevated myocardial infarction) 04/26/2017    Obstructive sleep apnea 2011    nfsg 2011, ahi 67    Osteoarthritis     Osteopenia     Peripheral neuropathy     feet    Pulmonary embolism, bilateral 05/2017    Rotator cuff tear     Bilateral- Ortho        Past Surgical History:   Procedure Laterality Date    CARDIAC CATHETERIZATION  04/26/2017    99% mid LAD. 90% mid LCX. 60% RCA. Recommended CABG. Dr. Diaz    CARDIAC DEFIBRILLATOR PLACEMENT  12/26/2017    ICD implant/ Dr. Ellis    CATARACT  EXTRACTION      CORONARY ARTERY BYPASS GRAFT  04/26/2017    x4 & ASD Closure    CYSTOSCOPY  2002    negative. Dr. Daniels    LAPAROSCOPIC CHOLECYSTECTOMY  04/17/2013    For biliary dyskinesia. Dr. Mccoy.     REPLACEMENT TOTAL KNEE BILATERAL  11/2004    Dr. Herrera    THORACENTESIS Left 05/08/2017    TONSILLECTOMY      TUBAL ABDOMINAL LIGATION Bilateral        Family History   Problem Relation Age of Onset    Heart disease Mother     Other Mother         Hypoglycemia    Osteoporosis Mother     COPD Father     Stroke Father     Hypertension Brother     Diabetes Brother     Heart disease Brother        Social History     Tobacco Use    Smoking status: Never   Vaping Use    Vaping status: Some Days    Substances: CBD, nightly, 2-3 weekly   Substance Use Topics    Alcohol use: Never    Drug use: Never        Home meds:  Prior to Admission medications    Medication Sig Start Date End Date Taking? Authorizing Provider   levocetirizine (XYZAL) 5 MG tablet Take 1 tablet by mouth Every Evening.   Yes Amor White MD   levothyroxine (SYNTHROID, LEVOTHROID) 88 MCG tablet Take 1 tablet by mouth Every Morning. 1/27/24  Yes Bertha Calixto APRN   omeprazole (priLOSEC) 40 MG capsule Take 1 capsule by mouth Daily.   Yes Amor White MD   oxybutynin XL (DITROPAN-XL) 10 MG 24 hr tablet Take 1 tablet by mouth Daily.   Yes Amor White MD   PARoxetine (PAXIL) 40 MG tablet Take 1 tablet by mouth Every Morning.   Yes Amor White MD   topiramate (TOPAMAX) 50 MG tablet Take 1 tablet by mouth Daily.   Yes Amor White MD   warfarin (COUMADIN) 3 MG tablet 2 tablets See Admin Instructions. Take 1 tablet by mouth on Tuesday, Thursday, Saturday, and Sunday   Yes Amor White MD   warfarin (COUMADIN) 3 MG tablet Take 1 tablet by mouth Daily. Monday Wednesday, Friday   Yes Amor White MD   atorvastatin (LIPITOR) 80 MG tablet Take 1 tablet by mouth Daily.    Amor White MD  "  linaclotide (LINZESS) 72 MCG capsule capsule Take 1 capsule by mouth As Needed.    Provider, Amor, MD       Allergies:  Oxytetracycline and Oxycodone    Scheduled Meds:atorvastatin, 80 mg, Oral, Daily  cefTRIAXone, 2,000 mg, Intravenous, Q24H  digoxin, 125 mcg, Oral, Daily  fluconazole, 100 mg, Oral, Q24H  levothyroxine, 88 mcg, Oral, Q AM  metoprolol succinate XL, 25 mg, Oral, Q24H  nystatin, 5 mL, Oral, 4x Daily  pantoprazole, 40 mg, Intravenous, Q AM  PARoxetine, 40 mg, Oral, QAM  sodium chloride, 10 mL, Intravenous, Q12H  topiramate, 50 mg, Oral, Daily      Continuous Infusions:dilTIAZem, 5-15 mg/hr, Last Rate: Stopped (05/30/24 1239)  Pharmacy to dose warfarin,       PRN Meds:.  acetaminophen    benzocaine-menthol    calcium carbonate    Calcium Replacement - Follow Nurse / BPA Driven Protocol    HYDROcodone-acetaminophen    LORazepam    Magnesium Standard Dose Replacement - Follow Nurse / BPA Driven Protocol    melatonin    ondansetron    Pharmacy to dose warfarin    Phosphorus Replacement - Follow Nurse / BPA Driven Protocol    Potassium Replacement - Follow Nurse / BPA Driven Protocol    [COMPLETED] Insert Peripheral IV **AND** sodium chloride    sodium chloride    sodium chloride      OBJECTIVE    Vital Signs  Vitals:    05/31/24 1100 05/31/24 1500 05/31/24 2329 06/01/24 0354   BP: 106/58 98/51 96/58 102/57   BP Location: Right arm Right arm Right arm Right arm   Patient Position: Lying Lying Lying Lying   Pulse: (!) 134 108 100 90   Resp: 20 16 16 15   Temp: 97.9 °F (36.6 °C) 97.8 °F (36.6 °C) 98.5 °F (36.9 °C) 98.1 °F (36.7 °C)   TempSrc: Oral Oral Oral Oral   SpO2: 96%  96% 98%   Weight:       Height:           Flowsheet Rows      Flowsheet Row First Filed Value   Admission Height 172.7 cm (68\") Documented at 05/29/2024 1123   Admission Weight 77.1 kg (170 lb) Documented at 05/29/2024 1123              Intake/Output Summary (Last 24 hours) at 6/1/2024 0622  Last data filed at 5/31/2024 " 1900  Gross per 24 hour   Intake 364 ml   Output --   Net 364 ml          Telemetry: Atrial fibrillation with heart rate in the 90s    Physical Exam:  The patient is alert, oriented and in no distress.  Vital signs as noted above.  Head and neck revealed no carotid bruits or jugular venous distention.  No thyromegaly or lymphadenopathy is present  Lungs clear.  No wheezing.  Breath sounds are normal bilaterally.  Heart normal first and second heart sounds.  No murmur. No precordial rub is present.  No gallop is present.  Abdomen soft and nontender.  No organomegaly is present.  Extremities with good peripheral pulses without any pedal edema.  Skin warm and dry.  Musculoskeletal system is grossly normal.  CNS grossly normal.       Results Review:  I have personally reviewed the results from the time of this admission to 6/1/2024 06:22 EDT and agree with these findings:  []  Laboratory  []  Microbiology  []  Radiology  []  EKG/Telemetry   []  Cardiology/Vascular   []  Pathology  []  Old records  []  Other:    Most notable findings include:    Lab Results (last 24 hours)       Procedure Component Value Units Date/Time    Protime-INR [[id number]]  (Normal) Collected: 06/01/24 0526    Specimen: Blood Updated: 06/01/24 0616     Protime 25.9 Seconds      INR 2.55    CBC & Differential [[id number]]  (Abnormal) Collected: 06/01/24 0526    Specimen: Blood Updated: 06/01/24 0601    Narrative:      The following orders were created for panel order CBC & Differential.  Procedure                               Abnormality         Status                     ---------                               -----------         ------                     CBC Auto Differential[[id number]]        Abnormal            Final result                 Please view results for these tests on the individual orders.    CBC Auto Differential [[id number]]  (Abnormal) Collected: 06/01/24 0526    Specimen: Blood Updated: 06/01/24 0601     WBC 11.27 10*3/mm3       RBC 3.33 10*6/mm3      Hemoglobin 10.1 g/dL      Hematocrit 32.5 %      MCV 97.6 fL      MCH 30.3 pg      MCHC 31.1 g/dL      RDW 14.9 %      RDW-SD 52.9 fl      MPV 10.4 fL      Platelets 269 10*3/mm3      Neutrophil % 83.9 %      Lymphocyte % 6.6 %      Monocyte % 5.8 %      Eosinophil % 1.5 %      Basophil % 1.2 %      Immature Grans % 1.0 %      Neutrophils, Absolute 9.46 10*3/mm3      Lymphocytes, Absolute 0.74 10*3/mm3      Monocytes, Absolute 0.65 10*3/mm3      Eosinophils, Absolute 0.17 10*3/mm3      Basophils, Absolute 0.14 10*3/mm3      Immature Grans, Absolute 0.11 10*3/mm3      nRBC 0.0 /100 WBC     Basic Metabolic Panel [[id number]] Collected: 06/01/24 0526    Specimen: Blood Updated: 06/01/24 0557    Vitamin B12 [[id number]]  (Normal) Collected: 05/31/24 1615    Specimen: Blood from Arm, Right Updated: 05/31/24 2023     Vitamin B-12 734 pg/mL     Narrative:      Results may be falsely increased if patient taking Biotin.      Blood Culture - Blood, Arm, Right [[id number]]  (Normal) Collected: 05/29/24 1708    Specimen: Blood from Arm, Right Updated: 05/31/24 1715     Blood Culture No growth at 2 days    Lipid Panel [[id number]]  (Abnormal) Collected: 05/31/24 1615    Specimen: Blood from Arm, Right Updated: 05/31/24 1650     Total Cholesterol 145 mg/dL      Triglycerides 140 mg/dL      HDL Cholesterol 32 mg/dL      LDL Cholesterol  88 mg/dL      VLDL Cholesterol 25 mg/dL      LDL/HDL Ratio 2.66    Narrative:      Cholesterol Reference Ranges  (U.S. Department of Health and Human Services ATP III Classifications)    Desirable          <200 mg/dL  Borderline High    200-239 mg/dL  High Risk          >240 mg/dL      Triglyceride Reference Ranges  (U.S. Department of Health and Human Services ATP III Classifications)    Normal           <150 mg/dL  Borderline High  150-199 mg/dL  High             200-499 mg/dL  Very High        >500 mg/dL    HDL Reference Ranges  (U.S. Department of Health and Human Services  ATP III Classifications)    Low     <40 mg/dl (major risk factor for CHD)  High    >60 mg/dl ('negative' risk factor for CHD)        LDL Reference Ranges  (U.S. Department of Health and Human Services ATP III Classifications)    Optimal          <100 mg/dL  Near Optimal     100-129 mg/dL  Borderline High  130-159 mg/dL  High             160-189 mg/dL  Very High        >189 mg/dL    Hemoglobin A1c [[id number]]  (Abnormal) Collected: 05/31/24 1615    Specimen: Blood Updated: 05/31/24 1642     Hemoglobin A1C 6.20 %     Blood Culture - Blood, Arm, Right [[id number]]  (Normal) Collected: 05/29/24 1532    Specimen: Blood from Arm, Right Updated: 05/31/24 1545     Blood Culture No growth at 2 days    Narrative:      Less than seven (7) mL's of blood was collected.  Insufficient quantity may yield false negative results.            Imaging Results (Last 24 Hours)       Procedure Component Value Units Date/Time    CT Angiogram Head [[id number]] Collected: 05/31/24 1951     Updated: 05/31/24 1958    Narrative:      CT ANGIOGRAM HEAD, CT ANGIOGRAM CAROTIDS    Date of Exam: 5/31/2024 7:16 PM EDT    Indication: stroke.    Comparison: None available.    Technique: CTA of the head and neck was performed after the uneventful intravenous administration of iodinated contrast. Reconstructed coronal and sagittal images were also obtained. In addition, a 3-D volume rendered image was created for   interpretation. Automated exposure control and iterative reconstruction methods were used.    Findings: Moderate atheromatous disease of the aortic arch. The right common carotid artery demonstrates multifocal mild atheromatous disease. Moderate calcified and noncalcified mural thrombus of the right carotid bulb and proximal right internal   carotid artery without significant stenosis. Moderate atheromatous disease of the intracranial segments of the right internal carotid artery. The right carotid terminus is normal. The visualized branches of  the right anterior and middle cerebral arteries   are normal.    The left common carotid artery is normal. Mild atheromatous disease of the left carotid bulb and proximal left internal carotid artery. Severe atheromatous disease involving the intracranial segments of the left internal carotid artery. The left carotid   terminus is normal. The visualized branches of the left anterior and middle cerebral arteries are normal.    Both vertebral arteries arise from the subclavian arteries. The vertebral arteries are codominant. Both vertebral arteries supply the basilar artery. The basilar artery and basilar artery tip are normal. The basilar artery terminates in bilateral   posterior cerebral arteries which appear normal.    No abnormal intracranial enhancement. No intracranial mass. No intracranial hemorrhage. Prior bilateral lens replacements. The paranasal sinuses are clear. The mastoid air cells are aerated. No cervical adenopathy. The thyroid gland is atrophic. The lung   apices are clear. Degenerative changes.      Impression:      Atheromatous disease without significant stenosis. No vessel occlusion.        Electronically Signed: El Rivero MD    5/31/2024 7:56 PM EDT    Workstation ID: [id number]    CT Angiogram Carotids [[id number]] Collected: 05/31/24 1951     Updated: 05/31/24 1958    Narrative:      CT ANGIOGRAM HEAD, CT ANGIOGRAM CAROTIDS    Date of Exam: 5/31/2024 7:16 PM EDT    Indication: stroke.    Comparison: None available.    Technique: CTA of the head and neck was performed after the uneventful intravenous administration of iodinated contrast. Reconstructed coronal and sagittal images were also obtained. In addition, a 3-D volume rendered image was created for   interpretation. Automated exposure control and iterative reconstruction methods were used.    Findings: Moderate atheromatous disease of the aortic arch. The right common carotid artery demonstrates multifocal mild atheromatous disease.  Moderate calcified and noncalcified mural thrombus of the right carotid bulb and proximal right internal   carotid artery without significant stenosis. Moderate atheromatous disease of the intracranial segments of the right internal carotid artery. The right carotid terminus is normal. The visualized branches of the right anterior and middle cerebral arteries   are normal.    The left common carotid artery is normal. Mild atheromatous disease of the left carotid bulb and proximal left internal carotid artery. Severe atheromatous disease involving the intracranial segments of the left internal carotid artery. The left carotid   terminus is normal. The visualized branches of the left anterior and middle cerebral arteries are normal.    Both vertebral arteries arise from the subclavian arteries. The vertebral arteries are codominant. Both vertebral arteries supply the basilar artery. The basilar artery and basilar artery tip are normal. The basilar artery terminates in bilateral   posterior cerebral arteries which appear normal.    No abnormal intracranial enhancement. No intracranial mass. No intracranial hemorrhage. Prior bilateral lens replacements. The paranasal sinuses are clear. The mastoid air cells are aerated. No cervical adenopathy. The thyroid gland is atrophic. The lung   apices are clear. Degenerative changes.      Impression:      Atheromatous disease without significant stenosis. No vessel occlusion.        Electronically Signed: El Rivero MD    5/31/2024 7:56 PM EDT    Workstation ID: [id number]            LAB RESULTS (LAST 7 DAYS)    CBC  Results from last 7 days   Lab Units 06/01/24  0526 05/31/24  0456 05/30/24  0243 05/29/24  1231   WBC 10*3/mm3 11.27* 11.87* 14.40* 19.22*   RBC 10*6/mm3 3.33* 3.25* 3.53* 3.85   HEMOGLOBIN g/dL 10.1* 9.9* 10.7* 11.9*   HEMATOCRIT % 32.5* 31.6* 35.0 37.3   MCV fL 97.6* 97.2* 99.2* 96.9   PLATELETS 10*3/mm3 269 321 362 354       BMP  Results from last 7 days   Lab  Units 05/31/24  0456 05/30/24  0243 05/29/24  1231   SODIUM mmol/L 135* 132* 129*   POTASSIUM mmol/L 4.4 4.3 3.4*   CHLORIDE mmol/L 107 106 100   CO2 mmol/L 18.8* 13.9* 13.5*   BUN mg/dL 9 13 19   CREATININE mg/dL 0.96 0.97 1.27*   GLUCOSE mg/dL 141* 97 131*       CMP   Results from last 7 days   Lab Units 05/31/24  0456 05/30/24  0243 05/29/24  1231   SODIUM mmol/L 135* 132* 129*   POTASSIUM mmol/L 4.4 4.3 3.4*   CHLORIDE mmol/L 107 106 100   CO2 mmol/L 18.8* 13.9* 13.5*   BUN mg/dL 9 13 19   CREATININE mg/dL 0.96 0.97 1.27*   GLUCOSE mg/dL 141* 97 131*   ALBUMIN g/dL  --   --  3.1*   BILIRUBIN mg/dL  --   --  0.8   ALK PHOS U/L  --   --  162*   AST (SGOT) U/L  --   --  104*   ALT (SGPT) U/L  --   --  54*       BNP        TROPONIN  Results from last 7 days   Lab Units 05/29/24  1231   HSTROP T ng/L 30*       CoAg  Results from last 7 days   Lab Units 06/01/24  0526 05/31/24 0456 05/30/24  0243 05/29/24  1340   INR  2.55 3.51* 4.14* 4.45*   APTT seconds  --   --   --  42.7*       Creatinine Clearance  Estimated Creatinine Clearance: 62 mL/min (by C-G formula based on SCr of 0.96 mg/dL).    ABG        Radiology  CT Angiogram Head    Result Date: 5/31/2024  Atheromatous disease without significant stenosis. No vessel occlusion. Electronically Signed: El Rivero MD  5/31/2024 7:56 PM EDT  Workstation ID: [id number]    CT Angiogram Carotids    Result Date: 5/31/2024  Atheromatous disease without significant stenosis. No vessel occlusion. Electronically Signed: El Rivero MD  5/31/2024 7:56 PM EDT  Workstation ID: [id number]       EKG  I personally viewed and interpreted the patient's EKG/Telemetry data:  ECG 12 Lead Tachycardia   Final Result   HEART RATE= 139  bpm   RR Interval= 430  ms   TX Interval= 138  ms   P Horizontal Axis= -12  deg   P Front Axis= -11  deg   QRSD Interval= 94  ms   QT Interval= 264  ms   QTcB= 403  ms   QRS Axis= -1  deg   T Wave Axis= 42  deg   - ABNORMAL ECG -   Sinus tachycardia    Supraventricular bigeminy   Consider anteroseptal infarct   When compared with ECG of 18-May-2024 16:16:42,   Significant change in rhythm   Electronically Signed By: Lorenzo Conklin (Janusz) 30-May-2024 08:10:12   Date and Time of Study: 2024-05-29 12:43:06      Telemetry Scan   Final Result      Telemetry Scan   Final Result      Telemetry Scan   Final Result      Telemetry Scan   Final Result      Telemetry Scan   Final Result      Telemetry Scan   Final Result      Telemetry Scan   Final Result      Telemetry Scan   Final Result      Telemetry Scan   Final Result      Telemetry Scan   Final Result            Echocardiogram:    Results for orders placed during the hospital encounter of 01/22/24    Adult Transthoracic Echo Complete W/ Cont if Necessary Per Protocol    Interpretation Summary    Left ventricular ejection fraction appears to be 56 - 60%.    Estimated right ventricular systolic pressure from tricuspid regurgitation is normal (<35 mmHg).    Indication  Dyspnea  Palpitations    Technically satisfactory study.  Mitral valve is structurally normal.  Tricuspid valve is structurally normal.  Mild tricuspid regurgitation is present.  Aortic valve is aortic valve with decreased opening motion.  Gradient across the aortic valve is 16/9 mmHg.  Valve area 1.37 cm².  Pulmonic valve could not be well visualized.  Left atrium is enlarged.  Right atrium is normal in size.  Left ventricle is enlarged with septal anterior wall and apical severe hypokinesis with ejection fraction of 40%.  Possible left ventricular apical thrombus.  Right ventricle is normal in size.  Atrial septum is intact.  Aorta is normal.  No pericardial effusion or intracardiac thrombus is seen.  ICD lead is present in the right ventricle.    Impression  Mild tricuspid regurgitation.  Mild to moderate aortic valve stenosis with gradient of 16/9 mmHg and valve area of 1.37 cm².  Left atrial enlargement.  Left ventricle is enlarged with septal anterior  wall and apical severe hypokinesis with ejection fraction of 40%.  Possible left ventricular apical thrombus.  ICD lead is present in the right ventricle.  No evidence for pulmonary hypertension is present.        Stress Test:  Results for orders placed during the hospital encounter of 01/22/24    Stress Test With Myocardial Perfusion One Day    Interpretation Summary  Indications  Status post CABG  Atrial fibrillation    This study was performed under the direct supervision of Rohini NOLASCO.    Resting ECG  Sinus rhythm    The patient was injected with Lexiscan intravenously while constantly monitoring electrocardiogram and vital signs.  Patient did not have any chest discomfort ST abnormalities or ectopy with injection of Lexiscan.    Cardiolite was used as an imaging agent.    Cardiolite images showed significantly decreased radionuclide uptake in the proximal posterior segments with partial redistribution in the resting images.    Gated SPECT images revealed normal left ventricle size and diffuse hypocontractility with calculated ejection fraction of 61%.    Impression  ========  Lexiscan Cardiolite test revealed proximal posterior infarction and ischemia.  Gated SPECT images revealed normal left ventricular size and diffuse hypocontractility with calculated ejection fraction of 61%.         Cardiac Catheterization:  No results found for this or any previous visit.         Other:         ASSESSMENT & PLAN:    Principal Problem:    Atrial fibrillation with RVR  Active Problems:    Urinary tract infection    A-fib RVR  [id number] score is 4  Resume warfarin with goal INR of 2-3.  Current INR is 2.5  Currently on digoxin  Continue Toprol-XL 25 mg p.o. daily  Cardizem has been stopped due to hypotension    HFpEF  Echocardiogram with EF of 40%  HFpEF secondary to aortic stenosis, atrial fibrillation, coronary artery disease  Serial echocardiograms to follow-up on mild to moderate aortic valve stenosis.  Currently  appears euvolemic.  Diuretics as needed.  ICD in place [employer].    Coronary artery disease  Status post CABG and ASD closure in 2017  Abnormal nuclear stress test noted  Continue statin and beta-blocker  Currently chest angina pain-free    Peripheral arterial disease  Status post stenting of aneurysmal sac.  Thrombus within the aneurysmal sac  Doppler shows good flow in both common iliac arteries    Chronic kidney disease  Creatinine is now normal    Diabetes  A1c is 6.2  Consider starting metformin.    Hypothyroidism  Continue Synthroid    Thrush  Started on fluconazole         Keith Tobin MD  06/01/24  06:22 EDT

## 2024-06-02 LAB
INR PPP: 2.28 (ref 2–3)
PROTHROMBIN TIME: 23.4 SECONDS (ref 19.4–28.5)

## 2024-06-02 PROCEDURE — 99233 SBSQ HOSP IP/OBS HIGH 50: CPT | Performed by: INTERNAL MEDICINE

## 2024-06-02 PROCEDURE — 25010000002 CEFTRIAXONE PER 250 MG: Performed by: FAMILY MEDICINE

## 2024-06-02 PROCEDURE — 85610 PROTHROMBIN TIME: CPT | Performed by: INTERNAL MEDICINE

## 2024-06-02 RX ORDER — PANTOPRAZOLE SODIUM 40 MG/1
40 TABLET, DELAYED RELEASE ORAL
Status: DISCONTINUED | OUTPATIENT
Start: 2024-06-03 | End: 2024-06-03 | Stop reason: HOSPADM

## 2024-06-02 RX ORDER — WARFARIN SODIUM 6 MG/1
6 TABLET ORAL
Status: COMPLETED | OUTPATIENT
Start: 2024-06-02 | End: 2024-06-02

## 2024-06-02 RX ORDER — METOPROLOL SUCCINATE 25 MG/1
25 TABLET, EXTENDED RELEASE ORAL EVERY 12 HOURS SCHEDULED
Status: DISCONTINUED | OUTPATIENT
Start: 2024-06-02 | End: 2024-06-03 | Stop reason: HOSPADM

## 2024-06-02 RX ADMIN — LEVOTHYROXINE SODIUM 88 MCG: 88 TABLET ORAL at 06:05

## 2024-06-02 RX ADMIN — NYSTATIN 500000 UNITS: 100000 SUSPENSION ORAL at 11:15

## 2024-06-02 RX ADMIN — PANTOPRAZOLE SODIUM 40 MG: 40 INJECTION, POWDER, FOR SOLUTION INTRAVENOUS at 06:05

## 2024-06-02 RX ADMIN — Medication 10 ML: at 08:41

## 2024-06-02 RX ADMIN — NYSTATIN 500000 UNITS: 100000 SUSPENSION ORAL at 16:20

## 2024-06-02 RX ADMIN — TOPIRAMATE 50 MG: 25 TABLET, FILM COATED ORAL at 08:41

## 2024-06-02 RX ADMIN — NYSTATIN 500000 UNITS: 100000 SUSPENSION ORAL at 08:41

## 2024-06-02 RX ADMIN — METOPROLOL SUCCINATE 25 MG: 25 TABLET, EXTENDED RELEASE ORAL at 08:41

## 2024-06-02 RX ADMIN — ACETAMINOPHEN 650 MG: 325 TABLET, FILM COATED ORAL at 06:28

## 2024-06-02 RX ADMIN — DIGOXIN 125 MCG: 125 TABLET ORAL at 11:15

## 2024-06-02 RX ADMIN — FLUCONAZOLE 100 MG: 100 TABLET ORAL at 20:28

## 2024-06-02 RX ADMIN — ATORVASTATIN CALCIUM 80 MG: 40 TABLET, FILM COATED ORAL at 08:41

## 2024-06-02 RX ADMIN — WARFARIN SODIUM 6 MG: 6 TABLET ORAL at 16:19

## 2024-06-02 RX ADMIN — CEFTRIAXONE 2000 MG: 2 INJECTION, POWDER, FOR SOLUTION INTRAMUSCULAR; INTRAVENOUS at 16:21

## 2024-06-02 RX ADMIN — Medication 10 ML: at 20:28

## 2024-06-02 RX ADMIN — METOPROLOL SUCCINATE 25 MG: 25 TABLET, EXTENDED RELEASE ORAL at 20:28

## 2024-06-02 RX ADMIN — NYSTATIN 500000 UNITS: 100000 SUSPENSION ORAL at 20:28

## 2024-06-02 RX ADMIN — PAROXETINE 40 MG: 40 TABLET, FILM COATED ORAL at 06:04

## 2024-06-02 NOTE — PROGRESS NOTES
Referring Provider: Keke Casiano MD    Reason for follow-up: Atrial fibrillation, coronary artery disease status post CABG     Patient Care Team:  Luan Joseph APRN as PCP - General (Family Medicine)  Jozef Otero MD as Consulting Physician (Nephrology)      SUBJECTIVE  Patient is trying to get out of bed.  She wants to go home today.   is not at bedside today.     ROS  Review of all systems negative except as indicated.    Since I have last seen, the patient has been without any chest discomfort, shortness of breath, palpitations, dizziness or syncope.  Denies having any headache, abdominal pain, nausea, vomiting, diarrhea, constipation, loss of weight or loss of appetite.  Denies having any excessive bruising, hematuria or blood in the stool.        Personal History:    Past Medical History:   Diagnosis Date    AAA (abdominal aortic aneurysm)     infrarenal- 3.1cm(2010, 3.7x4.2cm(2016), 3.9cm (2017)    Allergic rhinitis     Aspiration pneumonia 05/2017    CHF (congestive heart failure)     Coronary artery disease     DDD (degenerative disc disease), lumbar     lumbar spine- Dr. Churchill     Depression     Diverticulosis     Frequent UTI     Dr. Daniels    GERD (gastroesophageal reflux disease)     Hiatal hernia     HSV (herpes simplex virus) infection     Oral    Hyperlipidemia     IBS (irritable bowel syndrome)     Constipation predominant    Ischemic cardiomyopathy 09/2017    AICD     Kidney stones     NSTEMI (non-ST elevated myocardial infarction) 04/26/2017    Obstructive sleep apnea 2011    nfsg 2011, ahi 67    Osteoarthritis     Osteopenia     Peripheral neuropathy     feet    Pulmonary embolism, bilateral 05/2017    Rotator cuff tear     Bilateral- Ortho        Past Surgical History:   Procedure Laterality Date    CARDIAC CATHETERIZATION  04/26/2017    99% mid LAD. 90% mid LCX. 60% RCA. Recommended CABG. Dr. Diaz    CARDIAC DEFIBRILLATOR PLACEMENT  12/26/2017    ICD implant/ Dr. Ellis     CATARACT EXTRACTION      CORONARY ARTERY BYPASS GRAFT  04/26/2017    x4 & ASD Closure    CYSTOSCOPY  2002    negative. Dr. Daniels    LAPAROSCOPIC CHOLECYSTECTOMY  04/17/2013    For biliary dyskinesia. Dr. Mccoy.     REPLACEMENT TOTAL KNEE BILATERAL  11/2004    Dr. Herrera    THORACENTESIS Left 05/08/2017    TONSILLECTOMY      TUBAL ABDOMINAL LIGATION Bilateral        Family History   Problem Relation Age of Onset    Heart disease Mother     Other Mother         Hypoglycemia    Osteoporosis Mother     COPD Father     Stroke Father     Hypertension Brother     Diabetes Brother     Heart disease Brother        Social History     Tobacco Use    Smoking status: Never   Vaping Use    Vaping status: Some Days    Substances: CBD, nightly, 2-3 weekly   Substance Use Topics    Alcohol use: Never    Drug use: Never        Home meds:  Prior to Admission medications    Medication Sig Start Date End Date Taking? Authorizing Provider   levocetirizine (XYZAL) 5 MG tablet Take 1 tablet by mouth Every Evening.   Yes Amor White MD   levothyroxine (SYNTHROID, LEVOTHROID) 88 MCG tablet Take 1 tablet by mouth Every Morning. 1/27/24  Yes Bertha Calixto APRN   omeprazole (priLOSEC) 40 MG capsule Take 1 capsule by mouth Daily.   Yes Amor White MD   oxybutynin XL (DITROPAN-XL) 10 MG 24 hr tablet Take 1 tablet by mouth Daily.   Yes Amor White MD   PARoxetine (PAXIL) 40 MG tablet Take 1 tablet by mouth Every Morning.   Yes Amor White MD   topiramate (TOPAMAX) 50 MG tablet Take 1 tablet by mouth Daily.   Yes Amor White MD   warfarin (COUMADIN) 3 MG tablet 2 tablets See Admin Instructions. Take 1 tablet by mouth on Tuesday, Thursday, Saturday, and Sunday   Yes Amor White MD   warfarin (COUMADIN) 3 MG tablet Take 1 tablet by mouth Daily. Monday Wednesday, Friday   Yes Amor White MD   atorvastatin (LIPITOR) 80 MG tablet Take 1 tablet by mouth Daily.    Amor White  "MD   linaclotide (LINZESS) 72 MCG capsule capsule Take 1 capsule by mouth As Needed.    Provider, Historical, MD       Allergies:  Oxytetracycline and Oxycodone    Scheduled Meds:atorvastatin, 80 mg, Oral, Daily  cefTRIAXone, 2,000 mg, Intravenous, Q24H  digoxin, 125 mcg, Oral, Daily  fluconazole, 100 mg, Oral, Q24H  levothyroxine, 88 mcg, Oral, Q AM  metoprolol succinate XL, 25 mg, Oral, Q24H  nystatin, 5 mL, Oral, 4x Daily  pantoprazole, 40 mg, Intravenous, Q AM  PARoxetine, 40 mg, Oral, QAM  sodium chloride, 10 mL, Intravenous, Q12H  topiramate, 50 mg, Oral, Daily      Continuous Infusions:dilTIAZem, 5-15 mg/hr, Last Rate: Stopped (05/30/24 1239)  Pharmacy to dose warfarin,       PRN Meds:.  acetaminophen    benzocaine-menthol    calcium carbonate    Calcium Replacement - Follow Nurse / BPA Driven Protocol    HYDROcodone-acetaminophen    LORazepam    Magnesium Standard Dose Replacement - Follow Nurse / BPA Driven Protocol    melatonin    ondansetron    Pharmacy to dose warfarin    Phosphorus Replacement - Follow Nurse / BPA Driven Protocol    Potassium Replacement - Follow Nurse / BPA Driven Protocol    [COMPLETED] Insert Peripheral IV **AND** sodium chloride    sodium chloride    sodium chloride      OBJECTIVE    Vital Signs  Vitals:    06/01/24 2104 06/02/24 0007 06/02/24 0011 06/02/24 0401   BP: 93/50 (!) 89/59 98/60 104/70   BP Location: Right arm  Right arm Right arm   Patient Position: Lying  Lying Lying   Pulse: 110 103 117 98   Resp: 15  17 21   Temp: 98 °F (36.7 °C)  97.5 °F (36.4 °C) 97.4 °F (36.3 °C)   TempSrc: Oral  Oral Oral   SpO2: 98%  95% 95%   Weight:    79.3 kg (174 lb 13.2 oz)   Height:           Flowsheet Rows      Flowsheet Row First Filed Value   Admission Height 172.7 cm (68\") Documented at 05/29/2024 1123   Admission Weight 77.1 kg (170 lb) Documented at 05/29/2024 1123            No intake or output data in the 24 hours ending 06/02/24 0721         Telemetry: Atrial fibrillation with " heart rate in the 90s    Physical Exam:  The patient is alert, oriented and in no distress.  Vital signs as noted above.  Head and neck revealed no carotid bruits or jugular venous distention.  No thyromegaly or lymphadenopathy is present  Lungs clear.  No wheezing.  Breath sounds are normal bilaterally.  Heart normal first and second heart sounds.  No murmur. No precordial rub is present.  No gallop is present.  Abdomen soft and nontender.  No organomegaly is present.  Extremities with good peripheral pulses without any pedal edema.  Skin warm and dry.  Musculoskeletal system is grossly normal.  CNS grossly normal.       Results Review:  I have personally reviewed the results from the time of this admission to 6/2/2024 07:21 EDT and agree with these findings:  []  Laboratory  []  Microbiology  []  Radiology  []  EKG/Telemetry   []  Cardiology/Vascular   []  Pathology  []  Old records  []  Other:    Most notable findings include:    Lab Results (last 24 hours)       Procedure Component Value Units Date/Time    Protime-INR [[id number]]  (Normal) Collected: 06/02/24 0621    Specimen: Blood Updated: 06/02/24 0706     Protime 23.4 Seconds      INR 2.28    Blood Culture - Blood, Arm, Right [[id number]]  (Normal) Collected: 05/29/24 1708    Specimen: Blood from Arm, Right Updated: 06/01/24 1715     Blood Culture No growth at 3 days    Blood Culture - Blood, Arm, Right [[id number]]  (Normal) Collected: 05/29/24 1532    Specimen: Blood from Arm, Right Updated: 06/01/24 1545     Blood Culture No growth at 3 days    Narrative:      Less than seven (7) mL's of blood was collected.  Insufficient quantity may yield false negative results.    Ammonia [[id number]]  (Normal) Collected: 06/01/24 1246    Specimen: Blood from Arm, Left Updated: 06/01/24 1322     Ammonia 34 umol/L     TSH [[id number]]  (Normal) Collected: 06/01/24 0526    Specimen: Blood Updated: 06/01/24 1248     TSH 0.976 uIU/mL             Imaging Results (Last 24 Hours)        ** No results found for the last 24 hours. **            LAB RESULTS (LAST 7 DAYS)    CBC  Results from last 7 days   Lab Units 06/01/24  0526 05/31/24 0456 05/30/24 0243 05/29/24  1231   WBC 10*3/mm3 11.27* 11.87* 14.40* 19.22*   RBC 10*6/mm3 3.33* 3.25* 3.53* 3.85   HEMOGLOBIN g/dL 10.1* 9.9* 10.7* 11.9*   HEMATOCRIT % 32.5* 31.6* 35.0 37.3   MCV fL 97.6* 97.2* 99.2* 96.9   PLATELETS 10*3/mm3 269 321 362 354       BMP  Results from last 7 days   Lab Units 06/01/24 0526 05/31/24 0456 05/30/24 0243 05/29/24  1231   SODIUM mmol/L 134* 135* 132* 129*   POTASSIUM mmol/L 3.6 4.4 4.3 3.4*   CHLORIDE mmol/L 108* 107 106 100   CO2 mmol/L 15.2* 18.8* 13.9* 13.5*   BUN mg/dL 8 9 13 19   CREATININE mg/dL 0.82 0.96 0.97 1.27*   GLUCOSE mg/dL 102* 141* 97 131*       CMP   Results from last 7 days   Lab Units 06/01/24  1246 06/01/24 0526 05/31/24 0456 05/30/24 0243 05/29/24  1231   SODIUM mmol/L  --  134* 135* 132* 129*   POTASSIUM mmol/L  --  3.6 4.4 4.3 3.4*   CHLORIDE mmol/L  --  108* 107 106 100   CO2 mmol/L  --  15.2* 18.8* 13.9* 13.5*   BUN mg/dL  --  8 9 13 19   CREATININE mg/dL  --  0.82 0.96 0.97 1.27*   GLUCOSE mg/dL  --  102* 141* 97 131*   ALBUMIN g/dL  --   --   --   --  3.1*   BILIRUBIN mg/dL  --   --   --   --  0.8   ALK PHOS U/L  --   --   --   --  162*   AST (SGOT) U/L  --   --   --   --  104*   ALT (SGPT) U/L  --   --   --   --  54*   AMMONIA umol/L 34  --   --   --   --        BNP        TROPONIN  Results from last 7 days   Lab Units 05/29/24  1231   HSTROP T ng/L 30*       CoAg  Results from last 7 days   Lab Units 06/02/24  0621 06/01/24  0526 05/31/24  0456 05/30/24  0243 05/29/24  1340   INR  2.28 2.55 3.51* 4.14* 4.45*   APTT seconds  --   --   --   --  42.7*       Creatinine Clearance  Estimated Creatinine Clearance: 71.7 mL/min (by C-G formula based on SCr of 0.82 mg/dL).    ABG        Radiology  CT Angiogram Head    Result Date: 5/31/2024  Atheromatous disease without significant  stenosis. No vessel occlusion. Electronically Signed: El Rivero MD  5/31/2024 7:56 PM EDT  Workstation ID: [id number]    CT Angiogram Carotids    Result Date: 5/31/2024  Atheromatous disease without significant stenosis. No vessel occlusion. Electronically Signed: El Rivero MD  5/31/2024 7:56 PM EDT  Workstation ID: [id number]       EKG  I personally viewed and interpreted the patient's EKG/Telemetry data:  ECG 12 Lead Tachycardia   Final Result   HEART RATE= 139  bpm   RR Interval= 430  ms   NM Interval= 138  ms   P Horizontal Axis= -12  deg   P Front Axis= -11  deg   QRSD Interval= 94  ms   QT Interval= 264  ms   QTcB= 403  ms   QRS Axis= -1  deg   T Wave Axis= 42  deg   - ABNORMAL ECG -   Sinus tachycardia   Supraventricular bigeminy   Consider anteroseptal infarct   When compared with ECG of 18-May-2024 16:16:42,   Significant change in rhythm   Electronically Signed By: Lorenzo Conklin ([facility]) 30-May-2024 08:10:12   Date and Time of Study: 2024-05-29 12:43:06      Telemetry Scan   Final Result      Telemetry Scan   Final Result      Telemetry Scan   Final Result      Telemetry Scan   Final Result      Telemetry Scan   Final Result      Telemetry Scan   Final Result      Telemetry Scan   Final Result      Telemetry Scan   Final Result      Telemetry Scan   Final Result      Telemetry Scan   Final Result      Telemetry Scan   Final Result      Telemetry Scan   Final Result            Echocardiogram:    Results for orders placed during the hospital encounter of 01/22/24    Adult Transthoracic Echo Complete W/ Cont if Necessary Per Protocol    Interpretation Summary    Left ventricular ejection fraction appears to be 56 - 60%.    Estimated right ventricular systolic pressure from tricuspid regurgitation is normal (<35 mmHg).    Indication  Dyspnea  Palpitations    Technically satisfactory study.  Mitral valve is structurally normal.  Tricuspid valve is structurally normal.  Mild tricuspid regurgitation is  present.  Aortic valve is aortic valve with decreased opening motion.  Gradient across the aortic valve is 16/9 mmHg.  Valve area 1.37 cm².  Pulmonic valve could not be well visualized.  Left atrium is enlarged.  Right atrium is normal in size.  Left ventricle is enlarged with septal anterior wall and apical severe hypokinesis with ejection fraction of 40%.  Possible left ventricular apical thrombus.  Right ventricle is normal in size.  Atrial septum is intact.  Aorta is normal.  No pericardial effusion or intracardiac thrombus is seen.  ICD lead is present in the right ventricle.    Impression  Mild tricuspid regurgitation.  Mild to moderate aortic valve stenosis with gradient of 16/9 mmHg and valve area of 1.37 cm².  Left atrial enlargement.  Left ventricle is enlarged with septal anterior wall and apical severe hypokinesis with ejection fraction of 40%.  Possible left ventricular apical thrombus.  ICD lead is present in the right ventricle.  No evidence for pulmonary hypertension is present.        Stress Test:  Results for orders placed during the hospital encounter of 01/22/24    Stress Test With Myocardial Perfusion One Day    Interpretation Summary  Indications  Status post CABG  Atrial fibrillation    This study was performed under the direct supervision of Rohini NOLASCO.    Resting ECG  Sinus rhythm    The patient was injected with Lexiscan intravenously while constantly monitoring electrocardiogram and vital signs.  Patient did not have any chest discomfort ST abnormalities or ectopy with injection of Lexiscan.    Cardiolite was used as an imaging agent.    Cardiolite images showed significantly decreased radionuclide uptake in the proximal posterior segments with partial redistribution in the resting images.    Gated SPECT images revealed normal left ventricle size and diffuse hypocontractility with calculated ejection fraction of 61%.    Impression  ========  Lexiscan Cardiolite test revealed proximal  posterior infarction and ischemia.  Gated SPECT images revealed normal left ventricular size and diffuse hypocontractility with calculated ejection fraction of 61%.         Cardiac Catheterization:  No results found for this or any previous visit.         Other:         ASSESSMENT & PLAN:    Principal Problem:    Atrial fibrillation with RVR  Active Problems:    Urinary tract infection    A-fib RVR  [id number] score is 4  Resume warfarin with goal INR of 2-3.  Current INR is 2.28  On digoxin and metoprolol for rate control.  Increasing the frequency of Toprol-XL to 25 mg p.o. twice daily for better heart rate control  Cardizem has been stopped due to hypotension    Stroke  Tiny acute to subacute cortical left frontal stroke on MRI from 5/17/2024  Continue warfarin, statin and beta-blocker  Risk factors modification  Neurology has signed off    HFpEF  Echocardiogram with EF of 40%  HFpEF secondary to aortic stenosis, atrial fibrillation, coronary artery disease  Serial echocardiograms to follow-up on mild to moderate aortic valve stenosis.  Currently appears euvolemic.  Diuretics as needed.  ICD in place [employer].    Coronary artery disease  Status post CABG and ASD closure in 2017  Abnormal nuclear stress test noted  Continue statin and beta-blocker  Currently chest angina pain-free    Peripheral arterial disease  Status post stenting of aneurysmal sac.  Thrombus within the aneurysmal sac  Doppler shows good flow in both common iliac arteries  Continue high intensity statin and warfarin    Chronic kidney disease  Creatinine is now normal    UTI  Antibiotics per primary    Diabetes  A1c is 6.2  Consider starting metformin.    Hypothyroidism  Continue Synthroid    Thrush  Started on fluconazole.    From cardiac standpoint she can be discharged.         Keith Tobin MD  06/02/24  07:21 EDT

## 2024-06-02 NOTE — DISCHARGE PLACEMENT REQUEST
"Shahbaz Mejia \"Annie\" (68 y.o. Female)       Date of Birth   1955    Social Security Number       Address   [address] [place] [address] [place] [facility]    Home Phone   [phone]    MRN   [id number]       Taoist   Patient Refused    Marital Status                               Admission Date   5/29/24    Admission Type   Emergency    Admitting Provider   Keke Casiano MD    Attending Provider   Keke Casiano MD    Department, Room/Bed   [facility] 2D, 269/1       Discharge Date       Discharge Disposition       Discharge Destination                                 Attending Provider: Keke Casiano MD    Allergies: Oxytetracycline, Oxycodone    Isolation: None   Infection: None   Code Status: CPR    Ht: 172.7 cm (68\")   Wt: 79.3 kg (174 lb 13.2 oz)    Admission Cmt: None   Principal Problem: Atrial fibrillation with RVR [I48.91]                   Active Insurance as of 5/29/2024       Primary Coverage       Payor Plan Insurance Group Employer/Plan Group    HUMANA MEDICARE REPLACEMENT HUMANA MED ADV PPO [id number]       Payor Plan Address Payor Plan Phone Number Payor Plan Fax Number Effective Dates    [address] [address] [phone]  9/1/2023 - None Entered    [facility] [id number]         Subscriber Name Subscriber Birth Date Member ID       SHAHBAZ MEJIA 1955 [id number]                     Emergency Contacts        (Rel.) Home Phone Work Phone Mobile Phone    DARI MEJIA (Spouse) [phone] -- --                "

## 2024-06-02 NOTE — PROGRESS NOTES
"Pharmacy dosing service  Anticoagulant  Warfarin     Subjective:    Laura Mejia is a 68 y.o.female being continued on warfarin for Atrial Fibrillation / Flutter.    INR Goal: 2 - 3  Home medication?: warfarin 3 mg PO daily, except warfarin 6 mg PO on Su/Tu/Th/Sa.   Bridge Therapy Present?:  No  Interacting Medications Evaluation (New/Present/Discontinued): ceftriaxone (may increase INR), fluconazole(may increase INR), digoxin and paroxetine (home meds)  Additional Contributing Factors: last dose 6mg on 5/28      Assessment/Plan:    INR is therapeutic again today after holding from 5/29-5/31. Will continue with home dose of warfarin today with a 6 mg dose. Patient will likely need home regimen change on discharge.     Continue to monitor and adjust based on INR.         Date 5/29 5/30 5/31 6/1 6/2       INR 4.45 4.14 3.51 2.55 2.28       Dose hold hold hold 6 mg 6 mg           Objective:  [Ht: 172.7 cm (68\"); Wt: 79.3 kg (174 lb 13.2 oz); BMI: Body mass index is 26.58 kg/m².]    Lab Results   Component Value Date    ALBUMIN 3.1 (L) 05/29/2024     Lab Results   Component Value Date    INR 2.28 06/02/2024    INR 2.55 06/01/2024    INR 3.51 (H) 05/31/2024    PROTIME 23.4 06/02/2024    PROTIME 25.9 06/01/2024    PROTIME 34.9 (H) 05/31/2024     Lab Results   Component Value Date    HGB 10.1 (L) 06/01/2024    HGB 9.9 (L) 05/31/2024    HGB 10.7 (L) 05/30/2024     Lab Results   Component Value Date    HCT 32.5 (L) 06/01/2024    HCT 31.6 (L) 05/31/2024    HCT 35.0 05/30/2024       Sebas Kang RPH  06/02/24 09:21 EDT             "

## 2024-06-02 NOTE — CASE MANAGEMENT/SOCIAL WORK
Continued Stay Note  [facility]     Patient Name: Laura Mejia  MRN: [id number]  Today's Date: 6/2/2024    Admit Date: 5/29/2024    Plan: Return home with spouse. Referral to [facility], pending acceptance. Declined SNF. HH order needed prior to d/c.   Discharge Plan       Row Name 06/02/24 1625       Plan    Plan Return home with spouse. Referral to [facility], pending acceptance. Declined SNF. HH order needed prior to d/c.    Plan Comments Met with pt and family in room. Pt declined SNF, but agreeable to home health - any agency that will accept. Referral to [facility], pending acceptance. Liaison notified via secure chat.                      Expected Discharge Date and Time       Expected Discharge Date Expected Discharge Time    Jun 4, 2024               Annika Nguyen RN     Yes

## 2024-06-02 NOTE — PROGRESS NOTES
LOS: 3 days   Patient Care Team:  Luan Joseph APRN as PCP - General (Family Medicine)  Jozef Otero MD as Consulting Physician (Nephrology)    Subjective:  Better with less shortness of breath    Objective:   Afebrile//heart rate remains in the 90s      Review of Systems:   Review of Systems   Respiratory:  Positive for shortness of breath.    Neurological:  Positive for weakness.           Vital Signs  Temp:  [97.4 °F (36.3 °C)-98 °F (36.7 °C)] 97.8 °F (36.6 °C)  Heart Rate:  [] 103  Resp:  [14-21] 20  BP: ()/(47-70) 121/63    Physical Exam:  Physical Exam  Vitals reviewed.   Constitutional:       Appearance: Normal appearance.   Cardiovascular:      Rate and Rhythm: Rhythm irregular.      Heart sounds: Normal heart sounds.   Pulmonary:      Breath sounds: Normal breath sounds.   Skin:     General: Skin is warm.   Neurological:      Mental Status: She is alert.          Radiology:  CT Angiogram Head    Result Date: 5/31/2024  Atheromatous disease without significant stenosis. No vessel occlusion. Electronically Signed: El Rivero MD  5/31/2024 7:56 PM EDT  Workstation ID: [id number]    CT Angiogram Carotids    Result Date: 5/31/2024  Atheromatous disease without significant stenosis. No vessel occlusion. Electronically Signed: El Rivero MD  5/31/2024 7:56 PM EDT  Workstation ID: [id number]    XR Chest 1 View    Result Date: 5/29/2024  Impression: Cardiomegaly and central pulmonary vascular congestion. Electronically Signed: Ronak Birmingham MD  5/29/2024 2:39 PM EDT  Workstation ID: [id number]        Results Review:     I reviewed the patient's new clinical results.  I reviewed the patient's new imaging results and agree with the interpretation.    Medication Review:   Scheduled Meds:atorvastatin, 80 mg, Oral, Daily  cefTRIAXone, 2,000 mg, Intravenous, Q24H  digoxin, 125 mcg, Oral, Daily  fluconazole, 100 mg, Oral, Q24H  levothyroxine, 88 mcg, Oral, Q AM  metoprolol succinate XL, 25 mg, Oral,  "Q12H  nystatin, 5 mL, Oral, 4x Daily  pantoprazole, 40 mg, Intravenous, Q AM  PARoxetine, 40 mg, Oral, QAM  sodium chloride, 10 mL, Intravenous, Q12H  topiramate, 50 mg, Oral, Daily  warfarin, 6 mg, Oral, Once      Continuous Infusions:Pharmacy to dose warfarin,       PRN Meds:.  acetaminophen    benzocaine-menthol    calcium carbonate    Calcium Replacement - Follow Nurse / BPA Driven Protocol    HYDROcodone-acetaminophen    LORazepam    Magnesium Standard Dose Replacement - Follow Nurse / BPA Driven Protocol    melatonin    ondansetron    Pharmacy to dose warfarin    Phosphorus Replacement - Follow Nurse / BPA Driven Protocol    Potassium Replacement - Follow Nurse / BPA Driven Protocol    [COMPLETED] Insert Peripheral IV **AND** sodium chloride    sodium chloride    sodium chloride    Labs:    CBC    Results from last 7 days   Lab Units 06/01/24 0526 05/31/24 0456 05/30/24 0243 05/29/24  1231   WBC 10*3/mm3 11.27* 11.87* 14.40* 19.22*   HEMOGLOBIN g/dL 10.1* 9.9* 10.7* 11.9*   PLATELETS 10*3/mm3 269 321 362 354     BMP   Results from last 7 days   Lab Units 06/01/24 0526 05/31/24 0456 05/30/24 0243 05/29/24  1231   SODIUM mmol/L 134* 135* 132* 129*   POTASSIUM mmol/L 3.6 4.4 4.3 3.4*   CHLORIDE mmol/L 108* 107 106 100   CO2 mmol/L 15.2* 18.8* 13.9* 13.5*   BUN mg/dL 8 9 13 19   CREATININE mg/dL 0.82 0.96 0.97 1.27*   GLUCOSE mg/dL 102* 141* 97 131*     Cr Clearance Estimated Creatinine Clearance: 71.7 mL/min (by C-G formula based on SCr of 0.82 mg/dL).  Coag   Results from last 7 days   Lab Units 06/02/24  0621 06/01/24 0526 05/31/24 0456 05/30/24  0243 05/29/24  1340   INR  2.28 2.55 3.51* 4.14* 4.45*   APTT seconds  --   --   --   --  42.7*     HbA1C   Lab Results   Component Value Date    HGBA1C 6.20 (H) 05/31/2024     Blood Glucose No results found for: \"POCGLU\"  Infection   Results from last 7 days   Lab Units 05/29/24  1708 05/29/24  1532 05/29/24  1313   BLOODCX  No growth at 3 days No growth at 3 " days  --    URINECX   --   --  Yeast isolated*     CMP   Results from last 7 days   Lab Units 06/01/24  1246 06/01/24  0526 05/31/24  0456 05/30/24  0243 05/29/24  1231   SODIUM mmol/L  --  134* 135* 132* 129*   POTASSIUM mmol/L  --  3.6 4.4 4.3 3.4*   CHLORIDE mmol/L  --  108* 107 106 100   CO2 mmol/L  --  15.2* 18.8* 13.9* 13.5*   BUN mg/dL  --  8 9 13 19   CREATININE mg/dL  --  0.82 0.96 0.97 1.27*   GLUCOSE mg/dL  --  102* 141* 97 131*   ALBUMIN g/dL  --   --   --   --  3.1*   BILIRUBIN mg/dL  --   --   --   --  0.8   ALK PHOS U/L  --   --   --   --  162*   AST (SGOT) U/L  --   --   --   --  104*   ALT (SGPT) U/L  --   --   --   --  54*   AMMONIA umol/L 34  --   --   --   --      UA    Results from last 7 days   Lab Units 05/29/24  1313   NITRITE UA  Negative   WBC UA /HPF 21-50*   BACTERIA UA /HPF 1+*   SQUAM EPITHEL UA /HPF 0-2   URINECX  Yeast isolated*     Radiology(recent) CT Angiogram Head    Result Date: 5/31/2024  Atheromatous disease without significant stenosis. No vessel occlusion. Electronically Signed: El Rivero MD  5/31/2024 7:56 PM EDT  Workstation ID: [id number]    CT Angiogram Carotids    Result Date: 5/31/2024  Atheromatous disease without significant stenosis. No vessel occlusion. 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

## 2024-06-03 ENCOUNTER — DOCUMENTATION (OUTPATIENT)
Dept: HOME HEALTH SERVICES | Facility: HOME HEALTHCARE | Age: 69
End: 2024-06-03
Payer: MEDICARE

## 2024-06-03 ENCOUNTER — READMISSION MANAGEMENT (OUTPATIENT)
Dept: CALL CENTER | Facility: HOSPITAL | Age: 69
End: 2024-06-03
Payer: MEDICARE

## 2024-06-03 ENCOUNTER — TRANSCRIBE ORDERS (OUTPATIENT)
Dept: HOME HEALTH SERVICES | Facility: HOME HEALTHCARE | Age: 69
End: 2024-06-03
Payer: MEDICARE

## 2024-06-03 ENCOUNTER — HOME HEALTH ADMISSION (OUTPATIENT)
Dept: HOME HEALTH SERVICES | Facility: HOME HEALTHCARE | Age: 69
End: 2024-06-03
Payer: MEDICARE

## 2024-06-03 VITALS
BODY MASS INDEX: 26.5 KG/M2 | HEIGHT: 68 IN | DIASTOLIC BLOOD PRESSURE: 57 MMHG | WEIGHT: 174.82 LBS | SYSTOLIC BLOOD PRESSURE: 104 MMHG | RESPIRATION RATE: 19 BRPM | HEART RATE: 79 BPM | OXYGEN SATURATION: 96 % | TEMPERATURE: 98.4 F

## 2024-06-03 DIAGNOSIS — I48.91 ATRIAL FIBRILLATION, UNSPECIFIED TYPE: Primary | ICD-10-CM

## 2024-06-03 LAB
BACTERIA SPEC AEROBE CULT: NORMAL
BACTERIA SPEC AEROBE CULT: NORMAL
INR PPP: 3.06 (ref 2–3)
PROTHROMBIN TIME: 30.7 SECONDS (ref 19.4–28.5)

## 2024-06-03 PROCEDURE — 99232 SBSQ HOSP IP/OBS MODERATE 35: CPT | Performed by: INTERNAL MEDICINE

## 2024-06-03 PROCEDURE — 93005 ELECTROCARDIOGRAM TRACING: CPT | Performed by: INTERNAL MEDICINE

## 2024-06-03 PROCEDURE — 93010 ELECTROCARDIOGRAM REPORT: CPT | Performed by: INTERNAL MEDICINE

## 2024-06-03 PROCEDURE — 85610 PROTHROMBIN TIME: CPT | Performed by: INTERNAL MEDICINE

## 2024-06-03 RX ORDER — FLUCONAZOLE 100 MG/1
100 TABLET ORAL EVERY 24 HOURS
Qty: 1 TABLET | Refills: 0 | Status: SHIPPED | OUTPATIENT
Start: 2024-06-03 | End: 2024-06-04

## 2024-06-03 RX ORDER — METOPROLOL SUCCINATE 25 MG/1
25 TABLET, EXTENDED RELEASE ORAL EVERY 12 HOURS SCHEDULED
Qty: 60 TABLET | Refills: 3 | Status: SHIPPED | OUTPATIENT
Start: 2024-06-03

## 2024-06-03 RX ORDER — DIGOXIN 125 MCG
125 TABLET ORAL
Qty: 30 TABLET | Refills: 3 | Status: SHIPPED | OUTPATIENT
Start: 2024-06-03

## 2024-06-03 RX ORDER — PANTOPRAZOLE SODIUM 40 MG/1
40 TABLET, DELAYED RELEASE ORAL
Qty: 30 TABLET | Refills: 0 | Status: SHIPPED | OUTPATIENT
Start: 2024-06-04

## 2024-06-03 RX ORDER — CEPHALEXIN 500 MG/1
500 CAPSULE ORAL 4 TIMES DAILY
Qty: 20 CAPSULE | Refills: 0 | Status: SHIPPED | OUTPATIENT
Start: 2024-06-03

## 2024-06-03 RX ADMIN — ATORVASTATIN CALCIUM 80 MG: 40 TABLET, FILM COATED ORAL at 09:24

## 2024-06-03 RX ADMIN — NYSTATIN 500000 UNITS: 100000 SUSPENSION ORAL at 09:24

## 2024-06-03 RX ADMIN — METOPROLOL SUCCINATE 25 MG: 25 TABLET, EXTENDED RELEASE ORAL at 09:24

## 2024-06-03 RX ADMIN — PANTOPRAZOLE SODIUM 40 MG: 40 TABLET, DELAYED RELEASE ORAL at 06:36

## 2024-06-03 RX ADMIN — PAROXETINE 40 MG: 40 TABLET, FILM COATED ORAL at 06:36

## 2024-06-03 RX ADMIN — LEVOTHYROXINE SODIUM 88 MCG: 88 TABLET ORAL at 06:36

## 2024-06-03 RX ADMIN — Medication 10 ML: at 09:24

## 2024-06-03 RX ADMIN — TOPIRAMATE 50 MG: 25 TABLET, FILM COATED ORAL at 09:24

## 2024-06-03 NOTE — PLAN OF CARE
Goal Outcome Evaluation:         Patient rested well during the night.  Confusion at times.  Heart rate has been controlled.  BP 94/47.  No new complaints from patient.

## 2024-06-03 NOTE — DISCHARGE SUMMARY
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  History of coronary artery bypass grafting  History of ASD closure  PAD  Acquired hypothyroidism  Acute urethritis  Acute urinary tract infection present upon admission  Oral thrush  Hypercoagulable state secondary to atrial fibrillation  Chronic oral anticoagulation  History of pacer placement  ICD  Ischemic cardiomyopathy  Hypoventilation apnea Syndrome with ANNIE  Cerebrovascular disease    Presenting Problem/History of Present Illness  Active Hospital Problems    Diagnosis  POA    **Atrial fibrillation with RVR [I48.91]  Yes    Urinary tract infection [N39.0]  Yes      Resolved Hospital Problems   No resolved problems to display.          Hospital Course  Patient is a 68 y.o. female who presented with palpitations and shortness of breath with evidence of acute atrial fibrillation with rapid ventricular response.  Audiology was asked to evaluate the patient and rate control was achieved through parenteral means.  She was found to have warfarin toxicity which was managed with modification of medications.  She was found to have a left frontal CVA as well and is complications related to multiple comorbidities.  Physical therapy was initiated and risk factor modification ensued with recommendation to continue with oral anticoagulation with warfarin.  Improved overall and was deemed stable for discharge home to have home health care and home physical therapy with medications per the discharge reconciliation will include the blockade digoxin and statin therapy.  She will follow-up with cardiology within 1 month and with us within 1 to 2 weeks time.  There were no other complications throughout the patient's hospital stay.    Procedures  Performed         Consults:   Consults       Date and Time Order Name Status Description    5/31/2024 12:43 PM Inpatient Neurology Consult Stroke Completed     5/29/2024  6:25 PM Inpatient Cardiology Consult      5/29/2024  2:48 PM Family Medicine Consult      5/18/2024  3:24 PM Inpatient Cardiology Consult Completed     5/17/2024 12:46 PM Inpatient Psychiatrist Consult Completed             Pertinent Test Results:CT Angiogram Head    Result Date: 5/31/2024  Atheromatous disease without significant stenosis. No vessel occlusion. Electronically Signed: El Rivero MD  5/31/2024 7:56 PM EDT  Workstation ID: [id number]    CT Angiogram Carotids    Result Date: 5/31/2024  Atheromatous disease without significant stenosis. No vessel occlusion. Electronically Signed: El Rivero MD  5/31/2024 7:56 PM EDT  Workstation ID: [id number]    XR Chest 1 View    Result Date: 5/29/2024  Impression: Cardiomegaly and central pulmonary vascular congestion. Electronically Signed: Ronak Birmingham MD  5/29/2024 2:39 PM EDT  Workstation ID: [id number]     Imaging Results (Last 7 Days)       Procedure Component Value Units Date/Time    CT Angiogram Head [[id number]] Collected: 05/31/24 1951     Updated: 05/31/24 1958    Narrative:      CT ANGIOGRAM HEAD, CT ANGIOGRAM CAROTIDS    Date of Exam: 5/31/2024 7:16 PM EDT    Indication: stroke.    Comparison: None available.    Technique: CTA of the head and neck was performed after the uneventful intravenous administration of iodinated contrast. Reconstructed coronal and sagittal images were also obtained. In addition, a 3-D volume rendered image was created for   interpretation. Automated exposure control and iterative reconstruction methods were used.    Findings: Moderate atheromatous disease of the aortic arch. The right common carotid artery demonstrates multifocal mild atheromatous disease. Moderate calcified and noncalcified mural thrombus of the right carotid bulb and proximal right internal    carotid artery without significant stenosis. Moderate atheromatous disease of the intracranial segments of the right internal carotid artery. The right carotid terminus is normal. The visualized branches of the right anterior and middle cerebral arteries   are normal.    The left common carotid artery is normal. Mild atheromatous disease of the left carotid bulb and proximal left internal carotid artery. Severe atheromatous disease involving the intracranial segments of the left internal carotid artery. The left carotid   terminus is normal. The visualized branches of the left anterior and middle cerebral arteries are normal.    Both vertebral arteries arise from the subclavian arteries. The vertebral arteries are codominant. Both vertebral arteries supply the basilar artery. The basilar artery and basilar artery tip are normal. The basilar artery terminates in bilateral   posterior cerebral arteries which appear normal.    No abnormal intracranial enhancement. No intracranial mass. No intracranial hemorrhage. Prior bilateral lens replacements. The paranasal sinuses are clear. The mastoid air cells are aerated. No cervical adenopathy. The thyroid gland is atrophic. The lung   apices are clear. Degenerative changes.      Impression:      Atheromatous disease without significant stenosis. No vessel occlusion.        Electronically Signed: El Rivero MD    5/31/2024 7:56 PM EDT    Workstation ID: [id number]    CT Angiogram Carotids [[id number]] Collected: 05/31/24 1951     Updated: 05/31/24 1958    Narrative:      CT ANGIOGRAM HEAD, CT ANGIOGRAM CAROTIDS    Date of Exam: 5/31/2024 7:16 PM EDT    Indication: stroke.    Comparison: None available.    Technique: CTA of the head and neck was performed after the uneventful intravenous administration of iodinated contrast. Reconstructed coronal and sagittal images were also obtained. In addition, a 3-D volume rendered image was created for   interpretation. Automated  exposure control and iterative reconstruction methods were used.    Findings: Moderate atheromatous disease of the aortic arch. The right common carotid artery demonstrates multifocal mild atheromatous disease. Moderate calcified and noncalcified mural thrombus of the right carotid bulb and proximal right internal   carotid artery without significant stenosis. Moderate atheromatous disease of the intracranial segments of the right internal carotid artery. The right carotid terminus is normal. The visualized branches of the right anterior and middle cerebral arteries   are normal.    The left common carotid artery is normal. Mild atheromatous disease of the left carotid bulb and proximal left internal carotid artery. Severe atheromatous disease involving the intracranial segments of the left internal carotid artery. The left carotid   terminus is normal. The visualized branches of the left anterior and middle cerebral arteries are normal.    Both vertebral arteries arise from the subclavian arteries. The vertebral arteries are codominant. Both vertebral arteries supply the basilar artery. The basilar artery and basilar artery tip are normal. The basilar artery terminates in bilateral   posterior cerebral arteries which appear normal.    No abnormal intracranial enhancement. No intracranial mass. No intracranial hemorrhage. Prior bilateral lens replacements. The paranasal sinuses are clear. The mastoid air cells are aerated. No cervical adenopathy. The thyroid gland is atrophic. The lung   apices are clear. Degenerative changes.      Impression:      Atheromatous disease without significant stenosis. No vessel occlusion.        Electronically Signed: El Rivero MD    5/31/2024 7:56 PM EDT    Workstation ID: [id number]    XR Chest 1 View [[id number]] Collected: 05/29/24 1438     Updated: 05/29/24 1442    Narrative:      XR CHEST 1 VW    Date of Exam: 5/29/2024 12:54 PM EDT    Indication: tachycardia    Comparison:  Portable chest 5/16/2024    Findings:  Left-sided AICD noted. Prior sternotomy. Stable cardiomegaly and central pulmonary vascular congestion. No pneumothorax. No pleural effusion. Right reverse shoulder prosthesis partially imaged.      Impression:      Impression:  Cardiomegaly and central pulmonary vascular congestion.      Electronically Signed: Ronak Birmingham MD    5/29/2024 2:39 PM EDT    Workstation ID: [id number]                Condition on Discharge:  Fair    Vital Signs  Temp:  [97.7 °F (36.5 °C)-98.6 °F (37 °C)] 97.7 °F (36.5 °C)  Heart Rate:  [] 76  Resp:  [15-20] 19  BP: ()/(45-64) 94/47    Physical Exam:     General Appearance:    Alert, cooperative, in no acute distress   Head:    Normocephalic, without obvious abnormality, atraumatic   Eyes:           Conjunctivae and sclerae normal, no   icterus, no pallor, corneas clear, PERRLA   Throat:   No oral lesions, no thrush, oral mucosa moist   Neck:   No adenopathy, supple, trachea midline, no thyromegaly, no   carotid bruit, no JVD   Lungs:     Clear to auscultation,respirations regular, even and                  unlabored    Heart:    Regular rhythm and normal rate, normal S1 and S2, no            murmur, no gallop, no rub, no click   Chest Wall:    No abnormalities observed   Abdomen:     Normal bowel sounds, no masses, no organomegaly, soft        non-tender, non-distended, no guarding, no rebound                tenderness   Rectal:     Deferred   Extremities:   Moves all extremities well, no edema, no cyanosis, no             redness   Pulses:   Pulses palpable and equal bilaterally   Skin:   No bleeding, bruising or rash   Lymph nodes:   No palpable adenopathy   Neurologic:   Cranial nerves 2 - 12 grossly intact, sensation intact, DTR       present and equal bilaterally         Discharge Disposition  Home-Health Care Svc    Discharge Medications     Discharge Medications        New Medications        Instructions Start Date   cephalexin 500  MG capsule  Commonly known as: Keflex   500 mg, Oral, 4 Times Daily      digoxin 125 MCG tablet  Commonly known as: LANOXIN   125 mcg, Oral, Daily Digoxin      fluconazole 100 MG tablet  Commonly known as: DIFLUCAN   100 mg, Oral, Every 24 Hours      metoprolol succinate XL 25 MG 24 hr tablet  Commonly known as: TOPROL-XL   25 mg, Oral, Every 12 Hours Scheduled      pantoprazole 40 MG EC tablet  Commonly known as: PROTONIX   40 mg, Oral, Every Early Morning   Start Date: June 4, 2024            Continue These Medications        Instructions Start Date   atorvastatin 80 MG tablet  Commonly known as: LIPITOR   80 mg, Oral, Daily      levothyroxine 88 MCG tablet  Commonly known as: SYNTHROID, LEVOTHROID   88 mcg, Oral, Every Early Morning      linaclotide 72 MCG capsule capsule  Commonly known as: LINZESS   72 mcg, Oral, As Needed      PARoxetine 40 MG tablet  Commonly known as: PAXIL   40 mg, Oral, Every Morning      topiramate 50 MG tablet  Commonly known as: TOPAMAX   50 mg, Oral, Daily      warfarin 3 MG tablet  Commonly known as: COUMADIN   6 mg, See Admin Instructions, Take 1 tablet by mouth on Tuesday, Thursday, Saturday, and Sunday      warfarin 3 MG tablet  Commonly known as: COUMADIN   3 mg, Oral, Daily Warfarin, Monday Wednesday, Friday             Stop These Medications      levocetirizine 5 MG tablet  Commonly known as: XYZAL     omeprazole 40 MG capsule  Commonly known as: priLOSEC     oxybutynin XL 10 MG 24 hr tablet  Commonly known as: DITROPAN-XL              Discharge Diet:     Activity at Discharge:     Follow-up Appointments  Future Appointments   Date Time Provider Department Center   6/10/2024  1:00 PM Allie Hurley MD [facility]     Additional Instructions for the Follow-ups that You Need to Schedule       Ambulatory Referral to Home Health (Hospital)   As directed      Face to Face Visit Date: 6/3/2024   Follow-up provider for Plan of Care?: I will be treating the patient on an  ongoing basis.  Please send me the Plan of Care for signature.   Follow-up provider: VICTOR MANUEL BURT [[id number]]   Reason/Clinical Findings: Weakness atrial fibrillation   Describe mobility limitations that make leaving home difficult: Weakness atrial fibrillation   Nursing/Therapeutic Services Requested: Skilled Nursing Physical Therapy   Skilled nursing orders: Medication education   PT orders: Therapeutic exercise Strengthening   Frequency: 1 Week 1                Test Results Pending at Discharge  Pending Labs       Order Current Status    Blood Culture - Blood, Arm, Right Preliminary result    Blood Culture - Blood, Arm, Right Preliminary result             Zheng Angela MD  06/03/24  08:53 EDT

## 2024-06-03 NOTE — PROGRESS NOTES
Referring Provider: Keke Casiano MD    Reason for follow-up:  CABG  Atrial fibrillation     Patient Care Team:  Luan Joseph APRN as PCP - General (Family Medicine)  Jozef Otero MD as Consulting Physician (Nephrology)    Subjective .      ROS  Patient is feeling better today.  Today, the patient has been without any chest discomfort ,shortness of breath, palpitations, dizziness or syncope.  Denies having any headache ,abdominal pain ,nausea, vomiting , diarrhea constipation, loss of weight or loss of appetite.  Denies having any excessive bruising ,hematuria or blood in the stool.    Review of all systems negative except as indicated    History  Past Medical History:   Diagnosis Date    AAA (abdominal aortic aneurysm)     infrarenal- 3.1cm(2010, 3.7x4.2cm(2016), 3.9cm (2017)    Allergic rhinitis     Aspiration pneumonia 05/2017    CHF (congestive heart failure)     Coronary artery disease     DDD (degenerative disc disease), lumbar     lumbar spine- Dr. Churchill     Depression     Diverticulosis     Frequent UTI     Dr. Daniels    GERD (gastroesophageal reflux disease)     Hiatal hernia     HSV (herpes simplex virus) infection     Oral    Hyperlipidemia     IBS (irritable bowel syndrome)     Constipation predominant    Ischemic cardiomyopathy 09/2017    AICD     Kidney stones     NSTEMI (non-ST elevated myocardial infarction) 04/26/2017    Obstructive sleep apnea 2011    nfsg 2011, ahi 67    Osteoarthritis     Osteopenia     Peripheral neuropathy     feet    Pulmonary embolism, bilateral 05/2017    Rotator cuff tear     Bilateral- Ortho        Past Surgical History:   Procedure Laterality Date    CARDIAC CATHETERIZATION  04/26/2017    99% mid LAD. 90% mid LCX. 60% RCA. Recommended CABG. Dr. Diaz    CARDIAC DEFIBRILLATOR PLACEMENT  12/26/2017    ICD implant/ Dr. Ellis    CATARACT EXTRACTION      CORONARY ARTERY BYPASS GRAFT  04/26/2017    x4 & ASD Closure    CYSTOSCOPY  2002    negative. Dr. Daniels     LAPAROSCOPIC CHOLECYSTECTOMY  04/17/2013    For biliary dyskinesia. Dr. Mccoy.     REPLACEMENT TOTAL KNEE BILATERAL  11/2004    Dr. Herrera    THORACENTESIS Left 05/08/2017    TONSILLECTOMY      TUBAL ABDOMINAL LIGATION Bilateral        Family History   Problem Relation Age of Onset    Heart disease Mother     Other Mother         Hypoglycemia    Osteoporosis Mother     COPD Father     Stroke Father     Hypertension Brother     Diabetes Brother     Heart disease Brother        Social History     Tobacco Use    Smoking status: Never   Vaping Use    Vaping status: Some Days    Substances: CBD, nightly, 2-3 weekly   Substance Use Topics    Alcohol use: Never    Drug use: Never        Medications Prior to Admission   Medication Sig Dispense Refill Last Dose    levocetirizine (XYZAL) 5 MG tablet Take 1 tablet by mouth Every Evening.       levothyroxine (SYNTHROID, LEVOTHROID) 88 MCG tablet Take 1 tablet by mouth Every Morning. 30 tablet 1     omeprazole (priLOSEC) 40 MG capsule Take 1 capsule by mouth Daily.       oxybutynin XL (DITROPAN-XL) 10 MG 24 hr tablet Take 1 tablet by mouth Daily.       PARoxetine (PAXIL) 40 MG tablet Take 1 tablet by mouth Every Morning.       topiramate (TOPAMAX) 50 MG tablet Take 1 tablet by mouth Daily.       warfarin (COUMADIN) 3 MG tablet 2 tablets See Admin Instructions. Take 1 tablet by mouth on Tuesday, Thursday, Saturday, and Sunday 5/28/2024    warfarin (COUMADIN) 3 MG tablet Take 1 tablet by mouth Daily. Monday Wednesday, Friday 5/27/2024    atorvastatin (LIPITOR) 80 MG tablet Take 1 tablet by mouth Daily.       linaclotide (LINZESS) 72 MCG capsule capsule Take 1 capsule by mouth As Needed.          Allergies  Oxytetracycline and Oxycodone    Scheduled Meds:atorvastatin, 80 mg, Oral, Daily  cefTRIAXone, 2,000 mg, Intravenous, Q24H  digoxin, 125 mcg, Oral, Daily  fluconazole, 100 mg, Oral, Q24H  levothyroxine, 88 mcg, Oral, Q AM  metoprolol succinate XL, 25 mg, Oral, Q12H  nystatin,  "5 mL, Oral, 4x Daily  pantoprazole, 40 mg, Oral, Q AM  PARoxetine, 40 mg, Oral, QAM  sodium chloride, 10 mL, Intravenous, Q12H  topiramate, 50 mg, Oral, Daily      Continuous Infusions:Pharmacy to dose warfarin,       PRN Meds:.  acetaminophen    benzocaine-menthol    calcium carbonate    Calcium Replacement - Follow Nurse / BPA Driven Protocol    HYDROcodone-acetaminophen    LORazepam    Magnesium Standard Dose Replacement - Follow Nurse / BPA Driven Protocol    melatonin    ondansetron    Pharmacy to dose warfarin    Phosphorus Replacement - Follow Nurse / BPA Driven Protocol    Potassium Replacement - Follow Nurse / BPA Driven Protocol    [COMPLETED] Insert Peripheral IV **AND** sodium chloride    sodium chloride    sodium chloride    Objective     VITAL SIGNS  Vitals:    06/02/24 1610 06/02/24 2013 06/02/24 2320 06/03/24 0325   BP: 104/51 97/45 105/64 94/47   BP Location: Right arm Right arm Right arm Right arm   Patient Position: Lying Lying Lying Lying   Pulse: 97 84 84 76   Resp: 16 18 15 19   Temp: 98.4 °F (36.9 °C) 98.6 °F (37 °C) 98.1 °F (36.7 °C) 97.7 °F (36.5 °C)   TempSrc: Oral Oral Oral Oral   SpO2:  96% 95% 95%   Weight:       Height:           Flowsheet Rows      Flowsheet Row First Filed Value   Admission Height 172.7 cm (68\") Documented at 05/29/2024 1123   Admission Weight 77.1 kg (170 lb) Documented at 05/29/2024 1123              Intake/Output Summary (Last 24 hours) at 6/3/2024 0559  Last data filed at 6/2/2024 1610  Gross per 24 hour   Intake 240 ml   Output --   Net 240 ml        TELEMETRY: Sinus rhythm    Physical Exam:  The patient is alert, oriented and in no distress.  Vital signs as noted above.  Exogenous obesity.  Head and neck revealed no carotid bruits or jugular venous distention.  No thyromegaly or lymphadenopathy is present  Lungs clear.  No wheezing.  Breath sounds are normal bilaterally.  Heart normal first and second heart sounds.  No murmur. No precordial rub is present.  No " gallop is present.  Abdomen soft and nontender.  No organomegaly is present.  Extremities with good peripheral pulses without any pedal edema.  Skin warm and dry.  Musculoskeletal system is grossly normal  CNS grossly normal    Reviewed and updated.  Results Review:   I reviewed the patient's new clinical results.  Lab Results (last 24 hours)       Procedure Component Value Units Date/Time    Protime-INR [[id number]]  (Abnormal) Collected: 06/03/24 0400    Specimen: Blood Updated: 06/03/24 0424     Protime 30.7 Seconds      INR 3.06    Blood Culture - Blood, Arm, Right [[id number]]  (Normal) Collected: 05/29/24 1708    Specimen: Blood from Arm, Right Updated: 06/02/24 1715     Blood Culture No growth at 4 days    Blood Culture - Blood, Arm, Right [[id number]]  (Normal) Collected: 05/29/24 1532    Specimen: Blood from Arm, Right Updated: 06/02/24 1545     Blood Culture No growth at 4 days    Narrative:      Less than seven (7) mL's of blood was collected.  Insufficient quantity may yield false negative results.    Protime-INR [[id number]]  (Normal) Collected: 06/02/24 0621    Specimen: Blood Updated: 06/02/24 0706     Protime 23.4 Seconds      INR 2.28            Imaging Results (Last 24 Hours)       ** No results found for the last 24 hours. **        LAB RESULTS (LAST 7 DAYS)    CBC  Results from last 7 days   Lab Units 06/01/24 0526 05/31/24 0456 05/30/24  0243 05/29/24  1231   WBC 10*3/mm3 11.27* 11.87* 14.40* 19.22*   RBC 10*6/mm3 3.33* 3.25* 3.53* 3.85   HEMOGLOBIN g/dL 10.1* 9.9* 10.7* 11.9*   HEMATOCRIT % 32.5* 31.6* 35.0 37.3   MCV fL 97.6* 97.2* 99.2* 96.9   PLATELETS 10*3/mm3 269 321 362 354       BMP  Results from last 7 days   Lab Units 06/01/24 0526 05/31/24 0456 05/30/24  0243 05/29/24  1231   SODIUM mmol/L 134* 135* 132* 129*   POTASSIUM mmol/L 3.6 4.4 4.3 3.4*   CHLORIDE mmol/L 108* 107 106 100   CO2 mmol/L 15.2* 18.8* 13.9* 13.5*   BUN mg/dL 8 9 13 19   CREATININE mg/dL 0.82 0.96 0.97 1.27*    GLUCOSE mg/dL 102* 141* 97 131*       CMP   Results from last 7 days   Lab Units 06/01/24  1246 06/01/24  0526 05/31/24  0456 05/30/24  0243 05/29/24  1231   SODIUM mmol/L  --  134* 135* 132* 129*   POTASSIUM mmol/L  --  3.6 4.4 4.3 3.4*   CHLORIDE mmol/L  --  108* 107 106 100   CO2 mmol/L  --  15.2* 18.8* 13.9* 13.5*   BUN mg/dL  --  8 9 13 19   CREATININE mg/dL  --  0.82 0.96 0.97 1.27*   GLUCOSE mg/dL  --  102* 141* 97 131*   ALBUMIN g/dL  --   --   --   --  3.1*   BILIRUBIN mg/dL  --   --   --   --  0.8   ALK PHOS U/L  --   --   --   --  162*   AST (SGOT) U/L  --   --   --   --  104*   ALT (SGPT) U/L  --   --   --   --  54*   AMMONIA umol/L 34  --   --   --   --          BNP        TROPONIN  Results from last 7 days   Lab Units 05/29/24  1231   HSTROP T ng/L 30*       CoAg  Results from last 7 days   Lab Units 06/03/24  0400 06/02/24  0621 06/01/24 0526 05/31/24  0456 05/30/24  0243 05/29/24  1340   INR  3.06* 2.28 2.55 3.51* 4.14* 4.45*   APTT seconds  --   --   --   --   --  42.7*       Creatinine Clearance  Estimated Creatinine Clearance: 71.7 mL/min (by C-G formula based on SCr of 0.82 mg/dL).    ABG        Radiology  No radiology results for the last day        EKG                  I personally viewed and interpreted the patient's EKG/Telemetry data:    ECHOCARDIOGRAM:    Results for orders placed during the hospital encounter of 01/22/24    Adult Transthoracic Echo Complete W/ Cont if Necessary Per Protocol    Interpretation Summary    Left ventricular ejection fraction appears to be 56 - 60%.    Estimated right ventricular systolic pressure from tricuspid regurgitation is normal (<35 mmHg).    Indication  Dyspnea  Palpitations    Technically satisfactory study.  Mitral valve is structurally normal.  Tricuspid valve is structurally normal.  Mild tricuspid regurgitation is present.  Aortic valve is aortic valve with decreased opening motion.  Gradient across the aortic valve is 16/9 mmHg.  Valve area  1.37 cm².  Pulmonic valve could not be well visualized.  Left atrium is enlarged.  Right atrium is normal in size.  Left ventricle is enlarged with septal anterior wall and apical severe hypokinesis with ejection fraction of 40%.  Possible left ventricular apical thrombus.  Right ventricle is normal in size.  Atrial septum is intact.  Aorta is normal.  No pericardial effusion or intracardiac thrombus is seen.  ICD lead is present in the right ventricle.    Impression  Mild tricuspid regurgitation.  Mild to moderate aortic valve stenosis with gradient of 16/9 mmHg and valve area of 1.37 cm².  Left atrial enlargement.  Left ventricle is enlarged with septal anterior wall and apical severe hypokinesis with ejection fraction of 40%.  Possible left ventricular apical thrombus.  ICD lead is present in the right ventricle.  No evidence for pulmonary hypertension is present.          STRESS TEST  Results for orders placed during the hospital encounter of 01/22/24    Stress Test With Myocardial Perfusion One Day    Interpretation Summary  Indications  Status post CABG  Atrial fibrillation    This study was performed under the direct supervision of Rohini NOLASCO.    Resting ECG  Sinus rhythm    The patient was injected with Lexiscan intravenously while constantly monitoring electrocardiogram and vital signs.  Patient did not have any chest discomfort ST abnormalities or ectopy with injection of Lexiscan.    Cardiolite was used as an imaging agent.    Cardiolite images showed significantly decreased radionuclide uptake in the proximal posterior segments with partial redistribution in the resting images.    Gated SPECT images revealed normal left ventricle size and diffuse hypocontractility with calculated ejection fraction of 61%.    Impression  ========  Lexiscan Cardiolite test revealed proximal posterior infarction and ischemia.  Gated SPECT images revealed normal left ventricular size and diffuse hypocontractility with  calculated ejection fraction of 61%.        Cardiolite (Tc-99m sestamibi) stress test    CARDIAC CATHETERIZATION  No results found for this or any previous visit.                OTHER:         Assessment & Plan     Principal Problem:    Atrial fibrillation with RVR  Active Problems:    Urinary tract infection      ]]]]]]]]]]]]]]]]]  History  =========  - Palpitations  Sinus tachycardia and premature atrial contractions.  Intermittent atrial fibrillation is present.  Patient is in sinus rhythm 6/3/2024     - History of atrial fibrillation with RVR.  Patient had postop atrial fibrillation after she had CABG in 2017.  Patient was in sinus rhythm.    - Anticoagulation-on Coumadin.     - Status post CABG and ASD closure 2017     - Ischemic cardiomyopathy     -Moderate aortic valve stenosis     - Status post ICD (single-chamber)-2017-[employer].    -History of stroke.  Tiny acute to subacute cortical left frontal stroke on MRI 5/17/2024.     - Chronic anticoagulation-patient on Coumadin.  Home monitoring.  INR 1.75-5/19/2024.  INR 4.45-5/29/2024     - Hypothyroidism dyslipidemia obstructive sleep apnea diabetes     - Past history of pulmonary emboli     - CKD 3  20/1.7-1/22/2024  17/1.08-5/19/2024.     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 There is normal antegrade color Doppler flow including the common iliac arteries. There is thrombus within the aneurysm sac. Dimensions are discussed in detail above.     - Family history of coronary artery disease     - Non-smoker     - Allergic to oxytetracycline and oxycodone.    Stress Cardiolite test-1/24/2024  Lexiscan Cardiolite test revealed proximal posterior infarction and ischemia.  Gated SPECT images revealed normal left ventricular size and diffuse hypocontractility with calculated  ejection fraction of 61%.     Echocardiogram 1/23/2024 revealed  Mild tricuspid regurgitation.  Mild to moderate aortic valve stenosis with gradient of 16/9 mmHg and valve area of 1.37 cm².  Left atrial enlargement.  Left ventricle is enlarged with septal anterior wall and apical severe hypokinesis with ejection fraction of 40%.  Possible left ventricular apical thrombus.  ICD lead is present in the right ventricle.  No evidence for pulmonary hypertension is present.  =============  Plan  =============  Sinus tachycardia.  Urinary tract infection  Intermittent atrial fibrillation  Sinus rhythm-6/3/2024     Status post CABG and ASD closure 2017.  Patient is not having any angina pectoris or congestive heart failure.     Past history of history of A-fib with RVR.     Patient was in sinus rhythm     Ischemic cardiomyopathy      Mild to moderate aortic valve stenosis     Echocardiogram 1/23/2024-as above     Stress Cardiolite test-1/24/2024-as above     Status post ICD ([employer]) 2017.  Single-chamber.     History of left ventricular possible apical thrombus.  Continue anticoagulation.  Consider MERLE in the future if needed.  Patient is already anticoagulated.     Anticoagulation  [id number] score is 4.  INR 1.75-5/20/2024.  1.92-5/21/2024  2.74-5/22/2024  INR 4.45-5/29/2024.  4.14-5/30/2024  3.06-6/3/2024  Continue Coumadin.  Observe for toxic effects of high-risk medication.  Renal dysfunction  17/1.08  24/1.44  25/1.25-5/22/2024  19/1.27-5/29/2024.  13/0.97-5/30/2024  8/0.82-6/3/2024    History of stroke.  Tiny acute to subacute cortical left frontal stroke on MRI 5/17/2024.      Modification.  Continuation of anticoagulation statins and beta-blocker.     Medications were reviewed and updated.  Current medications include  Atorvastatin Lanoxin 0.125 mg levothyroxine metoprolol XL 25 mg a day pantoprazole Topamax Coumadin 6 mg a day.  Further plan will depend on patient's progress.     Reviewed and  updated-6/3/2024.  ]]]]]]]]]]]]]]]]]]]]]       Allie Hurley MD  06/03/24  05:59 EDT

## 2024-06-03 NOTE — PROGRESS NOTES
Demographics verified. Pt is agreeable to services and has no other agency    Nursing and PT    Spoke with Caroline. Luan Joseph,GABRIEL is agreeable to sign the plan of care and follow for home health orders    Pharmacy:[facility] in [place]

## 2024-06-03 NOTE — OUTREACH NOTE
Prep Survey      Flowsheet Row Responses   [facility] facility patient discharged from? [facility]   Is LACE score < 7 ? No   Eligibility Readm Mgmt   Discharge diagnosis Atrial fibrillation with RVR   Does the patient have one of the following disease processes/diagnoses(primary or secondary)? Other   Does the patient have Home health ordered? Yes   What is the Home health agency?  [facility]   Is there a DME ordered? No   Prep survey completed? Yes            Brenna GOMES - Registered Nurse

## 2024-06-04 LAB
QT INTERVAL: 419 MS
QTC INTERVAL: 473 MS

## 2024-06-04 NOTE — CASE MANAGEMENT/SOCIAL WORK
Case Management Discharge Note      Final Note: Religious        Home Medical Care Coordination complete.      Service Provider Selected Services Address Phone Fax Patient Preferred    [facility] [address] [facility] [place] [address] [phone] --           Transportation Services  Private: Car    Final Discharge Disposition Code: 06 - home with home health care

## 2024-06-06 ENCOUNTER — HOME CARE VISIT (OUTPATIENT)
Dept: HOME HEALTH SERVICES | Facility: HOME HEALTHCARE | Age: 69
End: 2024-06-06
Payer: MEDICARE

## 2024-06-06 VITALS
BODY MASS INDEX: 25.46 KG/M2 | DIASTOLIC BLOOD PRESSURE: 68 MMHG | HEART RATE: 60 BPM | TEMPERATURE: 97.3 F | WEIGHT: 168 LBS | OXYGEN SATURATION: 94 % | HEIGHT: 68 IN | RESPIRATION RATE: 16 BRPM | SYSTOLIC BLOOD PRESSURE: 120 MMHG

## 2024-06-06 PROCEDURE — G0299 HHS/HOSPICE OF RN EA 15 MIN: HCPCS
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
## 2024-06-07 ENCOUNTER — READMISSION MANAGEMENT (OUTPATIENT)
Dept: CALL CENTER | Facility: HOSPITAL | Age: 69
End: 2024-06-07
Payer: MEDICARE

## 2024-06-07 ENCOUNTER — HOME CARE VISIT (OUTPATIENT)
Dept: HOME HEALTH SERVICES | Facility: HOME HEALTHCARE | Age: 69
End: 2024-06-07
Payer: MEDICARE

## 2024-06-07 NOTE — OUTREACH NOTE
Medical Week 1 Survey      Flowsheet Row Responses   [facility] facility patient discharged from? [facility]   Does the patient have one of the following disease processes/diagnoses(primary or secondary)? Other   Week 1 attempt successful? No   Unsuccessful attempts Attempt 1            Sharita FRIEND - Registered Nurse

## 2024-06-10 ENCOUNTER — READMISSION MANAGEMENT (OUTPATIENT)
Dept: CALL CENTER | Facility: HOSPITAL | Age: 69
End: 2024-06-10
Payer: MEDICARE

## 2024-06-10 ENCOUNTER — HOME CARE VISIT (OUTPATIENT)
Dept: HOME HEALTH SERVICES | Facility: HOME HEALTHCARE | Age: 69
End: 2024-06-10
Payer: MEDICARE

## 2024-06-10 NOTE — OUTREACH NOTE
Medical Week 1 Survey      Flowsheet Row Responses   [facility] [place] [facility]   Does the patient have one of the following disease processes/diagnoses(primary or secondary)? Other   Week 1 attempt successful? No   Unsuccessful attempts Attempt 2            Lucía GOMES - Registered Nurse

## 2024-06-11 ENCOUNTER — HOME CARE VISIT (OUTPATIENT)
Dept: HOME HEALTH SERVICES | Facility: HOME HEALTHCARE | Age: 69
End: 2024-06-11
Payer: MEDICARE

## 2024-06-11 VITALS
RESPIRATION RATE: 17 BRPM | DIASTOLIC BLOOD PRESSURE: 58 MMHG | TEMPERATURE: 97.8 F | OXYGEN SATURATION: 97 % | SYSTOLIC BLOOD PRESSURE: 114 MMHG | HEART RATE: 78 BPM

## 2024-06-11 PROCEDURE — G0151 HHCP-SERV OF PT,EA 15 MIN: HCPCS

## 2024-06-11 PROCEDURE — G0299 HHS/HOSPICE OF RN EA 15 MIN: HCPCS

## 2024-06-11 NOTE — HOME HEALTH
"Routine Visit Note: patient denies any new issues or probems, medication changes, or symptoms. Patient reports she will check her INR level tomorrow. SN thacker who is managing her coumadin dosage and patient reports she is not sure. She \"thinks she has an el for that, but it has been a long time shince she has used this. Call placed to [facility] to determine if they are managing. Did not get a call back before the end of the day    Skill/education provided: cardiopulmonary assessment, dietary teaching, vitals assessment, medication review, pain assessment, bowel / bladder assessment    Patient/caregiver response: Patient tolerated procedure well and verbalized gratitude    Plan for next visit  - VS  - cardiopulmonary assess  - medication assess/teaching  - skin assess/education  - safety assess/education  - elimination assess  - nutrition assess/teaching  - pain assess/education  - edema assess/education"

## 2024-06-12 ENCOUNTER — HOME CARE VISIT (OUTPATIENT)
Dept: HOME HEALTH SERVICES | Facility: HOME HEALTHCARE | Age: 69
End: 2024-06-12
Payer: MEDICARE

## 2024-06-12 VITALS
SYSTOLIC BLOOD PRESSURE: 114 MMHG | OXYGEN SATURATION: 97 % | HEART RATE: 91 BPM | TEMPERATURE: 98.6 F | DIASTOLIC BLOOD PRESSURE: 72 MMHG | RESPIRATION RATE: 18 BRPM

## 2024-06-12 VITALS
TEMPERATURE: 98.1 F | SYSTOLIC BLOOD PRESSURE: 120 MMHG | DIASTOLIC BLOOD PRESSURE: 70 MMHG | HEART RATE: 79 BPM | OXYGEN SATURATION: 98 %

## 2024-06-12 PROCEDURE — G0152 HHCP-SERV OF OT,EA 15 MIN: HCPCS

## 2024-06-12 NOTE — HOME HEALTH
"Eval Note    Patient's goal(s): \"To walk better and get up without assistance\"    Services required to achieve goals: PT    Potential Issues for goal attainment: None noted     Problems identified: Significant decline in gait/transfer skills post hospitalization.    Describe the Functional status and safety: Patient needs cga of spouse coming to stand but able to perform same task with sba following pillow boost placement. Able to ambulate about 80ft with sba, occasional cga and verbal/visual cues for increased step length to reduce apprehension    Describe any environmental issues: Patient lives in one-story home with spouse who is assisting as needed    Any equipment needs: N/A    POC confirmed with patient and spouse on 6/11/24"

## 2024-06-13 ENCOUNTER — READMISSION MANAGEMENT (OUTPATIENT)
Dept: CALL CENTER | Facility: HOSPITAL | Age: 69
End: 2024-06-13
Payer: MEDICARE

## 2024-06-13 NOTE — OUTREACH NOTE
Medical Week 1 Survey      Flowsheet Row Responses   [facility] facility patient discharged from? [facility]   Does the patient have one of the following disease processes/diagnoses(primary or secondary)? Other   Week 1 attempt successful? No   Unsuccessful attempts Attempt 3            Bella WILLIAMSON - Registered Nurse

## 2024-06-13 NOTE — HOME HEALTH
Pt is a 67 yo female who lives in a 1 story home with spouse.   Pt recently hospitalized secondary to       PLOF pt independent with all self care      Currently pt independent with feeding grooming and toileting.   Pt requires MIN assist with bathing and dressing.  Total assist with IADLs      Pt does not believe further OT is needed at this time and wants to focus on PT.         OT eval only      Pt denies any falls or med changes

## 2024-06-17 ENCOUNTER — HOME CARE VISIT (OUTPATIENT)
Dept: HOME HEALTH SERVICES | Facility: HOME HEALTHCARE | Age: 69
End: 2024-06-17
Payer: MEDICARE

## 2024-06-17 ENCOUNTER — LAB REQUISITION (OUTPATIENT)
Dept: LAB | Facility: HOSPITAL | Age: 69
End: 2024-06-17
Payer: MEDICARE

## 2024-06-17 VITALS
OXYGEN SATURATION: 97 % | RESPIRATION RATE: 17 BRPM | SYSTOLIC BLOOD PRESSURE: 112 MMHG | TEMPERATURE: 97.8 F | DIASTOLIC BLOOD PRESSURE: 64 MMHG | HEART RATE: 74 BPM

## 2024-06-17 DIAGNOSIS — I48.91 UNSPECIFIED ATRIAL FIBRILLATION: ICD-10-CM

## 2024-06-17 LAB
ALBUMIN SERPL-MCNC: 3.1 G/DL (ref 3.5–5.2)
ALBUMIN/GLOB SERPL: 1.1 G/DL
ALP SERPL-CCNC: 135 U/L (ref 39–117)
ALT SERPL W P-5'-P-CCNC: 8 U/L (ref 1–33)
ANION GAP SERPL CALCULATED.3IONS-SCNC: 11.6 MMOL/L (ref 5–15)
AST SERPL-CCNC: 17 U/L (ref 1–32)
BASOPHILS # BLD AUTO: 0.09 10*3/MM3 (ref 0–0.2)
BASOPHILS NFR BLD AUTO: 0.8 % (ref 0–1.5)
BILIRUB SERPL-MCNC: 0.7 MG/DL (ref 0–1.2)
BUN SERPL-MCNC: 11 MG/DL (ref 8–23)
BUN/CREAT SERPL: 12.2 (ref 7–25)
CALCIUM SPEC-SCNC: 8.9 MG/DL (ref 8.6–10.5)
CHLORIDE SERPL-SCNC: 104 MMOL/L (ref 98–107)
CO2 SERPL-SCNC: 20.4 MMOL/L (ref 22–29)
CREAT SERPL-MCNC: 0.9 MG/DL (ref 0.57–1)
DEPRECATED RDW RBC AUTO: 57.5 FL (ref 37–54)
EGFRCR SERPLBLD CKD-EPI 2021: 69.3 ML/MIN/1.73
EOSINOPHIL # BLD AUTO: 0.15 10*3/MM3 (ref 0–0.4)
EOSINOPHIL NFR BLD AUTO: 1.3 % (ref 0.3–6.2)
ERYTHROCYTE [DISTWIDTH] IN BLOOD BY AUTOMATED COUNT: 15.8 % (ref 12.3–15.4)
GLOBULIN UR ELPH-MCNC: 2.8 GM/DL
GLUCOSE SERPL-MCNC: 105 MG/DL (ref 65–99)
HCT VFR BLD AUTO: 33.6 % (ref 34–46.6)
HGB BLD-MCNC: 10.3 G/DL (ref 12–15.9)
HH POC INTERNATIONAL NORMALIZATION RATIO: 2.6
HH POC PROTIME: 26.8 SECONDS
IMM GRANULOCYTES # BLD AUTO: 0.08 10*3/MM3 (ref 0–0.05)
IMM GRANULOCYTES NFR BLD AUTO: 0.7 % (ref 0–0.5)
LYMPHOCYTES # BLD AUTO: 0.74 10*3/MM3 (ref 0.7–3.1)
LYMPHOCYTES NFR BLD AUTO: 6.5 % (ref 19.6–45.3)
MCH RBC QN AUTO: 30.4 PG (ref 26.6–33)
MCHC RBC AUTO-ENTMCNC: 30.7 G/DL (ref 31.5–35.7)
MCV RBC AUTO: 99.1 FL (ref 79–97)
MONOCYTES # BLD AUTO: 0.83 10*3/MM3 (ref 0.1–0.9)
MONOCYTES NFR BLD AUTO: 7.3 % (ref 5–12)
NEUTROPHILS NFR BLD AUTO: 83.4 % (ref 42.7–76)
NEUTROPHILS NFR BLD AUTO: 9.41 10*3/MM3 (ref 1.7–7)
NRBC BLD AUTO-RTO: 0 /100 WBC (ref 0–0.2)
NT-PROBNP SERPL-MCNC: 1329 PG/ML (ref 0–900)
PLATELET # BLD AUTO: 255 10*3/MM3 (ref 140–450)
PMV BLD AUTO: 10.6 FL (ref 6–12)
POTASSIUM SERPL-SCNC: 3 MMOL/L (ref 3.5–5.2)
PROT SERPL-MCNC: 5.9 G/DL (ref 6–8.5)
RBC # BLD AUTO: 3.39 10*6/MM3 (ref 3.77–5.28)
SODIUM SERPL-SCNC: 136 MMOL/L (ref 136–145)
WBC NRBC COR # BLD AUTO: 11.3 10*3/MM3 (ref 3.4–10.8)

## 2024-06-17 PROCEDURE — 83880 ASSAY OF NATRIURETIC PEPTIDE: CPT | Performed by: NURSE PRACTITIONER

## 2024-06-17 PROCEDURE — G0299 HHS/HOSPICE OF RN EA 15 MIN: HCPCS

## 2024-06-17 PROCEDURE — 85025 COMPLETE CBC W/AUTO DIFF WBC: CPT | Performed by: NURSE PRACTITIONER

## 2024-06-17 PROCEDURE — 80053 COMPREHEN METABOLIC PANEL: CPT | Performed by: NURSE PRACTITIONER

## 2024-06-17 NOTE — HOME HEALTH
Patient is laying in bed and was assisted to sit up in the bed to get up and use the bedside commoed and patient immediately layed back down because her back was hurting her.

## 2024-06-17 NOTE — Clinical Note
Patient was unable to get up out of bed today due to severe pain in her back to obtain urine specimen.  Patient reports this is not a new issue but it has just flared up today. She appears confused and SN was encouraging patient to go to the ER to have her back checked out. Patient and family feel her confusion this is related to patients lack of sleep last night related to her back pain. INR has returned to WNL of 2.6 with PT of 26.8. SN attempted to call your office and was on hold for very long time.    Patient appears to be on the dry side with fair skin turgor. Family reports they will continue to encourage patient to drink and try and obtain urine sample for SN to  an take to lab when obtained. If patient is unable to collect specimen, do you want a straight cath performed?    Also, patient received a new PT/ INR machine today, but she was too confused to use this. SN performed lab with HH INR machine. Later in the Day spouse notified SN that they were able to  Eliquis prescription you ordered for 3 months supply today/

## 2024-06-18 ENCOUNTER — HOME CARE VISIT (OUTPATIENT)
Dept: HOME HEALTH SERVICES | Facility: HOME HEALTHCARE | Age: 69
End: 2024-06-18
Payer: MEDICARE

## 2024-06-19 ENCOUNTER — LAB REQUISITION (OUTPATIENT)
Dept: LAB | Facility: HOSPITAL | Age: 69
End: 2024-06-19
Payer: MEDICARE

## 2024-06-19 ENCOUNTER — HOME CARE VISIT (OUTPATIENT)
Dept: HOME HEALTH SERVICES | Facility: HOME HEALTHCARE | Age: 69
End: 2024-06-19
Payer: MEDICARE

## 2024-06-19 VITALS
DIASTOLIC BLOOD PRESSURE: 60 MMHG | HEART RATE: 75 BPM | SYSTOLIC BLOOD PRESSURE: 114 MMHG | TEMPERATURE: 97.1 F | OXYGEN SATURATION: 94 % | RESPIRATION RATE: 17 BRPM

## 2024-06-19 DIAGNOSIS — I48.91 UNSPECIFIED ATRIAL FIBRILLATION: ICD-10-CM

## 2024-06-19 LAB
BILIRUB UR QL STRIP: NEGATIVE
CLARITY UR: CLEAR
COLOR UR: ABNORMAL
GLUCOSE UR STRIP-MCNC: NEGATIVE MG/DL
HGB UR QL STRIP.AUTO: NEGATIVE
KETONES UR QL STRIP: NEGATIVE
LEUKOCYTE ESTERASE UR QL STRIP.AUTO: NEGATIVE
NITRITE UR QL STRIP: NEGATIVE
PH UR STRIP.AUTO: 6.5 [PH] (ref 5–8)
PROT UR QL STRIP: NEGATIVE
SP GR UR STRIP: 1.01 (ref 1–1.03)
UROBILINOGEN UR QL STRIP: ABNORMAL

## 2024-06-19 PROCEDURE — G0299 HHS/HOSPICE OF RN EA 15 MIN: HCPCS

## 2024-06-19 PROCEDURE — 81003 URINALYSIS AUTO W/O SCOPE: CPT | Performed by: NURSE PRACTITIONER

## 2024-06-20 ENCOUNTER — HOME CARE VISIT (OUTPATIENT)
Dept: HOME HEALTH SERVICES | Facility: HOME HEALTHCARE | Age: 69
End: 2024-06-20
Payer: MEDICARE

## 2024-06-21 ENCOUNTER — HOME CARE VISIT (OUTPATIENT)
Dept: HOME HEALTH SERVICES | Facility: HOME HEALTHCARE | Age: 69
End: 2024-06-21
Payer: MEDICARE

## 2024-06-25 ENCOUNTER — HOME CARE VISIT (OUTPATIENT)
Dept: HOME HEALTH SERVICES | Facility: HOME HEALTHCARE | Age: 69
End: 2024-06-25
Payer: MEDICARE

## 2024-06-25 ENCOUNTER — LAB REQUISITION (OUTPATIENT)
Dept: LAB | Facility: HOSPITAL | Age: 69
End: 2024-06-25
Payer: MEDICARE

## 2024-06-25 VITALS
OXYGEN SATURATION: 100 % | HEART RATE: 78 BPM | SYSTOLIC BLOOD PRESSURE: 106 MMHG | TEMPERATURE: 97.9 F | DIASTOLIC BLOOD PRESSURE: 62 MMHG

## 2024-06-25 DIAGNOSIS — I48.91 UNSPECIFIED ATRIAL FIBRILLATION: ICD-10-CM

## 2024-06-25 LAB
ANISOCYTOSIS BLD QL: ABNORMAL
BASOPHILS # BLD MANUAL: 0.36 10*3/MM3 (ref 0–0.2)
BASOPHILS NFR BLD MANUAL: 3 % (ref 0–1.5)
BURR CELLS BLD QL SMEAR: ABNORMAL
DEPRECATED RDW RBC AUTO: 58.9 FL (ref 37–54)
EOSINOPHIL # BLD MANUAL: 0.47 10*3/MM3 (ref 0–0.4)
EOSINOPHIL NFR BLD MANUAL: 4 % (ref 0.3–6.2)
ERYTHROCYTE [DISTWIDTH] IN BLOOD BY AUTOMATED COUNT: 15.8 % (ref 12.3–15.4)
HCT VFR BLD AUTO: 38.6 % (ref 34–46.6)
HGB BLD-MCNC: 11.4 G/DL (ref 12–15.9)
HH POC INTERNATIONAL NORMALIZATION RATIO: 1.8
HH POC PROTIME: 18.9 SECONDS
LYMPHOCYTES # BLD MANUAL: 1.3 10*3/MM3 (ref 0.7–3.1)
LYMPHOCYTES NFR BLD MANUAL: 8 % (ref 5–12)
MCH RBC QN AUTO: 29.8 PG (ref 26.6–33)
MCHC RBC AUTO-ENTMCNC: 29.5 G/DL (ref 31.5–35.7)
MCV RBC AUTO: 100.8 FL (ref 79–97)
MONOCYTES # BLD: 0.95 10*3/MM3 (ref 0.1–0.9)
NEUTROPHILS # BLD AUTO: 8.77 10*3/MM3 (ref 1.7–7)
NEUTROPHILS NFR BLD MANUAL: 73 % (ref 42.7–76)
NEUTS BAND NFR BLD MANUAL: 1 % (ref 0–5)
OVALOCYTES BLD QL SMEAR: ABNORMAL
PLAT MORPH BLD: NORMAL
PLATELET # BLD AUTO: 362 10*3/MM3 (ref 140–450)
PMV BLD AUTO: 10.5 FL (ref 6–12)
POIKILOCYTOSIS BLD QL SMEAR: ABNORMAL
POLYCHROMASIA BLD QL SMEAR: ABNORMAL
RBC # BLD AUTO: 3.83 10*6/MM3 (ref 3.77–5.28)
SCAN SLIDE: NORMAL
VARIANT LYMPHS NFR BLD MANUAL: 11 % (ref 19.6–45.3)
WBC MORPH BLD: NORMAL
WBC NRBC COR # BLD AUTO: 11.85 10*3/MM3 (ref 3.4–10.8)

## 2024-06-25 PROCEDURE — 85025 COMPLETE CBC W/AUTO DIFF WBC: CPT | Performed by: NURSE PRACTITIONER

## 2024-06-25 PROCEDURE — G0299 HHS/HOSPICE OF RN EA 15 MIN: HCPCS

## 2024-06-25 NOTE — CASE COMMUNICATION
When called today (6/25/2024) to schedule visit, patient declined PT visit and requested visit on this Thursday, 6/27/2024.  Please moved visit to Thursday to schedule of PT covering this area.

## 2024-06-26 ENCOUNTER — HOME CARE VISIT (OUTPATIENT)
Dept: HOME HEALTH SERVICES | Facility: HOME HEALTHCARE | Age: 69
End: 2024-06-26
Payer: MEDICARE

## 2024-06-26 ENCOUNTER — LAB REQUISITION (OUTPATIENT)
Dept: LAB | Facility: HOSPITAL | Age: 69
End: 2024-06-26
Payer: MEDICARE

## 2024-06-26 DIAGNOSIS — I48.91 UNSPECIFIED ATRIAL FIBRILLATION: ICD-10-CM

## 2024-06-26 LAB
ALBUMIN SERPL-MCNC: 3.6 G/DL (ref 3.5–5.2)
ALBUMIN/GLOB SERPL: 1 G/DL
ALP SERPL-CCNC: 156 U/L (ref 39–117)
ALT SERPL W P-5'-P-CCNC: 23 U/L (ref 1–33)
AMMONIA BLD-SCNC: 11 UMOL/L (ref 11–51)
ANION GAP SERPL CALCULATED.3IONS-SCNC: 14.3 MMOL/L (ref 5–15)
AST SERPL-CCNC: 43 U/L (ref 1–32)
BILIRUB SERPL-MCNC: 0.5 MG/DL (ref 0–1.2)
BUN SERPL-MCNC: 8 MG/DL (ref 8–23)
BUN/CREAT SERPL: 8.9 (ref 7–25)
CALCIUM SPEC-SCNC: 9.5 MG/DL (ref 8.6–10.5)
CHLORIDE SERPL-SCNC: 103 MMOL/L (ref 98–107)
CO2 SERPL-SCNC: 19.7 MMOL/L (ref 22–29)
CREAT SERPL-MCNC: 0.9 MG/DL (ref 0.57–1)
EGFRCR SERPLBLD CKD-EPI 2021: 69.3 ML/MIN/1.73
FOLATE SERPL-MCNC: 6.56 NG/ML (ref 4.78–24.2)
GLOBULIN UR ELPH-MCNC: 3.7 GM/DL
GLUCOSE SERPL-MCNC: 87 MG/DL (ref 65–99)
POTASSIUM SERPL-SCNC: 3.5 MMOL/L (ref 3.5–5.2)
PROT SERPL-MCNC: 7.3 G/DL (ref 6–8.5)
SODIUM SERPL-SCNC: 137 MMOL/L (ref 136–145)
T4 FREE SERPL-MCNC: 1.5 NG/DL (ref 0.93–1.7)
TSH SERPL DL<=0.05 MIU/L-ACNC: 0.25 UIU/ML (ref 0.27–4.2)
VIT B12 BLD-MCNC: 789 PG/ML (ref 211–946)

## 2024-06-26 PROCEDURE — 86592 SYPHILIS TEST NON-TREP QUAL: CPT | Performed by: NURSE PRACTITIONER

## 2024-06-26 PROCEDURE — 82746 ASSAY OF FOLIC ACID SERUM: CPT | Performed by: NURSE PRACTITIONER

## 2024-06-26 PROCEDURE — G0299 HHS/HOSPICE OF RN EA 15 MIN: HCPCS

## 2024-06-26 PROCEDURE — 82607 VITAMIN B-12: CPT | Performed by: NURSE PRACTITIONER

## 2024-06-26 PROCEDURE — 80053 COMPREHEN METABOLIC PANEL: CPT | Performed by: NURSE PRACTITIONER

## 2024-06-26 PROCEDURE — 82140 ASSAY OF AMMONIA: CPT | Performed by: NURSE PRACTITIONER

## 2024-06-26 PROCEDURE — 84443 ASSAY THYROID STIM HORMONE: CPT | Performed by: NURSE PRACTITIONER

## 2024-06-26 PROCEDURE — 84439 ASSAY OF FREE THYROXINE: CPT | Performed by: NURSE PRACTITIONER

## 2024-06-27 ENCOUNTER — HOME CARE VISIT (OUTPATIENT)
Dept: HOME HEALTH SERVICES | Facility: HOME HEALTHCARE | Age: 69
End: 2024-06-27
Payer: MEDICARE

## 2024-06-27 LAB — RPR SER QL: NORMAL

## 2024-07-02 ENCOUNTER — LAB REQUISITION (OUTPATIENT)
Dept: LAB | Facility: HOSPITAL | Age: 69
End: 2024-07-02
Payer: MEDICARE

## 2024-07-02 ENCOUNTER — HOME CARE VISIT (OUTPATIENT)
Dept: HOME HEALTH SERVICES | Facility: HOME HEALTHCARE | Age: 69
End: 2024-07-02
Payer: MEDICARE

## 2024-07-02 VITALS
DIASTOLIC BLOOD PRESSURE: 62 MMHG | RESPIRATION RATE: 17 BRPM | SYSTOLIC BLOOD PRESSURE: 104 MMHG | TEMPERATURE: 97.7 F | HEART RATE: 76 BPM | OXYGEN SATURATION: 98 %

## 2024-07-02 VITALS
HEART RATE: 67 BPM | TEMPERATURE: 97.6 F | SYSTOLIC BLOOD PRESSURE: 108 MMHG | DIASTOLIC BLOOD PRESSURE: 60 MMHG | OXYGEN SATURATION: 97 %

## 2024-07-02 DIAGNOSIS — I48.91 UNSPECIFIED ATRIAL FIBRILLATION: ICD-10-CM

## 2024-07-02 LAB
ALBUMIN SERPL-MCNC: 3.1 G/DL (ref 3.5–5.2)
ALBUMIN/GLOB SERPL: 0.9 G/DL
ALP SERPL-CCNC: 133 U/L (ref 39–117)
ALT SERPL W P-5'-P-CCNC: 17 U/L (ref 1–33)
AMYLASE SERPL-CCNC: 17 U/L (ref 28–100)
ANION GAP SERPL CALCULATED.3IONS-SCNC: 14.7 MMOL/L (ref 5–15)
AST SERPL-CCNC: 45 U/L (ref 1–32)
BASOPHILS # BLD AUTO: 0.16 10*3/MM3 (ref 0–0.2)
BASOPHILS NFR BLD AUTO: 1.5 % (ref 0–1.5)
BILIRUB SERPL-MCNC: 0.2 MG/DL (ref 0–1.2)
BUN SERPL-MCNC: 7 MG/DL (ref 8–23)
BUN/CREAT SERPL: 7.1 (ref 7–25)
CALCIUM SPEC-SCNC: 9.4 MG/DL (ref 8.6–10.5)
CHLORIDE SERPL-SCNC: 105 MMOL/L (ref 98–107)
CO2 SERPL-SCNC: 15.3 MMOL/L (ref 22–29)
CREAT SERPL-MCNC: 0.99 MG/DL (ref 0.57–1)
DEPRECATED RDW RBC AUTO: 58.1 FL (ref 37–54)
EGFRCR SERPLBLD CKD-EPI 2021: 61.9 ML/MIN/1.73
EOSINOPHIL # BLD AUTO: 0.31 10*3/MM3 (ref 0–0.4)
EOSINOPHIL NFR BLD AUTO: 2.9 % (ref 0.3–6.2)
ERYTHROCYTE [DISTWIDTH] IN BLOOD BY AUTOMATED COUNT: 15.9 % (ref 12.3–15.4)
GLOBULIN UR ELPH-MCNC: 3.3 GM/DL
GLUCOSE SERPL-MCNC: 104 MG/DL (ref 65–99)
HCT VFR BLD AUTO: 34.5 % (ref 34–46.6)
HGB BLD-MCNC: 10.2 G/DL (ref 12–15.9)
IMM GRANULOCYTES # BLD AUTO: 0.06 10*3/MM3 (ref 0–0.05)
IMM GRANULOCYTES NFR BLD AUTO: 0.6 % (ref 0–0.5)
LIPASE SERPL-CCNC: 27 U/L (ref 13–60)
LYMPHOCYTES # BLD AUTO: 0.9 10*3/MM3 (ref 0.7–3.1)
LYMPHOCYTES NFR BLD AUTO: 8.6 % (ref 19.6–45.3)
MCH RBC QN AUTO: 29.1 PG (ref 26.6–33)
MCHC RBC AUTO-ENTMCNC: 29.6 G/DL (ref 31.5–35.7)
MCV RBC AUTO: 98.6 FL (ref 79–97)
MONOCYTES # BLD AUTO: 0.49 10*3/MM3 (ref 0.1–0.9)
MONOCYTES NFR BLD AUTO: 4.7 % (ref 5–12)
NEUTROPHILS NFR BLD AUTO: 8.59 10*3/MM3 (ref 1.7–7)
NEUTROPHILS NFR BLD AUTO: 81.7 % (ref 42.7–76)
NRBC BLD AUTO-RTO: 0 /100 WBC (ref 0–0.2)
PLATELET # BLD AUTO: 322 10*3/MM3 (ref 140–450)
PMV BLD AUTO: 10.8 FL (ref 6–12)
POTASSIUM SERPL-SCNC: 4 MMOL/L (ref 3.5–5.2)
PROT SERPL-MCNC: 6.4 G/DL (ref 6–8.5)
RBC # BLD AUTO: 3.5 10*6/MM3 (ref 3.77–5.28)
SODIUM SERPL-SCNC: 135 MMOL/L (ref 136–145)
WBC NRBC COR # BLD AUTO: 10.51 10*3/MM3 (ref 3.4–10.8)

## 2024-07-02 PROCEDURE — 82150 ASSAY OF AMYLASE: CPT | Performed by: NURSE PRACTITIONER

## 2024-07-02 PROCEDURE — G0151 HHCP-SERV OF PT,EA 15 MIN: HCPCS

## 2024-07-02 PROCEDURE — 85025 COMPLETE CBC W/AUTO DIFF WBC: CPT | Performed by: NURSE PRACTITIONER

## 2024-07-02 PROCEDURE — G0299 HHS/HOSPICE OF RN EA 15 MIN: HCPCS

## 2024-07-02 PROCEDURE — 83690 ASSAY OF LIPASE: CPT | Performed by: NURSE PRACTITIONER

## 2024-07-02 PROCEDURE — 80053 COMPREHEN METABOLIC PANEL: CPT | Performed by: NURSE PRACTITIONER

## 2024-07-03 ENCOUNTER — HOME CARE VISIT (OUTPATIENT)
Dept: HOME HEALTH SERVICES | Facility: HOME HEALTHCARE | Age: 69
End: 2024-07-03
Payer: MEDICARE

## 2024-07-09 ENCOUNTER — HOME CARE VISIT (OUTPATIENT)
Dept: HOME HEALTH SERVICES | Facility: HOME HEALTHCARE | Age: 69
End: 2024-07-09
Payer: MEDICARE

## 2024-07-09 VITALS
RESPIRATION RATE: 17 BRPM | TEMPERATURE: 97.5 F | SYSTOLIC BLOOD PRESSURE: 112 MMHG | DIASTOLIC BLOOD PRESSURE: 62 MMHG | HEART RATE: 76 BPM | OXYGEN SATURATION: 94 %

## 2024-07-09 PROCEDURE — G0299 HHS/HOSPICE OF RN EA 15 MIN: HCPCS

## 2024-07-11 ENCOUNTER — APPOINTMENT (OUTPATIENT)
Dept: CT IMAGING | Facility: HOSPITAL | Age: 69
End: 2024-07-11
Payer: MEDICARE

## 2024-07-11 ENCOUNTER — TELEPHONE (OUTPATIENT)
Dept: NEUROSURGERY | Facility: CLINIC | Age: 69
End: 2024-07-11

## 2024-07-11 ENCOUNTER — APPOINTMENT (OUTPATIENT)
Dept: GENERAL RADIOLOGY | Facility: HOSPITAL | Age: 69
End: 2024-07-11
Payer: MEDICARE

## 2024-07-11 ENCOUNTER — DOCUMENTATION (OUTPATIENT)
Dept: HOME HEALTH SERVICES | Facility: HOME HEALTHCARE | Age: 69
End: 2024-07-11
Payer: MEDICARE

## 2024-07-11 ENCOUNTER — HOSPITAL ENCOUNTER (INPATIENT)
Facility: HOSPITAL | Age: 69
LOS: 2 days | Discharge: HOME | End: 2024-07-13
Attending: INTERNAL MEDICINE | Admitting: INTERNAL MEDICINE
Payer: MEDICARE

## 2024-07-11 ENCOUNTER — HOME CARE VISIT (OUTPATIENT)
Dept: HOME HEALTH SERVICES | Facility: HOME HEALTHCARE | Age: 69
End: 2024-07-11
Payer: MEDICARE

## 2024-07-11 DIAGNOSIS — R29.6 MULTIPLE FALLS: Primary | ICD-10-CM

## 2024-07-11 DIAGNOSIS — D72.829 LEUKOCYTOSIS, UNSPECIFIED TYPE: ICD-10-CM

## 2024-07-11 DIAGNOSIS — E86.1 HYPOTENSION DUE TO HYPOVOLEMIA: ICD-10-CM

## 2024-07-11 DIAGNOSIS — S22.070A CLOSED WEDGE COMPRESSION FRACTURE OF T10 VERTEBRA, INITIAL ENCOUNTER: ICD-10-CM

## 2024-07-11 DIAGNOSIS — R53.1 GENERALIZED WEAKNESS: ICD-10-CM

## 2024-07-11 PROBLEM — I95.9 HYPOTENSION: Status: ACTIVE | Noted: 2024-07-11

## 2024-07-11 LAB
ALBUMIN SERPL-MCNC: 3.9 G/DL (ref 3.5–5.2)
ALBUMIN/GLOB SERPL: 1.1 G/DL
ALP SERPL-CCNC: 155 U/L (ref 39–117)
ALT SERPL W P-5'-P-CCNC: 19 U/L (ref 1–33)
ANION GAP SERPL CALCULATED.3IONS-SCNC: 15.5 MMOL/L (ref 5–15)
ANION GAP SERPL CALCULATED.3IONS-SCNC: 9.8 MMOL/L (ref 5–15)
AST SERPL-CCNC: 48 U/L (ref 1–32)
B PARAPERT DNA SPEC QL NAA+PROBE: NOT DETECTED
B PERT DNA SPEC QL NAA+PROBE: NOT DETECTED
BACTERIA UR QL AUTO: ABNORMAL /HPF
BASOPHILS # BLD AUTO: 0.09 10*3/MM3 (ref 0–0.2)
BASOPHILS # BLD AUTO: 0.15 10*3/MM3 (ref 0–0.2)
BASOPHILS NFR BLD AUTO: 1 % (ref 0–1.5)
BASOPHILS NFR BLD AUTO: 1.2 % (ref 0–1.5)
BILIRUB SERPL-MCNC: 0.6 MG/DL (ref 0–1.2)
BILIRUB UR QL STRIP: NEGATIVE
BUN SERPL-MCNC: 7 MG/DL (ref 8–23)
BUN SERPL-MCNC: 8 MG/DL (ref 8–23)
BUN/CREAT SERPL: 7.1 (ref 7–25)
BUN/CREAT SERPL: 7.8 (ref 7–25)
C PNEUM DNA NPH QL NAA+NON-PROBE: NOT DETECTED
CALCIUM SPEC-SCNC: 10 MG/DL (ref 8.6–10.5)
CALCIUM SPEC-SCNC: 7.9 MG/DL (ref 8.6–10.5)
CHLORIDE SERPL-SCNC: 101 MMOL/L (ref 98–107)
CHLORIDE SERPL-SCNC: 110 MMOL/L (ref 98–107)
CLARITY UR: CLEAR
CO2 SERPL-SCNC: 17.2 MMOL/L (ref 22–29)
CO2 SERPL-SCNC: 18.5 MMOL/L (ref 22–29)
COLOR UR: YELLOW
CREAT SERPL-MCNC: 0.9 MG/DL (ref 0.57–1)
CREAT SERPL-MCNC: 1.13 MG/DL (ref 0.57–1)
CRP SERPL-MCNC: 1.22 MG/DL (ref 0–0.5)
D-LACTATE SERPL-SCNC: 1.1 MMOL/L (ref 0.3–2)
DEPRECATED RDW RBC AUTO: 55.8 FL (ref 37–54)
DEPRECATED RDW RBC AUTO: 59.1 FL (ref 37–54)
DIGOXIN SERPL-MCNC: 0.6 NG/ML (ref 0.6–1.2)
EGFRCR SERPLBLD CKD-EPI 2021: 52.8 ML/MIN/1.73
EGFRCR SERPLBLD CKD-EPI 2021: 69.3 ML/MIN/1.73
EOSINOPHIL # BLD AUTO: 0.13 10*3/MM3 (ref 0–0.4)
EOSINOPHIL # BLD AUTO: 0.2 10*3/MM3 (ref 0–0.4)
EOSINOPHIL NFR BLD AUTO: 1.3 % (ref 0.3–6.2)
EOSINOPHIL NFR BLD AUTO: 1.7 % (ref 0.3–6.2)
ERYTHROCYTE [DISTWIDTH] IN BLOOD BY AUTOMATED COUNT: 15.8 % (ref 12.3–15.4)
ERYTHROCYTE [DISTWIDTH] IN BLOOD BY AUTOMATED COUNT: 15.9 % (ref 12.3–15.4)
FLUAV SUBTYP SPEC NAA+PROBE: NOT DETECTED
FLUBV RNA ISLT QL NAA+PROBE: NOT DETECTED
GLOBULIN UR ELPH-MCNC: 3.6 GM/DL
GLUCOSE BLDC GLUCOMTR-MCNC: 65 MG/DL (ref 70–105)
GLUCOSE BLDC GLUCOMTR-MCNC: 80 MG/DL (ref 70–105)
GLUCOSE SERPL-MCNC: 69 MG/DL (ref 65–99)
GLUCOSE SERPL-MCNC: 86 MG/DL (ref 65–99)
GLUCOSE UR STRIP-MCNC: NEGATIVE MG/DL
HADV DNA SPEC NAA+PROBE: NOT DETECTED
HCOV 229E RNA SPEC QL NAA+PROBE: NOT DETECTED
HCOV HKU1 RNA SPEC QL NAA+PROBE: NOT DETECTED
HCOV NL63 RNA SPEC QL NAA+PROBE: NOT DETECTED
HCOV OC43 RNA SPEC QL NAA+PROBE: NOT DETECTED
HCT VFR BLD AUTO: 29.7 % (ref 34–46.6)
HCT VFR BLD AUTO: 38.3 % (ref 34–46.6)
HGB BLD-MCNC: 11.9 G/DL (ref 12–15.9)
HGB BLD-MCNC: 8.8 G/DL (ref 12–15.9)
HGB UR QL STRIP.AUTO: NEGATIVE
HMPV RNA NPH QL NAA+NON-PROBE: NOT DETECTED
HOLD SPECIMEN: NORMAL
HOLD SPECIMEN: NORMAL
HPIV1 RNA ISLT QL NAA+PROBE: NOT DETECTED
HPIV2 RNA SPEC QL NAA+PROBE: NOT DETECTED
HPIV3 RNA NPH QL NAA+PROBE: NOT DETECTED
HPIV4 P GENE NPH QL NAA+PROBE: NOT DETECTED
HYALINE CASTS UR QL AUTO: ABNORMAL /LPF
IMM GRANULOCYTES # BLD AUTO: 0.05 10*3/MM3 (ref 0–0.05)
IMM GRANULOCYTES # BLD AUTO: 0.17 10*3/MM3 (ref 0–0.05)
IMM GRANULOCYTES NFR BLD AUTO: 0.7 % (ref 0–0.5)
IMM GRANULOCYTES NFR BLD AUTO: 1.1 % (ref 0–0.5)
INR PPP: 1.05 (ref 2–3)
KETONES UR QL STRIP: NEGATIVE
LEUKOCYTE ESTERASE UR QL STRIP.AUTO: ABNORMAL
LYMPHOCYTES # BLD AUTO: 0.68 10*3/MM3 (ref 0.7–3.1)
LYMPHOCYTES # BLD AUTO: 0.85 10*3/MM3 (ref 0.7–3.1)
LYMPHOCYTES NFR BLD AUTO: 5.6 % (ref 19.6–45.3)
LYMPHOCYTES NFR BLD AUTO: 8.9 % (ref 19.6–45.3)
M PNEUMO IGG SER IA-ACNC: NOT DETECTED
MAGNESIUM SERPL-MCNC: 1.6 MG/DL (ref 1.6–2.4)
MCH RBC QN AUTO: 29.6 PG (ref 26.6–33)
MCH RBC QN AUTO: 29.6 PG (ref 26.6–33)
MCHC RBC AUTO-ENTMCNC: 29.6 G/DL (ref 31.5–35.7)
MCHC RBC AUTO-ENTMCNC: 31.1 G/DL (ref 31.5–35.7)
MCV RBC AUTO: 100 FL (ref 79–97)
MCV RBC AUTO: 95.3 FL (ref 79–97)
MONOCYTES # BLD AUTO: 0.35 10*3/MM3 (ref 0.1–0.9)
MONOCYTES # BLD AUTO: 0.47 10*3/MM3 (ref 0.1–0.9)
MONOCYTES NFR BLD AUTO: 3.1 % (ref 5–12)
MONOCYTES NFR BLD AUTO: 4.6 % (ref 5–12)
NEUTROPHILS NFR BLD AUTO: 13.38 10*3/MM3 (ref 1.7–7)
NEUTROPHILS NFR BLD AUTO: 6.3 10*3/MM3 (ref 1.7–7)
NEUTROPHILS NFR BLD AUTO: 82.9 % (ref 42.7–76)
NEUTROPHILS NFR BLD AUTO: 87.9 % (ref 42.7–76)
NITRITE UR QL STRIP: NEGATIVE
NRBC BLD AUTO-RTO: 0 /100 WBC (ref 0–0.2)
NRBC BLD AUTO-RTO: 0 /100 WBC (ref 0–0.2)
PH UR STRIP.AUTO: 6 [PH] (ref 5–8)
PLATELET # BLD AUTO: 189 10*3/MM3 (ref 140–450)
PLATELET # BLD AUTO: 316 10*3/MM3 (ref 140–450)
PMV BLD AUTO: 10.2 FL (ref 6–12)
PMV BLD AUTO: 10.2 FL (ref 6–12)
POTASSIUM SERPL-SCNC: 3.7 MMOL/L (ref 3.5–5.2)
POTASSIUM SERPL-SCNC: 3.8 MMOL/L (ref 3.5–5.2)
PROT SERPL-MCNC: 7.5 G/DL (ref 6–8.5)
PROT UR QL STRIP: NEGATIVE
PROTHROMBIN TIME: 11.4 SECONDS (ref 19.4–28.5)
RBC # BLD AUTO: 2.97 10*6/MM3 (ref 3.77–5.28)
RBC # BLD AUTO: 4.02 10*6/MM3 (ref 3.77–5.28)
RBC # UR STRIP: ABNORMAL /HPF
REF LAB TEST METHOD: ABNORMAL
RETICS # AUTO: 0.05 10*6/MM3 (ref 0.02–0.13)
RETICS/RBC NFR AUTO: 1.54 % (ref 0.7–1.9)
RHINOVIRUS RNA SPEC NAA+PROBE: NOT DETECTED
RSV RNA NPH QL NAA+NON-PROBE: NOT DETECTED
SARS-COV-2 RNA NPH QL NAA+NON-PROBE: NOT DETECTED
SODIUM SERPL-SCNC: 135 MMOL/L (ref 136–145)
SODIUM SERPL-SCNC: 137 MMOL/L (ref 136–145)
SP GR UR STRIP: 1.01 (ref 1–1.03)
SQUAMOUS #/AREA URNS HPF: ABNORMAL /HPF
TROPONIN T SERPL HS-MCNC: 20 NG/L
UROBILINOGEN UR QL STRIP: ABNORMAL
WBC # UR STRIP: ABNORMAL /HPF
WBC NRBC COR # BLD AUTO: 15.22 10*3/MM3 (ref 3.4–10.8)
WBC NRBC COR # BLD AUTO: 7.6 10*3/MM3 (ref 3.4–10.8)
WHOLE BLOOD HOLD COAG: NORMAL
WHOLE BLOOD HOLD SPECIMEN: NORMAL
YEAST URNS QL MICRO: ABNORMAL /HPF

## 2024-07-11 PROCEDURE — 25810000003 SODIUM CHLORIDE 0.9 % SOLUTION: Performed by: FAMILY MEDICINE

## 2024-07-11 PROCEDURE — 94660 CPAP INITIATION&MGMT: CPT

## 2024-07-11 PROCEDURE — 85025 COMPLETE CBC W/AUTO DIFF WBC: CPT | Performed by: NURSE PRACTITIONER

## 2024-07-11 PROCEDURE — 93005 ELECTROCARDIOGRAM TRACING: CPT | Performed by: NURSE PRACTITIONER

## 2024-07-11 PROCEDURE — 72131 CT LUMBAR SPINE W/O DYE: CPT

## 2024-07-11 PROCEDURE — 87040 BLOOD CULTURE FOR BACTERIA: CPT | Performed by: NURSE PRACTITIONER

## 2024-07-11 PROCEDURE — 25010000002 CEFTRIAXONE PER 250 MG: Performed by: NURSE PRACTITIONER

## 2024-07-11 PROCEDURE — 70450 CT HEAD/BRAIN W/O DYE: CPT

## 2024-07-11 PROCEDURE — 25810000003 DEXTROSE 5 % AND SODIUM CHLORIDE 0.9 % 5-0.9 % SOLUTION: Performed by: FAMILY MEDICINE

## 2024-07-11 PROCEDURE — 80162 ASSAY OF DIGOXIN TOTAL: CPT | Performed by: NURSE PRACTITIONER

## 2024-07-11 PROCEDURE — 25810000003 SODIUM CHLORIDE 0.9 % SOLUTION: Performed by: NURSE PRACTITIONER

## 2024-07-11 PROCEDURE — 94799 UNLISTED PULMONARY SVC/PX: CPT

## 2024-07-11 PROCEDURE — 85025 COMPLETE CBC W/AUTO DIFF WBC: CPT | Performed by: FAMILY MEDICINE

## 2024-07-11 PROCEDURE — 36415 COLL VENOUS BLD VENIPUNCTURE: CPT

## 2024-07-11 PROCEDURE — 99221 1ST HOSP IP/OBS SF/LOW 40: CPT

## 2024-07-11 PROCEDURE — 84484 ASSAY OF TROPONIN QUANT: CPT | Performed by: NURSE PRACTITIONER

## 2024-07-11 PROCEDURE — 80053 COMPREHEN METABOLIC PANEL: CPT | Performed by: NURSE PRACTITIONER

## 2024-07-11 PROCEDURE — 71045 X-RAY EXAM CHEST 1 VIEW: CPT

## 2024-07-11 PROCEDURE — 72128 CT CHEST SPINE W/O DYE: CPT

## 2024-07-11 PROCEDURE — 82948 REAGENT STRIP/BLOOD GLUCOSE: CPT

## 2024-07-11 PROCEDURE — 81001 URINALYSIS AUTO W/SCOPE: CPT | Performed by: NURSE PRACTITIONER

## 2024-07-11 PROCEDURE — 72125 CT NECK SPINE W/O DYE: CPT

## 2024-07-11 PROCEDURE — 83735 ASSAY OF MAGNESIUM: CPT | Performed by: FAMILY MEDICINE

## 2024-07-11 PROCEDURE — 85045 AUTOMATED RETICULOCYTE COUNT: CPT | Performed by: FAMILY MEDICINE

## 2024-07-11 PROCEDURE — P9612 CATHETERIZE FOR URINE SPEC: HCPCS

## 2024-07-11 PROCEDURE — 85610 PROTHROMBIN TIME: CPT | Performed by: NURSE PRACTITIONER

## 2024-07-11 PROCEDURE — 83605 ASSAY OF LACTIC ACID: CPT

## 2024-07-11 PROCEDURE — 0202U NFCT DS 22 TRGT SARS-COV-2: CPT | Performed by: NURSE PRACTITIONER

## 2024-07-11 PROCEDURE — 99285 EMERGENCY DEPT VISIT HI MDM: CPT

## 2024-07-11 PROCEDURE — 86140 C-REACTIVE PROTEIN: CPT | Performed by: NURSE PRACTITIONER

## 2024-07-11 RX ORDER — LUBIPROSTONE 8 UG/1
8 CAPSULE ORAL 2 TIMES DAILY
Status: DISCONTINUED | OUTPATIENT
Start: 2024-07-11 | End: 2024-07-13 | Stop reason: HOSPADM

## 2024-07-11 RX ORDER — SODIUM CHLORIDE 0.9 % (FLUSH) 0.9 %
10 SYRINGE (ML) INJECTION EVERY 12 HOURS SCHEDULED
Status: DISCONTINUED | OUTPATIENT
Start: 2024-07-11 | End: 2024-07-13 | Stop reason: HOSPADM

## 2024-07-11 RX ORDER — SODIUM CHLORIDE 9 MG/ML
40 INJECTION, SOLUTION INTRAVENOUS AS NEEDED
Status: DISCONTINUED | OUTPATIENT
Start: 2024-07-11 | End: 2024-07-13 | Stop reason: HOSPADM

## 2024-07-11 RX ORDER — SODIUM CHLORIDE 9 MG/ML
75 INJECTION, SOLUTION INTRAVENOUS CONTINUOUS
Status: DISCONTINUED | OUTPATIENT
Start: 2024-07-11 | End: 2024-07-11

## 2024-07-11 RX ORDER — TOPIRAMATE 25 MG/1
50 TABLET ORAL EVERY MORNING
Status: DISCONTINUED | OUTPATIENT
Start: 2024-07-11 | End: 2024-07-13 | Stop reason: HOSPADM

## 2024-07-11 RX ORDER — FLUCONAZOLE 2 MG/ML
400 INJECTION, SOLUTION INTRAVENOUS EVERY 24 HOURS
Status: DISCONTINUED | OUTPATIENT
Start: 2024-07-11 | End: 2024-07-11

## 2024-07-11 RX ORDER — HYDROCODONE BITARTRATE AND ACETAMINOPHEN 5; 325 MG/1; MG/1
1 TABLET ORAL EVERY 6 HOURS PRN
Status: DISCONTINUED | OUTPATIENT
Start: 2024-07-11 | End: 2024-07-13 | Stop reason: HOSPADM

## 2024-07-11 RX ORDER — AMOXICILLIN 250 MG
2 CAPSULE ORAL 2 TIMES DAILY PRN
Status: DISCONTINUED | OUTPATIENT
Start: 2024-07-11 | End: 2024-07-13 | Stop reason: HOSPADM

## 2024-07-11 RX ORDER — ATORVASTATIN CALCIUM 40 MG/1
80 TABLET, FILM COATED ORAL EVERY MORNING
Status: DISCONTINUED | OUTPATIENT
Start: 2024-07-11 | End: 2024-07-13 | Stop reason: HOSPADM

## 2024-07-11 RX ORDER — LEVOTHYROXINE SODIUM 88 UG/1
88 TABLET ORAL
Status: DISCONTINUED | OUTPATIENT
Start: 2024-07-12 | End: 2024-07-13 | Stop reason: HOSPADM

## 2024-07-11 RX ORDER — SODIUM CHLORIDE 0.9 % (FLUSH) 0.9 %
10 SYRINGE (ML) INJECTION AS NEEDED
Status: DISCONTINUED | OUTPATIENT
Start: 2024-07-11 | End: 2024-07-13 | Stop reason: HOSPADM

## 2024-07-11 RX ORDER — DEXTROSE MONOHYDRATE AND SODIUM CHLORIDE 5; .9 G/100ML; G/100ML
75 INJECTION, SOLUTION INTRAVENOUS CONTINUOUS
Status: DISCONTINUED | OUTPATIENT
Start: 2024-07-11 | End: 2024-07-13 | Stop reason: HOSPADM

## 2024-07-11 RX ORDER — MORPHINE SULFATE 2 MG/ML
2 INJECTION, SOLUTION INTRAMUSCULAR; INTRAVENOUS
Status: DISCONTINUED | OUTPATIENT
Start: 2024-07-11 | End: 2024-07-13 | Stop reason: HOSPADM

## 2024-07-11 RX ORDER — POLYETHYLENE GLYCOL 3350 17 G/17G
17 POWDER, FOR SOLUTION ORAL DAILY PRN
Status: DISCONTINUED | OUTPATIENT
Start: 2024-07-11 | End: 2024-07-13 | Stop reason: HOSPADM

## 2024-07-11 RX ORDER — PAROXETINE HYDROCHLORIDE 20 MG/1
40 TABLET, FILM COATED ORAL DAILY
Status: DISCONTINUED | OUTPATIENT
Start: 2024-07-12 | End: 2024-07-13 | Stop reason: HOSPADM

## 2024-07-11 RX ORDER — ONDANSETRON 4 MG/1
4 TABLET, ORALLY DISINTEGRATING ORAL EVERY 6 HOURS PRN
Status: DISCONTINUED | OUTPATIENT
Start: 2024-07-11 | End: 2024-07-13 | Stop reason: HOSPADM

## 2024-07-11 RX ORDER — PANTOPRAZOLE SODIUM 40 MG/1
40 TABLET, DELAYED RELEASE ORAL
Status: DISCONTINUED | OUTPATIENT
Start: 2024-07-12 | End: 2024-07-13 | Stop reason: HOSPADM

## 2024-07-11 RX ORDER — ACETAMINOPHEN 325 MG/1
650 TABLET ORAL EVERY 6 HOURS PRN
Status: DISCONTINUED | OUTPATIENT
Start: 2024-07-11 | End: 2024-07-13 | Stop reason: HOSPADM

## 2024-07-11 RX ORDER — FLUCONAZOLE 150 MG/1
150 TABLET ORAL ONCE
Status: COMPLETED | OUTPATIENT
Start: 2024-07-11 | End: 2024-07-11

## 2024-07-11 RX ORDER — ONDANSETRON 2 MG/ML
4 INJECTION INTRAMUSCULAR; INTRAVENOUS EVERY 6 HOURS PRN
Status: DISCONTINUED | OUTPATIENT
Start: 2024-07-11 | End: 2024-07-13 | Stop reason: HOSPADM

## 2024-07-11 RX ORDER — BISACODYL 5 MG/1
5 TABLET, DELAYED RELEASE ORAL DAILY PRN
Status: DISCONTINUED | OUTPATIENT
Start: 2024-07-11 | End: 2024-07-13 | Stop reason: HOSPADM

## 2024-07-11 RX ORDER — DIGOXIN 125 MCG
125 TABLET ORAL
Status: DISCONTINUED | OUTPATIENT
Start: 2024-07-12 | End: 2024-07-13 | Stop reason: HOSPADM

## 2024-07-11 RX ORDER — BISACODYL 10 MG
10 SUPPOSITORY, RECTAL RECTAL DAILY PRN
Status: DISCONTINUED | OUTPATIENT
Start: 2024-07-11 | End: 2024-07-13 | Stop reason: HOSPADM

## 2024-07-11 RX ADMIN — DEXTROSE AND SODIUM CHLORIDE 75 ML/HR: 5; 900 INJECTION, SOLUTION INTRAVENOUS at 21:34

## 2024-07-11 RX ADMIN — APIXABAN 5 MG: 5 TABLET, FILM COATED ORAL at 23:47

## 2024-07-11 RX ADMIN — CEFTRIAXONE 1000 MG: 1 INJECTION, POWDER, FOR SOLUTION INTRAMUSCULAR; INTRAVENOUS at 15:03

## 2024-07-11 RX ADMIN — SODIUM CHLORIDE 1000 ML: 9 INJECTION, SOLUTION INTRAVENOUS at 15:23

## 2024-07-11 RX ADMIN — SODIUM CHLORIDE 1000 ML: 9 INJECTION, SOLUTION INTRAVENOUS at 11:13

## 2024-07-11 RX ADMIN — SODIUM CHLORIDE 1000 ML: 9 INJECTION, SOLUTION INTRAVENOUS at 11:57

## 2024-07-11 RX ADMIN — Medication 10 ML: at 21:37

## 2024-07-11 RX ADMIN — HYDROCODONE BITARTRATE AND ACETAMINOPHEN 1 TABLET: 5; 325 TABLET ORAL at 21:34

## 2024-07-11 RX ADMIN — SODIUM CHLORIDE 142 ML: 9 INJECTION, SOLUTION INTRAVENOUS at 11:58

## 2024-07-11 RX ADMIN — TOPIRAMATE 50 MG: 25 TABLET, FILM COATED ORAL at 23:46

## 2024-07-11 RX ADMIN — FLUCONAZOLE 150 MG: 150 TABLET ORAL at 15:03

## 2024-07-11 RX ADMIN — ATORVASTATIN CALCIUM 80 MG: 40 TABLET, FILM COATED ORAL at 23:47

## 2024-07-11 RX ADMIN — SODIUM CHLORIDE 75 ML/HR: 9 INJECTION, SOLUTION INTRAVENOUS at 18:11

## 2024-07-11 NOTE — Clinical Note
Level of Care: Telemetry [5]   Admitting Physician: VICTOR MANUEL BURT [[id number]]   Attending Physician: VICTOR MANUEL BURT [[id number]]

## 2024-07-11 NOTE — ED NOTES
Pt arrived via [facility]  c/o mechanical fall at home. Pt did hit head but negative LOC but is on warfarin. Pt is A&Ox3, base line is A&Ox4 but family reports she has been alerted for the last 3-4 days d/t confirmed UTI that pt is on antibiotics for. Pts only complaint is generalized back pain.

## 2024-07-11 NOTE — ED NOTES
Nursing report ED to floor  Laura Mejia  68 y.o.  female    HPI:   Chief Complaint   Patient presents with    Fall       Admitting doctor:   Keke Casiano MD    Admitting diagnosis:   The primary encounter diagnosis was Multiple falls. Diagnoses of Generalized weakness, Closed wedge compression fracture of T10 vertebra, initial encounter, Leukocytosis, unspecified type, and Hypotension due to hypovolemia were also pertinent to this visit.    Code status:   Current Code Status       Date Active Code Status Order ID Comments User Context       7/11/2024 1534 CPR (Attempt to Resuscitate) [id number]  Carolin Hatfield MD ED        Question Answer    Code Status (Patient has no pulse and is not breathing) CPR (Attempt to Resuscitate)    Medical Interventions (Patient has pulse or is breathing) Full Support                    Allergies:   Oxytetracycline and Oxycodone    Isolation:  No active isolations     Fall Risk:  Fall Risk Assessment was completed, and patient is at high risk for falls.   Predictive Model Details         15 (Low) Factor Value    Calculated 7/11/2024 16:59 Age 68    Risk of Fall Model Active Peripheral IV Present     Imaging order in this encounter Present     Respiratory Rate 20     Diastolic BP 49     Magnesium not on file     Number of Distinct Medication Classes administered 3     Willard Scale not on file     Creatinine 1.13 mg/dL     Chloride 101 mmol/L     Albumin 3.9 g/dL     Total Bilirubin 0.6 mg/dL     Days after Admission 0.254     ALT 19 U/L     Calcium 10 mg/dL     Potassium 3.7 mmol/L         Weight:       07/11/24  1145   Weight: 71.4 kg (157 lb 6.4 oz)       Intake and Output    Intake/Output Summary (Last 24 hours) at 7/11/2024 1659  Last data filed at 7/11/2024 1257  Gross per 24 hour   Intake 2000 ml   Output --   Net 2000 ml       Diet:   Dietary Orders (From admission, onward)       Start     Ordered    07/11/24 1534  Diet: Cardiac; Healthy Heart (2-3 Na+); Fluid  Consistency: Thin (IDDSI 0)  Diet Effective Now        References:    Diet Order [place]   Question Answer Comment   Diets: Cardiac    Cardiac Diet: Healthy Heart (2-3 Na+)    Fluid Consistency: Thin (IDDSI 0)        07/11/24 1534                     Most recent vitals:   Vitals:    07/11/24 1616 07/11/24 1632 07/11/24 1633 07/11/24 1646   BP: 95/63 (!) 83/58  101/49   Pulse: 66 66  64   Resp:       Temp:       TempSrc:       SpO2: 92%  98% 93%   Weight:       Height:           Active LDAs/IV Access:   Lines, Drains & Airways       Active LDAs       Name Placement date Placement time Site Days    Peripheral IV 07/11/24 1110 Left Antecubital 07/11/24  1110  Antecubital  less than 1                    Skin Condition:   Skin Assessments (last day)       Date/Time Skin WDL    07/11/24 10:57:34 WDL             Labs (abnormal labs have a star):   Labs Reviewed   COMPREHENSIVE METABOLIC PANEL - Abnormal; Notable for the following components:       Result Value    Creatinine 1.13 (*)     Sodium 135 (*)     CO2 18.5 (*)     AST (SGOT) 48 (*)     Alkaline Phosphatase 155 (*)     Anion Gap 15.5 (*)     eGFR 52.8 (*)     All other components within normal limits    Narrative:     GFR Normal >60  Chronic Kidney Disease <60  Kidney Failure <15     URINALYSIS W/ CULTURE IF INDICATED - Abnormal; Notable for the following components:    Leuk Esterase, UA Trace (*)     All other components within normal limits    Narrative:     In absence of clinical symptoms, the presence of pyuria, bacteria, and/or nitrites on the urinalysis result does not correlate with infection.   C-REACTIVE PROTEIN - Abnormal; Notable for the following components:    C-Reactive Protein 1.22 (*)     All other components within normal limits   CBC WITH AUTO DIFFERENTIAL - Abnormal; Notable for the following components:    WBC 15.22 (*)     Hemoglobin 11.9 (*)     MCHC 31.1 (*)     RDW 15.8 (*)     RDW-SD 55.8 (*)     Neutrophil % 87.9 (*)     Lymphocyte % 5.6  (*)     Monocyte % 3.1 (*)     Immature Grans % 1.1 (*)     Neutrophils, Absolute 13.38 (*)     Immature Grans, Absolute 0.17 (*)     All other components within normal limits   URINALYSIS, MICROSCOPIC ONLY - Abnormal; Notable for the following components:    WBC, UA 3-5 (*)     Yeast, UA Small/1+ Yeast (*)     All other components within normal limits   SINGLE HS TROPONIN T - Abnormal; Notable for the following components:    HS Troponin T 20 (*)     All other components within normal limits    Narrative:     High Sensitive Troponin T Reference Range:  <14.0 ng/L- Negative Female for AMI  <22.0 ng/L- Negative Male for AMI  >=14 - Abnormal Female indicating possible myocardial injury.  >=22 - Abnormal Male indicating possible myocardial injury.   Clinicians would have to utilize clinical acumen, EKG, Troponin, and serial changes to determine if it is an Acute Myocardial Infarction or myocardial injury due to an underlying chronic condition.        PROTIME-INR - Abnormal; Notable for the following components:    Protime 11.4 (*)     INR 1.05 (*)     All other components within normal limits   RESPIRATORY PANEL PCR W/ COVID-19 (SARS-COV-2), NP SWAB IN UTM/VTP, 2 HR TAT - Normal    Narrative:     In the setting of a positive respiratory panel with a viral infection PLUS a negative procalcitonin without other underlying concern for bacterial infection, consider observing off antibiotics or discontinuation of antibiotics and continue supportive care. If the respiratory panel is positive for atypical bacterial infection (Bordetella pertussis, Chlamydophila pneumoniae, or Mycoplasma pneumoniae), consider antibiotic de-escalation to target atypical bacterial infection.   DIGOXIN LEVEL - Normal   POC LACTATE - Normal   BLOOD CULTURE   BLOOD CULTURE   RAINBOW DRAW    Narrative:     The following orders were created for panel order [place] Draw.  Procedure                               Abnormality         Status                      ---------                               -----------         ------                     Green Top (Gel)[[id number]]                                  Final result               Lavender Top[[id number]]                                     Final result               Gold Top - SST[[id number]]                                   Final result               Light Blue Top[[id number]]                                   Final result                 Please view results for these tests on the individual orders.   BASIC METABOLIC PANEL   MAGNESIUM   CBC WITH AUTO DIFFERENTIAL   POC LACTATE   CBC AND DIFFERENTIAL    Narrative:     The following orders were created for panel order CBC & Differential.  Procedure                               Abnormality         Status                     ---------                               -----------         ------                     CBC Auto Differential[[id number]]        Abnormal            Final result                 Please view results for these tests on the individual orders.   GREEN TOP   LAVENDER TOP   GOLD TOP - SST   LIGHT BLUE TOP   CBC AND DIFFERENTIAL    Narrative:     The following orders were created for panel order CBC & Differential.  Procedure                               Abnormality         Status                     ---------                               -----------         ------                     CBC Auto Differential[[id number]]                                                         Please view results for these tests on the individual orders.       LOC: Person, Place, Time, and Situation    Telemetry:  Med/Surg    Cardiac Monitoring Ordered: yes    EKG:   ECG 12 Lead Other; Sepsis   Preliminary Result   HEART RATE=70  bpm   RR Xqfgwdwr=631  ms   IA Hmmnwxcd=106  ms   P Horizontal Axis=198  deg   P Front Axis=45  deg   QRSD Interval=87  ms   QT Gbevaxww=299  ms   HEfW=591  ms   QRS Axis=21  deg   T Wave Axis=57  deg   - OTHERWISE NORMAL ECG -   Sinus rhythm   Atrial  premature complex   Date and Time of Study:2024-07-11 11:22:38          Medications Given in the ED:   Medications   sodium chloride 0.9 % flush 10 mL (has no administration in time range)   apixaban (ELIQUIS) tablet 5 mg (has no administration in time range)   atorvastatin (LIPITOR) tablet 80 mg (has no administration in time range)   digoxin (LANOXIN) tablet 125 mcg (has no administration in time range)   levothyroxine (SYNTHROID, LEVOTHROID) tablet 88 mcg (has no administration in time range)   lubiprostone (AMITIZA) capsule 8 mcg (has no administration in time range)   pantoprazole (PROTONIX) EC tablet 40 mg (has no administration in time range)   PARoxetine (PAXIL) tablet 40 mg (has no administration in time range)   topiramate (TOPAMAX) tablet 50 mg (has no administration in time range)   sodium chloride 0.9 % flush 10 mL (has no administration in time range)   sodium chloride 0.9 % flush 10 mL (has no administration in time range)   sodium chloride 0.9 % infusion 40 mL (has no administration in time range)   sodium chloride 0.9 % infusion (has no administration in time range)   Potassium Replacement - Follow Nurse / BPA Driven Protocol (has no administration in time range)   Magnesium Standard Dose Replacement - Follow Nurse / BPA Driven Protocol (has no administration in time range)   Phosphorus Replacement - Follow Nurse / BPA Driven Protocol (has no administration in time range)   Calcium Replacement - Follow Nurse / BPA Driven Protocol (has no administration in time range)   sennosides-docusate (PERICOLACE) 8.6-50 MG per tablet 2 tablet (has no administration in time range)     And   polyethylene glycol (MIRALAX) packet 17 g (has no administration in time range)     And   bisacodyl (DULCOLAX) EC tablet 5 mg (has no administration in time range)     And   bisacodyl (DULCOLAX) suppository 10 mg (has no administration in time range)   ondansetron ODT (ZOFRAN-ODT) disintegrating tablet 4 mg (has no  administration in time range)     Or   ondansetron (ZOFRAN) injection 4 mg (has no administration in time range)   sodium chloride 0.9 % bolus 1,000 mL (0 mL Intravenous Stopped 7/11/24 1213)   sodium chloride 0.9 % bolus 1,000 mL (0 mL Intravenous Stopped 7/11/24 1257)   sodium chloride 0.9 % bolus 142 mL (0 mL Intravenous Stopped 7/11/24 1317)   cefTRIAXone (ROCEPHIN) 1,000 mg in sodium chloride 0.9 % 100 mL MBP (0 mg Intravenous Stopped 7/11/24 1523)   fluconazole (DIFLUCAN) tablet 150 mg (150 mg Oral Given 7/11/24 1503)   sodium chloride 0.9 % bolus 1,000 mL (1,000 mL Intravenous New Bag 7/11/24 1523)       Imaging results:  CT Thoracic Spine Without Contrast    Result Date: 7/11/2024  Compression deformity of T10 appears most likely acute. No evidence of compromise of spinal canal contents. Old wedging of T7 and T8. Electronically Signed: Monica George MD  7/11/2024 1:32 PM EDT  Workstation ID: [id number]    CT Lumbar Spine Without Contrast    Result Date: 7/11/2024  Impression: Old wedging of L1. Mild concave wedging of the superior aspect of L5 has occurred since the prior plain film of 9/8/2023 although is overall very indeterminate age. Degenerative disc disease as detailed with areas of spinal stenosis and neural foraminal encroachment as outlined above. Electronically Signed: Monica George MD  7/11/2024 1:27 PM EDT  Workstation ID: [id number]    CT Cervical Spine Without Contrast    Result Date: 7/11/2024  Impression: Chronic findings. No acute process. Electronically Signed: Monica George MD  7/11/2024 1:21 PM EDT  Workstation ID: [id number]    CT Head Without Contrast    Result Date: 7/11/2024  Impression: Chronic findings. No acute process. Electronically Signed: Monica George MD  7/11/2024 1:16 PM EDT  Workstation ID: [id number]    XR Chest 1 View    Result Date: 7/11/2024  Impression: 1.No acute radiographic abnormality is identified. Electronically Signed: Roberto Huddleston MD  7/11/2024 11:52 AM EDT   Workstation ID: [id number]     Social issues:   Social History     Socioeconomic History    Marital status:    Tobacco Use    Smoking status: Never   Vaping Use    Vaping status: Some Days    Substances: CBD, nightly, 2-3 weekly   Substance and Sexual Activity    Alcohol use: Never    Drug use: Never    Sexual activity: Defer       NIH Stroke Scale:  Interval: (not recorded)  1a. Level of Consciousness: (not recorded)  1b. LOC Questions: (not recorded)  1c. LOC Commands: (not recorded)  2. Best Gaze: (not recorded)  3. Visual: (not recorded)  4. Facial Palsy: (not recorded)  5a. Motor Arm, Left: (not recorded)  5b. Motor Arm, Right: (not recorded)  6a. Motor Leg, Left: (not recorded)  6b. Motor Leg, Right: (not recorded)  7. Limb Ataxia: (not recorded)  8. Sensory: (not recorded)  9. Best Language: (not recorded)  10. Dysarthria: (not recorded)  11. Extinction and Inattention (formerly Neglect): (not recorded)    Total (NIH Stroke Scale): (not recorded)     Additional notable assessment information:     Nursing report ED to floor:  CONSTANCE Flores RN   07/11/24 16:59 EDT

## 2024-07-11 NOTE — ED PROVIDER NOTES
Subjective   History of Present Illness  68-year-old  female presents to the emergency room via EMS for complaint of increased and frequency of falls.  She and hit her head this morning she is also reporting increased back pain from previous chronic back pain.  Patient states that she went into [place]      Review of Systems   Constitutional:  Positive for activity change and appetite change.   Gastrointestinal:  Negative for abdominal pain.   Genitourinary:  Positive for dysuria. Negative for hematuria.   Musculoskeletal:  Positive for back pain.   Skin:  Positive for pallor.   Neurological:  Positive for weakness and light-headedness.       Past Medical History:   Diagnosis Date    AAA (abdominal aortic aneurysm)     infrarenal- 3.1cm(2010, 3.7x4.2cm(2016), 3.9cm (2017)    Allergic rhinitis     Aspiration pneumonia 05/2017    CHF (congestive heart failure)     Coronary artery disease     DDD (degenerative disc disease), lumbar     lumbar spine- Dr. Churchill     Depression     Diverticulosis     Frequent UTI     Dr. Daniels    GERD (gastroesophageal reflux disease)     Hiatal hernia     HSV (herpes simplex virus) infection     Oral    Hyperlipidemia     IBS (irritable bowel syndrome)     Constipation predominant    Ischemic cardiomyopathy 09/2017    AICD     Kidney stones     NSTEMI (non-ST elevated myocardial infarction) 04/26/2017    Obstructive sleep apnea 2011    nfsg 2011, ahi 68    Osteoarthritis     Osteopenia     Peripheral neuropathy     feet    Pulmonary embolism, bilateral 05/2017    Rotator cuff tear     Bilateral- Ortho        Allergies   Allergen Reactions    Oxytetracycline Hives    Oxycodone Itching       Past Surgical History:   Procedure Laterality Date    CARDIAC CATHETERIZATION  04/26/2017    99% mid LAD. 90% mid LCX. 60% RCA. Recommended CABG. Dr. Diaz    CARDIAC DEFIBRILLATOR PLACEMENT  12/26/2017    ICD implant/ Dr. Ellis    CATARACT EXTRACTION      CORONARY ARTERY BYPASS GRAFT   04/26/2017    x4 & ASD Closure    CYSTOSCOPY  2002    negative. Dr. Daniels    LAPAROSCOPIC CHOLECYSTECTOMY  04/17/2013    For biliary dyskinesia. Dr. Mccoy.     REPLACEMENT TOTAL KNEE BILATERAL  11/2004    Dr. Herrera    THORACENTESIS Left 05/08/2017    TONSILLECTOMY      TUBAL ABDOMINAL LIGATION Bilateral        Family History   Problem Relation Age of Onset    Heart disease Mother     Other Mother         Hypoglycemia    Osteoporosis Mother     COPD Father     Stroke Father     Hypertension Brother     Diabetes Brother     Heart disease Brother        Social History     Socioeconomic History    Marital status:    Tobacco Use    Smoking status: Never   Vaping Use    Vaping status: Some Days    Substances: CBD, nightly, 2-3 weekly   Substance and Sexual Activity    Alcohol use: Never    Drug use: Never    Sexual activity: Defer           Objective   Physical Exam  Constitutional:       General: She is in acute distress.      Appearance: She is ill-appearing and toxic-appearing. She is not diaphoretic.   HENT:      Head: Normocephalic.      Mouth/Throat:      Mouth: Mucous membranes are dry.   Eyes:      Extraocular Movements: Extraocular movements intact.      Conjunctiva/sclera: Conjunctivae normal.      Pupils: Pupils are equal, round, and reactive to light.   Cardiovascular:      Rate and Rhythm: Normal rate.      Pulses: Normal pulses.   Pulmonary:      Effort: Pulmonary effort is normal.   Abdominal:      Palpations: Abdomen is soft.   Skin:     General: Skin is warm and dry.      Capillary Refill: Capillary refill takes 2 to 3 seconds.      Coloration: Skin is pale.   Neurological:      General: No focal deficit present.      Mental Status: She is alert and oriented to person, place, and time.      Motor: Weakness present.   Psychiatric:         Mood and Affect: Mood normal.         Behavior: Behavior normal.         Procedures           ED Course  ED Course as of 07/11/24 1526   Thu Jul 11, 2024   1509  Discussed patient case with ALEXANDER Mejia intensivist and advised to give another 1 L bolus on pressure bag and call back with results. [CT]      ED Course User Index  [CT] Jelly Dunne APRN      Medications   sodium chloride 0.9 % flush 10 mL (has no administration in time range)   sodium chloride 0.9 % bolus 1,000 mL (1,000 mL Intravenous New Bag 7/11/24 1523)   sodium chloride 0.9 % bolus 1,000 mL (0 mL Intravenous Stopped 7/11/24 1213)   sodium chloride 0.9 % bolus 1,000 mL (0 mL Intravenous Stopped 7/11/24 1257)   sodium chloride 0.9 % bolus 142 mL (0 mL Intravenous Stopped 7/11/24 1317)   cefTRIAXone (ROCEPHIN) 1,000 mg in sodium chloride 0.9 % 100 mL MBP (0 mg Intravenous Stopped 7/11/24 1523)   fluconazole (DIFLUCAN) tablet 150 mg (150 mg Oral Given 7/11/24 1503)                                 Labs Reviewed   COMPREHENSIVE METABOLIC PANEL - Abnormal; Notable for the following components:       Result Value    Creatinine 1.13 (*)     Sodium 135 (*)     CO2 18.5 (*)     AST (SGOT) 48 (*)     Alkaline Phosphatase 155 (*)     Anion Gap 15.5 (*)     eGFR 52.8 (*)     All other components within normal limits    Narrative:     GFR Normal >60  Chronic Kidney Disease <60  Kidney Failure <15     URINALYSIS W/ CULTURE IF INDICATED - Abnormal; Notable for the following components:    Leuk Esterase, UA Trace (*)     All other components within normal limits    Narrative:     In absence of clinical symptoms, the presence of pyuria, bacteria, and/or nitrites on the urinalysis result does not correlate with infection.   C-REACTIVE PROTEIN - Abnormal; Notable for the following components:    C-Reactive Protein 1.22 (*)     All other components within normal limits   CBC WITH AUTO DIFFERENTIAL - Abnormal; Notable for the following components:    WBC 15.22 (*)     Hemoglobin 11.9 (*)     MCHC 31.1 (*)     RDW 15.8 (*)     RDW-SD 55.8 (*)     Neutrophil % 87.9 (*)     Lymphocyte % 5.6 (*)     Monocyte % 3.1 (*)     Immature  Grans % 1.1 (*)     Neutrophils, Absolute 13.38 (*)     Immature Grans, Absolute 0.17 (*)     All other components within normal limits   URINALYSIS, MICROSCOPIC ONLY - Abnormal; Notable for the following components:    WBC, UA 3-5 (*)     Yeast, UA Small/1+ Yeast (*)     All other components within normal limits   SINGLE HS TROPONIN T - Abnormal; Notable for the following components:    HS Troponin T 20 (*)     All other components within normal limits    Narrative:     High Sensitive Troponin T Reference Range:  <14.0 ng/L- Negative Female for AMI  <22.0 ng/L- Negative Male for AMI  >=14 - Abnormal Female indicating possible myocardial injury.  >=22 - Abnormal Male indicating possible myocardial injury.   Clinicians would have to utilize clinical acumen, EKG, Troponin, and serial changes to determine if it is an Acute Myocardial Infarction or myocardial injury due to an underlying chronic condition.        PROTIME-INR - Abnormal; Notable for the following components:    Protime 11.4 (*)     INR 1.05 (*)     All other components within normal limits   RESPIRATORY PANEL PCR W/ COVID-19 (SARS-COV-2), NP SWAB IN UTM/VTP, 2 HR TAT - Normal    Narrative:     In the setting of a positive respiratory panel with a viral infection PLUS a negative procalcitonin without other underlying concern for bacterial infection, consider observing off antibiotics or discontinuation of antibiotics and continue supportive care. If the respiratory panel is positive for atypical bacterial infection (Bordetella pertussis, Chlamydophila pneumoniae, or Mycoplasma pneumoniae), consider antibiotic de-escalation to target atypical bacterial infection.   DIGOXIN LEVEL - Normal   POC LACTATE - Normal   BLOOD CULTURE   BLOOD CULTURE   RAINBOW DRAW    Narrative:     The following orders were created for panel order [place] Draw.  Procedure                               Abnormality         Status                     ---------                                -----------         ------                     Green Top (Gel)[[id number]]                                  Final result               Lavender Top[[id number]]                                     Final result               Gold Top - SST[[id number]]                                   Final result               Light Blue Top[[id number]]                                   Final result                 Please view results for these tests on the individual orders.   POC LACTATE   CBC AND DIFFERENTIAL    Narrative:     The following orders were created for panel order CBC & Differential.  Procedure                               Abnormality         Status                     ---------                               -----------         ------                     CBC Auto Differential[[id number]]        Abnormal            Final result                 Please view results for these tests on the individual orders.   GREEN TOP   LAVENDER TOP   GOLD TOP - SST   LIGHT BLUE TOP              Medical Decision Making  Problems Addressed:  Closed wedge compression fracture of T10 vertebra, initial encounter: complicated acute illness or injury     Details: As seen on x-ray of thoracic spine.  Generalized weakness: complicated acute illness or injury     Details: Numerous falls over the last couple weeks after recent hospitalization at [facility] for recent UTI.  Hypotension due to hypovolemia: complicated acute illness or injury     Details: Most likely simple sepsis.  Leukocytosis, unspecified type: complicated acute illness or injury     Details: Over 15,000.  Elevated compared to 9 days ago at 10.5.  Rocephin was ordered and administered via peripheral IV.  Multiple falls: complicated acute illness or injury     Details: Generalized weakness and at least 2-3 falls in the last 1-1/2 to 2 weeks.  Patient has a new compression T 10 fracture.  Discussed results with Dr. Carolin Hatfield and agrees to admit.    Amount and/or  Complexity of Data Reviewed  Labs: ordered.     Details: CBC, CMP, troponin, BNP, urinalysis, CRP, lactate.  Lactate is 1.1 but patient has a soft blood pressure.  Sepsis bolus ordered and administered at 11 AM but it took 4 hours to be administered.  Finally after full administration of bolus blood pressure is 108/68.  Discussed patient case with Roberto MUNOZ [facility] intensivist and recommends another 1 L normal saline bolus to be administered and call back if not better.  After 108/68 blood pressure [facility] called for admission and Dr. Carolin Hatfield agrees to admit.  Radiology: ordered.     Details: CT of head, cervical spine, thoracic spine, lumbar spine were all ordered due to patient's report of numerous falls and worsening back pain.  CT of thoracic spine is significant for a compression T10 fracture  ECG/medicine tests: ordered.     Details: EKG = sinus rhythm, rate of 70.  Troponin is 20.  No ST elevation or depression was appreciated on EKG.  Seen and signed by Dr CLAY Phelps.    Risk  Prescription drug management.  Decision regarding hospitalization.  Risk Details: Admit to [facility].  Dr RONNA Hatfield agrees to accept.        Final diagnoses:   Multiple falls   Generalized weakness   Closed wedge compression fracture of T10 vertebra, initial encounter   Leukocytosis, unspecified type   Hypotension due to hypovolemia       ED Disposition  ED Disposition       ED Disposition   Decision to Admit    Condition   --    Comment   Level of Care: Telemetry [5]   Admitting Physician: VICTOR MANUEL BURT [[id number]]   Attending Physician: VICTOR MANUEL BURT [[id number]]                 No follow-up provider specified.       Medication List      No changes were made to your prescriptions during this visit.            Jelly Dunne, APRN  07/11/24 1535

## 2024-07-11 NOTE — CONSULTS
[facility] NEUROSURGERY CONSULT NOTE    Patient name: Laura Mejia  Referring Provider:     Carolin Hatfield MD     Reason for Consultation: T10 compression fracture     Patient Care Team:  Luan Joseph APRN as PCP - General (Family Medicine)  Jozef Otero MD as Consulting Physician (Nephrology)    Chief complaint: Back pain    Subjective .     History of present illness:    Patient is a 68 y.o. female who presents to [facility] after falling this morning and having severe back pain.  Patient and her  state that she was on the toilet this morning and was waiting for her  to help her up.  Patient recently had a UTI and has residual weakness from being sick.  While patient was waiting for her  to help her she slipped and fell the toilet and hit her head.  Patient denies losing consciousness when she hit her head.  Patient's  found her and sat her up but patient continued to be in severe pain and so they decided to come to the emergency room for further workup.  Patient denies pain, numbness/tingling weakness in her upper or lower extremities, headache, nausea/vomiting, hearing or vision changes, bowel or bladder incontinence or saddle anesthesia.  Patient admits to back pain, neck pain and neck stiffness.  Patient displays no red flags at this time.      Review of Systems  Review of Systems   Constitutional:  Positive for activity change.   Musculoskeletal:  Positive for back pain, neck pain and neck stiffness.       History  PAST MEDICAL HISTORY  Past Medical History:   Diagnosis Date    AAA (abdominal aortic aneurysm)     infrarenal- 3.1cm(2010, 3.7x4.2cm(2016), 3.9cm (2017)    Allergic rhinitis     Aspiration pneumonia 05/2017    CHF (congestive heart failure)     Coronary artery disease     DDD (degenerative disc disease), lumbar     lumbar spine- Dr. Churchill     Depression     Diverticulosis     Frequent UTI     Dr. Daniels    GERD (gastroesophageal reflux disease)      Hiatal hernia     HSV (herpes simplex virus) infection     Oral    Hyperlipidemia     IBS (irritable bowel syndrome)     Constipation predominant    Ischemic cardiomyopathy 09/2017    AICD     Kidney stones     NSTEMI (non-ST elevated myocardial infarction) 04/26/2017    Obstructive sleep apnea 2011    nfsg 2011, ahi 67    Osteoarthritis     Osteopenia     Peripheral neuropathy     feet    Pulmonary embolism, bilateral 05/2017    Rotator cuff tear     Bilateral- Ortho        PAST SURGICAL HISTORY  Past Surgical History:   Procedure Laterality Date    CARDIAC CATHETERIZATION  04/26/2017    99% mid LAD. 90% mid LCX. 60% RCA. Recommended CABG. Dr. Diaz    CARDIAC DEFIBRILLATOR PLACEMENT  12/26/2017    ICD implant/ Dr. Ellis    CATARACT EXTRACTION      CORONARY ARTERY BYPASS GRAFT  04/26/2017    x4 & ASD Closure    CYSTOSCOPY  2002    negative. Dr. Daniels    LAPAROSCOPIC CHOLECYSTECTOMY  04/17/2013    For biliary dyskinesia. Dr. Mccoy.     REPLACEMENT TOTAL KNEE BILATERAL  11/2004    Dr. Herrera    THORACENTESIS Left 05/08/2017    TONSILLECTOMY      TUBAL ABDOMINAL LIGATION Bilateral        FAMILY HISTORY  Family History   Problem Relation Age of Onset    Heart disease Mother     Other Mother         Hypoglycemia    Osteoporosis Mother     COPD Father     Stroke Father     Hypertension Brother     Diabetes Brother     Heart disease Brother        SOCIAL HISTORY  Social History     Tobacco Use    Smoking status: Never   Vaping Use    Vaping status: Some Days    Substances: CBD, nightly, 2-3 weekly   Substance Use Topics    Alcohol use: Never    Drug use: Never       Allergies:  Oxytetracycline and Oxycodone    MEDICATIONS:  (Not in a hospital admission)        Current Facility-Administered Medications:     apixaban (ELIQUIS) tablet 5 mg, 5 mg, Oral, BID, Carolin Hatfield MD    atorvastatin (LIPITOR) tablet 80 mg, 80 mg, Oral, Daily, Carolin Hatfield MD    sennosides-docusate (PERICOLACE) 8.6-50 MG per tablet 2  tablet, 2 tablet, Oral, BID PRN **AND** polyethylene glycol (MIRALAX) packet 17 g, 17 g, Oral, Daily PRN **AND** bisacodyl (DULCOLAX) EC tablet 5 mg, 5 mg, Oral, Daily PRN **AND** bisacodyl (DULCOLAX) suppository 10 mg, 10 mg, Rectal, Daily PRN, Carolin Hatfield MD    Calcium Replacement - Follow Nurse / BPA Driven Protocol, , Does not apply, Alysia MARQUEZ Tamera D, MD    [START ON 7/12/2024] digoxin (LANOXIN) tablet 125 mcg, 125 mcg, Oral, Daily, Carolin Hatfield MD    [START ON 7/12/2024] levothyroxine (SYNTHROID, LEVOTHROID) tablet 88 mcg, 88 mcg, Oral, Q AM, Carolin Hatfield MD    lubiprostone (AMITIZA) capsule 8 mcg, 8 mcg, Oral, BID, Carolin Hatfield MD    Magnesium Standard Dose Replacement - Follow Nurse / BPA Driven Protocol, , Does not apply, Alysia MARQUEZ Tamera D, MD    ondansetron ODT (ZOFRAN-ODT) disintegrating tablet 4 mg, 4 mg, Oral, Q6H PRN **OR** ondansetron (ZOFRAN) injection 4 mg, 4 mg, Intravenous, Q6H PRN, Carolin Hatfield MD    [START ON 7/12/2024] pantoprazole (PROTONIX) EC tablet 40 mg, 40 mg, Oral, Q AM, Carolin Hatfield MD    [START ON 7/12/2024] PARoxetine (PAXIL) tablet 40 mg, 40 mg, Oral, QAM, Carolin Hatfield MD    Phosphorus Replacement - Follow Nurse / BPA Driven Protocol, , Does not apply, Alysia MARQUEZ Tamera D, MD    Potassium Replacement - Follow Nurse / BPA Driven Protocol, , Does not apply, Alysia MARQUEZ Tamera D, MD    sodium chloride 0.9 % bolus 1,000 mL, 1,000 mL, Intravenous, Once, Jelly Dunne APRN, Last Rate: 1,000 mL/hr at 07/11/24 1523, 1,000 mL at 07/11/24 1523    sodium chloride 0.9 % flush 10 mL, 10 mL, Intravenous, PRN, Jelly Dunne, ALEXANDER    sodium chloride 0.9 % flush 10 mL, 10 mL, Intravenous, Q12H, Carolin Hatfield MD    sodium chloride 0.9 % flush 10 mL, 10 mL, Intravenous, PRN, Carolin Hatfield MD    sodium chloride 0.9 % infusion 40 mL, 40 mL, Intravenous, PRN, Carolin Hatfield MD    sodium chloride 0.9 % infusion, 75 mL/hr, Intravenous, Continuous, Alysia,  Carolin GOMES MD    topiramate (TOPAMAX) tablet 50 mg, 50 mg, Oral, Daily, Carolin Hatfield MD    Current Outpatient Medications:     apixaban (ELIQUIS) 5 MG tablet tablet, Take 5 mg by mouth 2 (Two) Times a Day. Indications: Atrial Fibrillation, Disp: , Rfl:     aspirin-acetaminophen-caffeine (Excedrin Migraine) 250-250-65 MG per tablet, Take 2 tablets by mouth Every 8 (Eight) Hours As Needed for Headache. Indications: Headache, Disp: , Rfl:     atorvastatin (LIPITOR) 80 MG tablet, Take 1 tablet by mouth Daily. Indications: High Amount of Fats in the Blood, Disp: , Rfl:     cephalexin (Keflex) 500 MG capsule, Take 1 capsule by mouth 4 (Four) Times a Day. (Patient not taking: Reported on 6/25/2024), Disp: 20 capsule, Rfl: 0    digoxin (LANOXIN) 125 MCG tablet, Take 1 tablet by mouth Daily., Disp: 30 tablet, Rfl: 3    FLUCONAZOLE 100MG/5ML SUSP, Take 200 mg by mouth Daily. Indications: Urinary Tract Infection, Disp: , Rfl:     levothyroxine (SYNTHROID, LEVOTHROID) 88 MCG tablet, Take 1 tablet by mouth Every Morning., Disp: 30 tablet, Rfl: 1    linaclotide (LINZESS) 72 MCG capsule capsule, Take 1 capsule by mouth As Needed (Irritable Bowel Syndrome). Indications: Constipation caused by Irritable Bowel Syndrome, Disp: , Rfl:     metoprolol succinate XL (TOPROL-XL) 25 MG 24 hr tablet, Take 1 tablet by mouth Every 12 (Twelve) Hours., Disp: 60 tablet, Rfl: 3    pantoprazole (PROTONIX) 40 MG EC tablet, Take 1 tablet by mouth Every Morning., Disp: 30 tablet, Rfl: 0    PARoxetine (PAXIL) 40 MG tablet, Take 1 tablet by mouth Every Morning. Indications: Major Depressive Disorder, Disp: , Rfl:     topiramate (TOPAMAX) 50 MG tablet, Take 1 tablet by mouth Daily. Indications: Migraine Headache, Disp: , Rfl:       Objective     Results Review:  LABS:  Results from last 7 days   Lab Units 07/11/24  1114   WBC 10*3/mm3 15.22*   HEMOGLOBIN g/dL 11.9*   HEMATOCRIT % 38.3   PLATELETS 10*3/mm3 316     Results from last 7 days   Lab Units  07/11/24  1114   SODIUM mmol/L 135*   POTASSIUM mmol/L 3.7   CHLORIDE mmol/L 101   CO2 mmol/L 18.5*   BUN mg/dL 8   CREATININE mg/dL 1.13*   CALCIUM mg/dL 10.0   BILIRUBIN mg/dL 0.6   ALK PHOS U/L 155*   ALT (SGPT) U/L 19   AST (SGOT) U/L 48*   GLUCOSE mg/dL 86         DIAGNOSTICS:  CT THORACIC SPINE WO CONTRAST    Date of Exam: 7/11/2024 12:59 PM EDT    Indication: fall.    Comparison: Plain films of 9823.    Technique: Axial CT images were obtained of the thoracic spine without contrast administration.  Sagittal and coronal reconstructions were performed.  Automated exposure control and iterative reconstruction methods were used.   ...   Impression   Compression deformity of T10 appears most likely acute. No evidence of compromise of spinal canal contents. Old wedging of T7 and T8.      CT Lumbar Spine Without Contrast Final result 7/11/2024          Narrative   CT LUMBAR SPINE WO CONTRAST    Date of Exam: 7/11/2024 12:59 PM EDT    Indication: fall.    Comparison: Plain films of 9/8/2023.    Technique: Axial CT images were obtained of the lumbar spine without contrast administration.  Sagittal and coronal reconstructions were performed.  Automated exposure control and iterative reconstruction methods were used.   ...   Impression   Impression:  Old wedging of L1. Mild concave wedging of the superior aspect of L5 has occurred since the prior plain film of 9/8/2023 although is overall very indeterminate age. Degenerative disc disease as detailed with areas of spinal stenosis and neural foraminal  encroachment as outlined above.         CT Cervical Spine Without Contrast Final result 7/11/2024          Narrative     CT CERVICAL SPINE WO CONTRAST    Date of Exam: 7/11/2024 12:59 PM EDT    Indication: fall.    Comparison: None available.    Technique: Axial CT images were obtained of the cervical spine without contrast administration.  Sagittal and coronal reconstructions were performed.  Automated exposure control and  iterative reconstruction methods were used. ...   Impression   Impression:  Chronic findings. No acute process.        CT HEAD WO CONTRAST     Date of Exam: 7/11/2024 12:59 PM EDT     Indication: fall and contusion on back of head.     Comparison: 5/16/2024.     Technique: Axial CT images were obtained of the head without contrast administration.  Coronal reconstructions were performed.  Automated exposure control and iterative reconstruction methods were used.        Findings:  There is moderately severe atrophy. Mild ventricular dilatation is compatible with atrophy. There is no acute mass effect or edema. There is no acute CVA or hemorrhage. Mild periventricular hypodensity likely relates to chronic small vessel ischemic   insult. There is a stable calcification in the right evens. Visualized portions of paranasal sinuses are clear.     IMPRESSION:  Impression:  Chronic findings. No acute process.          Results Review:   I reviewed the patient's new clinical results.  I personally viewed patient's chart and imaging    Vital Signs   Temp:  [98.9 °F (37.2 °C)-99.2 °F (37.3 °C)] 99.2 °F (37.3 °C)  Heart Rate:  [59-89] 60  Resp:  [16-20] 20  BP: ()/(32-86) 108/86    Physical Exam:  Physical Exam  Vitals reviewed.   Musculoskeletal:         General: Normal range of motion.      Cervical back: Normal range of motion.      Comments: Had right shoulder replaced, has difficulty lifting her right arm but has good strength.   Skin:     General: Skin is warm and dry.   Neurological:      General: No focal deficit present.      Mental Status: She is alert and oriented to person, place, and time.      Cranial Nerves: Cranial nerves 2-12 are intact.   Psychiatric:         Mood and Affect: Mood normal.         Speech: Speech normal.       Neurologic Exam     Mental Status   Oriented to person, place, and time.   Speech: speech is normal   Level of consciousness: alert  Knowledge: good.     Cranial Nerves   Cranial nerves  II through XII intact.     Motor Exam   Muscle bulk: normal  Right arm tone: normal  Left arm tone: normal  Right leg tone: normal  Left leg tone: normal    Strength   Right deltoid: 5/5  Left deltoid: 5/5  Right biceps: 5/5  Left biceps: 5/5  Right triceps: 5/5  Left triceps: 5/5  Right wrist flexion: 5/5  Left wrist flexion: 5/5  Right wrist extension: 5/5  Left wrist extension: 5/5  Right quadriceps: 5/5  Left quadriceps: 5/5  Right hamstrin/5  Left hamstrin/5  Right anterior tibial: 5/5  Left anterior tibial: 5/5  Right posterior tibial: 5/5  Left posterior tibial: 5/5    Sensory Exam   Light touch normal.     Gait, Coordination, and Reflexes Not tested       Assessment & Plan       Hypotension      Problem List Items Addressed This Visit          Cardiac and Vasculature    * (Principal) Hypotension     Other Visit Diagnoses       Multiple falls    -  Primary    Generalized weakness        Closed wedge compression fracture of T10 vertebra, initial encounter        Leukocytosis, unspecified type                 COMORBID CONDITIONS:  COPD and Hypertension    Patient is a 68-year-old female who presents to [facility] after falling.  Patient had multiple imaging done including CT thoracic spine showing compression deformity of T10 with no evidence of compromise of the spinal cord.  CT of the lumbar spine shows old wedging of L1 and mild concave wedging of the severe aspect of L5 that has been on films since 2023.  CT cervical spine that shows chronic findings and no acute process.  CT head shows chronic findings and no acute process.  Patient has expected back pain with compression fracture but has full strength on physical exam.  Patient is neurologically intact and has no red flags at this time.  There is no surgical intervention required at this time.  Yes patient about previous DEXA scans and she says she had 1 back in  where she was diagnosed with osteoporosis but denies being on any  "treatment.  I recommended to the patient that the next time she sees her primary care provider she starts on medication due to her poor bone quality.  Patient is to follow-up in 6 weeks in clinic with x-rays of her thoracic spine.  Patient is to also wear TLSO brace whenever out of bed.  Patient is agreeable and understands.  Neurosurgery signed at this time.  Please let me know if you have any questions or concerns.    PLAN: T10 compression fracture  -TLSO brace whenever out of bed  -Follow-up in 6 weeks with thoracic x-ray    I discussed the patient's findings and my recommendations with patient, family, and Dr. Riddhi Christensen PA-C  07/11/24  16:21 EDT    \"Dictated utilizing Dragon dictation\".      "

## 2024-07-11 NOTE — SIGNIFICANT NOTE
07/11/24 1539   OTHER   Discipline physical therapist   Rehab Time/Intention   Session Not Performed unable to evaluate, medical status change  (Acute T10 compression fracture, awaiting Neurosurgery consult. PT will follow-up tomorrow to complete evaluation.)   Therapy Assessment/Plan (PT)   Criteria for Skilled Interventions Met (PT) yes;meets criteria   Recommendation   PT - Next Appointment 07/12/24

## 2024-07-12 ENCOUNTER — HOME CARE VISIT (OUTPATIENT)
Dept: HOME HEALTH SERVICES | Facility: HOME HEALTHCARE | Age: 69
End: 2024-07-12
Payer: MEDICARE

## 2024-07-12 LAB
ANION GAP SERPL CALCULATED.3IONS-SCNC: 9.4 MMOL/L (ref 5–15)
BASOPHILS # BLD AUTO: 0.1 10*3/MM3 (ref 0–0.2)
BASOPHILS NFR BLD AUTO: 1.2 % (ref 0–1.5)
BUN SERPL-MCNC: 6 MG/DL (ref 8–23)
BUN/CREAT SERPL: 6.5 (ref 7–25)
CALCIUM SPEC-SCNC: 8.2 MG/DL (ref 8.6–10.5)
CANCER AG19-9 SERPL-ACNC: 6.8 U/ML
CHLORIDE SERPL-SCNC: 111 MMOL/L (ref 98–107)
CO2 SERPL-SCNC: 18.6 MMOL/L (ref 22–29)
CREAT SERPL-MCNC: 0.93 MG/DL (ref 0.57–1)
DEPRECATED RDW RBC AUTO: 60.1 FL (ref 37–54)
EGFRCR SERPLBLD CKD-EPI 2021: 66.7 ML/MIN/1.73
EOSINOPHIL # BLD AUTO: 0.25 10*3/MM3 (ref 0–0.4)
EOSINOPHIL NFR BLD AUTO: 3 % (ref 0.3–6.2)
ERYTHROCYTE [DISTWIDTH] IN BLOOD BY AUTOMATED COUNT: 16.1 % (ref 12.3–15.4)
GLUCOSE BLDC GLUCOMTR-MCNC: 106 MG/DL (ref 70–105)
GLUCOSE BLDC GLUCOMTR-MCNC: 94 MG/DL (ref 70–105)
GLUCOSE SERPL-MCNC: 94 MG/DL (ref 65–99)
HCT VFR BLD AUTO: 33.2 % (ref 34–46.6)
HGB BLD-MCNC: 9.8 G/DL (ref 12–15.9)
IMM GRANULOCYTES # BLD AUTO: 0.05 10*3/MM3 (ref 0–0.05)
IMM GRANULOCYTES NFR BLD AUTO: 0.6 % (ref 0–0.5)
LYMPHOCYTES # BLD AUTO: 0.78 10*3/MM3 (ref 0.7–3.1)
LYMPHOCYTES NFR BLD AUTO: 9.3 % (ref 19.6–45.3)
MAGNESIUM SERPL-MCNC: 1.7 MG/DL (ref 1.6–2.4)
MCH RBC QN AUTO: 29.4 PG (ref 26.6–33)
MCHC RBC AUTO-ENTMCNC: 29.5 G/DL (ref 31.5–35.7)
MCV RBC AUTO: 99.7 FL (ref 79–97)
MONOCYTES # BLD AUTO: 0.41 10*3/MM3 (ref 0.1–0.9)
MONOCYTES NFR BLD AUTO: 4.9 % (ref 5–12)
NEUTROPHILS NFR BLD AUTO: 6.8 10*3/MM3 (ref 1.7–7)
NEUTROPHILS NFR BLD AUTO: 81 % (ref 42.7–76)
NRBC BLD AUTO-RTO: 0 /100 WBC (ref 0–0.2)
PLATELET # BLD AUTO: 192 10*3/MM3 (ref 140–450)
PMV BLD AUTO: 10.4 FL (ref 6–12)
POTASSIUM SERPL-SCNC: 3.3 MMOL/L (ref 3.5–5.2)
POTASSIUM SERPL-SCNC: 4.1 MMOL/L (ref 3.5–5.2)
QT INTERVAL: 432 MS
QTC INTERVAL: 458 MS
RBC # BLD AUTO: 3.33 10*6/MM3 (ref 3.77–5.28)
SODIUM SERPL-SCNC: 139 MMOL/L (ref 136–145)
WBC NRBC COR # BLD AUTO: 8.39 10*3/MM3 (ref 3.4–10.8)

## 2024-07-12 PROCEDURE — 97166 OT EVAL MOD COMPLEX 45 MIN: CPT

## 2024-07-12 PROCEDURE — 25810000003 DEXTROSE 5 % AND SODIUM CHLORIDE 0.9 % 5-0.9 % SOLUTION: Performed by: FAMILY MEDICINE

## 2024-07-12 PROCEDURE — 94660 CPAP INITIATION&MGMT: CPT

## 2024-07-12 PROCEDURE — 94761 N-INVAS EAR/PLS OXIMETRY MLT: CPT

## 2024-07-12 PROCEDURE — 94799 UNLISTED PULMONARY SVC/PX: CPT

## 2024-07-12 PROCEDURE — 83735 ASSAY OF MAGNESIUM: CPT | Performed by: FAMILY MEDICINE

## 2024-07-12 PROCEDURE — 80048 BASIC METABOLIC PNL TOTAL CA: CPT | Performed by: FAMILY MEDICINE

## 2024-07-12 PROCEDURE — 85025 COMPLETE CBC W/AUTO DIFF WBC: CPT | Performed by: FAMILY MEDICINE

## 2024-07-12 PROCEDURE — 63710000001 ONDANSETRON ODT 4 MG TABLET DISPERSIBLE: Performed by: FAMILY MEDICINE

## 2024-07-12 PROCEDURE — 82948 REAGENT STRIP/BLOOD GLUCOSE: CPT

## 2024-07-12 PROCEDURE — 84132 ASSAY OF SERUM POTASSIUM: CPT | Performed by: INTERNAL MEDICINE

## 2024-07-12 PROCEDURE — 97162 PT EVAL MOD COMPLEX 30 MIN: CPT

## 2024-07-12 PROCEDURE — 99222 1ST HOSP IP/OBS MODERATE 55: CPT | Performed by: INTERNAL MEDICINE

## 2024-07-12 PROCEDURE — 86301 IMMUNOASSAY TUMOR CA 19-9: CPT | Performed by: FAMILY MEDICINE

## 2024-07-12 RX ORDER — IBUPROFEN 400 MG/1
400 TABLET ORAL EVERY 6 HOURS PRN
Status: DISCONTINUED | OUTPATIENT
Start: 2024-07-12 | End: 2024-07-13 | Stop reason: HOSPADM

## 2024-07-12 RX ORDER — POTASSIUM CHLORIDE 20 MEQ/1
40 TABLET, EXTENDED RELEASE ORAL EVERY 4 HOURS
Qty: 4 TABLET | Refills: 0 | Status: COMPLETED | OUTPATIENT
Start: 2024-07-12 | End: 2024-07-12

## 2024-07-12 RX ADMIN — HYDROCODONE BITARTRATE AND ACETAMINOPHEN 1 TABLET: 5; 325 TABLET ORAL at 15:42

## 2024-07-12 RX ADMIN — DEXTROSE AND SODIUM CHLORIDE 75 ML/HR: 5; 900 INJECTION, SOLUTION INTRAVENOUS at 10:56

## 2024-07-12 RX ADMIN — HYDROCODONE BITARTRATE AND ACETAMINOPHEN 1 TABLET: 5; 325 TABLET ORAL at 21:58

## 2024-07-12 RX ADMIN — LUBIPROSTONE 8 MCG: 8 CAPSULE, GELATIN COATED ORAL at 00:55

## 2024-07-12 RX ADMIN — LEVOTHYROXINE SODIUM 88 MCG: 0.09 TABLET ORAL at 06:23

## 2024-07-12 RX ADMIN — IBUPROFEN 400 MG: 400 TABLET, FILM COATED ORAL at 12:25

## 2024-07-12 RX ADMIN — POTASSIUM CHLORIDE 40 MEQ: 1500 TABLET, EXTENDED RELEASE ORAL at 09:13

## 2024-07-12 RX ADMIN — DEXTROSE AND SODIUM CHLORIDE 75 ML/HR: 5; 900 INJECTION, SOLUTION INTRAVENOUS at 21:59

## 2024-07-12 RX ADMIN — APIXABAN 5 MG: 5 TABLET, FILM COATED ORAL at 09:13

## 2024-07-12 RX ADMIN — APIXABAN 5 MG: 5 TABLET, FILM COATED ORAL at 20:45

## 2024-07-12 RX ADMIN — PANTOPRAZOLE SODIUM 40 MG: 40 TABLET, DELAYED RELEASE ORAL at 06:23

## 2024-07-12 RX ADMIN — LUBIPROSTONE 8 MCG: 8 CAPSULE, GELATIN COATED ORAL at 09:12

## 2024-07-12 RX ADMIN — ONDANSETRON 4 MG: 4 TABLET, ORALLY DISINTEGRATING ORAL at 12:23

## 2024-07-12 RX ADMIN — LUBIPROSTONE 8 MCG: 8 CAPSULE, GELATIN COATED ORAL at 20:45

## 2024-07-12 RX ADMIN — ATORVASTATIN CALCIUM 80 MG: 40 TABLET, FILM COATED ORAL at 09:13

## 2024-07-12 RX ADMIN — Medication 10 ML: at 09:13

## 2024-07-12 RX ADMIN — DIGOXIN 125 MCG: 125 TABLET ORAL at 12:23

## 2024-07-12 RX ADMIN — POTASSIUM CHLORIDE 40 MEQ: 1500 TABLET, EXTENDED RELEASE ORAL at 12:23

## 2024-07-12 RX ADMIN — PAROXETINE HYDROCHLORIDE 40 MG: 20 TABLET, FILM COATED ORAL at 09:13

## 2024-07-12 NOTE — H&P
Patient Care Team:  Luan Joseph APRN as PCP - General (Family Medicine)  Jozef Otero MD as Consulting Physician (Nephrology)    Chief complaint frequent falls    Subjective     Patient is a 68 y.o. female with history of CHF, CAD, AAA, hyperlipidemia, sleep apnea who presented to the ER with reports of multiple falls over the last 1-2 weeks. She reports she was recently hospitalized at [facility] for UTI and the falls have been happening since she has been home. She did fall this morning and hit her head. She is now complaining of increased back pain, although she does have chronic back pain this is worse than her baseline. She reports general weakness all over, no focal deficits. She denies any fever, chills, chest pain, shortness of breath.   In the ER CT head and cervical spine with no acute findings, CT thoracic spine significant for compression T10 fracture, EKG sinus rhythm rate 70, troponin 20. Blood pressure was low in the ER and was given sepsis bolus with minimal improvement and was given another 1L NS with improvement to 108/68.       Onset of symptoms was ongoing    Review of Systems   Constitutional:  Positive for activity change.   HENT: Negative.     Eyes: Negative.    Respiratory: Negative.     Cardiovascular: Negative.    Gastrointestinal: Negative.    Endocrine: Negative.    Musculoskeletal:  Positive for back pain and gait problem.   Neurological:  Positive for weakness and light-headedness.   Psychiatric/Behavioral: Negative.            History  Past Medical History:   Diagnosis Date    AAA (abdominal aortic aneurysm)     infrarenal- 3.1cm(2010, 3.7x4.2cm(2016), 3.9cm (2017)    Allergic rhinitis     Aspiration pneumonia 05/2017    CHF (congestive heart failure)     Coronary artery disease     DDD (degenerative disc disease), lumbar     lumbar spine- Dr. Churchill     Depression     Diverticulosis     Frequent UTI     Dr. Daniels    GERD (gastroesophageal reflux disease)      Hiatal hernia     HSV (herpes simplex virus) infection     Oral    Hyperlipidemia     IBS (irritable bowel syndrome)     Constipation predominant    Ischemic cardiomyopathy 09/2017    AICD     Kidney stones     NSTEMI (non-ST elevated myocardial infarction) 04/26/2017    Obstructive sleep apnea 2011    nfsg 2011, ahi 67    Osteoarthritis     Osteopenia     Peripheral neuropathy     feet    Pulmonary embolism, bilateral 05/2017    Rotator cuff tear     Bilateral- Ortho      Past Surgical History:   Procedure Laterality Date    CARDIAC CATHETERIZATION  04/26/2017    99% mid LAD. 90% mid LCX. 60% RCA. Recommended CABG. Dr. Diaz    CARDIAC DEFIBRILLATOR PLACEMENT  12/26/2017    ICD implant/ Dr. Ellis    CATARACT EXTRACTION      CORONARY ARTERY BYPASS GRAFT  04/26/2017    x4 & ASD Closure    CYSTOSCOPY  2002    negative. Dr. Daniels    LAPAROSCOPIC CHOLECYSTECTOMY  04/17/2013    For biliary dyskinesia. Dr. Mccoy.     REPLACEMENT TOTAL KNEE BILATERAL  11/2004    Dr. Herrera    THORACENTESIS Left 05/08/2017    TONSILLECTOMY      TUBAL ABDOMINAL LIGATION Bilateral      Family History   Problem Relation Age of Onset    Heart disease Mother     Other Mother         Hypoglycemia    Osteoporosis Mother     COPD Father     Stroke Father     Hypertension Brother     Diabetes Brother     Heart disease Brother      Social History     Tobacco Use    Smoking status: Never   Vaping Use    Vaping status: Some Days    Substances: CBD, nightly, 2-3 weekly   Substance Use Topics    Alcohol use: Never    Drug use: Never     Medications Prior to Admission   Medication Sig Dispense Refill Last Dose    apixaban (ELIQUIS) 5 MG tablet tablet Take 1 tablet by mouth 2 (Two) Times a Day. Indications: Atrial Fibrillation       aspirin-acetaminophen-caffeine (Excedrin Migraine) 250-250-65 MG per tablet Take 2 tablets by mouth Every 8 (Eight) Hours As Needed for Headache. Indications: Headache       atorvastatin (LIPITOR) 80 MG tablet Take 1  tablet by mouth Daily. Indications: High Amount of Fats in the Blood       cephalexin (Keflex) 500 MG capsule Take 1 capsule by mouth 4 (Four) Times a Day. (Patient not taking: Reported on 6/25/2024) 20 capsule 0     digoxin (LANOXIN) 125 MCG tablet Take 1 tablet by mouth Daily. 30 tablet 3     FLUCONAZOLE 100MG/5ML SUSP Take 200 mg by mouth Daily. Indications: Urinary Tract Infection       levothyroxine (SYNTHROID, LEVOTHROID) 88 MCG tablet Take 1 tablet by mouth Every Morning. 30 tablet 1     linaclotide (LINZESS) 72 MCG capsule capsule Take 1 capsule by mouth As Needed (Irritable Bowel Syndrome). Indications: Constipation caused by Irritable Bowel Syndrome       metoprolol succinate XL (TOPROL-XL) 25 MG 24 hr tablet Take 1 tablet by mouth Every 12 (Twelve) Hours. 60 tablet 3     pantoprazole (PROTONIX) 40 MG EC tablet Take 1 tablet by mouth Every Morning. 30 tablet 0     PARoxetine (PAXIL) 40 MG tablet Take 1 tablet by mouth Every Morning. Indications: Major Depressive Disorder       topiramate (TOPAMAX) 50 MG tablet Take 1 tablet by mouth Daily. Indications: Migraine Headache        Allergies:  Oxytetracycline and Oxycodone    Objective     Vital Signs  Temp:  [98.1 °F (36.7 °C)-99.2 °F (37.3 °C)] 98.3 °F (36.8 °C)  Heart Rate:  [59-89] 68  Resp:  [16-20] 20  BP: ()/(32-86) 92/55     Physical Exam:      General Appearance:    Alert, cooperative, ill appearing   Head:    Normocephalic, without obvious abnormality, atraumatic   Eyes:            Lids and lashes normal, conjunctivae and sclerae normal, no   icterus, no pallor, corneas clear, PERRLA   Ears:    Ears appear intact with no abnormalities noted   Throat:   No oral lesions, no thrush, oral mucosa moist   Neck:   No adenopathy, supple, trachea midline, no thyromegaly, no   carotid bruit, no JVD   Lungs:     Clear to auscultation,respirations regular, even and                  unlabored    Heart:    Regular rhythm and normal rate, normal S1 and S2, no             murmur, no gallop, no rub, no click   Chest Wall:    No abnormalities observed   Abdomen:     Normal bowel sounds, no masses, no organomegaly, soft        non-tender, non-distended, no guarding, no rebound                tenderness   Extremities:   Moves all extremities well, no edema, no cyanosis, no             redness   Pulses:   Pulses palpable and equal bilaterally   Skin:   No bleeding, bruising or rash   Lymph nodes:   No palpable adenopathy   Neurologic:   No focal deficits noted, general weakness       Results Review:     Imaging Results (Last 24 Hours)       Procedure Component Value Units Date/Time    CT Thoracic Spine Without Contrast [[id number]] Collected: 07/11/24 1327     Updated: 07/11/24 1334    Narrative:      CT THORACIC SPINE WO CONTRAST    Date of Exam: 7/11/2024 12:59 PM EDT    Indication: fall.    Comparison: Plain films of 9823.    Technique: Axial CT images were obtained of the thoracic spine without contrast administration.  Sagittal and coronal reconstructions were performed.  Automated exposure control and iterative reconstruction methods were used.      Findings:  There is osteoporosis. There is marked diffuse flattening of T10 which has occurred since the prior plain films. The bone is sclerotic and irregular and some fracture fragments are displaced anteriorly. The fracture site appears most likely acute. There   is no extension of fracture into the posterior elements. There is minimal retropulsion of T10 resulting in mild relative spinal stenosis at this level. There is old moderately severe wedging of T7 and T8. There is accentuation of kyphotic curvature   centered at T10. Remaining thoracic vertebral body heights are maintained.      Impression:      Compression deformity of T10 appears most likely acute. No evidence of compromise of spinal canal contents. Old wedging of T7 and T8.      Electronically Signed: Monica George MD    7/11/2024 1:32 PM EDT    Workstation ID:  [id number]    CT Lumbar Spine Without Contrast [[id number]] Collected: 07/11/24 1322     Updated: 07/11/24 1329    Narrative:      CT LUMBAR SPINE WO CONTRAST    Date of Exam: 7/11/2024 12:59 PM EDT    Indication: fall.    Comparison: Plain films of 9/8/2023.    Technique: Axial CT images were obtained of the lumbar spine without contrast administration.  Sagittal and coronal reconstructions were performed.  Automated exposure control and iterative reconstruction methods were used.      Findings:  Moderately severe biconcave wedging of L1 is stable as compared to the prior plain films. There is new mild concave central wedging of the superior endplate of L5. L2-L4 vertebral body heights are maintained. There is stable mild grade 1   spondylolisthesis of L4 on L5. There is osteoporosis. There is disc bulge at L2-3 with moderate right neural foraminal stenosis and mild to moderate spinal stenosis. There is disc bulge with facet joint hypertrophy at L3-4 resulting in moderate degree of   spinal stenosis. There is disc bulge with facet joint hypertrophy at L4-5 with moderately severe spinal stenosis and left neural foraminal stenosis. There is no significant spinal stenosis at L5-S1.      Impression:      Impression:  Old wedging of L1. Mild concave wedging of the superior aspect of L5 has occurred since the prior plain film of 9/8/2023 although is overall very indeterminate age. Degenerative disc disease as detailed with areas of spinal stenosis and neural foraminal   encroachment as outlined above.        Electronically Signed: Monica George MD    7/11/2024 1:27 PM EDT    Workstation ID: [id number]    CT Cervical Spine Without Contrast [[id number]] Collected: 07/11/24 1317     Updated: 07/11/24 1323    Narrative:        CT CERVICAL SPINE WO CONTRAST    Date of Exam: 7/11/2024 12:59 PM EDT    Indication: fall.    Comparison: None available.    Technique: Axial CT images were obtained of the cervical spine without contrast  administration.  Sagittal and coronal reconstructions were performed.  Automated exposure control and iterative reconstruction methods were used.      Findings:  There are normal vertebral body heights and disc heights. There is normal alignment. There are no fractures. There is facet joint hypertrophy resulting in moderate left and mild right neural foraminal stenosis at C3-4 and C4-5. There is moderately severe   left neural foraminal stenosis at C5-6 and mild left neural foraminal stenosis at C6/7.    Impression:      Impression:  Chronic findings. No acute process.      Electronically Signed: Monica George MD    7/11/2024 1:21 PM EDT    Workstation ID: [id number]    CT Head Without Contrast [[id number]] Collected: 07/11/24 1315     Updated: 07/11/24 1319    Narrative:      CT HEAD WO CONTRAST    Date of Exam: 7/11/2024 12:59 PM EDT    Indication: fall and contusion on back of head.    Comparison: 5/16/2024.    Technique: Axial CT images were obtained of the head without contrast administration.  Coronal reconstructions were performed.  Automated exposure control and iterative reconstruction methods were used.      Findings:  There is moderately severe atrophy. Mild ventricular dilatation is compatible with atrophy. There is no acute mass effect or edema. There is no acute CVA or hemorrhage. Mild periventricular hypodensity likely relates to chronic small vessel ischemic   insult. There is a stable calcification in the right evens. Visualized portions of paranasal sinuses are clear.      Impression:      Impression:  Chronic findings. No acute process.      Electronically Signed: Monica George MD    7/11/2024 1:16 PM EDT    Workstation ID: [id number]    XR Chest 1 View [[id number]] Collected: 07/11/24 1151     Updated: 07/11/24 1154    Narrative:      XR CHEST 1 VW    Date of Exam: 7/11/2024 11:45 AM EDT    Indication: Simple Sepsis Protocol    Comparison: Chest x-ray dated 5/29/2024    Findings:  Patient is status  post median sternotomy and CABG. Left chest wall pacemaker is present. There is mild left basilar atelectasis/scarring. Lungs are otherwise adequately aerated. No significant pleural effusion or pneumothorax. Cardiac silhouette is   unremarkable. No acute osseous lesion is seen. There are senescent changes of the skeletal structures. Bones are demineralized. Right shoulder arthroplasty is noted. Abdominal aortic stent graft is partially visualized.      Impression:      Impression:  1.No acute radiographic abnormality is identified.      Electronically Signed: Roberto Huddleston MD    7/11/2024 11:52 AM EDT    Workstation ID: [id number]             Lab Results (last 24 hours)       Procedure Component Value Units Date/Time    Reticulocytes [[id number]]  (Normal) Collected: 07/11/24 1938    Specimen: Blood Updated: 07/11/24 2037     Reticulocyte % 1.54 %      Reticulocyte Absolute 0.0462 10*6/mm3     CBC & Differential [[id number]]  (Abnormal) Collected: 07/11/24 1938    Specimen: Blood Updated: 07/11/24 1947    Narrative:      The following orders were created for panel order CBC & Differential.  Procedure                               Abnormality         Status                     ---------                               -----------         ------                     CBC Auto Differential[[id number]]        Abnormal            Final result                 Please view results for these tests on the individual orders.    CBC Auto Differential [[id number]]  (Abnormal) Collected: 07/11/24 1938    Specimen: Blood Updated: 07/11/24 1947     WBC 7.60 10*3/mm3      RBC 2.97 10*6/mm3      Hemoglobin 8.8 g/dL      Comment: Result checked          Hematocrit 29.7 %      .0 fL      MCH 29.6 pg      MCHC 29.6 g/dL      RDW 15.9 %      RDW-SD 59.1 fl      MPV 10.2 fL      Platelets 189 10*3/mm3      Neutrophil % 82.9 %      Lymphocyte % 8.9 %      Monocyte % 4.6 %      Eosinophil % 1.7 %      Basophil % 1.2 %      Immature Grans %  0.7 %      Neutrophils, Absolute 6.30 10*3/mm3      Lymphocytes, Absolute 0.68 10*3/mm3      Monocytes, Absolute 0.35 10*3/mm3      Eosinophils, Absolute 0.13 10*3/mm3      Basophils, Absolute 0.09 10*3/mm3      Immature Grans, Absolute 0.05 10*3/mm3      nRBC 0.0 /100 WBC     POC Glucose Once [[id number]]  (Normal) Collected: 07/11/24 1927    Specimen: Blood Updated: 07/11/24 1929     Glucose 80 mg/dL      Comment: Serial Number: [id number]:  [id number]       POC Glucose Once [[id number]]  (Abnormal) Collected: 07/11/24 1904    Specimen: Blood Updated: 07/11/24 1906     Glucose 65 mg/dL      Comment: Serial Number: [id number]:  [id number]       Magnesium [[id number]]  (Normal) Collected: 07/11/24 1810    Specimen: Blood Updated: 07/11/24 1854     Magnesium 1.6 mg/dL     Basic Metabolic Panel [[id number]]  (Abnormal) Collected: 07/11/24 1810    Specimen: Blood Updated: 07/11/24 1854     Glucose 69 mg/dL      BUN 7 mg/dL      Creatinine 0.90 mg/dL      Sodium 137 mmol/L      Potassium 3.8 mmol/L      Chloride 110 mmol/L      CO2 17.2 mmol/L      Calcium 7.9 mg/dL      BUN/Creatinine Ratio 7.8     Anion Gap 9.8 mmol/L      eGFR 69.3 mL/min/1.73     Narrative:      GFR Normal >60  Chronic Kidney Disease <60  Kidney Failure <15      Protime-INR [[id number]]  (Abnormal) Collected: 07/11/24 1114    Specimen: Blood Updated: 07/11/24 1407     Protime 11.4 Seconds      INR 1.05    Single High Sensitivity Troponin T [[id number]]  (Abnormal) Collected: 07/11/24 1114    Specimen: Blood Updated: 07/11/24 1246     HS Troponin T 20 ng/L     Narrative:      High Sensitive Troponin T Reference Range:  <14.0 ng/L- Negative Female for AMI  <22.0 ng/L- Negative Male for AMI  >=14 - Abnormal Female indicating possible myocardial injury.  >=22 - Abnormal Male indicating possible myocardial injury.   Clinicians would have to utilize clinical acumen, EKG, Troponin, and serial changes to determine if it is an Acute Myocardial  Infarction or myocardial injury due to an underlying chronic condition.         Digoxin Level [[id number]]  (Normal) Collected: 07/11/24 1114    Specimen: Blood Updated: 07/11/24 1246     Digoxin 0.60 ng/mL     Respiratory Panel PCR w/COVID-19(SARS-CoV-2) MELISSA/NATO/LISA/PAD/COR/LUCHO In-House, NP Swab in UTM/VTM, 2 HR TAT - Swab, Nasopharynx [[id number]]  (Normal) Collected: 07/11/24 1118    Specimen: Swab from Nasopharynx Updated: 07/11/24 1215     ADENOVIRUS, PCR Not Detected     Coronavirus 229E Not Detected     Coronavirus HKU1 Not Detected     Coronavirus NL63 Not Detected     Coronavirus OC43 Not Detected     COVID19 Not Detected     Human Metapneumovirus Not Detected     Human Rhinovirus/Enterovirus Not Detected     Influenza A PCR Not Detected     Influenza B PCR Not Detected     Parainfluenza Virus 1 Not Detected     Parainfluenza Virus 2 Not Detected     Parainfluenza Virus 3 Not Detected     Parainfluenza Virus 4 Not Detected     RSV, PCR Not Detected     Bordetella pertussis pcr Not Detected     Bordetella parapertussis PCR Not Detected     Chlamydophila pneumoniae PCR Not Detected     Mycoplasma pneumo by PCR Not Detected    Narrative:      In the setting of a positive respiratory panel with a viral infection PLUS a negative procalcitonin without other underlying concern for bacterial infection, consider observing off antibiotics or discontinuation of antibiotics and continue supportive care. If the respiratory panel is positive for atypical bacterial infection (Bordetella pertussis, Chlamydophila pneumoniae, or Mycoplasma pneumoniae), consider antibiotic de-escalation to target atypical bacterial infection.    Blood Culture - Blood, Arm, Left [[id number]] Collected: 07/11/24 1153    Specimen: Blood from Arm, Left Updated: 07/11/24 1204    Urinalysis, Microscopic Only - Straight Cath [[id number]]  (Abnormal) Collected: 07/11/24 1115    Specimen: Urine from Straight Cath Updated: 07/11/24 1157     RBC, UA 0-2  /HPF      WBC, UA 3-5 /HPF      Comment: Urine culture not indicated.        Bacteria, UA None Seen /HPF      Squamous Epithelial Cells, UA None Seen /HPF      Yeast, UA Small/1+ Yeast /HPF      Hyaline Casts, UA None Seen /LPF      Methodology Manual Light Microscopy    [place] Draw [[id number]] Collected: 07/11/24 1114    Specimen: Blood Updated: 07/11/24 1156    Narrative:      The following orders were created for panel order [place] Draw.  Procedure                               Abnormality         Status                     ---------                               -----------         ------                     Green Top (Gel)[[id number]]                                  Final result               Lavender Top[[id number]]                                     Final result               Gold Top - SST[[id number]]                                   Final result               Light Blue Top[[id number]]                                   Final result                 Please view results for these tests on the individual orders.    Green Top (Gel) [[id number]] Collected: 07/11/24 1114    Specimen: Blood Updated: 07/11/24 1156     Extra Tube HOLD    Comprehensive Metabolic Panel [[id number]]  (Abnormal) Collected: 07/11/24 1114    Specimen: Blood Updated: 07/11/24 1150     Glucose 86 mg/dL      BUN 8 mg/dL      Creatinine 1.13 mg/dL      Sodium 135 mmol/L      Potassium 3.7 mmol/L      Chloride 101 mmol/L      CO2 18.5 mmol/L      Calcium 10.0 mg/dL      Total Protein 7.5 g/dL      Albumin 3.9 g/dL      ALT (SGPT) 19 U/L      AST (SGOT) 48 U/L      Alkaline Phosphatase 155 U/L      Total Bilirubin 0.6 mg/dL      Globulin 3.6 gm/dL      A/G Ratio 1.1 g/dL      BUN/Creatinine Ratio 7.1     Anion Gap 15.5 mmol/L      eGFR 52.8 mL/min/1.73     Narrative:      GFR Normal >60  Chronic Kidney Disease <60  Kidney Failure <15      C-reactive Protein [[id number]]  (Abnormal) Collected: 07/11/24 1114    Specimen: Blood Updated: 07/11/24 1150      C-Reactive Protein 1.22 mg/dL     Urinalysis With Culture If Indicated - Straight Cath [[id number]]  (Abnormal) Collected: 07/11/24 1115    Specimen: Urine from Straight Cath Updated: 07/11/24 1145     Color, UA Yellow     Appearance, UA Clear     pH, UA 6.0     Specific Gravity, UA 1.007     Glucose, UA Negative     Ketones, UA Negative     Bilirubin, UA Negative     Blood, UA Negative     Protein, UA Negative     Leuk Esterase, UA Trace     Nitrite, UA Negative     Urobilinogen, UA 1.0 E.U./dL    Narrative:      In absence of clinical symptoms, the presence of pyuria, bacteria, and/or nitrites on the urinalysis result does not correlate with infection.    Lavender Top [[id number]] Collected: 07/11/24 1114    Specimen: Blood Updated: 07/11/24 1131     Extra Tube hold for add-on     Comment: Auto resulted       Gold Top - SST [[id number]] Collected: 07/11/24 1114    Specimen: Blood Updated: 07/11/24 1131     Extra Tube Hold for add-ons.     Comment: Auto resulted.       Light Blue Top [[id number]] Collected: 07/11/24 1114    Specimen: Blood Updated: 07/11/24 1131     Extra Tube Hold for add-ons.     Comment: Auto resulted       CBC & Differential [[id number]]  (Abnormal) Collected: 07/11/24 1114    Specimen: Blood Updated: 07/11/24 1127    Narrative:      The following orders were created for panel order CBC & Differential.  Procedure                               Abnormality         Status                     ---------                               -----------         ------                     CBC Auto Differential[[id number]]        Abnormal            Final result                 Please view results for these tests on the individual orders.    CBC Auto Differential [[id number]]  (Abnormal) Collected: 07/11/24 1114    Specimen: Blood Updated: 07/11/24 1127     WBC 15.22 10*3/mm3      RBC 4.02 10*6/mm3      Hemoglobin 11.9 g/dL      Hematocrit 38.3 %      MCV 95.3 fL      MCH 29.6 pg      MCHC 31.1 g/dL      RDW 15.8  %      RDW-SD 55.8 fl      MPV 10.2 fL      Platelets 316 10*3/mm3      Neutrophil % 87.9 %      Lymphocyte % 5.6 %      Monocyte % 3.1 %      Eosinophil % 1.3 %      Basophil % 1.0 %      Immature Grans % 1.1 %      Neutrophils, Absolute 13.38 10*3/mm3      Lymphocytes, Absolute 0.85 10*3/mm3      Monocytes, Absolute 0.47 10*3/mm3      Eosinophils, Absolute 0.20 10*3/mm3      Basophils, Absolute 0.15 10*3/mm3      Immature Grans, Absolute 0.17 10*3/mm3      nRBC 0.0 /100 WBC     POC Lactate [[id number]]  (Normal) Collected: 07/11/24 1120    Specimen: Blood Updated: 07/11/24 1122     Lactate 1.1 mmol/L      Comment: Serial Number: [id number]:  [id number]       Blood Culture - Blood, Arm, Right [[id number]] Collected: 07/11/24 1114    Specimen: Blood from Arm, Right Updated: 07/11/24 1121             I reviewed the patient's new clinical results.    Assessment & Plan       Hypotension due to hypovolemia    Closed wedge compression fracture of T10 vertebra    Generalized weakness    Leukocytosis    Multiple falls  -CT head and cervical spine with no acute findings  -CT thoracic spine significant for compression T10 fracture  -EKG sinus rhythm rate 70  -troponin 20  -UA with trace leuks, wbc's, and yeast  -sepsis fluid bolus, 3L in ER  -Rocephin, Diflucan  -Cont. IV fluids  -PT/OT eval  -Neurosurgery consulted      DVT prophylaxis- SCD's  GI prophylaxis- ppi    I discussed the patient's findings and my recommendations with patient.     Iva Dailey, ALEXANDER  07/11/24  22:29 EDT

## 2024-07-12 NOTE — HOME HEALTH
This patient was admitted to [facility] as an inpatient status via ED on 7/11/24 with a diagnosis of compression fracture post fall.

## 2024-07-12 NOTE — DISCHARGE PLACEMENT REQUEST
"Shahbaz Mejia \"Annie\" (68 y.o. Female)       Date of Birth   1955    Social Security Number       Address   [address] [place] [address] [place] [facility]    Home Phone   [phone]    MRN   [id number]       Gnosticist   Patient Refused    Marital Status                               Admission Date   7/11/24    Admission Type   Emergency    Admitting Provider   Keke Casiano MD    Attending Provider   Keke Casiano MD    Department, Room/Bed   [facility] INTENSIVE CARE UNIT, 2315/1       Discharge Date       Discharge Disposition       Discharge Destination                                 Attending Provider: Keke Casiano MD    Allergies: Oxytetracycline, Oxycodone    Isolation: None   Infection: None   Code Status: CPR    Ht: 172.7 cm (68\")   Wt: 72.3 kg (159 lb 6.3 oz)    Admission Cmt: None   Principal Problem: Hypotension due to hypovolemia [E86.1]                   Active Insurance as of 7/11/2024       Primary Coverage       Payor Plan Insurance Group Employer/Plan Group    HUMANA MEDICARE REPLACEMENT HUMANA MED ADV PPO [id number]       Payor Plan Address Payor Plan Phone Number Payor Plan Fax Number Effective Dates    [address] [address] [phone]  9/1/2023 - None Entered    [facility] [address]         Subscriber Name Subscriber Birth Date Member ID       SHAHBAZ MEJIA 1955 [id number]                     Emergency Contacts        (Rel.) Home Phone Work Phone Mobile Phone    DARI MEJIA (Spouse) [phone] -- --              Physician Progress Notes (last 24 hours)  Notes from 07/11/24 0845 through 07/12/24 0845   No notes of this type exist for this encounter.       "

## 2024-07-12 NOTE — PLAN OF CARE
Goal Outcome Evaluation:  Plan of Care Reviewed With: patient        Progress: no change   Pt is a 69 y/o F admitted to [facility] on 7/11/24 with complaints of mechanical fall at home. CT imaging of the Lumbar Spine was (+) for acute compression fracture at T10 and Neurosurgery advised TLSO when out of bed. She is currently living at home with spouse in [facility] with two steps to enter. At baseline, she is normally IND with household mobility and ADLs with no AD. She does have recent history of UTIs of which spouse has needed to help patient with some ADLs. This date, she is AAOx4 with verbal cuing and complaining of 9/10 pain in the lower back. She completes bed mobility mod>max A x 2, transfers min A x 2, and amb CGA>min A with RW over to the recliner. She exhibits high pain levels this date, requiring extensive levels of assist to complete all mobility. She will not be able to return home in this condition and would benefit from SNF at d/c for continued care and rehab. PT will continue to follow and progress as tolerated.    Anticipated Discharge Disposition (PT): skilled nursing facility

## 2024-07-12 NOTE — PLAN OF CARE
Goal Outcome Evaluation:              Outcome Evaluation: 68-year-old female who presents to [facility] after falling.  Patient had multiple imaging done including CT thoracic spine showing compression deformity of T10 with no evidence of compromise of the spinal cord.  Per neuro no surgery recommended no surgical intervention.  Pt is to wear TLSO while out of bed.  PMHx significant for hx CABG, hx PE, frequent UTI and hx A.fib on warfarin. PLOF obtained from previous PT evaluation: At baseline pt resides with spouse in multilevel home with 3 LEO. Pt typically (I) with ADLs, no use of AD for mobility, has RW uses infrequently, though has been requiring increased assist after recent hospital admission for UTI.  Pt required assist for bed mobility and donning brace at EOB.  Pt reports significant back pain while sitting.  Appears to improve slighlty with movement. Pt requiring signifiacntly increased assist and will require SNF at discharge.      Anticipated Discharge Disposition (OT): skilled nursing facility

## 2024-07-12 NOTE — CASE MANAGEMENT/SOCIAL WORK
Discharge Planning Assessment   Petar     Patient Name: Laura Mejia  MRN: [id number]  Today's Date: 7/12/2024    Admit Date: 7/11/2024    Plan: home with spouse.  Current with [facility]   Discharge Needs Assessment       Row Name 07/12/24 1424       Living Environment    People in Home spouse    Current Living Arrangements home    Potentially Unsafe Housing Conditions none    In the past 12 months has the electric, gas, oil, or water company threatened to shut off services in your home? No    Primary Care Provided by self    Provides Primary Care For no one    Family Caregiver if Needed spouse    Quality of Family Relationships helpful;involved;supportive    Able to Return to Prior Arrangements yes       Resource/Environmental Concerns    Resource/Environmental Concerns none    Transportation Concerns none       Transportation Needs    In the past 12 months, has lack of transportation kept you from medical appointments or from getting medications? no    In the past 12 months, has lack of transportation kept you from meetings, work, or from getting things needed for daily living? No       Food Insecurity    Within the past 12 months, you worried that your food would run out before you got the money to buy more. Never true    Within the past 12 months, the food you bought just didn't last and you didn't have money to get more. Never true       Transition Planning    Patient/Family Anticipates Transition to home with help/services    Patient/Family Anticipated Services at Transition none    Transportation Anticipated family or friend will provide       Discharge Needs Assessment    Readmission Within the Last 30 Days no previous admission in last 30 days    Equipment Currently Used at Home cpap;walker, rolling;commode    Concerns to be Addressed discharge planning;care coordination/care conferences    Anticipated Changes Related to Illness none    Equipment Needed After Discharge none    Provided Post Acute  Provider List? N/A                   Discharge Plan       Row Name 07/12/24 1426       Plan    Plan home with spouse.  Current with [facility]    Plan Comments met with patient, spouse and daughters at bedside.  lives at home with spouse.  has cpap, rw, and bsc at home  current with [employer] - notified liasion of admission.  pcp and pharm verified.  I with adls.  spouse assist with meds, cook, cleaning.  denies transportation concerns.  reports concerns about eliquis and linzess cost.  stated they had never used free month supply of eliquis- provided them with coupon.  encouraged to discuss with md for samples or to contact [facility] for meidcation assistance programs.  verbalized understanding.  TLSO brace provided from [employer].                  Continued Care and Services - Admitted Since 7/11/2024       Durable Medical Equipment       Service Provider Request Status Selected Services Address Phone Fax Patient Preferred    [facility] Pending - Request Sent N/A [address] [facility] [address], [place] [id number] [phone] --              Home Medical Care       Service Provider Request Status Selected Services Address Phone Fax Patient Preferred     [facility] Pending - Request Sent N/A [address] [facility] [place] [address] [phone] --                  Selected Continued Care - Prior Encounters Includes continued care and service providers with selected services from prior encounters from 4/12/2024 to 7/12/2024      Discharged on 6/3/2024 Admission date: 5/29/2024 - Discharge disposition: Home-Health Care [facility]      Home Medical Care       Service Provider Selected Services Address Phone Fax Patient Preferred     [facility] [address] [facility] [place] [address] [phone] --                          Expected Discharge Date and Time       Expected Discharge Date Expected Discharge Time    Jul 15, 2024            Demographic Summary        Row Name 07/12/24 1422       General Information    Admission Type inpatient    Arrived From emergency department    Required Notices Provided Important Message from Medicare    Referral Source admission list    Reason for Consult discharge planning    Preferred Language English                   Functional Status       Row Name 07/12/24 1423       Functional Status    Usual Activity Tolerance good    Current Activity Tolerance good       Functional Status, IADL    Medications assistive person    Meal Preparation assistive person    Housekeeping assistive person    Laundry assistive person    Shopping assistive person       Mental Status    General Appearance WDL WDL       Mental Status Summary    Recent Changes in Mental Status/Cognitive Functioning no changes                   Psychosocial    No documentation.                  Abuse/Neglect    No documentation.                  Legal    No documentation.                  Substance Abuse    No documentation.                  Patient Forms       Row Name 07/12/24 1425       Patient Forms    Important Message from Medicare (IMM) Delivered  7/11 imm per reg                      Yolie Rush RN

## 2024-07-12 NOTE — THERAPY EVALUATION
Patient Name: Laura Mejia  : 1955    MRN: [id number]                              Today's Date: 2024       Admit Date: 2024    Visit Dx:     ICD-10-CM ICD-9-CM   1. Multiple falls  R29.6 V15.88   2. Generalized weakness  R53.1 780.79   3. Closed wedge compression fracture of T10 vertebra, initial encounter  S22.070A 805.2   4. Leukocytosis, unspecified type  D72.829 288.60   5. Hypotension due to hypovolemia  E86.1 458.8     276.52     Patient Active Problem List   Diagnosis    New onset atrial fibrillation    Near syncope    S/P CABG (coronary artery bypass graft)    Abnormal nuclear stress test    Altered mental status    Altered mental status, unspecified    Atrial fibrillation with RVR    Urinary tract infection    Abdominal aortic aneurysm (AAA)    Acute on chronic systolic heart failure    Allergic rhinitis    Atherosclerosis of coronary artery bypass graft    Compression fracture of lumbar vertebra    Congestive heart failure    COPD (chronic obstructive pulmonary disease)    Degeneration of intervertebral disc of lumbar region    Degeneration of thoracic intervertebral disc    Degenerative spondylolisthesis    Depression    Gastroesophageal reflux disease    History of pulmonary embolism    Hyperlipidemia    Hypothyroidism    Irritable bowel syndrome    Ischemic cardiomyopathy    Kidney stone    Lumbar radiculopathy    Obstructive sleep apnea syndrome    Osteopenia    Peripheral neuropathy    Pleural effusion    Pulmonary emboli    Hypertension    S/P CABG x 4    Hypotension due to hypovolemia    Closed wedge compression fracture of T10 vertebra    Generalized weakness    Leukocytosis    Multiple falls     Past Medical History:   Diagnosis Date    AAA (abdominal aortic aneurysm)     infrarenal- 3.1cm(, 3.7x4.2cm(), 3.9cm (2017)    Allergic rhinitis     Aspiration pneumonia 2017    CHF (congestive heart failure)     Coronary artery disease     DDD (degenerative disc  disease), lumbar     lumbar spine- Dr. Churchill     Depression     Diverticulosis     Frequent UTI     Dr. Daniels    GERD (gastroesophageal reflux disease)     Hiatal hernia     HSV (herpes simplex virus) infection     Oral    Hyperlipidemia     IBS (irritable bowel syndrome)     Constipation predominant    Ischemic cardiomyopathy 09/2017    AICD     Kidney stones     NSTEMI (non-ST elevated myocardial infarction) 04/26/2017    Obstructive sleep apnea 2011    nfsg 2011, ahi 67    Osteoarthritis     Osteopenia     Peripheral neuropathy     feet    Pulmonary embolism, bilateral 05/2017    Rotator cuff tear     Bilateral- Ortho      Past Surgical History:   Procedure Laterality Date    CARDIAC CATHETERIZATION  04/26/2017    99% mid LAD. 90% mid LCX. 60% RCA. Recommended CABG. Dr. Diaz    CARDIAC DEFIBRILLATOR PLACEMENT  12/26/2017    ICD implant/ Dr. Ellis    CATARACT EXTRACTION      CORONARY ARTERY BYPASS GRAFT  04/26/2017    x4 & ASD Closure    CYSTOSCOPY  2002    negative. Dr. Daniels    LAPAROSCOPIC CHOLECYSTECTOMY  04/17/2013    For biliary dyskinesia. Dr. Mccoy.     REPLACEMENT TOTAL KNEE BILATERAL  11/2004    Dr. Herrera    THORACENTESIS Left 05/08/2017    TONSILLECTOMY      TUBAL ABDOMINAL LIGATION Bilateral       General Information       Row Name 07/12/24 1624          Physical Therapy Time and Intention    Document Type evaluation  -MB     Mode of Treatment physical therapy  -MB       Row Name 07/12/24 162          General Information    Patient Profile Reviewed yes  -MB     Prior Level of Function independent:;all household mobility;community mobility;gait;transfer;ADL's;home management;driving;shopping  -MB     Existing Precautions/Restrictions spinal;TLSO  -MB     Barriers to Rehab none identified  -MB       Row Name 07/12/24 1621          Living Environment    People in Home spouse  -MB       Row Name 07/12/24 1624          Home Main Entrance    Number of Stairs, Main Entrance two  -MB     Stair Railings,  Main Entrance none  -MB       Row Name 07/12/24 1624          Stairs Within Home, Primary    Number of Stairs, Within Home, Primary none  -MB       Row Name 07/12/24 1624          Cognition    Orientation Status (Cognition) oriented x 4  -MB       Row Name 07/12/24 1624          Safety Issues, Functional Mobility    Impairments Affecting Function (Mobility) balance;pain;endurance/activity tolerance;postural/trunk control;strength  -MB               User Key  (r) = Recorded By, (t) = Taken By, (c) = Cosigned By      Initials Name Provider Type    Iraj King, PRACHI Physical Therapist                   Mobility       Row Name 07/12/24 1625          Bed Mobility    Bed Mobility bed mobility (all) activities  -MB     All Activities, [place] (Bed Mobility) moderate assist (50% patient effort);2 person assist  -MB     Assistive Device (Bed Mobility) head of bed elevated;bed rails  -MB       Row Name 07/12/24 1625          Bed-Chair Transfer    Bed-Chair [place] (Transfers) minimum assist (75% patient effort);2 person assist  -MB     Assistive Device (Bed-Chair Transfers) walker, front-wheeled  -MB       Row Name 07/12/24 1625          Sit-Stand Transfer    Sit-Stand [place] (Transfers) minimum assist (75% patient effort);2 person assist  -MB     Assistive Device (Sit-Stand Transfers) walker, 4-wheeled  -MB       Row Name 07/12/24 1625          Gait/Stairs (Locomotion)    [place] Level (Gait) contact guard  -MB     Assistive Device (Gait) walker, front-wheeled  -MB     Patient was able to Ambulate yes  -MB     Distance in Feet (Gait) 10  -MB     Comment, (Gait/Stairs) 10' CGA with RW  -MB               User Key  (r) = Recorded By, (t) = Taken By, (c) = Cosigned By      Initials Name Provider Type    Iraj King PT Physical Therapist                   Obj/Interventions       Row Name 07/12/24 1627          Range of Motion Comprehensive    General Range of Motion no range of motion deficits  identified  -MB       Row Name 07/12/24 1627          Strength Comprehensive (MMT)    Comment, General Manual Muscle Testing (MMT) Assessment BLE Strength 4/5 grossly  -MB       Row Name 07/12/24 1627          Balance    Balance Assessment sitting static balance;sitting dynamic balance;sit to stand dynamic balance;standing static balance;standing dynamic balance  -MB     Static Sitting Balance standby assist  -MB     Dynamic Sitting Balance contact guard  -MB     Position, Sitting Balance unsupported;sitting edge of bed  -MB     Sit to Stand Dynamic Balance minimal assist  -MB     Static Standing Balance contact guard;minimal assist  -MB     Dynamic Standing Balance minimal assist  -MB       Row Name 07/12/24 1627          Sensory Assessment (Somatosensory)    Sensory Assessment (Somatosensory) sensation intact  -MB               User Key  (r) = Recorded By, (t) = Taken By, (c) = Cosigned By      Initials Name Provider Type    Iraj King, PT Physical Therapist                   Goals/Plan       Row Name 07/12/24 1632          Bed Mobility Goal 1 (PT)    Activity/Assistive Device (Bed Mobility Goal 1, PT) bed mobility activities, all  -MB     [place] Level/Cues Needed (Bed Mobility Goal 1, PT) contact guard required  -MB     Time Frame (Bed Mobility Goal 1, PT) long term goal (LTG);2 weeks  -MB       Row Name 07/12/24 1632          Transfer Goal 1 (PT)    Activity/Assistive Device (Transfer Goal 1, PT) transfers, all;walker, rolling  -MB     [place] Level/Cues Needed (Transfer Goal 1, PT) standby assist  -MB     Time Frame (Transfer Goal 1, PT) long term goal (LTG);2 weeks  -MB       Row Name 07/12/24 1632          Gait Training Goal 1 (PT)    Activity/Assistive Device (Gait Training Goal 1, PT) gait (walking locomotion);walker, rolling  -MB     [place] Level (Gait Training Goal 1, PT) standby assist  -MB     Distance (Gait Training Goal 1, PT) 100  -MB     Time Frame (Gait Training Goal 1, PT)  long term goal (LTG);2 weeks  -MB       Row Name 07/12/24 1632          Stairs Goal 1 (PT)    Activity/Assistive Device (Stairs Goal 1, PT) stairs, all skills;walker, rolling  -MB     [place] Level/Cues Needed (Stairs Goal 1, PT) contact guard required  -MB     Number of Stairs (Stairs Goal 1, PT) 2  -MB     Time Frame (Stairs Goal 1, PT) long term goal (LTG);2 weeks  -MB       Row Name 07/12/24 1632          Therapy Assessment/Plan (PT)    Planned Therapy Interventions (PT) balance training;bed mobility training;gait training;home exercise program;neuromuscular re-education;patient/family education;strengthening;transfer training  -MB               User Key  (r) = Recorded By, (t) = Taken By, (c) = Cosigned By      Initials Name Provider Type    Iraj King, PT Physical Therapist                   Clinical Impression       Row Name 07/12/24 1629          Pain    Pretreatment Pain Rating 9/10  -MB     Posttreatment Pain Rating 9/10  -MB     Pain Location lower  -MB     Pain Location - back  -MB     Pain Intervention(s) Repositioned;Emotional support;Nursing Notified  -MB       Row Name 07/12/24 1644 07/12/24 1629       Plan of Care Review    Plan of Care Reviewed With -- patient  -MB    Progress -- no change  -MB    Outcome Evaluation Pt is a 67 y/o F admitted to [facility] on 7/11/24 with complaints of mechanical fall at home. CT imaging of the Lumbar Spine was (+) for acute compression fracture at T10 and Neurosurgery advised TLSO when out of bed. She is currently living at home with spouse in [facility] with two steps to enter. At baseline, she is normally IND with household mobility and ADLs with no AD. She does have recent history of UTIs of which spouse has needed to help patient with some ADLs. This date, she is AAOx4 with verbal cuing and complaining of 9/10 pain in the lower back. She completes bed mobility mod>max A x 2, transfers min A x 2, and amb CGA>min A with RW over to the recliner. She exhibits high pain  levels this date, requiring extensive levels of assist to complete all mobility. She will not be able to return home in this condition and would benefit from SNF at d/c for continued care and rehab. PT will continue to follow and progress as tolerated.  -MB --      Row Name 07/12/24 1629          Therapy Assessment/Plan (PT)    Rehab Potential (PT) fair, will monitor progress closely  -MB     Criteria for Skilled Interventions Met (PT) yes;meets criteria  -MB     Therapy Frequency (PT) 5 times/wk  -MB     Predicted Duration of Therapy Intervention (PT) until d/c  -MB       Row Name 07/12/24 1629          Vital Signs    Pre SpO2 (%) 98  -MB     O2 Delivery Pre Treatment room air  -MB     O2 Delivery Intra Treatment room air  -MB     Post SpO2 (%) 97  -MB     O2 Delivery Post Treatment room air  -MB     Pre Patient Position Supine  -MB     Intra Patient Position Standing  -MB     Post Patient Position Sitting  -MB       Row Name 07/12/24 1629          Positioning and Restraints    Pre-Treatment Position in bed  -MB     Post Treatment Position chair  -MB     In Chair notified nsg;reclined;sitting;call light within reach;encouraged to call for assist;exit alarm on  -MB               User Key  (r) = Recorded By, (t) = Taken By, (c) = Cosigned By      Initials Name Provider Type    Iraj King, PT Physical Therapist                   Outcome Measures       Row Name 07/12/24 1633          How much help from another person do you currently need...    Turning from your back to your side while in flat bed without using bedrails? 2  -MB     Moving from lying on back to sitting on the side of a flat bed without bedrails? 2  -MB     Moving to and from a bed to a chair (including a wheelchair)? 2  -MB     Standing up from a chair using your arms (e.g., wheelchair, bedside chair)? 2  -MB     Climbing 3-5 steps with a railing? 2  -MB     To walk in hospital room? 2  -MB     AM-PAC 6 Clicks Score (PT) 12  -MB     Highest Level  of Mobility Goal 4 --> Transfer to chair/commode  -MB       Row Name 07/12/24 1633          Functional Assessment    Outcome Measure Options AM-PAC 6 Clicks Basic Mobility (PT)  -MB               User Key  (r) = Recorded By, (t) = Taken By, (c) = Cosigned By      Initials Name Provider Type    Iraj King, PT Physical Therapist                                   PT Recommendation and Plan  Planned Therapy Interventions (PT): balance training, bed mobility training, gait training, home exercise program, neuromuscular re-education, patient/family education, strengthening, transfer training  Plan of Care Reviewed With: patient  Progress: no change  Outcome Evaluation: Pt is a 67 y/o F admitted to [facility] on 7/11/24 with complaints of mechanical fall at home. CT imaging of the Lumbar Spine was (+) for acute compression fracture at T10 and Neurosurgery advised TLSO when out of bed. She is currently living at home with spouse in [facility] with two steps to enter. At baseline, she is normally IND with household mobility and ADLs with no AD. She does have recent history of UTIs of which spouse has needed to help patient with some ADLs. This date, she is AAOx4 with verbal cuing and complaining of 9/10 pain in the lower back. She completes bed mobility mod>max A x 2, transfers min A x 2, and amb CGA>min A with RW over to the recliner. She exhibits high pain levels this date, requiring extensive levels of assist to complete all mobility. She will not be able to return home in this condition and would benefit from SNF at d/c for continued care and rehab. PT will continue to follow and progress as tolerated.     Time Calculation:   PT Evaluation Complexity  History, PT Evaluation Complexity: 1-2 personal factors and/or comorbidities  Examination of Body Systems (PT Eval Complexity): total of 3 or more elements  Clinical Presentation (PT Evaluation Complexity): evolving  Clinical Decision Making (PT Evaluation Complexity): moderate  complexity  Overall Complexity (PT Evaluation Complexity): moderate complexity     PT Charges       Row Name 07/12/24 1634             Time Calculation    Start Time 1445  -MB      Stop Time 1510  -MB      Time Calculation (min) 25 min  -MB      PT Received On 07/12/24  -MB      PT - Next Appointment 07/14/24  -MB      PT Goal Re-Cert Due Date 07/26/24  -MB         Time Calculation- PT    Total Timed Code Minutes- PT 0 minute(s)  -MB                User Key  (r) = Recorded By, (t) = Taken By, (c) = Cosigned By      Initials Name Provider Type    Iraj King, PT Physical Therapist                  Therapy Charges for Today       Code Description Service Date Service Provider Modifiers Qty    [id number] HC PT EVAL MOD COMPLEXITY 4 7/12/2024 Iraj Adams, PT GP 1            PT G-Codes  Outcome Measure Options: AM-PAC 6 Clicks Basic Mobility (PT)  AM-PAC 6 Clicks Score (PT): 12  PT Discharge Summary  Anticipated Discharge Disposition (PT): skilled nursing facility    Iraj Adams PT  7/12/2024

## 2024-07-12 NOTE — Clinical Note
Transfer Summary:    - Patient transferred to [facility]  - Reason for transfer: compression fracture post fall  - Report called to hospital  on 7/11/24    - Summary of Care, treatment or services provided to the patient including disciplines seeing the patient: nursing and PT    - Patient progress toward goals: ongoing    - Communicable Disease? No

## 2024-07-12 NOTE — THERAPY EVALUATION
Patient Name: Laura Mejia  : 1955    MRN: [id number]                              Today's Date: 2024       Admit Date: 2024    Visit Dx:     ICD-10-CM ICD-9-CM   1. Multiple falls  R29.6 V15.88   2. Generalized weakness  R53.1 780.79   3. Closed wedge compression fracture of T10 vertebra, initial encounter  S22.070A 805.2   4. Leukocytosis, unspecified type  D72.829 288.60   5. Hypotension due to hypovolemia  E86.1 458.8     276.52     Patient Active Problem List   Diagnosis    New onset atrial fibrillation    Near syncope    S/P CABG (coronary artery bypass graft)    Abnormal nuclear stress test    Altered mental status    Altered mental status, unspecified    Atrial fibrillation with RVR    Urinary tract infection    Abdominal aortic aneurysm (AAA)    Acute on chronic systolic heart failure    Allergic rhinitis    Atherosclerosis of coronary artery bypass graft    Compression fracture of lumbar vertebra    Congestive heart failure    COPD (chronic obstructive pulmonary disease)    Degeneration of intervertebral disc of lumbar region    Degeneration of thoracic intervertebral disc    Degenerative spondylolisthesis    Depression    Gastroesophageal reflux disease    History of pulmonary embolism    Hyperlipidemia    Hypothyroidism    Irritable bowel syndrome    Ischemic cardiomyopathy    Kidney stone    Lumbar radiculopathy    Obstructive sleep apnea syndrome    Osteopenia    Peripheral neuropathy    Pleural effusion    Pulmonary emboli    Hypertension    S/P CABG x 4    Hypotension due to hypovolemia    Closed wedge compression fracture of T10 vertebra    Generalized weakness    Leukocytosis    Multiple falls     Past Medical History:   Diagnosis Date    AAA (abdominal aortic aneurysm)     infrarenal- 3.1cm(, 3.7x4.2cm(), 3.9cm (2017)    Allergic rhinitis     Aspiration pneumonia 2017    CHF (congestive heart failure)     Coronary artery disease     DDD (degenerative disc  disease), lumbar     lumbar spine- Dr. Churchill     Depression     Diverticulosis     Frequent UTI     Dr. Daniels    GERD (gastroesophageal reflux disease)     Hiatal hernia     HSV (herpes simplex virus) infection     Oral    Hyperlipidemia     IBS (irritable bowel syndrome)     Constipation predominant    Ischemic cardiomyopathy 09/2017    AICD     Kidney stones     NSTEMI (non-ST elevated myocardial infarction) 04/26/2017    Obstructive sleep apnea 2011    nfsg 2011, ahi 68    Osteoarthritis     Osteopenia     Peripheral neuropathy     feet    Pulmonary embolism, bilateral 05/2017    Rotator cuff tear     Bilateral- Ortho      Past Surgical History:   Procedure Laterality Date    CARDIAC CATHETERIZATION  04/26/2017    99% mid LAD. 90% mid LCX. 60% RCA. Recommended CABG. Dr. Diaz    CARDIAC DEFIBRILLATOR PLACEMENT  12/26/2017    ICD implant/ Dr. Ellis    CATARACT EXTRACTION      CORONARY ARTERY BYPASS GRAFT  04/26/2017    x4 & ASD Closure    CYSTOSCOPY  2002    negative. Dr. Daniels    LAPAROSCOPIC CHOLECYSTECTOMY  04/17/2013    For biliary dyskinesia. Dr. Mccoy.     REPLACEMENT TOTAL KNEE BILATERAL  11/2004    Dr. Herrera    THORACENTESIS Left 05/08/2017    TONSILLECTOMY      TUBAL ABDOMINAL LIGATION Bilateral       General Information       Row Name 07/12/24 1643          OT Time and Intention    Document Type evaluation  -SR     Mode of Treatment occupational therapy  -SR       Row Name 07/12/24 1643          General Information    Existing Precautions/Restrictions spinal;TLSO  -SR       Row Name 07/12/24 1643          Occupational Profile    Reason for Services/Referral (Occupational Profile) 68-year-old female who presents to [facility] after falling.  Patient had multiple imaging done including CT thoracic spine showing compression deformity of T10 with no evidence of compromise of the spinal cord.  Per neuro no surgery recommended no surgical intervention.  Pt is to wear TLSO while out of bed.  PMHx  significant for hx CABG, hx PE, frequent UTI and hx A.fib on warfarin. PLOF obtained from previous PT evaluation: At baseline pt resides with spouse in multilevel home with 3 LEO. Pt typically (I) with ADLs, no use of AD for mobility, has RW uses infrequently, though has been requiring increased assist after recent hospital admission for UTI.  -SR       Row Name 07/12/24 1643          Living Environment    People in Home spouse  -SR       Row Name 07/12/24 1643          Cognition    Orientation Status (Cognition) oriented x 4  -SR       Row Name 07/12/24 1643          Safety Issues, Functional Mobility    Impairments Affecting Function (Mobility) balance;pain;endurance/activity tolerance;postural/trunk control;strength  -SR               User Key  (r) = Recorded By, (t) = Taken By, (c) = Cosigned By      Initials Name Provider Type    SR Sandra Arciniega, OT Occupational Therapist                     Mobility/ADL's       Row Name 07/12/24 1644          Bed Mobility    Bed Mobility bed mobility (all) activities  -SR     All Activities, [place] (Bed Mobility) moderate assist (50% patient effort);2 person assist  -SR       Row Name 07/12/24 1644          Bed-Chair Transfer    Bed-Chair [place] (Transfers) minimum assist (75% patient effort);2 person assist  -SR     Assistive Device (Bed-Chair Transfers) walker, front-wheeled  -SR       Row Name 07/12/24 1644          Sit-Stand Transfer    Sit-Stand [place] (Transfers) minimum assist (75% patient effort);2 person assist  -SR       Row Name 07/12/24 1644          Activities of Daily Living    BADL Assessment/Intervention lower body dressing;upper body dressing  -SR       Row Name 07/12/24 1644          Lower Body Dressing Assessment/Training    [place] Level (Lower Body Dressing) don;socks;dependent (less than 25% patient effort)  -SR       Row Name 07/12/24 1644          Upper Body Dressing Assessment/Training    [place] Level (Upper Body  Dressing) dependent (less than 25% patient effort)  -SR     Comment, (Upper Body Dressing) [place] TLSO  -SR               User Key  (r) = Recorded By, (t) = Taken By, (c) = Cosigned By      Initials Name Provider Type    SR Sandra Arciniega OT Occupational Therapist                   Obj/Interventions       Row Name 07/12/24 1647          Range of Motion Comprehensive    General Range of Motion no range of motion deficits identified  -SR       Row Name 07/12/24 1647          Strength Comprehensive (MMT)    Comment, General Manual Muscle Testing (MMT) Assessment BUE grossly 4/5  -SR       [facility] Name 07/12/24 1647          Balance    Balance Interventions sitting;supported;static;dynamic;minimal challenge;standing;sit to stand  -SR               User Key  (r) = Recorded By, (t) = Taken By, (c) = Cosigned By      Initials Name Provider Type    SR Sandra Arciniega OT Occupational Therapist                   Goals/Plan       Row Name 07/12/24 1652          Transfer Goal 1 (OT)    Activity/Assistive Device (Transfer Goal 1, OT) transfers, all  -SR     [place] Level/Cues Needed (Transfer Goal 1, OT) minimum assist (75% or more patient effort)  -SR     Time Frame (Transfer Goal 1, OT) 2 weeks  -SR       Row Name 07/12/24 1652          Toileting Goal 1 (OT)    Activity/Device (Toileting Goal 1, OT) toileting skills, all  -SR     [place] Level/Cues Needed (Toileting Goal 1, OT) moderate assist (50-74% patient effort)  -SR     Time Frame (Toileting Goal 1, OT) 2 weeks  -SR       Row Name 07/12/24 1652          Therapy Assessment/Plan (OT)    Planned Therapy Interventions (OT) activity tolerance training;BADL retraining;IADL retraining;functional balance retraining;neuromuscular control/coordination retraining;ROM/therapeutic exercise;transfer/mobility retraining;strengthening exercise;occupation/activity based interventions  -SR               User Key  (r) = Recorded By, (t) = Taken By, (c) = Cosigned By       Initials Name Provider Type    SR Sandra Arciniega, OT Occupational Therapist                   Clinical Impression       Row Name 07/12/24 1647          Pain Assessment    Pretreatment Pain Rating 9/10  -SR     Posttreatment Pain Rating 9/10  -SR       Row Name 07/12/24 1647          Plan of Care Review    Outcome Evaluation 68-year-old female who presents to [facility] after falling.  Patient had multiple imaging done including CT thoracic spine showing compression deformity of T10 with no evidence of compromise of the spinal cord.  Per neuro no surgery recommended no surgical intervention.  Pt is to wear TLSO while out of bed.  PMHx significant for hx CABG, hx PE, frequent UTI and hx A.fib on warfarin. PLOF obtained from previous PT evaluation: At baseline pt resides with spouse in multilevel home with 3 LEO. Pt typically (I) with ADLs, no use of AD for mobility, has RW uses infrequently, though has been requiring increased assist after recent hospital admission for UTI.  Pt required assist for bed mobility and donning brace at EOB.  Pt reports significant back pain while sitting.  Appears to improve slighlty with movement. Pt requiring signifiacntly increased assist and will require SNF at discharge.  -SR       Row Name 07/12/24 1647          Therapy Assessment/Plan (OT)    Criteria for Skilled Therapeutic Interventions Met (OT) yes  -SR     Therapy Frequency (OT) 3 times/wk  -SR     Predicted Duration of Therapy Intervention (OT) Until discharge  -SR       Row Name 07/12/24 1647          Therapy Plan Review/Discharge Plan (OT)    Anticipated Discharge Disposition (OT) skilled nursing facility  -SR       Row Name 07/12/24 1647          Positioning and Restraints    Pre-Treatment Position in bed  -SR     Post Treatment Position chair  -SR     In Chair call light within reach;encouraged to call for assist;exit alarm on  -SR               User Key  (r) = Recorded By, (t) = Taken By, (c) = Cosigned By       Initials Name Provider Type    Sandra Alexandra, OT Occupational Therapist                   Outcome Measures       Row Name 07/12/24 1633          How much help from another person do you currently need...    Turning from your back to your side while in flat bed without using bedrails? 2  -MB     Moving from lying on back to sitting on the side of a flat bed without bedrails? 2  -MB     Moving to and from a bed to a chair (including a wheelchair)? 2  -MB     Standing up from a chair using your arms (e.g., wheelchair, bedside chair)? 2  -MB     Climbing 3-5 steps with a railing? 2  -MB     To walk in hospital room? 2  -MB     AM-PAC 6 Clicks Score (PT) 12  -MB     Highest Level of Mobility Goal 4 --> Transfer to chair/commode  -MB       Row Name 07/12/24 1633          Functional Assessment    Outcome Measure Options AM-PAC 6 Clicks Basic Mobility (PT)  -MB               User Key  (r) = Recorded By, (t) = Taken By, (c) = Cosigned By      Initials Name Provider Type    Iraj King, PT Physical Therapist                    Occupational Therapy Education       Title: PT OT SLP Therapies (In Progress)       Topic: Occupational Therapy (In Progress)       Point: ADL training (In Progress)       Description:   Instruct learner(s) on proper safety adaptation and remediation techniques during self care or transfers.   Instruct in proper use of assistive devices.                  Learning Progress Summary             Patient Acceptance, E,TB, NR by SR at 7/12/2024 1685                         Point: Precautions (Not Started)       Description:   Instruct learner(s) on prescribed precautions during self-care and functional transfers.                  Learner Progress:  Not documented in this visit.              Point: Body mechanics (In Progress)       Description:   Instruct learner(s) on proper positioning and spine alignment during self-care, functional mobility activities and/or exercises.                   Learning Progress Summary             Patient Acceptance, E,TB, NR by SR at 7/12/2024 1652                                         User Key       Initials Effective Dates Name Provider Type Discipline    SR 06/16/21 -  Sandra Arciniega, OT Occupational Therapist OT                  OT Recommendation and Plan  Planned Therapy Interventions (OT): activity tolerance training, BADL retraining, IADL retraining, functional balance retraining, neuromuscular control/coordination retraining, ROM/therapeutic exercise, transfer/mobility retraining, strengthening exercise, occupation/activity based interventions  Therapy Frequency (OT): 3 times/wk  Plan of Care Review  Outcome Evaluation: 68-year-old female who presents to [facility] after falling.  Patient had multiple imaging done including CT thoracic spine showing compression deformity of T10 with no evidence of compromise of the spinal cord.  Per neuro no surgery recommended no surgical intervention.  Pt is to wear TLSO while out of bed.  PMHx significant for hx CABG, hx PE, frequent UTI and hx A.fib on warfarin. PLOF obtained from previous PT evaluation: At baseline pt resides with spouse in multilevel home with 3 LEO. Pt typically (I) with ADLs, no use of AD for mobility, has RW uses infrequently, though has been requiring increased assist after recent hospital admission for UTI.  Pt required assist for bed mobility and donning brace at EOB.  Pt reports significant back pain while sitting.  Appears to improve slighlty with movement. Pt requiring signifiacntly increased assist and will require SNF at discharge.     Time Calculation:         Time Calculation- OT       Row Name 07/12/24 3351             Time Calculation- OT    OT Start Time 1445  -SR      OT Stop Time 1510  -SR      OT Time Calculation (min) 25 min  -SR      Total Timed Code Minutes- OT 0 minute(s)  -SR      OT Received On 07/12/24  -SR      OT - Next Appointment 07/15/24  -SR      OT Goal  Re-Cert Due Date 07/26/24  -SR                User Key  (r) = Recorded By, (t) = Taken By, (c) = Cosigned By      Initials Name Provider Type    SR Sandra Arciniega, OT Occupational Therapist                  Therapy Charges for Today       Code Description Service Date Service Provider Modifiers Qty    [id number] HC OT EVAL MOD COMPLEXITY 4 7/12/2024 Sandra Arciniega OT GO 1                 Sandra Arciniega OT  7/12/2024

## 2024-07-12 NOTE — CONSULTS
Referring Provider: Keke Casiano MD  Reason for Consultation:  Status post CABG  Hypotension.    Patient Care Team:  Luan Joseph APRN as PCP - General (Family Medicine)  Jozef Otero MD as Consulting Physician (Nephrology)    Chief complaint  Recent falls    Subjective .     History of present illness:  Laura Mejia is a 68 y.o. female who presents with history of recent slip and fall with significant back discomfort.  T10 closed wedge compression fracture of T10 vertebra.  Patient was seen by neurosurgery.  Patient was noted to have low blood pressure.  Patient was started on intravenous fluids.  Cardiology consultation was requested.  Patient is not having any cardiac symptoms such as chest pain shortness of breath dizziness etc.  No other associated aggravating or alleviating factors.      ROS      Today, the patient has been without any chest discomfort, shortness of breath, palpitations, dizziness or syncope.  Denies having any headache, abdominal pain, nausea, vomiting, diarrhea, constipation, loss of weight or loss of appetite.  Denies having any excessive bruising, hematuria or blood in the stool.    Review of all systems negative except as indicated      History  Past Medical History:   Diagnosis Date    AAA (abdominal aortic aneurysm)     infrarenal- 3.1cm(2010, 3.7x4.2cm(2016), 3.9cm (2017)    Allergic rhinitis     Aspiration pneumonia 05/2017    CHF (congestive heart failure)     Coronary artery disease     DDD (degenerative disc disease), lumbar     lumbar spine- Dr. Churchill     Depression     Diverticulosis     Frequent UTI     Dr. Daniels    GERD (gastroesophageal reflux disease)     Hiatal hernia     HSV (herpes simplex virus) infection     Oral    Hyperlipidemia     IBS (irritable bowel syndrome)     Constipation predominant    Ischemic cardiomyopathy 09/2017    AICD     Kidney stones     NSTEMI (non-ST elevated myocardial infarction) 04/26/2017    Obstructive sleep apnea 2011    [facility] [place]  2011, ahi 67    Osteoarthritis     Osteopenia     Peripheral neuropathy     feet    Pulmonary embolism, bilateral 05/2017    Rotator cuff tear     Bilateral- Ortho        Past Surgical History:   Procedure Laterality Date    CARDIAC CATHETERIZATION  04/26/2017    99% mid LAD. 90% mid LCX. 60% RCA. Recommended CABG. Dr. Diaz    CARDIAC DEFIBRILLATOR PLACEMENT  12/26/2017    ICD implant/ Dr. Ellis    CATARACT EXTRACTION      CORONARY ARTERY BYPASS GRAFT  04/26/2017    x4 & ASD Closure    CYSTOSCOPY  2002    negative. Dr. Daniels    LAPAROSCOPIC CHOLECYSTECTOMY  04/17/2013    For biliary dyskinesia. Dr. Mccoy.     REPLACEMENT TOTAL KNEE BILATERAL  11/2004    Dr. Herrera    THORACENTESIS Left 05/08/2017    TONSILLECTOMY      TUBAL ABDOMINAL LIGATION Bilateral        Family History   Problem Relation Age of Onset    Heart disease Mother     Other Mother         Hypoglycemia    Osteoporosis Mother     COPD Father     Stroke Father     Hypertension Brother     Diabetes Brother     Heart disease Brother        Social History     Tobacco Use    Smoking status: Never   Vaping Use    Vaping status: Some Days    Substances: CBD, nightly, 2-3 weekly   Substance Use Topics    Alcohol use: Never    Drug use: Never        Medications Prior to Admission   Medication Sig Dispense Refill Last Dose    topiramate (TOPAMAX) 50 MG tablet Take 1 tablet by mouth Daily. Indications: Migraine Headache   7/11/2024    apixaban (ELIQUIS) 5 MG tablet tablet Take 1 tablet by mouth 2 (Two) Times a Day. Indications: Atrial Fibrillation       aspirin-acetaminophen-caffeine (Excedrin Migraine) 250-250-65 MG per tablet Take 2 tablets by mouth Every 8 (Eight) Hours As Needed for Headache. Indications: Headache       atorvastatin (LIPITOR) 80 MG tablet Take 1 tablet by mouth Daily. Indications: High Amount of Fats in the Blood       cephalexin (Keflex) 500 MG capsule Take 1 capsule by mouth 4 (Four) Times a Day. (Patient not taking: Reported on  6/25/2024) 20 capsule 0     digoxin (LANOXIN) 125 MCG tablet Take 1 tablet by mouth Daily. 30 tablet 3     FLUCONAZOLE 100MG/5ML SUSP Take 200 mg by mouth Daily. Indications: Urinary Tract Infection       levothyroxine (SYNTHROID, LEVOTHROID) 88 MCG tablet Take 1 tablet by mouth Every Morning. 30 tablet 1     linaclotide (LINZESS) 72 MCG capsule capsule Take 1 capsule by mouth As Needed (Irritable Bowel Syndrome). Indications: Constipation caused by Irritable Bowel Syndrome       metoprolol succinate XL (TOPROL-XL) 25 MG 24 hr tablet Take 1 tablet by mouth Every 12 (Twelve) Hours. 60 tablet 3     pantoprazole (PROTONIX) 40 MG EC tablet Take 1 tablet by mouth Every Morning. 30 tablet 0     PARoxetine (PAXIL) 40 MG tablet Take 1 tablet by mouth Every Morning. Indications: Major Depressive Disorder            Oxytetracycline and Oxycodone    Scheduled Meds:apixaban, 5 mg, Oral, BID  atorvastatin, 80 mg, Oral, QAM  digoxin, 125 mcg, Oral, Daily  levothyroxine, 88 mcg, Oral, Q AM  lubiprostone, 8 mcg, Oral, BID  pantoprazole, 40 mg, Oral, Q AM  PARoxetine, 40 mg, Oral, Daily  sodium chloride, 10 mL, Intravenous, Q12H  topiramate, 50 mg, Oral, QAM      Continuous Infusions:dextrose 5 % and sodium chloride 0.9 %, 75 mL/hr, Last Rate: 75 mL/hr (07/12/24 1056)      PRN Meds:.  acetaminophen    senna-docusate sodium **AND** polyethylene glycol **AND** bisacodyl **AND** bisacodyl    Calcium Replacement - Follow Nurse / BPA Driven Protocol    HYDROcodone-acetaminophen    ibuprofen    Magnesium Standard Dose Replacement - Follow Nurse / BPA Driven Protocol    Morphine    ondansetron ODT **OR** ondansetron    Phosphorus Replacement - Follow Nurse / BPA Driven Protocol    Potassium Replacement - Follow Nurse / BPA Driven Protocol    sodium chloride    sodium chloride    sodium chloride    Objective     VITAL SIGNS  Vitals:    07/12/24 0615 07/12/24 0749 07/12/24 0821 07/12/24 1223   BP: 103/64      Pulse: 70      Resp:       Temp:  "  97.2 °F (36.2 °C) 97.5 °F (36.4 °C)   TempSrc:   Axillary Oral   SpO2: 100% 100%     Weight:       Height:           Flowsheet Rows      Flowsheet Row First Filed Value   Admission Height 172.7 cm (68\") Documented at 07/11/2024 1051   Admission Weight 87.8 kg (193 lb 9 oz) Documented at 07/11/2024 1051              Intake/Output Summary (Last 24 hours) at 7/12/2024 1239  Last data filed at 7/12/2024 0600  Gross per 24 hour   Intake 1480 ml   Output 800 ml   Net 680 ml        TELEMETRY: Sinus rhythm    Physical Exam:  The patient is alert, oriented and in no distress.  Vital signs as noted above.  Head and neck revealed no carotid bruits or jugular venous distention.  No thyromegaly or lymphadenopathy is present  Lungs clear.  No wheezing.  Breath sounds are normal bilaterally.  Heart normal first and second heart sounds. No murmur.  No precordial rub is present.  No gallop is present.  Abdomen soft and nontender.  No organomegaly is present.  Extremities with good peripheral pulses without any pedal edema.  Skin warm and dry.  Musculoskeletal system is grossly normal  CNS grossly normal    Reviewed and updated.    Results Review:   I reviewed the patient's new clinical results.  Lab Results (last 24 hours)       Procedure Component Value Units Date/Time    Blood Culture - Blood, Arm, Left [[id number]]  (Normal) Collected: 07/11/24 1153    Specimen: Blood from Arm, Left Updated: 07/12/24 1215     Blood Culture No growth at 24 hours    Blood Culture - Blood, Arm, Right [[id number]]  (Normal) Collected: 07/11/24 1114    Specimen: Blood from Arm, Right Updated: 07/12/24 1131     Blood Culture No growth at 24 hours    Basic Metabolic Panel [[id number]]  (Abnormal) Collected: 07/12/24 0515    Specimen: Blood Updated: 07/12/24 0548     Glucose 94 mg/dL      BUN 6 mg/dL      Creatinine 0.93 mg/dL      Sodium 139 mmol/L      Potassium 3.3 mmol/L      Comment: Specimen hemolyzed.  Result may be falsely elevated.        " Chloride 111 mmol/L      CO2 18.6 mmol/L      Calcium 8.2 mg/dL      BUN/Creatinine Ratio 6.5     Anion Gap 9.4 mmol/L      eGFR 66.7 mL/min/1.73     Narrative:      GFR Normal >60  Chronic Kidney Disease <60  Kidney Failure <15      Magnesium [[id number]]  (Normal) Collected: 07/12/24 0515    Specimen: Blood Updated: 07/12/24 0548     Magnesium 1.7 mg/dL     CBC & Differential [[id number]]  (Abnormal) Collected: 07/12/24 0515    Specimen: Blood Updated: 07/12/24 0523    Narrative:      The following orders were created for panel order CBC & Differential.  Procedure                               Abnormality         Status                     ---------                               -----------         ------                     CBC Auto Differential[[id number]]        Abnormal            Final result                 Please view results for these tests on the individual orders.    CBC Auto Differential [[id number]]  (Abnormal) Collected: 07/12/24 0515    Specimen: Blood Updated: 07/12/24 0523     WBC 8.39 10*3/mm3      RBC 3.33 10*6/mm3      Hemoglobin 9.8 g/dL      Hematocrit 33.2 %      MCV 99.7 fL      MCH 29.4 pg      MCHC 29.5 g/dL      RDW 16.1 %      RDW-SD 60.1 fl      MPV 10.4 fL      Platelets 192 10*3/mm3      Neutrophil % 81.0 %      Lymphocyte % 9.3 %      Monocyte % 4.9 %      Eosinophil % 3.0 %      Basophil % 1.2 %      Immature Grans % 0.6 %      Neutrophils, Absolute 6.80 10*3/mm3      Lymphocytes, Absolute 0.78 10*3/mm3      Monocytes, Absolute 0.41 10*3/mm3      Eosinophils, Absolute 0.25 10*3/mm3      Basophils, Absolute 0.10 10*3/mm3      Immature Grans, Absolute 0.05 10*3/mm3      nRBC 0.0 /100 WBC     POC Glucose Once [[id number]]  (Normal) Collected: 07/12/24 0053    Specimen: Blood Updated: 07/12/24 0056     Glucose 94 mg/dL      Comment: Serial Number: [id number]:  [id number]       Reticulocytes [[id number]]  (Normal) Collected: 07/11/24 1938    Specimen: Blood Updated: 07/11/24 2037      Reticulocyte % 1.54 %      Reticulocyte Absolute 0.0462 10*6/mm3     CBC & Differential [[id number]]  (Abnormal) Collected: 07/11/24 1938    Specimen: Blood Updated: 07/11/24 1947    Narrative:      The following orders were created for panel order CBC & Differential.  Procedure                               Abnormality         Status                     ---------                               -----------         ------                     CBC Auto Differential[[id number]]        Abnormal            Final result                 Please view results for these tests on the individual orders.    CBC Auto Differential [[id number]]  (Abnormal) Collected: 07/11/24 1938    Specimen: Blood Updated: 07/11/24 1947     WBC 7.60 10*3/mm3      RBC 2.97 10*6/mm3      Hemoglobin 8.8 g/dL      Comment: Result checked          Hematocrit 29.7 %      .0 fL      MCH 29.6 pg      MCHC 29.6 g/dL      RDW 15.9 %      RDW-SD 59.1 fl      MPV 10.2 fL      Platelets 189 10*3/mm3      Neutrophil % 82.9 %      Lymphocyte % 8.9 %      Monocyte % 4.6 %      Eosinophil % 1.7 %      Basophil % 1.2 %      Immature Grans % 0.7 %      Neutrophils, Absolute 6.30 10*3/mm3      Lymphocytes, Absolute 0.68 10*3/mm3      Monocytes, Absolute 0.35 10*3/mm3      Eosinophils, Absolute 0.13 10*3/mm3      Basophils, Absolute 0.09 10*3/mm3      Immature Grans, Absolute 0.05 10*3/mm3      nRBC 0.0 /100 WBC     POC Glucose Once [[id number]]  (Normal) Collected: 07/11/24 1927    Specimen: Blood Updated: 07/11/24 1929     Glucose 80 mg/dL      Comment: Serial Number: [id number]:  [id number]       POC Glucose Once [[id number]]  (Abnormal) Collected: 07/11/24 1904    Specimen: Blood Updated: 07/11/24 1906     Glucose 65 mg/dL      Comment: Serial Number: [id number]:  [id number]       Magnesium [[id number]]  (Normal) Collected: 07/11/24 1810    Specimen: Blood Updated: 07/11/24 1854     Magnesium 1.6 mg/dL     Basic Metabolic Panel [[id number]]  (Abnormal)  Collected: 07/11/24 1810    Specimen: Blood Updated: 07/11/24 1854     Glucose 69 mg/dL      BUN 7 mg/dL      Creatinine 0.90 mg/dL      Sodium 137 mmol/L      Potassium 3.8 mmol/L      Chloride 110 mmol/L      CO2 17.2 mmol/L      Calcium 7.9 mg/dL      BUN/Creatinine Ratio 7.8     Anion Gap 9.8 mmol/L      eGFR 69.3 mL/min/1.73     Narrative:      GFR Normal >60  Chronic Kidney Disease <60  Kidney Failure <15      Protime-INR [[id number]]  (Abnormal) Collected: 07/11/24 1114    Specimen: Blood Updated: 07/11/24 1407     Protime 11.4 Seconds      INR 1.05    Single High Sensitivity Troponin T [[id number]]  (Abnormal) Collected: 07/11/24 1114    Specimen: Blood Updated: 07/11/24 1246     HS Troponin T 20 ng/L     Narrative:      High Sensitive Troponin T Reference Range:  <14.0 ng/L- Negative Female for AMI  <22.0 ng/L- Negative Male for AMI  >=14 - Abnormal Female indicating possible myocardial injury.  >=22 - Abnormal Male indicating possible myocardial injury.   Clinicians would have to utilize clinical acumen, EKG, Troponin, and serial changes to determine if it is an Acute Myocardial Infarction or myocardial injury due to an underlying chronic condition.         Digoxin Level [[id number]]  (Normal) Collected: 07/11/24 1114    Specimen: Blood Updated: 07/11/24 1246     Digoxin 0.60 ng/mL             Imaging Results (Last 24 Hours)       Procedure Component Value Units Date/Time    CT Thoracic Spine Without Contrast [[id number]] Collected: 07/11/24 1327     Updated: 07/11/24 1334    Narrative:      CT THORACIC SPINE WO CONTRAST    Date of Exam: 7/11/2024 12:59 PM EDT    Indication: fall.    Comparison: Plain films of 9823.    Technique: Axial CT images were obtained of the thoracic spine without contrast administration.  Sagittal and coronal reconstructions were performed.  Automated exposure control and iterative reconstruction methods were used.      Findings:  There is osteoporosis. There is marked diffuse  flattening of T10 which has occurred since the prior plain films. The bone is sclerotic and irregular and some fracture fragments are displaced anteriorly. The fracture site appears most likely acute. There   is no extension of fracture into the posterior elements. There is minimal retropulsion of T10 resulting in mild relative spinal stenosis at this level. There is old moderately severe wedging of T7 and T8. There is accentuation of kyphotic curvature   centered at T10. Remaining thoracic vertebral body heights are maintained.      Impression:      Compression deformity of T10 appears most likely acute. No evidence of compromise of spinal canal contents. Old wedging of T7 and T8.      Electronically Signed: Monica George MD    7/11/2024 1:32 PM EDT    Workstation ID: [id number]    CT Lumbar Spine Without Contrast [[id number]] Collected: 07/11/24 1322     Updated: 07/11/24 1329    Narrative:      CT LUMBAR SPINE WO CONTRAST    Date of Exam: 7/11/2024 12:59 PM EDT    Indication: fall.    Comparison: Plain films of 9/8/2023.    Technique: Axial CT images were obtained of the lumbar spine without contrast administration.  Sagittal and coronal reconstructions were performed.  Automated exposure control and iterative reconstruction methods were used.      Findings:  Moderately severe biconcave wedging of L1 is stable as compared to the prior plain films. There is new mild concave central wedging of the superior endplate of L5. L2-L4 vertebral body heights are maintained. There is stable mild grade 1   spondylolisthesis of L4 on L5. There is osteoporosis. There is disc bulge at L2-3 with moderate right neural foraminal stenosis and mild to moderate spinal stenosis. There is disc bulge with facet joint hypertrophy at L3-4 resulting in moderate degree of   spinal stenosis. There is disc bulge with facet joint hypertrophy at L4-5 with moderately severe spinal stenosis and left neural foraminal stenosis. There is no  significant spinal stenosis at L5-S1.      Impression:      Impression:  Old wedging of L1. Mild concave wedging of the superior aspect of L5 has occurred since the prior plain film of 9/8/2023 although is overall very indeterminate age. Degenerative disc disease as detailed with areas of spinal stenosis and neural foraminal   encroachment as outlined above.        Electronically Signed: Monica George MD    7/11/2024 1:27 PM EDT    Workstation ID: [id number]    CT Cervical Spine Without Contrast [[id number]] Collected: 07/11/24 1317     Updated: 07/11/24 1323    Narrative:        CT CERVICAL SPINE WO CONTRAST    Date of Exam: 7/11/2024 12:59 PM EDT    Indication: fall.    Comparison: None available.    Technique: Axial CT images were obtained of the cervical spine without contrast administration.  Sagittal and coronal reconstructions were performed.  Automated exposure control and iterative reconstruction methods were used.      Findings:  There are normal vertebral body heights and disc heights. There is normal alignment. There are no fractures. There is facet joint hypertrophy resulting in moderate left and mild right neural foraminal stenosis at C3-4 and C4-5. There is moderately severe   left neural foraminal stenosis at C5-6 and mild left neural foraminal stenosis at C6/7.    Impression:      Impression:  Chronic findings. No acute process.      Electronically Signed: Monica George MD    7/11/2024 1:21 PM EDT    Workstation ID: [id number]    CT Head Without Contrast [[id number]] Collected: 07/11/24 1315     Updated: 07/11/24 1319    Narrative:      CT HEAD WO CONTRAST    Date of Exam: 7/11/2024 12:59 PM EDT    Indication: fall and contusion on back of head.    Comparison: 5/16/2024.    Technique: Axial CT images were obtained of the head without contrast administration.  Coronal reconstructions were performed.  Automated exposure control and iterative reconstruction methods were used.      Findings:  There is  moderately severe atrophy. Mild ventricular dilatation is compatible with atrophy. There is no acute mass effect or edema. There is no acute CVA or hemorrhage. Mild periventricular hypodensity likely relates to chronic small vessel ischemic   insult. There is a stable calcification in the right evens. Visualized portions of paranasal sinuses are clear.      Impression:      Impression:  Chronic findings. No acute process.      Electronically Signed: Monica George MD    7/11/2024 1:16 PM EDT    Workstation ID: [id number]        LAB RESULTS (LAST 7 DAYS)    CBC  Results from last 7 days   Lab Units 07/12/24  0515 07/11/24  1938 07/11/24  1114   WBC 10*3/mm3 8.39 7.60 15.22*   RBC 10*6/mm3 3.33* 2.97* 4.02   HEMOGLOBIN g/dL 9.8* 8.8* 11.9*   HEMATOCRIT % 33.2* 29.7* 38.3   MCV fL 99.7* 100.0* 95.3   PLATELETS 10*3/mm3 192 189 316       BMP  Results from last 7 days   Lab Units 07/12/24  0515 07/11/24  1810 07/11/24  1114   SODIUM mmol/L 139 137 135*   POTASSIUM mmol/L 3.3* 3.8 3.7   CHLORIDE mmol/L 111* 110* 101   CO2 mmol/L 18.6* 17.2* 18.5*   BUN mg/dL 6* 7* 8   CREATININE mg/dL 0.93 0.90 1.13*   GLUCOSE mg/dL 94 69 86   MAGNESIUM mg/dL 1.7 1.6  --        CMP   Results from last 7 days   Lab Units 07/12/24  0515 07/11/24  1810 07/11/24  1114   SODIUM mmol/L 139 137 135*   POTASSIUM mmol/L 3.3* 3.8 3.7   CHLORIDE mmol/L 111* 110* 101   CO2 mmol/L 18.6* 17.2* 18.5*   BUN mg/dL 6* 7* 8   CREATININE mg/dL 0.93 0.90 1.13*   GLUCOSE mg/dL 94 69 86   ALBUMIN g/dL  --   --  3.9   BILIRUBIN mg/dL  --   --  0.6   ALK PHOS U/L  --   --  155*   AST (SGOT) U/L  --   --  48*   ALT (SGPT) U/L  --   --  19         BNP        TROPONIN  Results from last 7 days   Lab Units 07/11/24  1114   HSTROP T ng/L 20*       CoAg  Results from last 7 days   Lab Units 07/11/24  1114   INR  1.05*       Creatinine Clearance  Estimated Creatinine Clearance: 65.2 mL/min (by C-G formula based on SCr of 0.93 mg/dL).    ABG        Radiology  CT Thoracic  Spine Without Contrast    Result Date: 7/11/2024  Compression deformity of T10 appears most likely acute. No evidence of compromise of spinal canal contents. Old wedging of T7 and T8. Electronically Signed: Monica George MD  7/11/2024 1:32 PM EDT  Workstation ID: [id number]    CT Lumbar Spine Without Contrast    Result Date: 7/11/2024  Impression: Old wedging of L1. Mild concave wedging of the superior aspect of L5 has occurred since the prior plain film of 9/8/2023 although is overall very indeterminate age. Degenerative disc disease as detailed with areas of spinal stenosis and neural foraminal encroachment as outlined above. Electronically Signed: Monica George MD  7/11/2024 1:27 PM EDT  Workstation ID: [id number]    CT Cervical Spine Without Contrast    Result Date: 7/11/2024  Impression: Chronic findings. No acute process. Electronically Signed: Monica George MD  7/11/2024 1:21 PM EDT  Workstation ID: [id number]    CT Head Without Contrast    Result Date: 7/11/2024  Impression: Chronic findings. No acute process. Electronically Signed: Monica George MD  7/11/2024 1:16 PM EDT  Workstation ID: [id number]    XR Chest 1 View    Result Date: 7/11/2024  Impression: 1.No acute radiographic abnormality is identified. Electronically Signed: Roberto Huddleston MD  7/11/2024 11:52 AM EDT  Workstation ID: [id number]       EKG            I personally viewed and interpreted the patient's EKG/Telemetry data: Sinus rhythm    ECHOCARDIOGRAM:    Results for orders placed during the hospital encounter of 01/22/24    Adult Transthoracic Echo Complete W/ Cont if Necessary Per Protocol    Interpretation Summary    Left ventricular ejection fraction appears to be 56 - 60%.    Estimated right ventricular systolic pressure from tricuspid regurgitation is normal (<35 mmHg).    Indication  Dyspnea  Palpitations    Technically satisfactory study.  Mitral valve is structurally normal.  Tricuspid valve is structurally normal.  Mild tricuspid  regurgitation is present.  Aortic valve is aortic valve with decreased opening motion.  Gradient across the aortic valve is 16/9 mmHg.  Valve area 1.37 cm².  Pulmonic valve could not be well visualized.  Left atrium is enlarged.  Right atrium is normal in size.  Left ventricle is enlarged with septal anterior wall and apical severe hypokinesis with ejection fraction of 40%.  Possible left ventricular apical thrombus.  Right ventricle is normal in size.  Atrial septum is intact.  Aorta is normal.  No pericardial effusion or intracardiac thrombus is seen.  ICD lead is present in the right ventricle.    Impression  Mild tricuspid regurgitation.  Mild to moderate aortic valve stenosis with gradient of 16/9 mmHg and valve area of 1.37 cm².  Left atrial enlargement.  Left ventricle is enlarged with septal anterior wall and apical severe hypokinesis with ejection fraction of 40%.  Possible left ventricular apical thrombus.  ICD lead is present in the right ventricle.  No evidence for pulmonary hypertension is present.      STRESS TEST  Results for orders placed during the hospital encounter of 01/22/24    Stress Test With Myocardial Perfusion One Day    Interpretation Summary  Indications  Status post CABG  Atrial fibrillation    This study was performed under the direct supervision of Rohini NOLASCO.    Resting ECG  Sinus rhythm    The patient was injected with Lexiscan intravenously while constantly monitoring electrocardiogram and vital signs.  Patient did not have any chest discomfort ST abnormalities or ectopy with injection of Lexiscan.    Cardiolite was used as an imaging agent.    Cardiolite images showed significantly decreased radionuclide uptake in the proximal posterior segments with partial redistribution in the resting images.    Gated SPECT images revealed normal left ventricle size and diffuse hypocontractility with calculated ejection fraction of 61%.    Impression  ========  Lexiscan Cardiolite test revealed  proximal posterior infarction and ischemia.  Gated SPECT images revealed normal left ventricular size and diffuse hypocontractility with calculated ejection fraction of 61%.        Cardiolite (Tc-99m sestamibi) stress test    HEART CATHETERIZATION  No results found for this or any previous visit.      OTHER:     Assessment & Plan     Principal Problem:    Hypotension due to hypovolemia  Active Problems:    Closed wedge compression fracture of T10 vertebra    Generalized weakness    Leukocytosis    Multiple falls    ]]]]]]]]]]]]]]]]]  History  =========  -Recent mechanical fall (slip and fall)-T10 compression fracture.  Neurosurgery is following.    - Past history of palpitations  Sinus tachycardia and premature atrial contractions.  Intermittent atrial fibrillation is present.  Patient is in sinus rhythm 6/3/2024  Sinus rhythm 7/11/2024.     - History of atrial fibrillation with RVR.  Patient had postop atrial fibrillation after she had CABG in 2017.  Patient is maintaining sinus rhythm.     - Anticoagulation-on Eliquis..     - Status post CABG and ASD closure 2017     - Ischemic cardiomyopathy     -Moderate aortic valve stenosis     - Status post ICD (single-chamber)-[id number].     -History of stroke.  Tiny acute to subacute cortical left frontal stroke on MRI 5/17/2024.      - Hypothyroidism dyslipidemia obstructive sleep apnea diabetes     - Past history of pulmonary emboli     - CKD 3  20/1.7-1/22/2024 17/1.08-5/19/2024.     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 There is normal antegrade color Doppler flow including the common iliac arteries. There is thrombus within the aneurysm sac. Dimensions are discussed in detail above.     - Family history of coronary artery disease     - Non-smoker     - Allergic to  oxytetracycline and oxycodone.     Stress Cardiolite test-1/24/2024  Lexiscan Cardiolite test revealed proximal posterior infarction and ischemia.  Gated SPECT images revealed normal left ventricular size and diffuse hypocontractility with calculated ejection fraction of 61%.     Echocardiogram 1/23/2024 revealed  Mild tricuspid regurgitation.  Mild to moderate aortic valve stenosis with gradient of 16/9 mmHg and valve area of 1.37 cm².  Left atrial enlargement.  Left ventricle is enlarged with septal anterior wall and apical severe hypokinesis with ejection fraction of 40%.  Possible left ventricular apical thrombus.  ICD lead is present in the right ventricle.  No evidence for pulmonary hypertension is present.  =============  Plan  =============  Recent mechanical fall (slip and fall)-T10 compression fracture.  Neurosurgery is following.  No cardiac symptoms.  Patient has low blood pressure which is better with intravenous fluids.  EKG showed sinus rhythm without ischemic changes    Status post CABG and ASD closure 2017.  Patient is not having any angina pectoris or congestive heart failure.     Past history of history of A-fib with RVR.     Ischemic cardiomyopathy      Mild to moderate aortic valve stenosis     Echocardiogram 1/23/2024-as above     Stress Cardiolite test-1/24/2024-as above     Status post ICD ([employer]) 2017.  Single-chamber.     History of left ventricular possible apical thrombus.  Continue anticoagulation.  Consider MERLE in the future if needed.  Patient is already anticoagulated.     Anticoagulation  Patient is on Eliquis    History of renal dysfunction-improved.    6/0.93-7/12/2024.    History of stroke.  Tiny acute to subacute cortical left frontal stroke on MRI 5/17/2024.     Medications were reviewed and updated.  Patient is on Eliquis and atorvastatin Lanoxin levothyroxine Amitiza pantoprazole topiramate    Further plan will depend on patient's progress.    Reviewed and updated  7/12/2024.  ]]]]]]]]]]]]]]]]]]]]]           Allie Hurley MD  07/12/24  12:39 EDT

## 2024-07-12 NOTE — PLAN OF CARE
Goal Outcome Evaluation:   Pt did fine throughout the night. Spoke to Dr. Angela due to pt having low blood sugar and not eating dinner, stated to switch pt from  NS to D5 NS at 75ml/hr. BP has been on the softer side, but pt stated primary care provider wants her BP low. Other vitals were stable.

## 2024-07-12 NOTE — CONSULTS
Nutrition Services    Patient Name: Laura Mejia  YOB: 1955  MRN: [id number]  Admission date: 7/11/2024    Comment:  -- Continue current diet and encourage good PO intakes    -- Add Boost Plus BID (Provides 720 kcals, 28 g protein if consumed)         CLINICAL NUTRITION ASSESSMENT      Reason for Assessment 7/12: MST of 3      H&P      Past Medical History:   Diagnosis Date    AAA (abdominal aortic aneurysm)     infrarenal- 3.1cm(2010, 3.7x4.2cm(2016), 3.9cm (2017)    Allergic rhinitis     Aspiration pneumonia 05/2017    CHF (congestive heart failure)     Coronary artery disease     DDD (degenerative disc disease), lumbar     lumbar spine- Dr. Churchill     Depression     Diverticulosis     Frequent UTI     Dr. Daniels    GERD (gastroesophageal reflux disease)     Hiatal hernia     HSV (herpes simplex virus) infection     Oral    Hyperlipidemia     IBS (irritable bowel syndrome)     Constipation predominant    Ischemic cardiomyopathy 09/2017    AICD     Kidney stones     NSTEMI (non-ST elevated myocardial infarction) 04/26/2017    Obstructive sleep apnea 2011    nfsg 2011, ahi 68    Osteoarthritis     Osteopenia     Peripheral neuropathy     feet    Pulmonary embolism, bilateral 05/2017    Rotator cuff tear     Bilateral- Ortho        Past Surgical History:   Procedure Laterality Date    CARDIAC CATHETERIZATION  04/26/2017    99% mid LAD. 90% mid LCX. 60% RCA. Recommended CABG. Dr. Diaz    CARDIAC DEFIBRILLATOR PLACEMENT  12/26/2017    ICD implant/ Dr. Ellis    CATARACT EXTRACTION      CORONARY ARTERY BYPASS GRAFT  04/26/2017    x4 & ASD Closure    CYSTOSCOPY  2002    negative. Dr. Daniels    LAPAROSCOPIC CHOLECYSTECTOMY  04/17/2013    For biliary dyskinesia. Dr. Mccoy.     REPLACEMENT TOTAL KNEE BILATERAL  11/2004    Dr. Herrera    THORACENTESIS Left 05/08/2017    TONSILLECTOMY      TUBAL ABDOMINAL LIGATION Bilateral         Current Problems   Hypotension due to hypovolemia  - cardiology  "following    Closed wedge compression fracture of T10 vertebra  - Neurosurgery following    Generalized weakness    Leukocytosis    Multiple falls    CAD    GERD    HLD    ANNIE       Encounter Information        Trending Narrative     7/12: Admitted for frequent falls.  RD visited patient at bedside.  Patient reports decrease in appetite, nausea, no chewing/swallowing issues noted, recent weight loss of 30-45#.  NFPE completed and not consistent with nutrition diagnosis of malnutrition at this time using AND/ASPEN criteria.  Noted with Boost Breeze at bedside table that patient states tastes ok.         Anthropometrics        Current Height, Weight Height: 172.7 cm (68\")  Weight: 72.3 kg (159 lb 6.3 oz) (07/11/24 1800)       Usual Body Weight (UBW) Unable to obtain from patient        Trending Weight Hx     This admission: 7/12: 159#             PTA: 11.6% weight loss x 6 months - significant     Wt Readings from Last 30 Encounters:   07/11/24 1800 72.3 kg (159 lb 6.3 oz)   07/11/24 1145 71.4 kg (157 lb 6.4 oz)   07/11/24 1051 87.8 kg (193 lb 9 oz)   06/23/24 1045 87.8 kg (193 lb 9.7 oz)   06/19/24 0810 87.2 kg (192 lb 4.2 oz)   06/18/24 0912 87.5 kg (192 lb 14.8 oz)   06/18/24 0912 88.3 kg (194 lb 11.7 oz)   06/15/24 0923 87.7 kg (193 lb 5.1 oz)   06/15/24 0923 87.6 kg (193 lb 0.9 oz)   06/14/24 2254 88.5 kg (195 lb 3.1 oz)   06/14/24 2254 88.5 kg (195 lb 2.4 oz)   06/14/24 2254 88.4 kg (194 lb 14.9 oz)   06/14/24 2251 88.6 kg (195 lb 3.5 oz)   06/14/24 1247 75.1 kg (165 lb 8.3 oz)   06/13/24 1721 88.2 kg (194 lb 5.4 oz)   06/13/24 1557 75.6 kg (166 lb 10 oz)   06/06/24 1051 76.2 kg (168 lb)   06/02/24 0401 79.3 kg (174 lb 13.2 oz)   05/30/24 2126 81.7 kg (180 lb 1.9 oz)   05/29/24 1129 77.1 kg (170 lb)   05/29/24 1123 77.1 kg (170 lb)   05/18/24 0057 87.8 kg (193 lb 9 oz)   05/16/24 2223 83.6 kg (184 lb 4.9 oz)   05/16/24 1533 83.6 kg (184 lb 4.9 oz)   01/23/24 0730 81.6 kg (180 lb)   01/22/24 1240 81.6 kg (180 lb) "   09/08/23 0918 95.3 kg (210 lb 1.6 oz)   05/10/19 1048 97.1 kg (214 lb)   11/26/18 1418 95.5 kg (210 lb 9.6 oz)   11/06/18 1459 95.3 kg (210 lb)   11/05/18 1358 95.6 kg (210 lb 12.8 oz)   10/25/18 1310 95.6 kg (210 lb 12.8 oz)   10/23/18 1417 94.7 kg (208 lb 12.8 oz)   09/06/18 1359 94.5 kg (208 lb 6.1 oz)   07/11/18 1117 85.7 kg (189 lb)   06/11/18 1126 86 kg (189 lb 8.9 oz)   05/24/18 0835 88.2 kg (194 lb 6.1 oz)   05/11/18 0951 87.5 kg (193 lb)   05/03/18 1358 86 kg (189 lb 8 oz)   01/19/18 1138 86.8 kg (191 lb 6.4 oz)   12/28/17 0853 89.7 kg (197 lb 12.8 oz)   12/22/17 1015 92.1 kg (203 lb)   12/04/17 1005 91.6 kg (202 lb)   11/20/17 0805 88 kg (194 lb)   11/17/17 1124 89.4 kg (197 lb)   11/03/17 0813 88 kg (194 lb)   09/12/17 1137 93.3 kg (205 lb 12.8 oz)   08/24/17 0754 95.1 kg (209 lb 9.6 oz)   07/25/17 1017 90.7 kg (200 lb)   07/10/17 1500 93.2 kg (205 lb 6.4 oz)      BMI kg/m2 Body mass index is 24.24 kg/m².       Labs        Pertinent Labs    Results from last 7 days   Lab Units 07/12/24  0515 07/11/24  1810 07/11/24  1114   SODIUM mmol/L 139 137 135*   POTASSIUM mmol/L 3.3* 3.8 3.7   CHLORIDE mmol/L 111* 110* 101   CO2 mmol/L 18.6* 17.2* 18.5*   BUN mg/dL 6* 7* 8   CREATININE mg/dL 0.93 0.90 1.13*   CALCIUM mg/dL 8.2* 7.9* 10.0   BILIRUBIN mg/dL  --   --  0.6   ALK PHOS U/L  --   --  155*   ALT (SGPT) U/L  --   --  19   AST (SGOT) U/L  --   --  48*   GLUCOSE mg/dL 94 69 86     Results from last 7 days   Lab Units 07/12/24  0515 07/11/24  1938 07/11/24  1810   MAGNESIUM mg/dL 1.7  --  1.6   HEMOGLOBIN g/dL 9.8*   < >  --    HEMATOCRIT % 33.2*   < >  --     < > = values in this interval not displayed.     Lab Results   Component Value Date    HGBA1C 6.20 (H) 05/31/2024        Medications    Scheduled Medications apixaban, 5 mg, Oral, BID  atorvastatin, 80 mg, Oral, QAM  digoxin, 125 mcg, Oral, Daily  levothyroxine, 88 mcg, Oral, Q AM  lubiprostone, 8 mcg, Oral, BID  pantoprazole, 40 mg, Oral, Q  AM  PARoxetine, 40 mg, Oral, Daily  sodium chloride, 10 mL, Intravenous, Q12H  topiramate, 50 mg, Oral, QAM        Infusions dextrose 5 % and sodium chloride 0.9 %, 75 mL/hr, Last Rate: 75 mL/hr (07/12/24 1056)        PRN Medications   acetaminophen    senna-docusate sodium **AND** polyethylene glycol **AND** bisacodyl **AND** bisacodyl    Calcium Replacement - Follow Nurse / BPA Driven Protocol    HYDROcodone-acetaminophen    ibuprofen    Magnesium Standard Dose Replacement - Follow Nurse / BPA Driven Protocol    Morphine    ondansetron ODT **OR** ondansetron    Phosphorus Replacement - Follow Nurse / BPA Driven Protocol    Potassium Replacement - Follow Nurse / BPA Driven Protocol    sodium chloride    sodium chloride    sodium chloride     Physical Findings        Trending Physical   Appearance, NFPE 7/12: NFPE completed and not consistent with nutrition diagnosis of malnutrition at this time using AND/ASPEN criteria      --  Edema  No edema documented      Bowel Function No BM since admission (x 1 day ago)     Tubes No feeding tube      Chewing/Swallowing No known issues      Skin Skin tear      --  Current Nutrition Orders & Evaluation of Intake       Oral Nutrition     Food Allergies NKFA   Current PO Diet Diet: Cardiac; Healthy Heart (2-3 Na+); Fluid Consistency: Thin (IDDSI 0)   Supplement None ordered    PO Evaluation     Trending % PO Intake 7/12: 50% x 1 documented meal since admission    --  Nutritional Risk Screening        NRS-2002 Score          Nutrition Diagnosis         Nutrition Dx Problem 1 Unintentional weight loss related to intake less than needs as evidenced by 11.6% weight loss x 6 months.        Nutrition Dx Problem 2        Intervention Goal         Intervention Goal(s) Accept ONS  No weight loss  PO intakes at least 75%     Nutrition Intervention        RD Action Add ONS  Completed NFPE     Nutrition Prescription          Diet Prescription Heart Healthy    Supplement Prescription Boost  Plus BID    --  Monitor/Evaluation        Monitor Per protocol, I&O, PO intake, Supplement intake, Pertinent labs, Weight, Skin status, GI status, Symptoms, POC/GOC       Electronically signed by:  Alpa Gann RD  07/12/24 14:15 EDT

## 2024-07-12 NOTE — PROGRESS NOTES
LOS: 1 day   Patient Care Team:  Luan Joseph APRN as PCP - General (Family Medicine)  Jozef Otero MD as Consulting Physician (Nephrology)    Subjective     Interval History: BP improved    Patient Complaints: pt feeling better    History taken from: patient    Review of Systems   Constitutional:  Positive for activity change, appetite change, fatigue and unexpected weight change.   HENT: Negative.     Respiratory: Negative.     Cardiovascular: Negative.    Gastrointestinal: Negative.    Genitourinary:  Positive for frequency.   Musculoskeletal:  Positive for arthralgias, back pain and gait problem.   Neurological:  Positive for weakness.           Objective     Vital Signs  Temp:  [97.2 °F (36.2 °C)-99.2 °F (37.3 °C)] 97.2 °F (36.2 °C)  Heart Rate:  [59-89] 70  Resp:  [16-20] 20  BP: ()/(32-86) 103/64    Physical Exam:     General Appearance:    Alert, cooperative, in no acute distress, laying flat in bed   Head:    Normocephalic, without obvious abnormality, atraumatic   Eyes:            Lids and lashes normal, conjunctivae and sclerae normal, no   icterus, no pallor, corneas clear, PERRLA   Ears:    Ears appear intact with no abnormalities noted   Throat:   No oral lesions, no thrush, oral mucosa moist   Neck:   No adenopathy, supple, trachea midline, no thyromegaly, no   carotid bruit, no JVD   Lungs:     Clear to auscultation,respirations regular, even and                  unlabored    Heart:    Regular rhythm and normal rate, normal S1 and S2, no            murmur, no gallop, no rub, no click   Chest Wall:    No abnormalities observed   Abdomen:     Normal bowel sounds, no masses, no organomegaly, soft        non-tender, non-distended, no guarding, no rebound                tenderness   Extremities:   Moves all extremities well, no edema, no cyanosis, no             redness   Pulses:   Pulses palpable and equal bilaterally   Skin:   No bleeding, bruising or rash   Lymph nodes:   No palpable  adenopathy   Neurologic:   Cranial nerves 2 - 12 grossly intact, sensation intact, DTR       present and equal bilaterally        Results Review:    Lab Results (last 24 hours)       Procedure Component Value Units Date/Time    Basic Metabolic Panel [[id number]]  (Abnormal) Collected: 07/12/24 0515    Specimen: Blood Updated: 07/12/24 0548     Glucose 94 mg/dL      BUN 6 mg/dL      Creatinine 0.93 mg/dL      Sodium 139 mmol/L      Potassium 3.3 mmol/L      Comment: Specimen hemolyzed.  Result may be falsely elevated.        Chloride 111 mmol/L      CO2 18.6 mmol/L      Calcium 8.2 mg/dL      BUN/Creatinine Ratio 6.5     Anion Gap 9.4 mmol/L      eGFR 66.7 mL/min/1.73     Narrative:      GFR Normal >60  Chronic Kidney Disease <60  Kidney Failure <15      Magnesium [[id number]]  (Normal) Collected: 07/12/24 0515    Specimen: Blood Updated: 07/12/24 0548     Magnesium 1.7 mg/dL     CBC & Differential [[id number]]  (Abnormal) Collected: 07/12/24 0515    Specimen: Blood Updated: 07/12/24 0523    Narrative:      The following orders were created for panel order CBC & Differential.  Procedure                               Abnormality         Status                     ---------                               -----------         ------                     CBC Auto Differential[[id number]]        Abnormal            Final result                 Please view results for these tests on the individual orders.    CBC Auto Differential [[id number]]  (Abnormal) Collected: 07/12/24 0515    Specimen: Blood Updated: 07/12/24 0523     WBC 8.39 10*3/mm3      RBC 3.33 10*6/mm3      Hemoglobin 9.8 g/dL      Hematocrit 33.2 %      MCV 99.7 fL      MCH 29.4 pg      MCHC 29.5 g/dL      RDW 16.1 %      RDW-SD 60.1 fl      MPV 10.4 fL      Platelets 192 10*3/mm3      Neutrophil % 81.0 %      Lymphocyte % 9.3 %      Monocyte % 4.9 %      Eosinophil % 3.0 %      Basophil % 1.2 %      Immature Grans % 0.6 %      Neutrophils, Absolute 6.80 10*3/mm3       Lymphocytes, Absolute 0.78 10*3/mm3      Monocytes, Absolute 0.41 10*3/mm3      Eosinophils, Absolute 0.25 10*3/mm3      Basophils, Absolute 0.10 10*3/mm3      Immature Grans, Absolute 0.05 10*3/mm3      nRBC 0.0 /100 WBC     POC Glucose Once [[id number]]  (Normal) Collected: 07/12/24 0053    Specimen: Blood Updated: 07/12/24 0056     Glucose 94 mg/dL      Comment: Serial Number: [id number]:  [id number]       Reticulocytes [[id number]]  (Normal) Collected: 07/11/24 1938    Specimen: Blood Updated: 07/11/24 2037     Reticulocyte % 1.54 %      Reticulocyte Absolute 0.0462 10*6/mm3     CBC & Differential [[id number]]  (Abnormal) Collected: 07/11/24 1938    Specimen: Blood Updated: 07/11/24 1947    Narrative:      The following orders were created for panel order CBC & Differential.  Procedure                               Abnormality         Status                     ---------                               -----------         ------                     CBC Auto Differential[[id number]]        Abnormal            Final result                 Please view results for these tests on the individual orders.    CBC Auto Differential [[id number]]  (Abnormal) Collected: 07/11/24 1938    Specimen: Blood Updated: 07/11/24 1947     WBC 7.60 10*3/mm3      RBC 2.97 10*6/mm3      Hemoglobin 8.8 g/dL      Comment: Result checked          Hematocrit 29.7 %      .0 fL      MCH 29.6 pg      MCHC 29.6 g/dL      RDW 15.9 %      RDW-SD 59.1 fl      MPV 10.2 fL      Platelets 189 10*3/mm3      Neutrophil % 82.9 %      Lymphocyte % 8.9 %      Monocyte % 4.6 %      Eosinophil % 1.7 %      Basophil % 1.2 %      Immature Grans % 0.7 %      Neutrophils, Absolute 6.30 10*3/mm3      Lymphocytes, Absolute 0.68 10*3/mm3      Monocytes, Absolute 0.35 10*3/mm3      Eosinophils, Absolute 0.13 10*3/mm3      Basophils, Absolute 0.09 10*3/mm3      Immature Grans, Absolute 0.05 10*3/mm3      nRBC 0.0 /100 WBC     POC Glucose Once [[id number]]   (Normal) Collected: 07/11/24 1927    Specimen: Blood Updated: 07/11/24 1929     Glucose 80 mg/dL      Comment: Serial Number: [id number]:  [id number]       POC Glucose Once [[id number]]  (Abnormal) Collected: 07/11/24 1904    Specimen: Blood Updated: 07/11/24 1906     Glucose 65 mg/dL      Comment: Serial Number: [id number]:  [id number]       Magnesium [[id number]]  (Normal) Collected: 07/11/24 1810    Specimen: Blood Updated: 07/11/24 1854     Magnesium 1.6 mg/dL     Basic Metabolic Panel [[id number]]  (Abnormal) Collected: 07/11/24 1810    Specimen: Blood Updated: 07/11/24 1854     Glucose 69 mg/dL      BUN 7 mg/dL      Creatinine 0.90 mg/dL      Sodium 137 mmol/L      Potassium 3.8 mmol/L      Chloride 110 mmol/L      CO2 17.2 mmol/L      Calcium 7.9 mg/dL      BUN/Creatinine Ratio 7.8     Anion Gap 9.8 mmol/L      eGFR 69.3 mL/min/1.73     Narrative:      GFR Normal >60  Chronic Kidney Disease <60  Kidney Failure <15      Protime-INR [[id number]]  (Abnormal) Collected: 07/11/24 1114    Specimen: Blood Updated: 07/11/24 1407     Protime 11.4 Seconds      INR 1.05    Single High Sensitivity Troponin T [[id number]]  (Abnormal) Collected: 07/11/24 1114    Specimen: Blood Updated: 07/11/24 1246     HS Troponin T 20 ng/L     Narrative:      High Sensitive Troponin T Reference Range:  <14.0 ng/L- Negative Female for AMI  <22.0 ng/L- Negative Male for AMI  >=14 - Abnormal Female indicating possible myocardial injury.  >=22 - Abnormal Male indicating possible myocardial injury.   Clinicians would have to utilize clinical acumen, EKG, Troponin, and serial changes to determine if it is an Acute Myocardial Infarction or myocardial injury due to an underlying chronic condition.         Digoxin Level [[id number]]  (Normal) Collected: 07/11/24 1114    Specimen: Blood Updated: 07/11/24 1246     Digoxin 0.60 ng/mL     Respiratory Panel PCR w/COVID-19(SARS-CoV-2) [facility] In-House, NP Swab in UTM/VTM, 2 HR  TAT - Swab, Nasopharynx [[id number]]  (Normal) Collected: 07/11/24 1118    Specimen: Swab from Nasopharynx Updated: 07/11/24 1215     ADENOVIRUS, PCR Not Detected     Coronavirus 229E Not Detected     Coronavirus HKU1 Not Detected     Coronavirus NL63 Not Detected     Coronavirus OC43 Not Detected     COVID19 Not Detected     Human Metapneumovirus Not Detected     Human Rhinovirus/Enterovirus Not Detected     Influenza A PCR Not Detected     Influenza B PCR Not Detected     Parainfluenza Virus 1 Not Detected     Parainfluenza Virus 2 Not Detected     Parainfluenza Virus 3 Not Detected     Parainfluenza Virus 4 Not Detected     RSV, PCR Not Detected     Bordetella pertussis pcr Not Detected     Bordetella parapertussis PCR Not Detected     Chlamydophila pneumoniae PCR Not Detected     Mycoplasma pneumo by PCR Not Detected    Narrative:      In the setting of a positive respiratory panel with a viral infection PLUS a negative procalcitonin without other underlying concern for bacterial infection, consider observing off antibiotics or discontinuation of antibiotics and continue supportive care. If the respiratory panel is positive for atypical bacterial infection (Bordetella pertussis, Chlamydophila pneumoniae, or Mycoplasma pneumoniae), consider antibiotic de-escalation to target atypical bacterial infection.    Blood Culture - Blood, Arm, Left [[id number]] Collected: 07/11/24 1153    Specimen: Blood from Arm, Left Updated: 07/11/24 1204    Urinalysis, Microscopic Only - Straight Cath [[id number]]  (Abnormal) Collected: 07/11/24 1115    Specimen: Urine from Straight Cath Updated: 07/11/24 1157     RBC, UA 0-2 /HPF      WBC, UA 3-5 /HPF      Comment: Urine culture not indicated.        Bacteria, UA None Seen /HPF      Squamous Epithelial Cells, UA None Seen /HPF      Yeast, UA Small/1+ Yeast /HPF      Hyaline Casts, UA None Seen /LPF      Methodology Manual Light Microscopy    [place] Draw [[id number]] Collected: 07/11/24  1114    Specimen: Blood Updated: 07/11/24 1156    Narrative:      The following orders were created for panel order [place] Draw.  Procedure                               Abnormality         Status                     ---------                               -----------         ------                     Green Top (Gel)[[id number]]                                  Final result               Lavender Top[[id number]]                                     Final result               Gold Top - SST[[id number]]                                   Final result               Light Blue Top[[id number]]                                   Final result                 Please view results for these tests on the individual orders.    Green Top (Gel) [[id number]] Collected: 07/11/24 1114    Specimen: Blood Updated: 07/11/24 1156     Extra Tube HOLD    Comprehensive Metabolic Panel [[id number]]  (Abnormal) Collected: 07/11/24 1114    Specimen: Blood Updated: 07/11/24 1150     Glucose 86 mg/dL      BUN 8 mg/dL      Creatinine 1.13 mg/dL      Sodium 135 mmol/L      Potassium 3.7 mmol/L      Chloride 101 mmol/L      CO2 18.5 mmol/L      Calcium 10.0 mg/dL      Total Protein 7.5 g/dL      Albumin 3.9 g/dL      ALT (SGPT) 19 U/L      AST (SGOT) 48 U/L      Alkaline Phosphatase 155 U/L      Total Bilirubin 0.6 mg/dL      Globulin 3.6 gm/dL      A/G Ratio 1.1 g/dL      BUN/Creatinine Ratio 7.1     Anion Gap 15.5 mmol/L      eGFR 52.8 mL/min/1.73     Narrative:      GFR Normal >60  Chronic Kidney Disease <60  Kidney Failure <15      C-reactive Protein [[id number]]  (Abnormal) Collected: 07/11/24 1114    Specimen: Blood Updated: 07/11/24 1150     C-Reactive Protein 1.22 mg/dL     Urinalysis With Culture If Indicated - Straight Cath [[id number]]  (Abnormal) Collected: 07/11/24 1115    Specimen: Urine from Straight Cath Updated: 07/11/24 1145     Color, UA Yellow     Appearance, UA Clear     pH, UA 6.0     Specific Gravity, UA 1.007     Glucose, UA  Negative     Ketones, UA Negative     Bilirubin, UA Negative     Blood, UA Negative     Protein, UA Negative     Leuk Esterase, UA Trace     Nitrite, UA Negative     Urobilinogen, UA 1.0 E.U./dL    Narrative:      In absence of clinical symptoms, the presence of pyuria, bacteria, and/or nitrites on the urinalysis result does not correlate with infection.    Lavender Top [[id number]] Collected: 07/11/24 1114    Specimen: Blood Updated: 07/11/24 1131     Extra Tube hold for add-on     Comment: Auto resulted       Gold Top - SST [[id number]] Collected: 07/11/24 1114    Specimen: Blood Updated: 07/11/24 1131     Extra Tube Hold for add-ons.     Comment: Auto resulted.       Light Blue Top [[id number]] Collected: 07/11/24 1114    Specimen: Blood Updated: 07/11/24 1131     Extra Tube Hold for add-ons.     Comment: Auto resulted       CBC & Differential [[id number]]  (Abnormal) Collected: 07/11/24 1114    Specimen: Blood Updated: 07/11/24 1127    Narrative:      The following orders were created for panel order CBC & Differential.  Procedure                               Abnormality         Status                     ---------                               -----------         ------                     CBC Auto Differential[[id number]]        Abnormal            Final result                 Please view results for these tests on the individual orders.    CBC Auto Differential [[id number]]  (Abnormal) Collected: 07/11/24 1114    Specimen: Blood Updated: 07/11/24 1127     WBC 15.22 10*3/mm3      RBC 4.02 10*6/mm3      Hemoglobin 11.9 g/dL      Hematocrit 38.3 %      MCV 95.3 fL      MCH 29.6 pg      MCHC 31.1 g/dL      RDW 15.8 %      RDW-SD 55.8 fl      MPV 10.2 fL      Platelets 316 10*3/mm3      Neutrophil % 87.9 %      Lymphocyte % 5.6 %      Monocyte % 3.1 %      Eosinophil % 1.3 %      Basophil % 1.0 %      Immature Grans % 1.1 %      Neutrophils, Absolute 13.38 10*3/mm3      Lymphocytes, Absolute 0.85 10*3/mm3       Monocytes, Absolute 0.47 10*3/mm3      Eosinophils, Absolute 0.20 10*3/mm3      Basophils, Absolute 0.15 10*3/mm3      Immature Grans, Absolute 0.17 10*3/mm3      nRBC 0.0 /100 WBC     POC Lactate [[id number]]  (Normal) Collected: 07/11/24 1120    Specimen: Blood Updated: 07/11/24 1122     Lactate 1.1 mmol/L      Comment: Serial Number: [id number]:  [id number]       Blood Culture - Blood, Arm, Right [[id number]] Collected: 07/11/24 1114    Specimen: Blood from Arm, Right Updated: 07/11/24 1121             Imaging Results (Last 24 Hours)       Procedure Component Value Units Date/Time    CT Thoracic Spine Without Contrast [[id number]] Collected: 07/11/24 1327     Updated: 07/11/24 1334    Narrative:      CT THORACIC SPINE WO CONTRAST    Date of Exam: 7/11/2024 12:59 PM EDT    Indication: fall.    Comparison: Plain films of 9823.    Technique: Axial CT images were obtained of the thoracic spine without contrast administration.  Sagittal and coronal reconstructions were performed.  Automated exposure control and iterative reconstruction methods were used.      Findings:  There is osteoporosis. There is marked diffuse flattening of T10 which has occurred since the prior plain films. The bone is sclerotic and irregular and some fracture fragments are displaced anteriorly. The fracture site appears most likely acute. There   is no extension of fracture into the posterior elements. There is minimal retropulsion of T10 resulting in mild relative spinal stenosis at this level. There is old moderately severe wedging of T7 and T8. There is accentuation of kyphotic curvature   centered at T10. Remaining thoracic vertebral body heights are maintained.      Impression:      Compression deformity of T10 appears most likely acute. No evidence of compromise of spinal canal contents. Old wedging of T7 and T8.      Electronically Signed: Monica George MD    7/11/2024 1:32 PM EDT    Workstation ID: [id number]    CT Lumbar Spine  Without Contrast [[id number]] Collected: 07/11/24 1322     Updated: 07/11/24 1329    Narrative:      CT LUMBAR SPINE WO CONTRAST    Date of Exam: 7/11/2024 12:59 PM EDT    Indication: fall.    Comparison: Plain films of 9/8/2023.    Technique: Axial CT images were obtained of the lumbar spine without contrast administration.  Sagittal and coronal reconstructions were performed.  Automated exposure control and iterative reconstruction methods were used.      Findings:  Moderately severe biconcave wedging of L1 is stable as compared to the prior plain films. There is new mild concave central wedging of the superior endplate of L5. L2-L4 vertebral body heights are maintained. There is stable mild grade 1   spondylolisthesis of L4 on L5. There is osteoporosis. There is disc bulge at L2-3 with moderate right neural foraminal stenosis and mild to moderate spinal stenosis. There is disc bulge with facet joint hypertrophy at L3-4 resulting in moderate degree of   spinal stenosis. There is disc bulge with facet joint hypertrophy at L4-5 with moderately severe spinal stenosis and left neural foraminal stenosis. There is no significant spinal stenosis at L5-S1.      Impression:      Impression:  Old wedging of L1. Mild concave wedging of the superior aspect of L5 has occurred since the prior plain film of 9/8/2023 although is overall very indeterminate age. Degenerative disc disease as detailed with areas of spinal stenosis and neural foraminal   encroachment as outlined above.        Electronically Signed: Monica George MD    7/11/2024 1:27 PM EDT    Workstation ID: [id number]    CT Cervical Spine Without Contrast [[id number]] Collected: 07/11/24 1317     Updated: 07/11/24 1323    Narrative:        CT CERVICAL SPINE WO CONTRAST    Date of Exam: 7/11/2024 12:59 PM EDT    Indication: fall.    Comparison: None available.    Technique: Axial CT images were obtained of the cervical spine without contrast administration.  Sagittal  and coronal reconstructions were performed.  Automated exposure control and iterative reconstruction methods were used.      Findings:  There are normal vertebral body heights and disc heights. There is normal alignment. There are no fractures. There is facet joint hypertrophy resulting in moderate left and mild right neural foraminal stenosis at C3-4 and C4-5. There is moderately severe   left neural foraminal stenosis at C5-6 and mild left neural foraminal stenosis at C6/7.    Impression:      Impression:  Chronic findings. No acute process.      Electronically Signed: Monica George MD    7/11/2024 1:21 PM EDT    Workstation ID: [id number]    CT Head Without Contrast [[id number]] Collected: 07/11/24 1315     Updated: 07/11/24 1319    Narrative:      CT HEAD WO CONTRAST    Date of Exam: 7/11/2024 12:59 PM EDT    Indication: fall and contusion on back of head.    Comparison: 5/16/2024.    Technique: Axial CT images were obtained of the head without contrast administration.  Coronal reconstructions were performed.  Automated exposure control and iterative reconstruction methods were used.      Findings:  There is moderately severe atrophy. Mild ventricular dilatation is compatible with atrophy. There is no acute mass effect or edema. There is no acute CVA or hemorrhage. Mild periventricular hypodensity likely relates to chronic small vessel ischemic   insult. There is a stable calcification in the right evens. Visualized portions of paranasal sinuses are clear.      Impression:      Impression:  Chronic findings. No acute process.      Electronically Signed: Monica George MD    7/11/2024 1:16 PM EDT    Workstation ID: [id number]    XR Chest 1 View [[id number]] Collected: 07/11/24 1151     Updated: 07/11/24 1154    Narrative:      XR CHEST 1 VW    Date of Exam: 7/11/2024 11:45 AM EDT    Indication: Simple Sepsis Protocol    Comparison: Chest x-ray dated 5/29/2024    Findings:  Patient is status post median sternotomy and  CABG. Left chest wall pacemaker is present. There is mild left basilar atelectasis/scarring. Lungs are otherwise adequately aerated. No significant pleural effusion or pneumothorax. Cardiac silhouette is   unremarkable. No acute osseous lesion is seen. There are senescent changes of the skeletal structures. Bones are demineralized. Right shoulder arthroplasty is noted. Abdominal aortic stent graft is partially visualized.      Impression:      Impression:  1.No acute radiographic abnormality is identified.      Electronically Signed: Roberto Huddleston MD    7/11/2024 11:52 AM EDT    Workstation ID: [id number]                 I reviewed the patient's new clinical results.    Medication Review:   Scheduled Meds:apixaban, 5 mg, Oral, BID  atorvastatin, 80 mg, Oral, QAM  digoxin, 125 mcg, Oral, Daily  levothyroxine, 88 mcg, Oral, Q AM  lubiprostone, 8 mcg, Oral, BID  pantoprazole, 40 mg, Oral, Q AM  PARoxetine, 40 mg, Oral, Daily  potassium chloride ER, 40 mEq, Oral, Q4H  sodium chloride, 10 mL, Intravenous, Q12H  topiramate, 50 mg, Oral, QAM      Continuous Infusions:dextrose 5 % and sodium chloride 0.9 %, 75 mL/hr, Last Rate: 75 mL/hr (07/11/24 2134)      PRN Meds:.  acetaminophen    senna-docusate sodium **AND** polyethylene glycol **AND** bisacodyl **AND** bisacodyl    Calcium Replacement - Follow Nurse / BPA Driven Protocol    HYDROcodone-acetaminophen    Magnesium Standard Dose Replacement - Follow Nurse / BPA Driven Protocol    Morphine    ondansetron ODT **OR** ondansetron    Phosphorus Replacement - Follow Nurse / BPA Driven Protocol    Potassium Replacement - Follow Nurse / BPA Driven Protocol    sodium chloride    sodium chloride    sodium chloride     Assessment & Plan       Hypotension due to hypovolemia    Closed wedge compression fracture of T10 vertebra    Generalized weakness    Leukocytosis    Multiple falls    Hypotension with hypovolemia- improved with IVF.  Consult cardiology.  Beta blocker on  hold  Ischemic cardiomyopathy/CHF  Closed wedge compression fracture of T10 vertebra - TLSO brace.  Seen by neurosurgery.  No intervention at this time.  F/u with them in 2 weeks.  PT/OT  Generalized weakness - PT/OT  UTI - abx and diflucan  Multiple falls - PT/OT  CAD  GERD  HLD  ANNIE    DVT prophylaxis- SCD's  GI prophylaxis- ppi    Plan for disposition:ELLIOT Hatfield MD  07/12/24  09:38 EDT

## 2024-07-13 ENCOUNTER — READMISSION MANAGEMENT (OUTPATIENT)
Dept: CALL CENTER | Facility: HOSPITAL | Age: 69
End: 2024-07-13
Payer: MEDICARE

## 2024-07-13 ENCOUNTER — TRANSCRIBE ORDERS (OUTPATIENT)
Dept: HOME HEALTH SERVICES | Facility: HOME HEALTHCARE | Age: 69
End: 2024-07-13
Payer: MEDICARE

## 2024-07-13 VITALS
DIASTOLIC BLOOD PRESSURE: 68 MMHG | WEIGHT: 159.39 LBS | BODY MASS INDEX: 24.16 KG/M2 | RESPIRATION RATE: 20 BRPM | HEIGHT: 68 IN | TEMPERATURE: 97.1 F | OXYGEN SATURATION: 92 % | SYSTOLIC BLOOD PRESSURE: 110 MMHG | HEART RATE: 100 BPM

## 2024-07-13 DIAGNOSIS — S22.070A CLOSED WEDGE COMPRESSION FRACTURE OF T10 VERTEBRA, INITIAL ENCOUNTER: ICD-10-CM

## 2024-07-13 DIAGNOSIS — I95.89 OTHER HYPOTENSION: Primary | ICD-10-CM

## 2024-07-13 LAB
ANION GAP SERPL CALCULATED.3IONS-SCNC: 7.6 MMOL/L (ref 5–15)
BASOPHILS # BLD AUTO: 0.11 10*3/MM3 (ref 0–0.2)
BASOPHILS NFR BLD AUTO: 1.5 % (ref 0–1.5)
BUN SERPL-MCNC: 3 MG/DL (ref 8–23)
BUN/CREAT SERPL: 3.7 (ref 7–25)
CALCIUM SPEC-SCNC: 7.8 MG/DL (ref 8.6–10.5)
CHLORIDE SERPL-SCNC: 115 MMOL/L (ref 98–107)
CO2 SERPL-SCNC: 18.4 MMOL/L (ref 22–29)
CREAT SERPL-MCNC: 0.82 MG/DL (ref 0.57–1)
DEPRECATED RDW RBC AUTO: 60.8 FL (ref 37–54)
EGFRCR SERPLBLD CKD-EPI 2021: 77.5 ML/MIN/1.73
EOSINOPHIL # BLD AUTO: 0.32 10*3/MM3 (ref 0–0.4)
EOSINOPHIL NFR BLD AUTO: 4.4 % (ref 0.3–6.2)
ERYTHROCYTE [DISTWIDTH] IN BLOOD BY AUTOMATED COUNT: 16.2 % (ref 12.3–15.4)
GLUCOSE SERPL-MCNC: 100 MG/DL (ref 65–99)
HCT VFR BLD AUTO: 26.7 % (ref 34–46.6)
HGB BLD-MCNC: 8 G/DL (ref 12–15.9)
IMM GRANULOCYTES # BLD AUTO: 0.05 10*3/MM3 (ref 0–0.05)
IMM GRANULOCYTES NFR BLD AUTO: 0.7 % (ref 0–0.5)
IRON 24H UR-MRATE: 31 MCG/DL (ref 37–145)
IRON SATN MFR SERPL: 18 % (ref 20–50)
LYMPHOCYTES # BLD AUTO: 0.69 10*3/MM3 (ref 0.7–3.1)
LYMPHOCYTES NFR BLD AUTO: 9.5 % (ref 19.6–45.3)
MAGNESIUM SERPL-MCNC: 1.6 MG/DL (ref 1.6–2.4)
MCH RBC QN AUTO: 30.4 PG (ref 26.6–33)
MCHC RBC AUTO-ENTMCNC: 30 G/DL (ref 31.5–35.7)
MCV RBC AUTO: 101.5 FL (ref 79–97)
MONOCYTES # BLD AUTO: 0.43 10*3/MM3 (ref 0.1–0.9)
MONOCYTES NFR BLD AUTO: 5.9 % (ref 5–12)
NEUTROPHILS NFR BLD AUTO: 5.65 10*3/MM3 (ref 1.7–7)
NEUTROPHILS NFR BLD AUTO: 78 % (ref 42.7–76)
NRBC BLD AUTO-RTO: 0 /100 WBC (ref 0–0.2)
PLATELET # BLD AUTO: 160 10*3/MM3 (ref 140–450)
PMV BLD AUTO: 10.8 FL (ref 6–12)
POTASSIUM SERPL-SCNC: 4 MMOL/L (ref 3.5–5.2)
RBC # BLD AUTO: 2.63 10*6/MM3 (ref 3.77–5.28)
SODIUM SERPL-SCNC: 141 MMOL/L (ref 136–145)
TIBC SERPL-MCNC: 168 MCG/DL (ref 298–536)
TRANSFERRIN SERPL-MCNC: 113 MG/DL (ref 200–360)
WBC NRBC COR # BLD AUTO: 7.25 10*3/MM3 (ref 3.4–10.8)

## 2024-07-13 PROCEDURE — 83735 ASSAY OF MAGNESIUM: CPT | Performed by: FAMILY MEDICINE

## 2024-07-13 PROCEDURE — 94660 CPAP INITIATION&MGMT: CPT

## 2024-07-13 PROCEDURE — 63710000001 ONDANSETRON ODT 4 MG TABLET DISPERSIBLE: Performed by: FAMILY MEDICINE

## 2024-07-13 PROCEDURE — 83540 ASSAY OF IRON: CPT | Performed by: FAMILY MEDICINE

## 2024-07-13 PROCEDURE — 84466 ASSAY OF TRANSFERRIN: CPT | Performed by: FAMILY MEDICINE

## 2024-07-13 PROCEDURE — 94761 N-INVAS EAR/PLS OXIMETRY MLT: CPT

## 2024-07-13 PROCEDURE — 80048 BASIC METABOLIC PNL TOTAL CA: CPT | Performed by: FAMILY MEDICINE

## 2024-07-13 PROCEDURE — 94799 UNLISTED PULMONARY SVC/PX: CPT

## 2024-07-13 PROCEDURE — 85025 COMPLETE CBC W/AUTO DIFF WBC: CPT | Performed by: FAMILY MEDICINE

## 2024-07-13 RX ORDER — METOPROLOL SUCCINATE 25 MG/1
12.5 TABLET, EXTENDED RELEASE ORAL EVERY 12 HOURS SCHEDULED
Qty: 30 TABLET | Refills: 3 | Status: SHIPPED | OUTPATIENT
Start: 2024-07-13

## 2024-07-13 RX ORDER — HYDROCODONE BITARTRATE AND ACETAMINOPHEN 5; 325 MG/1; MG/1
1 TABLET ORAL EVERY 6 HOURS PRN
Qty: 30 TABLET | Refills: 0 | Status: SHIPPED | OUTPATIENT
Start: 2024-07-13 | End: 2024-08-12

## 2024-07-13 RX ORDER — IBUPROFEN 400 MG/1
400 TABLET ORAL EVERY 6 HOURS PRN
Start: 2024-07-13

## 2024-07-13 RX ADMIN — LEVOTHYROXINE SODIUM 88 MCG: 0.09 TABLET ORAL at 06:01

## 2024-07-13 RX ADMIN — APIXABAN 5 MG: 5 TABLET, FILM COATED ORAL at 08:46

## 2024-07-13 RX ADMIN — Medication 10 ML: at 08:46

## 2024-07-13 RX ADMIN — LUBIPROSTONE 8 MCG: 8 CAPSULE, GELATIN COATED ORAL at 08:46

## 2024-07-13 RX ADMIN — PAROXETINE HYDROCHLORIDE 40 MG: 20 TABLET, FILM COATED ORAL at 08:46

## 2024-07-13 RX ADMIN — PANTOPRAZOLE SODIUM 40 MG: 40 TABLET, DELAYED RELEASE ORAL at 06:01

## 2024-07-13 RX ADMIN — ATORVASTATIN CALCIUM 80 MG: 40 TABLET, FILM COATED ORAL at 08:46

## 2024-07-13 RX ADMIN — ONDANSETRON 4 MG: 4 TABLET, ORALLY DISINTEGRATING ORAL at 08:46

## 2024-07-13 NOTE — OUTREACH NOTE
Prep Survey      Flowsheet Row Responses   [facility] patient discharged from? [facility]   Is LACE score < 7 ? No   Eligibility Readm Mgmt   Discharge diagnosis Hypotension due to hypovolemia   Does the patient have one of the following disease processes/diagnoses(primary or secondary)? Other   Does the patient have Home health ordered? Yes   What is the Home health agency?  [facility]   Prep survey completed? Yes            Brenna GOMES - Registered Nurse

## 2024-07-13 NOTE — PLAN OF CARE
Goal Outcome Evaluation:  Plan of Care Reviewed With: patient, spouse  Progress: improving  Patient adequate for d/c per MD. Patient and family have decided against inpatient rehab. Patient educated on falls risk and safety concerns. Family and patient understand the risk.  has adjusted home setting for any risk or safety concerns that may be of concern. Patient was wheeled out in wheelchair with tech.

## 2024-07-13 NOTE — DISCHARGE SUMMARY
Date of Discharge:  7/13/2024    Discharge Diagnosis:   Hypotension due to hypovolemia    Closed wedge compression fracture of T10 vertebra    Generalized weakness    Leukocytosis    Multiple falls  Ischemic cardiomyopathy/CHF   UTI  CAD  GERD  HLD  ANNIE    Presenting Problem/History of Present Illness  Active Hospital Problems    Diagnosis  POA    **Hypotension due to hypovolemia [E86.1]  Yes    Closed wedge compression fracture of T10 vertebra [S22.070A]  Yes    Generalized weakness [R53.1]  Yes    Leukocytosis [D72.829]  Yes    Multiple falls [R29.6]  Not Applicable      Resolved Hospital Problems   No resolved problems to display.          Hospital Course     68 y.o. female with history of CHF, CAD, AAA, hyperlipidemia, sleep apnea who presented to the ER with reports of multiple falls over the last 1-2 weeks. She reports she was recently hospitalized at [facility] for UTI and the falls have been happening since she has been home. She did fall this morning and hit her head. She is now complaining of increased back pain, although she does have chronic back pain this is worse than her baseline. She reports general weakness all over, no focal deficits. She denies any fever, chills, chest pain, shortness of breath.   In the ER CT head and cervical spine with no acute findings, CT thoracic spine significant for compression T10 fracture, EKG sinus rhythm rate 70, troponin 20. Blood pressure was low in the ER and was given sepsis bolus with minimal improvement and was given another 1L NS with improvement to 108/68. She was admitted and neurosurgery/PT/OT were consulted. She was started on rocephin and diflucan.  She was placed in TLSO brace.  Cardiology was consulted for her hypotension. She improved with IVF.  Her beta blocker was initially held and then restarted at half dose.  PT/OT recommended rehab, but patient and family refuse stating that they already have home PT/nursing set up and that they can  take care of her.  They are very capable and supportive.  She is ready for d/c today with closel f/u     Procedures Performed         Consults:   Consults       Date and Time Order Name Status Description    7/12/2024 11:16 AM Inpatient Cardiology Consult      7/11/2024  3:32 PM Inpatient Neurosurgery Consult Completed     7/11/2024  3:13 PM Family Medicine Consult              Pertinent Test Results:    Lab Results (most recent)       Procedure Component Value Units Date/Time    Blood Culture - Blood, Arm, Right [[id number]]  (Normal) Collected: 07/11/24 1114    Specimen: Blood from Arm, Right Updated: 07/13/24 1130     Blood Culture No growth at 2 days    Iron Profile [[id number]] Collected: 07/13/24 0558    Specimen: Blood Updated: 07/13/24 1059    Basic Metabolic Panel [[id number]]  (Abnormal) Collected: 07/13/24 0558    Specimen: Blood Updated: 07/13/24 0636     Glucose 100 mg/dL      BUN 3 mg/dL      Creatinine 0.82 mg/dL      Sodium 141 mmol/L      Potassium 4.0 mmol/L      Comment: Specimen hemolyzed.  Result may be falsely elevated.        Chloride 115 mmol/L      CO2 18.4 mmol/L      Calcium 7.8 mg/dL      BUN/Creatinine Ratio 3.7     Anion Gap 7.6 mmol/L      eGFR 77.5 mL/min/1.73     Narrative:      GFR Normal >60  Chronic Kidney Disease <60  Kidney Failure <15      Magnesium [[id number]]  (Normal) Collected: 07/13/24 0558    Specimen: Blood Updated: 07/13/24 0636     Magnesium 1.6 mg/dL     CBC & Differential [[id number]]  (Abnormal) Collected: 07/13/24 0558    Specimen: Blood Updated: 07/13/24 0604    Narrative:      The following orders were created for panel order CBC & Differential.  Procedure                               Abnormality         Status                     ---------                               -----------         ------                     CBC Auto Differential[[id number]]        Abnormal            Final result                 Please view results for these tests on the individual orders.     CBC Auto Differential [[id number]]  (Abnormal) Collected: 07/13/24 0558    Specimen: Blood Updated: 07/13/24 0604     WBC 7.25 10*3/mm3      RBC 2.63 10*6/mm3      Hemoglobin 8.0 g/dL      Hematocrit 26.7 %      .5 fL      MCH 30.4 pg      MCHC 30.0 g/dL      RDW 16.2 %      RDW-SD 60.8 fl      MPV 10.8 fL      Platelets 160 10*3/mm3      Neutrophil % 78.0 %      Lymphocyte % 9.5 %      Monocyte % 5.9 %      Eosinophil % 4.4 %      Basophil % 1.5 %      Immature Grans % 0.7 %      Neutrophils, Absolute 5.65 10*3/mm3      Lymphocytes, Absolute 0.69 10*3/mm3      Monocytes, Absolute 0.43 10*3/mm3      Eosinophils, Absolute 0.32 10*3/mm3      Basophils, Absolute 0.11 10*3/mm3      Immature Grans, Absolute 0.05 10*3/mm3      nRBC 0.0 /100 WBC     Potassium [[id number]]  (Normal) Collected: 07/12/24 1800    Specimen: Blood Updated: 07/12/24 1825     Potassium 4.1 mmol/L     Cancer Antigen 19-9 [[id number]]  (Normal) Collected: 07/12/24 1226    Specimen: Blood Updated: 07/12/24 1641     CA 19-9 6.8 U/mL     Narrative:      Results may be falsely decreased if patient taking Biotin.    Testing Method: Roche [employer] Electrochemiluminescence Immunoassay(ECLIA)  Values obtained with different assay methods or kits cannot be used interchangeably.    POC Glucose Once [[id number]]  (Abnormal) Collected: 07/12/24 1632    Specimen: Blood Updated: 07/12/24 1634     Glucose 106 mg/dL      Comment: Serial Number: [id number]:  [id number]       Blood Culture - Blood, Arm, Left [[id number]]  (Normal) Collected: 07/11/24 1153    Specimen: Blood from Arm, Left Updated: 07/12/24 1215     Blood Culture No growth at 24 hours    Basic Metabolic Panel [[id number]]  (Abnormal) Collected: 07/12/24 0515    Specimen: Blood Updated: 07/12/24 0548     Glucose 94 mg/dL      BUN 6 mg/dL      Creatinine 0.93 mg/dL      Sodium 139 mmol/L      Potassium 3.3 mmol/L      Comment: Specimen hemolyzed.  Result may be falsely elevated.         Chloride 111 mmol/L      CO2 18.6 mmol/L      Calcium 8.2 mg/dL      BUN/Creatinine Ratio 6.5     Anion Gap 9.4 mmol/L      eGFR 66.7 mL/min/1.73     Narrative:      GFR Normal >60  Chronic Kidney Disease <60  Kidney Failure <15      Magnesium [[id number]]  (Normal) Collected: 07/12/24 0515    Specimen: Blood Updated: 07/12/24 0548     Magnesium 1.7 mg/dL     CBC & Differential [[id number]]  (Abnormal) Collected: 07/12/24 0515    Specimen: Blood Updated: 07/12/24 0523    Narrative:      The following orders were created for panel order CBC & Differential.  Procedure                               Abnormality         Status                     ---------                               -----------         ------                     CBC Auto Differential[[id number]]        Abnormal            Final result                 Please view results for these tests on the individual orders.    CBC Auto Differential [[id number]]  (Abnormal) Collected: 07/12/24 0515    Specimen: Blood Updated: 07/12/24 0523     WBC 8.39 10*3/mm3      RBC 3.33 10*6/mm3      Hemoglobin 9.8 g/dL      Hematocrit 33.2 %      MCV 99.7 fL      MCH 29.4 pg      MCHC 29.5 g/dL      RDW 16.1 %      RDW-SD 60.1 fl      MPV 10.4 fL      Platelets 192 10*3/mm3      Neutrophil % 81.0 %      Lymphocyte % 9.3 %      Monocyte % 4.9 %      Eosinophil % 3.0 %      Basophil % 1.2 %      Immature Grans % 0.6 %      Neutrophils, Absolute 6.80 10*3/mm3      Lymphocytes, Absolute 0.78 10*3/mm3      Monocytes, Absolute 0.41 10*3/mm3      Eosinophils, Absolute 0.25 10*3/mm3      Basophils, Absolute 0.10 10*3/mm3      Immature Grans, Absolute 0.05 10*3/mm3      nRBC 0.0 /100 WBC     POC Glucose Once [[id number]]  (Normal) Collected: 07/12/24 0053    Specimen: Blood Updated: 07/12/24 0056     Glucose 94 mg/dL      Comment: Serial Number: [id number]:  [id number]       Reticulocytes [[id number]]  (Normal) Collected: 07/11/24 1938    Specimen: Blood Updated: 07/11/24 2037      Reticulocyte % 1.54 %      Reticulocyte Absolute 0.0462 10*6/mm3     Protime-INR [[id number]]  (Abnormal) Collected: 07/11/24 1114    Specimen: Blood Updated: 07/11/24 1407     Protime 11.4 Seconds      INR 1.05    Single High Sensitivity Troponin T [[id number]]  (Abnormal) Collected: 07/11/24 1114    Specimen: Blood Updated: 07/11/24 1246     HS Troponin T 20 ng/L     Narrative:      High Sensitive Troponin T Reference Range:  <14.0 ng/L- Negative Female for AMI  <22.0 ng/L- Negative Male for AMI  >=14 - Abnormal Female indicating possible myocardial injury.  >=22 - Abnormal Male indicating possible myocardial injury.   Clinicians would have to utilize clinical acumen, EKG, Troponin, and serial changes to determine if it is an Acute Myocardial Infarction or myocardial injury due to an underlying chronic condition.         Digoxin Level [[id number]]  (Normal) Collected: 07/11/24 1114    Specimen: Blood Updated: 07/11/24 1246     Digoxin 0.60 ng/mL     Respiratory Panel PCR w/COVID-19(SARS-CoV-2) MELISSA/NATO/LISA/PAD/COR/LUCHO In-House, NP Swab in UTM/VTM, 2 HR TAT - Swab, Nasopharynx [[id number]]  (Normal) Collected: 07/11/24 1118    Specimen: Swab from Nasopharynx Updated: 07/11/24 1215     ADENOVIRUS, PCR Not Detected     Coronavirus 229E Not Detected     Coronavirus HKU1 Not Detected     Coronavirus NL63 Not Detected     Coronavirus OC43 Not Detected     COVID19 Not Detected     Human Metapneumovirus Not Detected     Human Rhinovirus/Enterovirus Not Detected     Influenza A PCR Not Detected     Influenza B PCR Not Detected     Parainfluenza Virus 1 Not Detected     Parainfluenza Virus 2 Not Detected     Parainfluenza Virus 3 Not Detected     Parainfluenza Virus 4 Not Detected     RSV, PCR Not Detected     Bordetella pertussis pcr Not Detected     Bordetella parapertussis PCR Not Detected     Chlamydophila pneumoniae PCR Not Detected     Mycoplasma pneumo by PCR Not Detected    Narrative:      In the setting of a positive  respiratory panel with a viral infection PLUS a negative procalcitonin without other underlying concern for bacterial infection, consider observing off antibiotics or discontinuation of antibiotics and continue supportive care. If the respiratory panel is positive for atypical bacterial infection (Bordetella pertussis, Chlamydophila pneumoniae, or Mycoplasma pneumoniae), consider antibiotic de-escalation to target atypical bacterial infection.    Urinalysis, Microscopic Only - Straight Cath [[id number]]  (Abnormal) Collected: 07/11/24 1115    Specimen: Urine from Straight Cath Updated: 07/11/24 1157     RBC, UA 0-2 /HPF      WBC, UA 3-5 /HPF      Comment: Urine culture not indicated.        Bacteria, UA None Seen /HPF      Squamous Epithelial Cells, UA None Seen /HPF      Yeast, UA Small/1+ Yeast /HPF      Hyaline Casts, UA None Seen /LPF      Methodology Manual Light Microscopy    [place] Draw [[id number]] Collected: 07/11/24 1114    Specimen: Blood Updated: 07/11/24 1156    Narrative:      The following orders were created for panel order [place] Draw.  Procedure                               Abnormality         Status                     ---------                               -----------         ------                     Green Top (Gel)[[id number]]                                  Final result               Lavender Top[[id number]]                                     Final result               Gold Top - SST[[id number]]                                   Final result               Light Blue Top[[id number]]                                   Final result                 Please view results for these tests on the individual orders.    Green Top (Gel) [[id number]] Collected: 07/11/24 1114    Specimen: Blood Updated: 07/11/24 1156     Extra Tube HOLD    Comprehensive Metabolic Panel [[id number]]  (Abnormal) Collected: 07/11/24 1114    Specimen: Blood Updated: 07/11/24 1150     Glucose 86 mg/dL      BUN 8 mg/dL       Creatinine 1.13 mg/dL      Sodium 135 mmol/L      Potassium 3.7 mmol/L      Chloride 101 mmol/L      CO2 18.5 mmol/L      Calcium 10.0 mg/dL      Total Protein 7.5 g/dL      Albumin 3.9 g/dL      ALT (SGPT) 19 U/L      AST (SGOT) 48 U/L      Alkaline Phosphatase 155 U/L      Total Bilirubin 0.6 mg/dL      Globulin 3.6 gm/dL      A/G Ratio 1.1 g/dL      BUN/Creatinine Ratio 7.1     Anion Gap 15.5 mmol/L      eGFR 52.8 mL/min/1.73     Narrative:      GFR Normal >60  Chronic Kidney Disease <60  Kidney Failure <15      C-reactive Protein [[id number]]  (Abnormal) Collected: 07/11/24 1114    Specimen: Blood Updated: 07/11/24 1150     C-Reactive Protein 1.22 mg/dL     Urinalysis With Culture If Indicated - Straight Cath [[id number]]  (Abnormal) Collected: 07/11/24 1115    Specimen: Urine from Straight Cath Updated: 07/11/24 1145     Color, UA Yellow     Appearance, UA Clear     pH, UA 6.0     Specific Gravity, UA 1.007     Glucose, UA Negative     Ketones, UA Negative     Bilirubin, UA Negative     Blood, UA Negative     Protein, UA Negative     Leuk Esterase, UA Trace     Nitrite, UA Negative     Urobilinogen, UA 1.0 E.U./dL    Narrative:      In absence of clinical symptoms, the presence of pyuria, bacteria, and/or nitrites on the urinalysis result does not correlate with infection.    Lavender Top [[id number]] Collected: 07/11/24 1114    Specimen: Blood Updated: 07/11/24 1131     Extra Tube hold for add-on     Comment: Auto resulted       Gold Top - SST [[id number]] Collected: 07/11/24 1114    Specimen: Blood Updated: 07/11/24 1131     Extra Tube Hold for add-ons.     Comment: Auto resulted.       Light Blue Top [[id number]] Collected: 07/11/24 1114    Specimen: Blood Updated: 07/11/24 1131     Extra Tube Hold for add-ons.     Comment: Auto resulted       POC Lactate [[id number]]  (Normal) Collected: 07/11/24 1120    Specimen: Blood Updated: 07/11/24 1122     Lactate 1.1 mmol/L      Comment: Serial Number:  [id number]:  [id number]                Results for orders placed during the hospital encounter of 01/22/24    Adult Transthoracic Echo Complete W/ Cont if Necessary Per Protocol    Interpretation Summary    Left ventricular ejection fraction appears to be 56 - 60%.    Estimated right ventricular systolic pressure from tricuspid regurgitation is normal (<35 mmHg).    Indication  Dyspnea  Palpitations    Technically satisfactory study.  Mitral valve is structurally normal.  Tricuspid valve is structurally normal.  Mild tricuspid regurgitation is present.  Aortic valve is aortic valve with decreased opening motion.  Gradient across the aortic valve is 16/9 mmHg.  Valve area 1.37 cm².  Pulmonic valve could not be well visualized.  Left atrium is enlarged.  Right atrium is normal in size.  Left ventricle is enlarged with septal anterior wall and apical severe hypokinesis with ejection fraction of 40%.  Possible left ventricular apical thrombus.  Right ventricle is normal in size.  Atrial septum is intact.  Aorta is normal.  No pericardial effusion or intracardiac thrombus is seen.  ICD lead is present in the right ventricle.    Impression  Mild tricuspid regurgitation.  Mild to moderate aortic valve stenosis with gradient of 16/9 mmHg and valve area of 1.37 cm².  Left atrial enlargement.  Left ventricle is enlarged with septal anterior wall and apical severe hypokinesis with ejection fraction of 40%.  Possible left ventricular apical thrombus.  ICD lead is present in the right ventricle.  No evidence for pulmonary hypertension is present.              Condition on Discharge:  good    Vital Signs  Temp:  [97.1 °F (36.2 °C)-97.5 °F (36.4 °C)] 97.1 °F (36.2 °C)  Heart Rate:  [] 78  Resp:  [20] 20  BP: ()/(49-79) 125/64    Physical Exam:     General Appearance:    Alert, cooperative, in no acute distress   Head:    Normocephalic, without obvious abnormality, atraumatic   Eyes:            Lids and lashes  normal, conjunctivae and sclerae normal, no   icterus, no pallor, corneas clear, PERRLA   Ears:    Ears appear intact with no abnormalities noted   Throat:   No oral lesions, no thrush, oral mucosa moist   Neck:   No adenopathy, supple, trachea midline, no thyromegaly, no   carotid bruit, no JVD   Lungs:     Clear to auscultation,respirations regular, even and                  unlabored    Heart:    Regular rhythm and normal rate, normal S1 and S2, no            murmur, no gallop, no rub, no click   Chest Wall:    No abnormalities observed   Abdomen:     Normal bowel sounds, no masses, no organomegaly, soft        non-tender, non-distended, no guarding, no rebound                tenderness   Extremities:   Moves all extremities well, no edema, no cyanosis, no             redness   Pulses:   Pulses palpable and equal bilaterally   Skin:   No bleeding, bruising or rash   Lymph nodes:   No palpable adenopathy   Neurologic:   Cranial nerves 2 - 12 grossly intact, sensation intact, DTR       present and equal bilaterally       Discharge Disposition  Home or Self Care    Discharge Medications     Discharge Medications        New Medications        Instructions Start Date   HYDROcodone-acetaminophen 5-325 MG per tablet  Commonly known as: NORCO   1 tablet, Oral, Every 6 Hours PRN      ibuprofen 400 MG tablet  Commonly known as: ADVIL,MOTRIN   400 mg, Oral, Every 6 Hours PRN             Changes to Medications        Instructions Start Date   metoprolol succinate XL 25 MG 24 hr tablet  Commonly known as: TOPROL-XL  What changed: how much to take   12.5 mg, Oral, Every 12 Hours Scheduled             Continue These Medications        Instructions Start Date   apixaban 5 MG tablet tablet  Commonly known as: ELIQUIS   5 mg, Oral, 2 Times Daily      atorvastatin 80 MG tablet  Commonly known as: LIPITOR   1 tablet, Oral, Daily      digoxin 125 MCG tablet  Commonly known as: LANOXIN   125 mcg, Oral, Daily Digoxin      Excedrin  Migraine 250-250-65 MG per tablet  Generic drug: aspirin-acetaminophen-caffeine   2 tablets, Oral, Every 8 Hours PRN      levothyroxine 88 MCG tablet  Commonly known as: SYNTHROID, LEVOTHROID   88 mcg, Oral, Every Early Morning      linaclotide 72 MCG capsule capsule  Commonly known as: LINZESS   1 capsule, Oral, As Needed      pantoprazole 40 MG EC tablet  Commonly known as: PROTONIX   40 mg, Oral, Every Early Morning      PARoxetine 40 MG tablet  Commonly known as: PAXIL   1 tablet, Oral, Every Morning             Stop These Medications      cephalexin 500 MG capsule  Commonly known as: Keflex     fluconazole 100 MG/5ML oral suspension  Commonly known as: DIFLUCAN     topiramate 50 MG tablet  Commonly known as: TOPAMAX              Discharge Diet:     Activity at Discharge:     Follow-up Appointments  Future Appointments   Date Time Provider Department Center   8/27/2024  1:30 PM Tasneem Martínez APRN [facility]     Additional Instructions for the Follow-ups that You Need to Schedule       Discharge Follow-up with PCP   As directed       Currently Documented PCP:    Luan Joseph APRN    PCP Phone Number:    [phone]     Follow Up Details: keep upcoming apt        Discharge Follow-up with Specialty: neurosurgery; 2 Weeks   As directed      Specialty: neurosurgery   Follow Up: 2 Weeks        XR Spine Thoracolumbar 2 View   Aug 22, 2024      Exam reason: T12 compression fx   Release to patient: Routine Release                Test Results Pending at Discharge  Pending Labs       Order Current Status    Iron Profile In process    Blood Culture - Blood, Arm, Left Preliminary result    Blood Culture - Blood, Arm, Right Preliminary result             Carolin Hatfield MD  07/13/24  11:59 EDT

## 2024-07-14 NOTE — CASE MANAGEMENT/SOCIAL WORK
Case Management Discharge Note      Final Note: home with [facility]    Provided Post Acute Provider List?: N/A    Selected Continued Care - Discharged on 7/13/2024 Admission date: 7/11/2024 - Discharge disposition: Home or Self Care          Home Medical Care Coordination complete.      Service Provider Selected Services Address Phone Fax Patient Preferred    [facility] [address] [place] [address] [phone] --                     Transportation Services  Private: Car    Final Discharge Disposition Code: 06 - home with home health care

## 2024-07-15 ENCOUNTER — HOME CARE VISIT (OUTPATIENT)
Dept: HOME HEALTH SERVICES | Facility: HOME HEALTHCARE | Age: 69
End: 2024-07-15
Payer: MEDICARE

## 2024-07-15 PROCEDURE — G0299 HHS/HOSPICE OF RN EA 15 MIN: HCPCS

## 2024-07-15 NOTE — Clinical Note
Please send message to outside PCP Luan Joseph APRN    Resumption of Care Note:     Reason for hospitalization/new problems: Hypotension due to hypovolemia and Closed wedge compression fracture of T10 vertebra from recent fall    JEFF [place] Findings: Patient is now in severe pain from her back compression fracture and is requesting that PT defer their visit until her pain is better controlled    New/changed medications: Oxycodone and ibuprofen added for pain and metoprolol dosage was decreased to 12.5mg 2 times daily    New/changed orders: Patient is to wear back brace continually unless she is laying down or in the shower    Educated on Emergency Plan, steps to take prior to going to the ER and when to Call [facility]:  reviewed    Plan/Focus of Care and Skilled need: SN for Pain management and cardiopulmonary assessment. Reinitiate PT once pain is better managed.

## 2024-07-15 NOTE — Clinical Note
Please send message to outside PCP Luan Joseph APRN  PER CMS requirement, home health agencies must report any major drug to drug interactions. Upon review of this patient's medications the following interactions are noted.       Interacting Medications/Orders:  aspirin-acetaminophen-caffeine (Excedrin Migraine) 250-250-65 MG per tablet   apixaban (ELIQUIS) 5 MG tablet tablet     Significance: Major    Warning: Use of apixaban with Excedrin Migraine may increase the risk of bleeding.    Onset: Rapid Documentation Level: Suspected    Management Code: Professional review suggested    Effects: Use of apixaban with Excedrin Migraine may increase the risk of bleeding.    Mechanism: apixaban and Excedrin Migraine may cause additive effects on inhibition of normal clotting mechanisms.    Management: The coadministration of apixaban with Excedrin Migraine should be undertaken with caution. Careful clinical monitoring for signs and symptoms of bleeding is warranted.        Interacting Medications/Orders:  apixaban (ELIQUIS) 5 MG tablet tablet   ibuprofen (ADVIL,MOTRIN) 400 MG tablet     Significance: Major    Warning: Use of apixaban with ibuprofen may increase the risk of bleeding.    Onset: Rapid Documentation Level: Suspected    Management Code: Professional review suggested    Effects: Use of apixaban with ibuprofen may increase the risk of bleeding.    Mechanism: apixaban and ibuprofen may cause additive effects on inhibition of normal clotting mechanisms. Other mechanisms may exist.    Management: The coadministration of apixaban with ibuprofen should be undertaken with caution. Careful clinical monitoring for signs and symptoms of bleeding is warranted.        Interacting Medications/Orders:  ibuprofen (ADVIL,MOTRIN) 400 MG tablet   PARoxetine (PAXIL) 40 MG tablet   PARoxetine (PAXIL) tablet 40 mg     Significance: Major    Warning: Toxic effects may be increased with concurrent administration of ibuprofen and  PARoxetine. The risk of upper gastrointestinal bleeding may be increased. Patients taking both drugs concurrently should be educated about the signs and symptoms of GI bleeding.    Onset: Delayed Documentation Level: Possible    Management Code: Professional review suggested    Effects: Toxic effects may be increased with concurrent administration of ibuprofen and PARoxetine. The risk of upper gastrointestinal bleeding may be increased. Patients taking both drugs concurrently should be educated about the signs and symptoms of GI bleeding.    Mechanism: Unknown. Prolonged use of PARoxetine may lead to depletion of serotonin in platelets, which is thought to play an important role in hemostasis.    Management: Consider alternatives to non-steroidal anti-inflammatory drugs (NSAIDs) in patients receiving selective serotonin reuptake inhibitors (SSRIs). Use of a gastroprotective agent such misoprostol or a proton pump inhibitor may decrease gastrointestinal bleeding risk but is not expected to affect intracranial bleeding risk. It is unclear whether more Randhawa-2 NSAIDs reduce bleeding risk in SSRI-treated patients. Monitor patients receiving these combinations for evidence of bleeding and diminished antidepressant effects.

## 2024-07-16 ENCOUNTER — HOME CARE VISIT (OUTPATIENT)
Dept: HOME HEALTH SERVICES | Facility: HOME HEALTHCARE | Age: 69
End: 2024-07-16
Payer: MEDICARE

## 2024-07-16 VITALS
TEMPERATURE: 97.2 F | OXYGEN SATURATION: 96 % | DIASTOLIC BLOOD PRESSURE: 62 MMHG | HEART RATE: 68 BPM | RESPIRATION RATE: 16 BRPM | SYSTOLIC BLOOD PRESSURE: 118 MMHG

## 2024-07-16 DIAGNOSIS — S22.070A CLOSED WEDGE COMPRESSION FRACTURE OF T10 VERTEBRA, INITIAL ENCOUNTER: Primary | ICD-10-CM

## 2024-07-16 LAB
BACTERIA SPEC AEROBE CULT: NORMAL
BACTERIA SPEC AEROBE CULT: NORMAL

## 2024-07-16 NOTE — CASE COMMUNICATION
Patient missed a PT Evaluation visit from [facility] on 7/16/2024.    Reason: Patient reports that currently she is in too much pain to start PT. She requests that PT evaluation occur next week (week of 7/21/2024).    For your records only.   Per CMS Guidance, MD must be notified of missed/cancelled visits; therefore the prescribed frequency was not met.

## 2024-07-16 NOTE — HOME HEALTH
Resumption of Care Note:     Reason for hospitalization/new problems: Hypotension due to hypovolemia and Closed wedge compression fracture of T10 vertebra from recent fall    JEFF [place] Findings: Patient is now in severe pain from her back compression fracture and is requesting that PT defer their visit until her pain is better controlled    New/changed medications: Oxycodone and ibuprofen added for pain and metoprolol dosage was decreased to 12.5mg 2 times daily    New/changed orders: Patient is to wear back brace continually unless she is laying down or in the shower    Educated on Emergency Plan, steps to take prior to going to the ER and when to Call [facility]:  reviewed    Plan/Focus of Care and Skilled need: Hypovolemia with hypotension

## 2024-07-18 ENCOUNTER — READMISSION MANAGEMENT (OUTPATIENT)
Dept: CALL CENTER | Facility: HOSPITAL | Age: 69
End: 2024-07-18
Payer: MEDICARE

## 2024-07-18 NOTE — OUTREACH NOTE
Medical Week 1 Survey      Flowsheet Row Responses   [facility] facility patient discharged from? [facility]   Does the patient have one of the following disease processes/diagnoses(primary or secondary)? Other   Week 1 attempt successful? No   Unsuccessful attempts Attempt 1            Juana Kelsey Registered Nurse

## 2024-07-19 ENCOUNTER — HOME CARE VISIT (OUTPATIENT)
Dept: HOME HEALTH SERVICES | Facility: HOME HEALTHCARE | Age: 69
End: 2024-07-19
Payer: MEDICARE

## 2024-07-19 VITALS — TEMPERATURE: 97.6 F | SYSTOLIC BLOOD PRESSURE: 108 MMHG | DIASTOLIC BLOOD PRESSURE: 64 MMHG | RESPIRATION RATE: 17 BRPM

## 2024-07-19 PROCEDURE — G0299 HHS/HOSPICE OF RN EA 15 MIN: HCPCS

## 2024-07-23 ENCOUNTER — HOME CARE VISIT (OUTPATIENT)
Dept: HOME HEALTH SERVICES | Facility: HOME HEALTHCARE | Age: 69
End: 2024-07-23
Payer: MEDICARE

## 2024-07-23 ENCOUNTER — READMISSION MANAGEMENT (OUTPATIENT)
Dept: CALL CENTER | Facility: HOSPITAL | Age: 69
End: 2024-07-23
Payer: MEDICARE

## 2024-07-23 NOTE — OUTREACH NOTE
Medical Week 2 Survey      Flowsheet Row Responses   [facility] facility patient discharged from? [facility]   Does the patient have one of the following disease processes/diagnoses(primary or secondary)? Other   Week 2 attempt successful? No   Unsuccessful attempts Attempt 1            Alexandria BARRERA - Licensed Nurse   Detail Level: Detailed

## 2024-07-25 ENCOUNTER — HOSPITAL ENCOUNTER (INPATIENT)
Facility: HOSPITAL | Age: 69
LOS: 6 days | Discharge: HOME | End: 2024-08-03
Attending: EMERGENCY MEDICINE | Admitting: FAMILY MEDICINE
Payer: MEDICARE

## 2024-07-25 ENCOUNTER — APPOINTMENT (OUTPATIENT)
Dept: CT IMAGING | Facility: HOSPITAL | Age: 69
End: 2024-07-25
Payer: MEDICARE

## 2024-07-25 ENCOUNTER — APPOINTMENT (OUTPATIENT)
Dept: GENERAL RADIOLOGY | Facility: HOSPITAL | Age: 69
End: 2024-07-25
Payer: MEDICARE

## 2024-07-25 ENCOUNTER — READMISSION MANAGEMENT (OUTPATIENT)
Dept: CALL CENTER | Facility: HOSPITAL | Age: 69
End: 2024-07-25
Payer: MEDICARE

## 2024-07-25 DIAGNOSIS — R41.82 ALTERED MENTAL STATUS, UNSPECIFIED ALTERED MENTAL STATUS TYPE: Primary | ICD-10-CM

## 2024-07-25 DIAGNOSIS — I25.5 ISCHEMIC CARDIOMYOPATHY: ICD-10-CM

## 2024-07-25 LAB
ALBUMIN SERPL-MCNC: 3.3 G/DL (ref 3.5–5.2)
ALBUMIN/GLOB SERPL: 1.1 G/DL
ALP SERPL-CCNC: 179 U/L (ref 39–117)
ALT SERPL W P-5'-P-CCNC: 6 U/L (ref 1–33)
AMMONIA BLD-SCNC: 14 UMOL/L (ref 11–51)
AMPHET+METHAMPHET UR QL: NEGATIVE
ANION GAP SERPL CALCULATED.3IONS-SCNC: 11.4 MMOL/L (ref 5–15)
APAP SERPL-MCNC: <5 MCG/ML (ref 0–30)
AST SERPL-CCNC: 19 U/L (ref 1–32)
B PARAPERT DNA SPEC QL NAA+PROBE: NOT DETECTED
B PERT DNA SPEC QL NAA+PROBE: NOT DETECTED
BACTERIA UR QL AUTO: ABNORMAL /HPF
BARBITURATES UR QL SCN: NEGATIVE
BASOPHILS # BLD AUTO: 0.18 10*3/MM3 (ref 0–0.2)
BASOPHILS NFR BLD AUTO: 1.2 % (ref 0–1.5)
BENZODIAZ UR QL SCN: NEGATIVE
BILIRUB SERPL-MCNC: 0.8 MG/DL (ref 0–1.2)
BILIRUB UR QL STRIP: ABNORMAL
BUN SERPL-MCNC: 12 MG/DL (ref 8–23)
BUN/CREAT SERPL: 13.5 (ref 7–25)
C PNEUM DNA NPH QL NAA+NON-PROBE: NOT DETECTED
CALCIUM SPEC-SCNC: 8.9 MG/DL (ref 8.6–10.5)
CANNABINOIDS SERPL QL: POSITIVE
CHLORIDE SERPL-SCNC: 106 MMOL/L (ref 98–107)
CLARITY UR: CLEAR
CO2 SERPL-SCNC: 18.6 MMOL/L (ref 22–29)
COCAINE UR QL: NEGATIVE
COLOR UR: YELLOW
CREAT SERPL-MCNC: 0.89 MG/DL (ref 0.57–1)
DEPRECATED RDW RBC AUTO: 61.5 FL (ref 37–54)
DIGOXIN SERPL-MCNC: 0.92 NG/ML (ref 0.6–1.2)
EGFRCR SERPLBLD CKD-EPI 2021: 70.3 ML/MIN/1.73
EOSINOPHIL # BLD AUTO: 0.19 10*3/MM3 (ref 0–0.4)
EOSINOPHIL NFR BLD AUTO: 1.3 % (ref 0.3–6.2)
ERYTHROCYTE [DISTWIDTH] IN BLOOD BY AUTOMATED COUNT: 16.7 % (ref 12.3–15.4)
ETHANOL UR QL: <0.01 %
FLUAV SUBTYP SPEC NAA+PROBE: NOT DETECTED
FLUBV RNA ISLT QL NAA+PROBE: NOT DETECTED
GLOBULIN UR ELPH-MCNC: 3.1 GM/DL
GLUCOSE SERPL-MCNC: 162 MG/DL (ref 65–99)
GLUCOSE UR STRIP-MCNC: NEGATIVE MG/DL
HADV DNA SPEC NAA+PROBE: NOT DETECTED
HCOV 229E RNA SPEC QL NAA+PROBE: NOT DETECTED
HCOV HKU1 RNA SPEC QL NAA+PROBE: NOT DETECTED
HCOV NL63 RNA SPEC QL NAA+PROBE: NOT DETECTED
HCOV OC43 RNA SPEC QL NAA+PROBE: NOT DETECTED
HCT VFR BLD AUTO: 35.5 % (ref 34–46.6)
HGB BLD-MCNC: 10.5 G/DL (ref 12–15.9)
HGB UR QL STRIP.AUTO: NEGATIVE
HMPV RNA NPH QL NAA+NON-PROBE: NOT DETECTED
HOLD SPECIMEN: NORMAL
HPIV1 RNA ISLT QL NAA+PROBE: NOT DETECTED
HPIV2 RNA SPEC QL NAA+PROBE: NOT DETECTED
HPIV3 RNA NPH QL NAA+PROBE: NOT DETECTED
HPIV4 P GENE NPH QL NAA+PROBE: NOT DETECTED
HYALINE CASTS UR QL AUTO: ABNORMAL /LPF
IMM GRANULOCYTES # BLD AUTO: 0.09 10*3/MM3 (ref 0–0.05)
IMM GRANULOCYTES NFR BLD AUTO: 0.6 % (ref 0–0.5)
KETONES UR QL STRIP: ABNORMAL
LEUKOCYTE ESTERASE UR QL STRIP.AUTO: ABNORMAL
LYMPHOCYTES # BLD AUTO: 0.54 10*3/MM3 (ref 0.7–3.1)
LYMPHOCYTES NFR BLD AUTO: 3.6 % (ref 19.6–45.3)
M PNEUMO IGG SER IA-ACNC: NOT DETECTED
MAGNESIUM SERPL-MCNC: 1.7 MG/DL (ref 1.6–2.4)
MCH RBC QN AUTO: 29.2 PG (ref 26.6–33)
MCHC RBC AUTO-ENTMCNC: 29.6 G/DL (ref 31.5–35.7)
MCV RBC AUTO: 98.9 FL (ref 79–97)
METHADONE UR QL SCN: NEGATIVE
MONOCYTES # BLD AUTO: 0.85 10*3/MM3 (ref 0.1–0.9)
MONOCYTES NFR BLD AUTO: 5.6 % (ref 5–12)
NEUTROPHILS NFR BLD AUTO: 13.21 10*3/MM3 (ref 1.7–7)
NEUTROPHILS NFR BLD AUTO: 87.7 % (ref 42.7–76)
NITRITE UR QL STRIP: NEGATIVE
NRBC BLD AUTO-RTO: 0 /100 WBC (ref 0–0.2)
OPIATES UR QL: NEGATIVE
OXYCODONE UR QL SCN: NEGATIVE
PH UR STRIP.AUTO: 5.5 [PH] (ref 5–8)
PLATELET # BLD AUTO: 207 10*3/MM3 (ref 140–450)
PMV BLD AUTO: 11 FL (ref 6–12)
POTASSIUM SERPL-SCNC: 4 MMOL/L (ref 3.5–5.2)
PROT SERPL-MCNC: 6.4 G/DL (ref 6–8.5)
PROT UR QL STRIP: ABNORMAL
QT INTERVAL: 384 MS
QTC INTERVAL: 439 MS
RBC # BLD AUTO: 3.59 10*6/MM3 (ref 3.77–5.28)
RBC # UR STRIP: ABNORMAL /HPF
REF LAB TEST METHOD: ABNORMAL
RHINOVIRUS RNA SPEC NAA+PROBE: NOT DETECTED
RSV RNA NPH QL NAA+NON-PROBE: NOT DETECTED
SALICYLATES SERPL-MCNC: <0.5 MG/DL
SARS-COV-2 RNA NPH QL NAA+NON-PROBE: NOT DETECTED
SODIUM SERPL-SCNC: 136 MMOL/L (ref 136–145)
SP GR UR STRIP: 1.02 (ref 1–1.03)
SQUAMOUS #/AREA URNS HPF: ABNORMAL /HPF
TROPONIN T SERPL HS-MCNC: 18 NG/L
UROBILINOGEN UR QL STRIP: ABNORMAL
WBC # UR STRIP: ABNORMAL /HPF
WBC NRBC COR # BLD AUTO: 15.06 10*3/MM3 (ref 3.4–10.8)
WHOLE BLOOD HOLD SPECIMEN: NORMAL

## 2024-07-25 PROCEDURE — 83735 ASSAY OF MAGNESIUM: CPT | Performed by: EMERGENCY MEDICINE

## 2024-07-25 PROCEDURE — 80053 COMPREHEN METABOLIC PANEL: CPT | Performed by: EMERGENCY MEDICINE

## 2024-07-25 PROCEDURE — 0202U NFCT DS 22 TRGT SARS-COV-2: CPT | Performed by: EMERGENCY MEDICINE

## 2024-07-25 PROCEDURE — 71045 X-RAY EXAM CHEST 1 VIEW: CPT

## 2024-07-25 PROCEDURE — 93005 ELECTROCARDIOGRAM TRACING: CPT | Performed by: EMERGENCY MEDICINE

## 2024-07-25 PROCEDURE — 84484 ASSAY OF TROPONIN QUANT: CPT | Performed by: EMERGENCY MEDICINE

## 2024-07-25 PROCEDURE — 25810000003 SODIUM CHLORIDE 0.9 % SOLUTION: Performed by: EMERGENCY MEDICINE

## 2024-07-25 PROCEDURE — 81001 URINALYSIS AUTO W/SCOPE: CPT | Performed by: EMERGENCY MEDICINE

## 2024-07-25 PROCEDURE — 80307 DRUG TEST PRSMV CHEM ANLYZR: CPT | Performed by: EMERGENCY MEDICINE

## 2024-07-25 PROCEDURE — 70450 CT HEAD/BRAIN W/O DYE: CPT

## 2024-07-25 PROCEDURE — 85025 COMPLETE CBC W/AUTO DIFF WBC: CPT | Performed by: EMERGENCY MEDICINE

## 2024-07-25 PROCEDURE — 63710000001 ONDANSETRON ODT 4 MG TABLET DISPERSIBLE: Performed by: FAMILY MEDICINE

## 2024-07-25 PROCEDURE — 80143 DRUG ASSAY ACETAMINOPHEN: CPT | Performed by: EMERGENCY MEDICINE

## 2024-07-25 PROCEDURE — 36415 COLL VENOUS BLD VENIPUNCTURE: CPT

## 2024-07-25 PROCEDURE — 87040 BLOOD CULTURE FOR BACTERIA: CPT | Performed by: EMERGENCY MEDICINE

## 2024-07-25 PROCEDURE — 87086 URINE CULTURE/COLONY COUNT: CPT | Performed by: FAMILY MEDICINE

## 2024-07-25 PROCEDURE — 80179 DRUG ASSAY SALICYLATE: CPT | Performed by: EMERGENCY MEDICINE

## 2024-07-25 PROCEDURE — 82077 ASSAY SPEC XCP UR&BREATH IA: CPT | Performed by: EMERGENCY MEDICINE

## 2024-07-25 PROCEDURE — P9612 CATHETERIZE FOR URINE SPEC: HCPCS

## 2024-07-25 PROCEDURE — G0378 HOSPITAL OBSERVATION PER HR: HCPCS

## 2024-07-25 PROCEDURE — 99285 EMERGENCY DEPT VISIT HI MDM: CPT

## 2024-07-25 PROCEDURE — 82140 ASSAY OF AMMONIA: CPT | Performed by: EMERGENCY MEDICINE

## 2024-07-25 PROCEDURE — 80162 ASSAY OF DIGOXIN TOTAL: CPT | Performed by: EMERGENCY MEDICINE

## 2024-07-25 RX ORDER — HYDROCODONE BITARTRATE AND ACETAMINOPHEN 5; 325 MG/1; MG/1
1 TABLET ORAL EVERY 6 HOURS PRN
Status: DISCONTINUED | OUTPATIENT
Start: 2024-07-25 | End: 2024-08-03 | Stop reason: HOSPADM

## 2024-07-25 RX ORDER — POLYETHYLENE GLYCOL 3350 17 G/17G
17 POWDER, FOR SOLUTION ORAL DAILY PRN
Status: DISCONTINUED | OUTPATIENT
Start: 2024-07-25 | End: 2024-08-03 | Stop reason: HOSPADM

## 2024-07-25 RX ORDER — LEVOTHYROXINE SODIUM 0.07 MG/1
75 TABLET ORAL DAILY
COMMUNITY

## 2024-07-25 RX ORDER — SODIUM CHLORIDE 0.9 % (FLUSH) 0.9 %
10 SYRINGE (ML) INJECTION EVERY 12 HOURS SCHEDULED
Status: DISCONTINUED | OUTPATIENT
Start: 2024-07-25 | End: 2024-08-03 | Stop reason: HOSPADM

## 2024-07-25 RX ORDER — OXYBUTYNIN CHLORIDE 10 MG/1
10 TABLET, EXTENDED RELEASE ORAL DAILY
COMMUNITY

## 2024-07-25 RX ORDER — DIGOXIN 125 MCG
125 TABLET ORAL
Status: DISCONTINUED | OUTPATIENT
Start: 2024-07-26 | End: 2024-07-30

## 2024-07-25 RX ORDER — PAROXETINE HYDROCHLORIDE 20 MG/1
40 TABLET, FILM COATED ORAL EVERY MORNING
Status: DISCONTINUED | OUTPATIENT
Start: 2024-07-26 | End: 2024-07-27

## 2024-07-25 RX ORDER — SODIUM CHLORIDE 0.9 % (FLUSH) 0.9 %
10 SYRINGE (ML) INJECTION AS NEEDED
Status: DISCONTINUED | OUTPATIENT
Start: 2024-07-25 | End: 2024-08-03 | Stop reason: HOSPADM

## 2024-07-25 RX ORDER — LUBIPROSTONE 8 UG/1
8 CAPSULE ORAL 2 TIMES DAILY
Status: DISCONTINUED | OUTPATIENT
Start: 2024-07-25 | End: 2024-07-27

## 2024-07-25 RX ORDER — BISACODYL 5 MG/1
5 TABLET, DELAYED RELEASE ORAL DAILY PRN
Status: DISCONTINUED | OUTPATIENT
Start: 2024-07-25 | End: 2024-08-03 | Stop reason: HOSPADM

## 2024-07-25 RX ORDER — ONDANSETRON 4 MG/1
4 TABLET, ORALLY DISINTEGRATING ORAL EVERY 6 HOURS PRN
Status: DISCONTINUED | OUTPATIENT
Start: 2024-07-25 | End: 2024-08-03 | Stop reason: HOSPADM

## 2024-07-25 RX ORDER — ONDANSETRON 4 MG/1
4 TABLET, FILM COATED ORAL EVERY 8 HOURS PRN
COMMUNITY

## 2024-07-25 RX ORDER — ATORVASTATIN CALCIUM 40 MG/1
80 TABLET, FILM COATED ORAL NIGHTLY
Status: DISCONTINUED | OUTPATIENT
Start: 2024-07-25 | End: 2024-07-27

## 2024-07-25 RX ORDER — PANTOPRAZOLE SODIUM 40 MG/1
40 TABLET, DELAYED RELEASE ORAL
Status: DISCONTINUED | OUTPATIENT
Start: 2024-07-26 | End: 2024-08-03 | Stop reason: HOSPADM

## 2024-07-25 RX ORDER — AMOXICILLIN 250 MG
2 CAPSULE ORAL 2 TIMES DAILY PRN
Status: DISCONTINUED | OUTPATIENT
Start: 2024-07-25 | End: 2024-08-03 | Stop reason: HOSPADM

## 2024-07-25 RX ORDER — LEVOTHYROXINE SODIUM 0.07 MG/1
75 TABLET ORAL
Status: DISCONTINUED | OUTPATIENT
Start: 2024-07-26 | End: 2024-08-03 | Stop reason: HOSPADM

## 2024-07-25 RX ORDER — ONDANSETRON 2 MG/ML
4 INJECTION INTRAMUSCULAR; INTRAVENOUS EVERY 6 HOURS PRN
Status: DISCONTINUED | OUTPATIENT
Start: 2024-07-25 | End: 2024-08-03 | Stop reason: HOSPADM

## 2024-07-25 RX ORDER — BISACODYL 10 MG
10 SUPPOSITORY, RECTAL RECTAL DAILY PRN
Status: DISCONTINUED | OUTPATIENT
Start: 2024-07-25 | End: 2024-08-03 | Stop reason: HOSPADM

## 2024-07-25 RX ORDER — POTASSIUM CHLORIDE 1500 MG/1
20 TABLET, EXTENDED RELEASE ORAL DAILY
COMMUNITY

## 2024-07-25 RX ORDER — SODIUM CHLORIDE 9 MG/ML
40 INJECTION, SOLUTION INTRAVENOUS AS NEEDED
Status: DISCONTINUED | OUTPATIENT
Start: 2024-07-25 | End: 2024-08-03 | Stop reason: HOSPADM

## 2024-07-25 RX ADMIN — APIXABAN 5 MG: 5 TABLET, FILM COATED ORAL at 19:09

## 2024-07-25 RX ADMIN — LUBIPROSTONE 8 MCG: 8 CAPSULE, GELATIN COATED ORAL at 19:08

## 2024-07-25 RX ADMIN — ONDANSETRON 4 MG: 4 TABLET, ORALLY DISINTEGRATING ORAL at 19:23

## 2024-07-25 RX ADMIN — HYDROCODONE BITARTRATE AND ACETAMINOPHEN 1 TABLET: 5; 325 TABLET ORAL at 19:09

## 2024-07-25 RX ADMIN — Medication 10 ML: at 19:24

## 2024-07-25 RX ADMIN — ATORVASTATIN CALCIUM 80 MG: 40 TABLET, FILM COATED ORAL at 19:09

## 2024-07-25 RX ADMIN — SODIUM CHLORIDE 500 ML: 9 INJECTION, SOLUTION INTRAVENOUS at 14:30

## 2024-07-25 NOTE — ED PROVIDER NOTES
Subjective   History of Present Illness  Chief complaint: Patient is a 68-year-old female who presents with altered mental status.  Is been significant over the last couple of days and worsened through the night last night.  She has not slept in 2 days.  She has been intermittently saying things and talking about people that are not there.  Last week she was seen here in the ER.  She fell.  She suffered a fractured vertebrae.  She has been having persisting discomfort.  She is only taken a couple of the hydrocodone however.  She has significant pain with ambulating.  She has not been eating or drinking much either over these last several days.  With this worsening they presented here for further evaluation.    Context:    Duration:    Timing:    Severity: Severe    Associated Symptoms:        PCP:  LMP:      Review of Systems   Constitutional:  Positive for appetite change.   Respiratory:  Negative for shortness of breath.    Cardiovascular:  Negative for chest pain.   Gastrointestinal:  Negative for abdominal pain.   Genitourinary: Negative.    Musculoskeletal:  Positive for back pain.   Neurological:  Negative for seizures.   Psychiatric/Behavioral:  Positive for confusion.        Past Medical History:   Diagnosis Date    AAA (abdominal aortic aneurysm)     infrarenal- 3.1cm(2010, 3.7x4.2cm(2016), 3.9cm (2017)    Allergic rhinitis     Aspiration pneumonia 05/2017    CHF (congestive heart failure)     Coronary artery disease     DDD (degenerative disc disease), lumbar     lumbar spine- Dr. Churchill     Depression     Diverticulosis     Frequent UTI     Dr. Daniels    GERD (gastroesophageal reflux disease)     Hiatal hernia     HSV (herpes simplex virus) infection     Oral    Hyperlipidemia     IBS (irritable bowel syndrome)     Constipation predominant    Ischemic cardiomyopathy 09/2017    AICD     Kidney stones     NSTEMI (non-ST elevated myocardial infarction) 04/26/2017    Obstructive sleep apnea 2011    nfsg 2011, ahi  67    Osteoarthritis     Osteopenia     Peripheral neuropathy     feet    Pulmonary embolism, bilateral 05/2017    Rotator cuff tear     Bilateral- Ortho        Allergies   Allergen Reactions    Oxytetracycline Hives    Oxycodone Itching       Past Surgical History:   Procedure Laterality Date    CARDIAC CATHETERIZATION  04/26/2017    99% mid LAD. 90% mid LCX. 60% RCA. Recommended CABG. Dr. Diaz    CARDIAC DEFIBRILLATOR PLACEMENT  12/26/2017    ICD implant/ Dr. Ellis    CATARACT EXTRACTION      CORONARY ARTERY BYPASS GRAFT  04/26/2017    x4 & ASD Closure    CYSTOSCOPY  2002    negative. Dr. Daniels    LAPAROSCOPIC CHOLECYSTECTOMY  04/17/2013    For biliary dyskinesia. Dr. Mccoy.     REPLACEMENT TOTAL KNEE BILATERAL  11/2004    Dr. Herrera    THORACENTESIS Left 05/08/2017    TONSILLECTOMY      TUBAL ABDOMINAL LIGATION Bilateral        Family History   Problem Relation Age of Onset    Heart disease Mother     Other Mother         Hypoglycemia    Osteoporosis Mother     COPD Father     Stroke Father     Hypertension Brother     Diabetes Brother     Heart disease Brother        Social History     Socioeconomic History    Marital status:    Tobacco Use    Smoking status: Never   Vaping Use    Vaping status: Some Days    Substances: CBD, nightly, 2-3 weekly   Substance and Sexual Activity    Alcohol use: Never    Drug use: Never    Sexual activity: Defer           Objective   Physical Exam  Vitals and nursing note reviewed.   Constitutional:       Appearance: She is well-developed.   HENT:      Head: Normocephalic and atraumatic.   Cardiovascular:      Rate and Rhythm: Regular rhythm. Tachycardia present.   Pulmonary:      Effort: Pulmonary effort is normal.      Breath sounds: Normal breath sounds.   Abdominal:      Palpations: Abdomen is soft.      Tenderness: There is no abdominal tenderness.   Skin:     General: Skin is warm and dry.   Neurological:      Mental Status: She is alert and oriented to person,  place, and time.      Cranial Nerves: No cranial nerve deficit or dysarthria.      Sensory: No sensory deficit.      Motor: No weakness.   Psychiatric:         Mood and Affect: Mood normal.         Speech: Speech normal.         Procedures           ED Course                                 Results for orders placed or performed during the hospital encounter of 07/25/24   Respiratory Panel PCR w/COVID-19(SARS-CoV-2) MELISSA/NATO/LISA/PAD/COR/LUCHO In-House, NP Swab in UTM/VTM, 2 HR TAT - Swab, Nasopharynx    Specimen: Nasopharynx; Swab   Result Value Ref Range    ADENOVIRUS, PCR Not Detected Not Detected    Coronavirus 229E Not Detected Not Detected    Coronavirus HKU1 Not Detected Not Detected    Coronavirus NL63 Not Detected Not Detected    Coronavirus OC43 Not Detected Not Detected    COVID19 Not Detected Not Detected - Ref. Range    Human Metapneumovirus Not Detected Not Detected    Human Rhinovirus/Enterovirus Not Detected Not Detected    Influenza A PCR Not Detected Not Detected    Influenza B PCR Not Detected Not Detected    Parainfluenza Virus 1 Not Detected Not Detected    Parainfluenza Virus 2 Not Detected Not Detected    Parainfluenza Virus 3 Not Detected Not Detected    Parainfluenza Virus 4 Not Detected Not Detected    RSV, PCR Not Detected Not Detected    Bordetella pertussis pcr Not Detected Not Detected    Bordetella parapertussis PCR Not Detected Not Detected    Chlamydophila pneumoniae PCR Not Detected Not Detected    Mycoplasma pneumo by PCR Not Detected Not Detected   Comprehensive Metabolic Panel    Specimen: Blood   Result Value Ref Range    Glucose 162 (H) 65 - 99 mg/dL    BUN 12 8 - 23 mg/dL    Creatinine 0.89 0.57 - 1.00 mg/dL    Sodium 136 136 - 145 mmol/L    Potassium 4.0 3.5 - 5.2 mmol/L    Chloride 106 98 - 107 mmol/L    CO2 18.6 (L) 22.0 - 29.0 mmol/L    Calcium 8.9 8.6 - 10.5 mg/dL    Total Protein 6.4 6.0 - 8.5 g/dL    Albumin 3.3 (L) 3.5 - 5.2 g/dL    ALT (SGPT) 6 1 - 33 U/L    AST (SGOT) 19  1 - 32 U/L    Alkaline Phosphatase 179 (H) 39 - 117 U/L    Total Bilirubin 0.8 0.0 - 1.2 mg/dL    Globulin 3.1 gm/dL    A/G Ratio 1.1 g/dL    BUN/Creatinine Ratio 13.5 7.0 - 25.0    Anion Gap 11.4 5.0 - 15.0 mmol/L    eGFR 70.3 >60.0 mL/min/1.73   Single High Sensitivity Troponin T    Specimen: Blood   Result Value Ref Range    HS Troponin T 18 (H) <14 ng/L   Magnesium    Specimen: Blood   Result Value Ref Range    Magnesium 1.7 1.6 - 2.4 mg/dL   Urinalysis With Microscopic If Indicated (No Culture) - Straight Cath    Specimen: Straight Cath; Urine   Result Value Ref Range    Color, UA Yellow Yellow, Straw    Appearance, UA Clear Clear    pH, UA 5.5 5.0 - 8.0    Specific Gravity, UA 1.018 1.005 - 1.030    Glucose, UA Negative Negative    Ketones, UA Trace (A) Negative    Bilirubin, UA Small (1+) (A) Negative    Blood, UA Negative Negative    Protein, UA Trace (A) Negative    Leuk Esterase, UA Trace (A) Negative    Nitrite, UA Negative Negative    Urobilinogen, UA 1.0 E.U./dL 0.2 - 1.0 E.U./dL   CBC Auto Differential    Specimen: Blood   Result Value Ref Range    WBC 15.06 (H) 3.40 - 10.80 10*3/mm3    RBC 3.59 (L) 3.77 - 5.28 10*6/mm3    Hemoglobin 10.5 (L) 12.0 - 15.9 g/dL    Hematocrit 35.5 34.0 - 46.6 %    MCV 98.9 (H) 79.0 - 97.0 fL    MCH 29.2 26.6 - 33.0 pg    MCHC 29.6 (L) 31.5 - 35.7 g/dL    RDW 16.7 (H) 12.3 - 15.4 %    RDW-SD 61.5 (H) 37.0 - 54.0 fl    MPV 11.0 6.0 - 12.0 fL    Platelets 207 140 - 450 10*3/mm3    Neutrophil % 87.7 (H) 42.7 - 76.0 %    Lymphocyte % 3.6 (L) 19.6 - 45.3 %    Monocyte % 5.6 5.0 - 12.0 %    Eosinophil % 1.3 0.3 - 6.2 %    Basophil % 1.2 0.0 - 1.5 %    Immature Grans % 0.6 (H) 0.0 - 0.5 %    Neutrophils, Absolute 13.21 (H) 1.70 - 7.00 10*3/mm3    Lymphocytes, Absolute 0.54 (L) 0.70 - 3.10 10*3/mm3    Monocytes, Absolute 0.85 0.10 - 0.90 10*3/mm3    Eosinophils, Absolute 0.19 0.00 - 0.40 10*3/mm3    Basophils, Absolute 0.18 0.00 - 0.20 10*3/mm3    Immature Grans, Absolute 0.09 (H)  0.00 - 0.05 10*3/mm3    nRBC 0.0 0.0 - 0.2 /100 WBC   Digoxin Level    Specimen: Blood   Result Value Ref Range    Digoxin 0.92 0.60 - 1.20 ng/mL   Ethanol    Specimen: Blood   Result Value Ref Range    Ethanol % <0.010 %   Acetaminophen Level    Specimen: Blood   Result Value Ref Range    Acetaminophen <5.0 0.0 - 30.0 mcg/mL   Urine Drug Screen - Straight Cath    Specimen: Straight Cath; Urine   Result Value Ref Range    Amphet/Methamphet, Screen Negative Negative    Barbiturates Screen, Urine Negative Negative    Benzodiazepine Screen, Urine Negative Negative    Cocaine Screen, Urine Negative Negative    Opiate Screen Negative Negative    THC, Screen, Urine Positive (A) Negative    Methadone Screen, Urine Negative Negative    Oxycodone Screen, Urine Negative Negative   Salicylate Level    Specimen: Blood   Result Value Ref Range    Salicylate <0.5 <=30.0 mg/dL   Ammonia    Specimen: Blood   Result Value Ref Range    Ammonia 14 11 - 51 umol/L   Urinalysis, Microscopic Only - Straight Cath    Specimen: Straight Cath; Urine   Result Value Ref Range    RBC, UA 3-5 (A) None Seen, 0-2 /HPF    WBC, UA 0-2 None Seen, 0-2 /HPF    Bacteria, UA None Seen None Seen /HPF    Squamous Epithelial Cells, UA 0-2 None Seen, 0-2 /HPF    Hyaline Casts, UA 0-2 None Seen /LPF    Methodology Automated Microscopy    Green Top (Gel)   Result Value Ref Range    Extra Tube Hold for add-ons.    Lavender Top   Result Value Ref Range    Extra Tube hold for add-on      CT Head Without Contrast    Result Date: 7/25/2024  Impression: 1. No intracranial hemorrhage. 2. Generalized cerebral atrophy with moderate white matter findings of chronic microvascular disease. Electronically Signed: Ronak Birmingham MD  7/25/2024 3:16 PM EDT  Workstation ID: [id number]    XR Chest 1 View    Result Date: 7/25/2024  Impression: No acute cardiopulmonary findings. Electronically Signed: Juan Alvarado MD  7/25/2024 1:55 PM EDT  Workstation ID: [id number]                Medical Decision Making  Patient was seen evaluated for the above problem    Differential diagnosis includes but is not limited to UTI, intracerebral hemorrhage, dehydration, psychiatric, TIA    Patient is oriented for me here.  I am not seeing focal deficits on exam.  She was sent for CT that shows no hemorrhage.  This loss of appetite and transient confusion and hallucinations thought potentially could be medication.  But she has not taken but two of her pain pills over the last week.  No other new medications.  I am not sure is the cause of the hallucinations.  With loss of p.o. intake and persisting pain potentially dehydration.  She was provided IV fluids here.  She did have a transient drop in her blood pressure.  This was present the last time she was here as well.  Her troponin was 18 about where it typically runs.  However with her declining status at home she will be admitted to the hospital.  Spoke with  who agrees to admit the patient.  She will be further observed.  Potential MRI or neurologic evaluation is necessary.    Problems Addressed:  Altered mental status, unspecified altered mental status type: complicated acute illness or injury    Amount and/or Complexity of Data Reviewed  Labs: ordered. Decision-making details documented in ED Course.     Details: Labs reviewed by myself  Radiology: ordered and independent interpretation performed.     Details: CT reviewed by myself as well as x-ray.  As above.  ECG/medicine tests: ordered.     Details: Ordered and pending at this time.    Risk  Prescription drug management.  Decision regarding hospitalization.        Final diagnoses:   None   Altered mental status  Back injury    ED Disposition  ED Disposition       None            No follow-up provider specified.       Medication List      No changes were made to your prescriptions during this visit.            Tavo Black, DO  07/25/24 [id number]       Tavo Black, DO  07/25/24  [id number]

## 2024-07-25 NOTE — Clinical Note
Level of Care: Med/Surg [1]   Admitting Physician: VICTOR MANUEL BURT [[id number]]   Attending Physician: VICTOR MANUEL BURT [[id number]]

## 2024-07-26 ENCOUNTER — DOCUMENTATION (OUTPATIENT)
Dept: HOME HEALTH SERVICES | Facility: HOME HEALTHCARE | Age: 69
End: 2024-07-26
Payer: MEDICARE

## 2024-07-26 ENCOUNTER — APPOINTMENT (OUTPATIENT)
Dept: CT IMAGING | Facility: HOSPITAL | Age: 69
End: 2024-07-26
Payer: MEDICARE

## 2024-07-26 ENCOUNTER — HOME CARE VISIT (OUTPATIENT)
Dept: HOME HEALTH SERVICES | Facility: HOME HEALTHCARE | Age: 69
End: 2024-07-26
Payer: MEDICARE

## 2024-07-26 LAB
ANION GAP SERPL CALCULATED.3IONS-SCNC: 11.5 MMOL/L (ref 5–15)
BASOPHILS # BLD AUTO: 0.1 10*3/MM3 (ref 0–0.2)
BASOPHILS NFR BLD AUTO: 0.8 % (ref 0–1.5)
BUN SERPL-MCNC: 12 MG/DL (ref 8–23)
BUN/CREAT SERPL: 16.7 (ref 7–25)
CALCIUM SPEC-SCNC: 8.1 MG/DL (ref 8.6–10.5)
CHLORIDE SERPL-SCNC: 107 MMOL/L (ref 98–107)
CO2 SERPL-SCNC: 17.5 MMOL/L (ref 22–29)
CREAT SERPL-MCNC: 0.72 MG/DL (ref 0.57–1)
DEPRECATED RDW RBC AUTO: 61.8 FL (ref 37–54)
EGFRCR SERPLBLD CKD-EPI 2021: 90.6 ML/MIN/1.73
EOSINOPHIL # BLD AUTO: 0.38 10*3/MM3 (ref 0–0.4)
EOSINOPHIL NFR BLD AUTO: 3.2 % (ref 0.3–6.2)
ERYTHROCYTE [DISTWIDTH] IN BLOOD BY AUTOMATED COUNT: 16.9 % (ref 12.3–15.4)
GLUCOSE SERPL-MCNC: 95 MG/DL (ref 65–99)
HCT VFR BLD AUTO: 30.3 % (ref 34–46.6)
HGB BLD-MCNC: 9 G/DL (ref 12–15.9)
HOLD SPECIMEN: NORMAL
IMM GRANULOCYTES # BLD AUTO: 0.06 10*3/MM3 (ref 0–0.05)
IMM GRANULOCYTES NFR BLD AUTO: 0.5 % (ref 0–0.5)
IRON 24H UR-MRATE: 14 MCG/DL (ref 37–145)
IRON SATN MFR SERPL: 9 % (ref 20–50)
LYMPHOCYTES # BLD AUTO: 0.77 10*3/MM3 (ref 0.7–3.1)
LYMPHOCYTES NFR BLD AUTO: 6.4 % (ref 19.6–45.3)
MCH RBC QN AUTO: 29.6 PG (ref 26.6–33)
MCHC RBC AUTO-ENTMCNC: 29.7 G/DL (ref 31.5–35.7)
MCV RBC AUTO: 99.7 FL (ref 79–97)
MONOCYTES # BLD AUTO: 0.82 10*3/MM3 (ref 0.1–0.9)
MONOCYTES NFR BLD AUTO: 6.9 % (ref 5–12)
NEUTROPHILS NFR BLD AUTO: 82.2 % (ref 42.7–76)
NEUTROPHILS NFR BLD AUTO: 9.81 10*3/MM3 (ref 1.7–7)
NRBC BLD AUTO-RTO: 0 /100 WBC (ref 0–0.2)
PLATELET # BLD AUTO: 190 10*3/MM3 (ref 140–450)
PMV BLD AUTO: 11.3 FL (ref 6–12)
POTASSIUM SERPL-SCNC: 3.6 MMOL/L (ref 3.5–5.2)
RBC # BLD AUTO: 3.04 10*6/MM3 (ref 3.77–5.28)
SODIUM SERPL-SCNC: 136 MMOL/L (ref 136–145)
TIBC SERPL-MCNC: 155 MCG/DL (ref 298–536)
TRANSFERRIN SERPL-MCNC: 104 MG/DL (ref 200–360)
WBC NRBC COR # BLD AUTO: 11.94 10*3/MM3 (ref 3.4–10.8)
WHOLE BLOOD HOLD COAG: NORMAL

## 2024-07-26 PROCEDURE — 97162 PT EVAL MOD COMPLEX 30 MIN: CPT

## 2024-07-26 PROCEDURE — G0378 HOSPITAL OBSERVATION PER HR: HCPCS

## 2024-07-26 PROCEDURE — 25810000003 SODIUM CHLORIDE 0.9 % SOLUTION: Performed by: FAMILY MEDICINE

## 2024-07-26 PROCEDURE — 83540 ASSAY OF IRON: CPT | Performed by: FAMILY MEDICINE

## 2024-07-26 PROCEDURE — 74176 CT ABD & PELVIS W/O CONTRAST: CPT

## 2024-07-26 PROCEDURE — 99222 1ST HOSP IP/OBS MODERATE 55: CPT | Performed by: PSYCHIATRY & NEUROLOGY

## 2024-07-26 PROCEDURE — 80048 BASIC METABOLIC PNL TOTAL CA: CPT | Performed by: FAMILY MEDICINE

## 2024-07-26 PROCEDURE — 85025 COMPLETE CBC W/AUTO DIFF WBC: CPT | Performed by: FAMILY MEDICINE

## 2024-07-26 PROCEDURE — 84466 ASSAY OF TRANSFERRIN: CPT | Performed by: FAMILY MEDICINE

## 2024-07-26 RX ORDER — RISPERIDONE 0.5 MG/1
0.25 TABLET, ORALLY DISINTEGRATING ORAL NIGHTLY
Status: DISCONTINUED | OUTPATIENT
Start: 2024-07-26 | End: 2024-08-03 | Stop reason: HOSPADM

## 2024-07-26 RX ORDER — SODIUM CHLORIDE 9 MG/ML
50 INJECTION, SOLUTION INTRAVENOUS CONTINUOUS
Status: DISCONTINUED | OUTPATIENT
Start: 2024-07-26 | End: 2024-08-03 | Stop reason: HOSPADM

## 2024-07-26 RX ADMIN — APIXABAN 5 MG: 5 TABLET, FILM COATED ORAL at 08:08

## 2024-07-26 RX ADMIN — SODIUM CHLORIDE 100 ML/HR: 9 INJECTION, SOLUTION INTRAVENOUS at 07:13

## 2024-07-26 RX ADMIN — PANTOPRAZOLE SODIUM 40 MG: 40 TABLET, DELAYED RELEASE ORAL at 05:46

## 2024-07-26 RX ADMIN — HYDROCODONE BITARTRATE AND ACETAMINOPHEN 1 TABLET: 5; 325 TABLET ORAL at 18:31

## 2024-07-26 RX ADMIN — DIGOXIN 125 MCG: 125 TABLET ORAL at 13:20

## 2024-07-26 RX ADMIN — Medication 10 ML: at 21:20

## 2024-07-26 RX ADMIN — LEVOTHYROXINE SODIUM 75 MCG: 0.07 TABLET ORAL at 05:46

## 2024-07-26 RX ADMIN — PAROXETINE HYDROCHLORIDE 40 MG: 20 TABLET, FILM COATED ORAL at 05:45

## 2024-07-26 RX ADMIN — ATORVASTATIN CALCIUM 80 MG: 40 TABLET, FILM COATED ORAL at 21:20

## 2024-07-26 RX ADMIN — RISPERIDONE 0.25 MG: 0.5 TABLET, ORALLY DISINTEGRATING ORAL at 21:20

## 2024-07-26 RX ADMIN — APIXABAN 5 MG: 5 TABLET, FILM COATED ORAL at 21:20

## 2024-07-26 NOTE — H&P
Patient Care Team:  Luan Joseph APRN as PCP - General (Family Medicine)  Jozef Otero MD as Consulting Physician (Nephrology)    Chief complaint altered mental status    Subjective     Patient is a 68 y.o. female who presented to the ER with reports of altered mental status over the last few days but was worse through the night last night. It was reported that patient has not slept in 2 days and has been hallucinating, talking to people that are not there. She was seen in the ER last week after a fall with fractured vertebrae. She has been taking hydrocodone for pain but has only taken a couple doses. She is having significant pain with ambulation. She has had decreased oral intake the last few days. Patient was recommended rehab after her last admission but family refused as they reported they already had PT/OT set up at home and they were able to help take care of her.   In the ER CT with no acute findings. She was given IV fluids for hydration, and was oriented at this time.     Onset of symptoms was 2-3 days      Review of Systems   Constitutional: Negative.    HENT: Negative.     Eyes: Negative.    Respiratory: Negative.     Cardiovascular: Negative.    Gastrointestinal: Negative.    Endocrine: Negative.    Genitourinary: Negative.    Musculoskeletal:  Positive for back pain, gait problem and myalgias.   Neurological: Negative.    Psychiatric/Behavioral:  Positive for confusion.           History  Past Medical History:   Diagnosis Date    AAA (abdominal aortic aneurysm)     infrarenal- 3.1cm(2010, 3.7x4.2cm(2016), 3.9cm (2017)    Allergic rhinitis     Aspiration pneumonia 05/2017    CHF (congestive heart failure)     Coronary artery disease     DDD (degenerative disc disease), lumbar     lumbar spine- Dr. Churchill     Depression     Diverticulosis     Frequent UTI     Dr. Daniels    GERD (gastroesophageal reflux disease)     Hiatal hernia     HSV (herpes simplex virus) infection     Oral     Hyperlipidemia     IBS (irritable bowel syndrome)     Constipation predominant    Ischemic cardiomyopathy 09/2017    AICD     Kidney stones     NSTEMI (non-ST elevated myocardial infarction) 04/26/2017    Obstructive sleep apnea 2011    nfsg 2011, ahi 67    Osteoarthritis     Osteopenia     Peripheral neuropathy     feet    Pulmonary embolism, bilateral 05/2017    Rotator cuff tear     Bilateral- Ortho      Past Surgical History:   Procedure Laterality Date    CARDIAC CATHETERIZATION  04/26/2017    99% mid LAD. 90% mid LCX. 60% RCA. Recommended CABG. Dr. Diaz    CARDIAC DEFIBRILLATOR PLACEMENT  12/26/2017    ICD implant/ Dr. Ellis    CATARACT EXTRACTION      CORONARY ARTERY BYPASS GRAFT  04/26/2017    x4 & ASD Closure    CYSTOSCOPY  2002    negative. Dr. Daniels    LAPAROSCOPIC CHOLECYSTECTOMY  04/17/2013    For biliary dyskinesia. Dr. Mccoy.     REPLACEMENT TOTAL KNEE BILATERAL  11/2004    Dr. Herrera    THORACENTESIS Left 05/08/2017    TONSILLECTOMY      TUBAL ABDOMINAL LIGATION Bilateral      Family History   Problem Relation Age of Onset    Heart disease Mother     Other Mother         Hypoglycemia    Osteoporosis Mother     COPD Father     Stroke Father     Hypertension Brother     Diabetes Brother     Heart disease Brother      Social History     Tobacco Use    Smoking status: Never   Vaping Use    Vaping status: Some Days    Substances: CBD, nightly, 2-3 weekly   Substance Use Topics    Alcohol use: Never    Drug use: Never     (Not in a hospital admission)    Allergies:  Oxytetracycline and Oxycodone    Objective     Vital Signs  Temp:  [98 °F (36.7 °C)-98.9 °F (37.2 °C)] 98.9 °F (37.2 °C)  Heart Rate:  [] 81  Resp:  [15-20] 15  BP: ()/(46-83) 109/57     Physical Exam:      General Appearance:    Alert, cooperative, in no acute distress   Head:    Normocephalic, without obvious abnormality, atraumatic   Eyes:            Lids and lashes normal, conjunctivae and sclerae normal, no   icterus, no  pallor, corneas clear, PERRLA   Ears:    Ears appear intact with no abnormalities noted   Throat:   No oral lesions, no thrush, oral mucosa moist   Neck:   No adenopathy, supple, trachea midline, no thyromegaly, no   carotid bruit, no JVD   Lungs:     Clear to auscultation,respirations regular, even and                  unlabored    Heart:    Regular rhythm and normal rate, normal S1 and S2, no            murmur, no gallop, no rub, no click   Chest Wall:    No abnormalities observed   Abdomen:     Normal bowel sounds, no masses, no organomegaly, soft        non-tender, non-distended, no guarding, no rebound                tenderness   Extremities:   Moves all extremities well, no edema, no cyanosis, no             redness   Pulses:   Pulses palpable and equal bilaterally   Skin:   No bleeding, bruising or rash   Lymph nodes:   No palpable adenopathy   Neurologic:   No focal deficits noted       Results Review:     Imaging Results (Last 24 Hours)       Procedure Component Value Units Date/Time    CT Head Without Contrast [[id number]] Collected: 07/25/24 1508     Updated: 07/25/24 1518    Narrative:      CT HEAD WO CONTRAST    Date of Exam: 7/25/2024 3:07 PM EDT    Indication: ams.    Comparison: CT head without contrast 7/11/2024    Technique: Axial CT images were obtained of the head without contrast administration.  Coronal reconstructions were performed.  Automated exposure control and iterative reconstruction methods were used.      Findings:  There is no intracranial hemorrhage. Generalized cerebral volume loss. Moderate white matter findings of chronic microvascular disease. No intracranial hemorrhage. No abnormal extra-axial fluid collection. The posterior fossa is without acute   abnormality. Globes are intact and symmetric. No retro-orbital abnormality. The mastoid air cells are well-aerated. There is no sinus fluid level. Negative for calvarial fracture.      Impression:      Impression:  1. No intracranial  hemorrhage.  2. Generalized cerebral atrophy with moderate white matter findings of chronic microvascular disease.        Electronically Signed: Ronak Birmingham MD    7/25/2024 3:16 PM EDT    Workstation ID: [id number]    XR Chest 1 View [[id number]] Collected: 07/25/24 1353     Updated: 07/25/24 1358    Narrative:      XR CHEST 1 VW    Date of Exam: 7/25/2024 1:41 PM EDT    Indication: Weak/Dizzy/AMS triage protocol    Comparison: 7/11/2024    Findings:  Right shoulder arthroplasty again noted. Left subclavian cardiac stimulator device appears unchanged. Abdominal aortic stent graft is partially visible. Sternotomy wires overlie the midline. No focal pulmonary consolidations are evident. Pulmonary   vascularity, heart size and mediastinal contour are within normal limits. No pneumothorax or pleural fluid identified.      Impression:      Impression:  No acute cardiopulmonary findings.      Electronically Signed: Juan Alvarado MD    7/25/2024 1:55 PM EDT    Workstation ID: [id number]             Lab Results (last 24 hours)       Procedure Component Value Units Date/Time    Blood Culture - Blood, Arm, Right [[id number]] Collected: 07/25/24 1538    Specimen: Blood from Arm, Right Updated: 07/25/24 1541    Blood Culture - Blood, Arm, Left [[id number]] Collected: 07/25/24 1538    Specimen: Blood from Arm, Left Updated: 07/25/24 1541    Respiratory Panel PCR w/COVID-19(SARS-CoV-2) [facility]O/LISA/PAD/[facility] In-House, NP Swab in UTM/VTM, 2 HR TAT - Swab, Nasopharynx [[id number]]  (Normal) Collected: 07/25/24 1411    Specimen: Swab from Nasopharynx Updated: 07/25/24 1536     ADENOVIRUS, PCR Not Detected     Coronavirus 229E Not Detected     Coronavirus HKU1 Not Detected     Coronavirus NL63 Not Detected     Coronavirus OC43 Not Detected     COVID19 Not Detected     Human Metapneumovirus Not Detected     Human Rhinovirus/Enterovirus Not Detected     Influenza A PCR Not Detected     Influenza B PCR Not Detected     Parainfluenza Virus 1  Not Detected     Parainfluenza Virus 2 Not Detected     Parainfluenza Virus 3 Not Detected     Parainfluenza Virus 4 Not Detected     RSV, PCR Not Detected     Bordetella pertussis pcr Not Detected     Bordetella parapertussis PCR Not Detected     Chlamydophila pneumoniae PCR Not Detected     Mycoplasma pneumo by PCR Not Detected    Narrative:      In the setting of a positive respiratory panel with a viral infection PLUS a negative procalcitonin without other underlying concern for bacterial infection, consider observing off antibiotics or discontinuation of antibiotics and continue supportive care. If the respiratory panel is positive for atypical bacterial infection (Bordetella pertussis, Chlamydophila pneumoniae, or Mycoplasma pneumoniae), consider antibiotic de-escalation to target atypical bacterial infection.    Ethanol [[id number]] Collected: 07/25/24 1449    Specimen: Blood Updated: 07/25/24 1522     Ethanol % <0.010 %     Narrative:      Plasma Ethanol Clinical Symptoms:    ETOH (%)               Clinical Symptom  .01-.05              No apparent influence  .03-.12              Euphoria, Diminished judgment and attention   .09-.25              Impaired comprehension, Muscle incoordination  .18-.30              Confusion, Staggered gait, Slurred speech  .25-.40              Markedly decreased response to stimuli, unable to stand or                        walk, vomitting, sleep or stupor  .35-.50              Comatose, Anesthesia, Subnormal body temperature        Ammonia [[id number]]  (Normal) Collected: 07/25/24 1449    Specimen: Blood Updated: 07/25/24 1516     Ammonia 14 umol/L     Urine Drug Screen - Straight Cath [[id number]]  (Abnormal) Collected: 07/25/24 1411    Specimen: Urine from Straight Cath Updated: 07/25/24 1506     Amphet/Methamphet, Screen Negative     Barbiturates Screen, Urine Negative     Benzodiazepine Screen, Urine Negative     Cocaine Screen, Urine Negative     Opiate Screen Negative      THC, Screen, Urine Positive     Methadone Screen, Urine Negative     Oxycodone Screen, Urine Negative    Narrative:      Negative Thresholds Per Drugs Screened:    Amphetamines                 500 ng/ml  Barbiturates                 200 ng/ml  Benzodiazepines              100 ng/ml  Cocaine                      300 ng/ml  Methadone                    300 ng/ml  Opiates                      300 ng/ml  Oxycodone                    100 ng/ml  THC                           50 ng/ml    The Normal Value for all drugs tested is negative. This report includes final unconfirmed screening results to be used for medical treatment purposes only. Unconfirmed results must not be used for non-medical purposes such as employment or legal testing. Clinical consideration should be applied to any drug of abuse test, particularly when unconfirmed results are used.          All urine drugs of abuse requests without chain of custody are for medical screening purposes only.  False positives are possible.      Urinalysis, Microscopic Only - Straight Cath [[id number]]  (Abnormal) Collected: 07/25/24 1411    Specimen: Urine from Straight Cath Updated: 07/25/24 1434     RBC, UA 3-5 /HPF      WBC, UA 0-2 /HPF      Bacteria, UA None Seen /HPF      Squamous Epithelial Cells, UA 0-2 /HPF      Hyaline Casts, UA 0-2 /LPF      Methodology Automated Microscopy    Urinalysis With Microscopic If Indicated (No Culture) - Straight Cath [[id number]]  (Abnormal) Collected: 07/25/24 1411    Specimen: Urine from Straight Cath Updated: 07/25/24 1432     Color, UA Yellow     Appearance, UA Clear     pH, UA 5.5     Specific Gravity, UA 1.018     Glucose, UA Negative     Ketones, UA Trace     Bilirubin, UA Small (1+)     Comment: Confirmation testing is unavailable.  A serum bilirubin is recommended for further assessment.        Blood, UA Negative     Protein, UA Trace     Leuk Esterase, UA Trace     Nitrite, UA Negative     Urobilinogen, UA 1.0 E.U./dL     Digoxin Level [[id number]]  (Normal) Collected: 07/25/24 1342    Specimen: Blood Updated: 07/25/24 1422     Digoxin 0.92 ng/mL     Acetaminophen Level [[id number]]  (Normal) Collected: 07/25/24 1342    Specimen: Blood Updated: 07/25/24 1422     Acetaminophen <5.0 mcg/mL     Narrative:      Acetaminophen Therapeutic Range  5-20 ug/mL      Hours after ingestion            Toxic Value    4 Hours                           150 ug/mL    8 Hours                            70 ug/mL   12 Hours                            40 ug/mL   16 Hours                            20 ug/mL    These values apply to a single ingestion only.     Salicylate Level [[id number]]  (Normal) Collected: 07/25/24 1342    Specimen: Blood Updated: 07/25/24 1422     Salicylate <0.5 mg/dL     Comprehensive Metabolic Panel [[id number]]  (Abnormal) Collected: 07/25/24 1342    Specimen: Blood Updated: 07/25/24 1409     Glucose 162 mg/dL      BUN 12 mg/dL      Creatinine 0.89 mg/dL      Sodium 136 mmol/L      Potassium 4.0 mmol/L      Chloride 106 mmol/L      CO2 18.6 mmol/L      Calcium 8.9 mg/dL      Total Protein 6.4 g/dL      Albumin 3.3 g/dL      ALT (SGPT) 6 U/L      AST (SGOT) 19 U/L      Alkaline Phosphatase 179 U/L      Total Bilirubin 0.8 mg/dL      Globulin 3.1 gm/dL      A/G Ratio 1.1 g/dL      BUN/Creatinine Ratio 13.5     Anion Gap 11.4 mmol/L      eGFR 70.3 mL/min/1.73     Narrative:      GFR Normal >60  Chronic Kidney Disease <60  Kidney Failure <15      Single High Sensitivity Troponin T [[id number]]  (Abnormal) Collected: 07/25/24 1342    Specimen: Blood Updated: 07/25/24 1409     HS Troponin T 18 ng/L     Narrative:      High Sensitive Troponin T Reference Range:  <14.0 ng/L- Negative Female for AMI  <22.0 ng/L- Negative Male for AMI  >=14 - Abnormal Female indicating possible myocardial injury.  >=22 - Abnormal Male indicating possible myocardial injury.   Clinicians would have to utilize clinical acumen, EKG, Troponin, and serial changes to  determine if it is an Acute Myocardial Infarction or myocardial injury due to an underlying chronic condition.         Magnesium [[id number]]  (Normal) Collected: 07/25/24 1342    Specimen: Blood Updated: 07/25/24 1409     Magnesium 1.7 mg/dL     CBC & Differential [[id number]]  (Abnormal) Collected: 07/25/24 1342    Specimen: Blood Updated: 07/25/24 1347    Narrative:      The following orders were created for panel order CBC & Differential.  Procedure                               Abnormality         Status                     ---------                               -----------         ------                     CBC Auto Differential[[id number]]        Abnormal            Final result                 Please view results for these tests on the individual orders.    CBC Auto Differential [[id number]]  (Abnormal) Collected: 07/25/24 1342    Specimen: Blood Updated: 07/25/24 1347     WBC 15.06 10*3/mm3      RBC 3.59 10*6/mm3      Hemoglobin 10.5 g/dL      Hematocrit 35.5 %      MCV 98.9 fL      MCH 29.2 pg      MCHC 29.6 g/dL      RDW 16.7 %      RDW-SD 61.5 fl      MPV 11.0 fL      Platelets 207 10*3/mm3      Neutrophil % 87.7 %      Lymphocyte % 3.6 %      Monocyte % 5.6 %      Eosinophil % 1.3 %      Basophil % 1.2 %      Immature Grans % 0.6 %      Neutrophils, Absolute 13.21 10*3/mm3      Lymphocytes, Absolute 0.54 10*3/mm3      Monocytes, Absolute 0.85 10*3/mm3      Eosinophils, Absolute 0.19 10*3/mm3      Basophils, Absolute 0.18 10*3/mm3      Immature Grans, Absolute 0.09 10*3/mm3      nRBC 0.0 /100 WBC     [place] Draw [[id number]] Collected: 07/25/24 1342    Specimen: Blood Updated: 07/25/24 1345    Narrative:      The following orders were created for panel order [place] Draw.  Procedure                               Abnormality         Status                     ---------                               -----------         ------                     Green Top (Gel)[[id number]]                                   Final result               Lavender Top[[id number]]                                     Final result               Gold Top - SST[[id number]]                                                              Light Blue Top[[id number]]                                                                Please view results for these tests on the individual orders.    Green Top (Gel) [[id number]] Collected: 07/25/24 1342    Specimen: Blood Updated: 07/25/24 1345     Extra Tube Hold for add-ons.     Comment: Auto resulted.       Lavender Top [[id number]] Collected: 07/25/24 1342    Specimen: Blood Updated: 07/25/24 1345     Extra Tube hold for add-on     Comment: Auto resulted                I reviewed the patient's new clinical results.    Assessment & Plan       Altered mental status  -CT head with no acute findings  -UA negative for infection  -UDS positive for thc  -CXR unremarkable  -respiratory panel negative  -IV fluids for hydration    DVT prophylaxis- SCD's  GI prophylaxis- ppi    I discussed the patient's findings and my recommendations with patient.     Iva Dailey, APRN  07/25/24  21:29 EDT

## 2024-07-26 NOTE — CASE MANAGEMENT/SOCIAL WORK
Discharge Planning Assessment  [facility]     Patient Name: Laura Mejia  MRN: [id number]  Today's Date: 7/26/2024    Admit Date: 7/25/2024    Plan: From home, PT/OT pending. Watch for O2 needs.   Discharge Needs Assessment       Row Name 07/26/24 1155       Living Environment    People in Home spouse    Name(s) of People in Home Spouse Lamont    Current Living Arrangements home    Potentially Unsafe Housing Conditions none    In the past 12 months has the electric, gas, oil, or water company threatened to shut off services in your home? No    Primary Care Provided by spouse/significant other  Spouse Lamont    Provides Primary Care For spouse    Caregiving Concerns Spouse Lamont    Family Caregiver if Needed spouse    Family Caregiver Names Spouse Lamont    Quality of Family Relationships helpful;involved;supportive    Able to Return to Prior Arrangements yes       Resource/Environmental Concerns    Resource/Environmental Concerns none    Transportation Concerns none       Transportation Needs    In the past 12 months, has lack of transportation kept you from medical appointments or from getting medications? no    In the past 12 months, has lack of transportation kept you from meetings, work, or from getting things needed for daily living? No       Food Insecurity    Within the past 12 months, you worried that your food would run out before you got the money to buy more. Never true    Within the past 12 months, the food you bought just didn't last and you didn't have money to get more. Never true       Transition Planning    Patient/Family Anticipates Transition to home with family    Transportation Anticipated family or friend will provide  Spouse Lamont can transport at d/c.       Discharge Needs Assessment    Equipment Currently Used at Home commode;rollator;shower chair;grab bar;cpap;glucometer;other (see comments)  Back Brace                   Discharge Plan       Row Name 07/26/24 5137        Plan    Plan From home, PT/OT pending. Watch for O2 needs.    Patient/Family in Agreement with Plan yes    Plan Comments CM spoke to pt. Annie and spouse Lamont at bedside. PCP and pharmacy confirmed. Pt.'s spouse Lamont denies financial, transportation, or medication issues. PT. declined M2B. Pts spouse Lamont can transport at d/c. Pt. has used [facility] in recent past. DC barriers: PT/OT, Psychology consult, IV meds.                    Functional Status       Row Name 07/26/24 1151       Functional Status, IADL    Medications assistive person  Spouse Lamont    Meal Preparation assistive person  Spouse Lamont    Housekeeping assistive person  Spouse Lamont    Laundry assistive person  Spouse Lamont    Shopping assistive person  Spouse Lamont                Patient Forms       Row Name 07/26/24 1233       Patient Forms    Important Message from Medicare (IMM) Delivered  CLEMENTE per registration 7/25/2024    Patient Observation Letter Delivered  CLEMENET per registration 7/25/2024                    Xin Roman RN    Office: [phone]  Fax: [phone]  [email or web address]

## 2024-07-26 NOTE — OUTREACH NOTE
Medical Week 2 Survey      Flowsheet Row Responses   [facility] facility patient discharged from? [facility]   Does the patient have one of the following disease processes/diagnoses(primary or secondary)? Other   Week 2 attempt successful? No   Unsuccessful attempts Attempt 1   Revoke Readmitted            ALVARADO DINH - Registered Nurse

## 2024-07-26 NOTE — PROGRESS NOTES
LOS: 0 days   Patient Care Team:  Luan Joseph APRN as PCP - General (Family Medicine)  Jozef Otero MD as Consulting Physician (Nephrology)    Subjective     Interval History:     Patient Complaints: pt is a little better, still confused.  Slept well last night.  Evidently had not slept for several days at home.      History taken from: patient    Review of Systems   Constitutional:  Positive for activity change, appetite change and fatigue. Negative for fever.   HENT: Negative.     Respiratory: Negative.     Cardiovascular: Negative.    Gastrointestinal:  Positive for constipation and nausea. Negative for abdominal distention, abdominal pain and vomiting.   Genitourinary: Negative.    Musculoskeletal:  Positive for arthralgias, back pain and gait problem.   Neurological:  Positive for weakness.   Psychiatric/Behavioral:  Positive for confusion and sleep disturbance.            Objective     Vital Signs  Temp:  [97.3 °F (36.3 °C)-99.1 °F (37.3 °C)] 97.3 °F (36.3 °C)  Heart Rate:  [] 87  Resp:  [15-20] 16  BP: ()/(46-83) 102/48    Physical Exam:     General Appearance:    Alert, cooperative, in no acute distress, not at baseline   Head:    Normocephalic, without obvious abnormality, atraumatic   Eyes:            Lids and lashes normal, conjunctivae and sclerae normal, no   icterus, no pallor, corneas clear, PERRLA   Ears:    Ears appear intact with no abnormalities noted   Throat:   No oral lesions, no thrush, oral mucosa moist   Neck:   No adenopathy, supple, trachea midline, no thyromegaly, no   carotid bruit, no JVD   Lungs:     Clear to auscultation,respirations regular, even and                  unlabored    Heart:    Regular rhythm and normal rate, normal S1 and S2, no            murmur, no gallop, no rub, no click   Chest Wall:    No abnormalities observed   Abdomen:     Normal bowel sounds, no masses, no organomegaly, soft        non-tender, non-distended, no guarding, no rebound                 tenderness   Extremities:   Moves all extremities well, no edema, no cyanosis, no             redness   Pulses:   Pulses palpable and equal bilaterally   Skin:   No bleeding, bruising or rash   Lymph nodes:   No palpable adenopathy   Neurologic:   Cranial nerves 2 - 12 grossly intact, sensation intact, DTR       present and equal bilaterally        Results Review:    Lab Results (last 24 hours)       Procedure Component Value Units Date/Time    Basic Metabolic Panel [[id number]]  (Abnormal) Collected: 07/26/24 0551    Specimen: Blood Updated: 07/26/24 0852     Glucose 95 mg/dL      BUN 12 mg/dL      Creatinine 0.72 mg/dL      Sodium 136 mmol/L      Potassium 3.6 mmol/L      Comment: Specimen hemolyzed.  Result may be falsely elevated.        Chloride 107 mmol/L      CO2 17.5 mmol/L      Calcium 8.1 mg/dL      BUN/Creatinine Ratio 16.7     Anion Gap 11.5 mmol/L      eGFR 90.6 mL/min/1.73     Narrative:      GFR Normal >60  Chronic Kidney Disease <60  Kidney Failure <15      CBC & Differential [[id number]]  (Abnormal) Collected: 07/26/24 0549    Specimen: Blood Updated: 07/26/24 0602    Narrative:      The following orders were created for panel order CBC & Differential.  Procedure                               Abnormality         Status                     ---------                               -----------         ------                     CBC Auto Differential[[id number]]        Abnormal            Final result                 Please view results for these tests on the individual orders.    CBC Auto Differential [[id number]]  (Abnormal) Collected: 07/26/24 0549    Specimen: Blood Updated: 07/26/24 0602     WBC 11.94 10*3/mm3      RBC 3.04 10*6/mm3      Hemoglobin 9.0 g/dL      Hematocrit 30.3 %      MCV 99.7 fL      MCH 29.6 pg      MCHC 29.7 g/dL      RDW 16.9 %      RDW-SD 61.8 fl      MPV 11.3 fL      Platelets 190 10*3/mm3      Neutrophil % 82.2 %      Lymphocyte % 6.4 %      Monocyte % 6.9 %       Eosinophil % 3.2 %      Basophil % 0.8 %      Immature Grans % 0.5 %      Neutrophils, Absolute 9.81 10*3/mm3      Lymphocytes, Absolute 0.77 10*3/mm3      Monocytes, Absolute 0.82 10*3/mm3      Eosinophils, Absolute 0.38 10*3/mm3      Basophils, Absolute 0.10 10*3/mm3      Immature Grans, Absolute 0.06 10*3/mm3      nRBC 0.0 /100 WBC     [place] Draw [[id number]] Collected: 07/25/24 1342    Specimen: Blood Updated: 07/26/24 0601    Narrative:      The following orders were created for panel order [place] Draw.  Procedure                               Abnormality         Status                     ---------                               -----------         ------                     Green Top (Gel)[[id number]]                                  Final result               Lavender Top[[id number]]                                     Final result               Gold Top - SST[[id number]]                                   Final result               Light Blue Top[[id number]]                                   Final result                 Please view results for these tests on the individual orders.    Gold Top - SST [[id number]] Collected: 07/26/24 0549    Specimen: Blood Updated: 07/26/24 0601     Extra Tube Hold for add-ons.     Comment: Auto resulted.       Light Blue Top [[id number]] Collected: 07/26/24 0549    Specimen: Blood Updated: 07/26/24 0601     Extra Tube Hold for add-ons.     Comment: Auto resulted       Blood Culture - Blood, Arm, Right [[id number]] Collected: 07/25/24 1538    Specimen: Blood from Arm, Right Updated: 07/25/24 1541    Blood Culture - Blood, Arm, Left [[id number]] Collected: 07/25/24 1538    Specimen: Blood from Arm, Left Updated: 07/25/24 1541    Respiratory Panel PCR w/COVID-19(SARS-CoV-2) [facility] In-House, NP Swab in UTM/VTM, 2 HR TAT - Swab, Nasopharynx [[id number]]  (Normal) Collected: 07/25/24 1411    Specimen: Swab from Nasopharynx Updated: 07/25/24 1536     ADENOVIRUS, PCR Not  Detected     Coronavirus 229E Not Detected     Coronavirus HKU1 Not Detected     Coronavirus NL63 Not Detected     Coronavirus OC43 Not Detected     COVID19 Not Detected     Human Metapneumovirus Not Detected     Human Rhinovirus/Enterovirus Not Detected     Influenza A PCR Not Detected     Influenza B PCR Not Detected     Parainfluenza Virus 1 Not Detected     Parainfluenza Virus 2 Not Detected     Parainfluenza Virus 3 Not Detected     Parainfluenza Virus 4 Not Detected     RSV, PCR Not Detected     Bordetella pertussis pcr Not Detected     Bordetella parapertussis PCR Not Detected     Chlamydophila pneumoniae PCR Not Detected     Mycoplasma pneumo by PCR Not Detected    Narrative:      In the setting of a positive respiratory panel with a viral infection PLUS a negative procalcitonin without other underlying concern for bacterial infection, consider observing off antibiotics or discontinuation of antibiotics and continue supportive care. If the respiratory panel is positive for atypical bacterial infection (Bordetella pertussis, Chlamydophila pneumoniae, or Mycoplasma pneumoniae), consider antibiotic de-escalation to target atypical bacterial infection.    Ethanol [[id number]] Collected: 07/25/24 1449    Specimen: Blood Updated: 07/25/24 4849     Ethanol % <0.010 %     Narrative:      Plasma Ethanol Clinical Symptoms:    ETOH (%)               Clinical Symptom  .01-.05              No apparent influence  .03-.12              Euphoria, Diminished judgment and attention   .09-.25              Impaired comprehension, Muscle incoordination  .18-.30              Confusion, Staggered gait, Slurred speech  .25-.40              Markedly decreased response to stimuli, unable to stand or                        walk, vomitting, sleep or stupor  .35-.50              Comatose, Anesthesia, Subnormal body temperature        Ammonia [[id number]]  (Normal) Collected: 07/25/24 1449    Specimen: Blood Updated: 07/25/24 8173      Ammonia 14 umol/L     Urine Drug Screen - Straight Cath [[id number]]  (Abnormal) Collected: 07/25/24 1411    Specimen: Urine from Straight Cath Updated: 07/25/24 1506     Amphet/Methamphet, Screen Negative     Barbiturates Screen, Urine Negative     Benzodiazepine Screen, Urine Negative     Cocaine Screen, Urine Negative     Opiate Screen Negative     THC, Screen, Urine Positive     Methadone Screen, Urine Negative     Oxycodone Screen, Urine Negative    Narrative:      Negative Thresholds Per Drugs Screened:    Amphetamines                 500 ng/ml  Barbiturates                 200 ng/ml  Benzodiazepines              100 ng/ml  Cocaine                      300 ng/ml  Methadone                    300 ng/ml  Opiates                      300 ng/ml  Oxycodone                    100 ng/ml  THC                           50 ng/ml    The Normal Value for all drugs tested is negative. This report includes final unconfirmed screening results to be used for medical treatment purposes only. Unconfirmed results must not be used for non-medical purposes such as employment or legal testing. Clinical consideration should be applied to any drug of abuse test, particularly when unconfirmed results are used.          All urine drugs of abuse requests without chain of custody are for medical screening purposes only.  False positives are possible.      Urinalysis, Microscopic Only - Straight Cath [[id number]]  (Abnormal) Collected: 07/25/24 1411    Specimen: Urine from Straight Cath Updated: 07/25/24 1434     RBC, UA 3-5 /HPF      WBC, UA 0-2 /HPF      Bacteria, UA None Seen /HPF      Squamous Epithelial Cells, UA 0-2 /HPF      Hyaline Casts, UA 0-2 /LPF      Methodology Automated Microscopy    Urinalysis With Microscopic If Indicated (No Culture) - Straight Cath [[id number]]  (Abnormal) Collected: 07/25/24 1411    Specimen: Urine from Straight Cath Updated: 07/25/24 1432     Color, UA Yellow     Appearance, UA Clear     pH, UA 5.5      Specific Gravity, UA 1.018     Glucose, UA Negative     Ketones, UA Trace     Bilirubin, UA Small (1+)     Comment: Confirmation testing is unavailable.  A serum bilirubin is recommended for further assessment.        Blood, UA Negative     Protein, UA Trace     Leuk Esterase, UA Trace     Nitrite, UA Negative     Urobilinogen, UA 1.0 E.U./dL    Digoxin Level [[id number]]  (Normal) Collected: 07/25/24 1342    Specimen: Blood Updated: 07/25/24 1422     Digoxin 0.92 ng/mL     Acetaminophen Level [[id number]]  (Normal) Collected: 07/25/24 1342    Specimen: Blood Updated: 07/25/24 1422     Acetaminophen <5.0 mcg/mL     Narrative:      Acetaminophen Therapeutic Range  5-20 ug/mL      Hours after ingestion            Toxic Value    4 Hours                           150 ug/mL    8 Hours                            70 ug/mL   12 Hours                            40 ug/mL   16 Hours                            20 ug/mL    These values apply to a single ingestion only.     Salicylate Level [[id number]]  (Normal) Collected: 07/25/24 1342    Specimen: Blood Updated: 07/25/24 1422     Salicylate <0.5 mg/dL     Comprehensive Metabolic Panel [[id number]]  (Abnormal) Collected: 07/25/24 1342    Specimen: Blood Updated: 07/25/24 1409     Glucose 162 mg/dL      BUN 12 mg/dL      Creatinine 0.89 mg/dL      Sodium 136 mmol/L      Potassium 4.0 mmol/L      Chloride 106 mmol/L      CO2 18.6 mmol/L      Calcium 8.9 mg/dL      Total Protein 6.4 g/dL      Albumin 3.3 g/dL      ALT (SGPT) 6 U/L      AST (SGOT) 19 U/L      Alkaline Phosphatase 179 U/L      Total Bilirubin 0.8 mg/dL      Globulin 3.1 gm/dL      A/G Ratio 1.1 g/dL      BUN/Creatinine Ratio 13.5     Anion Gap 11.4 mmol/L      eGFR 70.3 mL/min/1.73     Narrative:      GFR Normal >60  Chronic Kidney Disease <60  Kidney Failure <15      Single High Sensitivity Troponin T [[id number]]  (Abnormal) Collected: 07/25/24 1342    Specimen: Blood Updated: 07/25/24 1409     HS Troponin T 18 ng/L      Narrative:      High Sensitive Troponin T Reference Range:  <14.0 ng/L- Negative Female for AMI  <22.0 ng/L- Negative Male for AMI  >=14 - Abnormal Female indicating possible myocardial injury.  >=22 - Abnormal Male indicating possible myocardial injury.   Clinicians would have to utilize clinical acumen, EKG, Troponin, and serial changes to determine if it is an Acute Myocardial Infarction or myocardial injury due to an underlying chronic condition.         Magnesium [[id number]]  (Normal) Collected: 07/25/24 1342    Specimen: Blood Updated: 07/25/24 1409     Magnesium 1.7 mg/dL     CBC & Differential [[id number]]  (Abnormal) Collected: 07/25/24 1342    Specimen: Blood Updated: 07/25/24 1347    Narrative:      The following orders were created for panel order CBC & Differential.  Procedure                               Abnormality         Status                     ---------                               -----------         ------                     CBC Auto Differential[[id number]]        Abnormal            Final result                 Please view results for these tests on the individual orders.    CBC Auto Differential [[id number]]  (Abnormal) Collected: 07/25/24 1342    Specimen: Blood Updated: 07/25/24 1347     WBC 15.06 10*3/mm3      RBC 3.59 10*6/mm3      Hemoglobin 10.5 g/dL      Hematocrit 35.5 %      MCV 98.9 fL      MCH 29.2 pg      MCHC 29.6 g/dL      RDW 16.7 %      RDW-SD 61.5 fl      MPV 11.0 fL      Platelets 207 10*3/mm3      Neutrophil % 87.7 %      Lymphocyte % 3.6 %      Monocyte % 5.6 %      Eosinophil % 1.3 %      Basophil % 1.2 %      Immature Grans % 0.6 %      Neutrophils, Absolute 13.21 10*3/mm3      Lymphocytes, Absolute 0.54 10*3/mm3      Monocytes, Absolute 0.85 10*3/mm3      Eosinophils, Absolute 0.19 10*3/mm3      Basophils, Absolute 0.18 10*3/mm3      Immature Grans, Absolute 0.09 10*3/mm3      nRBC 0.0 /100 WBC     Green Top (Gel) [[id number]] Collected: 07/25/24 1342    Specimen:  Blood Updated: 07/25/24 1345     Extra Tube Hold for add-ons.     Comment: Auto resulted.       Lavender Top [[id number]] Collected: 07/25/24 1342    Specimen: Blood Updated: 07/25/24 1345     Extra Tube hold for add-on     Comment: Auto resulted                Imaging Results (Last 24 Hours)       Procedure Component Value Units Date/Time    CT Head Without Contrast [[id number]] Collected: 07/25/24 1508     Updated: 07/25/24 1518    Narrative:      CT HEAD WO CONTRAST    Date of Exam: 7/25/2024 3:07 PM EDT    Indication: ams.    Comparison: CT head without contrast 7/11/2024    Technique: Axial CT images were obtained of the head without contrast administration.  Coronal reconstructions were performed.  Automated exposure control and iterative reconstruction methods were used.      Findings:  There is no intracranial hemorrhage. Generalized cerebral volume loss. Moderate white matter findings of chronic microvascular disease. No intracranial hemorrhage. No abnormal extra-axial fluid collection. The posterior fossa is without acute   abnormality. Globes are intact and symmetric. No retro-orbital abnormality. The mastoid air cells are well-aerated. There is no sinus fluid level. Negative for calvarial fracture.      Impression:      Impression:  1. No intracranial hemorrhage.  2. Generalized cerebral atrophy with moderate white matter findings of chronic microvascular disease.        Electronically Signed: Ronak Birmingham MD    7/25/2024 3:16 PM EDT    Workstation ID: [id number]    XR Chest 1 View [[id number]] Collected: 07/25/24 1353     Updated: 07/25/24 1358    Narrative:      XR CHEST 1 VW    Date of Exam: 7/25/2024 1:41 PM EDT    Indication: Weak/Dizzy/AMS triage protocol    Comparison: 7/11/2024    Findings:  Right shoulder arthroplasty again noted. Left subclavian cardiac stimulator device appears unchanged. Abdominal aortic stent graft is partially visible. Sternotomy wires overlie the midline. No focal pulmonary  consolidations are evident. Pulmonary   vascularity, heart size and mediastinal contour are within normal limits. No pneumothorax or pleural fluid identified.      Impression:      Impression:  No acute cardiopulmonary findings.      Electronically Signed: Juan Alvarado MD    7/25/2024 1:55 PM EDT    Workstation ID: [id number]                 I reviewed the patient's new clinical results.    Medication Review:   Scheduled Meds:apixaban, 5 mg, Oral, BID  atorvastatin, 80 mg, Oral, Nightly  digoxin, 125 mcg, Oral, Daily  levothyroxine, 75 mcg, Oral, Q AM  lubiprostone, 8 mcg, Oral, BID  pantoprazole, 40 mg, Oral, Q AM  PARoxetine, 40 mg, Oral, QAM  sodium chloride, 10 mL, Intravenous, Q12H      Continuous Infusions:sodium chloride, 100 mL/hr, Last Rate: 100 mL/hr (07/26/24 0713)      PRN Meds:.  senna-docusate sodium **AND** polyethylene glycol **AND** bisacodyl **AND** bisacodyl    Calcium Replacement - Follow Nurse / BPA Driven Protocol    HYDROcodone-acetaminophen    Magnesium Standard Dose Replacement - Follow Nurse / BPA Driven Protocol    ondansetron ODT **OR** ondansetron    Phosphorus Replacement - Follow Nurse / BPA Driven Protocol    Potassium Replacement - Follow Nurse / BPA Driven Protocol    sodium chloride    sodium chloride    sodium chloride     Assessment & Plan       Altered mental status  -CT head with no acute findings  -UA negative for infection, but will ask for culture given her h/o recurrent UTI  -UDS positive for thc  -CXR unremarkable  -respiratory panel negative  -IV fluids for hydration  - will get MRI of her brain since she is s/p  CVA earlier this year  - psych consult for eval of hallucinations    Decreased appetite  - will get CT abd/pelvis    Recent compression fracture T10  - PT/OT  - no recent falls    Afib - eliquis, dig.  BP soft, metoprolol on hold  CHF  CAD  IBS-C - linzess  DDD  Depression - paxil  Diverticulosis  GERD - PPI  HLD - lipitor  ANNIE  Hypothyroid - synthroid            DVT prophylaxis- SCD's, eliquis  GI prophylaxis- ppi        Plan for disposition:ELLIOT Hatfield MD  07/26/24  08:59 EDT

## 2024-07-26 NOTE — CONSULTS
Referring Provider: Dr Hatfield  Reason for Consultation: hallucinations       Chief complaint hallucinations, fatigue, pain     Subjective .     History of present illness:  The patient is a 68 y.o. female who was admitted secondary to altered mental status. PMHx: AAA, CHF, CAD, frequent UTIs , DDD, osteoarthritis. Psych consult was requested by Dr Alysia juarez to hallucinations.  The pt was limited historian, majority of information was obtained from patient's  who was in the room during assessment.  Privacy was offered, the patient gave permission to her  to stay in the room.  Patient has been did not report any major problems with anxiety or depression until a few months ago when patient's physical health started declining, she had frequent UTIs recently, increased pain.  The patient started having hallucinations, she stated she has been talking to her mom who was .  Patient's  showed the video when the patient was actively hallucinating at home.   The patient denied any thoughts about harming herself, she does not feel frightened or threatened by hallucinations, sometimes she was laughing and responding to internal stimuli.   The patient was oriented to person, situation, she missed exact numerical date, she did recognize her .  The patient had poor sleep last few days, talking to people who were not there.  Past psych hx: no inpt admissions, no suicides       Review of Systems   All systems were reviewed and negative except for:  Constitution:  positive for fatigue  Musculoskeletal: positive for  back pain, bone pain, and muscle pain  Neurological: positive for  weakness  Behavioral/Psych: positive for  hallucinations    History       Past Medical History:   Diagnosis Date    AAA (abdominal aortic aneurysm)     infrarenal- 3.1cm(, 3.7x4.2cm(), 3.9cm ()    Allergic rhinitis     Aspiration pneumonia 2017    CHF (congestive heart failure)     Coronary artery disease      DDD (degenerative disc disease), lumbar     lumbar spine- Dr. Churchill     Depression     Diverticulosis     Frequent UTI     Dr. Daniels    GERD (gastroesophageal reflux disease)     Hiatal hernia     HSV (herpes simplex virus) infection     Oral    Hyperlipidemia     IBS (irritable bowel syndrome)     Constipation predominant    Ischemic cardiomyopathy 09/2017    AICD     Kidney stones     NSTEMI (non-ST elevated myocardial infarction) 04/26/2017    Obstructive sleep apnea 2011    nfsg 2011, ahi 67    Osteoarthritis     Osteopenia     Peripheral neuropathy     feet    Pulmonary embolism, bilateral 05/2017    Rotator cuff tear     Bilateral- Ortho           Family History   Problem Relation Age of Onset    Heart disease Mother     Other Mother         Hypoglycemia    Osteoporosis Mother     COPD Father     Stroke Father     Hypertension Brother     Diabetes Brother     Heart disease Brother         Social History     Tobacco Use    Smoking status: Never   Vaping Use    Vaping status: Some Days    Substances: CBD, nightly, 2-3 weekly   Substance Use Topics    Alcohol use: Never    Drug use: Never        (Not in a hospital admission)       Scheduled Meds:  apixaban, 5 mg, Oral, BID  atorvastatin, 80 mg, Oral, Nightly  digoxin, 125 mcg, Oral, Daily  levothyroxine, 75 mcg, Oral, Q AM  lubiprostone, 8 mcg, Oral, BID  pantoprazole, 40 mg, Oral, Q AM  PARoxetine, 40 mg, Oral, QAM  sodium chloride, 10 mL, Intravenous, Q12H         Continuous Infusions:  sodium chloride, 100 mL/hr, Last Rate: 100 mL/hr (07/26/24 1035)        PRN Meds:    senna-docusate sodium **AND** polyethylene glycol **AND** bisacodyl **AND** bisacodyl    Calcium Replacement - Follow Nurse / BPA Driven Protocol    HYDROcodone-acetaminophen    Magnesium Standard Dose Replacement - Follow Nurse / BPA Driven Protocol    ondansetron ODT **OR** ondansetron    Phosphorus Replacement - Follow Nurse / BPA Driven Protocol    Potassium Replacement - Follow Nurse / BPA  "Driven Protocol    sodium chloride    sodium chloride    sodium chloride      Allergies:  Oxytetracycline and Oxycodone      Objective     Vital Signs   /48   Pulse 87   Temp 97.3 °F (36.3 °C) (Oral)   Resp 16   Ht 172.7 cm (68\")   Wt 72.6 kg (160 lb)   SpO2 90%   BMI 24.33 kg/m²     Physical Exam:    Musculoskeletal:   Muscle strength and tone: decreased in UE  Abnormal Movements: none   Gait: unable to assess, the pt was in bed      General Appearance:    In NAD         Mental Status Exam:   Hygiene:   good  Cooperation:  Cooperative  Eye Contact:  Poor  Behavior and Psychomotor Activity: Slow  Speech:   soft voice   Mood: tired   Affect:  Blunted  Thought Process:   concrete but goal directed   Associations:  limited   Thought Content:  Hallucinations  Language: appropriate  Suicidal Ideations:  None  Homicidal:  None  Hallucinations:  Auditory and Visual  Delusion:  None  Orientation:  To person, Place, and Situation  Memory:  Deficits  Concentration and computation:poor   Attention span: short   Fund of knowledge: limited   Reliability:  fair  Insight:  Fair  Judgement:  Fair  Impulse Control:  Fair      Medications and allergies reviewed      Lab Results   Component Value Date    GLUCOSE 95 07/26/2024    CALCIUM 8.1 (L) 07/26/2024     07/26/2024    K 3.6 07/26/2024    CO2 17.5 (L) 07/26/2024     07/26/2024    BUN 12 07/26/2024    CREATININE 0.72 07/26/2024    EGFRIFAFRI 51 (L) 05/11/2018    EGFRIFNONA 59 (L) 05/11/2018    BCR 16.7 07/26/2024    ANIONGAP 11.5 07/26/2024       Last Urine Toxicity          Latest Ref Rng & Units 7/25/2024   LAST URINE TOXICITY RESULTS   Barbiturates Screen, Urine Negative Negative    Benzodiazepine Screen, Urine Negative Negative    Cocaine Screen, Urine Negative Negative    Methadone Screen , Urine Negative Negative       Details                   No results found for: \"PHENYTOIN\", \"PHENOBARB\", \"VALPROATE\", \"CBMZ\"    Lab Results   Component Value Date    "  07/26/2024    BUN 12 07/26/2024    CREATININE 0.72 07/26/2024    TSH 0.252 (L) 06/26/2024    WBC 11.94 (H) 07/26/2024       Brief Urine Lab Results  (Last result in the past 365 days)        Color   Clarity   Blood   Leuk Est   Nitrite   Protein   CREAT   Urine HCG        07/25/24 1411 Yellow   Clear   Negative   Trace   Negative   Trace                 7/25/24 EKG      Assessment & Plan       Altered mental status          Assessment: Delirium due to medical condition ( infection WBC 11.94)   Treatment Plan: the pt presented with frequent UTIs, increased confusion and hallucinations. Hallucinations are not frightening nor  threatening, no commands to harm herself   Start low dose of risperidone 0.25 mg po QHS   Cont to provide support,  Will follow   Treatment Plan discussed with: Patient, Family, and nursing     I discussed the patients findings and my recommendations with patient, family, and nursing staff    I have reviewed and approved the behavioral health treatment plans and problem list. Yes  Thank you for the consult   Referring MD has access to consult report and progress notes in EMR       This document has been electronically signed by Ashleigh Velasquez MD  July 26, 2024 14:25 EDT    Part of this note may be an electronic transcription/translation of spoken language to printed text using the Dragon Dictation System.

## 2024-07-26 NOTE — THERAPY EVALUATION
Patient Name: Laura Mejia  : 1955    MRN: [id number]                              Today's Date: 2024       Admit Date: 2024    Visit Dx:     ICD-10-CM ICD-9-CM   1. Altered mental status, unspecified altered mental status type  R41.82 780.97     Patient Active Problem List   Diagnosis    New onset atrial fibrillation    Near syncope    S/P CABG (coronary artery bypass graft)    Abnormal nuclear stress test    Altered mental status    Altered mental status, unspecified    Atrial fibrillation with RVR    Urinary tract infection    Abdominal aortic aneurysm (AAA)    Acute on chronic systolic heart failure    Allergic rhinitis    Atherosclerosis of coronary artery bypass graft    Compression fracture of lumbar vertebra    Congestive heart failure    COPD (chronic obstructive pulmonary disease)    Degeneration of intervertebral disc of lumbar region    Degeneration of thoracic intervertebral disc    Degenerative spondylolisthesis    Depression    Gastroesophageal reflux disease    History of pulmonary embolism    Hyperlipidemia    Hypothyroidism    Irritable bowel syndrome    Ischemic cardiomyopathy    Kidney stone    Lumbar radiculopathy    Obstructive sleep apnea syndrome    Osteopenia    Peripheral neuropathy    Pleural effusion    Pulmonary emboli    Hypertension    S/P CABG x 4    Hypotension due to hypovolemia    Closed wedge compression fracture of T10 vertebra    Generalized weakness    Leukocytosis    Multiple falls     Past Medical History:   Diagnosis Date    AAA (abdominal aortic aneurysm)     infrarenal- 3.1cm(, 3.7x4.2cm(), 3.9cm (2017)    Allergic rhinitis     Aspiration pneumonia 2017    CHF (congestive heart failure)     Coronary artery disease     DDD (degenerative disc disease), lumbar     lumbar spine- Dr. Churchill     Depression     Diverticulosis     Frequent UTI     Dr. Daniels    GERD (gastroesophageal reflux disease)     Hiatal hernia     HSV (herpes simplex virus)  infection     Oral    Hyperlipidemia     IBS (irritable bowel syndrome)     Constipation predominant    Ischemic cardiomyopathy 09/2017    AICD     Kidney stones     NSTEMI (non-ST elevated myocardial infarction) 04/26/2017    Obstructive sleep apnea 2011    nfsg 2011, ahi 67    Osteoarthritis     Osteopenia     Peripheral neuropathy     feet    Pulmonary embolism, bilateral 05/2017    Rotator cuff tear     Bilateral- Ortho      Past Surgical History:   Procedure Laterality Date    CARDIAC CATHETERIZATION  04/26/2017    99% mid LAD. 90% mid LCX. 60% RCA. Recommended CABG. Dr. Diaz    CARDIAC DEFIBRILLATOR PLACEMENT  12/26/2017    ICD implant/ Dr. Ellis    CATARACT EXTRACTION      CORONARY ARTERY BYPASS GRAFT  04/26/2017    x4 & ASD Closure    CYSTOSCOPY  2002    negative. Dr. Daniels    LAPAROSCOPIC CHOLECYSTECTOMY  04/17/2013    For biliary dyskinesia. Dr. Mccoy.     REPLACEMENT TOTAL KNEE BILATERAL  11/2004    Dr. Herrera    THORACENTESIS Left 05/08/2017    TONSILLECTOMY      TUBAL ABDOMINAL LIGATION Bilateral       General Information       Row Name 07/26/24 1254          Physical Therapy Time and Intention    Document Type evaluation  -MB     Mode of Treatment physical therapy  -MB       Row Name 07/26/24 1254          General Information    Patient Profile Reviewed yes  -MB     Prior Level of Function independent:;all household mobility;gait;transfer;ADL's;home management  -MB     Existing Precautions/Restrictions TLSO  -MB     Barriers to Rehab medically complex;previous functional deficit  -MB       Row Name 07/26/24 1254          Living Environment    People in Home spouse  -MB       Row Name 07/26/24 1254          Home Main Entrance    Number of Stairs, Main Entrance two  -MB     Stair Railings, Main Entrance none  -MB       Row Name 07/26/24 1254          Stairs Within Home, Primary    Number of Stairs, Within Home, Primary none  -MB       Row Name 07/26/24 1254          Cognition    Orientation Status  (Cognition) disoriented to;situation;place;time  -MB       Row Name 07/26/24 1254          Safety Issues, Functional Mobility    Safety Issues Affecting Function (Mobility) awareness of need for assistance;insight into deficits/self-awareness;safety precaution awareness;problem-solving  -MB     Impairments Affecting Function (Mobility) balance;cognition;endurance/activity tolerance;pain;strength  -MB               User Key  (r) = Recorded By, (t) = Taken By, (c) = Cosigned By      Initials Name Provider Type    Iraj King PT Physical Therapist                   Mobility       Row Name 07/26/24 1255          Bed Mobility    Bed Mobility bed mobility (all) activities;rolling left;rolling right  -MB     All Activities, [place] (Bed Mobility) maximum assist (25% patient effort)  -MB     Rolling Left [place] (Bed Mobility) maximum assist (25% patient effort)  -MB     Rolling Right [place] (Bed Mobility) maximum assist (25% patient effort)  -MB     Assistive Device (Bed Mobility) bed rails  -MB       Row Name 07/26/24 1255          Transfers    Comment, (Transfers) transfers not completed this date as TLSO not available for session  -MB       Row Name 07/26/24 1255          Bed-Chair Transfer    Bed-Chair [place] (Transfers) unable to assess  -MB       Row Name 07/26/24 1255          Sit-Stand Transfer    Sit-Stand [place] (Transfers) unable to assess  -MB       Row Name 07/26/24 1255          Gait/Stairs (Locomotion)    Patient was able to Ambulate no, other medical factors prevent ambulation  -MB     Reason Patient was unable to Ambulate Other (Comment)  TLSO not available  -MB               User Key  (r) = Recorded By, (t) = Taken By, (c) = Cosigned By      Initials Name Provider Type    Iraj King PT Physical Therapist                   Obj/Interventions       Row Name 07/26/24 1256          Range of Motion Comprehensive    General Range of Motion no range of motion deficits  identified  -MB       Row Name 07/26/24 1256          Strength Comprehensive (MMT)    Comment, General Manual Muscle Testing (MMT) Assessment BLE Strength 2+/5 grossly  -MB       Row Name 07/26/24 1256          Sensory Assessment (Somatosensory)    Sensory Assessment (Somatosensory) sensation intact  -MB               User Key  (r) = Recorded By, (t) = Taken By, (c) = Cosigned By      Initials Name Provider Type    Iraj King, PT Physical Therapist                   Goals/Plan       Row Name 07/26/24 1300          Bed Mobility Goal 1 (PT)    Activity/Assistive Device (Bed Mobility Goal 1, PT) bed mobility activities, all  -MB     Pike Level/Cues Needed (Bed Mobility Goal 1, PT) minimum assist (75% or more patient effort)  -MB     Time Frame (Bed Mobility Goal 1, PT) long term goal (LTG);2 weeks  -MB       Row Name 07/26/24 1300          Transfer Goal 1 (PT)    Activity/Assistive Device (Transfer Goal 1, PT) transfers, all;walker, rolling  -MB     [place]ike Level/Cues Needed (Transfer Goal 1, PT) minimum assist (75% or more patient effort)  -MB     Time Frame (Transfer Goal 1, PT) long term goal (LTG);2 weeks  -MB       Row Name 07/26/24 1300          Gait Training Goal 1 (PT)    Activity/Assistive Device (Gait Training Goal 1, PT) gait (walking locomotion);walker, rolling  -MB     [place]ike Level (Gait Training Goal 1, PT) contact guard required  -MB     Distance (Gait Training Goal 1, PT) 25  -MB     Time Frame (Gait Training Goal 1, PT) long term goal (LTG);2 weeks  -MB       Row Name 07/26/24 1300          Therapy Assessment/Plan (PT)    Planned Therapy Interventions (PT) balance training;bed mobility training;gait training;home exercise program;neuromuscular re-education;patient/family education;strengthening;transfer training  -MB               User Key  (r) = Recorded By, (t) = Taken By, (c) = Cosigned By      Initials Name Provider Type    Iraj King, PT Physical Therapist               "     Clinical Impression       Row Name 07/26/24 1256          Pain    Pretreatment Pain Rating 10/10  -MB     Posttreatment Pain Rating 10/10  -MB     Pain Location lower  -MB     Pain Location - back  -MB     Pain Intervention(s) Repositioned;Emotional support;Nursing Notified  -MB       Row Name 07/26/24 1302 07/26/24 1256       Plan of Care Review    Plan of Care Reviewed With -- patient  -MB    Progress -- no change  -MB    Outcome Evaluation Pt is a 67 y/o F admitted to [facility] on 7/25/24 with complaints of AMS with reports of hallucinations. She was most recently seen here at [facility] on 7/11/24 after falling with acute compression fracture at T10 and Neurosurgery advised TLSO when out of bed. PT recommended SNF at d/ last admission, but pt and family refused and went home with HHPT. XR Chest and CT Head both (-) this date, admitted for monitoring of AMS. She is currently living at home with spouse in [place] with two steps to enter. At baseline, she is normally IND with household mobility and ADLs with no AD. She does have recent history of UTIs of which spouse has needed to help patient with some ADLs. This date, she is disoriented to place, time, and situation, lying supine in bed with complaints of 10/10 pain in the lower back. She completes bed mobility max A this date. Spouse reports TLSO is \"in the truck\", but is refusing to get the brace this AM stating \"you won't be able to get it on\" 2/2 to severe pain levels. Spouse educated on purpose of TLSO and necessity for OOB activity. PT advised spouse to have TLSO present by tomorrow so she can work with PT staff. PT is again recommending SNF at d/c and it will be up to patient and spouse to accept. PT will continue to follow during stay, will need TLSO present to progress.  -MB --      Row Name 07/26/24 1255          Therapy Assessment/Plan (PT)    Rehab Potential (PT) fair, will monitor progress closely  -MB     Criteria for Skilled Interventions Met (PT) yes;meets " criteria  -MB     Therapy Frequency (PT) 5 times/wk  -MB     Predicted Duration of Therapy Intervention (PT) until d/c  -MB       Row Name 07/26/24 1256          Vital Signs    Pre SpO2 (%) 97  -MB     O2 Delivery Pre Treatment supplemental O2  1L  -MB     Intra SpO2 (%) 98  -MB     O2 Delivery Intra Treatment supplemental O2  1L  -MB     Post SpO2 (%) 97  -MB     O2 Delivery Post Treatment supplemental O2  1L  -MB     Pre Patient Position Supine  -MB     Intra Patient Position Side Lying  -MB     Post Patient Position Supine  -MB       Row Name 07/26/24 1256          Positioning and Restraints    Pre-Treatment Position in bed  -MB     Post Treatment Position bed  -MB     In Bed supine;notified nsg;call light within reach;encouraged to call for assist;with family/caregiver;exit alarm on  -MB               User Key  (r) = Recorded By, (t) = Taken By, (c) = Cosigned By      Initials Name Provider Type    Iraj King, PRACHI Physical Therapist                   Outcome Measures       Row Name 07/26/24 1301          How much help from another person do you currently need...    Turning from your back to your side while in flat bed without using bedrails? 2  -MB     Moving from lying on back to sitting on the side of a flat bed without bedrails? 2  -MB     Moving to and from a bed to a chair (including a wheelchair)? 2  -MB     Standing up from a chair using your arms (e.g., wheelchair, bedside chair)? 2  -MB     Climbing 3-5 steps with a railing? 2  -MB     To walk in hospital room? 2  -MB     AM-PAC 6 Clicks Score (PT) 12  -MB     Highest Level of Mobility Goal 4 --> Transfer to chair/commode  -MB               User Key  (r) = Recorded By, (t) = Taken By, (c) = Cosigned By      Initials Name Provider Type    Iraj King, PT Physical Therapist                                 Physical Therapy Education       Title: PT OT SLP Therapies (Done)       Topic: Physical Therapy (Done)       Point: Mobility training (Done)   "     Learning Progress Summary             Patient Acceptance, E,TB, VU by MB at 7/26/2024 1301                         Point: Body mechanics (Done)       Learning Progress Summary             Patient Acceptance, E,TB, VU by MB at 7/26/2024 1301                         Point: Precautions (Done)       Learning Progress Summary             Patient Acceptance, E,TB, VU by MB at 7/26/2024 1301                                         User Key       Initials Effective Dates Name Provider Type Discipline    MB 06/06/23 -  Iraj Adams, PT Physical Therapist PT                  PT Recommendation and Plan  Planned Therapy Interventions (PT): balance training, bed mobility training, gait training, home exercise program, neuromuscular re-education, patient/family education, strengthening, transfer training  Plan of Care Reviewed With: patient  Progress: no change  Outcome Evaluation: Pt is a 67 y/o F admitted to [facility] on 7/25/24 with complaints of AMS with reports of hallucinations. She was most recently seen here at [facility] on 7/11/24 after falling with acute compression fracture at T10 and Neurosurgery advised TLSO when out of bed. PT recommended SNF at d/c last admission, but pt and family refused and went home with HHPT. XR Chest and CT Head both (-) this date, admitted for monitoring of AMS. She is currently living at home with spouse in [facility] with two steps to enter. At baseline, she is normally IND with household mobility and ADLs with no AD. She does have recent history of UTIs of which spouse has needed to help patient with some ADLs. This date, she is disoriented to place, time, and situation, lying supine in bed with complaints of 10/10 pain in the lower back. She completes bed mobility max A this date. Spouse reports TLSO is \"in the truck\", but is refusing to get the brace this AM stating \"you won't be able to get it on\" 2/2 to severe pain levels. Spouse educated on purpose of TLSO and necessity for OOB activity. PT " advised spouse to have TLSO present by tomorrow so she can work with PT staff. PT is again recommending SNF at d/c and it will be up to patient and spouse to accept. PT will continue to follow during stay, will need TLSO present to progress.     Time Calculation:   PT Evaluation Complexity  History, PT Evaluation Complexity: 1-2 personal factors and/or comorbidities  Examination of Body Systems (PT Eval Complexity): total of 3 or more elements  Clinical Presentation (PT Evaluation Complexity): evolving  Clinical Decision Making (PT Evaluation Complexity): moderate complexity  Overall Complexity (PT Evaluation Complexity): moderate complexity     PT Charges       Row Name 07/26/24 1301             Time Calculation    Start Time 1011  -MB      Stop Time 1035  -MB      Time Calculation (min) 24 min  -MB      PT Received On 07/26/24  -MB      PT - Next Appointment 07/28/24  -MB      PT Goal Re-Cert Due Date 08/09/24  -MB         Time Calculation- PT    Total Timed Code Minutes- PT 0 minute(s)  -MB                User Key  (r) = Recorded By, (t) = Taken By, (c) = Cosigned By      Initials Name Provider Type    Iraj King, PT Physical Therapist                  Therapy Charges for Today       Code Description Service Date Service Provider Modifiers Qty    [id number] HC PT EVAL MOD COMPLEXITY 4 7/26/2024 Iraj Adams, PT GP 1            PT G-Codes  AM-PAC 6 Clicks Score (PT): 12  PT Discharge Summary  Anticipated Discharge Disposition (PT): skilled nursing facility    Iraj Adams PT  7/26/2024

## 2024-07-27 LAB
AMMONIA BLD-SCNC: 15 UMOL/L (ref 11–51)
ANION GAP SERPL CALCULATED.3IONS-SCNC: 8.1 MMOL/L (ref 5–15)
BACTERIA SPEC AEROBE CULT: NO GROWTH
BASOPHILS # BLD AUTO: 0.11 10*3/MM3 (ref 0–0.2)
BASOPHILS NFR BLD AUTO: 1 % (ref 0–1.5)
BUN SERPL-MCNC: 8 MG/DL (ref 8–23)
BUN/CREAT SERPL: 12.1 (ref 7–25)
CALCIUM SPEC-SCNC: 8.2 MG/DL (ref 8.6–10.5)
CHLORIDE SERPL-SCNC: 110 MMOL/L (ref 98–107)
CO2 SERPL-SCNC: 19.9 MMOL/L (ref 22–29)
CREAT SERPL-MCNC: 0.66 MG/DL (ref 0.57–1)
DEPRECATED RDW RBC AUTO: 59.9 FL (ref 37–54)
EGFRCR SERPLBLD CKD-EPI 2021: 95.1 ML/MIN/1.73
EOSINOPHIL # BLD AUTO: 0.41 10*3/MM3 (ref 0–0.4)
EOSINOPHIL NFR BLD AUTO: 3.7 % (ref 0.3–6.2)
ERYTHROCYTE [DISTWIDTH] IN BLOOD BY AUTOMATED COUNT: 16.4 % (ref 12.3–15.4)
GLUCOSE SERPL-MCNC: 84 MG/DL (ref 65–99)
HCT VFR BLD AUTO: 28.8 % (ref 34–46.6)
HGB BLD-MCNC: 8.8 G/DL (ref 12–15.9)
IMM GRANULOCYTES # BLD AUTO: 0.05 10*3/MM3 (ref 0–0.05)
IMM GRANULOCYTES NFR BLD AUTO: 0.4 % (ref 0–0.5)
LYMPHOCYTES # BLD AUTO: 0.82 10*3/MM3 (ref 0.7–3.1)
LYMPHOCYTES NFR BLD AUTO: 7.3 % (ref 19.6–45.3)
MCH RBC QN AUTO: 30 PG (ref 26.6–33)
MCHC RBC AUTO-ENTMCNC: 30.6 G/DL (ref 31.5–35.7)
MCV RBC AUTO: 98.3 FL (ref 79–97)
MONOCYTES # BLD AUTO: 0.58 10*3/MM3 (ref 0.1–0.9)
MONOCYTES NFR BLD AUTO: 5.2 % (ref 5–12)
NEUTROPHILS NFR BLD AUTO: 82.4 % (ref 42.7–76)
NEUTROPHILS NFR BLD AUTO: 9.25 10*3/MM3 (ref 1.7–7)
NRBC BLD AUTO-RTO: 0 /100 WBC (ref 0–0.2)
PLATELET # BLD AUTO: 177 10*3/MM3 (ref 140–450)
PMV BLD AUTO: 11.2 FL (ref 6–12)
POTASSIUM SERPL-SCNC: 3.2 MMOL/L (ref 3.5–5.2)
POTASSIUM SERPL-SCNC: 3.7 MMOL/L (ref 3.5–5.2)
RBC # BLD AUTO: 2.93 10*6/MM3 (ref 3.77–5.28)
SODIUM SERPL-SCNC: 138 MMOL/L (ref 136–145)
VIT B12 BLD-MCNC: 878 PG/ML (ref 211–946)
WBC NRBC COR # BLD AUTO: 11.22 10*3/MM3 (ref 3.4–10.8)

## 2024-07-27 PROCEDURE — 84132 ASSAY OF SERUM POTASSIUM: CPT | Performed by: FAMILY MEDICINE

## 2024-07-27 PROCEDURE — 82140 ASSAY OF AMMONIA: CPT | Performed by: FAMILY MEDICINE

## 2024-07-27 PROCEDURE — 80048 BASIC METABOLIC PNL TOTAL CA: CPT | Performed by: FAMILY MEDICINE

## 2024-07-27 PROCEDURE — 86038 ANTINUCLEAR ANTIBODIES: CPT | Performed by: FAMILY MEDICINE

## 2024-07-27 PROCEDURE — 99231 SBSQ HOSP IP/OBS SF/LOW 25: CPT | Performed by: PSYCHIATRY & NEUROLOGY

## 2024-07-27 PROCEDURE — G0378 HOSPITAL OBSERVATION PER HR: HCPCS

## 2024-07-27 PROCEDURE — 85025 COMPLETE CBC W/AUTO DIFF WBC: CPT | Performed by: FAMILY MEDICINE

## 2024-07-27 PROCEDURE — 84443 ASSAY THYROID STIM HORMONE: CPT | Performed by: FAMILY MEDICINE

## 2024-07-27 PROCEDURE — 82607 VITAMIN B-12: CPT | Performed by: FAMILY MEDICINE

## 2024-07-27 RX ORDER — POTASSIUM CHLORIDE 20 MEQ/1
40 TABLET, EXTENDED RELEASE ORAL EVERY 4 HOURS
Status: COMPLETED | OUTPATIENT
Start: 2024-07-27 | End: 2024-07-27

## 2024-07-27 RX ADMIN — Medication 10 ML: at 20:04

## 2024-07-27 RX ADMIN — DIGOXIN 125 MCG: 125 TABLET ORAL at 10:56

## 2024-07-27 RX ADMIN — SERTRALINE 50 MG: 50 TABLET, FILM COATED ORAL at 14:38

## 2024-07-27 RX ADMIN — Medication 10 ML: at 08:14

## 2024-07-27 RX ADMIN — APIXABAN 5 MG: 5 TABLET, FILM COATED ORAL at 08:14

## 2024-07-27 RX ADMIN — LUBIPROSTONE 8 MCG: 8 CAPSULE, GELATIN COATED ORAL at 08:14

## 2024-07-27 RX ADMIN — LUBIPROSTONE 8 MCG: 8 CAPSULE, GELATIN COATED ORAL at 00:16

## 2024-07-27 RX ADMIN — RISPERIDONE 0.25 MG: 0.5 TABLET, ORALLY DISINTEGRATING ORAL at 20:04

## 2024-07-27 RX ADMIN — PAROXETINE HYDROCHLORIDE 40 MG: 20 TABLET, FILM COATED ORAL at 05:47

## 2024-07-27 RX ADMIN — LEVOTHYROXINE SODIUM 75 MCG: 0.07 TABLET ORAL at 05:47

## 2024-07-27 RX ADMIN — HYDROCODONE BITARTRATE AND ACETAMINOPHEN 1 TABLET: 5; 325 TABLET ORAL at 00:16

## 2024-07-27 RX ADMIN — POTASSIUM CHLORIDE 40 MEQ: 1500 TABLET, EXTENDED RELEASE ORAL at 10:56

## 2024-07-27 RX ADMIN — PANTOPRAZOLE SODIUM 40 MG: 40 TABLET, DELAYED RELEASE ORAL at 05:47

## 2024-07-27 RX ADMIN — APIXABAN 5 MG: 5 TABLET, FILM COATED ORAL at 20:04

## 2024-07-27 RX ADMIN — POTASSIUM CHLORIDE 40 MEQ: 1500 TABLET, EXTENDED RELEASE ORAL at 08:14

## 2024-07-27 NOTE — PROGRESS NOTES
Chief complaint fatigue     Subjective .     History of present illness:  The patient is a 68 y.o. female who was admitted secondary to altered mental status. PMHx: AAA, CHF, CAD, frequent UTIs , DDD, osteoarthritis. Psych consult was requested by Dr Alysia juarez to hallucinations.  The pt was limited historian, majority of information was obtained from patient's  who was in the room during assessment.  Privacy was offered, the patient gave permission to her  to stay in the room.  Patient has been did not report any major problems with anxiety or depression until a few months ago when patient's physical health started declining, she had frequent UTIs recently, increased pain.  The patient started having hallucinations, she stated she has been talking to her mom who was .  Patient's  showed the video when the patient was actively hallucinating at home.   The patient denied any thoughts about harming herself, she does not feel frightened or threatened by hallucinations, sometimes she was laughing and responding to internal stimuli.   The patient was oriented to person, situation, she missed exact numerical date, she did recognize her .  The patient had poor sleep last few days, talking to people who were not there.  Past psych hx: no inpt admissions, no suicides .    The pt was started on low dose of risperidone  Today she reported feeling better, she was more interactive and appropriate, stated she had rest at night , no hallucinations, no bizarre behavior   Denied AVH/SI/HI   Review of Systems   Pertinent items are noted in HPI, all other systems reviewed and negative    History          Medications Prior to Admission   Medication Sig Dispense Refill Last Dose    apixaban (ELIQUIS) 5 MG tablet tablet Take 1 tablet by mouth 2 (Two) Times a Day. Indications: Atrial Fibrillation   2024    aspirin-acetaminophen-caffeine (Excedrin Migraine) 250-250-65 MG per tablet Take 1 tablet by  mouth Every 6 (Six) Hours As Needed for Headache. Indications: Headache   7/24/2024    atorvastatin (LIPITOR) 80 MG tablet Take 1 tablet by mouth Daily. Indications: High Amount of Fats in the Blood   7/24/2024    digoxin (LANOXIN) 125 MCG tablet Take 1 tablet by mouth Daily. 30 tablet 3 7/24/2024    levothyroxine (SYNTHROID, LEVOTHROID) 75 MCG tablet Take 1 tablet by mouth Daily.   7/24/2024    linaclotide (LINZESS) 72 MCG capsule capsule Take 1 capsule by mouth Daily As Needed (Irritable bowel). Indications: Constipation caused by Irritable Bowel Syndrome   7/24/2024    metoprolol succinate XL (TOPROL-XL) 25 MG 24 hr tablet Take 0.5 tablets by mouth Every 12 (Twelve) Hours. Indications: Atrial Fibrillation, High Blood Pressure Disorder 30 tablet 3 7/24/2024    ondansetron (ZOFRAN) 4 MG tablet Take 1 tablet by mouth Every 8 (Eight) Hours As Needed for Nausea or Vomiting.   7/24/2024    oxybutynin XL (DITROPAN-XL) 10 MG 24 hr tablet Take 1 tablet by mouth Daily.   7/24/2024    pantoprazole (PROTONIX) 40 MG EC tablet Take 1 tablet by mouth Every Morning. 30 tablet 0 7/24/2024    PARoxetine (PAXIL) 40 MG tablet Take 1 tablet by mouth Every Morning. Indications: Major Depressive Disorder   7/24/2024    potassium chloride ER (K-TAB) 20 MEQ tablet controlled-release ER tablet Take 1 tablet by mouth Daily.   7/24/2024    HYDROcodone-acetaminophen (NORCO) 5-325 MG per tablet Take 1 tablet by mouth Every 6 (Six) Hours As Needed for Moderate Pain for up to 30 days. 30 tablet 0     ibuprofen (ADVIL,MOTRIN) 400 MG tablet Take 1 tablet by mouth Every 6 (Six) Hours As Needed for Mild Pain.           Scheduled Meds:  apixaban, 5 mg, Oral, BID  atorvastatin, 80 mg, Oral, Nightly  digoxin, 125 mcg, Oral, Daily  levothyroxine, 75 mcg, Oral, Q AM  lubiprostone, 8 mcg, Oral, BID  pantoprazole, 40 mg, Oral, Q AM  PARoxetine, 40 mg, Oral, QAM  risperiDONE, 0.25 mg, Oral, Nightly  sodium chloride, 10 mL, Intravenous, Q12H        "  Continuous Infusions:  sodium chloride, 100 mL/hr, Last Rate: 100 mL/hr (07/27/24 0410)        PRN Meds:    senna-docusate sodium **AND** polyethylene glycol **AND** bisacodyl **AND** bisacodyl    Calcium Replacement - Follow Nurse / BPA Driven Protocol    HYDROcodone-acetaminophen    Magnesium Standard Dose Replacement - Follow Nurse / BPA Driven Protocol    ondansetron ODT **OR** ondansetron    Phosphorus Replacement - Follow Nurse / BPA Driven Protocol    Potassium Replacement - Follow Nurse / BPA Driven Protocol    sodium chloride    sodium chloride    sodium chloride      Allergies:  Oxytetracycline and Oxycodone      Objective     Vital Signs   /85 (BP Location: Right arm, Patient Position: Lying)   Pulse 109   Temp 98.7 °F (37.1 °C) (Temporal)   Resp 17   Ht 172.7 cm (68\")   Wt 72.6 kg (160 lb)   SpO2 96%   BMI 24.33 kg/m²     Physical Exam:     General Appearance:    In NAD        Mental Status Exam:    Hygiene:   good  Cooperation:  Cooperative  Eye Contact:  Good  Psychomotor Behavior:  Appropriate  Affect:  Appropriate  Hopelessness: Denies  Speech:  Normal  Thought Progress:  Goal directed and Linear  Thought Content:  Mood congruent  Suicidal:  None  Homicidal:  None  Hallucinations:  Not demonstrated today  Delusion:  None  Memory:   fair   Orientation:  Person, Place, and Situation  Reliability:  fair  Insight:  Fair  Judgement:  Fair  Impulse Control:  Fair  Physical/Medical Issues:  Yes      Medications and allergies reviewed      Lab Results   Component Value Date    GLUCOSE 84 07/27/2024    CALCIUM 8.2 (L) 07/27/2024     07/27/2024    K 3.2 (L) 07/27/2024    CO2 19.9 (L) 07/27/2024     (H) 07/27/2024    BUN 8 07/27/2024    CREATININE 0.66 07/27/2024    EGFRIFAFRI 51 (L) 05/11/2018    EGFRIFNONA 59 (L) 05/11/2018    BCR 12.1 07/27/2024    ANIONGAP 8.1 07/27/2024       Last Urine Toxicity          Latest Ref Rng & Units 7/25/2024   LAST URINE TOXICITY RESULTS " "  Barbiturates Screen, Urine Negative Negative    Benzodiazepine Screen, Urine Negative Negative    Cocaine Screen, Urine Negative Negative    Methadone Screen , Urine Negative Negative       Details                   No results found for: \"PHENYTOIN\", \"PHENOBARB\", \"VALPROATE\", \"CBMZ\"    Lab Results   Component Value Date     07/27/2024    BUN 8 07/27/2024    CREATININE 0.66 07/27/2024    TSH 0.252 (L) 06/26/2024    WBC 11.22 (H) 07/27/2024       Brief Urine Lab Results  (Last result in the past 365 days)        Color   Clarity   Blood   Leuk Est   Nitrite   Protein   CREAT   Urine HCG        07/25/24 1411 Yellow   Clear   Negative   Trace   Negative   Trace                   Assessment & Plan       Altered mental status        Assessment: Delirium due to medical condition ( infection WBC 11.94)   Treatment Plan: the pt presented with frequent UTIs, increased confusion and hallucinations. Hallucinations are not frightening nor  threatening, no commands to harm herself   Cont  risperidone 0.25 mg po QHS for 2-3 days   Cont to provide support,  Consider changing paxil to sertraline ( paxil has anticholinergic activity and should be avoided if possible in geriatric population)   Will follow PRN   Treatment Plan discussed with: Patient, Family, and nursing     I discussed the patients findings and my recommendations with patient and family    I have reviewed and approved the behavioral health treatment plans and problem list. Yes     Referring MD has access to consult report and progress notes in EMR     Ashleigh Velasquez MD  07/27/24  11:57 EDT            "

## 2024-07-27 NOTE — PROGRESS NOTES
LOS: 0 days   Patient Care Team:  Luan Joseph APRN as PCP - General (Family Medicine)  Jozef Otero MD as Consulting Physician (Nephrology)    Subjective:  More alert but not at baseline    Objective:   Afebrile      Review of Systems:   Review of Systems   Constitutional:  Positive for activity change.   Neurological:  Positive for weakness.           Vital Signs  Temp:  [97.6 °F (36.4 °C)-98.7 °F (37.1 °C)] 98.7 °F (37.1 °C)  Heart Rate:  [] 109  Resp:  [15-21] 17  BP: ()/(34-85) 108/85    Physical Exam:  Physical Exam  Vitals and nursing note reviewed.   Pulmonary:      Effort: Pulmonary effort is normal.   Neurological:      Mental Status: She is alert.          Radiology:  CT Abdomen Pelvis Without Contrast    Result Date: 7/26/2024  Impression: 1. Bilateral nonobstructing renal stones. 2. Abdominal aortic stent graft in place. The aneurysm sac is stable measuring 4.0 cm in the AP dimension. 3. Borderline splenomegaly. 4. Small hiatal hernia. 5. Multiple thoracolumbar compression fractures. The T12 compression fracture is new when compared to the previous abdominal CT. It appears subacute. There is a fracture cleft through the superior endplate with healing callus. 6. Additional findings as noted above. The study is limited by noncontrasted technique. Electronically Signed: Zheng Doe MD  7/26/2024 1:52 PM EDT  Workstation ID: [id number]    CT Head Without Contrast    Result Date: 7/25/2024  Impression: 1. No intracranial hemorrhage. 2. Generalized cerebral atrophy with moderate white matter findings of chronic microvascular disease. Electronically Signed: Ronak Birmingham MD  7/25/2024 3:16 PM EDT  Workstation ID: [id number]    XR Chest 1 View    Result Date: 7/25/2024  Impression: No acute cardiopulmonary findings. Electronically Signed: Juan Alvarado MD  7/25/2024 1:55 PM EDT  Workstation ID: [id number]        Results Review:     I reviewed the patient's new clinical results.  I reviewed the  "patient's new imaging results and agree with the interpretation.    Medication Review:   Scheduled Meds:apixaban, 5 mg, Oral, BID  digoxin, 125 mcg, Oral, Daily  levothyroxine, 75 mcg, Oral, Q AM  lubiprostone, 8 mcg, Oral, BID  pantoprazole, 40 mg, Oral, Q AM  PARoxetine, 40 mg, Oral, QAM  risperiDONE, 0.25 mg, Oral, Nightly  sodium chloride, 10 mL, Intravenous, Q12H      Continuous Infusions:sodium chloride, 100 mL/hr, Last Rate: 100 mL/hr (07/27/24 0410)      PRN Meds:.  senna-docusate sodium **AND** polyethylene glycol **AND** bisacodyl **AND** bisacodyl    Calcium Replacement - Follow Nurse / BPA Driven Protocol    HYDROcodone-acetaminophen    Magnesium Standard Dose Replacement - Follow Nurse / BPA Driven Protocol    ondansetron ODT **OR** ondansetron    Phosphorus Replacement - Follow Nurse / BPA Driven Protocol    Potassium Replacement - Follow Nurse / BPA Driven Protocol    sodium chloride    sodium chloride    sodium chloride    Labs:    CBC    Results from last 7 days   Lab Units 07/27/24  0553 07/26/24  0549 07/25/24  1342   WBC 10*3/mm3 11.22* 11.94* 15.06*   HEMOGLOBIN g/dL 8.8* 9.0* 10.5*   PLATELETS 10*3/mm3 177 190 207     BMP   Results from last 7 days   Lab Units 07/27/24  0553 07/26/24  0551 07/25/24  1342   SODIUM mmol/L 138 136 136   POTASSIUM mmol/L 3.2* 3.6 4.0   CHLORIDE mmol/L 110* 107 106   CO2 mmol/L 19.9* 17.5* 18.6*   BUN mg/dL 8 12 12   CREATININE mg/dL 0.66 0.72 0.89   GLUCOSE mg/dL 84 95 162*   MAGNESIUM mg/dL  --   --  1.7     Cr Clearance Estimated Creatinine Clearance: 92.2 mL/min (by C-G formula based on SCr of 0.66 mg/dL).  Coag     HbA1C   Lab Results   Component Value Date    HGBA1C 6.20 (H) 05/31/2024     Blood Glucose No results found for: \"POCGLU\"  Infection   Results from last 7 days   Lab Units 07/25/24  1538   BLOODCX  No growth at 24 hours  No growth at 24 hours     CMP   Results from last 7 days   Lab Units 07/27/24  0553 07/26/24  0551 07/25/24  1449 07/25/24  1342 "   SODIUM mmol/L 138 136  --  136   POTASSIUM mmol/L 3.2* 3.6  --  4.0   CHLORIDE mmol/L 110* 107  --  106   CO2 mmol/L 19.9* 17.5*  --  18.6*   BUN mg/dL 8 12  --  12   CREATININE mg/dL 0.66 0.72  --  0.89   GLUCOSE mg/dL 84 95  --  162*   ALBUMIN g/dL  --   --   --  3.3*   BILIRUBIN mg/dL  --   --   --  0.8   ALK PHOS U/L  --   --   --  179*   AST (SGOT) U/L  --   --   --  19   ALT (SGPT) U/L  --   --   --  6   AMMONIA umol/L  --   --  14  --      UA    Results from last 7 days   Lab Units 07/25/24  1411   NITRITE UA  Negative   WBC UA /HPF 0-2   BACTERIA UA /HPF None Seen   SQUAM EPITHEL UA /HPF 0-2     Radiology(recent) CT Abdomen Pelvis Without Contrast    Result Date: 7/26/2024  Impression: 1. Bilateral nonobstructing renal stones. 2. Abdominal aortic stent graft in place. The aneurysm sac is stable measuring 4.0 cm in the AP dimension. 3. Borderline splenomegaly. 4. Small hiatal hernia. 5. Multiple thoracolumbar compression fractures. The T12 compression fracture is new when compared to the previous abdominal CT. It appears subacute. There is a fracture cleft through the superior endplate with healing callus. 6. Additional findings as noted above. The study is limited by noncontrasted technique. Electronically Signed: Zheng Doe MD  7/26/2024 1:52 PM EDT  Workstation ID: [id number]    CT Head Without Contrast    Result Date: 7/25/2024  Impression: 1. No intracranial hemorrhage. 2. Generalized cerebral atrophy with moderate white matter findings of chronic microvascular disease. Electronically Signed: Ronak Birmingham MD  7/25/2024 3:16 PM EDT  Workstation ID: [id number]    XR Chest 1 View    Result Date: 7/25/2024  Impression: No acute cardiopulmonary findings. 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

## 2024-07-28 LAB
FOLATE SERPL-MCNC: 3.72 NG/ML (ref 4.78–24.2)
TSH SERPL DL<=0.05 MIU/L-ACNC: 1.33 UIU/ML (ref 0.27–4.2)

## 2024-07-28 PROCEDURE — 25810000003 SODIUM CHLORIDE 0.9 % SOLUTION: Performed by: FAMILY MEDICINE

## 2024-07-28 PROCEDURE — 97166 OT EVAL MOD COMPLEX 45 MIN: CPT

## 2024-07-28 PROCEDURE — 82746 ASSAY OF FOLIC ACID SERUM: CPT | Performed by: FAMILY MEDICINE

## 2024-07-28 RX ORDER — FERROUS SULFATE 324(65)MG
324 TABLET, DELAYED RELEASE (ENTERIC COATED) ORAL
Status: DISCONTINUED | OUTPATIENT
Start: 2024-07-28 | End: 2024-08-03 | Stop reason: HOSPADM

## 2024-07-28 RX ORDER — METOPROLOL TARTRATE 1 MG/ML
5 INJECTION, SOLUTION INTRAVENOUS EVERY 4 HOURS PRN
Status: DISCONTINUED | OUTPATIENT
Start: 2024-07-28 | End: 2024-08-03 | Stop reason: HOSPADM

## 2024-07-28 RX ADMIN — Medication 10 ML: at 21:19

## 2024-07-28 RX ADMIN — LEVOTHYROXINE SODIUM 75 MCG: 0.07 TABLET ORAL at 05:21

## 2024-07-28 RX ADMIN — SODIUM CHLORIDE 100 ML/HR: 9 INJECTION, SOLUTION INTRAVENOUS at 01:59

## 2024-07-28 RX ADMIN — DIGOXIN 125 MCG: 125 TABLET ORAL at 12:29

## 2024-07-28 RX ADMIN — SERTRALINE 50 MG: 50 TABLET, FILM COATED ORAL at 09:19

## 2024-07-28 RX ADMIN — PANTOPRAZOLE SODIUM 40 MG: 40 TABLET, DELAYED RELEASE ORAL at 05:21

## 2024-07-28 RX ADMIN — APIXABAN 5 MG: 5 TABLET, FILM COATED ORAL at 09:19

## 2024-07-28 RX ADMIN — HYDROCODONE BITARTRATE AND ACETAMINOPHEN 1 TABLET: 5; 325 TABLET ORAL at 09:19

## 2024-07-28 RX ADMIN — NYSTATIN 500000 UNITS: 100000 SUSPENSION ORAL at 13:52

## 2024-07-28 RX ADMIN — METOPROLOL TARTRATE 5 MG: 1 INJECTION, SOLUTION INTRAVENOUS at 22:19

## 2024-07-28 RX ADMIN — Medication 10 ML: at 09:20

## 2024-07-28 RX ADMIN — FERROUS SULFATE TAB EC 324 MG (65 MG FE EQUIVALENT) 324 MG: 324 (65 FE) TABLET DELAYED RESPONSE at 12:26

## 2024-07-28 NOTE — CASE MANAGEMENT/SOCIAL WORK
Continued Stay Note  SHELLEY Davey     Patient Name: Laura Mejia  MRN: [id number]  Today's Date: 7/28/2024    Admit Date: 7/25/2024    Plan: From home. PT rec SNF. Watch for 02 needs.   Discharge Plan       Row Name 07/28/24 1853       Plan    Plan From home. PT rec SNF. Watch for 02 needs.    Patient/Family in Agreement with Plan yes    Plan Comments CM met w/pt and spouse at bedside to provide list of SNF options. Pt and spouse would like to review list and follow up with CM tomorrow.             Expected Discharge Date and Time       Expected Discharge Date Expected Discharge Time    Jul 30, 2024           Jaz Saldana RN      Office Phone: [phone]  Office Cell: [phone]

## 2024-07-28 NOTE — CONSULTS
Nutrition Services    Patient Name: Laura Mejia  YOB: 1955  MRN: [id number]  Admission date: 7/25/2024    RD to add Boost Plus BID (Provides 720 kcals, 28 g protein if consumed)       NUTRITION SCREENING      Trending Narrative: 7/28: Nutrition assessment and POC initiation r/t Malnutrition screening tool score of 3. Pt presented to ED on 7/25 with reports of AMS over the last few days prior to admission but worse the night before admission. Pt was recently admitted s/p fall with a fractured vertebrae last week. Pt has had decreased po intake in the last week as well. Pt is currently followed by [facility]. Pt remains confused.  RD unable to visit pt in person at this time.  Will attempt to perform physical exam at next visit.        PO Diet: Diet: Cardiac; Healthy Heart (2-3 Na+); Fluid Consistency: Thin (IDDSI 0)   PO Supplements: none   Trending PO Intake:  7/28: 38% average po intake x last 4 documented meals       Nutritionally-Pertinent Medications RDN Reviewed, C/W clinical course         Labs (reviewed below): Reviewed labs 7/27. Management per attending.      Results from last 7 days   Lab Units 07/27/24  1320 07/27/24  0553 07/26/24  0551 07/25/24  1342   SODIUM mmol/L  --  138 136 136   POTASSIUM mmol/L 3.7 3.2* 3.6 4.0   CHLORIDE mmol/L  --  110* 107 106   CO2 mmol/L  --  19.9* 17.5* 18.6*   BUN mg/dL  --  8 12 12   CREATININE mg/dL  --  0.66 0.72 0.89   CALCIUM mg/dL  --  8.2* 8.1* 8.9   BILIRUBIN mg/dL  --   --   --  0.8   ALK PHOS U/L  --   --   --  179*   ALT (SGPT) U/L  --   --   --  6   AST (SGOT) U/L  --   --   --  19   GLUCOSE mg/dL  --  84 95 162*     Results from last 7 days   Lab Units 07/27/24  0553 07/26/24  0549 07/25/24  1342   MAGNESIUM mg/dL  --   --  1.7   HEMOGLOBIN g/dL 8.8*   < > 10.5*   HEMATOCRIT % 28.8*   < > 35.5    < > = values in this interval not displayed.     Lab Results   Component Value Date    HGBA1C 6.20 (H) 05/31/2024          GI  "Function:  No BM documented since admit       Skin: No edema documented   No PI documented        Weight Review: Estimated body mass index is 24.33 kg/m² as calculated from the following:    Height as of this encounter: 172.7 cm (68\").    Weight as of this encounter: 72.6 kg (160 lb).    Comment:   7/28: 160# (last weight 7/25)  11% weight loss in the last 6 months    Wt Readings from Last 30 Encounters:   07/25/24 1720 72.6 kg (160 lb)   07/25/24 1234 72.3 kg (159 lb 6.3 oz)   07/11/24 1800 72.3 kg (159 lb 6.3 oz)   07/11/24 1145 71.4 kg (157 lb 6.4 oz)   07/11/24 1051 87.8 kg (193 lb 9 oz)   06/23/24 1045 87.8 kg (193 lb 9.7 oz)   06/19/24 0810 87.2 kg (192 lb 4.2 oz)   06/18/24 0912 87.5 kg (192 lb 14.8 oz)   06/18/24 0912 88.3 kg (194 lb 11.7 oz)   06/15/24 0923 87.7 kg (193 lb 5.1 oz)   06/15/24 0923 87.6 kg (193 lb 0.9 oz)   06/14/24 2254 88.5 kg (195 lb 3.1 oz)   06/14/24 2254 88.5 kg (195 lb 2.4 oz)   06/14/24 2254 88.4 kg (194 lb 14.9 oz)   06/14/24 2251 88.6 kg (195 lb 3.5 oz)   06/14/24 1247 75.1 kg (165 lb 8.3 oz)   06/13/24 1721 88.2 kg (194 lb 5.4 oz)   06/13/24 1557 75.6 kg (166 lb 10 oz)   06/06/24 1051 76.2 kg (168 lb)   06/02/24 0401 79.3 kg (174 lb 13.2 oz)   05/30/24 2126 81.7 kg (180 lb 1.9 oz)   05/29/24 1129 77.1 kg (170 lb)   05/29/24 1123 77.1 kg (170 lb)   05/18/24 0057 87.8 kg (193 lb 9 oz)   05/16/24 2223 83.6 kg (184 lb 4.9 oz)   05/16/24 1533 83.6 kg (184 lb 4.9 oz)   01/23/24 0730 81.6 kg (180 lb)   01/22/24 1240 81.6 kg (180 lb)   09/08/23 0918 95.3 kg (210 lb 1.6 oz)   05/10/19 1048 97.1 kg (214 lb)   11/26/18 1418 95.5 kg (210 lb 9.6 oz)   11/06/18 1459 95.3 kg (210 lb)   11/05/18 1358 95.6 kg (210 lb 12.8 oz)   10/25/18 1310 95.6 kg (210 lb 12.8 oz)   10/23/18 1417 94.7 kg (208 lb 12.8 oz)   09/06/18 1359 94.5 kg (208 lb 6.1 oz)   07/11/18 1117 85.7 kg (189 lb)   06/11/18 1126 86 kg (189 lb 8.9 oz)   05/24/18 0835 88.2 kg (194 lb 6.1 oz)   05/11/18 0951 87.5 kg (193 lb)   05/03/18 " 1358 86 kg (189 lb 8 oz)   01/19/18 1138 86.8 kg (191 lb 6.4 oz)   12/28/17 0853 89.7 kg (197 lb 12.8 oz)   12/22/17 1015 92.1 kg (203 lb)   12/04/17 1005 91.6 kg (202 lb)   11/20/17 0805 88 kg (194 lb)   11/17/17 1124 89.4 kg (197 lb)   11/03/17 0813 88 kg (194 lb)   09/12/17 1137 93.3 kg (205 lb 12.8 oz)   08/24/17 0754 95.1 kg (209 lb 9.6 oz)   07/25/17 1017 90.7 kg (200 lb)          --       Nutrition Problem Statement: Inadequate po intake related to altered mental status as evidenced by 11% unintentional weight loss in the last 6 months and < 50% average po intake documented.         Nutrition Intervention: Add Boost Plus BID (Provides 720 kcals, 28 g protein if consumed)     Will continue to encourage good po intake.           Monitoring/Evaluation Per protocol, I&O, PO intake, Supplement intake, Pertinent labs, Weight, Skin status, GI status          RD to follow up per protocol.    Electronically signed by:  Mariya Doss RD  07/28/24 13:25 EDT

## 2024-07-28 NOTE — PROGRESS NOTES
LOS: 0 days   Patient Care Team:  Luan Joseph APRN as PCP - General (Family Medicine)  Jozef Otero MD as Consulting Physician (Nephrology)    Subjective:  Less confused with medication modifications    Objective:   Afebrile      Review of Systems:   Review of Systems   Constitutional:  Positive for activity change.   Neurological:  Positive for weakness.   Psychiatric/Behavioral:  Positive for confusion.            Vital Signs  Temp:  [97.6 °F (36.4 °C)-97.9 °F (36.6 °C)] 97.9 °F (36.6 °C)  Heart Rate:  [100-115] 106  Resp:  [14-18] 16  BP: (100-119)/(38-55) 117/46    Physical Exam:  Physical Exam  Vitals reviewed.   Cardiovascular:      Rate and Rhythm: Rhythm irregular.      Heart sounds: Normal heart sounds.   Pulmonary:      Breath sounds: Normal breath sounds.   Skin:     General: Skin is warm.   Neurological:      Mental Status: She is alert.          Radiology:  CT Abdomen Pelvis Without Contrast    Result Date: 7/26/2024  Impression: 1. Bilateral nonobstructing renal stones. 2. Abdominal aortic stent graft in place. The aneurysm sac is stable measuring 4.0 cm in the AP dimension. 3. Borderline splenomegaly. 4. Small hiatal hernia. 5. Multiple thoracolumbar compression fractures. The T12 compression fracture is new when compared to the previous abdominal CT. It appears subacute. There is a fracture cleft through the superior endplate with healing callus. 6. Additional findings as noted above. The study is limited by noncontrasted technique. Electronically Signed: Zheng Doe MD  7/26/2024 1:52 PM EDT  Workstation ID: [id number]    CT Head Without Contrast    Result Date: 7/25/2024  Impression: 1. No intracranial hemorrhage. 2. Generalized cerebral atrophy with moderate white matter findings of chronic microvascular disease. Electronically Signed: Ronak Birmingham MD  7/25/2024 3:16 PM EDT  Workstation ID: [id number]    XR Chest 1 View    Result Date: 7/25/2024  Impression: No acute cardiopulmonary  findings. Electronically Signed: Juan Alvarado MD  7/25/2024 1:55 PM EDT  Workstation ID: [id number]        Results Review:     I reviewed the patient's new clinical results.  I reviewed the patient's new imaging results and agree with the interpretation.    Medication Review:   Scheduled Meds:apixaban, 5 mg, Oral, BID  digoxin, 125 mcg, Oral, Daily  levothyroxine, 75 mcg, Oral, Q AM  nystatin, 5 mL, Swish & Swallow, 4x Daily  pantoprazole, 40 mg, Oral, Q AM  risperiDONE, 0.25 mg, Oral, Nightly  sertraline, 50 mg, Oral, Daily  sodium chloride, 10 mL, Intravenous, Q12H      Continuous Infusions:sodium chloride, 100 mL/hr, Last Rate: 100 mL/hr (07/28/24 0159)      PRN Meds:.  senna-docusate sodium **AND** polyethylene glycol **AND** bisacodyl **AND** bisacodyl    Calcium Replacement - Follow Nurse / BPA Driven Protocol    HYDROcodone-acetaminophen    Magnesium Standard Dose Replacement - Follow Nurse / BPA Driven Protocol    ondansetron ODT **OR** ondansetron    Phosphorus Replacement - Follow Nurse / BPA Driven Protocol    Potassium Replacement - Follow Nurse / BPA Driven Protocol    sodium chloride    sodium chloride    sodium chloride    Labs:    CBC    Results from last 7 days   Lab Units 07/27/24  0553 07/26/24  0549 07/25/24  1342   WBC 10*3/mm3 11.22* 11.94* 15.06*   HEMOGLOBIN g/dL 8.8* 9.0* 10.5*   PLATELETS 10*3/mm3 177 190 207     BMP   Results from last 7 days   Lab Units 07/27/24  1320 07/27/24  0553 07/26/24  0551 07/25/24  1342   SODIUM mmol/L  --  138 136 136   POTASSIUM mmol/L 3.7 3.2* 3.6 4.0   CHLORIDE mmol/L  --  110* 107 106   CO2 mmol/L  --  19.9* 17.5* 18.6*   BUN mg/dL  --  8 12 12   CREATININE mg/dL  --  0.66 0.72 0.89   GLUCOSE mg/dL  --  84 95 162*   MAGNESIUM mg/dL  --   --   --  1.7     Cr Clearance Estimated Creatinine Clearance: 92.2 mL/min (by C-G formula based on SCr of 0.66 mg/dL).  Coag     HbA1C   Lab Results   Component Value Date    HGBA1C 6.20 (H) 05/31/2024     Blood Glucose No  "results found for: \"POCGLU\"  Infection   Results from last 7 days   Lab Units 07/25/24  1538 07/25/24  1411   BLOODCX  No growth at 2 days  No growth at 2 days  --    URINECX   --  No growth     CMP   Results from last 7 days   Lab Units 07/27/24  1320 07/27/24  0553 07/26/24  0551 07/25/24  1449 07/25/24  1342   SODIUM mmol/L  --  138 136  --  136   POTASSIUM mmol/L 3.7 3.2* 3.6  --  4.0   CHLORIDE mmol/L  --  110* 107  --  106   CO2 mmol/L  --  19.9* 17.5*  --  18.6*   BUN mg/dL  --  8 12  --  12   CREATININE mg/dL  --  0.66 0.72  --  0.89   GLUCOSE mg/dL  --  84 95  --  162*   ALBUMIN g/dL  --   --   --   --  3.3*   BILIRUBIN mg/dL  --   --   --   --  0.8   ALK PHOS U/L  --   --   --   --  179*   AST (SGOT) U/L  --   --   --   --  19   ALT (SGPT) U/L  --   --   --   --  6   AMMONIA umol/L 15  --   --  14  --      UA    Results from last 7 days   Lab Units 07/25/24  1411   NITRITE UA  Negative   WBC UA /HPF 0-2   BACTERIA UA /HPF None Seen   SQUAM EPITHEL UA /HPF 0-2   URINECX  No growth     Radiology(recent) CT Abdomen Pelvis Without Contrast    Result Date: 7/26/2024  Impression: 1. Bilateral nonobstructing renal stones. 2. Abdominal aortic stent graft in place. The aneurysm sac is stable measuring 4.0 cm in the AP dimension. 3. Borderline splenomegaly. 4. Small hiatal hernia. 5. Multiple thoracolumbar compression fractures. The T12 compression fracture is new when compared to the previous abdominal CT. It appears subacute. There is a fracture cleft through the superior endplate with healing callus. 6. Additional findings as noted above. The study is limited by noncontrasted technique. 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

## 2024-07-28 NOTE — THERAPY EVALUATION
Patient Name: Laura Mejia  : 1955    MRN: [id number]                              Today's Date: 2024       Admit Date: 2024    Visit Dx:     ICD-10-CM ICD-9-CM   1. Altered mental status, unspecified altered mental status type  R41.82 780.97     Patient Active Problem List   Diagnosis    New onset atrial fibrillation    Near syncope    S/P CABG (coronary artery bypass graft)    Abnormal nuclear stress test    Altered mental status    Altered mental status, unspecified    Atrial fibrillation with RVR    Urinary tract infection    Abdominal aortic aneurysm (AAA)    Acute on chronic systolic heart failure    Allergic rhinitis    Atherosclerosis of coronary artery bypass graft    Compression fracture of lumbar vertebra    Congestive heart failure    COPD (chronic obstructive pulmonary disease)    Degeneration of intervertebral disc of lumbar region    Degeneration of thoracic intervertebral disc    Degenerative spondylolisthesis    Depression    Gastroesophageal reflux disease    History of pulmonary embolism    Hyperlipidemia    Hypothyroidism    Irritable bowel syndrome    Ischemic cardiomyopathy    Kidney stone    Lumbar radiculopathy    Obstructive sleep apnea syndrome    Osteopenia    Peripheral neuropathy    Pleural effusion    Pulmonary emboli    Hypertension    S/P CABG x 4    Hypotension due to hypovolemia    Closed wedge compression fracture of T10 vertebra    Generalized weakness    Leukocytosis    Multiple falls     Past Medical History:   Diagnosis Date    AAA (abdominal aortic aneurysm)     infrarenal- 3.1cm(, 3.7x4.2cm(), 3.9cm (2017)    Allergic rhinitis     Aspiration pneumonia 2017    CHF (congestive heart failure)     Coronary artery disease     DDD (degenerative disc disease), lumbar     lumbar spine- Dr. Churchill     Depression     Diverticulosis     Frequent UTI     Dr. Daniels    GERD (gastroesophageal reflux disease)     Hiatal hernia     HSV (herpes simplex virus)  infection     Oral    Hyperlipidemia     IBS (irritable bowel syndrome)     Constipation predominant    Ischemic cardiomyopathy 09/2017    AICD     Kidney stones     NSTEMI (non-ST elevated myocardial infarction) 04/26/2017    Obstructive sleep apnea 2011    nfsg 2011, ahi 67    Osteoarthritis     Osteopenia     Peripheral neuropathy     feet    Pulmonary embolism, bilateral 05/2017    Rotator cuff tear     Bilateral- Ortho      Past Surgical History:   Procedure Laterality Date    CARDIAC CATHETERIZATION  04/26/2017    99% mid LAD. 90% mid LCX. 60% RCA. Recommended CABG. Dr. Diaz    CARDIAC DEFIBRILLATOR PLACEMENT  12/26/2017    ICD implant/ Dr. Ellis    CATARACT EXTRACTION      CORONARY ARTERY BYPASS GRAFT  04/26/2017    x4 & ASD Closure    CYSTOSCOPY  2002    negative. Dr. Daniels    LAPAROSCOPIC CHOLECYSTECTOMY  04/17/2013    For biliary dyskinesia. Dr. Mccoy.     REPLACEMENT TOTAL KNEE BILATERAL  11/2004    Dr. Herrera    THORACENTESIS Left 05/08/2017    TONSILLECTOMY      TUBAL ABDOMINAL LIGATION Bilateral       General Information       Row Name 07/28/24 1315          OT Time and Intention    Document Type evaluation  -SP     Mode of Treatment occupational therapy  -SP       Row Name 07/28/24 1315          General Information    Patient Profile Reviewed yes  -SP     Prior Level of Function independent:;ADL's;driving;all household mobility;home management  -SP     Existing Precautions/Restrictions TLSO  -SP     Barriers to Rehab medically complex;previous functional deficit  -SP       Row Name 07/28/24 1315          Living Environment    People in Home spouse  -SP       Row Name 07/28/24 1315          Home Main Entrance    Number of Stairs, Main Entrance two  -SP       Row Name 07/28/24 1315          Stairs Within Home, Primary    Number of Stairs, Within Home, Primary two  -SP       Row Name 07/28/24 1315          Cognition    Orientation Status (Cognition) oriented x 4  -SP       Row Name 07/28/24 1315           Safety Issues, Functional Mobility    Safety Issues Affecting Function (Mobility) at risk behavior observed;awareness of need for assistance;impulsivity;judgment;safety precautions follow-through/compliance  -SP     Impairments Affecting Function (Mobility) balance;endurance/activity tolerance;pain  -SP               User Key  (r) = Recorded By, (t) = Taken By, (c) = Cosigned By      Initials Name Provider Type    SP Chele Tanner OT Occupational Therapist                     Mobility/ADL's       Row Name 07/28/24 1323          Bed Mobility    Bed Mobility bed mobility (all) activities  -SP     All Activities, Kimberly (Bed Mobility) not tested  -SP       Row Name 07/28/24 1323          Transfers    Transfers sit-stand transfer;stand-sit transfer  -SP       Row Name 07/28/24 1323          Sit-Stand Transfer    Sit-Stand Kimberly (Transfers) minimum assist (75% patient effort)  -SP     Assistive Device (Sit-Stand Transfers) walker, front-wheeled  -SP       Row Name 07/28/24 1323          Stand-Sit Transfer    Stand-Sit Kimberly (Transfers) minimum assist (75% patient effort)  -SP     Assistive Device (Stand-Sit Transfers) walker, front-wheeled  -SP       Row Name 07/28/24 1323          Functional Mobility    Functional Mobility- Ind. Level contact guard assist  -SP     Functional Mobility- Device walker, front-wheeled  -SP     Patient was able to Ambulate yes  -SP       Row Name 07/28/24 1323          Activities of Daily Living    BADL Assessment/Intervention lower body dressing  -SP       Row Name 07/28/24 1323          Lower Body Dressing Assessment/Training    Kimberly Level (Lower Body Dressing) don;pants/bottoms;maximum assist (25% patient effort)  -SP               User Key  (r) = Recorded By, (t) = Taken By, (c) = Cosigned By      Initials Name Provider Type    SP Chele Tanner OT Occupational Therapist                   Obj/Interventions       Row Name 07/28/24 1324           Sensory Assessment (Somatosensory)    Sensory Assessment (Somatosensory) UE sensation intact  -SP       Row Name 07/28/24 1324          Vision Assessment/Intervention    Vision Assessment Comment Does not reports any recent changes in vision  -SP       [facility] Name 07/28/24 1324          Range of Motion Comprehensive    General Range of Motion bilateral upper extremity ROM WFL  -SP       [facility] Name 07/28/24 1324          Strength Comprehensive (MMT)    Comment, General Manual Muscle Testing (MMT) Assessment BUE not formally tested d/t severe pain, though observed WFLs,  strength 4/5.  -SP       [facility] Name 07/28/24 1324          Balance    Balance Assessment sitting static balance;sitting dynamic balance;sit to stand dynamic balance;standing static balance;standing dynamic balance  -SP     Static Sitting Balance standby assist  -SP     Dynamic Sitting Balance standby assist  -SP     Position, Sitting Balance unsupported;sitting edge of bed  -SP     Sit to Stand Dynamic Balance minimal assist  -SP     Static Standing Balance contact guard  -SP     Dynamic Standing Balance contact guard  -SP     Position/Device Used, Standing Balance walker, front-wheeled  -SP               User Key  (r) = Recorded By, (t) = Taken By, (c) = Cosigned By      Initials Name Provider Type    SP Chele Tanner, SARITHA Occupational Therapist                   Goals/Plan       [facility] Name 07/28/24 133          Bed Mobility Goal 1 (OT)    Activity/Assistive Device (Bed Mobility Goal 1, OT) bed mobility activities, all;other (see comments)  Pt will demo good understanding of log roll technique  -SP     Martin Level/Cues Needed (Bed Mobility Goal 1, OT) modified independence  -SP     Time Frame (Bed Mobility Goal 1, OT) 2 weeks  -SP       Row Name 07/28/24 8203          Bathing Goal 1 (OT)    Activity/Device (Bathing Goal 1, OT) bathing skills, all  -SP     Martin Level/Cues Needed (Bathing Goal 1, OT) minimum assist (75% or more patient  effort)  -SP     Time Frame (Bathing Goal 1, OT) 2 weeks  -SP     Strategies/Barriers (Bathing Goal 1, OT) until d/c  -SP       Row Name 07/28/24 5188          Dressing Goal 1 (OT)    Activity/Device (Dressing Goal 1, OT) dressing skills, all  -SP     [place]/Cues Needed (Dressing Goal 1, OT) minimum assist (75% or more patient effort)  -SP     Time Frame (Dressing Goal 1, OT) 2 weeks  -SP     Strategies/Barriers (Dressing Goal 1, OT) until d/c  -SP       Row Name 07/28/24 6409          Therapy Assessment/Plan (OT)    Planned Therapy Interventions (OT) activity tolerance training;BADL retraining;IADL retraining;occupation/activity based interventions;patient/caregiver education/training;ROM/therapeutic exercise;strengthening exercise;transfer/mobility retraining  -SP               User Key  (r) = Recorded By, (t) = Taken By, (c) = Cosigned By      Initials Name Provider Type    SP Chele Tanner, SARITHA Occupational Therapist                   Clinical Impression       Row Name 07/28/24 1324          Pain Assessment    Pretreatment Pain Rating 8/10  -SP     Posttreatment Pain Rating 10/10  -SP     Pain Location - back  -SP     Pain Intervention(s) Repositioned;Nursing Notified;Therapeutic presence  -SP       Row Name 07/28/24 4056          Plan of Care Review    Plan of Care Reviewed With patient;family  -SP     Outcome Evaluation Pt is a 69 y/o female admitted to [facility] on 7/25/24 presenting with AMS. Psych consulted as pt has been hallucinating. Pt recently discharged on 7/14/24, found to have compression fx of T10. Per neuro, no surgical intervention recommended, however advised pt to wear TLSO when OOB. PMHx significant for CABG, hx PE, frequent UTI and hx A.fib on warfarin. At baseline pt resides with spouse in [facility] home with x2 LEO. Pt typically (I) with ADLs, no use of AD for mobility, has RW uses infrequently, though has been requiring increased assist after recent hospital admission. Pt reports she owns [facility]  in bedroom. Upon evaluation, pt A&O x4 with reports of 8/10 pain in back. Pt in bathroom receiving dependent A from adult daughter to complete eliana-care for BM. Pt without TLSO, OT educated family on importance of brace and spouse went to car to receive brace for pt. Pt requires dependent assist to don TLSO and additionally educated on log roll technique when completing bed mobility. Pt max assist to don brief and gown. Pt comes to standing with min A and FWW to ambulate to chair with CGA for safety. Pt impulsive and unaware of need for assistance, use of TLSO brace, and spinal precautions. OT anticipating home with HHOT once pain is managed with education.  -SP       Row Name 07/28/24 1326          Therapy Assessment/Plan (OT)    Criteria for Skilled Therapeutic Interventions Met (OT) yes;meets criteria;skilled treatment is necessary  -SP     Therapy Frequency (OT) 3 times/wk  -SP     Predicted Duration of Therapy Intervention (OT) until d/c  -SP       Row Name 07/28/24 1326          Therapy Plan Review/Discharge Plan (OT)    Anticipated Discharge Disposition (OT) home with home health  -SP       Row Name 07/28/24 1326          Vital Signs    O2 Delivery Pre Treatment room air  -SP     O2 Delivery Intra Treatment room air  -SP     O2 Delivery Post Treatment room air  -SP     Pre Patient Position Standing  -SP     Intra Patient Position Sitting  -SP     Post Patient Position Sitting  -SP       Row Name 07/28/24 1326          Positioning and Restraints    Pre-Treatment Position bathroom  -SP     Post Treatment Position chair  -SP     In Chair notified nsg;reclined;call light within reach;encouraged to call for assist;exit alarm on;with family/caregiver  -SP               User Key  (r) = Recorded By, (t) = Taken By, (c) = Cosigned By      Initials Name Provider Type    Chele Jurado, OT Occupational Therapist                   Outcome Measures       Row Name 07/28/24 1771          How much help from another is  currently needed...    Putting on and taking off regular lower body clothing? 2  -SP     Bathing (including washing, rinsing, and drying) 2  -SP     Toileting (which includes using toilet bed pan or urinal) 2  -SP     Putting on and taking off regular upper body clothing 2  -SP     Taking care of personal grooming (such as brushing teeth) 3  -SP     Eating meals 4  -SP     AM-PAC 6 Clicks Score (OT) 15  -SP       Row Name 07/28/24 0800          How much help from another person do you currently need...    Turning from your back to your side while in flat bed without using bedrails? 2  -LB     Moving from lying on back to sitting on the side of a flat bed without bedrails? 2  -LB     Moving to and from a bed to a chair (including a wheelchair)? 2  -LB     Standing up from a chair using your arms (e.g., wheelchair, bedside chair)? 2  -LB     Climbing 3-5 steps with a railing? 2  -LB     To walk in hospital room? 2  -LB     AM-PAC 6 Clicks Score (PT) 12  -LB     Highest Level of Mobility Goal 4 --> Transfer to chair/commode  -LB       Row Name 07/28/24 1335          Functional Assessment    Outcome Measure Options AM-PAC 6 Clicks Daily Activity (OT)  -SP               User Key  (r) = Recorded By, (t) = Taken By, (c) = Cosigned By      Initials Name Provider Type    LB Roselyn Burnett, RN Registered Nurse    Chele Jurado, SARITHA Occupational Therapist                    Occupational Therapy Education       Title: PT OT SLP Therapies (In Progress)       Topic: Occupational Therapy (In Progress)       Point: ADL training (Done)       Description:   Instruct learner(s) on proper safety adaptation and remediation techniques during self care or transfers.   Instruct in proper use of assistive devices.                  Learning Progress Summary             Patient Acceptance, E,TB, VU,NR by SP at 7/28/2024 1336   Family Acceptance, E,TB, VU,NR by SP at 7/28/2024 1336                         Point: Home exercise program (Not  Started)       Description:   Instruct learner(s) on appropriate technique for monitoring, assisting and/or progressing therapeutic exercises/activities.                  Learner Progress:  Not documented in this visit.              Point: Precautions (Done)       Description:   Instruct learner(s) on prescribed precautions during self-care and functional transfers.                  Learning Progress Summary             Patient Acceptance, E,TB, VU,NR by SP at 7/28/2024 1336   Family Acceptance, E,TB, VU,NR by SP at 7/28/2024 1336                         Point: Body mechanics (Done)       Description:   Instruct learner(s) on proper positioning and spine alignment during self-care, functional mobility activities and/or exercises.                  Learning Progress Summary             Patient Acceptance, E,TB, VU,NR by SP at 7/28/2024 1336   Family Acceptance, E,TB, VU,NR by SP at 7/28/2024 1336                                         User Key       Initials Effective Dates Name Provider Type Discipline    JOSH 11/15/23 -  Chele Tanner, SARITHA Occupational Therapist OT                  OT Recommendation and Plan  Planned Therapy Interventions (OT): activity tolerance training, BADL retraining, IADL retraining, occupation/activity based interventions, patient/caregiver education/training, ROM/therapeutic exercise, strengthening exercise, transfer/mobility retraining  Therapy Frequency (OT): 3 times/wk  Plan of Care Review  Plan of Care Reviewed With: patient, family  Outcome Evaluation: Pt is a 67 y/o female admitted to [facility] on 7/25/24 presenting with AMS. Psych consulted as pt has been hallucinating. Pt recently discharged on 7/14/24, found to have compression fx of T10. Per neuro, no surgical intervention recommended, however advised pt to wear TLSO when OOB. PMHx significant for CABG, hx PE, frequent UTI and hx A.fib on warfarin. At baseline pt resides with spouse in [facility] home with x2 LEO. Pt typically (I) with ADLs, no  use of AD for mobility, has RW uses infrequently, though has been requiring increased assist after recent hospital admission. Pt reports she owns [employer] in bedroom. Upon evaluation, pt A&O x4 with reports of 8/10 pain in back. Pt in bathroom receiving dependent A from adult daughter to complete eliana-care for BM. Pt without TLSO, OT educated family on importance of brace and spouse went to car to receive brace for pt. Pt requires dependent assist to don TLSO and additionally educated on log roll technique when completing bed mobility. Pt max assist to don brief and gown. Pt comes to standing with min A and FWW to ambulate to chair with CGA for safety. Pt impulsive and unaware of need for assistance, use of TLSO brace, and spinal precautions. OT anticipating home with HHOT once pain is managed with education.     Time Calculation:         Time Calculation- OT       Row Name 07/28/24 1336             Time Calculation- OT    OT Start Time 1137  -SP      OT Stop Time 1213  -SP      OT Time Calculation (min) 36 min  -SP      OT Received On 07/28/24  -SP      OT - Next Appointment 07/30/24  -SP      OT Goal Re-Cert Due Date 08/11/24  -SP                User Key  (r) = Recorded By, (t) = Taken By, (c) = Cosigned By      Initials Name Provider Type    Chele Jurado OT Occupational Therapist                  Therapy Charges for Today       Code Description Service Date Service Provider Modifiers Qty    [id number]  OT EVAL MOD COMPLEXITY 4 7/28/2024 Chele Tanner OT GO 1                 Chele Tanner OT  7/28/2024

## 2024-07-28 NOTE — NURSING NOTE
Patient disoriented to time, but alert and oriented otherwise. With extreme persuasion pt ambulated x2 mod assist(this RN and pt ) and a walker, to the bathroom. Pt was slightly unsteady and deviated to the L side, and reported 10/10 back pain with ambulation.

## 2024-07-29 ENCOUNTER — APPOINTMENT (OUTPATIENT)
Dept: MRI IMAGING | Facility: HOSPITAL | Age: 69
End: 2024-07-29
Payer: MEDICARE

## 2024-07-29 LAB
ANA SER QL: NEGATIVE
ANION GAP SERPL CALCULATED.3IONS-SCNC: 8.3 MMOL/L (ref 5–15)
BUN SERPL-MCNC: 3 MG/DL (ref 8–23)
BUN/CREAT SERPL: 5 (ref 7–25)
CALCIUM SPEC-SCNC: 8.4 MG/DL (ref 8.6–10.5)
CHLORIDE SERPL-SCNC: 110 MMOL/L (ref 98–107)
CO2 SERPL-SCNC: 19.7 MMOL/L (ref 22–29)
CREAT SERPL-MCNC: 0.6 MG/DL (ref 0.57–1)
DIGOXIN SERPL-MCNC: 0.89 NG/ML (ref 0.6–1.2)
EGFRCR SERPLBLD CKD-EPI 2021: 97.3 ML/MIN/1.73
GLUCOSE SERPL-MCNC: 86 MG/DL (ref 65–99)
POTASSIUM SERPL-SCNC: 3.7 MMOL/L (ref 3.5–5.2)
SODIUM SERPL-SCNC: 138 MMOL/L (ref 136–145)

## 2024-07-29 PROCEDURE — 97551 CAREGIVER TRAING EA ADDL 15: CPT

## 2024-07-29 PROCEDURE — 25810000003 SODIUM CHLORIDE 0.9 % SOLUTION: Performed by: FAMILY MEDICINE

## 2024-07-29 PROCEDURE — 94640 AIRWAY INHALATION TREATMENT: CPT

## 2024-07-29 PROCEDURE — 70551 MRI BRAIN STEM W/O DYE: CPT

## 2024-07-29 PROCEDURE — 25010000002 ONDANSETRON PER 1 MG: Performed by: FAMILY MEDICINE

## 2024-07-29 PROCEDURE — 80048 BASIC METABOLIC PNL TOTAL CA: CPT | Performed by: FAMILY MEDICINE

## 2024-07-29 PROCEDURE — 80162 ASSAY OF DIGOXIN TOTAL: CPT | Performed by: FAMILY MEDICINE

## 2024-07-29 RX ORDER — ALBUTEROL SULFATE 2.5 MG/3ML
2.5 SOLUTION RESPIRATORY (INHALATION) ONCE
Status: DISCONTINUED | OUTPATIENT
Start: 2024-07-29 | End: 2024-08-03 | Stop reason: HOSPADM

## 2024-07-29 RX ORDER — ALBUTEROL SULFATE 2.5 MG/3ML
SOLUTION RESPIRATORY (INHALATION)
Status: DISCONTINUED
Start: 2024-07-29 | End: 2024-07-29 | Stop reason: WASHOUT

## 2024-07-29 RX ADMIN — Medication 10 ML: at 21:11

## 2024-07-29 RX ADMIN — APIXABAN 5 MG: 5 TABLET, FILM COATED ORAL at 21:16

## 2024-07-29 RX ADMIN — DIGOXIN 125 MCG: 125 TABLET ORAL at 11:33

## 2024-07-29 RX ADMIN — ONDANSETRON 4 MG: 2 INJECTION INTRAMUSCULAR; INTRAVENOUS at 07:34

## 2024-07-29 RX ADMIN — SODIUM CHLORIDE 50 ML/HR: 9 INJECTION, SOLUTION INTRAVENOUS at 17:27

## 2024-07-29 RX ADMIN — Medication 10 ML: at 08:08

## 2024-07-29 RX ADMIN — NYSTATIN 500000 UNITS: 100000 SUSPENSION ORAL at 11:33

## 2024-07-29 RX ADMIN — SERTRALINE 50 MG: 50 TABLET, FILM COATED ORAL at 08:07

## 2024-07-29 RX ADMIN — RISPERIDONE 0.25 MG: 0.5 TABLET, ORALLY DISINTEGRATING ORAL at 21:10

## 2024-07-29 RX ADMIN — NYSTATIN 500000 UNITS: 100000 SUSPENSION ORAL at 17:25

## 2024-07-29 RX ADMIN — LEVOTHYROXINE SODIUM 75 MCG: 0.07 TABLET ORAL at 05:33

## 2024-07-29 RX ADMIN — PANTOPRAZOLE SODIUM 40 MG: 40 TABLET, DELAYED RELEASE ORAL at 05:33

## 2024-07-29 RX ADMIN — FERROUS SULFATE TAB EC 324 MG (65 MG FE EQUIVALENT) 324 MG: 324 (65 FE) TABLET DELAYED RESPONSE at 08:08

## 2024-07-29 RX ADMIN — APIXABAN 5 MG: 5 TABLET, FILM COATED ORAL at 08:08

## 2024-07-29 RX ADMIN — NYSTATIN 500000 UNITS: 100000 SUSPENSION ORAL at 08:08

## 2024-07-29 RX ADMIN — HYDROCODONE BITARTRATE AND ACETAMINOPHEN 1 TABLET: 5; 325 TABLET ORAL at 16:22

## 2024-07-29 NOTE — NURSING NOTE
Pt disoriented to situation and time. Pt strongly refused save urine specimen, O2 sensor on and blood drawing. Pt O2: 96% in room air.

## 2024-07-29 NOTE — PROGRESS NOTES
LOS: 1 day   Patient Care Team:  Luan Joseph APRN as PCP - General (Family Medicine)  Jozef Otero MD as Consulting Physician (Nephrology)    Subjective         Review of Systems   Unable to perform ROS: Mental status change           Objective     Vital Signs  Temp:  [97.3 °F (36.3 °C)-98.8 °F (37.1 °C)] 97.3 °F (36.3 °C)  Heart Rate:  [] 86  Resp:  [11-17] 11  BP: (106-132)/(41-77) 116/41      Physical Exam  Vitals reviewed.   Constitutional:       Appearance: She is not ill-appearing.   HENT:      Head: Normocephalic and atraumatic.      Right Ear: External ear normal.      Left Ear: External ear normal.      Nose: Nose normal.      Mouth/Throat:      Mouth: Mucous membranes are moist.   Eyes:      General:         Right eye: No discharge.         Left eye: No discharge.   Cardiovascular:      Rate and Rhythm: Normal rate and regular rhythm.      Pulses: Normal pulses.      Heart sounds: Normal heart sounds.   Pulmonary:      Effort: Pulmonary effort is normal.      Breath sounds: Normal breath sounds.   Abdominal:      General: Bowel sounds are normal.      Palpations: Abdomen is soft.   Musculoskeletal:         General: Normal range of motion.      Cervical back: Normal range of motion.   Skin:     General: Skin is warm and dry.   Neurological:      Mental Status: She is alert. She is disoriented.   Psychiatric:         Cognition and Memory: Cognition is impaired. Memory is impaired.              Results Review:    Lab Results (last 24 hours)       Procedure Component Value Units Date/Time    RAMAN [[id number]]  (Normal) Collected: 07/27/24 1320    Specimen: Blood from Arm, Left Updated: 07/29/24 0951     Antinuclear Antibodies (RAMAN) Negative    Basic Metabolic Panel [[id number]]  (Abnormal) Collected: 07/29/24 0548    Specimen: Blood from Arm, Right Updated: 07/29/24 0617     Glucose 86 mg/dL      BUN 3 mg/dL      Creatinine 0.60 mg/dL      Sodium 138 mmol/L      Potassium 3.7 mmol/L       Comment: Specimen hemolyzed.  Result may be falsely elevated.        Chloride 110 mmol/L      CO2 19.7 mmol/L      Calcium 8.4 mg/dL      BUN/Creatinine Ratio 5.0     Anion Gap 8.3 mmol/L      eGFR 97.3 mL/min/1.73     Narrative:      GFR Normal >60  Chronic Kidney Disease <60  Kidney Failure <15      Digoxin Level [[id number]]  (Normal) Collected: 07/29/24 0548    Specimen: Blood from Arm, Right Updated: 07/29/24 0615     Digoxin 0.89 ng/mL     Folate [[id number]]  (Abnormal) Collected: 07/28/24 0730    Specimen: Blood Updated: 07/28/24 1849     Folate 3.72 ng/mL     Narrative:      Results may be falsely increased if patient taking Biotin.      Blood Culture - Blood, Arm, Right [[id number]]  (Normal) Collected: 07/25/24 1538    Specimen: Blood from Arm, Right Updated: 07/28/24 1545     Blood Culture No growth at 3 days    Narrative:      Less than seven (7) mL's of blood was collected.  Insufficient quantity may yield false negative results.    Blood Culture - Blood, Arm, Left [[id number]]  (Normal) Collected: 07/25/24 1538    Specimen: Blood from Arm, Left Updated: 07/28/24 1545     Blood Culture No growth at 3 days             Imaging Results (Last 24 Hours)       Procedure Component Value Units Date/Time    MRI Brain Without Contrast [[id number]] Collected: 07/29/24 1133     Updated: 07/29/24 1138    Narrative:      MRI BRAIN WO CONTRAST    Date of Exam: 7/29/2024 10:42 AM EDT    Indication: altered mental status, h/o CVA.  has had MRI in the past.     Comparison: CT head without contrast 7/25/2024, MRI brain 5/21/2024    Technique:  Routine multiplanar/multisequence sequence images of the brain were obtained without contrast administration.      Findings:  There is no diffusion restriction to suggest acute infarct.     There is no evidence of acute or chronic intracranial hemorrhage. No mass effect or midline shift. No abnormal extra-axial collections.     The basal ganglia, brainstem and cerebellum appear within  normal limits. Midline structures are intact. There is generalized cerebral and cerebellar volume loss. Moderate subcortical, periventricular and deep white matter T2/FLAIR hyperintensities are in   keeping with chronic microvascular ischemic disease.    Calvarial and superficial soft tissue signal is within normal limits. Orbits appear unremarkable. The paranasal sinuses and the mastoid air cells appear well aerated.         Impression:      Impression:    1. No acute intracranial process.  2. Age-related atrophy and sequela of chronic microvascular ischemic disease.        Electronically Signed: Brooklynn Hernandez MD    7/29/2024 11:36 AM EDT    Workstation ID: [id number]                 I reviewed the patient's new clinical results.    Medication Review:   Scheduled Meds:albuterol, , ,   apixaban, 5 mg, Oral, BID  digoxin, 125 mcg, Oral, Daily  ferrous sulfate, 324 mg, Oral, Daily With Breakfast  levothyroxine, 75 mcg, Oral, Q AM  nystatin, 5 mL, Swish & Swallow, 4x Daily  pantoprazole, 40 mg, Oral, Q AM  risperiDONE, 0.25 mg, Oral, Nightly  sertraline, 50 mg, Oral, Daily  sodium chloride, 10 mL, Intravenous, Q12H      Continuous Infusions:sodium chloride, 50 mL/hr, Last Rate: 50 mL/hr (07/28/24 2120)      PRN Meds:.  albuterol    senna-docusate sodium **AND** polyethylene glycol **AND** bisacodyl **AND** bisacodyl    Calcium Replacement - Follow Nurse / BPA Driven Protocol    HYDROcodone-acetaminophen    Magnesium Standard Dose Replacement - Follow Nurse / BPA Driven Protocol    metoprolol tartrate    ondansetron ODT **OR** ondansetron    Phosphorus Replacement - Follow Nurse / BPA Driven Protocol    Potassium Replacement - Follow Nurse / BPA Driven Protocol    sodium chloride    sodium chloride    sodium chloride     Interval History:    Assessment & Plan     Altered mental status  Hallucinations  -CT head with no acute findings  -UA negative for infection, but will ask for culture given her h/o recurrent UTI  -UDS  positive for thc  -CXR unremarkable  -respiratory panel negative  -IV fluids for hydration  - MRI of her brain negative for acute events  - psych following  -has multiple UTI in past with confusion but not hallucinations  -started on risperidone  -had full neuro work up 5/31/24 with very tiny left frontal lobe stroke     Decreased appetite  - will get CT abd/pelvis with non-obstrucition renal stones/T12 compression fracture     Recent compression fracture T10  - PT/OT  - recent falls     Afib  CHF  CAD  -eliquis/dig    IBS-C - linzess  DDD  Depression - paxil  Diverticulosis  GERD - PPI  HLD - lipitor  ANNIE  Hypothyroid - synthroid              DVT prophylaxis- SCD's, eliquis  GI prophylaxis- ppi        Plan for disposition:INPT Rehab    ALEXANDER Rain  07/29/24  12:18 EDT

## 2024-07-29 NOTE — THERAPY TREATMENT NOTE
Subjective: Pt refuses to mobilize with therapy at this time. Spouse states it's too much to get her into her brace just to go to the bathroom. Spouse and patient report she uses her brace at home.     Objective:     Precautions - spinal precautions and TLSO    Bed mobility - N/A or Not attempted. Spouse assisting pt with supine positioning in recliner upon PT arrival. BLE elevated and recliner back in a fully reclined position. Pt resting in a horizontal position without brace.   Transfers - N/A or Not attempted. Pt defers at this time.   Ambulation -  N/A or Not attempted. Pt defers at this time.     Patient and CG edu - edu regarding need for consistent TLSO use anytime pt is up. Verbal edu and photo shown regarding the cascade effect of compression fractures. Emphasis on the necessity of brace use to help prevent additional fractures. Edu for spinal precautions and need to call for staff assist for safe mobility. Pt verbalize understanding but is noted to  be confused (stating she just came in from outside). Spouse verbalizes understanding.     Vitals: WNL    Pain: 8.5 VAS   Location: back  Intervention for pain: Repositioned and Therapeutic Presence    Education: Provided education on the importance of mobility in the acute care setting, brace use and spinal precautions.    Assessment: Laura Mejia presents with pain limiting her participation in functional mobility and gait training today.  PT noted several instances documented of pt refusing and not using her TLSO. Edu provided for safety and brace use to prevent additional fractures and complications. Pt and CG verbalize understanding but will likely need reinforcement due to noted impairment in cognitive status. Will continue to follow and progress as tolerated.     Plan/Recommendations:   If medically appropriate, Moderate Intensity Therapy recommended post-acute care. This is recommended as therapy feels the patient would require 3-4 days per week  "and wouldn't tolerate \"3 hour daily\" rehab intensity. SNF would be the preferred choice. If the patient does not agree to SNF, arrange HH or OP depending on home bound status. If patient is medically complex, consider LTACH. Pt requires no DME at discharge.     Pt desires Home with Home Health and Home with family assist at discharge. Pt cooperative; agreeable to therapeutic recommendations and plan of care.         Basic Mobility 6-click:  Rollin = Total, A lot = 2, A little = 3; 4 = None  Supine>Sit:   1 = Total, A lot = 2, A little = 3; 4 = None   Sit>Stand with arms:  1 = Total, A lot = 2, A little = 3; 4 = None  Bed>Chair:   1 = Total, A lot = 2, A little = 3; 4 = None  Ambulate in room:  1 = Total, A lot = 2, A little = 3; 4 = None  3-5 Steps with railin = Total, A lot = 2, A little = 3; 4 = None  Score: 15    Modified Ramu: N/A = No pre-op stroke/TIA    Post-Tx Position: Up in Chair, Alarms activated, and Call light and personal items within reach  PPE: gloves      "

## 2024-07-29 NOTE — CASE MANAGEMENT/SOCIAL WORK
Case Management Readmission Assessment Note    Case Management Readmission Assessment (all recorded)       Readmission Interview       Row Name 07/29/24 1003             Readmission Indications    Is the patient and/or family able to complete the readmission assessment questions? Yes      Is this hospitalization related to the prior hospital diagnosis? No        Row Name 07/29/24 1003             Recommendation for rehospitalization    Did you speak with your physician prior to coming to the hospital Yes        Row Name 07/29/24 1003             Follow-up Appointments    Do you have a PCP? Yes      Did you have an appointment with PCP after your hospitalization? Yes      When was your appointment scheduled? 07/29/24  scheduled for today but admitted      Did you go to appointment? No      Did you have an appointment with a Specialist? No      Are you current with the Pulmonary Clinic? No      Are you current with the CHF Clinic? No        Row Name 07/29/24 1003             Medications    Did you have newly prescribed medications at discharge? Yes      Did you understand the reasons for your medications at discharge and how to take them? Yes      Did you understand the side effects of your medications? Yes      Are you taking all of you prescribed medications? Yes  Patient only took them when really needed      Were medications picked up? Yes        Row Name 07/29/24 1003             Discharge Instructions    Did you understand your discharge instructions? Yes      Did your family/caregiver hear your instructions? Yes      Were you told to eat a special diet? No      Did you adhere to the diet? No      Were you given a number of someone to call if you had questions or concerns? No        Row Name 07/29/24 1003             Index discharge location/services    Where did you go upon discharge? Home with services      Do you have supportive family or friends in the home? Yes      What services were arranged at discharge?  Home Health      Home Health Service used? [facility]      Have you had a Home Health visit since discharge? Yes        Row Name 07/29/24 1003             Discharge Readiness    On a scale of 1-5 (5 being well prepared), how ready were you for discharge 4      Recommendation based on interview Education on diagnosis/self management;Goals of care discussion/advanced care planning        Row Name 07/29/24 1003             Palliative Care/Hospice    Are you current with Palliative Care? No      Are you current with Hospice Care? No        Row Name 07/29/24 1003 07/25/24 1718          Advance Directives (For Healthcare)    Pre-existing AND/MOST/POLST Order No No     Advance Directive Status Patient does not have advance directive Patient does not have advance directive     Have you reviewed your Advance Directive and is it valid for this stay? Not applicable No     Literature Provided on Advance Directives No No     Patient Requests Assistance on Advance Directives Patient Declined Patient Declined       Row Name 07/29/24 1003             Readmission Assessment Final Comments    Final Comments Previous 7/11-7/13: To ER for multiple falls. Recent hospitilization at [facility] for UTI and falls, increased back pain. Head/cervical spine CT negative. CT thoracic spin - Compression T10 fracture. BP also low in ED - bolus x2 for BP improvement to 106/68. Neurosurgery, cardiology d/t Hypotension, PT/OT consults. IV abx, TLSO brace, IVF d/t Hypotension. Refused SNF d/t having [facility]. Current 7/25: to ED for AMS, WBC increased at 11.94,  IVF, IV metoprolol, IV Zofran, Urinalysis positive for ketones, Leukocytes, protein. CT head negative, CXR negative, Resp panel negative.

## 2024-07-30 ENCOUNTER — HOME CARE VISIT (OUTPATIENT)
Dept: HOME HEALTH SERVICES | Facility: HOME HEALTHCARE | Age: 69
End: 2024-07-30
Payer: MEDICARE

## 2024-07-30 LAB
ALBUMIN SERPL-MCNC: 2.7 G/DL (ref 3.5–5.2)
ALBUMIN/GLOB SERPL: 1.1 G/DL
ALP SERPL-CCNC: 163 U/L (ref 39–117)
ALT SERPL W P-5'-P-CCNC: 12 U/L (ref 1–33)
ANION GAP SERPL CALCULATED.3IONS-SCNC: 9.9 MMOL/L (ref 5–15)
ARTERIAL PATENCY WRIST A: POSITIVE
AST SERPL-CCNC: 37 U/L (ref 1–32)
ATMOSPHERIC PRESS: ABNORMAL MM[HG]
BACTERIA SPEC AEROBE CULT: NORMAL
BACTERIA SPEC AEROBE CULT: NORMAL
BACTERIA UR QL AUTO: ABNORMAL /HPF
BASE EXCESS BLDA CALC-SCNC: -5.3 MMOL/L (ref 0–3)
BASOPHILS # BLD AUTO: 0.1 10*3/MM3 (ref 0–0.2)
BASOPHILS NFR BLD AUTO: 1.3 % (ref 0–1.5)
BDY SITE: ABNORMAL
BILIRUB SERPL-MCNC: 0.4 MG/DL (ref 0–1.2)
BILIRUB UR QL STRIP: NEGATIVE
BUN SERPL-MCNC: 2 MG/DL (ref 8–23)
BUN/CREAT SERPL: 3.3 (ref 7–25)
CA-I BLDA-SCNC: 1.23 MMOL/L (ref 1.15–1.33)
CALCIUM SPEC-SCNC: 8.5 MG/DL (ref 8.6–10.5)
CHLORIDE SERPL-SCNC: 108 MMOL/L (ref 98–107)
CLARITY UR: ABNORMAL
CO2 SERPL-SCNC: 20.1 MMOL/L (ref 22–29)
COLOR UR: YELLOW
CREAT BLDA-MCNC: 0.68 MG/DL (ref 0.6–1.3)
CREAT SERPL-MCNC: 0.61 MG/DL (ref 0.57–1)
CRP SERPL-MCNC: 3.24 MG/DL (ref 0–0.5)
D-LACTATE SERPL-SCNC: 1.1 MMOL/L (ref 0.2–2)
DEPRECATED RDW RBC AUTO: 58.1 FL (ref 37–54)
EGFRCR SERPLBLD CKD-EPI 2021: 94.4 ML/MIN/1.73
EGFRCR SERPLBLD CKD-EPI 2021: 96.9 ML/MIN/1.73
EOSINOPHIL # BLD AUTO: 0.29 10*3/MM3 (ref 0–0.4)
EOSINOPHIL NFR BLD AUTO: 3.8 % (ref 0.3–6.2)
ERYTHROCYTE [DISTWIDTH] IN BLOOD BY AUTOMATED COUNT: 16.1 % (ref 12.3–15.4)
GLOBULIN UR ELPH-MCNC: 2.4 GM/DL
GLUCOSE BLDC GLUCOMTR-MCNC: 102 MG/DL (ref 70–105)
GLUCOSE BLDC GLUCOMTR-MCNC: 120 MG/DL (ref 74–100)
GLUCOSE BLDC GLUCOMTR-MCNC: 120 MG/DL (ref 74–100)
GLUCOSE SERPL-MCNC: 92 MG/DL (ref 65–99)
GLUCOSE UR STRIP-MCNC: NEGATIVE MG/DL
HCO3 BLDA-SCNC: 18.3 MMOL/L (ref 21–28)
HCT VFR BLD AUTO: 23.8 % (ref 34–46.6)
HCT VFR BLDA CALC: 29 % (ref 38–51)
HEMODILUTION: NO
HGB BLD-MCNC: 7.1 G/DL (ref 12–15.9)
HGB BLDA-MCNC: 9.8 G/DL (ref 12–17)
HGB UR QL STRIP.AUTO: ABNORMAL
HYALINE CASTS UR QL AUTO: ABNORMAL /LPF
IMM GRANULOCYTES # BLD AUTO: 0.06 10*3/MM3 (ref 0–0.05)
IMM GRANULOCYTES NFR BLD AUTO: 0.8 % (ref 0–0.5)
INHALED O2 CONCENTRATION: 29 %
KETONES UR QL STRIP: NEGATIVE
LEUKOCYTE ESTERASE UR QL STRIP.AUTO: ABNORMAL
LYMPHOCYTES # BLD AUTO: 0.71 10*3/MM3 (ref 0.7–3.1)
LYMPHOCYTES NFR BLD AUTO: 9.3 % (ref 19.6–45.3)
MAGNESIUM SERPL-MCNC: 1.4 MG/DL (ref 1.6–2.4)
MCH RBC QN AUTO: 29.1 PG (ref 26.6–33)
MCHC RBC AUTO-ENTMCNC: 29.8 G/DL (ref 31.5–35.7)
MCV RBC AUTO: 97.5 FL (ref 79–97)
MODALITY: ABNORMAL
MONOCYTES # BLD AUTO: 0.37 10*3/MM3 (ref 0.1–0.9)
MONOCYTES NFR BLD AUTO: 4.8 % (ref 5–12)
NEUTROPHILS NFR BLD AUTO: 6.14 10*3/MM3 (ref 1.7–7)
NEUTROPHILS NFR BLD AUTO: 80 % (ref 42.7–76)
NITRITE UR QL STRIP: NEGATIVE
NRBC BLD AUTO-RTO: 0 /100 WBC (ref 0–0.2)
PCO2 BLDA: 28.5 MM HG (ref 35–48)
PH BLDA: 7.42 PH UNITS (ref 7.35–7.45)
PH UR STRIP.AUTO: 5.5 [PH] (ref 5–8)
PLATELET # BLD AUTO: 215 10*3/MM3 (ref 140–450)
PMV BLD AUTO: 11 FL (ref 6–12)
PO2 BLD: 295 MM[HG] (ref 0–500)
PO2 BLDA: 85.6 MM HG (ref 83–108)
POTASSIUM BLDA-SCNC: 3.2 MMOL/L (ref 3.5–4.5)
POTASSIUM SERPL-SCNC: 3 MMOL/L (ref 3.5–5.2)
PROT SERPL-MCNC: 5.1 G/DL (ref 6–8.5)
PROT UR QL STRIP: NEGATIVE
RBC # BLD AUTO: 2.44 10*6/MM3 (ref 3.77–5.28)
RBC # UR STRIP: ABNORMAL /HPF
REF LAB TEST METHOD: ABNORMAL
SAO2 % BLDCOA: 96.8 % (ref 94–98)
SODIUM BLD-SCNC: 140 MMOL/L (ref 138–146)
SODIUM SERPL-SCNC: 138 MMOL/L (ref 136–145)
SP GR UR STRIP: 1.01 (ref 1–1.03)
SQUAMOUS #/AREA URNS HPF: ABNORMAL /HPF
UROBILINOGEN UR QL STRIP: ABNORMAL
WBC # UR STRIP: ABNORMAL /HPF
WBC NRBC COR # BLD AUTO: 7.67 10*3/MM3 (ref 3.4–10.8)

## 2024-07-30 PROCEDURE — 87086 URINE CULTURE/COLONY COUNT: CPT | Performed by: FAMILY MEDICINE

## 2024-07-30 PROCEDURE — 25010000002 LORAZEPAM PER 2 MG: Performed by: INTERNAL MEDICINE

## 2024-07-30 PROCEDURE — 93005 ELECTROCARDIOGRAM TRACING: CPT | Performed by: INTERNAL MEDICINE

## 2024-07-30 PROCEDURE — 82803 BLOOD GASES ANY COMBINATION: CPT | Performed by: INTERNAL MEDICINE

## 2024-07-30 PROCEDURE — 80053 COMPREHEN METABOLIC PANEL: CPT | Performed by: INTERNAL MEDICINE

## 2024-07-30 PROCEDURE — 25010000002 DIGOXIN PER 500 MCG

## 2024-07-30 PROCEDURE — 85025 COMPLETE CBC W/AUTO DIFF WBC: CPT | Performed by: INTERNAL MEDICINE

## 2024-07-30 PROCEDURE — 86140 C-REACTIVE PROTEIN: CPT | Performed by: INTERNAL MEDICINE

## 2024-07-30 PROCEDURE — 99222 1ST HOSP IP/OBS MODERATE 55: CPT | Performed by: NURSE PRACTITIONER

## 2024-07-30 PROCEDURE — 25810000003 SODIUM CHLORIDE 0.9 % SOLUTION: Performed by: FAMILY MEDICINE

## 2024-07-30 PROCEDURE — 80051 ELECTROLYTE PANEL: CPT

## 2024-07-30 PROCEDURE — 36600 WITHDRAWAL OF ARTERIAL BLOOD: CPT | Performed by: INTERNAL MEDICINE

## 2024-07-30 PROCEDURE — 81001 URINALYSIS AUTO W/SCOPE: CPT | Performed by: FAMILY MEDICINE

## 2024-07-30 PROCEDURE — 87088 URINE BACTERIA CULTURE: CPT | Performed by: FAMILY MEDICINE

## 2024-07-30 PROCEDURE — 99222 1ST HOSP IP/OBS MODERATE 55: CPT | Performed by: INTERNAL MEDICINE

## 2024-07-30 PROCEDURE — 25010000002 MAGNESIUM SULFATE 2 GM/50ML SOLUTION: Performed by: INTERNAL MEDICINE

## 2024-07-30 PROCEDURE — 82565 ASSAY OF CREATININE: CPT

## 2024-07-30 PROCEDURE — 93010 ELECTROCARDIOGRAM REPORT: CPT | Performed by: INTERNAL MEDICINE

## 2024-07-30 PROCEDURE — 83735 ASSAY OF MAGNESIUM: CPT | Performed by: INTERNAL MEDICINE

## 2024-07-30 PROCEDURE — 25810000003 SODIUM CHLORIDE 0.9 % SOLUTION

## 2024-07-30 PROCEDURE — 25010000002 LORAZEPAM PER 2 MG

## 2024-07-30 PROCEDURE — 87186 SC STD MICRODIL/AGAR DIL: CPT | Performed by: FAMILY MEDICINE

## 2024-07-30 PROCEDURE — 83605 ASSAY OF LACTIC ACID: CPT

## 2024-07-30 PROCEDURE — 25010000002 CEFTRIAXONE PER 250 MG: Performed by: INTERNAL MEDICINE

## 2024-07-30 PROCEDURE — 82948 REAGENT STRIP/BLOOD GLUCOSE: CPT

## 2024-07-30 PROCEDURE — 85018 HEMOGLOBIN: CPT

## 2024-07-30 PROCEDURE — 82330 ASSAY OF CALCIUM: CPT

## 2024-07-30 RX ORDER — LORAZEPAM 2 MG/ML
1 INJECTION INTRAMUSCULAR ONCE
Status: COMPLETED | OUTPATIENT
Start: 2024-07-30 | End: 2024-07-30

## 2024-07-30 RX ORDER — MAGNESIUM SULFATE HEPTAHYDRATE 40 MG/ML
2 INJECTION, SOLUTION INTRAVENOUS
Status: COMPLETED | OUTPATIENT
Start: 2024-07-30 | End: 2024-07-31

## 2024-07-30 RX ORDER — POTASSIUM CHLORIDE 20 MEQ/1
40 TABLET, EXTENDED RELEASE ORAL EVERY 4 HOURS
Status: ACTIVE | OUTPATIENT
Start: 2024-07-30 | End: 2024-07-30

## 2024-07-30 RX ORDER — DIGOXIN 0.25 MG/ML
125 INJECTION INTRAMUSCULAR; INTRAVENOUS ONCE
Status: COMPLETED | OUTPATIENT
Start: 2024-07-30 | End: 2024-07-30

## 2024-07-30 RX ORDER — LORAZEPAM 2 MG/ML
0.5 INJECTION INTRAMUSCULAR EVERY 6 HOURS PRN
Status: DISCONTINUED | OUTPATIENT
Start: 2024-07-30 | End: 2024-08-03 | Stop reason: HOSPADM

## 2024-07-30 RX ADMIN — PANTOPRAZOLE SODIUM 40 MG: 40 TABLET, DELAYED RELEASE ORAL at 08:41

## 2024-07-30 RX ADMIN — FERROUS SULFATE TAB EC 324 MG (65 MG FE EQUIVALENT) 324 MG: 324 (65 FE) TABLET DELAYED RESPONSE at 08:41

## 2024-07-30 RX ADMIN — LEVOTHYROXINE SODIUM 75 MCG: 0.07 TABLET ORAL at 08:41

## 2024-07-30 RX ADMIN — LORAZEPAM 0.5 MG: 2 INJECTION INTRAMUSCULAR; INTRAVENOUS at 16:05

## 2024-07-30 RX ADMIN — RISPERIDONE 0.25 MG: 0.5 TABLET, ORALLY DISINTEGRATING ORAL at 22:07

## 2024-07-30 RX ADMIN — DIGOXIN 125 MCG: 0.25 INJECTION INTRAMUSCULAR; INTRAVENOUS at 04:19

## 2024-07-30 RX ADMIN — HYDROCODONE BITARTRATE AND ACETAMINOPHEN 1 TABLET: 5; 325 TABLET ORAL at 22:08

## 2024-07-30 RX ADMIN — CEFTRIAXONE 1000 MG: 1 INJECTION, POWDER, FOR SOLUTION INTRAMUSCULAR; INTRAVENOUS at 16:53

## 2024-07-30 RX ADMIN — NYSTATIN 500000 UNITS: 100000 SUSPENSION ORAL at 11:30

## 2024-07-30 RX ADMIN — SODIUM CHLORIDE 500 ML: 9 INJECTION, SOLUTION INTRAVENOUS at 01:58

## 2024-07-30 RX ADMIN — Medication 10 ML: at 21:58

## 2024-07-30 RX ADMIN — NYSTATIN 500000 UNITS: 100000 SUSPENSION ORAL at 08:41

## 2024-07-30 RX ADMIN — APIXABAN 5 MG: 5 TABLET, FILM COATED ORAL at 08:41

## 2024-07-30 RX ADMIN — SODIUM CHLORIDE 50 ML/HR: 9 INJECTION, SOLUTION INTRAVENOUS at 02:53

## 2024-07-30 RX ADMIN — Medication 10 ML: at 08:41

## 2024-07-30 RX ADMIN — POTASSIUM CHLORIDE 40 MEQ: 1500 TABLET, EXTENDED RELEASE ORAL at 10:59

## 2024-07-30 RX ADMIN — METOPROLOL TARTRATE 5 MG: 1 INJECTION, SOLUTION INTRAVENOUS at 10:59

## 2024-07-30 RX ADMIN — HYDROCODONE BITARTRATE AND ACETAMINOPHEN 1 TABLET: 5; 325 TABLET ORAL at 02:30

## 2024-07-30 RX ADMIN — APIXABAN 5 MG: 5 TABLET, FILM COATED ORAL at 22:06

## 2024-07-30 RX ADMIN — LORAZEPAM 1 MG: 2 INJECTION INTRAMUSCULAR; INTRAVENOUS at 02:54

## 2024-07-30 RX ADMIN — MAGNESIUM SULFATE HEPTAHYDRATE 2 G: 40 INJECTION, SOLUTION INTRAVENOUS at 21:57

## 2024-07-30 RX ADMIN — SERTRALINE 50 MG: 50 TABLET, FILM COATED ORAL at 08:41

## 2024-07-30 RX ADMIN — MAGNESIUM SULFATE HEPTAHYDRATE 2 G: 40 INJECTION, SOLUTION INTRAVENOUS at 18:44

## 2024-07-30 NOTE — CONSULTS
Primary Care Provider: No ref. provider found     Consult requested by:  Dr. Casiano     Reason for Consultation: Neurological evaluation      History taken from: patient chart RN    Chief complaint: altered mental status        SUBJECTIVE:    History of present illness: Background per H&P: Laura Mejia is a 68 y.o. year old female who presented to the ER on 7/25/24 with reports of altered mental status over the last few days but was worse through the night prior to admission. It was reported that patient had not slept in 2 days and has been hallucinating, talking to people that are not there. She was seen in the ER last week after a fall with fractured vertebrae. She has been taking hydrocodone for pain but has only taken a couple doses. She is having significant pain with ambulation. She has had decreased oral intake the last few days. Patient was recommended rehab after her last admission but family refused as they reported they already had PT/OT set up at home and they were able to help take care of her. UDS positive for thc. Respiratory panel negative   In the ER CT with no acute findings. She was given IV fluids for hydration, and was oriented at this time.     Pt has had episodes of hypotension and afib with RVR. She had worsening confusion on 7/30 around 6am and rapid response was called:   0615 Pt mental status changed. Disoriented x4. Baseline disoriented to time and situation. BP:87/57 HR:143. EKG completed, showed A-FIB.   0622 Rapid respond called. ABG completed.  0636 BP:125/71, HR:131.   Rapid team CONSTANCE Roberto recommended inform provider again and suggest Cardio consult    - Portions of the above HPI were copied from previous encounters and edited as appropriate. PMH as detailed below.     Pt's  at  states pt had been confused for several days, but was improving yesterday. Today, she is confused and agitated again. She has been reaching for things in front of her that are not there. UA was  repeated this morning which shows bacteruria. Pt is non-focal with no meningismus or seizure activity.  Pt is aware of the hallucinations and states she sees cartoon characters at times.     Review of Systems   Unable to perform ROS: Mental status change          PATIENT HISTORY:  Past Medical History:   Diagnosis Date    AAA (abdominal aortic aneurysm)     infrarenal- 3.1cm(2010, 3.7x4.2cm(2016), 3.9cm (2017)    Allergic rhinitis     Aspiration pneumonia 05/2017    CHF (congestive heart failure)     Coronary artery disease     DDD (degenerative disc disease), lumbar     lumbar spine- Dr. Churchill     Depression     Diverticulosis     Frequent UTI     Dr. Daniels    GERD (gastroesophageal reflux disease)     Hiatal hernia     HSV (herpes simplex virus) infection     Oral    Hyperlipidemia     IBS (irritable bowel syndrome)     Constipation predominant    Ischemic cardiomyopathy 09/2017    AICD     Kidney stones     NSTEMI (non-ST elevated myocardial infarction) 04/26/2017    Obstructive sleep apnea 2011    nfsg 2011, ahi 67    Osteoarthritis     Osteopenia     Peripheral neuropathy     feet    Pulmonary embolism, bilateral 05/2017    Rotator cuff tear     Bilateral- Ortho    ,   Past Surgical History:   Procedure Laterality Date    CARDIAC CATHETERIZATION  04/26/2017    99% mid LAD. 90% mid LCX. 60% RCA. Recommended CABG. Dr. Diaz    CARDIAC DEFIBRILLATOR PLACEMENT  12/26/2017    ICD implant/ Dr. Ellis    CATARACT EXTRACTION      CORONARY ARTERY BYPASS GRAFT  04/26/2017    x4 & ASD Closure    CYSTOSCOPY  2002    negative. Dr. Daniels    LAPAROSCOPIC CHOLECYSTECTOMY  04/17/2013    For biliary dyskinesia. Dr. Mccoy.     REPLACEMENT TOTAL KNEE BILATERAL  11/2004    Dr. Herrera    THORACENTESIS Left 05/08/2017    TONSILLECTOMY      TUBAL ABDOMINAL LIGATION Bilateral    ,   Family History   Problem Relation Age of Onset    Heart disease Mother     Other Mother         Hypoglycemia    Osteoporosis Mother     COPD Father      Stroke Father     Hypertension Brother     Diabetes Brother     Heart disease Brother    ,   Social History     Tobacco Use    Smoking status: Never   Vaping Use    Vaping status: Some Days    Substances: CBD, nightly, 2-3 weekly   Substance Use Topics    Alcohol use: Never    Drug use: Never   ,   Prior to Admission medications    Medication Sig Start Date End Date Taking? Authorizing Provider   apixaban (ELIQUIS) 5 MG tablet tablet Take 1 tablet by mouth 2 (Two) Times a Day. Indications: Atrial Fibrillation 6/17/24  Yes Amor White MD   aspirin-acetaminophen-caffeine (Excedrin Migraine) 250-250-65 MG per tablet Take 1 tablet by mouth Every 6 (Six) Hours As Needed for Headache. Indications: Headache 1/1/24  Yes Amor White MD   atorvastatin (LIPITOR) 80 MG tablet Take 1 tablet by mouth Daily. Indications: High Amount of Fats in the Blood 1/1/24  Yes Amor White MD   digoxin (LANOXIN) 125 MCG tablet Take 1 tablet by mouth Daily. 6/3/24  Yes Zheng Angela MD   levothyroxine (SYNTHROID, LEVOTHROID) 75 MCG tablet Take 1 tablet by mouth Daily.   Yes ProviderAmor MD   linaclotide (LINZESS) 72 MCG capsule capsule Take 1 capsule by mouth Daily As Needed (Irritable bowel). Indications: Constipation caused by Irritable Bowel Syndrome 1/1/24  Yes Amor White MD   metoprolol succinate XL (TOPROL-XL) 25 MG 24 hr tablet Take 0.5 tablets by mouth Every 12 (Twelve) Hours. Indications: Atrial Fibrillation, High Blood Pressure Disorder 7/13/24  Yes Carolin Hatfield MD   ondansetron (ZOFRAN) 4 MG tablet Take 1 tablet by mouth Every 8 (Eight) Hours As Needed for Nausea or Vomiting.   Yes Amor White MD   oxybutynin XL (DITROPAN-XL) 10 MG 24 hr tablet Take 1 tablet by mouth Daily.   Yes Amor White MD   pantoprazole (PROTONIX) 40 MG EC tablet Take 1 tablet by mouth Every Morning. 6/4/24  Yes Zheng Angela MD   PARoxetine (PAXIL) 40 MG tablet Take 1 tablet by  mouth Every Morning. Indications: Major Depressive Disorder 1/1/24  Yes Provider, MD Amor   potassium chloride ER (K-TAB) 20 MEQ tablet controlled-release ER tablet Take 1 tablet by mouth Daily.   Yes Provider, MD Amor   HYDROcodone-acetaminophen (NORCO) 5-325 MG per tablet Take 1 tablet by mouth Every 6 (Six) Hours As Needed for Moderate Pain for up to 30 days. 7/13/24 8/12/24  Carolin Hatfield MD   ibuprofen (ADVIL,MOTRIN) 400 MG tablet Take 1 tablet by mouth Every 6 (Six) Hours As Needed for Mild Pain. 7/13/24   Carolin Hatfield MD    Allergies:  Oxytetracycline and Oxycodone    Current Facility-Administered Medications   Medication Dose Route Frequency Provider Last Rate Last Admin    albuterol (PROVENTIL) nebulizer solution 0.083% 2.5 mg/3mL  2.5 mg Nebulization Once Keke Casiano MD        apixaban (ELIQUIS) tablet 5 mg  5 mg Oral BID Carolin Hatfield MD   5 mg at 07/30/24 0841    sennosides-docusate (PERICOLACE) 8.6-50 MG per tablet 2 tablet  2 tablet Oral BID PRN Carolin Hatfield MD        And    polyethylene glycol (MIRALAX) packet 17 g  17 g Oral Daily PRN Carolin Hatfield MD        And    bisacodyl (DULCOLAX) EC tablet 5 mg  5 mg Oral Daily PRN Carolin Hatfield MD        And    bisacodyl (DULCOLAX) suppository 10 mg  10 mg Rectal Daily PRN Carolin Hatfield MD        Calcium Replacement - Follow Nurse / BPA Driven Protocol   Does not apply PRN Carolin Hatfield MD        digoxin (LANOXIN) tablet 125 mcg  125 mcg Oral Daily Carolin Hatfield MD   125 mcg at 07/29/24 1133    ferrous sulfate EC tablet 324 mg  324 mg Oral Daily With Breakfast Zheng Angela MD   324 mg at 07/30/24 0841    HYDROcodone-acetaminophen (NORCO) 5-325 MG per tablet 1 tablet  1 tablet Oral Q6H PRN Carolin Hatfield MD   1 tablet at 07/30/24 0230    levothyroxine (SYNTHROID, LEVOTHROID) tablet 75 mcg  75 mcg Oral Q AM Carolin Hatfield MD   75 mcg at 07/30/24 0841    Magnesium Standard Dose Replacement - Follow Nurse / BPA  Driven Protocol   Does not apply PRN Carolin Hatfield MD        metoprolol tartrate (LOPRESSOR) injection 5 mg  5 mg Intravenous Q4H PRN Iva Dailey APRN   5 mg at 07/28/24 2219    nystatin (MYCOSTATIN) 100,000 unit/mL suspension 500,000 Units  5 mL Swish & Swallow 4x Daily Zheng Angela MD   500,000 Units at 07/30/24 0841    ondansetron ODT (ZOFRAN-ODT) disintegrating tablet 4 mg  4 mg Oral Q6H PRN Carolin Hatfield MD   4 mg at 07/25/24 1923    Or    ondansetron (ZOFRAN) injection 4 mg  4 mg Intravenous Q6H PRN Carolin Hatfield MD   4 mg at 07/29/24 0734    pantoprazole (PROTONIX) EC tablet 40 mg  40 mg Oral Q AM Carolin Hatfield MD   40 mg at 07/30/24 0841    Phosphorus Replacement - Follow Nurse / BPA Driven Protocol   Does not apply PRN Carolin Hatfield MD        Potassium Replacement - Follow Nurse / BPA Driven Protocol   Does not apply PRN Keke Casiano MD        risperiDONE (risperDAL M-TABS) disintegrating tablet 0.25 mg  0.25 mg Oral Nightly Ashleigh Velasquez MD   0.25 mg at 07/29/24 2110    sertraline (ZOLOFT) tablet 50 mg  50 mg Oral Daily Zheng Angela MD   50 mg at 07/30/24 0841    sodium chloride 0.9 % flush 10 mL  10 mL Intravenous PRN Tavo Black DO        sodium chloride 0.9 % flush 10 mL  10 mL Intravenous Q12H Carolin Hatfeild MD   10 mL at 07/30/24 0841    sodium chloride 0.9 % flush 10 mL  10 mL Intravenous PRN Carolin Hatfield MD        sodium chloride 0.9 % infusion 40 mL  40 mL Intravenous PRN Carolin Hatfield MD        sodium chloride 0.9 % infusion  50 mL/hr Intravenous Continuous Zheng Angeal MD 50 mL/hr at 07/30/24 0253 50 mL/hr at 07/30/24 0253        ________________________________________________________        OBJECTIVE:  Upon today's exam, pt is awake and alert. She is irritated and does not want to participate in exam. She asked me to leave multiple times. Her  at  provided comfort and reassurance.      Neurologic Exam  PHYSICAL  "EXAM:    Constitutional: The patient is in no apparent distress, awake and alert, encephalopathic     Head: Normocephalic, atraumatic.     Chest: No respiratory distress.    Cardiac: Regular rate and rhythm.     Extremities:  No clubbing, cyanosis, or edema.     NEUROLOGICAL:    Cognition:   Awake and alert  Refuses to answer questions. Asks examiner to leave her alone.   States \"no\" and \"I don't want to\" when asked questions or to follow commands  Does not follow commands   She is noted to reach out as if to grab objects that are not there occasionally  Exam limited given mental status      Cranial nerves;  Right lip appeared droopy, but pt's mouth is very dry. When talking, no asymmetry is appreciated.   Pupils equal and reactive  Tracking   Exam limited given mental status    Sensory:  Unable to fully assess due to mental status    Motor: Antigravity in all extremities. Moving all extremities spontaneously with no apparent focal weakness. Exam limited given mental status    Cerebellar and gait not tested given patient's mental status    Physical exam performed by BARBARA Cassidy    ________________________________________________________   RESULTS REVIEW:    VITAL SIGNS:   Temp:  [97.3 °F (36.3 °C)-98.1 °F (36.7 °C)] 98.1 °F (36.7 °C)  Heart Rate:  [] 123  Resp:  [11-23] 19  BP: ()/(30-96) 116/57     LABS:      Lab 07/30/24  0636 07/27/24  0553 07/26/24  0549 07/25/24  1342   WBC  --  11.22* 11.94* 15.06*   HEMOGLOBIN  --  8.8* 9.0* 10.5*   HEMOGLOBIN, POC 9.8*  --   --   --    HEMATOCRIT  --  28.8* 30.3* 35.5   HEMATOCRIT POC 29*  --   --   --    PLATELETS  --  177 190 207   NEUTROS ABS  --  9.25* 9.81* 13.21*   IMMATURE GRANS (ABS)  --  0.05 0.06* 0.09*   LYMPHS ABS  --  0.82 0.77 0.54*   MONOS ABS  --  0.58 0.82 0.85   EOS ABS  --  0.41* 0.38 0.19   MCV  --  98.3* 99.7* 98.9*   LACTATE 1.1  --   --   --          Lab 07/30/24  0636 07/29/24  0548 07/27/24  1320 07/27/24  0553 07/26/24  0551 " 07/25/24  1342   SODIUM  --  138  --  138 136 136   POTASSIUM  --  3.7 3.7 3.2* 3.6 4.0   CHLORIDE  --  110*  --  110* 107 106   CO2  --  19.7*  --  19.9* 17.5* 18.6*   ANION GAP  --  8.3  --  8.1 11.5 11.4   BUN  --  3*  --  8 12 12   CREATININE 0.68 0.60  --  0.66 0.72 0.89   EGFR 94.4 97.3  --  95.1 90.6 70.3   GLUCOSE  --  86  --  84 95 162*   CALCIUM  --  8.4*  --  8.2* 8.1* 8.9   MAGNESIUM  --   --   --   --   --  1.7   TSH  --   --  1.330  --   --   --          Lab 07/25/24  1342   TOTAL PROTEIN 6.4   ALBUMIN 3.3*   GLOBULIN 3.1   ALT (SGPT) 6   AST (SGOT) 19   BILIRUBIN 0.8   ALK PHOS 179*         Lab 07/25/24  1342   HSTROP T 18*             Lab 07/28/24  0730 07/27/24  1320 07/26/24  0551   IRON  --   --  14*   IRON SATURATION (TSAT)  --   --  9*   TIBC  --   --  155*   TRANSFERRIN  --   --  104*   FOLATE 3.72*  --   --    VITAMIN B 12  --  878  --          Lab 07/30/24  0636   PH, ARTERIAL 7.416   PCO2, ARTERIAL 28.5*   PO2 ART 85.6   O2 SATURATION ART 96.8   FIO2 29   HCO3 ART 18.3*   BASE EXCESS ART -5.3*     UA          6/19/2024    11:10 7/11/2024    11:15 7/25/2024    14:11   Urinalysis   Squamous Epithelial Cells, UA  None Seen  0-2    Specific Gravity, UA 1.012  1.007  1.018    Ketones, UA Negative  Negative  Trace    Blood, UA Negative  Negative  Negative    Leukocytes, UA Negative  Trace  Trace    Nitrite, UA Negative  Negative  Negative    RBC, UA  0-2  3-5    WBC, UA  3-5  0-2    Bacteria, UA  None Seen  None Seen        Lab Results   Component Value Date    TSH 1.330 07/27/2024    LDL 88 05/31/2024    HGBA1C 6.20 (H) 05/31/2024    [id number] 07/27/2024       IMAGING STUDIES:  MRI Brain Without Contrast    Result Date: 7/29/2024  Impression: 1. No acute intracranial process. 2. Age-related atrophy and sequela of chronic microvascular ischemic disease. Electronically Signed: Brooklynn Hernandez MD  7/29/2024 11:36 AM EDT  Workstation ID: [id number]     I reviewed the patient's new clinical  results.    ________________________________________________________     PROBLEM LIST:    Altered mental status      ASSESSMENT/PLAN:  1. Acute delirium with hallucinations. Initially, pt reported not sleeping for several days prior to admission and started having hallucinations. Pt had improved, but had a setback today with worsening delirium and hallucinations. She continues to be confused and combative. MRI brain is negative. UA now shows bacteruria. Pt has refused several ordered tests which limits workup.   Suspect delirium second to insomnia/sleep deprivation, medications, and now complicated by UTI. No meningismus or signs of seizure activity.   - MRI brain: No acute intracranial process. Age-related atrophy and sequela of chronic microvascular ischemic disease.   - PT/OT/ST as appropriate, fall precautions as appropriate, Neuro checks per protocol, DVT prophylaxis, Stroke education  - supportive care  - Continue treating underlying conditions   - Psychiatry following   - Further recommendations pending clinical course.   - Concerns for dementia or neurodegenerative disorders would need to be evaluated outpt after pt is treated for any acute infections or other contributing factors.     2. UTI  - Per primary    3. History of atrial fibrillation with RVR.  - Patient had postop atrial fibrillation after she had CABG in 2017.  - EKG: Atrial fibrillation, Probable anterior infarct, age indeterminate, Prolonged QT interval  - On Eliquis   - Cardiology following     Modification of stroke risk factors:   - Blood pressure should be less than 130/80 outpatient, HbA1c less than 6.5, LDL less than 70; b12>500 and smoking cessation if applicable. We would be grateful if the primary team / primary care physician would keep a close watch on the above targets.  - Stroke education  - Follow up with neurologist of choice      I discussed the patient's findings and my recommendations with patient, family, nursing staff, and  consulting provider    ALEXANDER Hyde  07/30/24  10:32 EDT

## 2024-07-30 NOTE — CODE DOCUMENTATION
Rapid response called related to increase confusion, AMS, and tachycardia. Per primary nurse, patient has been confused  and HR was elevated through the night. Dose of digoxin was given per ALEXANDER Olvera prior to rapid response. Pt refusing to wear bipap through the night. Afib with -140s upon arrival of RR team. Patient confused. BP WNL. ABG completed. pH 7.416. Primary nurse to place call to Dr. Casiano for further orders. Patient okay to remain on unit at this time.

## 2024-07-30 NOTE — Clinical Note
Transfer Summary  Pt transferred to [facility]  Reason for Transfer altered mental status  Report called to   Summary of Care treatment or services provided to the patient including disciplines seeing the patient: SN   Patient's progress towards goals ongoing  Communicable Disease If yes, type none

## 2024-07-30 NOTE — NURSING NOTE
"Pt refused to save urine specimen. Pt strongly refused O2 monitor. Pt O2 is 95% via Home CPAP. Education provided for safety. Pt verbalize understanding and said \"leave me alone.\"  "

## 2024-07-30 NOTE — PROGRESS NOTES
LOS: 2 days   Patient Care Team:  Luan Joseph APRN as PCP - General (Family Medicine)  Jozef Otero MD as Consulting Physician (Nephrology)    Subjective     Interval History: Tachycardic this am; refused bipap    Patient Complaints: Denies specific complaints; continues with confusion and hallucinations, picking at things in the air    History taken from: patient    Review of Systems   Constitutional:  Positive for activity change and appetite change. Negative for chills, diaphoresis, fatigue and fever.   HENT:  Negative for facial swelling.    Eyes:  Negative for visual disturbance.   Respiratory:  Negative for cough, shortness of breath, wheezing and stridor.    Cardiovascular:  Negative for chest pain, palpitations and leg swelling.   Gastrointestinal:  Negative for abdominal pain, constipation, diarrhea and nausea.   Endocrine: Negative for polyuria.   Genitourinary:  Negative for dysuria.   Musculoskeletal:  Negative for arthralgias, back pain and gait problem.   Skin:  Negative for rash and wound.   Neurological:  Positive for speech difficulty. Negative for dizziness, tremors, seizures, weakness, light-headedness, numbness and headaches.   Psychiatric/Behavioral:  Positive for behavioral problems, confusion and hallucinations. The patient is nervous/anxious.            Objective     Vital Signs  Temp:  [97.8 °F (36.6 °C)-98.1 °F (36.7 °C)] 97.9 °F (36.6 °C)  Heart Rate:  [] 95  Resp:  [13-23] 20  BP: ()/(30-96) 118/55    Physical Exam:     General Appearance:    Alert, oriented to person and place   Head:    Normocephalic, without obvious abnormality, atraumatic   Eyes:            Lids and lashes normal, conjunctivae and sclerae normal, no   icterus, no pallor, corneas clear, PERRLA   Ears:    Ears appear intact with no abnormalities noted   Throat:   OP is dry   Neck:   No adenopathy, supple, trachea midline, no thyromegaly, no   carotid bruit, no JVD   Lungs:     Clear to  auscultation,respirations regular, even and                  unlabored    Heart:    Irregularly irregular   Chest Wall:    No abnormalities observed   Abdomen:     Normal bowel sounds, no masses, no organomegaly, soft        Non-tender non-distended, no guarding,   Extremities:   Moves all extremities well, no edema, no cyanosis, no             Redness   Pulses:   Pulses palpable and equal bilaterally   Skin:   No bleeding, bruising or rash   Lymph nodes:   No palpable adenopathy   Neurologic:   Cranial nerves 2 - 12 grossly intact, sensation intact, DTR       present and equal bilaterally        Results Review:    Lab Results (last 24 hours)       Procedure Component Value Units Date/Time    Blood Culture - Blood, Arm, Right [[id number]]  (Normal) Collected: 07/25/24 1538    Specimen: Blood from Arm, Right Updated: 07/30/24 1546     Blood Culture No growth at 5 days    Narrative:      Less than seven (7) mL's of blood was collected.  Insufficient quantity may yield false negative results.    Blood Culture - Blood, Arm, Left [[id number]]  (Normal) Collected: 07/25/24 1538    Specimen: Blood from Arm, Left Updated: 07/30/24 1546     Blood Culture No growth at 5 days    Comprehensive Metabolic Panel [[id number]]  (Abnormal) Collected: 07/30/24 1104    Specimen: Blood from Arm, Left Updated: 07/30/24 1159     Glucose 92 mg/dL      BUN 2 mg/dL      Creatinine 0.61 mg/dL      Sodium 138 mmol/L      Potassium 3.0 mmol/L      Comment: Slight hemolysis detected by analyzer. Result may be falsely elevated.        Chloride 108 mmol/L      CO2 20.1 mmol/L      Calcium 8.5 mg/dL      Total Protein 5.1 g/dL      Albumin 2.7 g/dL      ALT (SGPT) 12 U/L      AST (SGOT) 37 U/L      Alkaline Phosphatase 163 U/L      Total Bilirubin 0.4 mg/dL      Globulin 2.4 gm/dL      A/G Ratio 1.1 g/dL      BUN/Creatinine Ratio 3.3     Anion Gap 9.9 mmol/L      eGFR 96.9 mL/min/1.73     Narrative:      GFR Normal >60  Chronic Kidney Disease  <60  Kidney Failure <15      C-reactive Protein [[id number]]  (Abnormal) Collected: 07/30/24 1104    Specimen: Blood from Arm, Left Updated: 07/30/24 1154     C-Reactive Protein 3.24 mg/dL     Urinalysis With Culture If Indicated - Straight Cath [[id number]]  (Abnormal) Collected: 07/30/24 1059    Specimen: Urine from Straight Cath Updated: 07/30/24 1131     Color, UA Yellow     Appearance, UA Cloudy     pH, UA 5.5     Specific Gravity, UA 1.007     Glucose, UA Negative     Ketones, UA Negative     Bilirubin, UA Negative     Blood, UA Small (1+)     Protein, UA Negative     Leuk Esterase, UA Large (3+)     Nitrite, UA Negative     Urobilinogen, UA 1.0 E.U./dL    Narrative:      In absence of clinical symptoms, the presence of pyuria, bacteria, and/or nitrites on the urinalysis result does not correlate with infection.    Urinalysis, Microscopic Only - Straight Cath [[id number]]  (Abnormal) Collected: 07/30/24 1059    Specimen: Urine from Straight Cath Updated: 07/30/24 1131     RBC, UA 0-2 /HPF      WBC, UA Too Numerous to Count /HPF      Bacteria, UA 4+ /HPF      Squamous Epithelial Cells, UA 0-2 /HPF      Hyaline Casts, UA 0-2 /LPF      Methodology Automated Microscopy    Urine Culture - Urine, Straight Cath [[id number]] Collected: 07/30/24 1059    Specimen: Urine from Straight Cath Updated: 07/30/24 1131    CBC & Differential [[id number]]  (Abnormal) Collected: 07/30/24 1104    Specimen: Blood from Arm, Left Updated: 07/30/24 1128    Narrative:      The following orders were created for panel order CBC & Differential.  Procedure                               Abnormality         Status                     ---------                               -----------         ------                     CBC Auto Differential[[id number]]        Abnormal            Final result                 Please view results for these tests on the individual orders.    CBC Auto Differential [[id number]]  (Abnormal) Collected: 07/30/24 1104     Specimen: Blood from Arm, Left Updated: 07/30/24 1128     WBC 7.67 10*3/mm3      RBC 2.44 10*6/mm3      Hemoglobin 7.1 g/dL      Hematocrit 23.8 %      MCV 97.5 fL      MCH 29.1 pg      MCHC 29.8 g/dL      RDW 16.1 %      RDW-SD 58.1 fl      MPV 11.0 fL      Platelets 215 10*3/mm3      Neutrophil % 80.0 %      Lymphocyte % 9.3 %      Monocyte % 4.8 %      Eosinophil % 3.8 %      Basophil % 1.3 %      Immature Grans % 0.8 %      Neutrophils, Absolute 6.14 10*3/mm3      Lymphocytes, Absolute 0.71 10*3/mm3      Monocytes, Absolute 0.37 10*3/mm3      Eosinophils, Absolute 0.29 10*3/mm3      Basophils, Absolute 0.10 10*3/mm3      Immature Grans, Absolute 0.06 10*3/mm3      nRBC 0.0 /100 WBC     Blood Gas, Arterial - [[id number]]  (Abnormal) Collected: 07/30/24 0636    Specimen: Arterial Blood Updated: 07/30/24 0638     Site Right Radial     Vernon's Test Positive     pH, Arterial 7.416 pH units      pCO2, Arterial 28.5 mm Hg      pO2, Arterial 85.6 mm Hg      HCO3, Arterial 18.3 mmol/L      Base Excess, Arterial -5.3 mmol/L      Comment: Serial Number: [id number]:  [id number]        O2 Saturation, Arterial 96.8 %      Barometric Pressure for Blood Gas --     Comment: N/A        Modality Cannula     FIO2 29 %      Hemodilution No     PO2/FIO2 295    POC Creatinine [[id number]]  (Normal) Collected: 07/30/24 0636    Specimen: Arterial Blood Updated: 07/30/24 0638     Creatinine 0.68 mg/dL      Comment: Serial Number: [id number]:  [id number]        eGFR 94.4 mL/min/1.73     POCT Electrolytes +HGB +HCT [[id number]]  (Abnormal) Collected: 07/30/24 0636    Specimen: Arterial Blood Updated: 07/30/24 0638     Sodium 140 mmol/L      POC Potassium 3.2 mmol/L      Ionized Calcium 1.23 mmol/L      Comment: Serial Number: [id number]:  [id number]        Glucose 120 mg/dL      Hematocrit 29 %      Hemoglobin 9.8 g/dL     POC Lactate [[id number]]  (Normal) Collected: 07/30/24 0636    Specimen: Arterial Blood Updated: 07/30/24 0638     Lactate  1.1 mmol/L      Comment: Serial Number: [id number]:  [id number]       POC Glucose Once [[id number]]  (Abnormal) Collected: 07/30/24 0636    Specimen: Arterial Blood Updated: 07/30/24 0638     Glucose 120 mg/dL      Comment: Serial Number: [id number]:  [id number]       POC Glucose Once [[id number]]  (Normal) Collected: 07/30/24 0625    Specimen: Blood Updated: 07/30/24 0626     Glucose 102 mg/dL      Comment: Serial Number: [id number]:  [id number]                Imaging Results (Last 24 Hours)       ** No results found for the last 24 hours. **                 I reviewed the patient's new clinical results.    Medication Review:   Scheduled Meds:albuterol, 2.5 mg, Nebulization, Once  apixaban, 5 mg, Oral, BID  ferrous sulfate, 324 mg, Oral, Daily With Breakfast  levothyroxine, 75 mcg, Oral, Q AM  nystatin, 5 mL, Swish & Swallow, 4x Daily  pantoprazole, 40 mg, Oral, Q AM  risperiDONE, 0.25 mg, Oral, Nightly  sertraline, 50 mg, Oral, Daily  sodium chloride, 10 mL, Intravenous, Q12H      Continuous Infusions:sodium chloride, 50 mL/hr, Last Rate: 50 mL/hr (07/30/24 0253)      PRN Meds:.  senna-docusate sodium **AND** polyethylene glycol **AND** bisacodyl **AND** bisacodyl    Calcium Replacement - Follow Nurse / BPA Driven Protocol    HYDROcodone-acetaminophen    LORazepam    Magnesium Standard Dose Replacement - Follow Nurse / BPA Driven Protocol    metoprolol tartrate    ondansetron ODT **OR** ondansetron    Phosphorus Replacement - Follow Nurse / BPA Driven Protocol    Potassium Replacement - Follow Nurse / BPA Driven Protocol    sodium chloride    sodium chloride    sodium chloride     Assessment & Plan       Altered mental status  - symptoms have not changed significantly since admission.  Holding atorvastatin and metoprolol have not improved cognition.  Metoprolol is being restarted.  Will hold digoxin as this may be contributing to confusion.  Initial urine collected on 7/25 was negative for infection, but  straight cath sample collected today is c/w UTI.  Unclear if she is symptomatic with bacteriuria as confusion was present when urine was clear.   states that her confusion has been present to some extent for 2 months. Will consult neurology to consider other causes of encephalopathy.  There are no clinical signs of meningitis.    Afib - adding back metoprolol for rate control; holding digoxin out of concern for adverse side effects.  Continue apixaban    Anemia - continue iron; no visible GI blood loss; monitor hg    Hypokalemia - replacing; checking magnesium    Hypothyroidism - TSH is normal; continue levothyroxine          Plan for disposition:TBMIREYA Casiano MD  07/30/24  15:58 EDT

## 2024-07-30 NOTE — NURSING NOTE
Pt more confused and agitated. Refusing meds, pulling off leads, gown, and wanting to pull IV out. Nurse tried to reorient patient and explain need for lines. Pt refusing and wanting to go home. Spouse called with room phone to talk to pt, didn't help, pt still wanting to leave and pull lines. MD made aware, Ativan ordered and given. Sitter at bedside.

## 2024-07-30 NOTE — CONSULTS
Referring Provider: Keke Casiano MD  Reason for Consultation:  Constipation  Possible atrial fibrillation    Patient Care Team:  Luan Joseph APRN as PCP - General (Family Medicine)  Jozef Otero MD as Consulting Physician (Nephrology)    Chief complaint  Altered mental status    Subjective .     History of present illness:  Laura Mejia is a 68 y.o. female who presents with history of altered mental status for last few days.  Patient was admitted to the hospital and today she was having changes in mental status.  Patient was thought to have atrial fibrillation although there is significant artifact on the EKG.  Cardiology consultation was requested.  At the time of my examination today she is asymptomatic.  Patient family is at bedside.  Patient is in sinus rhythm.      ROS      Today, the patient has been without any chest discomfort, shortness of breath, palpitations, dizziness or syncope.  Denies having any headache, abdominal pain, nausea, vomiting, diarrhea, constipation, loss of weight or loss of appetite.  Denies having any excessive bruising, hematuria or blood in the stool.    Review of all systems negative except as indicated      History  Past Medical History:   Diagnosis Date    AAA (abdominal aortic aneurysm)     infrarenal- 3.1cm(2010, 3.7x4.2cm(2016), 3.9cm (2017)    Allergic rhinitis     Aspiration pneumonia 05/2017    CHF (congestive heart failure)     Coronary artery disease     DDD (degenerative disc disease), lumbar     lumbar spine- Dr. Churchill     Depression     Diverticulosis     Frequent UTI     Dr. Daniels    GERD (gastroesophageal reflux disease)     Hiatal hernia     HSV (herpes simplex virus) infection     Oral    Hyperlipidemia     IBS (irritable bowel syndrome)     Constipation predominant    Ischemic cardiomyopathy 09/2017    AICD     Kidney stones     NSTEMI (non-ST elevated myocardial infarction) 04/26/2017    Obstructive sleep apnea 2011    nfsg 2011, ahi 67     Osteoarthritis     Osteopenia     Peripheral neuropathy     feet    Pulmonary embolism, bilateral 05/2017    Rotator cuff tear     Bilateral- Ortho        Past Surgical History:   Procedure Laterality Date    CARDIAC CATHETERIZATION  04/26/2017    99% mid LAD. 90% mid LCX. 60% RCA. Recommended CABG. Dr. Diaz    CARDIAC DEFIBRILLATOR PLACEMENT  12/26/2017    ICD implant/ Dr. Ellis    CATARACT EXTRACTION      CORONARY ARTERY BYPASS GRAFT  04/26/2017    x4 & ASD Closure    CYSTOSCOPY  2002    negative. Dr. Daniels    LAPAROSCOPIC CHOLECYSTECTOMY  04/17/2013    For biliary dyskinesia. Dr. Mccoy.     REPLACEMENT TOTAL KNEE BILATERAL  11/2004    Dr. Herrera    THORACENTESIS Left 05/08/2017    TONSILLECTOMY      TUBAL ABDOMINAL LIGATION Bilateral        Family History   Problem Relation Age of Onset    Heart disease Mother     Other Mother         Hypoglycemia    Osteoporosis Mother     COPD Father     Stroke Father     Hypertension Brother     Diabetes Brother     Heart disease Brother        Social History     Tobacco Use    Smoking status: Never   Vaping Use    Vaping status: Some Days    Substances: CBD, nightly, 2-3 weekly   Substance Use Topics    Alcohol use: Never    Drug use: Never        Medications Prior to Admission   Medication Sig Dispense Refill Last Dose    apixaban (ELIQUIS) 5 MG tablet tablet Take 1 tablet by mouth 2 (Two) Times a Day. Indications: Atrial Fibrillation   7/24/2024    aspirin-acetaminophen-caffeine (Excedrin Migraine) 250-250-65 MG per tablet Take 1 tablet by mouth Every 6 (Six) Hours As Needed for Headache. Indications: Headache   7/24/2024    atorvastatin (LIPITOR) 80 MG tablet Take 1 tablet by mouth Daily. Indications: High Amount of Fats in the Blood   7/24/2024    digoxin (LANOXIN) 125 MCG tablet Take 1 tablet by mouth Daily. 30 tablet 3 7/24/2024    levothyroxine (SYNTHROID, LEVOTHROID) 75 MCG tablet Take 1 tablet by mouth Daily.   7/24/2024    linaclotide (LINZESS) 72 MCG capsule  capsule Take 1 capsule by mouth Daily As Needed (Irritable bowel). Indications: Constipation caused by Irritable Bowel Syndrome   7/24/2024    metoprolol succinate XL (TOPROL-XL) 25 MG 24 hr tablet Take 0.5 tablets by mouth Every 12 (Twelve) Hours. Indications: Atrial Fibrillation, High Blood Pressure Disorder 30 tablet 3 7/24/2024    ondansetron (ZOFRAN) 4 MG tablet Take 1 tablet by mouth Every 8 (Eight) Hours As Needed for Nausea or Vomiting.   7/24/2024    oxybutynin XL (DITROPAN-XL) 10 MG 24 hr tablet Take 1 tablet by mouth Daily.   7/24/2024    pantoprazole (PROTONIX) 40 MG EC tablet Take 1 tablet by mouth Every Morning. 30 tablet 0 7/24/2024    PARoxetine (PAXIL) 40 MG tablet Take 1 tablet by mouth Every Morning. Indications: Major Depressive Disorder   7/24/2024    potassium chloride ER (K-TAB) 20 MEQ tablet controlled-release ER tablet Take 1 tablet by mouth Daily.   7/24/2024    HYDROcodone-acetaminophen (NORCO) 5-325 MG per tablet Take 1 tablet by mouth Every 6 (Six) Hours As Needed for Moderate Pain for up to 30 days. 30 tablet 0     ibuprofen (ADVIL,MOTRIN) 400 MG tablet Take 1 tablet by mouth Every 6 (Six) Hours As Needed for Mild Pain.            Oxytetracycline and Oxycodone    Scheduled Meds:albuterol, 2.5 mg, Nebulization, Once  apixaban, 5 mg, Oral, BID  ferrous sulfate, 324 mg, Oral, Daily With Breakfast  levothyroxine, 75 mcg, Oral, Q AM  nystatin, 5 mL, Swish & Swallow, 4x Daily  pantoprazole, 40 mg, Oral, Q AM  potassium chloride, 40 mEq, Oral, Q4H  risperiDONE, 0.25 mg, Oral, Nightly  sertraline, 50 mg, Oral, Daily  sodium chloride, 10 mL, Intravenous, Q12H      Continuous Infusions:sodium chloride, 50 mL/hr, Last Rate: 50 mL/hr (07/30/24 0253)      PRN Meds:.  senna-docusate sodium **AND** polyethylene glycol **AND** bisacodyl **AND** bisacodyl    Calcium Replacement - Follow Nurse / BPA Driven Protocol    HYDROcodone-acetaminophen    Magnesium Standard Dose Replacement - Follow Nurse / BPA  "Driven Protocol    metoprolol tartrate    ondansetron ODT **OR** ondansetron    Phosphorus Replacement - Follow Nurse / BPA Driven Protocol    Potassium Replacement - Follow Nurse / BPA Driven Protocol    sodium chloride    sodium chloride    sodium chloride    Objective     VITAL SIGNS  Vitals:    07/30/24 0630 07/30/24 0636 07/30/24 0700 07/30/24 0841   BP: 135/96 125/71 117/88 116/57   BP Location:    Right arm   Patient Position:    Lying   Pulse: (!) 134 (!) 131 (!) 127 (!) 123   Resp:    19   Temp:    98.1 °F (36.7 °C)   TempSrc:    Oral   SpO2: 100% 98% 100% 96%   Weight:       Height:           Flowsheet Rows      Flowsheet Row First Filed Value   Admission Height 172.7 cm (68\") Documented at 07/25/2024 1234   Admission Weight 72.3 kg (159 lb 6.3 oz) Documented at 07/25/2024 1234              Intake/Output Summary (Last 24 hours) at 7/30/2024 1057  Last data filed at 7/30/2024 1011  Gross per 24 hour   Intake 3264 ml   Output 500 ml   Net 2764 ml        TELEMETRY: Sinus rhythm    Physical Exam:  The patient is in no distress.  Vital signs as noted above.  Head and neck revealed no carotid bruits or jugular venous distention.  No thyromegaly or lymphadenopathy is present  Lungs clear.  No wheezing.  Breath sounds are normal bilaterally.  Heart normal first and second heart sounds. No murmur.  No precordial rub is present.  No gallop is present.  Abdomen soft and nontender.  No organomegaly is present.  Extremities with good peripheral pulses without any pedal edema.  Skin warm and dry.  Musculoskeletal system is grossly normal  CNS grossly normal    Reviewed and updated.    Results Review:   I reviewed the patient's new clinical results.  Lab Results (last 24 hours)       Procedure Component Value Units Date/Time    Blood Gas, Arterial - [[id number]]  (Abnormal) Collected: 07/30/24 0636    Specimen: Arterial Blood Updated: 07/30/24 0638     Site Right Radial     Vernon's Test Positive     pH, Arterial 7.416 pH " units      pCO2, Arterial 28.5 mm Hg      pO2, Arterial 85.6 mm Hg      HCO3, Arterial 18.3 mmol/L      Base Excess, Arterial -5.3 mmol/L      Comment: Serial Number: [id number]:  [id number]        O2 Saturation, Arterial 96.8 %      Barometric Pressure for Blood Gas --     Comment: N/A        Modality Cannula     FIO2 29 %      Hemodilution No     PO2/FIO2 295    POC Creatinine [[id number]]  (Normal) Collected: 07/30/24 0636    Specimen: Arterial Blood Updated: 07/30/24 0638     Creatinine 0.68 mg/dL      Comment: Serial Number: [id number]:  [id number]        eGFR 94.4 mL/min/1.73     POCT Electrolytes +HGB +HCT [[id number]]  (Abnormal) Collected: 07/30/24 0636    Specimen: Arterial Blood Updated: 07/30/24 0638     Sodium 140 mmol/L      POC Potassium 3.2 mmol/L      Ionized Calcium 1.23 mmol/L      Comment: Serial Number: [id number]:  [id number]        Glucose 120 mg/dL      Hematocrit 29 %      Hemoglobin 9.8 g/dL     POC Lactate [[id number]]  (Normal) Collected: 07/30/24 0636    Specimen: Arterial Blood Updated: 07/30/24 0638     Lactate 1.1 mmol/L      Comment: Serial Number: [id number]:  [id number]       POC Glucose Once [[id number]]  (Abnormal) Collected: 07/30/24 0636    Specimen: Arterial Blood Updated: 07/30/24 0638     Glucose 120 mg/dL      Comment: Serial Number: [id number]:  [id number]       POC Glucose Once [[id number]]  (Normal) Collected: 07/30/24 0625    Specimen: Blood Updated: 07/30/24 0626     Glucose 102 mg/dL      Comment: Serial Number: [id number]:  [id number]       Blood Culture - Blood, Arm, Right [[id number]]  (Normal) Collected: 07/25/24 1538    Specimen: Blood from Arm, Right Updated: 07/29/24 1545     Blood Culture No growth at 4 days    Narrative:      Less than seven (7) mL's of blood was collected.  Insufficient quantity may yield false negative results.    Blood Culture - Blood, Arm, Left [[id number]]  (Normal) Collected: 07/25/24 1538    Specimen: Blood from Arm, Left Updated: 07/29/24  1545     Blood Culture No growth at 4 days            Imaging Results (Last 24 Hours)       Procedure Component Value Units Date/Time    MRI Brain Without Contrast [[id number]] Collected: 07/29/24 1133     Updated: 07/29/24 1138    Narrative:      MRI BRAIN WO CONTRAST    Date of Exam: 7/29/2024 10:42 AM EDT    Indication: altered mental status, h/o CVA.  has had MRI in the past.     Comparison: CT head without contrast 7/25/2024, MRI brain 5/21/2024    Technique:  Routine multiplanar/multisequence sequence images of the brain were obtained without contrast administration.      Findings:  There is no diffusion restriction to suggest acute infarct.     There is no evidence of acute or chronic intracranial hemorrhage. No mass effect or midline shift. No abnormal extra-axial collections.     The basal ganglia, brainstem and cerebellum appear within normal limits. Midline structures are intact. There is generalized cerebral and cerebellar volume loss. Moderate subcortical, periventricular and deep white matter T2/FLAIR hyperintensities are in   keeping with chronic microvascular ischemic disease.    Calvarial and superficial soft tissue signal is within normal limits. Orbits appear unremarkable. The paranasal sinuses and the mastoid air cells appear well aerated.         Impression:      Impression:    1. No acute intracranial process.  2. Age-related atrophy and sequela of chronic microvascular ischemic disease.        Electronically Signed: Brooklynn Hernandez MD    7/29/2024 11:36 AM EDT    Workstation ID: [id number]        LAB RESULTS (LAST 7 DAYS)    CBC  Results from last 7 days   Lab Units 07/30/24  0636 07/27/24  0553 07/26/24  0549 07/25/24  1342   WBC 10*3/mm3  --  11.22* 11.94* 15.06*   RBC 10*6/mm3  --  2.93* 3.04* 3.59*   HEMOGLOBIN g/dL  --  8.8* 9.0* 10.5*   HEMOGLOBIN, POC g/dL 9.8*  --   --   --    HEMATOCRIT %  --  28.8* 30.3* 35.5   HEMATOCRIT POC % 29*  --   --   --    MCV fL  --  98.3* 99.7* 98.9*    PLATELETS 10*3/mm3  --  177 190 207       BMP  Results from last 7 days   Lab Units 07/30/24  0636 07/29/24  0548 07/27/24  1320 07/27/24  0553 07/26/24  0551 07/25/24  1342   SODIUM mmol/L  --  138  --  138 136 136   POTASSIUM mmol/L  --  3.7 3.7 3.2* 3.6 4.0   CHLORIDE mmol/L  --  110*  --  110* 107 106   CO2 mmol/L  --  19.7*  --  19.9* 17.5* 18.6*   BUN mg/dL  --  3*  --  8 12 12   CREATININE mg/dL 0.68 0.60  --  0.66 0.72 0.89   GLUCOSE mg/dL  --  86 -- 84 95 162*   MAGNESIUM mg/dL  --   --   --   --   --  1.7       CMP   Results from last 7 days   Lab Units 07/30/24  0636 07/29/24  0548 07/27/24  1320 07/27/24  0553 07/26/24  0551 07/25/24  1449 07/25/24  1342   SODIUM mmol/L  --  138  --  138 136  --  136   POTASSIUM mmol/L  --  3.7 3.7 3.2* 3.6  --  4.0   CHLORIDE mmol/L  --  110*  --  110* 107  --  106   CO2 mmol/L  --  19.7*  --  19.9* 17.5*  --  18.6*   BUN mg/dL  --  3*  --  8 12  --  12   CREATININE mg/dL 0.68 0.60  --  0.66 0.72  --  0.89   GLUCOSE mg/dL  --  86  --  84 95  --  162*   ALBUMIN g/dL  --   --   --   --   --   --  3.3*   BILIRUBIN mg/dL  --   --   --   --   --   --  0.8   ALK PHOS U/L  --   --   --   --   --   --  179*   AST (SGOT) U/L  --   --   --   --   --   --  19   ALT (SGPT) U/L  --   --   --   --   --   --  6   AMMONIA umol/L  --   --  15  --   --  14  --          BNP        TROPONIN  Results from last 7 days   Lab Units 07/25/24  1342   HSTROP T ng/L 18*       CoAg        Creatinine Clearance  Estimated Creatinine Clearance: 89.5 mL/min (by C-G formula based on SCr of 0.68 mg/dL).    ABG  Results from last 7 days   Lab Units 07/30/24  0636   PH, ARTERIAL pH units 7.416   PCO2, ARTERIAL mm Hg 28.5*   PO2 ART mm Hg 85.6   O2 SATURATION ART % 96.8   BASE EXCESS ART mmol/L -5.3*       Radiology  MRI Brain Without Contrast    Result Date: 7/29/2024  Impression: 1. No acute intracranial process. 2. Age-related atrophy and sequela of chronic microvascular ischemic disease.  Electronically Signed: Brooklynn Hernandez MD  7/29/2024 11:36 AM EDT  Workstation ID: [id number]       EKG                    I personally viewed and interpreted the patient's EKG/Telemetry data: Sinus rhythm with premature atrial contractions.  No definite atrial fibrillation is present although significant baseline artifact is present.    ECHOCARDIOGRAM:    Results for orders placed during the hospital encounter of 01/22/24    Adult Transthoracic Echo Complete W/ Cont if Necessary Per Protocol    Interpretation Summary    Left ventricular ejection fraction appears to be 56 - 60%.    Estimated right ventricular systolic pressure from tricuspid regurgitation is normal (<35 mmHg).    Indication  Dyspnea  Palpitations    Technically satisfactory study.  Mitral valve is structurally normal.  Tricuspid valve is structurally normal.  Mild tricuspid regurgitation is present.  Aortic valve is aortic valve with decreased opening motion.  Gradient across the aortic valve is 16/9 mmHg.  Valve area 1.37 cm².  Pulmonic valve could not be well visualized.  Left atrium is enlarged.  Right atrium is normal in size.  Left ventricle is enlarged with septal anterior wall and apical severe hypokinesis with ejection fraction of 40%.  Possible left ventricular apical thrombus.  Right ventricle is normal in size.  Atrial septum is intact.  Aorta is normal.  No pericardial effusion or intracardiac thrombus is seen.  ICD lead is present in the right ventricle.    Impression  Mild tricuspid regurgitation.  Mild to moderate aortic valve stenosis with gradient of 16/9 mmHg and valve area of 1.37 cm².  Left atrial enlargement.  Left ventricle is enlarged with septal anterior wall and apical severe hypokinesis with ejection fraction of 40%.  Possible left ventricular apical thrombus.  ICD lead is present in the right ventricle.  No evidence for pulmonary hypertension is present.      STRESS TEST  Results for orders placed during the hospital  encounter of 01/22/24    Stress Test With Myocardial Perfusion One Day    Interpretation Summary  Indications  Status post CABG  Atrial fibrillation    This study was performed under the direct supervision of Rohini NOLASCO.    Resting ECG  Sinus rhythm    The patient was injected with Lexiscan intravenously while constantly monitoring electrocardiogram and vital signs.  Patient did not have any chest discomfort ST abnormalities or ectopy with injection of Lexiscan.    Cardiolite was used as an imaging agent.    Cardiolite images showed significantly decreased radionuclide uptake in the proximal posterior segments with partial redistribution in the resting images.    Gated SPECT images revealed normal left ventricle size and diffuse hypocontractility with calculated ejection fraction of 61%.    Impression  ========  Lexiscan Cardiolite test revealed proximal posterior infarction and ischemia.  Gated SPECT images revealed normal left ventricular size and diffuse hypocontractility with calculated ejection fraction of 61%.        Cardiolite (Tc-99m sestamibi) stress test    HEART CATHETERIZATION  No results found for this or any previous visit.      OTHER:     Assessment & Plan     Principal Problem:    Altered mental status    ]]]]]]]]]]]]]]]]]  History  =========  - Altered mental status.    -History of mechanical fall (slip and fall)-T10 compression fracture.    - Past history of palpitations  Sinus tachycardia and premature atrial contractions.  Intermittent atrial fibrillation is present.  Patient is in sinus rhythm 6/3/2024  Sinus rhythm 7/11/2024.  Recent EKG showed 7/25/2024-sinus rhythm.  EKG 7/30/2024 revealed significant baseline artifact.  Likely sinus rhythm.    - Anticoagulation-on Eliquis..     - Status post CABG and ASD closure 2017     - Ischemic cardiomyopathy     -Moderate aortic valve stenosis     - Status post ICD (single-chamber)-2017-[employer].     -History of stroke.  Tiny acute to subacute  cortical left frontal stroke on MRI 5/17/2024.      - Hypothyroidism dyslipidemia obstructive sleep apnea diabetes     - Past history of pulmonary emboli     - CKD 3  20/1.7-1/22/2024  17/1.08-5/19/2024.     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 There is normal antegrade color Doppler flow including the common iliac arteries. There is thrombus within the aneurysm sac. Dimensions are discussed in detail above.     - Family history of coronary artery disease     - Non-smoker     - Allergic to oxytetracycline and oxycodone.     Stress Cardiolite test-1/24/2024  Lexiscan Cardiolite test revealed proximal posterior infarction and ischemia.  Gated SPECT images revealed normal left ventricular size and diffuse hypocontractility with calculated ejection fraction of 61%.     Echocardiogram 1/23/2024 revealed  Mild tricuspid regurgitation.  Mild to moderate aortic valve stenosis with gradient of 16/9 mmHg and valve area of 1.37 cm².  Left atrial enlargement.  Left ventricle is enlarged with septal anterior wall and apical severe hypokinesis with ejection fraction of 40%.  Possible left ventricular apical thrombus.  ICD lead is present in the right ventricle.  No evidence for pulmonary hypertension is present.  =============  Plan  =============  - Altered mental status.    - Past history of palpitations  Sinus tachycardia and premature atrial contractions.  Intermittent atrial fibrillation is present.  Patient is in sinus rhythm 6/3/2024  Sinus rhythm 7/11/2024.  Recent EKG showed 7/25/2024-sinus rhythm.  EKG 7/30/2024 revealed significant baseline artifact.  Likely sinus rhythm.     Status post CABG and ASD closure 2017.  Patient is not having any angina pectoris or congestive heart failure.      Ischemic cardiomyopathy      Mild to moderate  aortic valve stenosis     Echocardiogram 1/23/2024-as above     Stress Cardiolite test-1/24/2024-as above     Status post ICD ([employer]) 2017.  Single-chamber.     History of left ventricular possible apical thrombus.  Continue anticoagulation.  Consider MERLE in the future if needed.  Patient is already anticoagulated.     Anticoagulation  Patient is on Eliquis     History of renal dysfunction-improved.  6/0.93-7/12/2024.     History of stroke.  Tiny acute to subacute cortical left frontal stroke on MRI 5/17/2024.     Medications were reviewed and updated.  Continue Eliquis ferrous sulfate levothyroxine pantoprazole.     Further plan will depend on patient's progress.     Reviewed and updated-7/30/2024.  ]]]]]]]]]]]]]]]]]]]]]       Allie Hurley MD  07/30/24  10:57 EDT

## 2024-07-30 NOTE — NURSING NOTE
68-year-old female who presents to the hospital with altered mental status changes.  Patient is awake and alert.  Caregiver is at the bedside states the patient has issues with chronic skin tears as she has fragile skin.  Indeed she has multiple bruising.  She has a skin tear to the left lower extremity.  The base of which is mostly pink.  There skin is partially flapped back over it.  Currently there is a silicone border foam dressing in place.  I would recommend to continue the silicone foam border dressing and leave it in place for 7 days.  There is a small amount of serous exudate noted and the wound is overall stable.

## 2024-07-30 NOTE — HOME HEALTH
PATIENT IS CURRENT WITH [facility].  ADMITTED TO [facility] ON 7/28/24.  WE WILL CONTINUE TO FOLLOW AND RESUME CARE ONCE DISCHARGED HOME.

## 2024-07-30 NOTE — SIGNIFICANT NOTE
07/30/24 1136   OTHER   Discipline physical therapist   Rehab Time/Intention   Session Not Performed unable to treat, medical status change  (Rapid response this AM due to AMS and tachycardia. Resting  bpm this AM, not appropriate for PT at this time. Will f/u as time allows.)   Therapy Assessment/Plan (PT)   Criteria for Skilled Interventions Met (PT) yes;meets criteria   Recommendation   PT - Next Appointment 07/31/24

## 2024-07-30 NOTE — NURSING NOTE
01:40 Pt hr is up to 130/min, bp is 93/60. Pt is agitated and restless. Pt said I need to leave. Provider noted. NS 500ml and Atoivan 1mg iv given per MAR.   02:40 Pt's bp is 130/63, hr is 120. Provider noted. Provider state only given Ativan.

## 2024-07-31 ENCOUNTER — HOME CARE VISIT (OUTPATIENT)
Dept: HOME HEALTH SERVICES | Facility: HOME HEALTHCARE | Age: 69
End: 2024-07-31
Payer: MEDICARE

## 2024-07-31 LAB
ANION GAP SERPL CALCULATED.3IONS-SCNC: 11.1 MMOL/L (ref 5–15)
BUN SERPL-MCNC: 2 MG/DL (ref 8–23)
BUN/CREAT SERPL: 3.1 (ref 7–25)
CALCIUM SPEC-SCNC: 8.4 MG/DL (ref 8.6–10.5)
CHLORIDE SERPL-SCNC: 109 MMOL/L (ref 98–107)
CO2 SERPL-SCNC: 17.9 MMOL/L (ref 22–29)
CREAT SERPL-MCNC: 0.65 MG/DL (ref 0.57–1)
DIGOXIN SERPL-MCNC: 1.05 NG/ML (ref 0.6–1.2)
EGFRCR SERPLBLD CKD-EPI 2021: 95.4 ML/MIN/1.73
GLUCOSE SERPL-MCNC: 107 MG/DL (ref 65–99)
MAGNESIUM SERPL-MCNC: 3.2 MG/DL (ref 1.6–2.4)
POTASSIUM SERPL-SCNC: 3.7 MMOL/L (ref 3.5–5.2)
SODIUM SERPL-SCNC: 138 MMOL/L (ref 136–145)

## 2024-07-31 PROCEDURE — 25010000002 MAGNESIUM SULFATE 2 GM/50ML SOLUTION: Performed by: INTERNAL MEDICINE

## 2024-07-31 PROCEDURE — 97530 THERAPEUTIC ACTIVITIES: CPT

## 2024-07-31 PROCEDURE — 83735 ASSAY OF MAGNESIUM: CPT | Performed by: INTERNAL MEDICINE

## 2024-07-31 PROCEDURE — 80162 ASSAY OF DIGOXIN TOTAL: CPT | Performed by: INTERNAL MEDICINE

## 2024-07-31 PROCEDURE — 25010000002 CEFTRIAXONE PER 250 MG: Performed by: INTERNAL MEDICINE

## 2024-07-31 PROCEDURE — 99233 SBSQ HOSP IP/OBS HIGH 50: CPT | Performed by: INTERNAL MEDICINE

## 2024-07-31 PROCEDURE — 80048 BASIC METABOLIC PNL TOTAL CA: CPT | Performed by: INTERNAL MEDICINE

## 2024-07-31 PROCEDURE — 97112 NEUROMUSCULAR REEDUCATION: CPT

## 2024-07-31 PROCEDURE — 25810000003 SODIUM CHLORIDE 0.9 % SOLUTION: Performed by: FAMILY MEDICINE

## 2024-07-31 RX ORDER — POTASSIUM CHLORIDE 20 MEQ/1
20 TABLET, EXTENDED RELEASE ORAL DAILY
Status: DISCONTINUED | OUTPATIENT
Start: 2024-07-31 | End: 2024-08-03 | Stop reason: HOSPADM

## 2024-07-31 RX ORDER — FOLIC ACID 1 MG/1
1 TABLET ORAL DAILY
Status: DISCONTINUED | OUTPATIENT
Start: 2024-07-31 | End: 2024-08-03 | Stop reason: HOSPADM

## 2024-07-31 RX ORDER — MAGNESIUM SULFATE HEPTAHYDRATE 40 MG/ML
2 INJECTION, SOLUTION INTRAVENOUS ONCE
Status: DISCONTINUED | OUTPATIENT
Start: 2024-07-31 | End: 2024-08-03 | Stop reason: HOSPADM

## 2024-07-31 RX ADMIN — Medication 12.5 MG: at 11:58

## 2024-07-31 RX ADMIN — HYDROCODONE BITARTRATE AND ACETAMINOPHEN 1 TABLET: 5; 325 TABLET ORAL at 18:33

## 2024-07-31 RX ADMIN — METOPROLOL TARTRATE 5 MG: 1 INJECTION, SOLUTION INTRAVENOUS at 08:44

## 2024-07-31 RX ADMIN — MAGNESIUM SULFATE HEPTAHYDRATE 2 G: 40 INJECTION, SOLUTION INTRAVENOUS at 00:04

## 2024-07-31 RX ADMIN — POTASSIUM CHLORIDE 20 MEQ: 1500 TABLET, EXTENDED RELEASE ORAL at 08:00

## 2024-07-31 RX ADMIN — NYSTATIN 500000 UNITS: 100000 SUSPENSION ORAL at 17:40

## 2024-07-31 RX ADMIN — CEFTRIAXONE 1000 MG: 1 INJECTION, POWDER, FOR SOLUTION INTRAMUSCULAR; INTRAVENOUS at 17:40

## 2024-07-31 RX ADMIN — APIXABAN 5 MG: 5 TABLET, FILM COATED ORAL at 08:00

## 2024-07-31 RX ADMIN — FOLIC ACID 1 MG: 1 TABLET ORAL at 11:59

## 2024-07-31 RX ADMIN — SERTRALINE 50 MG: 50 TABLET, FILM COATED ORAL at 08:00

## 2024-07-31 RX ADMIN — NYSTATIN 500000 UNITS: 100000 SUSPENSION ORAL at 11:59

## 2024-07-31 RX ADMIN — Medication 10 ML: at 21:20

## 2024-07-31 RX ADMIN — FERROUS SULFATE TAB EC 324 MG (65 MG FE EQUIVALENT) 324 MG: 324 (65 FE) TABLET DELAYED RESPONSE at 08:00

## 2024-07-31 RX ADMIN — NYSTATIN 500000 UNITS: 100000 SUSPENSION ORAL at 08:00

## 2024-07-31 RX ADMIN — SODIUM CHLORIDE 50 ML/HR: 9 INJECTION, SOLUTION INTRAVENOUS at 12:01

## 2024-07-31 RX ADMIN — Medication 10 ML: at 08:00

## 2024-07-31 NOTE — THERAPY TREATMENT NOTE
Subjective: Pt agreeable to therapeutic plan of care. Pleasantly confused. Decreased attn to task requires frequent redirection.     Objective:     Precautions - spinal precautions and TLSO. Dependent for donning TLSO. Brace donned immediately upon sitting. Appropriate fit and alignment confirmed. Brace remains on through session and left on due to pt staying up in the chair.     Bed mobility - Min-A rolling to left  with tactile cues for log roll. modA for supine to sit.   Transfers - Mod-A sit to stand and Bobbi for steps from bed to chair.   Ambulation - unable    Static sitting balance - poor with posterior lean, continuous cues and min/modA needed to maintain.   Static and dynamic standing balance with arm in arm assist and therapist anterior to patient. Completes with Bobbi for stability.       Vitals: Tachycardic  bpm with activity.     Pain: 8 VAS   Location: back  Intervention for pain: Repositioned, RN notified, Increased Activity, and Therapeutic Presence, brace for support.     Education: Provided education on the importance of mobility in the acute care setting, Verbal/Tactile Cues, Transfer Training, Gait Training, and Post-Op Precautions, brace use.     Assessment: Laura Mejia had a rapid response yesterday due to AMS, Afib/ tachycardia, and hypotension. She presents today more impaired than last session. Pt follows commands but needs repeated cues and redirection.  Requiring min/modA for bed mobility and transfer OOB to chair. Unable to ambulate due to weakness. Pt with ongoing endurance, balance, strength, cognition and functional mobility impairments which indicate the need for skilled intervention. PT emphasizes need for SNF rehab at time of DC and spouse is agreeable today. Tolerating session today without incident. Will continue to follow and progress as tolerated.     Plan/Recommendations:   If medically appropriate, Moderate Intensity Therapy recommended post-acute care. This is  "recommended as therapy feels the patient would require 3-4 days per week and wouldn't tolerate \"3 hour daily\" rehab intensity. SNF would be the preferred choice. If the patient does not agree to SNF, arrange HH or OP depending on home bound status. If patient is medically complex, consider LTACH. Pt requires no DME at discharge.     Pt desires Skilled Rehab placement at discharge. Pt cooperative; agreeable to therapeutic recommendations and plan of care.         Basic Mobility 6-click:  Rollin = Total, A lot = 2, A little = 3; 4 = None  Supine>Sit:   1 = Total, A lot = 2, A little = 3; 4 = None   Sit>Stand with arms:  1 = Total, A lot = 2, A little = 3; 4 = None  Bed>Chair:   1 = Total, A lot = 2, A little = 3; 4 = None  Ambulate in room:  1 = Total, A lot = 2, A little = 3; 4 = None  3-5 Steps with railin = Total, A lot = 2, A little = 3; 4 = None  Score: 14    Modified [place]: N/A = No pre-op stroke/TIA    Post-Tx Position: Up in Chair, Alarms activated, and Call light and personal items within reach, spouse present  PPE: gloves      "

## 2024-07-31 NOTE — THERAPY TREATMENT NOTE
"Subjective: Pt agreeable to therapeutic plan of care.  Cognition: oriented to Person    Objective:     Precautions - spinal precautions    Bed Mobility: Max-A   Functional Transfers: Mod-A     Balance: supported, static, and standing Min-A  Functional Ambulation: N/A or Not attempted.    Lower Body Dressing: Dependent  ADL Position: supported sitting and supported standing      Vitals: WNL    Pain: 4 VAS  Location: back  Interventions for pain: Repositioned  Education: Provided education on the importance of mobility in the acute care setting      Assessment: Laura Mejia presents with ADL impairments affecting function including balance, cognition, endurance / activity tolerance, pain, and strength. Demonstrated functioning below baseline abilities indicate the need for continued skilled intervention while inpatient. Tolerating session today without incident. Will continue to follow and progress as tolerated.     Plan/Recommendations:   Moderate Intensity Therapy recommended post-acute care. This is recommended as therapy feels the patient would require 3-4 days per week and wouldn't tolerate \"3 hour daily\" rehab intensity. SNF would be the preferred choice. If the patient does not agree to SNF, arrange HH or OP depending on home bound status. If patient is medically complex, consider LTACH.. Pt requires no DME at discharge.     Pt desires Skilled Rehab placement at discharge. Pt cooperative; agreeable to therapeutic recommendations and plan of care.     Modified [place]: N/A = No pre-op stroke/TIA    Post-Tx Position: Supine with HOB Elevated  PPE: gloves    "

## 2024-07-31 NOTE — PROGRESS NOTES
LOS: 3 days   Patient Care Team:  Luan Joseph APRN as PCP - General (Family Medicine)  Jozef Otero MD as Consulting Physician (Nephrology)    Subjective     Interval History: Agitated last night but calmer and more oriented this morning    Patient Complaints: Dry mouth    History taken from: patient    Review of Systems   Constitutional:  Positive for activity change and appetite change. Negative for chills, diaphoresis, fatigue and fever.   HENT:  Negative for facial swelling.    Eyes:  Negative for visual disturbance.   Respiratory:  Negative for cough, shortness of breath, wheezing and stridor.    Cardiovascular:  Negative for chest pain, palpitations and leg swelling.   Gastrointestinal:  Negative for abdominal pain, constipation and diarrhea.   Genitourinary:  Negative for dysuria.   Musculoskeletal:  Positive for gait problem. Negative for arthralgias and back pain.   Skin:  Negative for rash and wound.   Neurological:  Positive for weakness. Negative for dizziness, tremors, seizures, speech difficulty, light-headedness and headaches.   Psychiatric/Behavioral:  Positive for agitation and confusion.            Objective     Vital Signs  Temp:  [97.3 °F (36.3 °C)-97.9 °F (36.6 °C)] 97.3 °F (36.3 °C)  Heart Rate:  [] 126  Resp:  [16-20] 16  BP: (103-118)/(45-65) 103/52    Physical Exam:     General Appearance:    Alert, cooperative, in no acute distress, oriented to person and place   Head:    Normocephalic, without obvious abnormality, atraumatic   Eyes:            Lids and lashes normal, conjunctivae and sclerae normal, no   icterus, no pallor, corneas clear, PERRLA   Ears:    Ears appear intact with no abnormalities noted   Throat:   No oral lesions, no thrush, oral mucosa moist   Neck:   No adenopathy, supple, trachea midline, no thyromegaly, no   carotid bruit, no JVD   Lungs:     Clear to auscultation,respirations regular, even and                  unlabored    Heart:  Irregularly  irregular   Chest Wall:    No abnormalities observed   Abdomen:     Normal bowel sounds, no masses, no organomegaly, soft        Non-tender non-distended, no guarding,   Extremities:   Moves all extremities well, no edema, no cyanosis, no             Redness   Pulses:   Pulses palpable and equal bilaterally   Skin:   No bleeding, bruising or rash   Lymph nodes:   No palpable adenopathy   Neurologic:   Cranial nerves 2 - 12 grossly intact, sensation intact, DTR       present and equal bilaterally        Results Review:    Lab Results (last 24 hours)       Procedure Component Value Units Date/Time    Digoxin Level [[id number]]  (Normal) Collected: 07/31/24 0623    Specimen: Blood from Arm, Right Updated: 07/31/24 1052     Digoxin 1.05 ng/mL     Urine Culture - Urine, Straight Cath [[id number]]  (Abnormal) Collected: 07/30/24 1059    Specimen: Urine from Straight Cath Updated: 07/31/24 1040     Urine Culture >100,000 CFU/mL Escherichia coli    Narrative:      Colonization of the urinary tract without infection is common. Treatment is discouraged unless the patient is symptomatic, pregnant, or undergoing an invasive urologic procedure.    Basic Metabolic Panel [[id number]]  (Abnormal) Collected: 07/31/24 0623    Specimen: Blood from Arm, Right Updated: 07/31/24 0748     Glucose 107 mg/dL      BUN 2 mg/dL      Creatinine 0.65 mg/dL      Sodium 138 mmol/L      Potassium 3.7 mmol/L      Comment: Specimen hemolyzed.  Result may be falsely elevated.        Chloride 109 mmol/L      CO2 17.9 mmol/L      Calcium 8.4 mg/dL      BUN/Creatinine Ratio 3.1     Anion Gap 11.1 mmol/L      eGFR 95.4 mL/min/1.73     Narrative:      GFR Normal >60  Chronic Kidney Disease <60  Kidney Failure <15      Magnesium [[id number]]  (Abnormal) Collected: 07/31/24 0623    Specimen: Blood from Arm, Right Updated: 07/31/24 0718     Magnesium 3.2 mg/dL     Magnesium [[id number]]  (Abnormal) Collected: 07/30/24 1104    Specimen: Blood from Arm, Left  Updated: 07/30/24 1646     Magnesium 1.4 mg/dL     Blood Culture - Blood, Arm, Right [[id number]]  (Normal) Collected: 07/25/24 1538    Specimen: Blood from Arm, Right Updated: 07/30/24 1546     Blood Culture No growth at 5 days    Narrative:      Less than seven (7) mL's of blood was collected.  Insufficient quantity may yield false negative results.    Blood Culture - Blood, Arm, Left [[id number]]  (Normal) Collected: 07/25/24 1538    Specimen: Blood from Arm, Left Updated: 07/30/24 1546     Blood Culture No growth at 5 days    Comprehensive Metabolic Panel [[id number]]  (Abnormal) Collected: 07/30/24 1104    Specimen: Blood from Arm, Left Updated: 07/30/24 1159     Glucose 92 mg/dL      BUN 2 mg/dL      Creatinine 0.61 mg/dL      Sodium 138 mmol/L      Potassium 3.0 mmol/L      Comment: Slight hemolysis detected by analyzer. Result may be falsely elevated.        Chloride 108 mmol/L      CO2 20.1 mmol/L      Calcium 8.5 mg/dL      Total Protein 5.1 g/dL      Albumin 2.7 g/dL      ALT (SGPT) 12 U/L      AST (SGOT) 37 U/L      Alkaline Phosphatase 163 U/L      Total Bilirubin 0.4 mg/dL      Globulin 2.4 gm/dL      A/G Ratio 1.1 g/dL      BUN/Creatinine Ratio 3.3     Anion Gap 9.9 mmol/L      eGFR 96.9 mL/min/1.73     Narrative:      GFR Normal >60  Chronic Kidney Disease <60  Kidney Failure <15      C-reactive Protein [[id number]]  (Abnormal) Collected: 07/30/24 1104    Specimen: Blood from Arm, Left Updated: 07/30/24 1154     C-Reactive Protein 3.24 mg/dL     Urinalysis With Culture If Indicated - Straight Cath [[id number]]  (Abnormal) Collected: 07/30/24 1059    Specimen: Urine from Straight Cath Updated: 07/30/24 1131     Color, UA Yellow     Appearance, UA Cloudy     pH, UA 5.5     Specific Gravity, UA 1.007     Glucose, UA Negative     Ketones, UA Negative     Bilirubin, UA Negative     Blood, UA Small (1+)     Protein, UA Negative     Leuk Esterase, UA Large (3+)     Nitrite, UA Negative     Urobilinogen, UA 1.0  E.U./dL    Narrative:      In absence of clinical symptoms, the presence of pyuria, bacteria, and/or nitrites on the urinalysis result does not correlate with infection.    Urinalysis, Microscopic Only - Straight Cath [[id number]]  (Abnormal) Collected: 07/30/24 1059    Specimen: Urine from Straight Cath Updated: 07/30/24 1131     RBC, UA 0-2 /HPF      WBC, UA Too Numerous to Count /HPF      Bacteria, UA 4+ /HPF      Squamous Epithelial Cells, UA 0-2 /HPF      Hyaline Casts, UA 0-2 /LPF      Methodology Automated Microscopy    CBC & Differential [[id number]]  (Abnormal) Collected: 07/30/24 1104    Specimen: Blood from Arm, Left Updated: 07/30/24 1128    Narrative:      The following orders were created for panel order CBC & Differential.  Procedure                               Abnormality         Status                     ---------                               -----------         ------                     CBC Auto Differential[[id number]]        Abnormal            Final result                 Please view results for these tests on the individual orders.    CBC Auto Differential [[id number]]  (Abnormal) Collected: 07/30/24 1104    Specimen: Blood from Arm, Left Updated: 07/30/24 1128     WBC 7.67 10*3/mm3      RBC 2.44 10*6/mm3      Hemoglobin 7.1 g/dL      Hematocrit 23.8 %      MCV 97.5 fL      MCH 29.1 pg      MCHC 29.8 g/dL      RDW 16.1 %      RDW-SD 58.1 fl      MPV 11.0 fL      Platelets 215 10*3/mm3      Neutrophil % 80.0 %      Lymphocyte % 9.3 %      Monocyte % 4.8 %      Eosinophil % 3.8 %      Basophil % 1.3 %      Immature Grans % 0.8 %      Neutrophils, Absolute 6.14 10*3/mm3      Lymphocytes, Absolute 0.71 10*3/mm3      Monocytes, Absolute 0.37 10*3/mm3      Eosinophils, Absolute 0.29 10*3/mm3      Basophils, Absolute 0.10 10*3/mm3      Immature Grans, Absolute 0.06 10*3/mm3      nRBC 0.0 /100 WBC              Imaging Results (Last 24 Hours)       ** No results found for the last 24 hours. **                  I reviewed the patient's new clinical results.    Medication Review:   Scheduled Meds:albuterol, 2.5 mg, Nebulization, Once  apixaban, 5 mg, Oral, BID  cefTRIAXone, 1,000 mg, Intravenous, Q24H  ferrous sulfate, 324 mg, Oral, Daily With Breakfast  levothyroxine, 75 mcg, Oral, Q AM  magnesium sulfate, 2 g, Intravenous, Once  metoprolol tartrate, 12.5 mg, Oral, Q12H  nystatin, 5 mL, Swish & Swallow, 4x Daily  pantoprazole, 40 mg, Oral, Q AM  potassium chloride, 20 mEq, Oral, Daily  risperiDONE, 0.25 mg, Oral, Nightly  sertraline, 50 mg, Oral, Daily  sodium chloride, 10 mL, Intravenous, Q12H      Continuous Infusions:sodium chloride, 50 mL/hr, Last Rate: 50 mL/hr (07/30/24 0253)      PRN Meds:.  senna-docusate sodium **AND** polyethylene glycol **AND** bisacodyl **AND** bisacodyl    Calcium Replacement - Follow Nurse / BPA Driven Protocol    HYDROcodone-acetaminophen    LORazepam    Magnesium Standard Dose Replacement - Follow Nurse / BPA Driven Protocol    metoprolol tartrate    ondansetron ODT **OR** ondansetron    Phosphorus Replacement - Follow Nurse / BPA Driven Protocol    Potassium Replacement - Follow Nurse / BPA Driven Protocol    sodium chloride    sodium chloride    sodium chloride     Assessment & Plan       Altered mental status  -Multifactorial; today is day 2 of IV antibiotics for UTI since admission.  Continue to hold digoxin as I think she may also have been having some adverse reactions to this medication that predate her admission.  Continue risperidone at night    Paroxysmal atrial fibs-metoprolol, anticoagulation  Mood disorder-sertraline  Anemia-no visible signs of GI blood loss; continue supplemental iron, adding folate acid replacement; transfuse for hemoglobin less than 7  Hypokalemia-resolved  Hypomagnesemia-resolved      Plan for disposition:SNF ( request)    Keke Casiano MD  07/31/24  11:20 EDT

## 2024-07-31 NOTE — PROGRESS NOTES
Referring Provider: Keke Casiano MD    Reason for follow-up:  Arrhythmia     Patient Care Team:  Luan Joseph APRN as PCP - General (Family Medicine)  Jozef Otero MD as Consulting Physician (Nephrology)    Subjective .      ROS  Feeling better.    Since I have last seen, the patient has been without any chest discomfort ,shortness of breath, palpitations, dizziness or syncope.  Denies having any headache ,abdominal pain ,nausea, vomiting , diarrhea constipation, loss of weight or loss of appetite.  Denies having any excessive bruising ,hematuria or blood in the stool.    Review of all systems negative except as indicated    History  Past Medical History:   Diagnosis Date    AAA (abdominal aortic aneurysm)     infrarenal- 3.1cm(2010, 3.7x4.2cm(2016), 3.9cm (2017)    Allergic rhinitis     Aspiration pneumonia 05/2017    CHF (congestive heart failure)     Coronary artery disease     DDD (degenerative disc disease), lumbar     lumbar spine- Dr. Churchill     Depression     Diverticulosis     Frequent UTI     Dr. Daniels    GERD (gastroesophageal reflux disease)     Hiatal hernia     HSV (herpes simplex virus) infection     Oral    Hyperlipidemia     IBS (irritable bowel syndrome)     Constipation predominant    Ischemic cardiomyopathy 09/2017    AICD     Kidney stones     NSTEMI (non-ST elevated myocardial infarction) 04/26/2017    Obstructive sleep apnea 2011    nfsg 2011, ahi 67    Osteoarthritis     Osteopenia     Peripheral neuropathy     feet    Pulmonary embolism, bilateral 05/2017    Rotator cuff tear     Bilateral- Ortho        Past Surgical History:   Procedure Laterality Date    CARDIAC CATHETERIZATION  04/26/2017    99% mid LAD. 90% mid LCX. 60% RCA. Recommended CABG. Dr. Diaz    CARDIAC DEFIBRILLATOR PLACEMENT  12/26/2017    ICD implant/ Dr. Ellis    CATARACT EXTRACTION      CORONARY ARTERY BYPASS GRAFT  04/26/2017    x4 & ASD Closure    CYSTOSCOPY  2002    negative. Dr. Daniels    LAPAROSCOPIC  CHOLECYSTECTOMY  04/17/2013    For biliary dyskinesia. Dr. Mccoy.     REPLACEMENT TOTAL KNEE BILATERAL  11/2004    Dr. Herrera    THORACENTESIS Left 05/08/2017    TONSILLECTOMY      TUBAL ABDOMINAL LIGATION Bilateral        Family History   Problem Relation Age of Onset    Heart disease Mother     Other Mother         Hypoglycemia    Osteoporosis Mother     COPD Father     Stroke Father     Hypertension Brother     Diabetes Brother     Heart disease Brother        Social History     Tobacco Use    Smoking status: Never   Vaping Use    Vaping status: Some Days    Substances: CBD, nightly, 2-3 weekly   Substance Use Topics    Alcohol use: Never    Drug use: Never        Medications Prior to Admission   Medication Sig Dispense Refill Last Dose    apixaban (ELIQUIS) 5 MG tablet tablet Take 1 tablet by mouth 2 (Two) Times a Day. Indications: Atrial Fibrillation   7/24/2024    aspirin-acetaminophen-caffeine (Excedrin Migraine) 250-250-65 MG per tablet Take 1 tablet by mouth Every 6 (Six) Hours As Needed for Headache. Indications: Headache   7/24/2024    atorvastatin (LIPITOR) 80 MG tablet Take 1 tablet by mouth Daily. Indications: High Amount of Fats in the Blood   7/24/2024    digoxin (LANOXIN) 125 MCG tablet Take 1 tablet by mouth Daily. 30 tablet 3 7/24/2024    levothyroxine (SYNTHROID, LEVOTHROID) 75 MCG tablet Take 1 tablet by mouth Daily.   7/24/2024    linaclotide (LINZESS) 72 MCG capsule capsule Take 1 capsule by mouth Daily As Needed (Irritable bowel). Indications: Constipation caused by Irritable Bowel Syndrome   7/24/2024    metoprolol succinate XL (TOPROL-XL) 25 MG 24 hr tablet Take 0.5 tablets by mouth Every 12 (Twelve) Hours. Indications: Atrial Fibrillation, High Blood Pressure Disorder 30 tablet 3 7/24/2024    ondansetron (ZOFRAN) 4 MG tablet Take 1 tablet by mouth Every 8 (Eight) Hours As Needed for Nausea or Vomiting.   7/24/2024    oxybutynin XL (DITROPAN-XL) 10 MG 24 hr tablet Take 1 tablet by mouth  Daily.   7/24/2024    pantoprazole (PROTONIX) 40 MG EC tablet Take 1 tablet by mouth Every Morning. 30 tablet 0 7/24/2024    PARoxetine (PAXIL) 40 MG tablet Take 1 tablet by mouth Every Morning. Indications: Major Depressive Disorder   7/24/2024    potassium chloride ER (K-TAB) 20 MEQ tablet controlled-release ER tablet Take 1 tablet by mouth Daily.   7/24/2024    HYDROcodone-acetaminophen (NORCO) 5-325 MG per tablet Take 1 tablet by mouth Every 6 (Six) Hours As Needed for Moderate Pain for up to 30 days. 30 tablet 0     ibuprofen (ADVIL,MOTRIN) 400 MG tablet Take 1 tablet by mouth Every 6 (Six) Hours As Needed for Mild Pain.          Allergies  Oxytetracycline and Oxycodone    Scheduled Meds:albuterol, 2.5 mg, Nebulization, Once  apixaban, 5 mg, Oral, BID  cefTRIAXone, 1,000 mg, Intravenous, Q24H  ferrous sulfate, 324 mg, Oral, Daily With Breakfast  levothyroxine, 75 mcg, Oral, Q AM  nystatin, 5 mL, Swish & Swallow, 4x Daily  pantoprazole, 40 mg, Oral, Q AM  risperiDONE, 0.25 mg, Oral, Nightly  sertraline, 50 mg, Oral, Daily  sodium chloride, 10 mL, Intravenous, Q12H      Continuous Infusions:sodium chloride, 50 mL/hr, Last Rate: 50 mL/hr (07/30/24 0253)      PRN Meds:.  senna-docusate sodium **AND** polyethylene glycol **AND** bisacodyl **AND** bisacodyl    Calcium Replacement - Follow Nurse / BPA Driven Protocol    HYDROcodone-acetaminophen    LORazepam    Magnesium Standard Dose Replacement - Follow Nurse / BPA Driven Protocol    metoprolol tartrate    ondansetron ODT **OR** ondansetron    Phosphorus Replacement - Follow Nurse / BPA Driven Protocol    Potassium Replacement - Follow Nurse / BPA Driven Protocol    sodium chloride    sodium chloride    sodium chloride    Objective     VITAL SIGNS  Vitals:    07/31/24 0200 07/31/24 0300 07/31/24 0400 07/31/24 0610   BP:    116/65   BP Location:    Right arm   Patient Position:    Lying   Pulse: 108 112 98 (!) 121   Resp:    16   Temp:    97.6 °F (36.4 °C)   TempSrc:  "   Oral   SpO2:    93%   Weight:       Height:           Flowsheet Rows      Flowsheet Row First Filed Value   Admission Height 172.7 cm (68\") Documented at 07/25/2024 1234   Admission Weight 72.3 kg (159 lb 6.3 oz) Documented at 07/25/2024 1234              Intake/Output Summary (Last 24 hours) at 7/31/2024 0644  Last data filed at 7/31/2024 0623  Gross per 24 hour   Intake 746 ml   Output 1300 ml   Net -554 ml        TELEMETRY: Sinus rhythm premature atrial contractions.    Physical Exam:  The patient is alert, oriented and in no distress.  Vital signs as noted above.  Head and neck revealed no carotid bruits or jugular venous distention.  No thyromegaly or lymphadenopathy is present  Lungs clear.  No wheezing.  Breath sounds are normal bilaterally.  Heart normal first and second heart sounds.  No murmur. No precordial rub is present.  No gallop is present.  Abdomen soft and nontender.  No organomegaly is present.  Extremities with good peripheral pulses without any pedal edema.  Skin warm and dry.  Musculoskeletal system is grossly normal  CNS grossly normal    Reviewed and updated.    Results Review:   I reviewed the patient's new clinical results.  Lab Results (last 24 hours)       Procedure Component Value Units Date/Time    Digoxin Level [[id number]] Collected: 07/31/24 0623    Specimen: Blood from Arm, Right Updated: 07/31/24 0631    Magnesium [[id number]] Collected: 07/31/24 0623    Specimen: Blood from Arm, Right Updated: 07/31/24 0631    Magnesium [[id number]]  (Abnormal) Collected: 07/30/24 1104    Specimen: Blood from Arm, Left Updated: 07/30/24 1646     Magnesium 1.4 mg/dL     Blood Culture - Blood, Arm, Right [[id number]]  (Normal) Collected: 07/25/24 1538    Specimen: Blood from Arm, Right Updated: 07/30/24 1546     Blood Culture No growth at 5 days    Narrative:      Less than seven (7) mL's of blood was collected.  Insufficient quantity may yield false negative results.    Blood Culture - Blood, Arm, " Left [[id number]]  (Normal) Collected: 07/25/24 1538    Specimen: Blood from Arm, Left Updated: 07/30/24 1546     Blood Culture No growth at 5 days    Comprehensive Metabolic Panel [[id number]]  (Abnormal) Collected: 07/30/24 1104    Specimen: Blood from Arm, Left Updated: 07/30/24 1159     Glucose 92 mg/dL      BUN 2 mg/dL      Creatinine 0.61 mg/dL      Sodium 138 mmol/L      Potassium 3.0 mmol/L      Comment: Slight hemolysis detected by analyzer. Result may be falsely elevated.        Chloride 108 mmol/L      CO2 20.1 mmol/L      Calcium 8.5 mg/dL      Total Protein 5.1 g/dL      Albumin 2.7 g/dL      ALT (SGPT) 12 U/L      AST (SGOT) 37 U/L      Alkaline Phosphatase 163 U/L      Total Bilirubin 0.4 mg/dL      Globulin 2.4 gm/dL      A/G Ratio 1.1 g/dL      BUN/Creatinine Ratio 3.3     Anion Gap 9.9 mmol/L      eGFR 96.9 mL/min/1.73     Narrative:      GFR Normal >60  Chronic Kidney Disease <60  Kidney Failure <15      C-reactive Protein [[id number]]  (Abnormal) Collected: 07/30/24 1104    Specimen: Blood from Arm, Left Updated: 07/30/24 1154     C-Reactive Protein 3.24 mg/dL     Urinalysis With Culture If Indicated - Straight Cath [[id number]]  (Abnormal) Collected: 07/30/24 1059    Specimen: Urine from Straight Cath Updated: 07/30/24 1131     Color, UA Yellow     Appearance, UA Cloudy     pH, UA 5.5     Specific Gravity, UA 1.007     Glucose, UA Negative     Ketones, UA Negative     Bilirubin, UA Negative     Blood, UA Small (1+)     Protein, UA Negative     Leuk Esterase, UA Large (3+)     Nitrite, UA Negative     Urobilinogen, UA 1.0 E.U./dL    Narrative:      In absence of clinical symptoms, the presence of pyuria, bacteria, and/or nitrites on the urinalysis result does not correlate with infection.    Urinalysis, Microscopic Only - Straight Cath [[id number]]  (Abnormal) Collected: 07/30/24 1059    Specimen: Urine from Straight Cath Updated: 07/30/24 1131     RBC, UA 0-2 /HPF      WBC, UA Too Numerous to Count  /HPF      Bacteria, UA 4+ /HPF      Squamous Epithelial Cells, UA 0-2 /HPF      Hyaline Casts, UA 0-2 /LPF      Methodology Automated Microscopy    Urine Culture - Urine, Straight Cath [[id number]] Collected: 07/30/24 1059    Specimen: Urine from Straight Cath Updated: 07/30/24 1131    CBC & Differential [[id number]]  (Abnormal) Collected: 07/30/24 1104    Specimen: Blood from Arm, Left Updated: 07/30/24 1128    Narrative:      The following orders were created for panel order CBC & Differential.  Procedure                               Abnormality         Status                     ---------                               -----------         ------                     CBC Auto Differential[[id number]]        Abnormal            Final result                 Please view results for these tests on the individual orders.    CBC Auto Differential [[id number]]  (Abnormal) Collected: 07/30/24 1104    Specimen: Blood from Arm, Left Updated: 07/30/24 1128     WBC 7.67 10*3/mm3      RBC 2.44 10*6/mm3      Hemoglobin 7.1 g/dL      Hematocrit 23.8 %      MCV 97.5 fL      MCH 29.1 pg      MCHC 29.8 g/dL      RDW 16.1 %      RDW-SD 58.1 fl      MPV 11.0 fL      Platelets 215 10*3/mm3      Neutrophil % 80.0 %      Lymphocyte % 9.3 %      Monocyte % 4.8 %      Eosinophil % 3.8 %      Basophil % 1.3 %      Immature Grans % 0.8 %      Neutrophils, Absolute 6.14 10*3/mm3      Lymphocytes, Absolute 0.71 10*3/mm3      Monocytes, Absolute 0.37 10*3/mm3      Eosinophils, Absolute 0.29 10*3/mm3      Basophils, Absolute 0.10 10*3/mm3      Immature Grans, Absolute 0.06 10*3/mm3      nRBC 0.0 /100 WBC             Imaging Results (Last 24 Hours)       ** No results found for the last 24 hours. **        LAB RESULTS (LAST 7 DAYS)    CBC  Results from last 7 days   Lab Units 07/30/24  1104 07/30/24  0636 07/27/24  0553 07/26/24  0549 07/25/24  1342   WBC 10*3/mm3 7.67  --  11.22* 11.94* 15.06*   RBC 10*6/mm3 2.44*  --  2.93* 3.04* 3.59*   HEMOGLOBIN  g/dL 7.1*  --  8.8* 9.0* 10.5*   HEMOGLOBIN, POC g/dL  --  9.8*  --   --   --    HEMATOCRIT % 23.8*  --  28.8* 30.3* 35.5   HEMATOCRIT POC %  --  29*  --   --   --    MCV fL 97.5*  --  98.3* 99.7* 98.9*   PLATELETS 10*3/mm3 215  --  177 190 207       BMP  Results from last 7 days   Lab Units 07/30/24  1104 07/30/24  0636 07/29/24  0548 07/27/24  1320 07/27/24  0553 07/26/24  0551 07/25/24  1342   SODIUM mmol/L 138  --  138  --  138 136 136   POTASSIUM mmol/L 3.0*  --  3.7 3.7 3.2* 3.6 4.0   CHLORIDE mmol/L 108*  --  110*  --  110* 107 106   CO2 mmol/L 20.1*  --  19.7*  --  19.9* 17.5* 18.6*   BUN mg/dL 2*  --  3*  --  8 12 12   CREATININE mg/dL 0.61 0.68 0.60  --  0.66 0.72 0.89   GLUCOSE mg/dL 92  --  86  --  84 95 162*   MAGNESIUM mg/dL 1.4*  --   --   --   --   --  1.7       CMP   Results from last 7 days   Lab Units 07/30/24  1104 07/30/24  0636 07/29/24  0548 07/27/24  1320 07/27/24  0553 07/26/24  0551 07/25/24  1449 07/25/24  1342   SODIUM mmol/L 138  --  138  --  138 136  --  136   POTASSIUM mmol/L 3.0*  --  3.7 3.7 3.2* 3.6  --  4.0   CHLORIDE mmol/L 108*  --  110*  --  110* 107  --  106   CO2 mmol/L 20.1*  --  19.7*  --  19.9* 17.5*  --  18.6*   BUN mg/dL 2*  --  3*  --  8 12  --  12   CREATININE mg/dL 0.61 0.68 0.60  --  0.66 0.72  --  0.89   GLUCOSE mg/dL 92  --  86  --  84 95  --  162*   ALBUMIN g/dL 2.7*  --   --   --   --   --   --  3.3*   BILIRUBIN mg/dL 0.4  --   --   --   --   --   --  0.8   ALK PHOS U/L 163*  --   --   --   --   --   --  179*   AST (SGOT) U/L 37*  --   --   --   --   --   --  19   ALT (SGPT) U/L 12  --   --   --   --   --   --  6   AMMONIA umol/L  --   --   --  15  --   --  14  --          BNP        TROPONIN  Results from last 7 days   Lab Units 07/25/24  1342   HSTROP T ng/L 18*       CoAg        Creatinine Clearance  Estimated Creatinine Clearance: 99.8 mL/min (by C-G formula based on SCr of 0.61 mg/dL).    ABG  Results from last 7 days   Lab Units 07/30/24  0636   PH, ARTERIAL  pH units 7.416   PCO2, ARTERIAL mm Hg 28.5*   PO2 ART mm Hg 85.6   O2 SATURATION ART % 96.8   BASE EXCESS ART mmol/L -5.3*       Radiology  MRI Brain Without Contrast    Result Date: 7/29/2024  Impression: 1. No acute intracranial process. 2. Age-related atrophy and sequela of chronic microvascular ischemic disease. Electronically Signed: Brooklynn Hernandez MD  7/29/2024 11:36 AM EDT  Workstation ID: [id number]         EKG                    I personally viewed and interpreted the patient's EKG/Telemetry data:    ECHOCARDIOGRAM:    Results for orders placed during the hospital encounter of 01/22/24    Adult Transthoracic Echo Complete W/ Cont if Necessary Per Protocol    Interpretation Summary    Left ventricular ejection fraction appears to be 56 - 60%.    Estimated right ventricular systolic pressure from tricuspid regurgitation is normal (<35 mmHg).    Indication  Dyspnea  Palpitations    Technically satisfactory study.  Mitral valve is structurally normal.  Tricuspid valve is structurally normal.  Mild tricuspid regurgitation is present.  Aortic valve is aortic valve with decreased opening motion.  Gradient across the aortic valve is 16/9 mmHg.  Valve area 1.37 cm².  Pulmonic valve could not be well visualized.  Left atrium is enlarged.  Right atrium is normal in size.  Left ventricle is enlarged with septal anterior wall and apical severe hypokinesis with ejection fraction of 40%.  Possible left ventricular apical thrombus.  Right ventricle is normal in size.  Atrial septum is intact.  Aorta is normal.  No pericardial effusion or intracardiac thrombus is seen.  ICD lead is present in the right ventricle.    Impression  Mild tricuspid regurgitation.  Mild to moderate aortic valve stenosis with gradient of 16/9 mmHg and valve area of 1.37 cm².  Left atrial enlargement.  Left ventricle is enlarged with septal anterior wall and apical severe hypokinesis with ejection fraction of 40%.  Possible left ventricular apical  thrombus.  ICD lead is present in the right ventricle.  No evidence for pulmonary hypertension is present.          STRESS TEST  Results for orders placed during the hospital encounter of 01/22/24    Stress Test With Myocardial Perfusion One Day    Interpretation Summary  Indications  Status post CABG  Atrial fibrillation    This study was performed under the direct supervision of Rohini NOLASCO.    Resting ECG  Sinus rhythm    The patient was injected with Lexiscan intravenously while constantly monitoring electrocardiogram and vital signs.  Patient did not have any chest discomfort ST abnormalities or ectopy with injection of Lexiscan.    Cardiolite was used as an imaging agent.    Cardiolite images showed significantly decreased radionuclide uptake in the proximal posterior segments with partial redistribution in the resting images.    Gated SPECT images revealed normal left ventricle size and diffuse hypocontractility with calculated ejection fraction of 61%.    Impression  ========  Lexiscan Cardiolite test revealed proximal posterior infarction and ischemia.  Gated SPECT images revealed normal left ventricular size and diffuse hypocontractility with calculated ejection fraction of 61%.        Cardiolite (Tc-99m sestamibi) stress test    CARDIAC CATHETERIZATION  No results found for this or any previous visit.                OTHER:         Assessment & Plan     Principal Problem:    Altered mental status      ]]]]]]]]]]]]]]]]]  History  =========  - Altered mental status.     -History of mechanical fall (slip and fall)-T10 compression fracture.     - Past history of palpitations  Sinus tachycardia and premature atrial contractions.  Intermittent atrial fibrillation is present.  Patient is in sinus rhythm 6/3/2024  Sinus rhythm 7/11/2024.  Recent EKG showed 7/25/2024-sinus rhythm.  EKG 7/30/2024 revealed significant baseline artifact.  Likely sinus rhythm.     - Anticoagulation-on Eliquis..     - Status post CABG and  ASD closure 2017     - Ischemic cardiomyopathy     -Moderate aortic valve stenosis     - Status post ICD (single-chamber)-2017-Upplication.     -History of stroke.  Tiny acute to subacute cortical left frontal stroke on MRI 5/17/2024.      - Hypothyroidism dyslipidemia obstructive sleep apnea diabetes     - Past history of pulmonary emboli     - CKD 3  20/1.7-1/22/2024  17/1.08-5/19/2024.     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 There is normal antegrade color Doppler flow including the common iliac arteries. There is thrombus within the aneurysm sac. Dimensions are discussed in detail above.     - Family history of coronary artery disease     - Non-smoker     - Allergic to oxytetracycline and oxycodone.     Stress Cardiolite test-1/24/2024  Lexiscan Cardiolite test revealed proximal posterior infarction and ischemia.  Gated SPECT images revealed normal left ventricular size and diffuse hypocontractility with calculated ejection fraction of 61%.     Echocardiogram 1/23/2024 revealed  Mild tricuspid regurgitation.  Mild to moderate aortic valve stenosis with gradient of 16/9 mmHg and valve area of 1.37 cm².  Left atrial enlargement.  Left ventricle is enlarged with septal anterior wall and apical severe hypokinesis with ejection fraction of 40%.  Possible left ventricular apical thrombus.  ICD lead is present in the right ventricle.  No evidence for pulmonary hypertension is present.  =============  Plan  =============  - Altered mental status.     - Past history of palpitations  Sinus tachycardia and premature atrial contractions.  Intermittent atrial fibrillation is present.  Patient is in sinus rhythm 6/3/2024  Sinus rhythm 7/11/2024.  Recent EKG showed 7/25/2024-sinus rhythm.  EKG 7/30/2024 revealed significant baseline  artifact.  Likely sinus rhythm.  Monitor showed sinus rhythm with premature atrial contractions.  Start low-dose metoprolol.     Status post CABG and ASD closure 2017.  Patient is not having any angina pectoris or congestive heart failure.      Ischemic cardiomyopathy      Mild to moderate aortic valve stenosis     Echocardiogram 1/23/2024-as above     Stress Cardiolite test-1/24/2024-as above     Status post ICD ([employer]) 2017.  Single-chamber.     History of left ventricular possible apical thrombus.  Continue anticoagulation.  Consider MERLE in the future if needed.  Patient is already anticoagulated.     Anticoagulation  Patient is on Eliquis    Hypomagnesemia-new problem  Mg 1.4  Intravenous IV magnesium supplements    Hypokalemia   K 3.0  Potassium supplements.    History of renal dysfunction-improved.  6/0.93-7/12/2024.     History of stroke.  Tiny acute to subacute cortical left frontal stroke on MRI 5/17/2024.     Medications were reviewed and updated.  Continue Eliquis ferrous sulfate levothyroxine pantoprazole.     Further plan will depend on patient's progress.     Reviewed and updated.  7/31/2024  ]]]]]]]]]]]]]]]]]]]]]        Allie Hurley MD  07/31/24  06:44 EDT

## 2024-08-01 LAB
ANION GAP SERPL CALCULATED.3IONS-SCNC: 12.2 MMOL/L (ref 5–15)
BACTERIA SPEC AEROBE CULT: ABNORMAL
BUN SERPL-MCNC: 2 MG/DL (ref 8–23)
BUN/CREAT SERPL: 3.2 (ref 7–25)
CALCIUM SPEC-SCNC: 8.6 MG/DL (ref 8.6–10.5)
CHLORIDE SERPL-SCNC: 109 MMOL/L (ref 98–107)
CO2 SERPL-SCNC: 19.8 MMOL/L (ref 22–29)
CREAT SERPL-MCNC: 0.63 MG/DL (ref 0.57–1)
EGFRCR SERPLBLD CKD-EPI 2021: 96.2 ML/MIN/1.73
GLUCOSE SERPL-MCNC: 79 MG/DL (ref 65–99)
MAGNESIUM SERPL-MCNC: 2.4 MG/DL (ref 1.6–2.4)
POTASSIUM SERPL-SCNC: 4.1 MMOL/L (ref 3.5–5.2)
QT INTERVAL: 373 MS
QTC INTERVAL: 589 MS
RETICS # AUTO: 0.04 10*6/MM3 (ref 0.02–0.13)
RETICS/RBC NFR AUTO: 1.38 % (ref 0.7–1.9)
SODIUM SERPL-SCNC: 141 MMOL/L (ref 136–145)

## 2024-08-01 PROCEDURE — 80048 BASIC METABOLIC PNL TOTAL CA: CPT | Performed by: INTERNAL MEDICINE

## 2024-08-01 PROCEDURE — 85045 AUTOMATED RETICULOCYTE COUNT: CPT | Performed by: FAMILY MEDICINE

## 2024-08-01 PROCEDURE — 94761 N-INVAS EAR/PLS OXIMETRY MLT: CPT

## 2024-08-01 PROCEDURE — 83735 ASSAY OF MAGNESIUM: CPT | Performed by: INTERNAL MEDICINE

## 2024-08-01 PROCEDURE — 94664 DEMO&/EVAL PT USE INHALER: CPT

## 2024-08-01 PROCEDURE — 25010000002 CEFTRIAXONE PER 250 MG: Performed by: INTERNAL MEDICINE

## 2024-08-01 PROCEDURE — 94799 UNLISTED PULMONARY SVC/PX: CPT

## 2024-08-01 PROCEDURE — 99232 SBSQ HOSP IP/OBS MODERATE 35: CPT | Performed by: INTERNAL MEDICINE

## 2024-08-01 PROCEDURE — 25010000002 ONDANSETRON PER 1 MG: Performed by: FAMILY MEDICINE

## 2024-08-01 RX ORDER — IPRATROPIUM BROMIDE AND ALBUTEROL SULFATE 2.5; .5 MG/3ML; MG/3ML
3 SOLUTION RESPIRATORY (INHALATION)
Status: DISCONTINUED | OUTPATIENT
Start: 2024-08-01 | End: 2024-08-03 | Stop reason: HOSPADM

## 2024-08-01 RX ADMIN — Medication 10 ML: at 08:08

## 2024-08-01 RX ADMIN — Medication 12.5 MG: at 20:47

## 2024-08-01 RX ADMIN — IPRATROPIUM BROMIDE AND ALBUTEROL SULFATE 3 ML: .5; 3 SOLUTION RESPIRATORY (INHALATION) at 15:13

## 2024-08-01 RX ADMIN — ONDANSETRON 4 MG: 2 INJECTION INTRAMUSCULAR; INTRAVENOUS at 17:22

## 2024-08-01 RX ADMIN — POTASSIUM CHLORIDE 20 MEQ: 1500 TABLET, EXTENDED RELEASE ORAL at 08:07

## 2024-08-01 RX ADMIN — APIXABAN 5 MG: 5 TABLET, FILM COATED ORAL at 08:08

## 2024-08-01 RX ADMIN — CEFTRIAXONE 1000 MG: 1 INJECTION, POWDER, FOR SOLUTION INTRAMUSCULAR; INTRAVENOUS at 16:28

## 2024-08-01 RX ADMIN — FERROUS SULFATE TAB EC 324 MG (65 MG FE EQUIVALENT) 324 MG: 324 (65 FE) TABLET DELAYED RESPONSE at 08:07

## 2024-08-01 RX ADMIN — FOLIC ACID 1 MG: 1 TABLET ORAL at 08:07

## 2024-08-01 RX ADMIN — Medication 12.5 MG: at 08:07

## 2024-08-01 RX ADMIN — APIXABAN 5 MG: 5 TABLET, FILM COATED ORAL at 20:47

## 2024-08-01 RX ADMIN — RISPERIDONE 0.25 MG: 0.5 TABLET, ORALLY DISINTEGRATING ORAL at 20:47

## 2024-08-01 RX ADMIN — HYDROCODONE BITARTRATE AND ACETAMINOPHEN 1 TABLET: 5; 325 TABLET ORAL at 20:47

## 2024-08-01 RX ADMIN — SERTRALINE 50 MG: 50 TABLET, FILM COATED ORAL at 08:07

## 2024-08-01 NOTE — PROGRESS NOTES
Referring Provider: Keke Casiano MD    Reason for follow-up:  Arrhythmia     Patient Care Team:  Luan Joseph APRN as PCP - General (Family Medicine)  Jozef Otero MD as Consulting Physician (Nephrology)    Subjective .      ROS  Feeling better.    Since I have last seen, the patient has been without any chest discomfort ,shortness of breath, palpitations, dizziness or syncope.  Denies having any headache ,abdominal pain ,nausea, vomiting , diarrhea constipation, loss of weight or loss of appetite.  Denies having any excessive bruising ,hematuria or blood in the stool.    Review of all systems negative except as indicated    History  Past Medical History:   Diagnosis Date    AAA (abdominal aortic aneurysm)     infrarenal- 3.1cm(2010, 3.7x4.2cm(2016), 3.9cm (2017)    Allergic rhinitis     Aspiration pneumonia 05/2017    CHF (congestive heart failure)     Coronary artery disease     DDD (degenerative disc disease), lumbar     lumbar spine- Dr. Churchill     Depression     Diverticulosis     Frequent UTI     Dr. Daniels    GERD (gastroesophageal reflux disease)     Hiatal hernia     HSV (herpes simplex virus) infection     Oral    Hyperlipidemia     IBS (irritable bowel syndrome)     Constipation predominant    Ischemic cardiomyopathy 09/2017    AICD     Kidney stones     NSTEMI (non-ST elevated myocardial infarction) 04/26/2017    Obstructive sleep apnea 2011    nfsg 2011, ahi 67    Osteoarthritis     Osteopenia     Peripheral neuropathy     feet    Pulmonary embolism, bilateral 05/2017    Rotator cuff tear     Bilateral- Ortho        Past Surgical History:   Procedure Laterality Date    CARDIAC CATHETERIZATION  04/26/2017    99% mid LAD. 90% mid LCX. 60% RCA. Recommended CABG. Dr. Diaz    CARDIAC DEFIBRILLATOR PLACEMENT  12/26/2017    ICD implant/ Dr. Ellis    CATARACT EXTRACTION      CORONARY ARTERY BYPASS GRAFT  04/26/2017    x4 & ASD Closure    CYSTOSCOPY  2002    negative. Dr. Daniels    LAPAROSCOPIC  CHOLECYSTECTOMY  04/17/2013    For biliary dyskinesia. Dr. Mccoy.     REPLACEMENT TOTAL KNEE BILATERAL  11/2004    Dr. Herrera    THORACENTESIS Left 05/08/2017    TONSILLECTOMY      TUBAL ABDOMINAL LIGATION Bilateral        Family History   Problem Relation Age of Onset    Heart disease Mother     Other Mother         Hypoglycemia    Osteoporosis Mother     COPD Father     Stroke Father     Hypertension Brother     Diabetes Brother     Heart disease Brother        Social History     Tobacco Use    Smoking status: Never   Vaping Use    Vaping status: Some Days    Substances: CBD, nightly, 2-3 weekly   Substance Use Topics    Alcohol use: Never    Drug use: Never        Medications Prior to Admission   Medication Sig Dispense Refill Last Dose    apixaban (ELIQUIS) 5 MG tablet tablet Take 1 tablet by mouth 2 (Two) Times a Day. Indications: Atrial Fibrillation   7/24/2024    aspirin-acetaminophen-caffeine (Excedrin Migraine) 250-250-65 MG per tablet Take 1 tablet by mouth Every 6 (Six) Hours As Needed for Headache. Indications: Headache   7/24/2024    atorvastatin (LIPITOR) 80 MG tablet Take 1 tablet by mouth Daily. Indications: High Amount of Fats in the Blood   7/24/2024    digoxin (LANOXIN) 125 MCG tablet Take 1 tablet by mouth Daily. 30 tablet 3 7/24/2024    levothyroxine (SYNTHROID, LEVOTHROID) 75 MCG tablet Take 1 tablet by mouth Daily.   7/24/2024    linaclotide (LINZESS) 72 MCG capsule capsule Take 1 capsule by mouth Daily As Needed (Irritable bowel). Indications: Constipation caused by Irritable Bowel Syndrome   7/24/2024    metoprolol succinate XL (TOPROL-XL) 25 MG 24 hr tablet Take 0.5 tablets by mouth Every 12 (Twelve) Hours. Indications: Atrial Fibrillation, High Blood Pressure Disorder 30 tablet 3 7/24/2024    ondansetron (ZOFRAN) 4 MG tablet Take 1 tablet by mouth Every 8 (Eight) Hours As Needed for Nausea or Vomiting.   7/24/2024    oxybutynin XL (DITROPAN-XL) 10 MG 24 hr tablet Take 1 tablet by mouth  Daily.   7/24/2024    pantoprazole (PROTONIX) 40 MG EC tablet Take 1 tablet by mouth Every Morning. 30 tablet 0 7/24/2024    PARoxetine (PAXIL) 40 MG tablet Take 1 tablet by mouth Every Morning. Indications: Major Depressive Disorder   7/24/2024    potassium chloride ER (K-TAB) 20 MEQ tablet controlled-release ER tablet Take 1 tablet by mouth Daily.   7/24/2024    HYDROcodone-acetaminophen (NORCO) 5-325 MG per tablet Take 1 tablet by mouth Every 6 (Six) Hours As Needed for Moderate Pain for up to 30 days. 30 tablet 0     ibuprofen (ADVIL,MOTRIN) 400 MG tablet Take 1 tablet by mouth Every 6 (Six) Hours As Needed for Mild Pain.          Allergies  Oxytetracycline and Oxycodone    Scheduled Meds:albuterol, 2.5 mg, Nebulization, Once  apixaban, 5 mg, Oral, BID  cefTRIAXone, 1,000 mg, Intravenous, Q24H  ferrous sulfate, 324 mg, Oral, Daily With Breakfast  folic acid, 1 mg, Oral, Daily  levothyroxine, 75 mcg, Oral, Q AM  magnesium sulfate, 2 g, Intravenous, Once  metoprolol tartrate, 12.5 mg, Oral, Q12H  nystatin, 5 mL, Swish & Swallow, 4x Daily  pantoprazole, 40 mg, Oral, Q AM  potassium chloride, 20 mEq, Oral, Daily  risperiDONE, 0.25 mg, Oral, Nightly  sertraline, 50 mg, Oral, Daily  sodium chloride, 10 mL, Intravenous, Q12H      Continuous Infusions:sodium chloride, 50 mL/hr, Last Rate: 50 mL/hr (07/31/24 1201)      PRN Meds:.  senna-docusate sodium **AND** polyethylene glycol **AND** bisacodyl **AND** bisacodyl    Calcium Replacement - Follow Nurse / BPA Driven Protocol    HYDROcodone-acetaminophen    LORazepam    Magnesium Standard Dose Replacement - Follow Nurse / BPA Driven Protocol    metoprolol tartrate    ondansetron ODT **OR** ondansetron    Phosphorus Replacement - Follow Nurse / BPA Driven Protocol    Potassium Replacement - Follow Nurse / BPA Driven Protocol    sodium chloride    sodium chloride    sodium chloride    Objective     VITAL SIGNS  Vitals:    07/31/24 0844 07/31/24 1149 07/31/24 1547 07/31/24  "2117   BP:  92/49 128/50 113/47   BP Location:  Right arm     Patient Position:  Lying Lying    Pulse: (!) 126 105 81 73   Resp:  16 18    Temp:  96.6 °F (35.9 °C) 97.7 °F (36.5 °C)    TempSrc:  Temporal Temporal    SpO2:   97%    Weight:       Height:           Flowsheet Rows      Flowsheet Row First Filed Value   Admission Height 172.7 cm (68\") Documented at 07/25/2024 1234   Admission Weight 72.3 kg (159 lb 6.3 oz) Documented at 07/25/2024 1234              Intake/Output Summary (Last 24 hours) at 8/1/2024 0643  Last data filed at 7/31/2024 1543  Gross per 24 hour   Intake 1000 ml   Output 500 ml   Net 500 ml        TELEMETRY: Sinus rhythm premature atrial contractions.    Physical Exam:  The patient is alert, oriented and in no distress.  Vital signs as noted above.  Head and neck revealed no carotid bruits or jugular venous distention.  No thyromegaly or lymphadenopathy is present  Lungs clear.  No wheezing.  Breath sounds are normal bilaterally.  Heart normal first and second heart sounds.  No murmur. No precordial rub is present.  No gallop is present.  Abdomen soft and nontender.  No organomegaly is present.  Extremities with good peripheral pulses without any pedal edema.  Skin warm and dry.  Musculoskeletal system is grossly normal  CNS grossly normal    Reviewed and updated    Results Review:   I reviewed the patient's new clinical results.  Lab Results (last 24 hours)       Procedure Component Value Units Date/Time    Magnesium [[id number]]  (Normal) Collected: 08/01/24 0438    Specimen: Blood Updated: 08/01/24 0528     Magnesium 2.4 mg/dL     Basic Metabolic Panel [[id number]]  (Abnormal) Collected: 08/01/24 0438    Specimen: Blood Updated: 08/01/24 0524     Glucose 79 mg/dL      BUN 2 mg/dL      Creatinine 0.63 mg/dL      Sodium 141 mmol/L      Potassium 4.1 mmol/L      Chloride 109 mmol/L      CO2 19.8 mmol/L      Calcium 8.6 mg/dL      BUN/Creatinine Ratio 3.2     Anion Gap 12.2 mmol/L      eGFR 96.2 " mL/min/1.73     Narrative:      GFR Normal >60  Chronic Kidney Disease <60  Kidney Failure <15      Digoxin Level [[id number]]  (Normal) Collected: 07/31/24 0623    Specimen: Blood from Arm, Right Updated: 07/31/24 1052     Digoxin 1.05 ng/mL     Urine Culture - Urine, Straight Cath [[id number]]  (Abnormal) Collected: 07/30/24 1059    Specimen: Urine from Straight Cath Updated: 07/31/24 1040     Urine Culture >100,000 CFU/mL Escherichia coli    Narrative:      Colonization of the urinary tract without infection is common. Treatment is discouraged unless the patient is symptomatic, pregnant, or undergoing an invasive urologic procedure.    Basic Metabolic Panel [[id number]]  (Abnormal) Collected: 07/31/24 0623    Specimen: Blood from Arm, Right Updated: 07/31/24 0748     Glucose 107 mg/dL      BUN 2 mg/dL      Creatinine 0.65 mg/dL      Sodium 138 mmol/L      Potassium 3.7 mmol/L      Comment: Specimen hemolyzed.  Result may be falsely elevated.        Chloride 109 mmol/L      CO2 17.9 mmol/L      Calcium 8.4 mg/dL      BUN/Creatinine Ratio 3.1     Anion Gap 11.1 mmol/L      eGFR 95.4 mL/min/1.73     Narrative:      GFR Normal >60  Chronic Kidney Disease <60  Kidney Failure <15      Magnesium [[id number]]  (Abnormal) Collected: 07/31/24 0623    Specimen: Blood from Arm, Right Updated: 07/31/24 0718     Magnesium 3.2 mg/dL             Imaging Results (Last 24 Hours)       ** No results found for the last 24 hours. **        LAB RESULTS (LAST 7 DAYS)    CBC  Results from last 7 days   Lab Units 07/30/24  1104 07/30/24  0636 07/27/24  0553 07/26/24  0549 07/25/24  1342   WBC 10*3/mm3 7.67  --  11.22* 11.94* 15.06*   RBC 10*6/mm3 2.44*  --  2.93* 3.04* 3.59*   HEMOGLOBIN g/dL 7.1*  --  8.8* 9.0* 10.5*   HEMOGLOBIN, POC g/dL  --  9.8*  --   --   --    HEMATOCRIT % 23.8*  --  28.8* 30.3* 35.5   HEMATOCRIT POC %  --  29*  --   --   --    MCV fL 97.5*  --  98.3* 99.7* 98.9*   PLATELETS 10*3/mm3 215  --  177 190 207        BMP  Results from last 7 days   Lab Units 08/01/24  0438 07/31/24  0623 07/30/24  1104 07/30/24  0636 07/29/24  0548 07/27/24  1320 07/27/24  0553 07/26/24  0551 07/25/24  1342   SODIUM mmol/L 141 138 138  --  138  --  138 136 136   POTASSIUM mmol/L 4.1 3.7 3.0*  --  3.7 3.7 3.2* 3.6 4.0   CHLORIDE mmol/L 109* 109* 108*  --  110*  --  110* 107 106   CO2 mmol/L 19.8* 17.9* 20.1*  --  19.7*  --  19.9* 17.5* 18.6*   BUN mg/dL 2* 2* 2*  --  3*  --  8 12 12   CREATININE mg/dL 0.63 0.65 0.61 0.68 0.60  --  0.66 0.72 0.89   GLUCOSE mg/dL 79 107* 92  --  86  --  84 95 162*   MAGNESIUM mg/dL 2.4 3.2* 1.4*  --   --   --   --   --  1.7       CMP   Results from last 7 days   Lab Units 08/01/24  0438 07/31/24  0623 07/30/24  1104 07/30/24  0636 07/29/24  0548 07/27/24  1320 07/27/24  0553 07/26/24  0551 07/25/24  1449 07/25/24  1342   SODIUM mmol/L 141 138 138  --  138  --  138 136  --  136   POTASSIUM mmol/L 4.1 3.7 3.0*  --  3.7 3.7 3.2* 3.6  --  4.0   CHLORIDE mmol/L 109* 109* 108*  --  110*  --  110* 107  --  106   CO2 mmol/L 19.8* 17.9* 20.1*  --  19.7*  --  19.9* 17.5*  --  18.6*   BUN mg/dL 2* 2* 2*  --  3*  --  8 12  --  12   CREATININE mg/dL 0.63 0.65 0.61 0.68 0.60  --  0.66 0.72  --  0.89   GLUCOSE mg/dL 79 107* 92  --  86  --  84 95  --  162*   ALBUMIN g/dL  --   --  2.7*  --   --   --   --   --   --  3.3*   BILIRUBIN mg/dL  --   --  0.4  --   --   --   --   --   --  0.8   ALK PHOS U/L  --   --  163*  --   --   --   --   --   --  179*   AST (SGOT) U/L  --   --  37*  --   --   --   --   --   --  19   ALT (SGPT) U/L  --   --  12  --   --   --   --   --   --  6   AMMONIA umol/L  --   --   --   --   --  15  --   --  14  --          BNP        TROPONIN  Results from last 7 days   Lab Units 07/25/24  1342   HSTROP T ng/L 18*       CoAg        Creatinine Clearance  Estimated Creatinine Clearance: 96.6 mL/min (by C-G formula based on SCr of 0.63 mg/dL).    ABG  Results from last 7 days   Lab Units 07/30/24  0636   PH,  ARTERIAL pH units 7.416   PCO2, ARTERIAL mm Hg 28.5*   PO2 ART mm Hg 85.6   O2 SATURATION ART % 96.8   BASE EXCESS ART mmol/L -5.3*       Radiology  No radiology results for the last day        EKG                    I personally viewed and interpreted the patient's EKG/Telemetry data:    ECHOCARDIOGRAM:    Results for orders placed during the hospital encounter of 01/22/24    Adult Transthoracic Echo Complete W/ Cont if Necessary Per Protocol    Interpretation Summary    Left ventricular ejection fraction appears to be 56 - 60%.    Estimated right ventricular systolic pressure from tricuspid regurgitation is normal (<35 mmHg).    Indication  Dyspnea  Palpitations    Technically satisfactory study.  Mitral valve is structurally normal.  Tricuspid valve is structurally normal.  Mild tricuspid regurgitation is present.  Aortic valve is aortic valve with decreased opening motion.  Gradient across the aortic valve is 16/9 mmHg.  Valve area 1.37 cm².  Pulmonic valve could not be well visualized.  Left atrium is enlarged.  Right atrium is normal in size.  Left ventricle is enlarged with septal anterior wall and apical severe hypokinesis with ejection fraction of 40%.  Possible left ventricular apical thrombus.  Right ventricle is normal in size.  Atrial septum is intact.  Aorta is normal.  No pericardial effusion or intracardiac thrombus is seen.  ICD lead is present in the right ventricle.    Impression  Mild tricuspid regurgitation.  Mild to moderate aortic valve stenosis with gradient of 16/9 mmHg and valve area of 1.37 cm².  Left atrial enlargement.  Left ventricle is enlarged with septal anterior wall and apical severe hypokinesis with ejection fraction of 40%.  Possible left ventricular apical thrombus.  ICD lead is present in the right ventricle.  No evidence for pulmonary hypertension is present.          STRESS TEST  Results for orders placed during the hospital encounter of 01/22/24    Stress Test With  Myocardial Perfusion One Day    Interpretation Summary  Indications  Status post CABG  Atrial fibrillation    This study was performed under the direct supervision of Rohini NOLASCO.    Resting ECG  Sinus rhythm    The patient was injected with Lexiscan intravenously while constantly monitoring electrocardiogram and vital signs.  Patient did not have any chest discomfort ST abnormalities or ectopy with injection of Lexiscan.    Cardiolite was used as an imaging agent.    Cardiolite images showed significantly decreased radionuclide uptake in the proximal posterior segments with partial redistribution in the resting images.    Gated SPECT images revealed normal left ventricle size and diffuse hypocontractility with calculated ejection fraction of 61%.    Impression  ========  Lexiscan Cardiolite test revealed proximal posterior infarction and ischemia.  Gated SPECT images revealed normal left ventricular size and diffuse hypocontractility with calculated ejection fraction of 61%.        Cardiolite (Tc-99m sestamibi) stress test    CARDIAC CATHETERIZATION  No results found for this or any previous visit.                OTHER:         Assessment & Plan     Principal Problem:    Altered mental status      ]]]]]]]]]]]]]]]]]  History  =========  - Altered mental status.     -History of mechanical fall (slip and fall)-T10 compression fracture.     - Past history of palpitations  Sinus tachycardia and premature atrial contractions.  Intermittent atrial fibrillation is present.  Patient is in sinus rhythm 6/3/2024  Sinus rhythm 7/11/2024.  Recent EKG showed 7/25/2024-sinus rhythm.  EKG 7/30/2024 revealed significant baseline artifact.  Likely sinus rhythm.     - Anticoagulation-on Eliquis..     - Status post CABG and ASD closure 2017     - Ischemic cardiomyopathy     -Moderate aortic valve stenosis     - Status post ICD (single-chamber)-2017-[employer].     -History of stroke.  Tiny acute to subacute cortical left frontal  stroke on MRI 5/17/2024.      - Hypothyroidism dyslipidemia obstructive sleep apnea diabetes     - Past history of pulmonary emboli     - CKD 3  20/1.7-1/22/2024  17/1.08-5/19/2024.     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 There is normal antegrade color Doppler flow including the common iliac arteries. There is thrombus within the aneurysm sac. Dimensions are discussed in detail above.     - Family history of coronary artery disease     - Non-smoker     - Allergic to oxytetracycline and oxycodone.     Stress Cardiolite test-1/24/2024  Lexiscan Cardiolite test revealed proximal posterior infarction and ischemia.  Gated SPECT images revealed normal left ventricular size and diffuse hypocontractility with calculated ejection fraction of 61%.     Echocardiogram 1/23/2024 revealed  Mild tricuspid regurgitation.  Mild to moderate aortic valve stenosis with gradient of 16/9 mmHg and valve area of 1.37 cm².  Left atrial enlargement.  Left ventricle is enlarged with septal anterior wall and apical severe hypokinesis with ejection fraction of 40%.  Possible left ventricular apical thrombus.  ICD lead is present in the right ventricle.  No evidence for pulmonary hypertension is present.  =============  Plan  =============  - Altered mental status.     - Past history of palpitations  Sinus tachycardia and premature atrial contractions.  Intermittent atrial fibrillation is present.  Patient is in sinus rhythm 6/3/2024  Sinus rhythm 7/11/2024.  Recent EKG showed 7/25/2024-sinus rhythm.  EKG 7/30/2024 revealed significant baseline artifact.  Likely sinus rhythm.  Monitor showed sinus rhythm with premature atrial contractions.  Start low-dose metoprolol.     Status post CABG and ASD closure 2017.  Patient is not having any angina pectoris or congestive  heart failure.      Ischemic cardiomyopathy      Mild to moderate aortic valve stenosis     Echocardiogram 1/23/2024-as above     Stress Cardiolite test-1/24/2024-as above     Status post ICD ([employer]) 2017.  Single-chamber.     History of left ventricular possible apical thrombus.  Continue anticoagulation.  Consider MERLE in the future if needed.  Patient is already anticoagulated.     Anticoagulation  Patient is on Eliquis    Hypomagnesemia-new problem  Mg 1.4  Intravenous IV magnesium supplements.  Improved at 2.4.    Hypokalemia   K 3.0  Potassium supplements.  4.1-8/1/2024    History of renal dysfunction-improved.  6/0.93-7/12/2024.     History of stroke.  Tiny acute to subacute cortical left frontal stroke on MRI 5/17/2024.     Medications were reviewed and updated.  Continue Eliquis ferrous sulfate levothyroxine pantoprazole.  Patient is on low-dose metoprolol.  (12.5 mg twice daily (     Further plan will depend on patient's progress.     Reviewed and updated 8/1/2024.  ]]]]]]]]]]]]]]]]]]]]]        Allie Hulrey MD  08/01/24  06:43 EDT

## 2024-08-01 NOTE — PROGRESS NOTES
LOS: 4 days   Patient Care Team:  Luan Joseph APRN as PCP - General (Family Medicine)  Jozef Otero MD as Consulting Physician (Nephrology)    Subjective:  Events noted    Objective:   Afebrile      Review of Systems:   Review of Systems   Unable to perform ROS: Mental status change   Constitutional:  Positive for activity change.           Vital Signs  Temp:  [96.6 °F (35.9 °C)-97.7 °F (36.5 °C)] 97.7 °F (36.5 °C)  Heart Rate:  [] 104  Resp:  [16-18] 17  BP: ()/(47-56) 118/56    Physical Exam:  Physical Exam  Vitals and nursing note reviewed.   Cardiovascular:      Rate and Rhythm: Rhythm irregular.      Heart sounds: Normal heart sounds.   Pulmonary:      Breath sounds: Normal breath sounds.          Radiology:  MRI Brain Without Contrast    Result Date: 7/29/2024  Impression: 1. No acute intracranial process. 2. Age-related atrophy and sequela of chronic microvascular ischemic disease. Electronically Signed: Brooklynn Hernandez MD  7/29/2024 11:36 AM EDT  Workstation ID: [id number]    CT Abdomen Pelvis Without Contrast    Result Date: 7/26/2024  Impression: 1. Bilateral nonobstructing renal stones. 2. Abdominal aortic stent graft in place. The aneurysm sac is stable measuring 4.0 cm in the AP dimension. 3. Borderline splenomegaly. 4. Small hiatal hernia. 5. Multiple thoracolumbar compression fractures. The T12 compression fracture is new when compared to the previous abdominal CT. It appears subacute. There is a fracture cleft through the superior endplate with healing callus. 6. Additional findings as noted above. The study is limited by noncontrasted technique. Electronically Signed: Zheng Doe MD  7/26/2024 1:52 PM EDT  Workstation ID: [id number]    CT Head Without Contrast    Result Date: 7/25/2024  Impression: 1. No intracranial hemorrhage. 2. Generalized cerebral atrophy with moderate white matter findings of chronic microvascular disease. Electronically Signed: Ronak Birmingham MD  7/25/2024  3:16 PM EDT  Workstation ID: [id number]    XR Chest 1 View    Result Date: 7/25/2024  Impression: No acute cardiopulmonary findings. Electronically Signed: Juan Alvarado MD  7/25/2024 1:55 PM EDT  Workstation ID: [id number]        Results Review:     I reviewed the patient's new clinical results.  I reviewed the patient's new imaging results and agree with the interpretation.    Medication Review:   Scheduled Meds:albuterol, 2.5 mg, Nebulization, Once  apixaban, 5 mg, Oral, BID  cefTRIAXone, 1,000 mg, Intravenous, Q24H  ferrous sulfate, 324 mg, Oral, Daily With Breakfast  folic acid, 1 mg, Oral, Daily  levothyroxine, 75 mcg, Oral, Q AM  magnesium sulfate, 2 g, Intravenous, Once  metoprolol tartrate, 12.5 mg, Oral, Q12H  pantoprazole, 40 mg, Oral, Q AM  potassium chloride, 20 mEq, Oral, Daily  risperiDONE, 0.25 mg, Oral, Nightly  sertraline, 50 mg, Oral, Daily  sodium chloride, 10 mL, Intravenous, Q12H      Continuous Infusions:sodium chloride, 50 mL/hr, Last Rate: 50 mL/hr (07/31/24 1201)      PRN Meds:.  senna-docusate sodium **AND** polyethylene glycol **AND** bisacodyl **AND** bisacodyl    Calcium Replacement - Follow Nurse / BPA Driven Protocol    HYDROcodone-acetaminophen    LORazepam    Magnesium Standard Dose Replacement - Follow Nurse / BPA Driven Protocol    metoprolol tartrate    ondansetron ODT **OR** ondansetron    Phosphorus Replacement - Follow Nurse / BPA Driven Protocol    Potassium Replacement - Follow Nurse / BPA Driven Protocol    sodium chloride    sodium chloride    sodium chloride    Labs:    CBC    Results from last 7 days   Lab Units 07/30/24  1104 07/30/24  0636 07/27/24  0553 07/26/24  0549 07/25/24  1342   WBC 10*3/mm3 7.67  --  11.22* 11.94* 15.06*   HEMOGLOBIN g/dL 7.1*  --  8.8* 9.0* 10.5*   HEMOGLOBIN, POC g/dL  --  9.8*  --   --   --    PLATELETS 10*3/mm3 215  --  177 190 207     BMP   Results from last 7 days   Lab Units 08/01/24  0438 07/31/24  0623 07/30/24  1104 07/30/24  0636  07/29/24  0548 07/27/24  1320 07/27/24  0553 07/26/24  0551 07/25/24  1342   SODIUM mmol/L 141 138 138  --  138  --  138 136 136   POTASSIUM mmol/L 4.1 3.7 3.0*  --  3.7 3.7 3.2* 3.6 4.0   CHLORIDE mmol/L 109* 109* 108*  --  110*  --  110* 107 106   CO2 mmol/L 19.8* 17.9* 20.1*  --  19.7*  --  19.9* 17.5* 18.6*   BUN mg/dL 2* 2* 2*  --  3*  --  8 12 12   CREATININE mg/dL 0.63 0.65 0.61 0.68 0.60  --  0.66 0.72 0.89   GLUCOSE mg/dL 79 107* 92  --  86  --  84 95 162*   MAGNESIUM mg/dL 2.4 3.2* 1.4*  --   --   --   --   --  1.7     Cr Clearance Estimated Creatinine Clearance: 96.6 mL/min (by C-G formula based on SCr of 0.63 mg/dL).  Coag     HbA1C   Lab Results   Component Value Date    HGBA1C 6.20 (H) 05/31/2024     Blood Glucose   Glucose   Date/Time Value Ref Range Status   07/30/2024 0636 120 (H) 74 - 100 mg/dL Final   07/30/2024 0636 120 (H) 74 - 100 mg/dL Final     Comment:     Serial Number: [id number]:  [id number]   07/30/2024 0625 102 70 - 105 mg/dL Final     Comment:     Serial Number: [id number]:  [id number]     Infection   Results from last 7 days   Lab Units 07/30/24  1059 07/25/24  1538 07/25/24  1411   BLOODCX   --  No growth at 5 days  No growth at 5 days  --    URINECX  >100,000 CFU/mL Escherichia coli*  --  No growth     CMP   Results from last 7 days   Lab Units 08/01/24  0438 07/31/24  0623 07/30/24  1104 07/30/24  0636 07/29/24  0548 07/27/24  1320 07/27/24  0553 07/26/24  0551 07/25/24  1449 07/25/24  1342   SODIUM mmol/L 141 138 138  --  138  --  138 136  --  136   POTASSIUM mmol/L 4.1 3.7 3.0*  --  3.7 3.7 3.2* 3.6  --  4.0   CHLORIDE mmol/L 109* 109* 108*  --  110*  --  110* 107  --  106   CO2 mmol/L 19.8* 17.9* 20.1*  --  19.7*  --  19.9* 17.5*  --  18.6*   BUN mg/dL 2* 2* 2*  --  3*  --  8 12  --  12   CREATININE mg/dL 0.63 0.65 0.61 0.68 0.60  --  0.66 0.72  --  0.89   GLUCOSE mg/dL 79 107* 92  --  86  --  84 95  --  162*   ALBUMIN g/dL  --   --  2.7*  --   --   --   --   --   --  3.3*    BILIRUBIN mg/dL  --   --  0.4  --   --   --   --   --   --  0.8   ALK PHOS U/L  --   --  163*  --   --   --   --   --   --  179*   AST (SGOT) U/L  --   --  37*  --   --   --   --   --   --  19   ALT (SGPT) U/L  --   --  12  --   --   --   --   --   --  6   AMMONIA umol/L  --   --   --   --   --  15  --   --  14  --      UA    Results from last 7 days   Lab Units 07/30/24  1059   NITRITE UA  Negative   WBC UA /HPF Too Numerous to Count*   BACTERIA UA /HPF 4+*   SQUAM EPITHEL UA /HPF 0-2   URINECX  >100,000 CFU/mL Escherichia coli*     Radiology(recent) No radiology results for the last day   Assessment:      E. 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  History of coronary artery bypass grafting  Gastroesophageal reflux disease without esophagitis  History of pulmonary embolism  Thrombophilia  Acquired hypothyroidism  Hypoventilation apnea syndrome with ANNIE  History of thoracic vertebral compression fracture  Osteoporosis  Dyslipidemia  IBS mixed  Abdominal aortic aneurysm  History of ICD implantation  History of ASD closure  Degenerative joint disease  Known history of hiatal hernia  History of frequent falls  MDE mild recurrent  History of myocardial infarction  Peripheral polyneuropathy      Plan:  Supportive care//antimicrobial therapy        Zheng Angela MD  08/01/24  08:11 EDT
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
## 2024-08-01 NOTE — CASE MANAGEMENT/SOCIAL WORK
Continued Stay Note  SHELLEY Davey     Patient Name: Laura Mejia  MRN: [id number]  Today's Date: 8/1/2024    Admit Date: 7/25/2024    Plan: D/C Plan: [facility] pending acceptance; Pre-cert required; PASRR approved. Transport TBD   Discharge Plan       Row Name 08/01/24 0915       Plan    Plan D/C Plan: [facility] pending acceptance; Pre-cert required; PASRR approved. Transport TBD    Plan Comments Barrier to D/C: Medical Clearance.               Expected Discharge Date and Time       Expected Discharge Date Expected Discharge Time    Aug 3, 2024               Alberta Linares RN

## 2024-08-02 LAB
ANION GAP SERPL CALCULATED.3IONS-SCNC: 11.2 MMOL/L (ref 5–15)
BUN SERPL-MCNC: 2 MG/DL (ref 8–23)
BUN/CREAT SERPL: 3.3 (ref 7–25)
CALCIUM SPEC-SCNC: 8.6 MG/DL (ref 8.6–10.5)
CHLORIDE SERPL-SCNC: 109 MMOL/L (ref 98–107)
CO2 SERPL-SCNC: 18.8 MMOL/L (ref 22–29)
CREAT SERPL-MCNC: 0.61 MG/DL (ref 0.57–1)
DEPRECATED RDW RBC AUTO: 59.5 FL (ref 37–54)
EGFRCR SERPLBLD CKD-EPI 2021: 96.9 ML/MIN/1.73
ERYTHROCYTE [DISTWIDTH] IN BLOOD BY AUTOMATED COUNT: 16.6 % (ref 12.3–15.4)
GLUCOSE SERPL-MCNC: 72 MG/DL (ref 65–99)
HCT VFR BLD AUTO: 30.5 % (ref 34–46.6)
HGB BLD-MCNC: 9 G/DL (ref 12–15.9)
MCH RBC QN AUTO: 28.8 PG (ref 26.6–33)
MCHC RBC AUTO-ENTMCNC: 29.5 G/DL (ref 31.5–35.7)
MCV RBC AUTO: 97.4 FL (ref 79–97)
PLATELET # BLD AUTO: 230 10*3/MM3 (ref 140–450)
PMV BLD AUTO: 10.7 FL (ref 6–12)
POTASSIUM SERPL-SCNC: 3.6 MMOL/L (ref 3.5–5.2)
RBC # BLD AUTO: 3.13 10*6/MM3 (ref 3.77–5.28)
SODIUM SERPL-SCNC: 139 MMOL/L (ref 136–145)
WBC NRBC COR # BLD AUTO: 7.76 10*3/MM3 (ref 3.4–10.8)

## 2024-08-02 PROCEDURE — 97530 THERAPEUTIC ACTIVITIES: CPT

## 2024-08-02 PROCEDURE — 80048 BASIC METABOLIC PNL TOTAL CA: CPT | Performed by: FAMILY MEDICINE

## 2024-08-02 PROCEDURE — 97116 GAIT TRAINING THERAPY: CPT

## 2024-08-02 PROCEDURE — 97535 SELF CARE MNGMENT TRAINING: CPT

## 2024-08-02 PROCEDURE — 99232 SBSQ HOSP IP/OBS MODERATE 35: CPT | Performed by: INTERNAL MEDICINE

## 2024-08-02 PROCEDURE — 85027 COMPLETE CBC AUTOMATED: CPT | Performed by: FAMILY MEDICINE

## 2024-08-02 PROCEDURE — 25010000002 ONDANSETRON PER 1 MG: Performed by: FAMILY MEDICINE

## 2024-08-02 PROCEDURE — 25010000002 CEFTRIAXONE PER 250 MG: Performed by: INTERNAL MEDICINE

## 2024-08-02 RX ORDER — ALUMINA, MAGNESIA, AND SIMETHICONE 2400; 2400; 240 MG/30ML; MG/30ML; MG/30ML
15 SUSPENSION ORAL EVERY 6 HOURS PRN
Status: DISCONTINUED | OUTPATIENT
Start: 2024-08-02 | End: 2024-08-03 | Stop reason: HOSPADM

## 2024-08-02 RX ORDER — POTASSIUM CHLORIDE 20 MEQ/1
40 TABLET, EXTENDED RELEASE ORAL EVERY 4 HOURS
Status: COMPLETED | OUTPATIENT
Start: 2024-08-02 | End: 2024-08-02

## 2024-08-02 RX ADMIN — RISPERIDONE 0.25 MG: 0.5 TABLET, ORALLY DISINTEGRATING ORAL at 20:07

## 2024-08-02 RX ADMIN — LEVOTHYROXINE SODIUM 75 MCG: 0.07 TABLET ORAL at 05:34

## 2024-08-02 RX ADMIN — APIXABAN 5 MG: 5 TABLET, FILM COATED ORAL at 08:54

## 2024-08-02 RX ADMIN — Medication 10 ML: at 08:55

## 2024-08-02 RX ADMIN — HYDROCODONE BITARTRATE AND ACETAMINOPHEN 1 TABLET: 5; 325 TABLET ORAL at 20:07

## 2024-08-02 RX ADMIN — FERROUS SULFATE TAB EC 324 MG (65 MG FE EQUIVALENT) 324 MG: 324 (65 FE) TABLET DELAYED RESPONSE at 08:54

## 2024-08-02 RX ADMIN — CEFTRIAXONE 1000 MG: 1 INJECTION, POWDER, FOR SOLUTION INTRAMUSCULAR; INTRAVENOUS at 17:31

## 2024-08-02 RX ADMIN — Medication 10 ML: at 20:09

## 2024-08-02 RX ADMIN — ONDANSETRON 4 MG: 2 INJECTION INTRAMUSCULAR; INTRAVENOUS at 19:49

## 2024-08-02 RX ADMIN — ONDANSETRON 4 MG: 2 INJECTION INTRAMUSCULAR; INTRAVENOUS at 12:23

## 2024-08-02 RX ADMIN — SERTRALINE 50 MG: 50 TABLET, FILM COATED ORAL at 08:54

## 2024-08-02 RX ADMIN — FOLIC ACID 1 MG: 1 TABLET ORAL at 08:54

## 2024-08-02 RX ADMIN — Medication 12.5 MG: at 08:53

## 2024-08-02 RX ADMIN — POTASSIUM CHLORIDE 40 MEQ: 1500 TABLET, EXTENDED RELEASE ORAL at 05:34

## 2024-08-02 RX ADMIN — Medication 12.5 MG: at 20:07

## 2024-08-02 RX ADMIN — APIXABAN 5 MG: 5 TABLET, FILM COATED ORAL at 20:07

## 2024-08-02 RX ADMIN — PANTOPRAZOLE SODIUM 40 MG: 40 TABLET, DELAYED RELEASE ORAL at 05:34

## 2024-08-02 RX ADMIN — POTASSIUM CHLORIDE 40 MEQ: 1500 TABLET, EXTENDED RELEASE ORAL at 08:53

## 2024-08-02 NOTE — CASE MANAGEMENT/SOCIAL WORK
Continued Stay Note   Petar     Patient Name: Laura Mejia  MRN: [id number]  Today's Date: 8/2/2024    Admit Date: 7/25/2024    Plan: D/C Plan: [facility]; Ins auth initiated 8/2/24 am.;PASRR approved. Transport TBD   Discharge Plan       Row Name 08/02/24 0940       Plan    Plan D/C Plan: [facility]; Ins auth initiated 8/2/24 am.;PASRR approved. Transport TBD               Expected Discharge Date and Time       Expected Discharge Date Expected Discharge Time    Aug 3, 2024               Alberta Linares RN

## 2024-08-02 NOTE — DISCHARGE PLACEMENT REQUEST
"Shahbaz Wang \"Annie\" (68 y.o. Female)       Date of Birth   1955    Social Security Number       Address   [address] [place] [address] [place] [facility]    Home Phone   [phone]    MRN   [id number]       Syn[employer]   Patient Refused    Marital Status                               Admission Date   7/25/24    Admission Type   Emergency    Admitting Provider   Carolin Hatfield MD    Attending Provider   Keke Casiano MD    Department, Room/Bed   [facility] 2D, 258/1       Discharge Date       Discharge Disposition       Discharge Destination                                 Attending Provider: Keke Casiano MD    Allergies: Oxytetracycline, Oxycodone    Isolation: None   Infection: None   Code Status: CPR    Ht: 172.7 cm (68\")   Wt: 72.6 kg (160 lb)    Admission Cmt: None   Principal Problem: Altered mental status [R41.82]                   Active Insurance as of 7/25/2024       Primary Coverage       Payor Plan Insurance Group Employer/Plan Group    HUMANA MEDICARE REPLACEMENT HUMANA MED ADV PPO [id number]       Payor Plan Address Payor Plan Phone Number Payor Plan Fax Number Effective Dates    [address] [address] [phone]  9/1/2023 - None Entered    [facility] [id number]         Subscriber Name Subscriber Birth Date Member ID       SHAHBAZ WANG 1955 [id number]                     Emergency Contacts        (Rel.) Home Phone Work Phone Mobile Phone    DARI WANG (Spouse) [phone] -- --                 History & Physical        Iva Dailey APRN at 07/25/24 2129       Attestation signed by Carolin Hatfield MD at 07/26/24 0838    I have reviewed this documentation and agree.                      Patient Care Team:  Luan Joseph APRN as PCP - General (Family Medicine)  Jozef Otero MD as Consulting Physician (Nephrology)    Chief complaint altered mental status    Subjective    Patient is a 68 y.o. female who presented to the ER with reports of " altered mental status over the last few days but was worse through the night last night. It was reported that patient has not slept in 2 days and has been hallucinating, talking to people that are not there. She was seen in the ER last week after a fall with fractured vertebrae. She has been taking hydrocodone for pain but has only taken a couple doses. She is having significant pain with ambulation. She has had decreased oral intake the last few days. Patient was recommended rehab after her last admission but family refused as they reported they already had PT/OT set up at home and they were able to help take care of her.   In the ER CT with no acute findings. She was given IV fluids for hydration, and was oriented at this time.     Onset of symptoms was 2-3 days      Review of Systems   Constitutional: Negative.    HENT: Negative.     Eyes: Negative.    Respiratory: Negative.     Cardiovascular: Negative.    Gastrointestinal: Negative.    Endocrine: Negative.    Genitourinary: Negative.    Musculoskeletal:  Positive for back pain, gait problem and myalgias.   Neurological: Negative.    Psychiatric/Behavioral:  Positive for confusion.           History  Past Medical History:   Diagnosis Date    AAA (abdominal aortic aneurysm)     infrarenal- 3.1cm(2010, 3.7x4.2cm(2016), 3.9cm (2017)    Allergic rhinitis     Aspiration pneumonia 05/2017    CHF (congestive heart failure)     Coronary artery disease     DDD (degenerative disc disease), lumbar     lumbar spine- Dr. Churchill     Depression     Diverticulosis     Frequent UTI     Dr. Daniels    GERD (gastroesophageal reflux disease)     Hiatal hernia     HSV (herpes simplex virus) infection     Oral    Hyperlipidemia     IBS (irritable bowel syndrome)     Constipation predominant    Ischemic cardiomyopathy 09/2017    AICD     Kidney stones     NSTEMI (non-ST elevated myocardial infarction) 04/26/2017    Obstructive sleep apnea 2011    nfsg 2011, ahi 67    Osteoarthritis      Osteopenia     Peripheral neuropathy     feet    Pulmonary embolism, bilateral 05/2017    Rotator cuff tear     Bilateral- Ortho      Past Surgical History:   Procedure Laterality Date    CARDIAC CATHETERIZATION  04/26/2017    99% mid LAD. 90% mid LCX. 60% RCA. Recommended CABG. Dr. Diaz    CARDIAC DEFIBRILLATOR PLACEMENT  12/26/2017    ICD implant/ Dr. Ellis    CATARACT EXTRACTION      CORONARY ARTERY BYPASS GRAFT  04/26/2017    x4 & ASD Closure    CYSTOSCOPY  2002    negative. Dr. Daniels    LAPAROSCOPIC CHOLECYSTECTOMY  04/17/2013    For biliary dyskinesia. Dr. Mccoy.     REPLACEMENT TOTAL KNEE BILATERAL  11/2004    Dr. Herrera    THORACENTESIS Left 05/08/2017    TONSILLECTOMY      TUBAL ABDOMINAL LIGATION Bilateral      Family History   Problem Relation Age of Onset    Heart disease Mother     Other Mother         Hypoglycemia    Osteoporosis Mother     COPD Father     Stroke Father     Hypertension Brother     Diabetes Brother     Heart disease Brother      Social History     Tobacco Use    Smoking status: Never   Vaping Use    Vaping status: Some Days    Substances: CBD, nightly, 2-3 weekly   Substance Use Topics    Alcohol use: Never    Drug use: Never     (Not in a hospital admission)    Allergies:  Oxytetracycline and Oxycodone    Objective    Vital Signs  Temp:  [98 °F (36.7 °C)-98.9 °F (37.2 °C)] 98.9 °F (37.2 °C)  Heart Rate:  [] 81  Resp:  [15-20] 15  BP: ()/(46-83) 109/57     Physical Exam:      General Appearance:    Alert, cooperative, in no acute distress   Head:    Normocephalic, without obvious abnormality, atraumatic   Eyes:            Lids and lashes normal, conjunctivae and sclerae normal, no   icterus, no pallor, corneas clear, PERRLA   Ears:    Ears appear intact with no abnormalities noted   Throat:   No oral lesions, no thrush, oral mucosa moist   Neck:   No adenopathy, supple, trachea midline, no thyromegaly, no   carotid bruit, no JVD   Lungs:     Clear to  auscultation,respirations regular, even and                  unlabored    Heart:    Regular rhythm and normal rate, normal S1 and S2, no            murmur, no gallop, no rub, no click   Chest Wall:    No abnormalities observed   Abdomen:     Normal bowel sounds, no masses, no organomegaly, soft        non-tender, non-distended, no guarding, no rebound                tenderness   Extremities:   Moves all extremities well, no edema, no cyanosis, no             redness   Pulses:   Pulses palpable and equal bilaterally   Skin:   No bleeding, bruising or rash   Lymph nodes:   No palpable adenopathy   Neurologic:   No focal deficits noted       Results Review:     Imaging Results (Last 24 Hours)       Procedure Component Value Units Date/Time    CT Head Without Contrast [[id number]] Collected: 07/25/24 1508     Updated: 07/25/24 1518    Narrative:      CT HEAD WO CONTRAST    Date of Exam: 7/25/2024 3:07 PM EDT    Indication: ams.    Comparison: CT head without contrast 7/11/2024    Technique: Axial CT images were obtained of the head without contrast administration.  Coronal reconstructions were performed.  Automated exposure control and iterative reconstruction methods were used.      Findings:  There is no intracranial hemorrhage. Generalized cerebral volume loss. Moderate white matter findings of chronic microvascular disease. No intracranial hemorrhage. No abnormal extra-axial fluid collection. The posterior fossa is without acute   abnormality. Globes are intact and symmetric. No retro-orbital abnormality. The mastoid air cells are well-aerated. There is no sinus fluid level. Negative for calvarial fracture.      Impression:      Impression:  1. No intracranial hemorrhage.  2. Generalized cerebral atrophy with moderate white matter findings of chronic microvascular disease.        Electronically Signed: Ronak Birmingham MD    7/25/2024 3:16 PM EDT    Workstation ID: [id number]    XR Chest 1 View [[id number]] Collected:  07/25/24 1353     Updated: 07/25/24 1358    Narrative:      XR CHEST 1 VW    Date of Exam: 7/25/2024 1:41 PM EDT    Indication: Weak/Dizzy/AMS triage protocol    Comparison: 7/11/2024    Findings:  Right shoulder arthroplasty again noted. Left subclavian cardiac stimulator device appears unchanged. Abdominal aortic stent graft is partially visible. Sternotomy wires overlie the midline. No focal pulmonary consolidations are evident. Pulmonary   vascularity, heart size and mediastinal contour are within normal limits. No pneumothorax or pleural fluid identified.      Impression:      Impression:  No acute cardiopulmonary findings.      Electronically Signed: Juan Alvarado MD    7/25/2024 1:55 PM EDT    Workstation ID: [id number]             Lab Results (last 24 hours)       Procedure Component Value Units Date/Time    Blood Culture - Blood, Arm, Right [[id number]] Collected: 07/25/24 1538    Specimen: Blood from Arm, Right Updated: 07/25/24 1541    Blood Culture - Blood, Arm, Left [[id number]] Collected: 07/25/24 1538    Specimen: Blood from Arm, Left Updated: 07/25/24 1541    Respiratory Panel PCR w/COVID-19(SARS-CoV-2) MELISSA/NATO/LISA/PAD/COR/LUCHO In-House, NP Swab in UTM/VTM, 2 HR TAT - Swab, Nasopharynx [[id number]]  (Normal) Collected: 07/25/24 1411    Specimen: Swab from Nasopharynx Updated: 07/25/24 1536     ADENOVIRUS, PCR Not Detected     Coronavirus 229E Not Detected     Coronavirus HKU1 Not Detected     Coronavirus NL63 Not Detected     Coronavirus OC43 Not Detected     COVID19 Not Detected     Human Metapneumovirus Not Detected     Human Rhinovirus/Enterovirus Not Detected     Influenza A PCR Not Detected     Influenza B PCR Not Detected     Parainfluenza Virus 1 Not Detected     Parainfluenza Virus 2 Not Detected     Parainfluenza Virus 3 Not Detected     Parainfluenza Virus 4 Not Detected     RSV, PCR Not Detected     Bordetella pertussis pcr Not Detected     Bordetella parapertussis PCR Not Detected      Chlamydophila pneumoniae PCR Not Detected     Mycoplasma pneumo by PCR Not Detected    Narrative:      In the setting of a positive respiratory panel with a viral infection PLUS a negative procalcitonin without other underlying concern for bacterial infection, consider observing off antibiotics or discontinuation of antibiotics and continue supportive care. If the respiratory panel is positive for atypical bacterial infection (Bordetella pertussis, Chlamydophila pneumoniae, or Mycoplasma pneumoniae), consider antibiotic de-escalation to target atypical bacterial infection.    Ethanol [[id number]] Collected: 07/25/24 1449    Specimen: Blood Updated: 07/25/24 1522     Ethanol % <0.010 %     Narrative:      Plasma Ethanol Clinical Symptoms:    ETOH (%)               Clinical Symptom  .01-.05              No apparent influence  .03-.12              Euphoria, Diminished judgment and attention   .09-.25              Impaired comprehension, Muscle incoordination  .18-.30              Confusion, Staggered gait, Slurred speech  .25-.40              Markedly decreased response to stimuli, unable to stand or                        walk, vomitting, sleep or stupor  .35-.50              Comatose, Anesthesia, Subnormal body temperature        Ammonia [[id number]]  (Normal) Collected: 07/25/24 1449    Specimen: Blood Updated: 07/25/24 1516     Ammonia 14 umol/L     Urine Drug Screen - Straight Cath [[id number]]  (Abnormal) Collected: 07/25/24 1411    Specimen: Urine from Straight Cath Updated: 07/25/24 1506     Amphet/Methamphet, Screen Negative     Barbiturates Screen, Urine Negative     Benzodiazepine Screen, Urine Negative     Cocaine Screen, Urine Negative     Opiate Screen Negative     THC, Screen, Urine Positive     Methadone Screen, Urine Negative     Oxycodone Screen, Urine Negative    Narrative:      Negative Thresholds Per Drugs Screened:    Amphetamines                 500 ng/ml  Barbiturates                 200  ng/ml  Benzodiazepines              100 ng/ml  Cocaine                      300 ng/ml  Methadone                    300 ng/ml  Opiates                      300 ng/ml  Oxycodone                    100 ng/ml  THC                           50 ng/ml    The Normal Value for all drugs tested is negative. This report includes final unconfirmed screening results to be used for medical treatment purposes only. Unconfirmed results must not be used for non-medical purposes such as employment or legal testing. Clinical consideration should be applied to any drug of abuse test, particularly when unconfirmed results are used.          All urine drugs of abuse requests without chain of custody are for medical screening purposes only.  False positives are possible.      Urinalysis, Microscopic Only - Straight Cath [[id number]]  (Abnormal) Collected: 07/25/24 1411    Specimen: Urine from Straight Cath Updated: 07/25/24 1434     RBC, UA 3-5 /HPF      WBC, UA 0-2 /HPF      Bacteria, UA None Seen /HPF      Squamous Epithelial Cells, UA 0-2 /HPF      Hyaline Casts, UA 0-2 /LPF      Methodology Automated Microscopy    Urinalysis With Microscopic If Indicated (No Culture) - Straight Cath [[id number]]  (Abnormal) Collected: 07/25/24 1411    Specimen: Urine from Straight Cath Updated: 07/25/24 1432     Color, UA Yellow     Appearance, UA Clear     pH, UA 5.5     Specific Gravity, UA 1.018     Glucose, UA Negative     Ketones, UA Trace     Bilirubin, UA Small (1+)     Comment: Confirmation testing is unavailable.  A serum bilirubin is recommended for further assessment.        Blood, UA Negative     Protein, UA Trace     Leuk Esterase, UA Trace     Nitrite, UA Negative     Urobilinogen, UA 1.0 E.U./dL    Digoxin Level [[id number]]  (Normal) Collected: 07/25/24 1342    Specimen: Blood Updated: 07/25/24 1422     Digoxin 0.92 ng/mL     Acetaminophen Level [[id number]]  (Normal) Collected: 07/25/24 1342    Specimen: Blood Updated: 07/25/24 1422      Acetaminophen <5.0 mcg/mL     Narrative:      Acetaminophen Therapeutic Range  5-20 ug/mL      Hours after ingestion            Toxic Value    4 Hours                           150 ug/mL    8 Hours                            70 ug/mL   12 Hours                            40 ug/mL   16 Hours                            20 ug/mL    These values apply to a single ingestion only.     Salicylate Level [[id number]]  (Normal) Collected: 07/25/24 1342    Specimen: Blood Updated: 07/25/24 1422     Salicylate <0.5 mg/dL     Comprehensive Metabolic Panel [[id number]]  (Abnormal) Collected: 07/25/24 1342    Specimen: Blood Updated: 07/25/24 1409     Glucose 162 mg/dL      BUN 12 mg/dL      Creatinine 0.89 mg/dL      Sodium 136 mmol/L      Potassium 4.0 mmol/L      Chloride 106 mmol/L      CO2 18.6 mmol/L      Calcium 8.9 mg/dL      Total Protein 6.4 g/dL      Albumin 3.3 g/dL      ALT (SGPT) 6 U/L      AST (SGOT) 19 U/L      Alkaline Phosphatase 179 U/L      Total Bilirubin 0.8 mg/dL      Globulin 3.1 gm/dL      A/G Ratio 1.1 g/dL      BUN/Creatinine Ratio 13.5     Anion Gap 11.4 mmol/L      eGFR 70.3 mL/min/1.73     Narrative:      GFR Normal >60  Chronic Kidney Disease <60  Kidney Failure <15      Single High Sensitivity Troponin T [[id number]]  (Abnormal) Collected: 07/25/24 1342    Specimen: Blood Updated: 07/25/24 1409     HS Troponin T 18 ng/L     Narrative:      High Sensitive Troponin T Reference Range:  <14.0 ng/L- Negative Female for AMI  <22.0 ng/L- Negative Male for AMI  >=14 - Abnormal Female indicating possible myocardial injury.  >=22 - Abnormal Male indicating possible myocardial injury.   Clinicians would have to utilize clinical acumen, EKG, Troponin, and serial changes to determine if it is an Acute Myocardial Infarction or myocardial injury due to an underlying chronic condition.         Magnesium [[id number]]  (Normal) Collected: 07/25/24 1342    Specimen: Blood Updated: 07/25/24 1409     Magnesium 1.7 mg/dL      CBC & Differential [[id number]]  (Abnormal) Collected: 07/25/24 1342    Specimen: Blood Updated: 07/25/24 1347    Narrative:      The following orders were created for panel order CBC & Differential.  Procedure                               Abnormality         Status                     ---------                               -----------         ------                     CBC Auto Differential[[id number]]        Abnormal            Final result                 Please view results for these tests on the individual orders.    CBC Auto Differential [[id number]]  (Abnormal) Collected: 07/25/24 1342    Specimen: Blood Updated: 07/25/24 1347     WBC 15.06 10*3/mm3      RBC 3.59 10*6/mm3      Hemoglobin 10.5 g/dL      Hematocrit 35.5 %      MCV 98.9 fL      MCH 29.2 pg      MCHC 29.6 g/dL      RDW 16.7 %      RDW-SD 61.5 fl      MPV 11.0 fL      Platelets 207 10*3/mm3      Neutrophil % 87.7 %      Lymphocyte % 3.6 %      Monocyte % 5.6 %      Eosinophil % 1.3 %      Basophil % 1.2 %      Immature Grans % 0.6 %      Neutrophils, Absolute 13.21 10*3/mm3      Lymphocytes, Absolute 0.54 10*3/mm3      Monocytes, Absolute 0.85 10*3/mm3      Eosinophils, Absolute 0.19 10*3/mm3      Basophils, Absolute 0.18 10*3/mm3      Immature Grans, Absolute 0.09 10*3/mm3      nRBC 0.0 /100 WBC     Hibbs Draw [[id number]] Collected: 07/25/24 1342    Specimen: Blood Updated: 07/25/24 1345    Narrative:      The following orders were created for panel order Hibbs Draw.  Procedure                               Abnormality         Status                     ---------                               -----------         ------                     Green Top (Gel)[[id number]]                                  Final result               Lavender Top[[id number]]                                     Final result               Gold Top - SST[[id number]]                                                              Light Blue Top[[id number]]                                                                 Please view results for these tests on the individual orders.    Green Top (Gel) [[id number]] Collected: 07/25/24 1342    Specimen: Blood Updated: 07/25/24 1345     Extra Tube Hold for add-ons.     Comment: Auto resulted.       Lavender Top [[id number]] Collected: 07/25/24 1342    Specimen: Blood Updated: 07/25/24 1345     Extra Tube hold for add-on     Comment: Auto resulted                I reviewed the patient's new clinical results.    Assessment & Plan      Altered mental status  -CT head with no acute findings  -UA negative for infection  -UDS positive for thc  -CXR unremarkable  -respiratory panel negative  -IV fluids for hydration    DVT prophylaxis- SCD's  GI prophylaxis- ppi    I discussed the patient's findings and my recommendations with patient.     ALEXANDER Dye  07/25/24  21:29 EDT        Electronically signed by Carolin Hatfield MD at 07/26/24 0855          Physician Progress Notes (most recent note)        Zheng Angela MD at 08/01/24 0811               LOS: 4 days   Patient Care Team:  Luan Joseph APRN as PCP - General (Family Medicine)  Jozef Otero MD as Consulting Physician (Nephrology)    Subjective:  Events noted    Objective:   Afebrile      Review of Systems:   Review of Systems   Unable to perform ROS: Mental status change   Constitutional:  Positive for activity change.           Vital Signs  Temp:  [96.6 °F (35.9 °C)-97.7 °F (36.5 °C)] 97.7 °F (36.5 °C)  Heart Rate:  [] 104  Resp:  [16-18] 17  BP: ()/(47-56) 118/56    Physical Exam:  Physical Exam  Vitals and nursing note reviewed.   Cardiovascular:      Rate and Rhythm: Rhythm irregular.      Heart sounds: Normal heart sounds.   Pulmonary:      Breath sounds: Normal breath sounds.          Radiology:  MRI Brain Without Contrast    Result Date: 7/29/2024  Impression: 1. No acute intracranial process. 2. Age-related atrophy and sequela of chronic microvascular ischemic  disease. Electronically Signed: Brooklynn Hernandez MD  7/29/2024 11:36 AM EDT  Workstation ID: [id number]    CT Abdomen Pelvis Without Contrast    Result Date: 7/26/2024  Impression: 1. Bilateral nonobstructing renal stones. 2. Abdominal aortic stent graft in place. The aneurysm sac is stable measuring 4.0 cm in the AP dimension. 3. Borderline splenomegaly. 4. Small hiatal hernia. 5. Multiple thoracolumbar compression fractures. The T12 compression fracture is new when compared to the previous abdominal CT. It appears subacute. There is a fracture cleft through the superior endplate with healing callus. 6. Additional findings as noted above. The study is limited by noncontrasted technique. Electronically Signed: Zheng Doe MD  7/26/2024 1:52 PM EDT  Workstation ID: [id number]    CT Head Without Contrast    Result Date: 7/25/2024  Impression: 1. No intracranial hemorrhage. 2. Generalized cerebral atrophy with moderate white matter findings of chronic microvascular disease. Electronically Signed: Ronak Birmingham MD  7/25/2024 3:16 PM EDT  Workstation ID: [id number]    XR Chest 1 View    Result Date: 7/25/2024  Impression: No acute cardiopulmonary findings. Electronically Signed: Juan Alvarado MD  7/25/2024 1:55 PM EDT  Workstation ID: [id number]        Results Review:     I reviewed the patient's new clinical results.  I reviewed the patient's new imaging results and agree with the interpretation.    Medication Review:   Scheduled Meds:albuterol, 2.5 mg, Nebulization, Once  apixaban, 5 mg, Oral, BID  cefTRIAXone, 1,000 mg, Intravenous, Q24H  ferrous sulfate, 324 mg, Oral, Daily With Breakfast  folic acid, 1 mg, Oral, Daily  levothyroxine, 75 mcg, Oral, Q AM  magnesium sulfate, 2 g, Intravenous, Once  metoprolol tartrate, 12.5 mg, Oral, Q12H  pantoprazole, 40 mg, Oral, Q AM  potassium chloride, 20 mEq, Oral, Daily  risperiDONE, 0.25 mg, Oral, Nightly  sertraline, 50 mg, Oral, Daily  sodium chloride, 10 mL, Intravenous,  Q12H      Continuous Infusions:sodium chloride, 50 mL/hr, Last Rate: 50 mL/hr (07/31/24 1201)      PRN Meds:.  senna-docusate sodium **AND** polyethylene glycol **AND** bisacodyl **AND** bisacodyl    Calcium Replacement - Follow Nurse / BPA Driven Protocol    HYDROcodone-acetaminophen    LORazepam    Magnesium Standard Dose Replacement - Follow Nurse / BPA Driven Protocol    metoprolol tartrate    ondansetron ODT **OR** ondansetron    Phosphorus Replacement - Follow Nurse / BPA Driven Protocol    Potassium Replacement - Follow Nurse / BPA Driven Protocol    sodium chloride    sodium chloride    sodium chloride    Labs:    CBC    Results from last 7 days   Lab Units 07/30/24  1104 07/30/24  0636 07/27/24  0553 07/26/24  0549 07/25/24  1342   WBC 10*3/mm3 7.67  --  11.22* 11.94* 15.06*   HEMOGLOBIN g/dL 7.1*  --  8.8* 9.0* 10.5*   HEMOGLOBIN, POC g/dL  --  9.8*  --   --   --    PLATELETS 10*3/mm3 215  --  177 190 207     BMP   Results from last 7 days   Lab Units 08/01/24  0438 07/31/24  0623 07/30/24  1104 07/30/24  0636 07/29/24  0548 07/27/24  1320 07/27/24  0553 07/26/24  0551 07/25/24  1342   SODIUM mmol/L 141 138 138  --  138  --  138 136 136   POTASSIUM mmol/L 4.1 3.7 3.0*  --  3.7 3.7 3.2* 3.6 4.0   CHLORIDE mmol/L 109* 109* 108*  --  110*  --  110* 107 106   CO2 mmol/L 19.8* 17.9* 20.1*  --  19.7*  --  19.9* 17.5* 18.6*   BUN mg/dL 2* 2* 2*  --  3*  --  8 12 12   CREATININE mg/dL 0.63 0.65 0.61 0.68 0.60  --  0.66 0.72 0.89   GLUCOSE mg/dL 79 107* 92  --  86  --  84 95 162*   MAGNESIUM mg/dL 2.4 3.2* 1.4*  --   --   --   --   --  1.7     Cr Clearance Estimated Creatinine Clearance: 96.6 mL/min (by C-G formula based on SCr of 0.63 mg/dL).  Coag     HbA1C   Lab Results   Component Value Date    HGBA1C 6.20 (H) 05/31/2024     Blood Glucose   Glucose   Date/Time Value Ref Range Status   07/30/2024 0636 120 (H) 74 - 100 mg/dL Final   07/30/2024 0636 120 (H) 74 - 100 mg/dL Final     Comment:     Serial Number:  [id number]:  [id number]   07/30/2024 0625 102 70 - 105 mg/dL Final     Comment:     Serial Number: [id number]:  [id number]     Infection   Results from last 7 days   Lab Units 07/30/24  1059 07/25/24  1538 07/25/24  1411   BLOODCX   --  No growth at 5 days  No growth at 5 days  --    URINECX  >100,000 CFU/mL Escherichia coli*  --  No growth     CMP   Results from last 7 days   Lab Units 08/01/24  0438 07/31/24  0623 07/30/24  1104 07/30/24  0636 07/29/24  0548 07/27/24  1320 07/27/24  0553 07/26/24  0551 07/25/24  1449 07/25/24  1342   SODIUM mmol/L 141 138 138  --  138  --  138 136  --  136   POTASSIUM mmol/L 4.1 3.7 3.0*  --  3.7 3.7 3.2* 3.6  --  4.0   CHLORIDE mmol/L 109* 109* 108*  --  110*  --  110* 107  --  106   CO2 mmol/L 19.8* 17.9* 20.1*  --  19.7*  --  19.9* 17.5*  --  18.6*   BUN mg/dL 2* 2* 2*  --  3*  --  8 12  --  12   CREATININE mg/dL 0.63 0.65 0.61 0.68 0.60  --  0.66 0.72  --  0.89   GLUCOSE mg/dL 79 107* 92  --  86  --  84 95  --  162*   ALBUMIN g/dL  --   --  2.7*  --   --   --   --   --   --  3.3*   BILIRUBIN mg/dL  --   --  0.4  --   --   --   --   --   --  0.8   ALK PHOS U/L  --   --  163*  --   --   --   --   --   --  179*   AST (SGOT) U/L  --   --  37*  --   --   --   --   --   --  19   ALT (SGPT) U/L  --   --  12  --   --   --   --   --   --  6   AMMONIA umol/L  --   --   --   --   --  15  --   --  14  --      UA    Results from last 7 days   Lab Units 07/30/24  1059   NITRITE UA  Negative   WBC UA /HPF Too Numerous to Count*   BACTERIA UA /HPF 4+*   SQUAM EPITHEL UA /HPF 0-2   URINECX  >100,000 CFU/mL Escherichia coli*     Radiology(recent) No radiology results for the last day   Assessment:      E. 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  History of coronary artery bypass grafting  Gastroesophageal reflux disease without esophagitis  History of pulmonary embolism  Thrombophilia  Acquired hypothyroidism  Hypoventilation apnea syndrome with ANNIE  History of thoracic vertebral compression fracture  Osteoporosis  Dyslipidemia  IBS mixed  Abdominal aortic aneurysm  History of ICD implantation  History of ASD closure  Degenerative joint disease  Known history of hiatal hernia  History of frequent falls  MDSARABJIT mild recurrent  History of myocardial infarction  Peripheral polyneuropathy      Plan:  Supportive care//antimicrobial therapy        Zheng Angela MD  08/01/24  08:11 EDT          Electronically signed by Zheng Angela MD at 08/01/24 0817          Consult Notes (most recent note)        Allie Hurley MD at 07/30/24 1057            Referring Provider: Keke Casiano MD  Reason for Consultation:  Constipation  Possible atrial fibrillation    Patient Care Team:  Luan Joseph APRN as PCP - General (Family Medicine)  Jozef Otero MD as Consulting Physician (Nephrology)    Chief complaint  Altered mental status    Subjective .     History of present illness:  Laura Mejia is a 68 y.o. female who presents with history of altered mental status for last few days.  Patient was admitted to the hospital and today she was having changes in mental status.  Patient was thought to have atrial fibrillation although there is significant artifact on the EKG.  Cardiology consultation was requested.  At the time of my examination today she is asymptomatic.  Patient  family is at bedside.  Patient is in sinus rhythm.      ROS      Today, the patient has been without any chest discomfort, shortness of breath, palpitations, dizziness or syncope.  Denies having any headache, abdominal pain, nausea, vomiting, diarrhea, constipation, loss of weight or loss of appetite.  Denies having any excessive bruising, hematuria or blood in the stool.    Review of all systems negative except as indicated      History  Past Medical History:   Diagnosis Date    AAA (abdominal aortic aneurysm)     infrarenal- 3.1cm(2010, 3.7x4.2cm(2016), 3.9cm (2017)    Allergic rhinitis     Aspiration pneumonia 05/2017    CHF (congestive heart failure)     Coronary artery disease     DDD (degenerative disc disease), lumbar     lumbar spine- Dr. Churchill     Depression     Diverticulosis     Frequent UTI     Dr. Daniels    GERD (gastroesophageal reflux disease)     Hiatal hernia     HSV (herpes simplex virus) infection     Oral    Hyperlipidemia     IBS (irritable bowel syndrome)     Constipation predominant    Ischemic cardiomyopathy 09/2017    AICD     Kidney stones     NSTEMI (non-ST elevated myocardial infarction) 04/26/2017    Obstructive sleep apnea 2011    nfsg 2011, ahi 67    Osteoarthritis     Osteopenia     Peripheral neuropathy     feet    Pulmonary embolism, bilateral 05/2017    Rotator cuff tear     Bilateral- Ortho        Past Surgical History:   Procedure Laterality Date    CARDIAC CATHETERIZATION  04/26/2017    99% mid LAD. 90% mid LCX. 60% RCA. Recommended CABG. Dr. Diaz    CARDIAC DEFIBRILLATOR PLACEMENT  12/26/2017    ICD implant/ Dr. Ellis    CATARACT EXTRACTION      CORONARY ARTERY BYPASS GRAFT  04/26/2017    x4 & ASD Closure    CYSTOSCOPY  2002    negative. Dr. Daniels    LAPAROSCOPIC CHOLECYSTECTOMY  04/17/2013    For biliary dyskinesia. Dr. Mccoy.     REPLACEMENT TOTAL KNEE BILATERAL  11/2004    Dr. Herrera    THORACENTESIS Left 05/08/2017    TONSILLECTOMY      TUBAL ABDOMINAL LIGATION  Bilateral        Family History   Problem Relation Age of Onset    Heart disease Mother     Other Mother         Hypoglycemia    Osteoporosis Mother     COPD Father     Stroke Father     Hypertension Brother     Diabetes Brother     Heart disease Brother        Social History     Tobacco Use    Smoking status: Never   Vaping Use    Vaping status: Some Days    Substances: CBD, nightly, 2-3 weekly   Substance Use Topics    Alcohol use: Never    Drug use: Never        Medications Prior to Admission   Medication Sig Dispense Refill Last Dose    apixaban (ELIQUIS) 5 MG tablet tablet Take 1 tablet by mouth 2 (Two) Times a Day. Indications: Atrial Fibrillation   7/24/2024    aspirin-acetaminophen-caffeine (Excedrin Migraine) 250-250-65 MG per tablet Take 1 tablet by mouth Every 6 (Six) Hours As Needed for Headache. Indications: Headache   7/24/2024    atorvastatin (LIPITOR) 80 MG tablet Take 1 tablet by mouth Daily. Indications: High Amount of Fats in the Blood   7/24/2024    digoxin (LANOXIN) 125 MCG tablet Take 1 tablet by mouth Daily. 30 tablet 3 7/24/2024    levothyroxine (SYNTHROID, LEVOTHROID) 75 MCG tablet Take 1 tablet by mouth Daily.   7/24/2024    linaclotide (LINZESS) 72 MCG capsule capsule Take 1 capsule by mouth Daily As Needed (Irritable bowel). Indications: Constipation caused by Irritable Bowel Syndrome   7/24/2024    metoprolol succinate XL (TOPROL-XL) 25 MG 24 hr tablet Take 0.5 tablets by mouth Every 12 (Twelve) Hours. Indications: Atrial Fibrillation, High Blood Pressure Disorder 30 tablet 3 7/24/2024    ondansetron (ZOFRAN) 4 MG tablet Take 1 tablet by mouth Every 8 (Eight) Hours As Needed for Nausea or Vomiting.   7/24/2024    oxybutynin XL (DITROPAN-XL) 10 MG 24 hr tablet Take 1 tablet by mouth Daily.   7/24/2024    pantoprazole (PROTONIX) 40 MG EC tablet Take 1 tablet by mouth Every Morning. 30 tablet 0 7/24/2024    PARoxetine (PAXIL) 40 MG tablet Take 1 tablet by mouth Every Morning. Indications:  "Major Depressive Disorder   7/24/2024    potassium chloride ER (K-TAB) 20 MEQ tablet controlled-release ER tablet Take 1 tablet by mouth Daily.   7/24/2024    HYDROcodone-acetaminophen (NORCO) 5-325 MG per tablet Take 1 tablet by mouth Every 6 (Six) Hours As Needed for Moderate Pain for up to 30 days. 30 tablet 0     ibuprofen (ADVIL,MOTRIN) 400 MG tablet Take 1 tablet by mouth Every 6 (Six) Hours As Needed for Mild Pain.            Oxytetracycline and Oxycodone    Scheduled Meds:albuterol, 2.5 mg, Nebulization, Once  apixaban, 5 mg, Oral, BID  ferrous sulfate, 324 mg, Oral, Daily With Breakfast  levothyroxine, 75 mcg, Oral, Q AM  nystatin, 5 mL, Swish & Swallow, 4x Daily  pantoprazole, 40 mg, Oral, Q AM  potassium chloride, 40 mEq, Oral, Q4H  risperiDONE, 0.25 mg, Oral, Nightly  sertraline, 50 mg, Oral, Daily  sodium chloride, 10 mL, Intravenous, Q12H      Continuous Infusions:sodium chloride, 50 mL/hr, Last Rate: 50 mL/hr (07/30/24 0253)      PRN Meds:.  senna-docusate sodium **AND** polyethylene glycol **AND** bisacodyl **AND** bisacodyl    Calcium Replacement - Follow Nurse / BPA Driven Protocol    HYDROcodone-acetaminophen    Magnesium Standard Dose Replacement - Follow Nurse / BPA Driven Protocol    metoprolol tartrate    ondansetron ODT **OR** ondansetron    Phosphorus Replacement - Follow Nurse / BPA Driven Protocol    Potassium Replacement - Follow Nurse / BPA Driven Protocol    sodium chloride    sodium chloride    sodium chloride    Objective     VITAL SIGNS  Vitals:    07/30/24 0630 07/30/24 0636 07/30/24 0700 07/30/24 0841   BP: 135/96 125/71 117/88 116/57   BP Location:    Right arm   Patient Position:    Lying   Pulse: (!) 134 (!) 131 (!) 127 (!) 123   Resp:    19   Temp:    98.1 °F (36.7 °C)   TempSrc:    Oral   SpO2: 100% 98% 100% 96%   Weight:       Height:           Flowsheet Rows      Flowsheet Row First Filed Value   Admission Height 172.7 cm (68\") Documented at 07/25/2024 1234   Admission " Weight 72.3 kg (159 lb 6.3 oz) Documented at 07/25/2024 1234              Intake/Output Summary (Last 24 hours) at 7/30/2024 1057  Last data filed at 7/30/2024 1011  Gross per 24 hour   Intake 3264 ml   Output 500 ml   Net 2764 ml        TELEMETRY: Sinus rhythm    Physical Exam:  The patient is in no distress.  Vital signs as noted above.  Head and neck revealed no carotid bruits or jugular venous distention.  No thyromegaly or lymphadenopathy is present  Lungs clear.  No wheezing.  Breath sounds are normal bilaterally.  Heart normal first and second heart sounds. No murmur.  No precordial rub is present.  No gallop is present.  Abdomen soft and nontender.  No organomegaly is present.  Extremities with good peripheral pulses without any pedal edema.  Skin warm and dry.  Musculoskeletal system is grossly normal  CNS grossly normal    Reviewed and updated.    Results Review:   I reviewed the patient's new clinical results.  Lab Results (last 24 hours)       Procedure Component Value Units Date/Time    Blood Gas, Arterial - [[id number]]  (Abnormal) Collected: 07/30/24 0636    Specimen: Arterial Blood Updated: 07/30/24 0638     Site Right Radial     Vernon's Test Positive     pH, Arterial 7.416 pH units      pCO2, Arterial 28.5 mm Hg      pO2, Arterial 85.6 mm Hg      HCO3, Arterial 18.3 mmol/L      Base Excess, Arterial -5.3 mmol/L      Comment: Serial Number: [id number]:  [id number]        O2 Saturation, Arterial 96.8 %      Barometric Pressure for Blood Gas --     Comment: N/A        Modality Cannula     FIO2 29 %      Hemodilution No     PO2/FIO2 295    POC Creatinine [[id number]]  (Normal) Collected: 07/30/24 0636    Specimen: Arterial Blood Updated: 07/30/24 0638     Creatinine 0.68 mg/dL      Comment: Serial Number: [id number]:  [id number]        eGFR 94.4 mL/min/1.73     POCT Electrolytes +HGB +HCT [[id number]]  (Abnormal) Collected: 07/30/24 0636    Specimen: Arterial Blood Updated: 07/30/24 0638     Sodium 140  mmol/L      POC Potassium 3.2 mmol/L      Ionized Calcium 1.23 mmol/L      Comment: Serial Number: [id number]:  [id number]        Glucose 120 mg/dL      Hematocrit 29 %      Hemoglobin 9.8 g/dL     POC Lactate [[id number]]  (Normal) Collected: 07/30/24 0636    Specimen: Arterial Blood Updated: 07/30/24 0638     Lactate 1.1 mmol/L      Comment: Serial Number: [id number]:  [id number]       POC Glucose Once [[id number]]  (Abnormal) Collected: 07/30/24 0636    Specimen: Arterial Blood Updated: 07/30/24 0638     Glucose 120 mg/dL      Comment: Serial Number: [id number]:  [id number]       POC Glucose Once [[id number]]  (Normal) Collected: 07/30/24 0625    Specimen: Blood Updated: 07/30/24 0626     Glucose 102 mg/dL      Comment: Serial Number: [id number]:  [id number]       Blood Culture - Blood, Arm, Right [[id number]]  (Normal) Collected: 07/25/24 1538    Specimen: Blood from Arm, Right Updated: 07/29/24 1545     Blood Culture No growth at 4 days    Narrative:      Less than seven (7) mL's of blood was collected.  Insufficient quantity may yield false negative results.    Blood Culture - Blood, Arm, Left [[id number]]  (Normal) Collected: 07/25/24 1538    Specimen: Blood from Arm, Left Updated: 07/29/24 1545     Blood Culture No growth at 4 days            Imaging Results (Last 24 Hours)       Procedure Component Value Units Date/Time    MRI Brain Without Contrast [[id number]] Collected: 07/29/24 1133     Updated: 07/29/24 1138    Narrative:      MRI BRAIN WO CONTRAST    Date of Exam: 7/29/2024 10:42 AM EDT    Indication: altered mental status, h/o CVA.  has had MRI in the past.     Comparison: CT head without contrast 7/25/2024, MRI brain 5/21/2024    Technique:  Routine multiplanar/multisequence sequence images of the brain were obtained without contrast administration.      Findings:  There is no diffusion restriction to suggest acute infarct.     There is no evidence of acute or chronic intracranial hemorrhage. No mass  effect or midline shift. No abnormal extra-axial collections.     The basal ganglia, brainstem and cerebellum appear within normal limits. Midline structures are intact. There is generalized cerebral and cerebellar volume loss. Moderate subcortical, periventricular and deep white matter T2/FLAIR hyperintensities are in   keeping with chronic microvascular ischemic disease.    Calvarial and superficial soft tissue signal is within normal limits. Orbits appear unremarkable. The paranasal sinuses and the mastoid air cells appear well aerated.         Impression:      Impression:    1. No acute intracranial process.  2. Age-related atrophy and sequela of chronic microvascular ischemic disease.        Electronically Signed: Brooklynn Hernandez MD    7/29/2024 11:36 AM EDT    Workstation ID: [id number]        LAB RESULTS (LAST 7 DAYS)    CBC  Results from last 7 days   Lab Units 07/30/24  0636 07/27/24  0553 07/26/24  0549 07/25/24  1342   WBC 10*3/mm3  --  11.22* 11.94* 15.06*   RBC 10*6/mm3  --  2.93* 3.04* 3.59*   HEMOGLOBIN g/dL  --  8.8* 9.0* 10.5*   HEMOGLOBIN, POC g/dL 9.8*  --   --   --    HEMATOCRIT %  --  28.8* 30.3* 35.5   HEMATOCRIT POC % 29*  --   --   --    MCV fL  --  98.3* 99.7* 98.9*   PLATELETS 10*3/mm3  --  177 190 207       BMP  Results from last 7 days   Lab Units 07/30/24  0636 07/29/24  0548 07/27/24  1320 07/27/24  0553 07/26/24  0551 07/25/24  1342   SODIUM mmol/L  --  138  --  138 136 136   POTASSIUM mmol/L  --  3.7 3.7 3.2* 3.6 4.0   CHLORIDE mmol/L  --  110*  --  110* 107 106   CO2 mmol/L  --  19.7*  --  19.9* 17.5* 18.6*   BUN mg/dL  --  3*  --  8 12 12   CREATININE mg/dL 0.68 0.60  --  0.66 0.72 0.89   GLUCOSE mg/dL  --  86  --  84 95 162*   MAGNESIUM mg/dL  --   --   --   --   --  1.7       CMP   Results from last 7 days   Lab Units 07/30/24  0636 07/29/24  0548 07/27/24  1320 07/27/24  0553 07/26/24  0551 07/25/24  1449 07/25/24  1342   SODIUM mmol/L  --  138  --  138 136  --  136   POTASSIUM  mmol/L  --  3.7 3.7 3.2* 3.6  --  4.0   CHLORIDE mmol/L  --  110*  --  110* 107  --  106   CO2 mmol/L  --  19.7*  --  19.9* 17.5*  --  18.6*   BUN mg/dL  --  3*  --  8 12  --  12   CREATININE mg/dL 0.68 0.60  --  0.66 0.72  --  0.89   GLUCOSE mg/dL  --  86  --  84 95  --  162*   ALBUMIN g/dL  --   --   --   --   --   --  3.3*   BILIRUBIN mg/dL  --   --   --   --   --   --  0.8   ALK PHOS U/L  --   --   --   --   --   --  179*   AST (SGOT) U/L  --   --   --   --   --   --  19   ALT (SGPT) U/L  --   --   --   --   --   --  6   AMMONIA umol/L  --   --  15  --   --  14  --          BNP        TROPONIN  Results from last 7 days   Lab Units 07/25/24  1342   HSTROP T ng/L 18*       CoAg        Creatinine Clearance  Estimated Creatinine Clearance: 89.5 mL/min (by C-G formula based on SCr of 0.68 mg/dL).    ABG  Results from last 7 days   Lab Units 07/30/24  0636   PH, ARTERIAL pH units 7.416   PCO2, ARTERIAL mm Hg 28.5*   PO2 ART mm Hg 85.6   O2 SATURATION ART % 96.8   BASE EXCESS ART mmol/L -5.3*       Radiology  MRI Brain Without Contrast    Result Date: 7/29/2024  Impression: 1. No acute intracranial process. 2. Age-related atrophy and sequela of chronic microvascular ischemic disease. Electronically Signed: Brooklynn Hernandez MD  7/29/2024 11:36 AM EDT  Workstation ID: [id number]       EKG                    I personally viewed and interpreted the patient's EKG/Telemetry data: Sinus rhythm with premature atrial contractions.  No definite atrial fibrillation is present although significant baseline artifact is present.    ECHOCARDIOGRAM:    Results for orders placed during the hospital encounter of 01/22/24    Adult Transthoracic Echo Complete W/ Cont if Necessary Per Protocol    Interpretation Summary    Left ventricular ejection fraction appears to be 56 - 60%.    Estimated right ventricular systolic pressure from tricuspid regurgitation is normal (<35 mmHg).    Indication  Dyspnea  Palpitations    Technically satisfactory  study.  Mitral valve is structurally normal.  Tricuspid valve is structurally normal.  Mild tricuspid regurgitation is present.  Aortic valve is aortic valve with decreased opening motion.  Gradient across the aortic valve is 16/9 mmHg.  Valve area 1.37 cm².  Pulmonic valve could not be well visualized.  Left atrium is enlarged.  Right atrium is normal in size.  Left ventricle is enlarged with septal anterior wall and apical severe hypokinesis with ejection fraction of 40%.  Possible left ventricular apical thrombus.  Right ventricle is normal in size.  Atrial septum is intact.  Aorta is normal.  No pericardial effusion or intracardiac thrombus is seen.  ICD lead is present in the right ventricle.    Impression  Mild tricuspid regurgitation.  Mild to moderate aortic valve stenosis with gradient of 16/9 mmHg and valve area of 1.37 cm².  Left atrial enlargement.  Left ventricle is enlarged with septal anterior wall and apical severe hypokinesis with ejection fraction of 40%.  Possible left ventricular apical thrombus.  ICD lead is present in the right ventricle.  No evidence for pulmonary hypertension is present.      STRESS TEST  Results for orders placed during the hospital encounter of 01/22/24    Stress Test With Myocardial Perfusion One Day    Interpretation Summary  Indications  Status post CABG  Atrial fibrillation    This study was performed under the direct supervision of Rohini NOLASCO.    Resting ECG  Sinus rhythm    The patient was injected with Lexiscan intravenously while constantly monitoring electrocardiogram and vital signs.  Patient did not have any chest discomfort ST abnormalities or ectopy with injection of Lexiscan.    Cardiolite was used as an imaging agent.    Cardiolite images showed significantly decreased radionuclide uptake in the proximal posterior segments with partial redistribution in the resting images.    Gated SPECT images revealed normal left ventricle size and diffuse hypocontractility  with calculated ejection fraction of 61%.    Impression  ========  Lexiscan Cardiolite test revealed proximal posterior infarction and ischemia.  Gated SPECT images revealed normal left ventricular size and diffuse hypocontractility with calculated ejection fraction of 61%.        Cardiolite (Tc-99m sestamibi) stress test    HEART CATHETERIZATION  No results found for this or any previous visit.      OTHER:     Assessment & Plan     Principal Problem:    Altered mental status    ]]]]]]]]]]]]]]]]]  History  =========  - Altered mental status.    -History of mechanical fall (slip and fall)-T10 compression fracture.    - Past history of palpitations  Sinus tachycardia and premature atrial contractions.  Intermittent atrial fibrillation is present.  Patient is in sinus rhythm 6/3/2024  Sinus rhythm 7/11/2024.  Recent EKG showed 7/25/2024-sinus rhythm.  EKG 7/30/2024 revealed significant baseline artifact.  Likely sinus rhythm.    - Anticoagulation-on Eliquis..     - Status post CABG and ASD closure 2017     - Ischemic cardiomyopathy     -Moderate aortic valve stenosis     - Status post ICD (single-chamber)-2017-[employer] [facility].     -History of stroke.  Tiny acute to subacute cortical left frontal stroke on MRI 5/17/2024.      - Hypothyroidism dyslipidemia obstructive sleep apnea diabetes     - Past history of pulmonary emboli     - CKD 3  20/1.7-1/22/2024  17/1.08-5/19/2024.     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 There is normal antegrade color Doppler flow including the common iliac arteries. There is thrombus within the aneurysm sac. Dimensions are discussed in detail above.     - Family history of coronary artery disease     - Non-smoker     - Allergic to oxytetracycline and oxycodone.     Stress Cardiolite test-1/24/2024  Lexiscan  Cardiolite test revealed proximal posterior infarction and ischemia.  Gated SPECT images revealed normal left ventricular size and diffuse hypocontractility with calculated ejection fraction of 61%.     Echocardiogram 1/23/2024 revealed  Mild tricuspid regurgitation.  Mild to moderate aortic valve stenosis with gradient of 16/9 mmHg and valve area of 1.37 cm².  Left atrial enlargement.  Left ventricle is enlarged with septal anterior wall and apical severe hypokinesis with ejection fraction of 40%.  Possible left ventricular apical thrombus.  ICD lead is present in the right ventricle.  No evidence for pulmonary hypertension is present.  =============  Plan  =============  - Altered mental status.    - Past history of palpitations  Sinus tachycardia and premature atrial contractions.  Intermittent atrial fibrillation is present.  Patient is in sinus rhythm 6/3/2024  Sinus rhythm 7/11/2024.  Recent EKG showed 7/25/2024-sinus rhythm.  EKG 7/30/2024 revealed significant baseline artifact.  Likely sinus rhythm.     Status post CABG and ASD closure 2017.  Patient is not having any angina pectoris or congestive heart failure.      Ischemic cardiomyopathy      Mild to moderate aortic valve stenosis     Echocardiogram 1/23/2024-as above     Stress Cardiolite test-1/24/2024-as above     Status post ICD ([employer]) 2017.  Single-chamber.     History of left ventricular possible apical thrombus.  Continue anticoagulation.  Consider MERLE in the future if needed.  Patient is already anticoagulated.     Anticoagulation  Patient is on Eliquis     History of renal dysfunction-improved.  6/0.93-7/12/2024.     History of stroke.  Tiny acute to subacute cortical left frontal stroke on MRI 5/17/2024.     Medications were reviewed and updated.  Continue Eliquis ferrous sulfate levothyroxine pantoprazole.     Further plan will depend on patient's progress.     Reviewed and updated-7/30/2024.  ]]]]]]]]]]]]]]]]]]]]]       Allie  "MD Mei  24  10:57 EDT              Electronically signed by Allie Hurley MD at 24 0694          Physical Therapy Notes (all)        Iraj Adams, PT at 24 1302  Version 1 of 1         Goal Outcome Evaluation:  Plan of Care Reviewed With: patient        Progress: no change   Pt is a 67 y/o F admitted to [facility] on 24 with complaints of AMS with reports of hallucinations. She was most recently seen here at [facility] on 24 after falling with acute compression fracture at T10 and Neurosurgery advised [facility]O when out of bed. PT recommended SNF at d/ last admission, but pt and family refused and went home with HHPT. XR Chest and CT Head both (-) this date, admitted for monitoring of AMS. She is currently living at home with spouse in [facility] with two steps to enter. At baseline, she is normally IND with household mobility and ADLs with no AD. She does have recent history of UTIs of which spouse has needed to help patient with some ADLs. This date, she is disoriented to place, time, and situation, lying supine in bed with complaints of 10/10 pain in the lower back. She completes bed mobility max A this date. Spouse reports TLSO is \"in the truck\", but is refusing to get the brace this AM stating \"you won't be able to get it on\" 2/2 to severe pain levels. Spouse educated on purpose of TLSO and necessity for OOB activity. PT advised spouse to have TLSO present by tomorrow so she can work with PT staff. PT is again recommending SNF at d/c and it will be up to patient and spouse to accept. PT will continue to follow during stay, will need TLSO present to progress.    Anticipated Discharge Disposition (PT): skilled nursing facility      Electronically signed by Iraj Adams PT at 24 1302       Iraj Adams, PT at 24 1302  Version 1 of 1         Patient Name: Laura Mejia  : 1955    MRN: [id number]                              Today's Date: 2024       Admit Date: " 7/25/2024    Visit Dx:     ICD-10-CM ICD-9-CM   1. Altered mental status, unspecified altered mental status type  R41.82 780.97     Patient Active Problem List   Diagnosis    New onset atrial fibrillation    Near syncope    S/P CABG (coronary artery bypass graft)    Abnormal nuclear stress test    Altered mental status    Altered mental status, unspecified    Atrial fibrillation with RVR    Urinary tract infection    Abdominal aortic aneurysm (AAA)    Acute on chronic systolic heart failure    Allergic rhinitis    Atherosclerosis of coronary artery bypass graft    Compression fracture of lumbar vertebra    Congestive heart failure    COPD (chronic obstructive pulmonary disease)    Degeneration of intervertebral disc of lumbar region    Degeneration of thoracic intervertebral disc    Degenerative spondylolisthesis    Depression    Gastroesophageal reflux disease    History of pulmonary embolism    Hyperlipidemia    Hypothyroidism    Irritable bowel syndrome    Ischemic cardiomyopathy    Kidney stone    Lumbar radiculopathy    Obstructive sleep apnea syndrome    Osteopenia    Peripheral neuropathy    Pleural effusion    Pulmonary emboli    Hypertension    S/P CABG x 4    Hypotension due to hypovolemia    Closed wedge compression fracture of T10 vertebra    Generalized weakness    Leukocytosis    Multiple falls     Past Medical History:   Diagnosis Date    AAA (abdominal aortic aneurysm)     infrarenal- 3.1cm(2010, 3.7x4.2cm(2016), 3.9cm (2017)    Allergic rhinitis     Aspiration pneumonia 05/2017    CHF (congestive heart failure)     Coronary artery disease     DDD (degenerative disc disease), lumbar     lumbar spine- Dr. Churchill     Depression     Diverticulosis     Frequent UTI     Dr. Daniels    GERD (gastroesophageal reflux disease)     Hiatal hernia     HSV (herpes simplex virus) infection     Oral    Hyperlipidemia     IBS (irritable bowel syndrome)     Constipation predominant    Ischemic cardiomyopathy 09/2017     AICD     Kidney stones     NSTEMI (non-ST elevated myocardial infarction) 04/26/2017    Obstructive sleep apnea 2011    nfsg 2011, ahi 68    Osteoarthritis     Osteopenia     Peripheral neuropathy     feet    Pulmonary embolism, bilateral 05/2017    Rotator cuff tear     Bilateral- Ortho      Past Surgical History:   Procedure Laterality Date    CARDIAC CATHETERIZATION  04/26/2017    99% mid LAD. 90% mid LCX. 60% RCA. Recommended CABG. Dr. Diaz    CARDIAC DEFIBRILLATOR PLACEMENT  12/26/2017    ICD implant/ Dr. Ellis    CATARACT EXTRACTION      CORONARY ARTERY BYPASS GRAFT  04/26/2017    x4 & ASD Closure    CYSTOSCOPY  2002    negative. Dr. Daniels    LAPAROSCOPIC CHOLECYSTECTOMY  04/17/2013    For biliary dyskinesia. Dr. Mccoy.     REPLACEMENT TOTAL KNEE BILATERAL  11/2004    Dr. Herrera    THORACENTESIS Left 05/08/2017    TONSILLECTOMY      TUBAL ABDOMINAL LIGATION Bilateral       General Information       Row Name 07/26/24 1254          Physical Therapy Time and Intention    Document Type evaluation  -MB     Mode of Treatment physical therapy  -MB       Row Name 07/26/24 1254          General Information    Patient Profile Reviewed yes  -MB     Prior Level of Function independent:;all household mobility;gait;transfer;ADL's;home management  -MB     Existing Precautions/Restrictions TLSO  -MB     Barriers to Rehab medically complex;previous functional deficit  -MB       Row Name 07/26/24 1254          Living Environment    People in Home spouse  -MB       Row Name 07/26/24 1254          Home Main Entrance    Number of Stairs, Main Entrance two  -MB     Stair Railings, Main Entrance none  -MB       Row Name 07/26/24 1254          Stairs Within Home, Primary    Number of Stairs, Within Home, Primary none  -MB       Row Name 07/26/24 1254          Cognition    Orientation Status (Cognition) disoriented to;situation;place;time  -MB       Row Name 07/26/24 1254          Safety Issues, Functional Mobility    Safety  Issues Affecting Function (Mobility) awareness of need for assistance;insight into deficits/self-awareness;safety precaution awareness;problem-solving  -MB     Impairments Affecting Function (Mobility) balance;cognition;endurance/activity tolerance;pain;strength  -MB               User Key  (r) = Recorded By, (t) = Taken By, (c) = Cosigned By      Initials Name Provider Type    Iraj King PT Physical Therapist                   Mobility       Row Name 07/26/24 1255          Bed Mobility    Bed Mobility bed mobility (all) activities;rolling left;rolling right  -MB     All Activities, [place] (Bed Mobility) maximum assist (25% patient effort)  -MB     Rolling Left [place] (Bed Mobility) maximum assist (25% patient effort)  -MB     Rolling Right [place] (Bed Mobility) maximum assist (25% patient effort)  -MB     Assistive Device (Bed Mobility) bed rails  -MB       Row Name 07/26/24 1255          Transfers    Comment, (Transfers) transfers not completed this date as TLSO not available for session  -MB       Row Name 07/26/24 1255          Bed-Chair Transfer    Bed-Chair [place] (Transfers) unable to assess  -MB       Row Name 07/26/24 1255          Sit-Stand Transfer    Sit-Stand [place] (Transfers) unable to assess  -MB       Row Name 07/26/24 1255          Gait/Stairs (Locomotion)    Patient was able to Ambulate no, other medical factors prevent ambulation  -MB     Reason Patient was unable to Ambulate Other (Comment)  TLSO not available  -MB               User Key  (r) = Recorded By, (t) = Taken By, (c) = Cosigned By      Initials Name Provider Type    Iraj King PT Physical Therapist                   Obj/Interventions       Row Name 07/26/24 1256          Range of Motion Comprehensive    General Range of Motion no range of motion deficits identified  -MB       Row Name 07/26/24 1256          Strength Comprehensive (MMT)    Comment, General Manual Muscle Testing (MMT) Assessment  BLE Strength 2+/5 grossly  -MB       Row Name 07/26/24 1256          Sensory Assessment (Somatosensory)    Sensory Assessment (Somatosensory) sensation intact  -MB               User Key  (r) = Recorded By, (t) = Taken By, (c) = Cosigned By      Initials Name Provider Type    Iraj King, PT Physical Therapist                   Goals/Plan       Row Name 07/26/24 1300          Bed Mobility Goal 1 (PT)    Activity/Assistive Device (Bed Mobility Goal 1, PT) bed mobility activities, all  -MB     [place] Level/Cues Needed (Bed Mobility Goal 1, PT) minimum assist (75% or more patient effort)  -MB     Time Frame (Bed Mobility Goal 1, PT) long term goal (LTG);2 weeks  -MB       Row Name 07/26/24 1300          Transfer Goal 1 (PT)    Activity/Assistive Device (Transfer Goal 1, PT) transfers, all;walker, rolling  -MB     [place] Level/Cues Needed (Transfer Goal 1, PT) minimum assist (75% or more patient effort)  -MB     Time Frame (Transfer Goal 1, PT) long term goal (LTG);2 weeks  -MB       Row Name 07/26/24 1300          Gait Training Goal 1 (PT)    Activity/Assistive Device (Gait Training Goal 1, PT) gait (walking locomotion);walker, rolling  -MB     [place] Level (Gait Training Goal 1, PT) contact guard required  -MB     Distance (Gait Training Goal 1, PT) 25  -MB     Time Frame (Gait Training Goal 1, PT) long term goal (LTG);2 weeks  -MB       Row Name 07/26/24 1300          Therapy Assessment/Plan (PT)    Planned Therapy Interventions (PT) balance training;bed mobility training;gait training;home exercise program;neuromuscular re-education;patient/family education;strengthening;transfer training  -MB               User Key  (r) = Recorded By, (t) = Taken By, (c) = Cosigned By      Initials Name Provider Type    Iraj King, PT Physical Therapist                   Clinical Impression       Row Name 07/26/24 1256          Pain    Pretreatment Pain Rating 10/10  -MB     Posttreatment Pain Rating  "10/10  -MB     Pain Location lower  -MB     Pain Location - back  -MB     Pain Intervention(s) Repositioned;Emotional support;Nursing Notified  -MB       Row Name 07/26/24 1300 07/26/24 1259       Plan of Care Review    Plan of Care Reviewed With -- patient  -MB    Progress -- no change  -MB    Outcome Evaluation Pt is a 69 y/o F admitted to [facility] on 7/25/24 with complaints of AMS with reports of hallucinations. She was most recently seen here at [facility] on 7/11/24 after falling with acute compression fracture at T10 and Neurosurgery advised TLSO when out of bed. PT recommended SNF at d/ last admission, but pt and family refused and went home with HHPT. XR Chest and CT Head both (-) this date, admitted for monitoring of AMS. She is currently living at home with spouse in [facility] with two steps to enter. At baseline, she is normally IND with household mobility and ADLs with no AD. She does have recent history of UTIs of which spouse has needed to help patient with some ADLs. This date, she is disoriented to place, time, and situation, lying supine in bed with complaints of 10/10 pain in the lower back. She completes bed mobility max A this date. Spouse reports TLSO is \"in the truck\", but is refusing to get the brace this AM stating \"you won't be able to get it on\" 2/2 to severe pain levels. Spouse educated on purpose of TLSO and necessity for OOB activity. PT advised spouse to have TLSO present by tomorrow so she can work with PT staff. PT is again recommending SNF at d/c and it will be up to patient and spouse to accept. PT will continue to follow during stay, will need TLSO present to progress.  -MB --      Row Name 07/26/24 4389          Therapy Assessment/Plan (PT)    Rehab Potential (PT) fair, will monitor progress closely  -MB     Criteria for Skilled Interventions Met (PT) yes;meets criteria  -MB     Therapy Frequency (PT) 5 times/wk  -MB     Predicted Duration of Therapy Intervention (PT) until d/c  -MB       Row Name " 07/26/24 1256          Vital Signs    Pre SpO2 (%) 97  -MB     O2 Delivery Pre Treatment supplemental O2  1L  -MB     Intra SpO2 (%) 98  -MB     O2 Delivery Intra Treatment supplemental O2  1L  -MB     Post SpO2 (%) 97  -MB     O2 Delivery Post Treatment supplemental O2  1L  -MB     Pre Patient Position Supine  -MB     Intra Patient Position Side Lying  -MB     Post Patient Position Supine  -MB       Row Name 07/26/24 1256          Positioning and Restraints    Pre-Treatment Position in bed  -MB     Post Treatment Position bed  -MB     In Bed supine;notified nsg;call light within reach;encouraged to call for assist;with family/caregiver;exit alarm on  -MB               User Key  (r) = Recorded By, (t) = Taken By, (c) = Cosigned By      Initials Name Provider Type    Iraj King PT Physical Therapist                   Outcome Measures       Row Name 07/26/24 1301          How much help from another person do you currently need...    Turning from your back to your side while in flat bed without using bedrails? 2  -MB     Moving from lying on back to sitting on the side of a flat bed without bedrails? 2  -MB     Moving to and from a bed to a chair (including a wheelchair)? 2  -MB     Standing up from a chair using your arms (e.g., wheelchair, bedside chair)? 2  -MB     Climbing 3-5 steps with a railing? 2  -MB     To walk in hospital room? 2  -MB     AM-PAC 6 Clicks Score (PT) 12  -MB     Highest Level of Mobility Goal 4 --> Transfer to chair/commode  -MB               User Key  (r) = Recorded By, (t) = Taken By, (c) = Cosigned By      Initials Name Provider Type    Iraj King, PRACHI Physical Therapist                                 Physical Therapy Education       Title: PT OT SLP Therapies (Done)       Topic: Physical Therapy (Done)       Point: Mobility training (Done)       Learning Progress Summary             Patient Acceptance, E,TB, VU by MB at 7/26/2024 1301                         Point: Body  "mechanics (Done)       Learning Progress Summary             Patient Acceptance, E,TB, VU by MB at 7/26/2024 1301                         Point: Precautions (Done)       Learning Progress Summary             Patient Acceptance, E,TB, VU by MB at 7/26/2024 1301                                         User Key       Initials Effective Dates Name Provider Type Discipline    MB 06/06/23 -  Iraj Adams, PT Physical Therapist PT                  PT Recommendation and Plan  Planned Therapy Interventions (PT): balance training, bed mobility training, gait training, home exercise program, neuromuscular re-education, patient/family education, strengthening, transfer training  Plan of Care Reviewed With: patient  Progress: no change  Outcome Evaluation: Pt is a 69 y/o F admitted to [facility] on 7/25/24 with complaints of AMS with reports of hallucinations. She was most recently seen here at [facility] on 7/11/24 after falling with acute compression fracture at T10 and Neurosurgery advised TLSO when out of bed. PT recommended SNF at d/ last admission, but pt and family refused and went home with HHPT. XR Chest and CT Head both (-) this date, admitted for monitoring of AMS. She is currently living at home with spouse in [facility] with two steps to enter. At baseline, she is normally IND with household mobility and ADLs with no AD. She does have recent history of UTIs of which spouse has needed to help patient with some ADLs. This date, she is disoriented to place, time, and situation, lying supine in bed with complaints of 10/10 pain in the lower back. She completes bed mobility max A this date. Spouse reports TLSO is \"in the truck\", but is refusing to get the brace this AM stating \"you won't be able to get it on\" 2/2 to severe pain levels. Spouse educated on purpose of TLSO and necessity for OOB activity. PT advised spouse to have TLSO present by tomorrow so she can work with PT staff. PT is again recommending SNF at d/c and it will be up to " patient and spouse to accept. PT will continue to follow during stay, will need TLSO present to progress.     Time Calculation:   PT Evaluation Complexity  History, PT Evaluation Complexity: 1-2 personal factors and/or comorbidities  Examination of Body Systems (PT Eval Complexity): total of 3 or more elements  Clinical Presentation (PT Evaluation Complexity): evolving  Clinical Decision Making (PT Evaluation Complexity): moderate complexity  Overall Complexity (PT Evaluation Complexity): moderate complexity     PT Charges       Row Name 07/26/24 1301             Time Calculation    Start Time 1011  -MB      Stop Time 1035  -MB      Time Calculation (min) 24 min  -MB      PT Received On 07/26/24  -MB      PT - Next Appointment 07/28/24  -MB      PT Goal Re-Cert Due Date 08/09/24  -MB         Time Calculation- PT    Total Timed Code Minutes- PT 0 minute(s)  -MB                User Key  (r) = Recorded By, (t) = Taken By, (c) = Cosigned By      Initials Name Provider Type    Iraj King, PT Physical Therapist                  Therapy Charges for Today       Code Description Service Date Service Provider Modifiers Qty    [id number]  PT EVAL MOD COMPLEXITY 4 7/26/2024 Iraj Adams, PT GP 1            PT G-Codes  AM-PAC 6 Clicks Score (PT): 12  PT Discharge Summary  Anticipated Discharge Disposition (PT): skilled nursing facility    Iraj Adams PT  7/26/2024      Electronically signed by Iraj Adams, PT at 07/26/24 1302       Rochelle Rehman PT at 07/29/24 9237  Version 1 of 1         Subjective: Pt refuses to mobilize with therapy at this time. Spouse states it's too much to get her into her brace just to go to the bathroom. Spouse and patient report she uses her brace at home.     Objective:     Precautions - spinal precautions and TLSO    Bed mobility - N/A or Not attempted. Spouse assisting pt with supine positioning in recliner upon PT arrival. BLE elevated and recliner back in a fully reclined position. Pt  "resting in a horizontal position without brace.   Transfers - N/A or Not attempted. Pt defers at this time.   Ambulation -  N/A or Not attempted. Pt defers at this time.     Patient and CG edu - edu regarding need for consistent TLSO use anytime pt is up. Verbal edu and photo shown regarding the cascade effect of compression fractures. Emphasis on the necessity of brace use to help prevent additional fractures. Edu for spinal precautions and need to call for staff assist for safe mobility. Pt verbalize understanding but is noted to  be confused (stating she just came in from outside). Spouse verbalizes understanding.     Vitals: WNL    Pain: 8.5 VAS   Location: back  Intervention for pain: Repositioned and Therapeutic Presence    Education: Provided education on the importance of mobility in the acute care setting, brace use and spinal precautions.    Assessment: Laura Mejia presents with pain limiting her participation in functional mobility and gait training today.  PT noted several instances documented of pt refusing and not using her TLSO. Edu provided for safety and brace use to prevent additional fractures and complications. Pt and CG verbalize understanding but will likely need reinforcement due to noted impairment in cognitive status. Will continue to follow and progress as tolerated.     Plan/Recommendations:   If medically appropriate, Moderate Intensity Therapy recommended post-acute care. This is recommended as therapy feels the patient would require 3-4 days per week and wouldn't tolerate \"3 hour daily\" rehab intensity. SNF would be the preferred choice. If the patient does not agree to SNF, arrange HH or OP depending on home bound status. If patient is medically complex, consider LTACH. Pt requires no DME at discharge.     Pt desires Home with Home Health and Home with family assist at discharge. Pt cooperative; agreeable to therapeutic recommendations and plan of care.         Basic Mobility " 6-click:  Rollin = Total, A lot = 2, A little = 3; 4 = None  Supine>Sit:   1 = Total, A lot = 2, A little = 3; 4 = None   Sit>Stand with arms:  1 = Total, A lot = 2, A little = 3; 4 = None  Bed>Chair:   1 = Total, A lot = 2, A little = 3; 4 = None  Ambulate in room:  1 = Total, A lot = 2, A little = 3; 4 = None  3-5 Steps with railin = Total, A lot = 2, A little = 3; 4 = None  Score: 15    Modified [place]: N/A = No pre-op stroke/TIA    Post-Tx Position: Up in Chair, Alarms activated, and Call light and personal items within reach  PPE: gloves        Electronically signed by Rochelle Rehman, PT at 24 1637       Rochelle Rehman, PT at 24 163  Version 1 of 1         Lauar Mejia presents with pain limiting her participation in functional mobility and gait training today.  PT noted several instances documented of pt refusing and not using her TLSO. Edu provided for safety and brace use to prevent additional fractures and complications. Pt and CG verbalize understanding but will likely need reinforcement due to noted impairment in cognitive status. Will continue to follow and progress as tolerated.     Electronically signed by Rochelle Rehman, PT at 24 1637       Rochelle Rehman, PT at 24 1137  Version 1 of 24 1136   OTHER   Discipline physical therapist   Rehab Time/Intention   Session Not Performed unable to treat, medical status change  (Rapid response this AM due to AMS and tachycardia. Resting  bpm this AM, not appropriate for PT at this time. Will f/u as time allows.)   Therapy Assessment/Plan (PT)   Criteria for Skilled Interventions Met (PT) yes;meets criteria   Recommendation   PT - Next Appointment 24         Electronically signed by Rochelle Rehman PT at 24 1137       Rochelle Rehman, PT at 24 9770  Version 1 of 1         Subjective: Pt agreeable to therapeutic plan of care. Pleasantly confused. Decreased attn to task  "requires frequent redirection.     Objective:     Precautions - spinal precautions and TLSO. Dependent for donning TLSO. Brace donned immediately upon sitting. Appropriate fit and alignment confirmed. Brace remains on through session and left on due to pt staying up in the chair.     Bed mobility - Min-A rolling to left  with tactile cues for log roll. modA for supine to sit.   Transfers - Mod-A sit to stand and Bobbi for steps from bed to chair.   Ambulation - unable    Static sitting balance - poor with posterior lean, continuous cues and min/modA needed to maintain.   Static and dynamic standing balance with arm in arm assist and therapist anterior to patient. Completes with Bobbi for stability.       Vitals: Tachycardic  bpm with activity.     Pain: 8 VAS   Location: back  Intervention for pain: Repositioned, RN notified, Increased Activity, and Therapeutic Presence, brace for support.     Education: Provided education on the importance of mobility in the acute care setting, Verbal/Tactile Cues, Transfer Training, Gait Training, and Post-Op Precautions, brace use.     Assessment: Laura Mejia had a rapid response yesterday due to AMS, Afib/ tachycardia, and hypotension. She presents today more impaired than last session. Pt follows commands but needs repeated cues and redirection.  Requiring min/modA for bed mobility and transfer OOB to chair. Unable to ambulate due to weakness. Pt with ongoing endurance, balance, strength, cognition and functional mobility impairments which indicate the need for skilled intervention. PT emphasizes need for SNF rehab at time of DC and spouse is agreeable today. Tolerating session today without incident. Will continue to follow and progress as tolerated.     Plan/Recommendations:   If medically appropriate, Moderate Intensity Therapy recommended post-acute care. This is recommended as therapy feels the patient would require 3-4 days per week and wouldn't tolerate \"3 " "hour daily\" rehab intensity. SNF would be the preferred choice. If the patient does not agree to SNF, arrange HH or OP depending on home bound status. If patient is medically complex, consider LTACH. Pt requires no DME at discharge.     Pt desires Skilled Rehab placement at discharge. Pt cooperative; agreeable to therapeutic recommendations and plan of care.         Basic Mobility 6-click:  Rollin = Total, A lot = 2, A little = 3; 4 = None  Supine>Sit:   1 = Total, A lot = 2, A little = 3; 4 = None   Sit>Stand with arms:  1 = Total, A lot = 2, A little = 3; 4 = None  Bed>Chair:   1 = Total, A lot = 2, A little = 3; 4 = None  Ambulate in room:  1 = Total, A lot = 2, A little = 3; 4 = None  3-5 Steps with railin = Total, A lot = 2, A little = 3; 4 = None  Score: 14    Modified [place]: N/A = No pre-op stroke/TIA    Post-Tx Position: Up in Chair, Alarms activated, and Call light and personal items within reach, spouse present  PPE: gloves        Electronically signed by Rochelle Rehman PT at 24 1605          Occupational Therapy Notes (all)        Luigi Delaney, OT at 24 1709          Subjective: Pt agreeable to therapeutic plan of care.  Cognition: oriented to Person    Objective:     Precautions - spinal precautions    Bed Mobility: Max-A   Functional Transfers: Mod-A     Balance: supported, static, and standing Min-A  Functional Ambulation: N/A or Not attempted.    Lower Body Dressing: Dependent  ADL Position: supported sitting and supported standing      Vitals: WNL    Pain: 4 VAS  Location: back  Interventions for pain: Repositioned  Education: Provided education on the importance of mobility in the acute care setting      Assessment: Laura Mejia presents with ADL impairments affecting function including balance, cognition, endurance / activity tolerance, pain, and strength. Demonstrated functioning below baseline abilities indicate the need for continued skilled " "intervention while inpatient. Tolerating session today without incident. Will continue to follow and progress as tolerated.     Plan/Recommendations:   Moderate Intensity Therapy recommended post-acute care. This is recommended as therapy feels the patient would require 3-4 days per week and wouldn't tolerate \"3 hour daily\" rehab intensity. SNF would be the preferred choice. If the patient does not agree to SNF, arrange HH or OP depending on home bound status. If patient is medically complex, consider LTACH.. Pt requires no DME at discharge.     Pt desires Skilled Rehab placement at discharge. Pt cooperative; agreeable to therapeutic recommendations and plan of care.     Modified [place]: N/A = No pre-op stroke/TIA    Post-Tx Position: Supine with HOB Elevated  PPE: gloves      Electronically signed by Luigi Delaney OT at 07/31/24 1709       Luigi Delaney OT at 07/31/24 1707          Goal Outcome Evaluation:         Pt. W/o significant progress made this date, remains confused and requires mod A SPS transfer armchair to EOB, poor sitting balance w/ max A to correct posterior lean. Pt. Provided max A for toileting Adls secondary to incontinence. Recommend SNF placement at d/c to address aforementioned deficits.                                        Electronically signed by Luigi Delaney OT at 07/31/24 1708       Luigi Delaney OT at 07/30/24 1347          Goal Outcome Evaluation:         Pt. W/ rapid response this AM, /130s resting and not appropriate for skilled therapy at this time.                                        Electronically signed by Luigi Delaney OT at 07/30/24 1347       Chele Tanner OT at 07/28/24 1337          Goal Outcome Evaluation:  Plan of Care Reviewed With: patient, family    Outcome Evaluation: Pt is a 69 y/o female admitted to [facility] on 7/25/24 presenting with AMS. Psych consulted as pt has been hallucinating. Pt recently discharged on 7/14/24, found to " have compression fx of T10. Per neuro, no surgical intervention recommended, however advised pt to wear TLSO when OOB. PMHx significant for CABG, hx PE, frequent UTI and hx A.fib on warfarin. At baseline pt resides with spouse in [facility] home with x2 LEO. Pt typically (I) with ADLs, no use of AD for mobility, has RW uses infrequently, though has been requiring increased assist after recent hospital admission. Pt reports she owns BSC in bedroom. Upon evaluation, pt A&O x4 with reports of 8/10 pain in back. Pt in bathroom receiving dependent A from adult daughter to complete eliana-care for BM. Pt without TLSO, OT educated family on importance of brace and spouse went to car to receive brace for pt. Pt requires dependent assist to don TLSO and additionally educated on log roll technique when completing bed mobility. Pt max assist to don brief and gown. Pt comes to standing with min A and FWW to ambulate to chair with CGA for safety. Pt impulsive and unaware of need for assistance, use of TLSO brace, and spinal precautions. OT anticipating home with HHOT once pain is managed with education.    Anticipated Discharge Disposition (OT): home with home health    Electronically signed by Chele Tanner OT at 24 1337       Chele Tanner OT at 24 1337          Patient Name: Laura Mejia  : 1955    MRN: [id number]                              Today's Date: 2024       Admit Date: 2024    Visit Dx:     ICD-10-CM ICD-9-CM   1. Altered mental status, unspecified altered mental status type  R41.82 780.97     Patient Active Problem List   Diagnosis    New onset atrial fibrillation    Near syncope    S/P CABG (coronary artery bypass graft)    Abnormal nuclear stress test    Altered mental status    Altered mental status, unspecified    Atrial fibrillation with RVR    Urinary tract infection    Abdominal aortic aneurysm (AAA)    Acute on chronic systolic heart failure    Allergic rhinitis     Atherosclerosis of coronary artery bypass graft    Compression fracture of lumbar vertebra    Congestive heart failure    COPD (chronic obstructive pulmonary disease)    Degeneration of intervertebral disc of lumbar region    Degeneration of thoracic intervertebral disc    Degenerative spondylolisthesis    Depression    Gastroesophageal reflux disease    History of pulmonary embolism    Hyperlipidemia    Hypothyroidism    Irritable bowel syndrome    Ischemic cardiomyopathy    Kidney stone    Lumbar radiculopathy    Obstructive sleep apnea syndrome    Osteopenia    Peripheral neuropathy    Pleural effusion    Pulmonary emboli    Hypertension    S/P CABG x 4    Hypotension due to hypovolemia    Closed wedge compression fracture of T10 vertebra    Generalized weakness    Leukocytosis    Multiple falls     Past Medical History:   Diagnosis Date    AAA (abdominal aortic aneurysm)     infrarenal- 3.1cm(2010, 3.7x4.2cm(2016), 3.9cm (2017)    Allergic rhinitis     Aspiration pneumonia 05/2017    CHF (congestive heart failure)     Coronary artery disease     DDD (degenerative disc disease), lumbar     lumbar spine- Dr. Churchill     Depression     Diverticulosis     Frequent UTI     Dr. Daniels    GERD (gastroesophageal reflux disease)     Hiatal hernia     HSV (herpes simplex virus) infection     Oral    Hyperlipidemia     IBS (irritable bowel syndrome)     Constipation predominant    Ischemic cardiomyopathy 09/2017    AICD     Kidney stones     NSTEMI (non-ST elevated myocardial infarction) 04/26/2017    Obstructive sleep apnea 2011    nfsg 2011, ahi 67    Osteoarthritis     Osteopenia     Peripheral neuropathy     feet    Pulmonary embolism, bilateral 05/2017    Rotator cuff tear     Bilateral- Ortho      Past Surgical History:   Procedure Laterality Date    CARDIAC CATHETERIZATION  04/26/2017    99% mid LAD. 90% mid LCX. 60% RCA. Recommended CABG. Dr. Diaz    CARDIAC DEFIBRILLATOR PLACEMENT  12/26/2017    ICD implant/   Caleb    CATARACT EXTRACTION      CORONARY ARTERY BYPASS GRAFT  04/26/2017    x4 & ASD Closure    CYSTOSCOPY  2002    negative. Dr. Daniels    LAPAROSCOPIC CHOLECYSTECTOMY  04/17/2013    For biliary dyskinesia. Dr. Mccoy.     REPLACEMENT TOTAL KNEE BILATERAL  11/2004    Dr. Herrera    THORACENTESIS Left 05/08/2017    TONSILLECTOMY      TUBAL ABDOMINAL LIGATION Bilateral       General Information       Row Name 07/28/24 1315          OT Time and Intention    Document Type evaluation  -SP     Mode of Treatment occupational therapy  -SP       Row Name 07/28/24 1315          General Information    Patient Profile Reviewed yes  -SP     Prior Level of Function independent:;ADL's;driving;all household mobility;home management  -SP     Existing Precautions/Restrictions TLSO  -SP     Barriers to Rehab medically complex;previous functional deficit  -SP       Row Name 07/28/24 1315          Living Environment    People in Home spouse  -SP       Row Name 07/28/24 1315          Home Main Entrance    Number of Stairs, Main Entrance two  -SP       Row Name 07/28/24 1315          Stairs Within Home, Primary    Number of Stairs, Within Home, Primary two  -SP       Row Name 07/28/24 1315          Cognition    Orientation Status (Cognition) oriented x 4  -SP       Row Name 07/28/24 1315          Safety Issues, Functional Mobility    Safety Issues Affecting Function (Mobility) at risk behavior observed;awareness of need for assistance;impulsivity;judgment;safety precautions follow-through/compliance  -SP     Impairments Affecting Function (Mobility) balance;endurance/activity tolerance;pain  -SP               User Key  (r) = Recorded By, (t) = Taken By, (c) = Cosigned By      Initials Name Provider Type    SP Chele Tanner, SARITHA Occupational Therapist                     Mobility/ADL's       Row Name 07/28/24 1323          Bed Mobility    Bed Mobility bed mobility (all) activities  -SP     All Activities, [place] (Bed Mobility) not  tested  -SP       Row Name 07/28/24 1323          Transfers    Transfers sit-stand transfer;stand-sit transfer  -SP       Row Name 07/28/24 1323          Sit-Stand Transfer    Sit-Stand [place] (Transfers) minimum assist (75% patient effort)  -SP     Assistive Device (Sit-Stand Transfers) walker, front-wheeled  -SP       Row Name 07/28/24 1323          Stand-Sit Transfer    Stand-Sit [place] (Transfers) minimum assist (75% patient effort)  -SP     Assistive Device (Stand-Sit Transfers) walker, front-wheeled  -SP       Row Name 07/28/24 1323          Functional Mobility    Functional Mobility- Ind. Level contact guard assist  -SP     Functional Mobility- Device walker, front-wheeled  -SP     Patient was able to Ambulate yes  -SP       Row Name 07/28/24 1323          Activities of Daily Living    BADL Assessment/Intervention lower body dressing  -SP       Row Name 07/28/24 1323          Lower Body Dressing Assessment/Training    [place] Level (Lower Body Dressing) don;pants/bottoms;maximum assist (25% patient effort)  -SP               User Key  (r) = Recorded By, (t) = Taken By, (c) = Cosigned By      Initials Name Provider Type    SP Chele Tanner OT Occupational Therapist                   Obj/Interventions       Row Name 07/28/24 1324          Sensory Assessment (Somatosensory)    Sensory Assessment (Somatosensory) UE sensation intact  -SP       Row Name 07/28/24 1324          Vision Assessment/Intervention    Vision Assessment Comment Does not reports any recent changes in vision  -SP       Row Name 07/28/24 1324          Range of Motion Comprehensive    General Range of Motion bilateral upper extremity ROM WFL  -SP       Row Name 07/28/24 1324          Strength Comprehensive (MMT)    Comment, General Manual Muscle Testing (MMT) Assessment BUE not formally tested d/t severe pain, though observed WFLs,  strength 4/5.  -SP       Row Name 07/28/24 1324          Balance    Balance Assessment  sitting static balance;sitting dynamic balance;sit to stand dynamic balance;standing static balance;standing dynamic balance  -SP     Static Sitting Balance standby assist  -SP     Dynamic Sitting Balance standby assist  -SP     Position, Sitting Balance unsupported;sitting edge of bed  -SP     Sit to Stand Dynamic Balance minimal assist  -SP     Static Standing Balance contact guard  -SP     Dynamic Standing Balance contact guard  -SP     Position/Device Used, Standing Balance walker, front-wheeled  -SP               User Key  (r) = Recorded By, (t) = Taken By, (c) = Cosigned By      Initials Name Provider Type    SP Chele Tanner, OT Occupational Therapist                   Goals/Plan       Row Name 07/28/24 8075          Bed Mobility Goal 1 (OT)    Activity/Assistive Device (Bed Mobility Goal 1, OT) bed mobility activities, all;other (see comments)  Pt will demo good understanding of log roll technique  -SP     [place] Level/Cues Needed (Bed Mobility Goal 1, OT) modified independence  -SP     Time Frame (Bed Mobility Goal 1, OT) 2 weeks  -SP       Row Name 07/28/24 2858          Bathing Goal 1 (OT)    Activity/Device (Bathing Goal 1, OT) bathing skills, all  -SP     [place] Level/Cues Needed (Bathing Goal 1, OT) minimum assist (75% or more patient effort)  -SP     Time Frame (Bathing Goal 1, OT) 2 weeks  -SP     Strategies/Barriers (Bathing Goal 1, OT) until d/c  -SP       Row Name 07/28/24 0187          Dressing Goal 1 (OT)    Activity/Device (Dressing Goal 1, OT) dressing skills, all  -SP     [place]/Cues Needed (Dressing Goal 1, OT) minimum assist (75% or more patient effort)  -SP     Time Frame (Dressing Goal 1, OT) 2 weeks  -SP     Strategies/Barriers (Dressing Goal 1, OT) until d/c  -SP       Row Name 07/28/24 8804          Therapy Assessment/Plan (OT)    Planned Therapy Interventions (OT) activity tolerance training;BADL retraining;IADL retraining;occupation/activity based  interventions;patient/caregiver education/training;ROM/therapeutic exercise;strengthening exercise;transfer/mobility retraining  -SP               User Key  (r) = Recorded By, (t) = Taken By, (c) = Cosigned By      Initials Name Provider Type    Chele Jurado OT Occupational Therapist                   Clinical Impression       Row Name 07/28/24 1329          Pain Assessment    Pretreatment Pain Rating 8/10  -SP     Posttreatment Pain Rating 10/10  -SP     Pain Location - back  -SP     Pain Intervention(s) Repositioned;Nursing Notified;Therapeutic presence  -SP       Row Name 07/28/24 1322          Plan of Care Review    Plan of Care Reviewed With patient;family  -SP     Outcome Evaluation Pt is a 67 y/o female admitted to [facility] on 7/25/24 presenting with AMS. Psych consulted as pt has been hallucinating. Pt recently discharged on 7/14/24, found to have compression fx of T10. Per neuro, no surgical intervention recommended, however advised pt to wear TLSO when OOB. PMHx significant for CABG, hx PE, frequent UTI and hx A.fib on warfarin. At baseline pt resides with spouse in [facility] home with x2 LEO. Pt typically (I) with ADLs, no use of AD for mobility, has RW uses infrequently, though has been requiring increased assist after recent hospital admission. Pt reports she owns BSC in bedroom. Upon evaluation, pt A&O x4 with reports of 8/10 pain in back. Pt in bathroom receiving dependent A from adult daughter to complete eliana-care for BM. Pt without TLSO, OT educated family on importance of brace and spouse went to car to receive brace for pt. Pt requires dependent assist to don TLSO and additionally educated on log roll technique when completing bed mobility. Pt max assist to don brief and gown. Pt comes to standing with min A and FWW to ambulate to chair with CGA for safety. Pt impulsive and unaware of need for assistance, use of TLSO brace, and spinal precautions. OT anticipating home with HHOT once pain is managed  with education.  -SP       Row Name 07/28/24 1326          Therapy Assessment/Plan (OT)    Criteria for Skilled Therapeutic Interventions Met (OT) yes;meets criteria;skilled treatment is necessary  -SP     Therapy Frequency (OT) 3 times/wk  -SP     Predicted Duration of Therapy Intervention (OT) until d/c  -SP       Row Name 07/28/24 1326          Therapy Plan Review/Discharge Plan (OT)    Anticipated Discharge Disposition (OT) home with home health  -SP       Row Name 07/28/24 1326          Vital Signs    O2 Delivery Pre Treatment room air  -SP     O2 Delivery Intra Treatment room air  -SP     O2 Delivery Post Treatment room air  -SP     Pre Patient Position Standing  -SP     Intra Patient Position Sitting  -SP     Post Patient Position Sitting  -SP       Row Name 07/28/24 1326          Positioning and Restraints    Pre-Treatment Position bathroom  -SP     Post Treatment Position chair  -SP     In Chair notified nsg;reclined;call light within reach;encouraged to call for assist;exit alarm on;with family/caregiver  -SP               User Key  (r) = Recorded By, (t) = Taken By, (c) = Cosigned By      Initials Name Provider Type    SP Chele Tanner, OT Occupational Therapist                   Outcome Measures       Row Name 07/28/24 1335          How much help from another is currently needed...    Putting on and taking off regular lower body clothing? 2  -SP     Bathing (including washing, rinsing, and drying) 2  -SP     Toileting (which includes using toilet bed pan or urinal) 2  -SP     Putting on and taking off regular upper body clothing 2  -SP     Taking care of personal grooming (such as brushing teeth) 3  -SP     Eating meals 4  -SP     AM-PAC 6 Clicks Score (OT) 15  -SP       Row Name 07/28/24 0800          How much help from another person do you currently need...    Turning from your back to your side while in flat bed without using bedrails? 2  -LB     Moving from lying on back to sitting on the side of  a flat bed without bedrails? 2  -LB     Moving to and from a bed to a chair (including a wheelchair)? 2  -LB     Standing up from a chair using your arms (e.g., wheelchair, bedside chair)? 2  -LB     Climbing 3-5 steps with a railing? 2  -LB     To walk in hospital room? 2  -LB     AM-PAC 6 Clicks Score (PT) 12  -LB     Highest Level of Mobility Goal 4 --> Transfer to chair/commode  -LB       Row Name 07/28/24 1335          Functional Assessment    Outcome Measure Options AM-PAC 6 Clicks Daily Activity (OT)  -SP               User Key  (r) = Recorded By, (t) = Taken By, (c) = Cosigned By      Initials Name Provider Type    Roselyn Amador, RN Registered Nurse    Chele Jurado OT Occupational Therapist                    Occupational Therapy Education       Title: PT OT SLP Therapies (In Progress)       Topic: Occupational Therapy (In Progress)       Point: ADL training (Done)       Description:   Instruct learner(s) on proper safety adaptation and remediation techniques during self care or transfers.   Instruct in proper use of assistive devices.                  Learning Progress Summary             Patient Acceptance, E,TB, VU,NR by SP at 7/28/2024 1336   Family Acceptance, E,TB, VU,NR by SP at 7/28/2024 1336                         Point: Home exercise program (Not Started)       Description:   Instruct learner(s) on appropriate technique for monitoring, assisting and/or progressing therapeutic exercises/activities.                  Learner Progress:  Not documented in this visit.              Point: Precautions (Done)       Description:   Instruct learner(s) on prescribed precautions during self-care and functional transfers.                  Learning Progress Summary             Patient Acceptance, E,TB, VU,NR by SP at 7/28/2024 1336   Family Acceptance, E,TB, VU,NR by SP at 7/28/2024 1336                         Point: Body mechanics (Done)       Description:   Instruct learner(s) on proper positioning  and spine alignment during self-care, functional mobility activities and/or exercises.                  Learning Progress Summary             Patient Acceptance, E,TB, VU,NR by SP at 7/28/2024 1336   Family Acceptance, E,TB, VU,NR by SP at 7/28/2024 1336                                         User Key       Initials Effective Dates Name Provider Type Discipline    SP 11/15/23 -  Chele Tanner, OT Occupational Therapist OT                  OT Recommendation and Plan  Planned Therapy Interventions (OT): activity tolerance training, BADL retraining, IADL retraining, occupation/activity based interventions, patient/caregiver education/training, ROM/therapeutic exercise, strengthening exercise, transfer/mobility retraining  Therapy Frequency (OT): 3 times/wk  Plan of Care Review  Plan of Care Reviewed With: patient, family  Outcome Evaluation: Pt is a 67 y/o female admitted to [facility] on 7/25/24 presenting with AMS. Psych consulted as pt has been hallucinating. Pt recently discharged on 7/14/24, found to have compression fx of T10. Per neuro, no surgical intervention recommended, however advised pt to wear TLSO when OOB. PMHx significant for CABG, hx PE, frequent UTI and hx A.fib on warfarin. At baseline pt resides with spouse in [facility] home with x2 LEO. Pt typically (I) with ADLs, no use of AD for mobility, has RW uses infrequently, though has been requiring increased assist after recent hospital admission. Pt reports she owns BSC in bedroom. Upon evaluation, pt A&O x4 with reports of 8/10 pain in back. Pt in bathroom receiving dependent A from adult daughter to complete eliana-care for BM. Pt without TLSO, OT educated family on importance of brace and spouse went to car to receive brace for pt. Pt requires dependent assist to don TLSO and additionally educated on log roll technique when completing bed mobility. Pt max assist to don brief and gown. Pt comes to standing with min A and FWW to ambulate to chair with CGA for  safety. Pt impulsive and unaware of need for assistance, use of TLSO brace, and spinal precautions. OT anticipating home with HHOT once pain is managed with education.     Time Calculation:         Time Calculation- OT       Row Name 07/28/24 1336             Time Calculation- OT    OT Start Time 1137  -SP      OT Stop Time 1213  -SP      OT Time Calculation (min) 36 min  -SP      OT Received On 07/28/24  -SP      OT - Next Appointment 07/30/24  -SP      OT Goal Re-Cert Due Date 08/11/24  -SP                User Key  (r) = Recorded By, (t) = Taken By, (c) = Cosigned By      Initials Name Provider Type    SP Chele Tanner OT Occupational Therapist                  Therapy Charges for Today       Code Description Service Date Service Provider Modifiers Qty    [id number] HC OT EVAL MOD COMPLEXITY 4 7/28/2024 Chele Tanner OT GO 1                 Chele Tanner OT  7/28/2024    Electronically signed by Chele Tanner OT at 07/28/24 4187

## 2024-08-02 NOTE — CASE MANAGEMENT/SOCIAL WORK
Continued Stay Note   [facility]     Patient Name: Laura Mejia  MRN: [id number]  Today's Date: 8/2/2024    Admit Date: 7/25/2024    Plan: D/C Plan: [facility]. Auth approved 8/3/24-8/6/24; Transport TBD   Discharge Plan       Row Name 08/02/24 1002       Plan    Plan D/C Plan: [facility]. Auth approved 8/3/24-8/6/24; Transport TBD      Row Name 08/02/24 0940       Plan    Plan D/C Plan: [facility]; Ins auth initiated 8/2/24 am.;PASRR approved. Transport TBD                 Expected Discharge Date and Time       Expected Discharge Date Expected Discharge Time    Aug 3, 2024               Alberta Linares RN

## 2024-08-02 NOTE — THERAPY TREATMENT NOTE
"Subjective: Pt agreeable to therapeutic plan of care.    Objective:     Precautions - spinal precautions and TLSO. TLSO donned once seated EOB.     Bed mobility - Mod-A supine to sidelying to sit. Cues and assist for logroll technique.   Transfers - Min-A, Mod-A, Assist x 2, and with rolling walker. Pt with left lean and narrow TARUN upon standing, improved with tactile cues and improved TARUN.   Ambulation - 5 feet Min-A, Mod-A, Assist x 2, and with rolling walker. Pt with narrow TARUN, steppage gait, inconsistent foot placement, step length, and impaired jin.     Vitals: WNL    Pain: 8 VAS   Location: back  Intervention for pain: Repositioned and Therapeutic Presence    Education: Provided education on the importance of mobility in the acute care setting, Verbal/Tactile Cues, and Transfer Training, spinal precautions.     Assessment: Laura Mejia presents with continued cognitive deficit with poor safety awareness and disorientation. Pt with balance, strength and functional mobility impairments which indicate the need for skilled intervention. Tolerating session today without incident. Will continue to follow and progress as tolerated.     Plan/Recommendations:   If medically appropriate, Moderate Intensity Therapy recommended post-acute care. This is recommended as therapy feels the patient would require 3-4 days per week and wouldn't tolerate \"3 hour daily\" rehab intensity. SNF would be the preferred choice. If the patient does not agree to SNF, arrange HH or OP depending on home bound status. If patient is medically complex, consider LTACH. Pt requires no DME at discharge.     Pt desires Skilled Rehab placement at discharge. Pt cooperative; agreeable to therapeutic recommendations and plan of care.         Basic Mobility 6-click:  Rollin = Total, A lot = 2, A little = 3; 4 = None  Supine>Sit:   1 = Total, A lot = 2, A little = 3; 4 = None   Sit>Stand with arms:  1 = Total, A lot = 2, A little " = 3; 4 = None  Bed>Chair:   1 = Total, A lot = 2, A little = 3; 4 = None  Ambulate in room:  1 = Total, A lot = 2, A little = 3; 4 = None  3-5 Steps with railin = Total, A lot = 2, A little = 3; 4 = None  Score: 11    Modified [place]: N/A = No pre-op stroke/TIA    Post-Tx Position: Up in Chair, Alarms activated, and Call light and personal items within reach, spouse present  PPE: gloves

## 2024-08-02 NOTE — PROGRESS NOTES
Nutrition Services    Patient Name: Laura Mejia  YOB: 1955  MRN: [id number]  Admission date: 7/25/2024    PROGRESS NOTE      Encounter Information: Check on for PO intakes.         PO Diet: Diet: Cardiac; Healthy Heart (2-3 Na+); Fluid Consistency: Thin (IDDSI 0)   PO Supplements: Boost Plus BID    PO Intake:  42% average PO intakes x 6 documented meals since last RD review        Current nutrition support:    Nutrition support review:        Labs (reviewed below): Reviewed, management per attending        GI Function:  Last documented BM 7/31 (x 2 days ago)       Nutrition Intervention Updates: Continue current diet, supplement and encourage good PO intakes.         Results from last 7 days   Lab Units 08/02/24 0219 08/01/24 0438 07/31/24 0623 07/30/24  1104   SODIUM mmol/L 139 141 138 138   POTASSIUM mmol/L 3.6 4.1 3.7 3.0*   CHLORIDE mmol/L 109* 109* 109* 108*   CO2 mmol/L 18.8* 19.8* 17.9* 20.1*   BUN mg/dL 2* 2* 2* 2*   CREATININE mg/dL 0.61 0.63 0.65 0.61   CALCIUM mg/dL 8.6 8.6 8.4* 8.5*   BILIRUBIN mg/dL  --   --   --  0.4   ALK PHOS U/L  --   --   --  163*   ALT (SGPT) U/L  --   --   --  12   AST (SGOT) U/L  --   --   --  37*   GLUCOSE mg/dL 72 79 107* 92     Results from last 7 days   Lab Units 08/02/24 0219 08/01/24 0438 07/31/24 0623 07/30/24  1104   MAGNESIUM mg/dL  --  2.4 3.2* 1.4*   HEMOGLOBIN g/dL 9.0*  --   --  7.1*   HEMATOCRIT % 30.5*  --   --  23.8*     COVID19   Date Value Ref Range Status   07/25/2024 Not Detected Not Detected - Ref. Range Final     Lab Results   Component Value Date    HGBA1C 6.20 (H) 05/31/2024       RD to follow up per protocol.    Electronically signed by:  Alpa Gann RD  08/02/24 12:00 EDT

## 2024-08-02 NOTE — PROGRESS NOTES
LOS: 5 days   Patient Care Team:  Luan Joseph APRN as PCP - General (Family Medicine)  Jozef Otero MD as Consulting Physician (Nephrology)    Subjective     Interval History: Improving    Patient Complaints: Back pain, no other complaints.  She is much more oriented and conversationally appropriate today.    History taken from: patient    Review of Systems   Constitutional:  Positive for activity change. Negative for appetite change, chills, diaphoresis, fatigue and fever.   HENT:  Negative for facial swelling.    Eyes:  Negative for visual disturbance.   Respiratory:  Negative for cough and shortness of breath.    Cardiovascular:  Negative for chest pain, palpitations and leg swelling.   Gastrointestinal:  Negative for abdominal pain, diarrhea and nausea.   Endocrine: Negative for polyuria.   Musculoskeletal:  Positive for back pain and gait problem. Negative for arthralgias.   Neurological:  Positive for weakness. Negative for dizziness and light-headedness.   Psychiatric/Behavioral:  Positive for confusion.            Objective     Vital Signs  Temp:  [97.5 °F (36.4 °C)-98.6 °F (37 °C)] 97.8 °F (36.6 °C)  Heart Rate:  [71-98] 72  Resp:  [11-16] 11  BP: (103-135)/(54-84) 116/62    Physical Exam:     General Appearance:    Alert, cooperative, in no acute distress, oriented to person and place, situation; not date or year   Head:    Normocephalic, without obvious abnormality, atraumatic   Eyes:            Lids and lashes normal, conjunctivae and sclerae normal, no   icterus, no pallor, corneas clear, PERRLA   Ears:    Ears appear intact with no abnormalities noted   Throat:   No oral lesions, no thrush, oral mucosa moist   Neck:   No adenopathy, supple, trachea midline, no thyromegaly, no   carotid bruit, no JVD   Lungs:     Clear to auscultation,respirations regular, even and                  unlabored    Heart:    Regular rhythm and normal rate, normal S1 and S2, no            murmur, no gallop, no  rub, no click   Chest Wall:    No abnormalities observed   Abdomen:     Normal bowel sounds, no masses, no organomegaly, soft        Non-tender non-distended, no guarding,   Extremities:   Moves all extremities well, no edema, no cyanosis, no             Redness   Pulses:   Pulses palpable and equal bilaterally   Skin:   No bleeding, bruising or rash   Lymph nodes:   No palpable adenopathy   Neurologic:   Cranial nerves 2 - 12 grossly intact, sensation intact, DTR       present and equal bilaterally        Results Review:    Lab Results (last 24 hours)       Procedure Component Value Units Date/Time    Basic Metabolic Panel [[id number]]  (Abnormal) Collected: 08/02/24 0219    Specimen: Blood Updated: 08/02/24 0405     Glucose 72 mg/dL      BUN 2 mg/dL      Creatinine 0.61 mg/dL      Sodium 139 mmol/L      Potassium 3.6 mmol/L      Comment: Specimen hemolyzed.  Result may be falsely elevated.        Chloride 109 mmol/L      CO2 18.8 mmol/L      Calcium 8.6 mg/dL      BUN/Creatinine Ratio 3.3     Anion Gap 11.2 mmol/L      eGFR 96.9 mL/min/1.73     Narrative:      GFR Normal >60  Chronic Kidney Disease <60  Kidney Failure <15      CBC (No Diff) [[id number]]  (Abnormal) Collected: 08/02/24 0219    Specimen: Blood Updated: 08/02/24 0341     WBC 7.76 10*3/mm3      RBC 3.13 10*6/mm3      Hemoglobin 9.0 g/dL      Hematocrit 30.5 %      MCV 97.4 fL      MCH 28.8 pg      MCHC 29.5 g/dL      RDW 16.6 %      RDW-SD 59.5 fl      MPV 10.7 fL      Platelets 230 10*3/mm3              Imaging Results (Last 24 Hours)       ** No results found for the last 24 hours. **                 I reviewed the patient's new clinical results.    Medication Review:   Scheduled Meds:albuterol, 2.5 mg, Nebulization, Once  apixaban, 5 mg, Oral, BID  cefTRIAXone, 1,000 mg, Intravenous, Q24H  ferrous sulfate, 324 mg, Oral, Daily With Breakfast  folic acid, 1 mg, Oral, Daily  ipratropium-albuterol, 3 mL, Nebulization, 4x Daily - RT  levothyroxine, 75  mcg, Oral, Q AM  magnesium sulfate, 2 g, Intravenous, Once  metoprolol tartrate, 12.5 mg, Oral, Q12H  pantoprazole, 40 mg, Oral, Q AM  potassium chloride, 20 mEq, Oral, Daily  risperiDONE, 0.25 mg, Oral, Nightly  sertraline, 50 mg, Oral, Daily  sodium chloride, 10 mL, Intravenous, Q12H      Continuous Infusions:sodium chloride, 50 mL/hr, Last Rate: 50 mL/hr (07/31/24 1201)      PRN Meds:.  aluminum-magnesium hydroxide-simethicone    senna-docusate sodium **AND** polyethylene glycol **AND** bisacodyl **AND** bisacodyl    Calcium Replacement - Follow Nurse / BPA Driven Protocol    HYDROcodone-acetaminophen    LORazepam    Magnesium Standard Dose Replacement - Follow Nurse / BPA Driven Protocol    metoprolol tartrate    ondansetron ODT **OR** ondansetron    Phosphorus Replacement - Follow Nurse / BPA Driven Protocol    Potassium Replacement - Follow Nurse / BPA Driven Protocol    sodium chloride    sodium chloride    sodium chloride     Assessment & Plan       Altered mental status  Urinary tract infection-improving on Rocephin  Confusion related to urinary tract infection-improving    Paroxysmal atrial fibs-metoprolol, anticoagulation  Mood disorder-sertraline  Anemia-no visible signs of GI blood loss; continue supplemental iron, adding folate acid replacement; transfuse for hemoglobin less than 7  Hypokalemia-resolved  Hypomagnesemia-resolved        Plan for disposition: [facility] to skilled rehab tomorrow    Keke Casiano MD  08/02/24  18:31 EDT

## 2024-08-02 NOTE — PROGRESS NOTES
Referring Provider: Keke Casiano MD    Reason for follow-up:  Arrhythmia     Patient Care Team:  Luan Joseph APRN as PCP - General (Family Medicine)  Jozef Otero MD as Consulting Physician (Nephrology)    Subjective .      ROS  Feeling better.    Today, the patient has been without any chest discomfort ,shortness of breath, palpitations, dizziness or syncope.  Denies having any headache ,abdominal pain ,nausea, vomiting , diarrhea constipation, loss of weight or loss of appetite.  Denies having any excessive bruising ,hematuria or blood in the stool.    Review of all systems negative except as indicated    History  Past Medical History:   Diagnosis Date    AAA (abdominal aortic aneurysm)     infrarenal- 3.1cm(2010, 3.7x4.2cm(2016), 3.9cm (2017)    Allergic rhinitis     Aspiration pneumonia 05/2017    CHF (congestive heart failure)     Coronary artery disease     DDD (degenerative disc disease), lumbar     lumbar spine- Dr. Churchill     Depression     Diverticulosis     Frequent UTI     Dr. Daniels    GERD (gastroesophageal reflux disease)     Hiatal hernia     HSV (herpes simplex virus) infection     Oral    Hyperlipidemia     IBS (irritable bowel syndrome)     Constipation predominant    Ischemic cardiomyopathy 09/2017    AICD     Kidney stones     NSTEMI (non-ST elevated myocardial infarction) 04/26/2017    Obstructive sleep apnea 2011    nfsg 2011, ahi 67    Osteoarthritis     Osteopenia     Peripheral neuropathy     feet    Pulmonary embolism, bilateral 05/2017    Rotator cuff tear     Bilateral- Ortho        Past Surgical History:   Procedure Laterality Date    CARDIAC CATHETERIZATION  04/26/2017    99% mid LAD. 90% mid LCX. 60% RCA. Recommended CABG. Dr. Diaz    CARDIAC DEFIBRILLATOR PLACEMENT  12/26/2017    ICD implant/ Dr. Ellis    CATARACT EXTRACTION      CORONARY ARTERY BYPASS GRAFT  04/26/2017    x4 & ASD Closure    CYSTOSCOPY  2002    negative. Dr. Daniels    LAPAROSCOPIC CHOLECYSTECTOMY   04/17/2013    For biliary dyskinesia. Dr. Mccoy.     REPLACEMENT TOTAL KNEE BILATERAL  11/2004    Dr. Herrera    THORACENTESIS Left 05/08/2017    TONSILLECTOMY      TUBAL ABDOMINAL LIGATION Bilateral        Family History   Problem Relation Age of Onset    Heart disease Mother     Other Mother         Hypoglycemia    Osteoporosis Mother     COPD Father     Stroke Father     Hypertension Brother     Diabetes Brother     Heart disease Brother        Social History     Tobacco Use    Smoking status: Never   Vaping Use    Vaping status: Some Days    Substances: CBD, nightly, 2-3 weekly   Substance Use Topics    Alcohol use: Never    Drug use: Never        Medications Prior to Admission   Medication Sig Dispense Refill Last Dose    apixaban (ELIQUIS) 5 MG tablet tablet Take 1 tablet by mouth 2 (Two) Times a Day. Indications: Atrial Fibrillation   7/24/2024    aspirin-acetaminophen-caffeine (Excedrin Migraine) 250-250-65 MG per tablet Take 1 tablet by mouth Every 6 (Six) Hours As Needed for Headache. Indications: Headache   7/24/2024    atorvastatin (LIPITOR) 80 MG tablet Take 1 tablet by mouth Daily. Indications: High Amount of Fats in the Blood   7/24/2024    digoxin (LANOXIN) 125 MCG tablet Take 1 tablet by mouth Daily. 30 tablet 3 7/24/2024    levothyroxine (SYNTHROID, LEVOTHROID) 75 MCG tablet Take 1 tablet by mouth Daily.   7/24/2024    linaclotide (LINZESS) 72 MCG capsule capsule Take 1 capsule by mouth Daily As Needed (Irritable bowel). Indications: Constipation caused by Irritable Bowel Syndrome   7/24/2024    metoprolol succinate XL (TOPROL-XL) 25 MG 24 hr tablet Take 0.5 tablets by mouth Every 12 (Twelve) Hours. Indications: Atrial Fibrillation, High Blood Pressure Disorder 30 tablet 3 7/24/2024    ondansetron (ZOFRAN) 4 MG tablet Take 1 tablet by mouth Every 8 (Eight) Hours As Needed for Nausea or Vomiting.   7/24/2024    oxybutynin XL (DITROPAN-XL) 10 MG 24 hr tablet Take 1 tablet by mouth Daily.   7/24/2024     pantoprazole (PROTONIX) 40 MG EC tablet Take 1 tablet by mouth Every Morning. 30 tablet 0 7/24/2024    PARoxetine (PAXIL) 40 MG tablet Take 1 tablet by mouth Every Morning. Indications: Major Depressive Disorder   7/24/2024    potassium chloride ER (K-TAB) 20 MEQ tablet controlled-release ER tablet Take 1 tablet by mouth Daily.   7/24/2024    HYDROcodone-acetaminophen (NORCO) 5-325 MG per tablet Take 1 tablet by mouth Every 6 (Six) Hours As Needed for Moderate Pain for up to 30 days. 30 tablet 0     ibuprofen (ADVIL,MOTRIN) 400 MG tablet Take 1 tablet by mouth Every 6 (Six) Hours As Needed for Mild Pain.          Allergies  Oxytetracycline and Oxycodone    Scheduled Meds:albuterol, 2.5 mg, Nebulization, Once  apixaban, 5 mg, Oral, BID  cefTRIAXone, 1,000 mg, Intravenous, Q24H  ferrous sulfate, 324 mg, Oral, Daily With Breakfast  folic acid, 1 mg, Oral, Daily  ipratropium-albuterol, 3 mL, Nebulization, 4x Daily - RT  levothyroxine, 75 mcg, Oral, Q AM  magnesium sulfate, 2 g, Intravenous, Once  metoprolol tartrate, 12.5 mg, Oral, Q12H  pantoprazole, 40 mg, Oral, Q AM  potassium chloride, 20 mEq, Oral, Daily  potassium chloride ER, 40 mEq, Oral, Q4H  risperiDONE, 0.25 mg, Oral, Nightly  sertraline, 50 mg, Oral, Daily  sodium chloride, 10 mL, Intravenous, Q12H      Continuous Infusions:sodium chloride, 50 mL/hr, Last Rate: 50 mL/hr (07/31/24 1201)      PRN Meds:.  senna-docusate sodium **AND** polyethylene glycol **AND** bisacodyl **AND** bisacodyl    Calcium Replacement - Follow Nurse / BPA Driven Protocol    HYDROcodone-acetaminophen    LORazepam    Magnesium Standard Dose Replacement - Follow Nurse / BPA Driven Protocol    metoprolol tartrate    ondansetron ODT **OR** ondansetron    Phosphorus Replacement - Follow Nurse / BPA Driven Protocol    Potassium Replacement - Follow Nurse / BPA Driven Protocol    sodium chloride    sodium chloride    sodium chloride    Objective     VITAL SIGNS  Vitals:    08/01/24 1624  "08/01/24 2050 08/02/24 0013 08/02/24 0536   BP: 120/52 135/61 111/65 114/84   BP Location: Right arm Right arm Right arm Right arm   Patient Position: Lying Lying Lying Lying   Pulse: 98 98 82 93   Resp: 15 16 14 15   Temp: 97.9 °F (36.6 °C) 98.4 °F (36.9 °C) 97.5 °F (36.4 °C) 98.6 °F (37 °C)   TempSrc: Temporal Oral Oral Oral   SpO2: 96% 95% 95% 94%   Weight:       Height:           Flowsheet Rows      Flowsheet Row First Filed Value   Admission Height 172.7 cm (68\") Documented at 07/25/2024 1234   Admission Weight 72.3 kg (159 lb 6.3 oz) Documented at 07/25/2024 1234              Intake/Output Summary (Last 24 hours) at 8/2/2024 0641  Last data filed at 8/1/2024 1300  Gross per 24 hour   Intake 480 ml   Output 1000 ml   Net -520 ml        TELEMETRY: Sinus rhythm    Physical Exam:  The patient is alert, oriented and in no distress.  Vital signs as noted above.  Head and neck revealed no carotid bruits or jugular venous distention.  No thyromegaly or lymphadenopathy is present  Lungs clear.  No wheezing.  Breath sounds are normal bilaterally.  Heart normal first and second heart sounds.  No murmur. No precordial rub is present.  No gallop is present.  Abdomen soft and nontender.  No organomegaly is present.  Extremities with good peripheral pulses without any pedal edema.  Skin warm and dry.  Musculoskeletal system is grossly normal  CNS grossly normal    Reviewed and updated.    Results Review:   I reviewed the patient's new clinical results.  Lab Results (last 24 hours)       Procedure Component Value Units Date/Time    Basic Metabolic Panel [[id number]]  (Abnormal) Collected: 08/02/24 0219    Specimen: Blood Updated: 08/02/24 0405     Glucose 72 mg/dL      BUN 2 mg/dL      Creatinine 0.61 mg/dL      Sodium 139 mmol/L      Potassium 3.6 mmol/L      Comment: Specimen hemolyzed.  Result may be falsely elevated.        Chloride 109 mmol/L      CO2 18.8 mmol/L      Calcium 8.6 mg/dL      BUN/Creatinine Ratio 3.3     " Anion Gap 11.2 mmol/L      eGFR 96.9 mL/min/1.73     Narrative:      GFR Normal >60  Chronic Kidney Disease <60  Kidney Failure <15      CBC (No Diff) [[id number]]  (Abnormal) Collected: 08/02/24 0219    Specimen: Blood Updated: 08/02/24 0341     WBC 7.76 10*3/mm3      RBC 3.13 10*6/mm3      Hemoglobin 9.0 g/dL      Hematocrit 30.5 %      MCV 97.4 fL      MCH 28.8 pg      MCHC 29.5 g/dL      RDW 16.6 %      RDW-SD 59.5 fl      MPV 10.7 fL      Platelets 230 10*3/mm3     Urine Culture - Urine, Straight Cath [[id number]]  (Abnormal)  (Susceptibility) Collected: 07/30/24 1059    Specimen: Urine from Straight Cath Updated: 08/01/24 1009     Urine Culture >100,000 CFU/mL Escherichia coli    Narrative:      Colonization of the urinary tract without infection is common. Treatment is discouraged unless the patient is symptomatic, pregnant, or undergoing an invasive urologic procedure.    Susceptibility        Escherichia coli      FRANK      Amoxicillin + Clavulanate Susceptible      Ampicillin Resistant      Ampicillin + Sulbactam Intermediate      Cefazolin Susceptible      Cefepime Susceptible      Ceftazidime Susceptible      Ceftriaxone Susceptible      Gentamicin Susceptible      Levofloxacin Intermediate      Nitrofurantoin Susceptible      Piperacillin + Tazobactam Susceptible      Trimethoprim + Sulfamethoxazole Susceptible                           Reticulocytes [[id number]]  (Normal) Collected: 08/01/24 0948    Specimen: Blood Updated: 08/01/24 0958     Reticulocyte % 1.38 %      Reticulocyte Absolute 0.0432 10*6/mm3             Imaging Results (Last 24 Hours)       ** No results found for the last 24 hours. **        LAB RESULTS (LAST 7 DAYS)    CBC  Results from last 7 days   Lab Units 08/02/24  0219 07/30/24  1104 07/30/24  0636 07/27/24  0553   WBC 10*3/mm3 7.76 7.67  --  11.22*   RBC 10*6/mm3 3.13* 2.44*  --  2.93*   HEMOGLOBIN g/dL 9.0* 7.1*  --  8.8*   HEMOGLOBIN, POC g/dL  --   --  9.8*  --    HEMATOCRIT %  30.5* 23.8*  --  28.8*   HEMATOCRIT POC %  --   --  29*  --    MCV fL 97.4* 97.5*  --  98.3*   PLATELETS 10*3/mm3 230 215  --  177       BMP  Results from last 7 days   Lab Units 08/02/24 0219 08/01/24  0438 07/31/24  0623 07/30/24  1104 07/30/24  0636 07/29/24  0548 07/27/24  1320 07/27/24  0553   SODIUM mmol/L 139 141 138 138  --  138  --  138   POTASSIUM mmol/L 3.6 4.1 3.7 3.0*  --  3.7 3.7 3.2*   CHLORIDE mmol/L 109* 109* 109* 108*  --  110*  --  110*   CO2 mmol/L 18.8* 19.8* 17.9* 20.1*  --  19.7*  --  19.9*   BUN mg/dL 2* 2* 2* 2*  --  3*  --  8   CREATININE mg/dL 0.61 0.63 0.65 0.61 0.68 0.60  --  0.66   GLUCOSE mg/dL 72 79 107* 92  --  86  --  84   MAGNESIUM mg/dL  --  2.4 3.2* 1.4*  --   --   --   --        CMP   Results from last 7 days   Lab Units 08/02/24 0219 08/01/24 0438 07/31/24  0623 07/30/24  1104 07/30/24  0636 07/29/24  0548 07/27/24  1320 07/27/24  0553   SODIUM mmol/L 139 141 138 138  --  138  --  138   POTASSIUM mmol/L 3.6 4.1 3.7 3.0*  --  3.7 3.7 3.2*   CHLORIDE mmol/L 109* 109* 109* 108*  --  110*  --  110*   CO2 mmol/L 18.8* 19.8* 17.9* 20.1*  --  19.7*  --  19.9*   BUN mg/dL 2* 2* 2* 2*  --  3*  --  8   CREATININE mg/dL 0.61 0.63 0.65 0.61 0.68 0.60  --  0.66   GLUCOSE mg/dL 72 79 107* 92  --  86  --  84   ALBUMIN g/dL  --   --   --  2.7*  --   --   --   --    BILIRUBIN mg/dL  --   --   --  0.4  --   --   --   --    ALK PHOS U/L  --   --   --  163*  --   --   --   --    AST (SGOT) U/L  --   --   --  37*  --   --   --   --    ALT (SGPT) U/L  --   --   --  12  --   --   --   --    AMMONIA umol/L  --   --   --   --   --   --  15  --          BNP        TROPONIN          CoAg        Creatinine Clearance  Estimated Creatinine Clearance: 99.8 mL/min (by C-G formula based on SCr of 0.61 mg/dL).    ABG  Results from last 7 days   Lab Units 07/30/24  0636   PH, ARTERIAL pH units 7.416   PCO2, ARTERIAL mm Hg 28.5*   PO2 ART mm Hg 85.6   O2 SATURATION ART % 96.8   BASE EXCESS ART mmol/L -5.3*        Radiology  No radiology results for the last day        EKG                    I personally viewed and interpreted the patient's EKG/Telemetry data:    ECHOCARDIOGRAM:    Results for orders placed during the hospital encounter of 01/22/24    Adult Transthoracic Echo Complete W/ Cont if Necessary Per Protocol    Interpretation Summary    Left ventricular ejection fraction appears to be 56 - 60%.    Estimated right ventricular systolic pressure from tricuspid regurgitation is normal (<35 mmHg).    Indication  Dyspnea  Palpitations    Technically satisfactory study.  Mitral valve is structurally normal.  Tricuspid valve is structurally normal.  Mild tricuspid regurgitation is present.  Aortic valve is aortic valve with decreased opening motion.  Gradient across the aortic valve is 16/9 mmHg.  Valve area 1.37 cm².  Pulmonic valve could not be well visualized.  Left atrium is enlarged.  Right atrium is normal in size.  Left ventricle is enlarged with septal anterior wall and apical severe hypokinesis with ejection fraction of 40%.  Possible left ventricular apical thrombus.  Right ventricle is normal in size.  Atrial septum is intact.  Aorta is normal.  No pericardial effusion or intracardiac thrombus is seen.  ICD lead is present in the right ventricle.    Impression  Mild tricuspid regurgitation.  Mild to moderate aortic valve stenosis with gradient of 16/9 mmHg and valve area of 1.37 cm².  Left atrial enlargement.  Left ventricle is enlarged with septal anterior wall and apical severe hypokinesis with ejection fraction of 40%.  Possible left ventricular apical thrombus.  ICD lead is present in the right ventricle.  No evidence for pulmonary hypertension is present.          STRESS TEST  Results for orders placed during the hospital encounter of 01/22/24    Stress Test With Myocardial Perfusion One Day    Interpretation Summary  Indications  Status post CABG  Atrial fibrillation    This study was performed under  the direct supervision of Rohini NOLASCO.    Resting ECG  Sinus rhythm    The patient was injected with Lexiscan intravenously while constantly monitoring electrocardiogram and vital signs.  Patient did not have any chest discomfort ST abnormalities or ectopy with injection of Lexiscan.    Cardiolite was used as an imaging agent.    Cardiolite images showed significantly decreased radionuclide uptake in the proximal posterior segments with partial redistribution in the resting images.    Gated SPECT images revealed normal left ventricle size and diffuse hypocontractility with calculated ejection fraction of 61%.    Impression  ========  Lexiscan Cardiolite test revealed proximal posterior infarction and ischemia.  Gated SPECT images revealed normal left ventricular size and diffuse hypocontractility with calculated ejection fraction of 61%.        Cardiolite (Tc-99m sestamibi) stress test    CARDIAC CATHETERIZATION  No results found for this or any previous visit.                OTHER:         Assessment & Plan     Principal Problem:    Altered mental status      ]]]]]]]]]]]]]]]]]  History  =========  - Altered mental status.     -History of mechanical fall (slip and fall)-T10 compression fracture.     - Past history of palpitations  Sinus tachycardia and premature atrial contractions.  Intermittent atrial fibrillation is present.  Patient is in sinus rhythm 6/3/2024  Sinus rhythm 7/11/2024.  Recent EKG showed 7/25/2024-sinus rhythm.  EKG 7/30/2024 revealed significant baseline artifact.  Likely sinus rhythm.     - Anticoagulation-on Eliquis..     - Status post CABG and ASD closure 2017     - Ischemic cardiomyopathy     -Moderate aortic valve stenosis     - Status post ICD (single-chamber)-2017-[employer].     -History of stroke.  Tiny acute to subacute cortical left frontal stroke on MRI 5/17/2024.      - Hypothyroidism dyslipidemia obstructive sleep apnea diabetes     - Past history of pulmonary emboli     - CKD  3  20/1.7-1/22/2024  17/1.08-5/19/2024.     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 There is normal antegrade color Doppler flow including the common iliac arteries. There is thrombus within the aneurysm sac. Dimensions are discussed in detail above.     - Family history of coronary artery disease     - Non-smoker     - Allergic to oxytetracycline and oxycodone.     Stress Cardiolite test-1/24/2024  Lexiscan Cardiolite test revealed proximal posterior infarction and ischemia.  Gated SPECT images revealed normal left ventricular size and diffuse hypocontractility with calculated ejection fraction of 61%.     Echocardiogram 1/23/2024 revealed  Mild tricuspid regurgitation.  Mild to moderate aortic valve stenosis with gradient of 16/9 mmHg and valve area of 1.37 cm².  Left atrial enlargement.  Left ventricle is enlarged with septal anterior wall and apical severe hypokinesis with ejection fraction of 40%.  Possible left ventricular apical thrombus.  ICD lead is present in the right ventricle.  No evidence for pulmonary hypertension is present.  =============  Plan  =============  - Altered mental status.     - Past history of palpitations  Sinus tachycardia and premature atrial contractions.  Intermittent atrial fibrillation is present.  Patient is in sinus rhythm 6/3/2024  Sinus rhythm 7/11/2024.  Recent EKG showed 7/25/2024-sinus rhythm.  EKG 7/30/2024 revealed significant baseline artifact.  Likely sinus rhythm.  Monitor showed sinus rhythm.  Continue low-dose metoprolol.  Assist 12.5 mg twice daily.     Status post CABG and ASD closure 2017.  Patient is not having any angina pectoris or congestive heart failure.      Ischemic cardiomyopathy      Mild to moderate aortic valve stenosis     Echocardiogram 1/23/2024-as above     Stress  Cardiolite test-1/24/2024-as above     Status post ICD ([employer]) 2017.  Single-chamber.     History of left ventricular possible apical thrombus.  Continue anticoagulation.  Consider MERLE in the future if needed.  Patient is already anticoagulated.     Anticoagulation  Patient is on Eliquis    Hypomagnesemia-new problem  Mg 1.4  Intravenous IV magnesium supplements.  Improved at 2.4.    Hypokalemia   K 3.0  Potassium supplements.  4.1-8/1/2024    History of renal dysfunction-improved.  6/0.93-7/12/2024.     History of stroke.  Tiny acute to subacute cortical left frontal stroke on MRI 5/17/2024.     Medications were reviewed and updated.  Continue Eliquis ferrous sulfate levothyroxine pantoprazole.  Patient is on low-dose metoprolol.  (12.5 mg twice daily)  Potassium supplements and Zoloft.     Further plan will depend on patient's progress.     Reviewed and updated 8/2/2024.  ]]]]]]]]]]]]]]]]]]]]]        Allie Hurley MD  08/02/24  06:41 EDT

## 2024-08-02 NOTE — SIGNIFICANT NOTE
08/02/24 0952   Post Acute Pre-Cert Documentation   Request Submitted by Facility - Type: Hospital   Post-Acute Authorization Type Submitted: SNF   Date Post Acute Pre-Cert Inititated per Facility 08/02/24   Verification from Payer Yes   Date Post Acute Pre-Cert Completed 08/02/24   Accepting Facility [facility]   Hospital Discharge Date Requested 08/03/24   All Clinicals Submitted? Yes   Had Accepting Facility at Time of Submission Yes   Response Received from Insurance? Approval   Response Communicated to:    Authorization Number: [id number]   Post Acute Pre-Cert Initiated Comment CM submitted pre-cert for [facility] via [facility] portal. Pre-cert auto approved, valid 8/3-8/6. Auth ID [id number]. CM updated 2D CM.

## 2024-08-03 ENCOUNTER — READMISSION MANAGEMENT (OUTPATIENT)
Dept: CALL CENTER | Facility: HOSPITAL | Age: 69
End: 2024-08-03
Payer: MEDICARE

## 2024-08-03 VITALS
RESPIRATION RATE: 14 BRPM | SYSTOLIC BLOOD PRESSURE: 99 MMHG | BODY MASS INDEX: 24.56 KG/M2 | DIASTOLIC BLOOD PRESSURE: 46 MMHG | WEIGHT: 162.04 LBS | HEART RATE: 77 BPM | HEIGHT: 68 IN | OXYGEN SATURATION: 95 % | TEMPERATURE: 98 F

## 2024-08-03 PROBLEM — I50.31 ACUTE HEART FAILURE WITH PRESERVED EJECTION FRACTION (HFPEF): Status: ACTIVE | Noted: 2024-08-03

## 2024-08-03 RX ORDER — CEPHALEXIN 500 MG/1
500 CAPSULE ORAL 3 TIMES DAILY
Qty: 21 CAPSULE | Refills: 0 | Status: SHIPPED | OUTPATIENT
Start: 2024-08-03 | End: 2024-08-10

## 2024-08-03 RX ORDER — AMOXICILLIN 250 MG
2 CAPSULE ORAL 2 TIMES DAILY PRN
Qty: 60 TABLET | Refills: 1 | Status: SHIPPED | OUTPATIENT
Start: 2024-08-03

## 2024-08-03 RX ORDER — NITROFURANTOIN 25; 75 MG/1; MG/1
100 CAPSULE ORAL NIGHTLY
Qty: 30 CAPSULE | Refills: 5 | Status: SHIPPED | OUTPATIENT
Start: 2024-08-11

## 2024-08-03 RX ORDER — FOLIC ACID 1 MG/1
1 TABLET ORAL DAILY
Qty: 30 TABLET | Refills: 1 | Status: SHIPPED | OUTPATIENT
Start: 2024-08-04

## 2024-08-03 RX ORDER — FERROUS SULFATE 324(65)MG
324 TABLET, DELAYED RELEASE (ENTERIC COATED) ORAL
Qty: 30 TABLET | Refills: 0 | Status: SHIPPED | OUTPATIENT
Start: 2024-08-04

## 2024-08-03 RX ORDER — RISPERIDONE 0.25 MG/1
0.25 TABLET, ORALLY DISINTEGRATING ORAL NIGHTLY
Qty: 30 TABLET | Refills: 1 | Status: SHIPPED | OUTPATIENT
Start: 2024-08-03

## 2024-08-03 RX ORDER — POLYETHYLENE GLYCOL 3350 17 G/17G
17 POWDER, FOR SOLUTION ORAL DAILY PRN
Qty: 30 EACH | Refills: 1 | Status: SHIPPED | OUTPATIENT
Start: 2024-08-03

## 2024-08-03 RX ADMIN — PANTOPRAZOLE SODIUM 40 MG: 40 TABLET, DELAYED RELEASE ORAL at 05:38

## 2024-08-03 RX ADMIN — FOLIC ACID 1 MG: 1 TABLET ORAL at 09:13

## 2024-08-03 RX ADMIN — POTASSIUM CHLORIDE 20 MEQ: 1500 TABLET, EXTENDED RELEASE ORAL at 09:13

## 2024-08-03 RX ADMIN — LEVOTHYROXINE SODIUM 75 MCG: 0.07 TABLET ORAL at 05:38

## 2024-08-03 RX ADMIN — SERTRALINE 50 MG: 50 TABLET, FILM COATED ORAL at 09:13

## 2024-08-03 RX ADMIN — FERROUS SULFATE TAB EC 324 MG (65 MG FE EQUIVALENT) 324 MG: 324 (65 FE) TABLET DELAYED RESPONSE at 09:13

## 2024-08-03 RX ADMIN — APIXABAN 5 MG: 5 TABLET, FILM COATED ORAL at 09:14

## 2024-08-03 RX ADMIN — Medication 10 ML: at 09:14

## 2024-08-03 NOTE — CASE MANAGEMENT/SOCIAL WORK
Continued Stay Note   [facility]     Patient Name: Laura Mejia  MRN: [id number]  Today's Date: 8/3/2024    Admit Date: 7/25/2024    Plan: D/C plan: home w/HH PT. [facility] referral pending.   Discharge Plan       Row Name 08/03/24 1252       Plan    Plan D/C plan: home w/HH PT. [facility] referral pending.    Plan Comments CM notified by Dr. Casiano that pt and her  no longer want to discharge to SNF facility, prefer to do HH PT instead. CM met with pt and  at bedside to discuss change in d/c plans. Pt and  verify they do not want to go to rehab facility but would prefer to do HH PT. Pt's  states their daughter is also a nurse and will be able to help as well. Pt and  were provided a list of HH options. They both chose [facility], as they have used them in the past. Referral placed in epic basket (pending). Facility liaison, Brenna, notified.             Expected Discharge Date and Time       Expected Discharge Date Expected Discharge Time    Aug 3, 2024           Jaz Saldana RN      Office Phone: [phone]  Office Cell: [phone]

## 2024-08-03 NOTE — PLAN OF CARE
Problem: Adult Inpatient Plan of Care  Goal: Patient-Specific Goal (Individualized)  Outcome: Ongoing, Progressing   Goal Outcome Evaluation:                                              
  Problem: Adult Inpatient Plan of Care  Goal: Patient-Specific Goal (Individualized)  Outcome: Ongoing, Progressing   Goal Outcome Evaluation:                                              
  Problem: Adult Inpatient Plan of Care  Goal: Plan of Care Review  Flowsheets (Taken 8/3/2024 3052)  Progress: improving  Plan of Care Reviewed With: patient   Goal Outcome Evaluation:  Plan of Care Reviewed With: patient        Progress: improving                                  
  Problem: Adult Inpatient Plan of Care  Goal: Plan of Care Review  Outcome: Ongoing, Progressing  Goal: Patient-Specific Goal (Individualized)  Outcome: Ongoing, Progressing  Goal: Absence of Hospital-Acquired Illness or Injury  Outcome: Ongoing, Progressing  Goal: Optimal Comfort and Wellbeing  Outcome: Ongoing, Progressing  Goal: Readiness for Transition of Care  Outcome: Ongoing, Progressing  Intervention: Mutually Develop Transition Plan  Recent Flowsheet Documentation  Taken 7/25/2024 1727 by Destinee Lopez, RN  Transportation Anticipated: family or friend will provide  Patient/Family Anticipated Services at Transition:      complementary therapies  Patient/Family Anticipates Transition to: home with family  Taken 7/25/2024 1720 by Destinee Lopez, RN  Equipment Currently Used at Home:   shower chair   commode   cpap   grab bar     Problem: Pain Acute  Goal: Acceptable Pain Control and Functional Ability  Outcome: Ongoing, Progressing                                                
"Goal Outcome Evaluation:  Plan of Care Reviewed With: patient        Progress: no change   Pt is a 69 y/o F admitted to [facility] on 7/25/24 with complaints of AMS with reports of hallucinations. She was most recently seen here at [facility] on 7/11/24 after falling with acute compression fracture at T10 and Neurosurgery advised TLSO when out of bed. PT recommended SNF at [facility] last admission, but pt and family refused and went home with HHPT. XR Chest and CT Head both (-) this date, admitted for monitoring of AMS. She is currently living at home with spouse in [facility] with two steps to enter. At baseline, she is normally IND with household mobility and ADLs with no AD. She does have recent history of UTIs of which spouse has needed to help patient with some ADLs. This date, she is disoriented to place, time, and situation, lying supine in bed with complaints of 10/10 pain in the lower back. She completes bed mobility max A this date. Spouse reports TLSO is \"in the truck\", but is refusing to get the brace this AM stating \"you won't be able to get it on\" 2/2 to severe pain levels. Spouse educated on purpose of TLSO and necessity for OOB activity. PT advised spouse to have TLSO present by tomorrow so she can work with PT staff. PT is again recommending SNF at d/ and it will be up to patient and spouse to accept. PT will continue to follow during stay, will need TLSO present to progress.    Anticipated Discharge Disposition (PT): skilled nursing facility    "
Bed mobility - Mod-A supine to sidelying to sit. Cues and assist for logroll technique.   Transfers - Min-A, Mod-A, Assist x 2, and with rolling walker. Pt with left lean and narrow TARUN upon standing, improved with tactile cues and improved TARUN.   Ambulation - 5 feet Min-A, Mod-A, Assist x 2, and with rolling walker. Pt with narrow TARUN, steppage gait, inconsistent foot placement, step length, and impaired jin.              
Goal Outcome Evaluation:           Progress: no change  Outcome Evaluation: Patient came to 2D around 1600. Willard score only 14, but she has a walter bed. Her pain level increases to 9/10 with activity such as being turned, but her baseline pain level is around 2-5. I do think her being on the turn team will be beneficial to her overall health. Preventative mepilex applied to patient's sacrum. Seems quite cognisent, despite being admitte with AMS. UTI is a high possibility, and still waiting on cultures. Patient and  educated on possible ways to prevent UTIs. Patient uses periwick, and I only want her to use bedpan right now if needed. Patient's BP has been soft, with a HR in the low 100s.                               
Goal Outcome Evaluation:         Pt is alert and oriented x 4 but forgetful. Spouse at bedside. Pt is being discharged to home with HH. De accessed iv, external cath and Mindray monitor. Spouse will drive.                                      
Goal Outcome Evaluation:         Pt. W/ rapid response this AM, /130s resting and not appropriate for skilled therapy at this time.                                      
Goal Outcome Evaluation:         Pt. W/o significant progress made this date, remains confused and requires mod A SPS transfer armchair to EOB, poor sitting balance w/ max A to correct posterior lean. Pt. Provided max A for toileting Adls secondary to incontinence. Recommend SNF placement at d/c to address aforementioned deficits.                                      
Goal Outcome Evaluation:      Patient has been refusing care this shift. Patient refused all her medications, refusing vital signs and Q2-turn. This RN educated patient on the importance of therapeutic regimen but refused. Bed alarm is activated. Will continue to monitor.                                         
Goal Outcome Evaluation:      Pt. Demonstrates progress w/ functional mobility this date SPS transfer EOB to armchair w/ min A x 2 for safety + rolling walker support. Pt. Continues to require max A for donning brace and all LB Adls, incontinent of urine w/ max A to change brief and complete posterior hygiene. Recommend SNF placement at d/c.                Anticipated Discharge Disposition (OT): skilled nursing facility                        
Goal Outcome Evaluation:  Plan of Care Reviewed With: patient        Problem: Adult Inpatient Plan of Care  Goal: Plan of Care Review  Outcome: Ongoing, Progressing  Flowsheets (Taken 7/31/2024 8716)  Progress: no change  Plan of Care Reviewed With: patient     Progress: no change                                  
Goal Outcome Evaluation:  Plan of Care Reviewed With: patient        Progress: improving  Outcome Evaluation: Patient alert and oriented, but confused at times, forgetful. Complains of pain with movement. VSS, patient encouraged to turn every 2 hours as she does move well independently in bed. Refusing to turn at times through the night. IVF running. DC home at discharge.                               
Goal Outcome Evaluation:  Plan of Care Reviewed With: patient        Progress: no change       Pt c/o back pain relieved with prn pain medication. Pt remains confused but has been less agitated and more cooperative this shift. IVFs infusing. On room air. Needs placement. Will continue to monitor...                           
Goal Outcome Evaluation:  Plan of Care Reviewed With: patient        Progress: no change     Patient has been pleasant this shift with no issues or concerns, does have forgetfulness at times but no real confusion noted this shift.  Did start new medication per Psych. No other issues or concerns noted at this time.                             
Goal Outcome Evaluation:  Plan of Care Reviewed With: patient        Progress: no change  Outcome Evaluation: Pt resting in bed with c/o nausea, PRN meds given. MRI brain done, negative for anything acute. Spouse updated.                               
Goal Outcome Evaluation:  Plan of Care Reviewed With: patient        Progress: no change  Outcome Evaluation: Pt resting in bed with no complaints at this time. Less agitated and more cooperative today. Sitter left at 0900. Fluids running, IV abx on board. Spouse updated                               
Goal Outcome Evaluation:  Plan of Care Reviewed With: patient        Progress: no change  Outcome Evaluation: Pt resting in bed with no complaints at this time. Pt getting more and more confused and hallucinating, MD notified. K replaced. UA done. Neurology consulted. Family updated at bedside.                               
Goal Outcome Evaluation:  Plan of Care Reviewed With: patient     Problem: Adult Inpatient Plan of Care  Goal: Plan of Care Review  Outcome: Ongoing, Progressing  Flowsheets (Taken 7/29/2024 9958)  Progress: no change  Plan of Care Reviewed With: patient        Progress: no change                                  
Goal Outcome Evaluation:  Plan of Care Reviewed With: patient     Problem: Adult Inpatient Plan of Care  Goal: Plan of Care Review  Outcome: Ongoing, Progressing  Flowsheets (Taken 7/30/2024 8220)  Progress: declining  Plan of Care Reviewed With: patient        Progress: declining                                  
Goal Outcome Evaluation:  Plan of Care Reviewed With: patient, family    Outcome Evaluation: Pt is a 69 y/o female admitted to [facility] on 7/25/24 presenting with AMS. Psych consulted as pt has been hallucinating. Pt recently discharged on 7/14/24, found to have compression fx of T10. Per neuro, no surgical intervention recommended, however advised pt to wear TLSO when OOB. PMHx significant for CABG, hx PE, frequent UTI and hx A.fib on warfarin. At baseline pt resides with spouse in [facility] home with x2 LEO. Pt typically (I) with ADLs, no use of AD for mobility, has RW uses infrequently, though has been requiring increased assist after recent hospital admission. Pt reports she owns BSC in bedroom. Upon evaluation, pt A&O x4 with reports of 8/10 pain in back. Pt in bathroom receiving dependent A from adult daughter to complete eliana-care for BM. Pt without TLSO, OT educated family on importance of brace and spouse went to car to receive brace for pt. Pt requires dependent assist to don TLSO and additionally educated on log roll technique when completing bed mobility. Pt max assist to don brief and gown. Pt comes to standing with min A and FWW to ambulate to chair with CGA for safety. Pt impulsive and unaware of need for assistance, use of TLSO brace, and spinal precautions. OT anticipating home with HHOT once pain is managed with education.    Anticipated Discharge Disposition (OT): home with home health  
Goal Outcome Evaluation:  Plan of Care Reviewed With: patient, spouse        Progress: no change                                  
Laura Mejia presents with pain limiting her participation in functional mobility and gait training today.  PT noted several instances documented of pt refusing and not using her TLSO. Edu provided for safety and brace use to prevent additional fractures and complications. Pt and CG verbalize understanding but will likely need reinforcement due to noted impairment in cognitive status. Will continue to follow and progress as tolerated.   
13.977

## 2024-08-03 NOTE — DISCHARGE SUMMARY
Date of Discharge:  8/3/2024    Discharge Diagnosis:   **Altered mental status [R41.82]   Acute heart failure with preserved ejection fraction (HFpEF) [I50.31]   Closed wedge compression fracture of T10 vertebra [S22.070A]   Atrial fibrillation with RVR [I48.91]   Urinary tract infection [N39.0]   S/P CABG (coronary artery bypass graft) [Z95.1]   Compression fracture of lumbar vertebra [S32.000A]   Ischemic cardiomyopathy [I25.5]   History of pulmonary embolism [Z86.711]   Acute on chronic systolic heart failure [I50.23]   COPD (chronic obstructive pulmonary disease) [J44.9]   Obstructive sleep apnea syndrome [G47.33]   Hypertension [I10]       Presenting Problem/History of Present Illness  Active Hospital Problems    Diagnosis  POA    **Altered mental status [R41.82]  Yes    Acute heart failure with preserved ejection fraction (HFpEF) [I50.31]  Yes    Closed wedge compression fracture of T10 vertebra [S22.070A]  Yes    Atrial fibrillation with RVR [I48.91]  Yes    Urinary tract infection [N39.0]  Yes    S/P CABG (coronary artery bypass graft) [Z95.1]  Not Applicable    Compression fracture of lumbar vertebra [S32.000A]  Yes    Ischemic cardiomyopathy [I25.5]  Yes    History of pulmonary embolism [Z86.711]  Yes    Acute on chronic systolic heart failure [I50.23]  Yes    COPD (chronic obstructive pulmonary disease) [J44.9]  Yes    Obstructive sleep apnea syndrome [G47.33]  Yes    Hypertension [I10]  Yes      Resolved Hospital Problems   No resolved problems to display.          Hospital Course  Patient is a 68 y.o. female with multiple medical problems listed above who presented with altered mental status with difficulty sleeping, hallucinating.  She had fallen the week prior and suffered a vertebral compression fracture and was taking hydrocodone for pain.  She had a complicated hospital course with intermittent rapid atrial fib and delirium.  The delirium and hallucinations persisted for several days.  Recent new  medications included digoxin, metoprolol and atorvastatin.  These medications were held.  Her mental status did improve.  Metoprolol was added back at a lower dose without adverse effects.  She initially improved with her mental status with change in her medications however then it worsened again and she was found to have a urinary tract infection that was not present on admission.  She was treated with appropriate antibiotics for the UTI and her mental status again cleared.  At the time of discharge she is about 90% back to her baseline mental status per family member.  She is generally deconditioned.  Initially the plan was for her to go to skilled rehab but she has not improved to the point where family feels like they can care for her with 24-hour attention at home.  Home health nursing, physical therapy and Occupational Therapy have been ordered.  She is cardiovascularly stable.  She will follow-up with PCP and cardiology promptly.    Paxil was changed to sertraline on the advice of psychiatry.  She is being discharged home with risperidone.  Anticipate that once she is home and more acclimated the risperidone likely can be discontinued.    She will continue using her home CPAP for obstructive sleep apnea.    She has a lumbar brace to wear for to assist with pain related to her vertebral compression fracture.    She has had recurrent UTIs resulting in significant morbidity.  She will complete a course of cephalexin for the current urinary tract infection and then start Macrodantin 100 mg nightly for UTI prophylaxis.    Procedures Performed         Consults:   Consults       Date and Time Order Name Status Description    7/30/2024 10:38 AM Inpatient Cardiology Consult      7/30/2024  9:10 AM Inpatient Neurology Consult General Completed     7/26/2024  9:01 AM Inpatient Psychiatrist Consult Completed     7/25/2024  3:35 PM Family Medicine Consult      7/11/2024  3:32 PM Inpatient Neurosurgery Consult Completed              Pertinent Test Results:    Lab Results (most recent)       Procedure Component Value Units Date/Time    Basic Metabolic Panel [[id number]]  (Abnormal) Collected: 08/02/24 0219    Specimen: Blood Updated: 08/02/24 0405     Glucose 72 mg/dL      BUN 2 mg/dL      Creatinine 0.61 mg/dL      Sodium 139 mmol/L      Potassium 3.6 mmol/L      Comment: Specimen hemolyzed.  Result may be falsely elevated.        Chloride 109 mmol/L      CO2 18.8 mmol/L      Calcium 8.6 mg/dL      BUN/Creatinine Ratio 3.3     Anion Gap 11.2 mmol/L      eGFR 96.9 mL/min/1.73     Narrative:      GFR Normal >60  Chronic Kidney Disease <60  Kidney Failure <15      CBC (No Diff) [[id number]]  (Abnormal) Collected: 08/02/24 0219    Specimen: Blood Updated: 08/02/24 0341     WBC 7.76 10*3/mm3      RBC 3.13 10*6/mm3      Hemoglobin 9.0 g/dL      Hematocrit 30.5 %      MCV 97.4 fL      MCH 28.8 pg      MCHC 29.5 g/dL      RDW 16.6 %      RDW-SD 59.5 fl      MPV 10.7 fL      Platelets 230 10*3/mm3     Urine Culture - Urine, Straight Cath [[id number]]  (Abnormal)  (Susceptibility) Collected: 07/30/24 1059    Specimen: Urine from Straight Cath Updated: 08/01/24 1009     Urine Culture >100,000 CFU/mL Escherichia coli    Narrative:      Colonization of the urinary tract without infection is common. Treatment is discouraged unless the patient is symptomatic, pregnant, or undergoing an invasive urologic procedure.    Susceptibility        Escherichia coli      FRANK      Amoxicillin + Clavulanate Susceptible      Ampicillin Resistant      Ampicillin + Sulbactam Intermediate      Cefazolin Susceptible      Cefepime Susceptible      Ceftazidime Susceptible      Ceftriaxone Susceptible      Gentamicin Susceptible      Levofloxacin Intermediate      Nitrofurantoin Susceptible      Piperacillin + Tazobactam Susceptible      Trimethoprim + Sulfamethoxazole Susceptible                           Reticulocytes [[id number]]  (Normal) Collected: 08/01/24 0946     Specimen: Blood Updated: 08/01/24 0958     Reticulocyte % 1.38 %      Reticulocyte Absolute 0.0432 10*6/mm3     Magnesium [[id number]]  (Normal) Collected: 08/01/24 0438    Specimen: Blood Updated: 08/01/24 0528     Magnesium 2.4 mg/dL     Basic Metabolic Panel [[id number]]  (Abnormal) Collected: 08/01/24 0438    Specimen: Blood Updated: 08/01/24 0524     Glucose 79 mg/dL      BUN 2 mg/dL      Creatinine 0.63 mg/dL      Sodium 141 mmol/L      Potassium 4.1 mmol/L      Chloride 109 mmol/L      CO2 19.8 mmol/L      Calcium 8.6 mg/dL      BUN/Creatinine Ratio 3.2     Anion Gap 12.2 mmol/L      eGFR 96.2 mL/min/1.73     Narrative:      GFR Normal >60  Chronic Kidney Disease <60  Kidney Failure <15      Digoxin Level [[id number]]  (Normal) Collected: 07/31/24 0623    Specimen: Blood from Arm, Right Updated: 07/31/24 1052     Digoxin 1.05 ng/mL     Magnesium [[id number]]  (Abnormal) Collected: 07/31/24 0623    Specimen: Blood from Arm, Right Updated: 07/31/24 0718     Magnesium 3.2 mg/dL     Blood Culture - Blood, Arm, Right [[id number]]  (Normal) Collected: 07/25/24 1538    Specimen: Blood from Arm, Right Updated: 07/30/24 1546     Blood Culture No growth at 5 days    Narrative:      Less than seven (7) mL's of blood was collected.  Insufficient quantity may yield false negative results.    Blood Culture - Blood, Arm, Left [[id number]]  (Normal) Collected: 07/25/24 1538    Specimen: Blood from Arm, Left Updated: 07/30/24 1546     Blood Culture No growth at 5 days    Comprehensive Metabolic Panel [[id number]]  (Abnormal) Collected: 07/30/24 1104    Specimen: Blood from Arm, Left Updated: 07/30/24 1159     Glucose 92 mg/dL      BUN 2 mg/dL      Creatinine 0.61 mg/dL      Sodium 138 mmol/L      Potassium 3.0 mmol/L      Comment: Slight hemolysis detected by analyzer. Result may be falsely elevated.        Chloride 108 mmol/L      CO2 20.1 mmol/L      Calcium 8.5 mg/dL      Total Protein 5.1 g/dL      Albumin 2.7 g/dL      ALT  (SGPT) 12 U/L      AST (SGOT) 37 U/L      Alkaline Phosphatase 163 U/L      Total Bilirubin 0.4 mg/dL      Globulin 2.4 gm/dL      A/G Ratio 1.1 g/dL      BUN/Creatinine Ratio 3.3     Anion Gap 9.9 mmol/L      eGFR 96.9 mL/min/1.73     Narrative:      GFR Normal >60  Chronic Kidney Disease <60  Kidney Failure <15      C-reactive Protein [[id number]]  (Abnormal) Collected: 07/30/24 1104    Specimen: Blood from Arm, Left Updated: 07/30/24 1154     C-Reactive Protein 3.24 mg/dL     Urinalysis With Culture If Indicated - Straight Cath [[id number]]  (Abnormal) Collected: 07/30/24 1059    Specimen: Urine from Straight Cath Updated: 07/30/24 1131     Color, UA Yellow     Appearance, UA Cloudy     pH, UA 5.5     Specific Gravity, UA 1.007     Glucose, UA Negative     Ketones, UA Negative     Bilirubin, UA Negative     Blood, UA Small (1+)     Protein, UA Negative     Leuk Esterase, UA Large (3+)     Nitrite, UA Negative     Urobilinogen, UA 1.0 E.U./dL    Narrative:      In absence of clinical symptoms, the presence of pyuria, bacteria, and/or nitrites on the urinalysis result does not correlate with infection.    Urinalysis, Microscopic Only - Straight Cath [[id number]]  (Abnormal) Collected: 07/30/24 1059    Specimen: Urine from Straight Cath Updated: 07/30/24 1131     RBC, UA 0-2 /HPF      WBC, UA Too Numerous to Count /HPF      Bacteria, UA 4+ /HPF      Squamous Epithelial Cells, UA 0-2 /HPF      Hyaline Casts, UA 0-2 /LPF      Methodology Automated Microscopy    CBC & Differential [[id number]]  (Abnormal) Collected: 07/30/24 1104    Specimen: Blood from Arm, Left Updated: 07/30/24 1128    Narrative:      The following orders were created for panel order CBC & Differential.  Procedure                               Abnormality         Status                     ---------                               -----------         ------                     CBC Auto Differential[[id number]]        Abnormal            Final result                  Please view results for these tests on the individual orders.    CBC Auto Differential [[id number]]  (Abnormal) Collected: 07/30/24 1104    Specimen: Blood from Arm, Left Updated: 07/30/24 1128     WBC 7.67 10*3/mm3      RBC 2.44 10*6/mm3      Hemoglobin 7.1 g/dL      Hematocrit 23.8 %      MCV 97.5 fL      MCH 29.1 pg      MCHC 29.8 g/dL      RDW 16.1 %      RDW-SD 58.1 fl      MPV 11.0 fL      Platelets 215 10*3/mm3      Neutrophil % 80.0 %      Lymphocyte % 9.3 %      Monocyte % 4.8 %      Eosinophil % 3.8 %      Basophil % 1.3 %      Immature Grans % 0.8 %      Neutrophils, Absolute 6.14 10*3/mm3      Lymphocytes, Absolute 0.71 10*3/mm3      Monocytes, Absolute 0.37 10*3/mm3      Eosinophils, Absolute 0.29 10*3/mm3      Basophils, Absolute 0.10 10*3/mm3      Immature Grans, Absolute 0.06 10*3/mm3      nRBC 0.0 /100 WBC     Blood Gas, Arterial - [[id number]]  (Abnormal) Collected: 07/30/24 0636    Specimen: Arterial Blood Updated: 07/30/24 0638     Site Right Radial     Vernon's Test Positive     pH, Arterial 7.416 pH units      pCO2, Arterial 28.5 mm Hg      pO2, Arterial 85.6 mm Hg      HCO3, Arterial 18.3 mmol/L      Base Excess, Arterial -5.3 mmol/L      Comment: Serial Number: [id number]:  [id number]        O2 Saturation, Arterial 96.8 %      Barometric Pressure for Blood Gas --     Comment: N/A        Modality Cannula     FIO2 29 %      Hemodilution No     PO2/FIO2 295    POC Creatinine [[id number]]  (Normal) Collected: 07/30/24 0636    Specimen: Arterial Blood Updated: 07/30/24 0638     Creatinine 0.68 mg/dL      Comment: Serial Number: [id number]:  [id number]        eGFR 94.4 mL/min/1.73     POCT Electrolytes +HGB +HCT [[id number]]  (Abnormal) Collected: 07/30/24 0636    Specimen: Arterial Blood Updated: 07/30/24 0638     Sodium 140 mmol/L      POC Potassium 3.2 mmol/L      Ionized Calcium 1.23 mmol/L      Comment: Serial Number: [id number]:  [id number]        Glucose 120 mg/dL      Hematocrit 29 %       Hemoglobin 9.8 g/dL     POC Lactate [[id number]]  (Normal) Collected: 07/30/24 0636    Specimen: Arterial Blood Updated: 07/30/24 0638     Lactate 1.1 mmol/L      Comment: Serial Number: [id number]:  [id number]       POC Glucose Once [[id number]]  (Abnormal) Collected: 07/30/24 0636    Specimen: Arterial Blood Updated: 07/30/24 0638     Glucose 120 mg/dL      Comment: Serial Number: [id number]:  [id number]       POC Glucose Once [[id number]]  (Normal) Collected: 07/30/24 0625    Specimen: Blood Updated: 07/30/24 0626     Glucose 102 mg/dL      Comment: Serial Number: [id number]:  [id number]       RAMAN [[id number]]  (Normal) Collected: 07/27/24 1320    Specimen: Blood from Arm, Left Updated: 07/29/24 0951     Antinuclear Antibodies (RAMAN) Negative    Digoxin Level [[id number]]  (Normal) Collected: 07/29/24 0548    Specimen: Blood from Arm, Right Updated: 07/29/24 0615     Digoxin 0.89 ng/mL     Folate [[id number]]  (Abnormal) Collected: 07/28/24 0730    Specimen: Blood Updated: 07/28/24 1849     Folate 3.72 ng/mL     Narrative:      Results may be falsely increased if patient taking Biotin.      TSH [[id number]]  (Normal) Collected: 07/27/24 1320    Specimen: Blood from Arm, Left Updated: 07/28/24 1210     TSH 1.330 uIU/mL     Vitamin B12 [[id number]]  (Normal) Collected: 07/27/24 1320    Specimen: Blood from Arm, Left Updated: 07/27/24 1837     Vitamin B-12 878 pg/mL     Narrative:      Results may be falsely increased if patient taking Biotin.      Potassium [[id number]]  (Normal) Collected: 07/27/24 1320    Specimen: Blood from Arm, Left Updated: 07/27/24 1352     Potassium 3.7 mmol/L      Comment: Specimen hemolyzed.  Result may be falsely elevated.       Ammonia [[id number]]  (Normal) Collected: 07/27/24 1320    Specimen: Blood from Arm, Left Updated: 07/27/24 1348     Ammonia 15 umol/L     Urine Culture - Urine, Straight Cath [[id number]]  (Normal) Collected: 07/25/24 1411    Specimen: Urine from Straight Cath Updated:  07/27/24 1308     Urine Culture No growth    CBC & Differential [[id number]]  (Abnormal) Collected: 07/27/24 0553    Specimen: Blood Updated: 07/27/24 0602    Narrative:      The following orders were created for panel order CBC & Differential.  Procedure                               Abnormality         Status                     ---------                               -----------         ------                     CBC Auto Differential[[id number]]        Abnormal            Final result                 Please view results for these tests on the individual orders.    CBC Auto Differential [[id number]]  (Abnormal) Collected: 07/27/24 0553    Specimen: Blood Updated: 07/27/24 0602     WBC 11.22 10*3/mm3      RBC 2.93 10*6/mm3      Hemoglobin 8.8 g/dL      Hematocrit 28.8 %      MCV 98.3 fL      MCH 30.0 pg      MCHC 30.6 g/dL      RDW 16.4 %      RDW-SD 59.9 fl      MPV 11.2 fL      Platelets 177 10*3/mm3      Neutrophil % 82.4 %      Lymphocyte % 7.3 %      Monocyte % 5.2 %      Eosinophil % 3.7 %      Basophil % 1.0 %      Immature Grans % 0.4 %      Neutrophils, Absolute 9.25 10*3/mm3      Lymphocytes, Absolute 0.82 10*3/mm3      Monocytes, Absolute 0.58 10*3/mm3      Eosinophils, Absolute 0.41 10*3/mm3      Basophils, Absolute 0.11 10*3/mm3      Immature Grans, Absolute 0.05 10*3/mm3      nRBC 0.0 /100 WBC     Iron Profile [[id number]]  (Abnormal) Collected: 07/26/24 0551    Specimen: Blood Updated: 07/26/24 1115     Iron 14 mcg/dL      Iron Saturation (TSAT) 9 %      Transferrin 104 mg/dL      TIBC 155 mcg/dL     [place] Draw [[id number]] Collected: 07/25/24 1342    Specimen: Blood Updated: 07/26/24 0601    Narrative:      The following orders were created for panel order [place] Draw.  Procedure                               Abnormality         Status                     ---------                               -----------         ------                     Green Top (Gel)[[id number]]                                   Final result               Lavender Top[[id number]]                                     Final result               Gold Top - SST[[id number]]                                   Final result               Light Blue Top[[id number]]                                   Final result                 Please view results for these tests on the individual orders.    Gold Top - SST [[id number]] Collected: 07/26/24 0549    Specimen: Blood Updated: 07/26/24 0601     Extra Tube Hold for add-ons.     Comment: Auto resulted.       Light Blue Top [[id number]] Collected: 07/26/24 0549    Specimen: Blood Updated: 07/26/24 0601     Extra Tube Hold for add-ons.     Comment: Auto resulted       Respiratory Panel PCR w/COVID-19(SARS-CoV-2) [facility]O/LISA/[facility] In-House, NP Swab in UTM/VTM, 2 HR TAT - Swab, Nasopharynx [[id number]]  (Normal) Collected: 07/25/24 1411    Specimen: Swab from Nasopharynx Updated: 07/25/24 1536     ADENOVIRUS, PCR Not Detected     Coronavirus 229E Not Detected     Coronavirus HKU1 Not Detected     Coronavirus NL63 Not Detected     Coronavirus OC43 Not Detected     COVID19 Not Detected     Human Metapneumovirus Not Detected     Human Rhinovirus/Enterovirus Not Detected     Influenza A PCR Not Detected     Influenza B PCR Not Detected     Parainfluenza Virus 1 Not Detected     Parainfluenza Virus 2 Not Detected     Parainfluenza Virus 3 Not Detected     Parainfluenza Virus 4 Not Detected     RSV, PCR Not Detected     Bordetella pertussis pcr Not Detected     Bordetella parapertussis PCR Not Detected     Chlamydophila pneumoniae PCR Not Detected     Mycoplasma pneumo by PCR Not Detected    Narrative:      In the setting of a positive respiratory panel with a viral infection PLUS a negative procalcitonin without other underlying concern for bacterial infection, consider observing off antibiotics or discontinuation of antibiotics and continue supportive care. If the respiratory panel is positive for atypical bacterial  infection (Bordetella pertussis, Chlamydophila pneumoniae, or Mycoplasma pneumoniae), consider antibiotic de-escalation to target atypical bacterial infection.    Ethanol [[id number]] Collected: 07/25/24 1449    Specimen: Blood Updated: 07/25/24 1522     Ethanol % <0.010 %     Narrative:      Plasma Ethanol Clinical Symptoms:    ETOH (%)               Clinical Symptom  .01-.05              No apparent influence  .03-.12              Euphoria, Diminished judgment and attention   .09-.25              Impaired comprehension, Muscle incoordination  .18-.30              Confusion, Staggered gait, Slurred speech  .25-.40              Markedly decreased response to stimuli, unable to stand or                        walk, vomitting, sleep or stupor  .35-.50              Comatose, Anesthesia, Subnormal body temperature        Ammonia [[id number]]  (Normal) Collected: 07/25/24 1449    Specimen: Blood Updated: 07/25/24 1516     Ammonia 14 umol/L     Urine Drug Screen - Straight Cath [[id number]]  (Abnormal) Collected: 07/25/24 1411    Specimen: Urine from Straight Cath Updated: 07/25/24 1506     Amphet/Methamphet, Screen Negative     Barbiturates Screen, Urine Negative     Benzodiazepine Screen, Urine Negative     Cocaine Screen, Urine Negative     Opiate Screen Negative     THC, Screen, Urine Positive     Methadone Screen, Urine Negative     Oxycodone Screen, Urine Negative    Narrative:      Negative Thresholds Per Drugs Screened:    Amphetamines                 500 ng/ml  Barbiturates                 200 ng/ml  Benzodiazepines              100 ng/ml  Cocaine                      300 ng/ml  Methadone                    300 ng/ml  Opiates                      300 ng/ml  Oxycodone                    100 ng/ml  THC                           50 ng/ml    The Normal Value for all drugs tested is negative. This report includes final unconfirmed screening results to be used for medical treatment purposes only. Unconfirmed results must  not be used for non-medical purposes such as employment or legal testing. Clinical consideration should be applied to any drug of abuse test, particularly when unconfirmed results are used.          All urine drugs of abuse requests without chain of custody are for medical screening purposes only.  False positives are possible.      Urinalysis, Microscopic Only - Straight Cath [[id number]]  (Abnormal) Collected: 07/25/24 1411    Specimen: Urine from Straight Cath Updated: 07/25/24 1434     RBC, UA 3-5 /HPF      WBC, UA 0-2 /HPF      Bacteria, UA None Seen /HPF      Squamous Epithelial Cells, UA 0-2 /HPF      Hyaline Casts, UA 0-2 /LPF      Methodology Automated Microscopy    Urinalysis With Microscopic If Indicated (No Culture) - Straight Cath [[id number]]  (Abnormal) Collected: 07/25/24 1411    Specimen: Urine from Straight Cath Updated: 07/25/24 1432     Color, UA Yellow     Appearance, UA Clear     pH, UA 5.5     Specific Gravity, UA 1.018     Glucose, UA Negative     Ketones, UA Trace     Bilirubin, UA Small (1+)     Comment: Confirmation testing is unavailable.  A serum bilirubin is recommended for further assessment.        Blood, UA Negative     Protein, UA Trace     Leuk Esterase, UA Trace     Nitrite, UA Negative     Urobilinogen, UA 1.0 E.U./dL    Acetaminophen Level [[id number]]  (Normal) Collected: 07/25/24 1342    Specimen: Blood Updated: 07/25/24 1422     Acetaminophen <5.0 mcg/mL     Narrative:      Acetaminophen Therapeutic Range  5-20 ug/mL      Hours after ingestion            Toxic Value    4 Hours                           150 ug/mL    8 Hours                            70 ug/mL   12 Hours                            40 ug/mL   16 Hours                            20 ug/mL    These values apply to a single ingestion only.     Salicylate Level [[id number]]  (Normal) Collected: 07/25/24 1342    Specimen: Blood Updated: 07/25/24 1422     Salicylate <0.5 mg/dL     Comprehensive Metabolic Panel [[id number]]   (Abnormal) Collected: 07/25/24 1342    Specimen: Blood Updated: 07/25/24 1409     Glucose 162 mg/dL      BUN 12 mg/dL      Creatinine 0.89 mg/dL      Sodium 136 mmol/L      Potassium 4.0 mmol/L      Chloride 106 mmol/L      CO2 18.6 mmol/L      Calcium 8.9 mg/dL      Total Protein 6.4 g/dL      Albumin 3.3 g/dL      ALT (SGPT) 6 U/L      AST (SGOT) 19 U/L      Alkaline Phosphatase 179 U/L      Total Bilirubin 0.8 mg/dL      Globulin 3.1 gm/dL      A/G Ratio 1.1 g/dL      BUN/Creatinine Ratio 13.5     Anion Gap 11.4 mmol/L      eGFR 70.3 mL/min/1.73     Narrative:      GFR Normal >60  Chronic Kidney Disease <60  Kidney Failure <15      Single High Sensitivity Troponin T [[id number]]  (Abnormal) Collected: 07/25/24 1342    Specimen: Blood Updated: 07/25/24 1409     HS Troponin T 18 ng/L     Narrative:      High Sensitive Troponin T Reference Range:  <14.0 ng/L- Negative Female for AMI  <22.0 ng/L- Negative Male for AMI  >=14 - Abnormal Female indicating possible myocardial injury.  >=22 - Abnormal Male indicating possible myocardial injury.   Clinicians would have to utilize clinical acumen, EKG, Troponin, and serial changes to determine if it is an Acute Myocardial Infarction or myocardial injury due to an underlying chronic condition.         Green Top (Gel) [[id number]] Collected: 07/25/24 1342    Specimen: Blood Updated: 07/25/24 1345     Extra Tube Hold for add-ons.     Comment: Auto resulted.       Lavender Top [[id number]] Collected: 07/25/24 1342    Specimen: Blood Updated: 07/25/24 1345     Extra Tube hold for add-on     Comment: Auto resulted                Results for orders placed during the hospital encounter of 01/22/24    Adult Transthoracic Echo Complete W/ Cont if Necessary Per Protocol    Interpretation Summary    Left ventricular ejection fraction appears to be 56 - 60%.    Estimated right ventricular systolic pressure from tricuspid regurgitation is normal (<35  mmHg).    Indication  Dyspnea  Palpitations    Technically satisfactory study.  Mitral valve is structurally normal.  Tricuspid valve is structurally normal.  Mild tricuspid regurgitation is present.  Aortic valve is aortic valve with decreased opening motion.  Gradient across the aortic valve is 16/9 mmHg.  Valve area 1.37 cm².  Pulmonic valve could not be well visualized.  Left atrium is enlarged.  Right atrium is normal in size.  Left ventricle is enlarged with septal anterior wall and apical severe hypokinesis with ejection fraction of 40%.  Possible left ventricular apical thrombus.  Right ventricle is normal in size.  Atrial septum is intact.  Aorta is normal.  No pericardial effusion or intracardiac thrombus is seen.  ICD lead is present in the right ventricle.    Impression  Mild tricuspid regurgitation.  Mild to moderate aortic valve stenosis with gradient of 16/9 mmHg and valve area of 1.37 cm².  Left atrial enlargement.  Left ventricle is enlarged with septal anterior wall and apical severe hypokinesis with ejection fraction of 40%.  Possible left ventricular apical thrombus.  ICD lead is present in the right ventricle.  No evidence for pulmonary hypertension is present.              Condition on Discharge: Improved, stable    Vital Signs  Temp:  [97.3 °F (36.3 °C)-98.1 °F (36.7 °C)] 98 °F (36.7 °C)  Heart Rate:  [71-79] 77  Resp:  [11-18] 14  BP: ()/(46-63) 99/46    Physical Exam:     General Appearance:    Alert, cooperative, in no acute distress, oriented to person, place, situation.  She remains disoriented to year.   Head:    Normocephalic, without obvious abnormality, atraumatic   Eyes:            Lids and lashes normal, conjunctivae and sclerae normal, no   icterus, no pallor, corneas clear, PERRLA   Ears:    Ears appear intact with no abnormalities noted   Throat:   No oral lesions, no thrush, oral mucosa moist   Neck:   No adenopathy, supple, trachea midline, no thyromegaly, no   carotid  bruit, no JVD   Lungs:     Clear to auscultation,respirations regular, even and                  unlabored    Heart:    Regular rhythm and normal rate, normal S1 and S2, no            murmur, no gallop, no rub, no click   Chest Wall:    No abnormalities observed   Abdomen:     Normal bowel sounds, no masses, no organomegaly, soft        non-tender, non-distended, no guarding, no rebound                tenderness   Extremities:   Moves all extremities well, no edema, no cyanosis, no             redness   Pulses:   Pulses palpable and equal bilaterally   Skin:   No bleeding, bruising or rash   Lymph nodes:   No palpable adenopathy   Neurologic:   Cranial nerves 2 - 12 grossly intact, sensation intact, DTR       present and equal bilaterally       Discharge Disposition  Home or Self Care    Discharge Medications     Discharge Medications        New Medications        Instructions Start Date   cephalexin 500 MG capsule  Commonly known as: Keflex   500 mg, Oral, 3 Times Daily      ferrous sulfate 324 (65 Fe) MG tablet delayed-release EC tablet   324 mg, Oral, Daily With Breakfast   Start Date: August 4, 2024     folic acid 1 MG tablet  Commonly known as: FOLVITE   1 mg, Oral, Daily   Start Date: August 4, 2024     metoprolol tartrate 25 MG tablet  Commonly known as: LOPRESSOR   12.5 mg, Oral, Every 12 Hours Scheduled      nitrofurantoin (macrocrystal-monohydrate) 100 MG capsule  Commonly known as: Macrobid   100 mg, Oral, Nightly, Start after completing treatment for current UTI.   Start Date: August 11, 2024     polyethylene glycol 17 g packet  Commonly known as: MIRALAX   17 g, Oral, Daily PRN      risperiDONE 0.25 MG tablet dispersible disintegrating tablet  Commonly known as: risperDAL M-TABS   0.25 mg, Translingual, Nightly      sennosides-docusate 8.6-50 MG per tablet  Commonly known as: PERICOLACE   2 tablets, Oral, 2 Times Daily PRN      sertraline 50 MG tablet  Commonly known as: ZOLOFT   50 mg, Oral, Nightly              Continue These Medications        Instructions Start Date   apixaban 5 MG tablet tablet  Commonly known as: ELIQUIS   5 mg, Oral, 2 Times Daily      HYDROcodone-acetaminophen 5-325 MG per tablet  Commonly known as: NORCO   1 tablet, Oral, Every 6 Hours PRN      levothyroxine 75 MCG tablet  Commonly known as: SYNTHROID, LEVOTHROID   75 mcg, Oral, Daily      ondansetron 4 MG tablet  Commonly known as: ZOFRAN   4 mg, Oral, Every 8 Hours PRN      oxybutynin XL 10 MG 24 hr tablet  Commonly known as: DITROPAN-XL   10 mg, Oral, Daily      pantoprazole 40 MG EC tablet  Commonly known as: PROTONIX   40 mg, Oral, Every Early Morning      potassium chloride ER 20 MEQ tablet controlled-release ER tablet  Commonly known as: K-TAB   20 mEq, Oral, Daily             Stop These Medications      atorvastatin 80 MG tablet  Commonly known as: LIPITOR     digoxin 125 MCG tablet  Commonly known as: LANOXIN     Excedrin Migraine 250-250-65 MG per tablet  Generic drug: aspirin-acetaminophen-caffeine     ibuprofen 400 MG tablet  Commonly known as: ADVIL,MOTRIN     linaclotide 72 MCG capsule capsule  Commonly known as: LINZESS     metoprolol succinate XL 25 MG 24 hr tablet  Commonly known as: TOPROL-XL     PARoxetine 40 MG tablet  Commonly known as: PAXIL              Discharge Diet: Regular    Activity at Discharge: Up with assistance    Follow-up Appointments  Future Appointments   Date Time Provider Department Center   8/27/2024  1:30 PM Tasneem Martínez APRN [facility]     Additional Instructions for the Follow-ups that You Need to Schedule       Ambulatory Referral to Home Health   As directed      Face to Face Visit Date: 8/3/2024   Follow-up provider for Plan of Care?: I treated the patient in an acute care facility and will not continue treatment after discharge.   Follow-up provider: ALYSSA KENNEY [[id number]]   Reason/Clinical Findings: Atrial fib, recurrent UTI   Describe mobility limitations that make leaving home  difficult: Generalized weakness   Nursing/Therapeutic Services Requested: Skilled Nursing Physical Therapy Occupational Therapy   Skilled nursing orders: Medication education Cardiopulmonary assessments   PT orders: Therapeutic exercise Strengthening   Occupational orders: Activities of daily living Strengthening   Frequency: 1 Week 1        Discharge Follow-up with PCP   As directed       Currently Documented PCP:    Luan Joseph APRN    PCP Phone Number:    [phone]     Follow Up Details: Call office to schedule a hospital follow up appointment.        Discharge Follow-up with Specified Provider: Dr. Hurley; 3 Weeks   As directed      To: Dr. Hurley   Follow Up: 3 Weeks                Test Results Pending at Discharge       Keke Casiano MD  08/03/24  11:43 EDT    Time: Discharge 35 min

## 2024-08-04 NOTE — OUTREACH NOTE
Prep Survey      Flowsheet Row Responses   [facility] facility patient discharged from? [facility]   Is LACE score < 7 ? No   Eligibility Readm Mgmt   Discharge diagnosis Altered mental status   Does the patient have one of the following disease processes/diagnoses(primary or secondary)? Other   Does the patient have Home health ordered? Yes   What is the Home health agency?  [facility]-oending at d/c   Is there a DME ordered? No   Prep survey completed? Yes            DOYLE FRIEND - Registered Nurse

## 2024-08-04 NOTE — CASE MANAGEMENT/SOCIAL WORK
Case Management Discharge Note      Final Note: Home w/referral to [facility] (pending).         Selected Continued Care - Discharged on 8/3/2024 Admission date: 7/25/2024 - Discharge disposition: Home or Self Care       Transportation Services  Private: Car    Final Discharge Disposition Code: 06 - home with home health care

## 2024-08-05 ENCOUNTER — HOME CARE VISIT (OUTPATIENT)
Dept: HOME HEALTH SERVICES | Facility: HOME HEALTHCARE | Age: 69
End: 2024-08-05

## 2024-08-05 ENCOUNTER — TRANSCRIBE ORDERS (OUTPATIENT)
Dept: HOME HEALTH SERVICES | Facility: HOME HEALTHCARE | Age: 69
End: 2024-08-05
Payer: MEDICARE

## 2024-08-05 ENCOUNTER — HOME HEALTH ADMISSION (OUTPATIENT)
Dept: HOME HEALTH SERVICES | Facility: HOME HEALTHCARE | Age: 69
End: 2024-08-05
Payer: MEDICARE

## 2024-08-05 ENCOUNTER — HOME CARE VISIT (OUTPATIENT)
Dept: HOME HEALTH SERVICES | Facility: HOME HEALTHCARE | Age: 69
End: 2024-08-05
Payer: MEDICARE

## 2024-08-05 DIAGNOSIS — I48.91 ATRIAL FIBRILLATION, UNSPECIFIED TYPE: Primary | ICD-10-CM

## 2024-08-05 DIAGNOSIS — N30.00 ACUTE CYSTITIS WITHOUT HEMATURIA: ICD-10-CM

## 2024-08-05 NOTE — PROGRESS NOTES
"Enter Query Response Below      Query Response: Pt has chronic heart failure with preserved ejection fraction.             If applicable, please update the problem list.     Patient: Laura Mejia \"Annie\"        : 1955  Account: [id number]           Admit Date: 2024        How to Respond to this query:       a. Click New Note     b. Answer query within the yellow box.                c. Update the Problem List, if applicable.      If you have any questions about this query contact me at: [email or web address]     ,    Discharge diagnoses include acute heart failure with preserved ejection fraction (HFpEF) and acute on chronic systolic heart failure. There is no diuretic given, no BNP done, and no edema or shortness of breath noted.    -Chest X-Ray () - Pulmonary vascularity, heart size and mediastinal contour are within normal limits.No pneumothorax or pleural fluid identified.   -Echo 24 (per discharge summary) - EF 56-60%...Left ventricle is enlarged with septal anterior wall and apical severe hypokinesis with ejection fraction of 40%.  Cardiology consult note  documents \"Patient is not having any angina pectoris or congestive heart failure.\"  Treatment included Digoxin, Lipitor and Metoprolol.    After study, was acute heart failure clinically supported during this admission?    *Acute on chronic heart failure with __________(preserved/reduced/combined) ejection fraction was supported with additional clinical indicators: ____________  *Chronic heart failure with __________(preserved/reduced/combined) ejection fraction only, acute heart failure was not supported  *No heart failure  *Other- specify_____________  *Unable to determine    By submitting this query, we are merely seeking further clarification of documentation to accurately reflect all conditions that you are monitoring, evaluating, treating or that extend the hospitalization or utilize additional resources of " care. Please utilize your independent clinical judgment when addressing the question(s) above.     This query and your response, once completed, will be entered into the legal medical record.    Sincerely,  Brenna HERRERA, RN  Clinical Documentation Integrity Program   [email or web address]

## 2024-08-06 NOTE — PROGRESS NOTES
"Enter Query Response Below      Query Response: Pt has Heart failure due to hypertension .             If applicable, please update the problem list.     Patient: Laura Mejia \"Annie\"        : 1955  Account: [id number]           Admit Date: 2024        How to Respond to this query:       a. Click New Note     b. Answer query within the yellow box.                c. Update the Problem List, if applicable.      If you have any questions about this query contact me at: [email or web address]     ,    Patient is diagnosed with chronic heart failure with preserved ejection fraction per query answer.  She is also diagnosed with hypertension, ischemic cardiomyopathy and (per cardiology note) moderate aortic valve stenosis.  Treatment included Digoxin, Lipitor and Metoprolol.    Please clarify the etiology of the patient’s heart failure:    Heart failure due to hypertension  Heart failure due to ischemic cardiomyopathy  Heart failure due to aortic valve stenosis  Heart failure due to hypertension, ischemic cardiomyopathy and aortic valve stenosis  Other- specify__________  Unable to determine    By submitting this query, we are merely seeking further clarification of documentation to accurately reflect all conditions that you are monitoring, evaluating, treating or that extend the hospitalization or utilize additional resources of care. Please utilize your independent clinical judgment when addressing the question(s) above.     This query and your response, once completed, will be entered into the legal medical record.    Sincerely,  Brenna HERRERA, RN  Clinical Documentation Integrity Program   [email or web address]  "

## 2024-08-07 ENCOUNTER — READMISSION MANAGEMENT (OUTPATIENT)
Dept: CALL CENTER | Facility: HOSPITAL | Age: 69
End: 2024-08-07
Payer: MEDICARE

## 2024-08-07 NOTE — OUTREACH NOTE
Medical Week 1 Survey      Flowsheet Row Responses   [facility] facility patient discharged from? [facility]   Does the patient have one of the following disease processes/diagnoses(primary or secondary)? Other   Week 1 attempt successful? No   Unsuccessful attempts Attempt 1            Alexandria BARRERA - Licensed Nurse

## 2024-08-08 ENCOUNTER — HOME CARE VISIT (OUTPATIENT)
Dept: HOME HEALTH SERVICES | Facility: HOME HEALTHCARE | Age: 69
End: 2024-08-08
Payer: MEDICARE

## 2024-08-08 VITALS
HEART RATE: 78 BPM | TEMPERATURE: 97.7 F | DIASTOLIC BLOOD PRESSURE: 70 MMHG | OXYGEN SATURATION: 95 % | SYSTOLIC BLOOD PRESSURE: 108 MMHG | RESPIRATION RATE: 16 BRPM

## 2024-08-08 PROCEDURE — G0299 HHS/HOSPICE OF RN EA 15 MIN: HCPCS

## 2024-08-08 NOTE — Clinical Note
"[facility] SOC    SOC Note: Patient is a pleasant 68 year old female who was admitted to [facility] after becoming confused at home. Patient's medications were changed and her confusion improved. Patient developed a UTI inpatient and became confused again. Infection was treated and patient was discharged home on PO ABT and will then start low dose daily ABT. Patient has previous back and coccyx injury from fall and has a lot of pain. Patient states that she does not want to take pain medication. Patient is still slightly confused at visit, spouse states that she has had multiple tests done r/t confusion, and a cause has not been found.    Home Health ordered for: SN, PT, OT    Reason for Hosp/Primary Dx/Co-morbidities: confusion, UTI, a-fib, CHF    Focus of Care: UTI    Patient's goal(s): \"to stop getting infections so I can spend more time with family\"    Current Functional status/mobility/DME: uses walker in her home, needs assistance with ADLs and transfers    HB status/Living Arrangements: lives with spouse who is primary caregiver    Skin Integrity/wound status: skin tear noted to LLE    Code Status: full code    Fall Risk/Safety concerns: high    Educated on Emergency Plan, steps to take prior to going to the ER and when to Call Home Health First:  yes    Medication issues/Concerns: none    Additional Problems/Concerns: none    SDOH Barriers (i.e. caregiver concerns, social isolation, transportation, food insecurity, environment, income etc.)/Need for MSW: denies    SN frequency: 1w6"

## 2024-08-09 ENCOUNTER — HOME CARE VISIT (OUTPATIENT)
Dept: HOME HEALTH SERVICES | Facility: HOME HEALTHCARE | Age: 69
End: 2024-08-09
Payer: MEDICARE

## 2024-08-09 NOTE — HOME HEALTH
"SOC Note: Patient is a pleasant 68 year old female who was admitted to [facility] after becoming confused at home. Patient's medications were changed and her confusion improved. Patient developed a UTI inpatient and became confused again. Infection was treated and patient was discharged home on PO ABT and will then start low dose daily ABT. Patient has previous back and coccyx injury from fall and has a lot of pain. Patient states that she does not want to take pain medication. Patient is still slightly confused at visit, spouse states that she has had multiple tests done r/t confusion, and a cause has not been found.    Home Health ordered for: SN, PT, OT    Reason for Hosp/Primary Dx/Co-morbidities: confusion, UTI, a-fib, CHF    Focus of Care: UTI    Patient's goal(s): \"to stop getting infections so I can spend more time with family\"    Current Functional status/mobility/DME: uses walker in her home, needs assistance with ADLs and transfers    HB status/Living Arrangements: lives with spouse who is primary caregiver    Skin Integrity/wound status: skin tear noted to LLE    Code Status: full code    Fall Risk/Safety concerns: high    Educated on Emergency Plan, steps to take prior to going to the ER and when to Call Home Health First:  yes    Medication issues/Concerns: none    Additional Problems/Concerns: none    SDOH Barriers (i.e. caregiver concerns, social isolation, transportation, food insecurity, environment, income etc.)/Need for MSW: denies    SN frequency: 1w6"

## 2024-08-12 ENCOUNTER — READMISSION MANAGEMENT (OUTPATIENT)
Dept: CALL CENTER | Facility: HOSPITAL | Age: 69
End: 2024-08-12
Payer: MEDICARE

## 2024-08-12 NOTE — OUTREACH NOTE
Medical Week 1 Survey      Flowsheet Row Responses   [facility] facility patient discharged from? [facility]   Does the patient have one of the following disease processes/diagnoses(primary or secondary)? Other   Week 1 attempt successful? No   Unsuccessful attempts Attempt 2            XOCHILT SRIVASTAVA - Registered Nurse

## 2024-08-13 ENCOUNTER — HOME CARE VISIT (OUTPATIENT)
Dept: HOME HEALTH SERVICES | Facility: HOME HEALTHCARE | Age: 69
End: 2024-08-13
Payer: MEDICARE

## 2024-08-13 ENCOUNTER — TELEPHONE (OUTPATIENT)
Dept: NEUROSURGERY | Facility: CLINIC | Age: 69
End: 2024-08-13
Payer: MEDICARE

## 2024-08-13 VITALS
HEART RATE: 72 BPM | OXYGEN SATURATION: 98 % | DIASTOLIC BLOOD PRESSURE: 64 MMHG | TEMPERATURE: 97.4 F | SYSTOLIC BLOOD PRESSURE: 100 MMHG

## 2024-08-13 PROCEDURE — G0151 HHCP-SERV OF PT,EA 15 MIN: HCPCS

## 2024-08-13 NOTE — TELEPHONE ENCOUNTER
Called and LVM to call the office. We don't have her  listed on a BH verbal release in our system. We did not see her for anything brain related during her last admission. We seen her for the compression fracture that she is following up for on 8/27/2024. Her d/c summary mentions neurology. We just need to see what symptoms the patient is needing to be seen for in regards to her brain.

## 2024-08-13 NOTE — TELEPHONE ENCOUNTER
Name: DARI WANG    Relationship: Emergency Contact    Best Callback Number: [phone]    HUB PROVIDED THE RELAY MESSAGE FROM THE OFFICE   PATIENT HAS FURTHER QUESTIONS AND WOULD LIKE A CALL BACK AT THE FOLLOWING PHONE NUMBER [phone]    ADDITIONAL INFORMATION: PATIENTS  CALLED AND WAS GIVEN THE INFORMATION FOR UPCOMING APPT. HOWEVER PATIENT EXPRESSED SHE IS WANTING TO MAKE AN APPT. TO GO OVER HER MRI OF THE BRAIN-COULD NOT LOCATE IF WE HAD SEEN PATIENT FOR BRAIN WHILE SHE WAS ADMITTED-SENDING TO OFFICE TO REVIEW AS THEY ARE ASKING TO BE SEEN FOR BRAIN DX-PATIENTS  IS ASKING FOR A CALL BACK TO DISCUSS THANK YOU

## 2024-08-13 NOTE — TELEPHONE ENCOUNTER
Caller: DARI WANG    Relationship: Emergency Contact    Best call back number: [phone]    What is the best time to reach you: ANYTIME    Who are you requesting to speak with (clinical staff, provider,  specific staff member):     Do you know the name of the person who called:     What was the call regarding: PATIENTS  CALLED BACK AND STATES THAT HE THINKS PATIENT NEEDS TO SEE NEUROLOGY-HE ADVISED HE WILL CALL PCP TO SEE WHERE SHE IS NEEDING TO BE SEEN FOR BRAIN-SENDING TO OFFICE TO CANCEL PREV. MESSAGE REQUESTING APPT. FOR BRAIN DX-THANK YOU    Is it okay if the provider responds through [employer]:

## 2024-08-14 NOTE — CASE COMMUNICATION
"PT Evaluation Summary: The patient is a 68 year old female admitted to home health services by  on 8/8/2024 following hospitalizations with confusion and UTI. Prior to this patient was followed by SN (admitted 6/6/2024) after hospitalization with Afib with RVR, UTI, Warfarin toxicity, and CVA. She was discharged due to end of certification period while hospitalized. She previously declined PT evaluation due to pain.    Past Medical Hi story includes: Multiple falls, Closed wedge compression fracture of T10 vertebra, Generalized weakness, Leukocytosis, Ischemic cardiomyopathy, Chronic diastolic congestive heart failure, UTI (urinary tract infection), CAD (coronary artery disease), status post coronary artery bypass grafting, Atrial fibrillation with rapid ventricular response, history of pacemaker/implantable cardioverter defibrillator (ICD) placement, PAD (peripheral  artery disease), GERD (gastroesophageal reflux disease), HLD (hyperlipidemia), ANNIE (obstructive sleep apnea), Acquired hypothyroidism, Cerebrovascular disease. (B) TKR, right TSA.    Prior Functional Level: Able to walk but had significant pain.    Social History: Lives in home with spouse, Lamont Mejia, who is able and willing to help patient as needed (currently helps with all mobility).   Patient Stated Goal: \"I would like to get  back the ability to stand and sit by myself.\"    PT Assessment this day (8/13/2024) reveals the problems of substantial lower extremity weakness (bilateral lower extremity strength graded 3+/5 throughout with weakness noted to negatively impact balance, transfers and gait), impaired transfers (requires no less than minimal assistance), limited ambulation ability (limited to 35 feet with walker and no less than minimal assistance and co ntinuous hands on assistance), imbalance (high falls risk as shown by a low score of 2/28 on the Tinetti Balance Assessment).    The patient will require the PT interventions of " therapeutic exercise, transfer training, gait training, and patient education (including home safety and home exercise program (HEP) instruction) to address the above noted problems.    Rehab potential good due to patient demonstrated ability and willingness to  participate in PT session and due to availability of capable and willing caregiver.    Plan is to see patient in the home for PT 2WK1, 1WK1, 2WK3, 1WK3.

## 2024-08-14 NOTE — CASE COMMUNICATION
PT Evaluation completed 8/13/2024.  Functional findings this day seem to be in agreement with OASIS findings - no changes recommended.

## 2024-08-14 NOTE — HOME HEALTH
"PT Evaluation Summary: The patient is a 68 year old female admitted to home health services by  on 8/8/2024 following hospitalizations with confusion and UTI. Prior to this patient was followed by SN (admitted 6/6/2024) after hospitalization with Afib with RVR, UTI, Warfarin toxicity, and CVA. She was discharged due to end of certification period while hospitalized. She previously declined PT evaluation due to pain.    Past Medical History includes: Multiple falls, Closed wedge compression fracture of T10 vertebra, Generalized weakness, Leukocytosis, Ischemic cardiomyopathy, Chronic diastolic congestive heart failure, UTI (urinary tract infection), CAD (coronary artery disease), status post coronary artery bypass grafting, Atrial fibrillation with rapid ventricular response, history of pacemaker/implantable cardioverter defibrillator (ICD) placement, PAD (peripheral artery disease), GERD (gastroesophageal reflux disease), HLD (hyperlipidemia), ANNIE (obstructive sleep apnea), Acquired hypothyroidism, Cerebrovascular disease. (B) TKR, right TSA.    Prior Functional Level: Able to walk but had significant pain.    Social History: Lives in home with spouse, Lamont Mejia, who is able and willing to help patient as needed (currently helps with all mobility).   Patient Stated Goal: \"I would like to get back the ability to stand and sit by myself.\"    PT Assessment this day (8/13/2024) reveals the problems of substantial lower extremity weakness (bilateral lower extremity strength graded 3+/5 throughout with weakness noted to negatively impact balance, transfers and gait), impaired transfers (requires no less than minimal assistance), limited ambulation ability (limited to 35 feet with walker and no less than minimal assistance and continuous hands on assistance), imbalance (high falls risk as shown by a low score of 2/28 on the Tinetti Balance Assessment).    The patient will require the PT interventions of therapeutic " exercise, transfer training, gait training, and patient education (including home safety and home exercise program (HEP) instruction) to address the above noted problems.    Rehab potential good due to patient demonstrated ability and willingness to participate in PT session and due to availability of capable and willing caregiver.    Plan is to see patient in the home for PT 2WK1, 1WK1, 2WK3, 1WK3.

## 2024-08-15 ENCOUNTER — HOME CARE VISIT (OUTPATIENT)
Dept: HOME HEALTH SERVICES | Facility: HOME HEALTHCARE | Age: 69
End: 2024-08-15
Payer: MEDICARE

## 2024-08-15 VITALS
OXYGEN SATURATION: 94 % | DIASTOLIC BLOOD PRESSURE: 48 MMHG | TEMPERATURE: 97.4 F | HEART RATE: 74 BPM | SYSTOLIC BLOOD PRESSURE: 108 MMHG | RESPIRATION RATE: 17 BRPM

## 2024-08-15 PROCEDURE — G0299 HHS/HOSPICE OF RN EA 15 MIN: HCPCS

## 2024-08-16 ENCOUNTER — HOME CARE VISIT (OUTPATIENT)
Dept: HOME HEALTH SERVICES | Facility: HOME HEALTHCARE | Age: 69
End: 2024-08-16
Payer: MEDICARE

## 2024-08-16 ENCOUNTER — READMISSION MANAGEMENT (OUTPATIENT)
Dept: CALL CENTER | Facility: HOSPITAL | Age: 69
End: 2024-08-16
Payer: MEDICARE

## 2024-08-16 NOTE — OUTREACH NOTE
Medical Week 1 Survey      Flowsheet Row Responses   [facility] facility patient discharged from? [facility]   Does the patient have one of the following disease processes/diagnoses(primary or secondary)? Other   Week 1 attempt successful? No   Unsuccessful attempts Attempt 3            DOYLE FRIEND - Registered Nurse

## 2024-08-16 NOTE — TELEPHONE ENCOUNTER
Called the patient's  Lamont and the patient was there and gave verbal permission for me to talk with Lamont. The patient did get on the phone and I did explain that she will need to complete her X-Rays at least one day before her follow up appointment. She verbally understood .  She did also state that she has called here PCP in regards to her Brain MRI and who she may need to see.

## 2024-08-21 ENCOUNTER — HOME CARE VISIT (OUTPATIENT)
Dept: HOME HEALTH SERVICES | Facility: HOME HEALTHCARE | Age: 69
End: 2024-08-21
Payer: MEDICARE

## 2024-08-22 ENCOUNTER — HOME CARE VISIT (OUTPATIENT)
Dept: HOME HEALTH SERVICES | Facility: HOME HEALTHCARE | Age: 69
End: 2024-08-22
Payer: MEDICARE

## 2024-08-22 ENCOUNTER — HOSPITAL ENCOUNTER (OUTPATIENT)
Dept: GENERAL RADIOLOGY | Facility: HOSPITAL | Age: 69
Discharge: HOME | End: 2024-08-22
Payer: MEDICARE

## 2024-08-22 DIAGNOSIS — S22.070A CLOSED WEDGE COMPRESSION FRACTURE OF T10 VERTEBRA, INITIAL ENCOUNTER: ICD-10-CM

## 2024-08-22 PROCEDURE — 72080 X-RAY EXAM THORACOLMB 2/> VW: CPT

## 2024-08-26 ENCOUNTER — OFFICE VISIT (OUTPATIENT)
Dept: NEUROSURGERY | Facility: CLINIC | Age: 69
End: 2024-08-26
Payer: MEDICARE

## 2024-08-26 VITALS
HEART RATE: 65 BPM | BODY MASS INDEX: 24.4 KG/M2 | SYSTOLIC BLOOD PRESSURE: 95 MMHG | HEIGHT: 68 IN | OXYGEN SATURATION: 96 % | RESPIRATION RATE: 18 BRPM | DIASTOLIC BLOOD PRESSURE: 58 MMHG | WEIGHT: 161 LBS

## 2024-08-26 DIAGNOSIS — S22.080A COMPRESSION FRACTURE OF T12 VERTEBRA, INITIAL ENCOUNTER: Primary | ICD-10-CM

## 2024-08-26 DIAGNOSIS — S22.070A CLOSED WEDGE COMPRESSION FRACTURE OF T10 VERTEBRA, INITIAL ENCOUNTER: ICD-10-CM

## 2024-08-26 PROCEDURE — 99213 OFFICE O/P EST LOW 20 MIN: CPT

## 2024-08-26 PROCEDURE — 3074F SYST BP LT 130 MM HG: CPT

## 2024-08-26 PROCEDURE — 3078F DIAST BP <80 MM HG: CPT

## 2024-08-26 NOTE — PROGRESS NOTES
Subjective     Chief Complaint   Patient presents with    Back Pain         Previous Treatment: TLSO, PT & OT    HPI: Laura Mejia is a 68 y.o. female who presents to clinic after being seen in the hospital for T10 and T12 compression fractures.  Patient has been wearing her TLSO brace as she should and following restrictions.  Unfortunately, despite these restrictions she still continues to be in quite a bit of pain when standing, sitting and walking.  Patient states the only comfortable position she can find is when she is lying down.  Patient denies any bowel or bladder incontinence or pain, numbness/tingling or weakness in her lower extremities at this time.        PMH:  Past Medical History:   Diagnosis Date    AAA (abdominal aortic aneurysm)     Allergic rhinitis     Aspiration pneumonia 05/2017    CHF (congestive heart failure)     Coronary artery disease     DDD (degenerative disc disease), lumbar     Depression     Diverticulosis     Frequent UTI     GERD (gastroesophageal reflux disease)     Hiatal hernia     HSV (herpes simplex virus) infection     Hyperlipidemia     IBS (irritable bowel syndrome)     Ischemic cardiomyopathy 09/2017    Kidney stones     NSTEMI (non-ST elevated myocardial infarction) 04/26/2017    Obstructive sleep apnea 2011    Osteoarthritis     Osteopenia     Peripheral neuropathy     Pulmonary embolism, bilateral 05/2017    Rotator cuff tear          Current Outpatient Medications:     apixaban (ELIQUIS) 5 MG tablet tablet, Take 1 tablet by mouth 2 (Two) Times a Day. Indications: Atrial Fibrillation, Disp: , Rfl:     ferrous sulfate 324 (65 Fe) MG tablet delayed-release EC tablet, Take 1 tablet by mouth Daily With Breakfast., Disp: 30 tablet, Rfl: 0    folic acid (FOLVITE) 1 MG tablet, Take 1 tablet by mouth Daily., Disp: 30 tablet, Rfl: 1    levothyroxine (SYNTHROID, LEVOTHROID) 75 MCG tablet, Take 1 tablet by mouth Daily. Indications: Underactive Thyroid, Disp: , Rfl:      metoprolol tartrate (LOPRESSOR) 25 MG tablet, Take 0.5 tablets by mouth Every 12 (Twelve) Hours., Disp: 30 tablet, Rfl: 5    nitrofurantoin, macrocrystal-monohydrate, (Macrobid) 100 MG capsule, Take 1 capsule by mouth Every Night. Start after completing treatment for current UTI., Disp: 30 capsule, Rfl: 5    ondansetron (ZOFRAN) 4 MG tablet, Take 1 tablet by mouth Every 8 (Eight) Hours As Needed for Nausea or Vomiting. Indications: Nausea and Vomiting, Disp: , Rfl:     oxybutynin XL (DITROPAN-XL) 10 MG 24 hr tablet, Take 1 tablet by mouth Daily. Indications: Frequent Urination, Disp: , Rfl:     pantoprazole (PROTONIX) 40 MG EC tablet, Take 1 tablet by mouth Every Morning., Disp: 30 tablet, Rfl: 0    polyethylene glycol (MIRALAX) 17 g packet, Take 17 g by mouth Daily As Needed (Use if senna-docusate is ineffective)., Disp: 30 each, Rfl: 1    potassium chloride ER (K-TAB) 20 MEQ tablet controlled-release ER tablet, Take 1 tablet by mouth Daily. Indications: Low Amount of Potassium in the Blood, Disp: , Rfl:     risperiDONE (risperDAL M-TABS) 0.25 MG tablet dispersible disintegrating tablet, Place 1 tablet on the tongue Every Night., Disp: 30 tablet, Rfl: 1    sennosides-docusate (PERICOLACE) 8.6-50 MG per tablet, Take 2 tablets by mouth 2 (Two) Times a Day As Needed for Constipation., Disp: 60 tablet, Rfl: 1    sertraline (ZOLOFT) 50 MG tablet, Take 1 tablet by mouth Every Night., Disp: 30 tablet, Rfl: 1     Allergies   Allergen Reactions    Oxytetracycline Hives    Oxycodone Itching        Past Surgical History:   Procedure Laterality Date    CARDIAC CATHETERIZATION  04/26/2017    99% mid LAD. 90% mid LCX. 60% RCA. Recommended CABG. Dr. Diaz    CARDIAC DEFIBRILLATOR PLACEMENT  12/26/2017    ICD implant/ Dr. Ellis    CATARACT EXTRACTION      CORONARY ARTERY BYPASS GRAFT  04/26/2017    x4 & ASD Closure    CYSTOSCOPY  2002    negative. Dr. Daniels    LAPAROSCOPIC CHOLECYSTECTOMY  04/17/2013    For biliary  "dyskinesia. Dr. Mccoy.     REPLACEMENT TOTAL KNEE BILATERAL  11/2004    Dr. Herrera    THORACENTESIS Left 05/08/2017    TONSILLECTOMY      TUBAL ABDOMINAL LIGATION Bilateral         Family History   Problem Relation Age of Onset    Heart disease Mother     Other Mother         Hypoglycemia    Osteoporosis Mother     COPD Father     Stroke Father     Hypertension Brother     Diabetes Brother     Heart disease Brother          Social Hx:  Social History     Tobacco Use   Smoking Status Never   Smokeless Tobacco Not on file      Alcohol Use: Not At Risk (7/26/2024)    AUDIT-C     Frequency of Alcohol Consumption: Never     Average Number of Drinks: Patient does not drink     Frequency of Binge Drinking: Never      Social History     Substance and Sexual Activity   Drug Use Never          Review of Systems   Constitutional:  Positive for activity change.   Respiratory:  Negative for chest tightness and shortness of breath.    Musculoskeletal:  Positive for arthralgias, back pain and myalgias.         Objective     BP 95/58   Pulse 65   Resp 18   Ht 172.7 cm (68\")   Wt 73 kg (161 lb)   SpO2 96%   BMI 24.48 kg/m²    Body mass index is 24.48 kg/m².      Physical Exam  Vitals reviewed.   Eyes:      Extraocular Movements: Extraocular movements intact.      Conjunctiva/sclera: Conjunctivae normal.      Pupils: Pupils are equal, round, and reactive to light.   Musculoskeletal:         General: Normal range of motion.      Cervical back: Normal range of motion.   Skin:     General: Skin is warm and dry.   Neurological:      General: No focal deficit present.      Mental Status: She is alert and oriented to person, place, and time.   Psychiatric:         Mood and Affect: Mood normal.         Speech: Speech normal.        Neurologic Exam     Mental Status   Oriented to person, place, and time.   Speech: speech is normal   Level of consciousness: alert    Cranial Nerves     CN III, IV, VI   Pupils are equal, round, and reactive " to light.    Motor Exam   Muscle bulk: normal  Right arm tone: normal  Left arm tone: normal  Right leg tone: normal  Left leg tone: normal    Strength   Right quadriceps: 5/5  Left quadriceps: 5/5  Right hamstrin/5  Left hamstrin/5  Right glutei: 5/5  Left glutei: 5/5  Right anterior tibial: 5/5  Left anterior tibial: 5/5  Right posterior tibial: 5/5  Left posterior tibial: 5/5    Sensory Exam   Light touch normal.     Gait, Coordination, and Reflexes Patient has difficulty walking due to the pain in her back         Results Review  I personally reviewed and interpreted the images from the following studies:          XR Spine Thoracolumbar junction 2 view    Result Date: 2024  XR SPINE THORACOLUMBAR 2 VW Date of Exam: 2024 8:03 AM EDT Indication: T12 compression fx Comparison: CT thoracic and lumbar spine 2024 Findings: Lumbar levoscoliosis 17 degrees centered at L2-3 with Mcmanus angle extending from superior T12 through inferior L4 vertebral endplates. Marked thoracic kyphotic curvature. Marked accentuation of lumbar lordosis. Multilevel chronic appearing mid to lower thoracic and upper lumbar compression deformities are present. No bony retropulsion or subluxation is seen. Exact numbering is difficult, as the lumbar spine is not completely included in the imaging field-of-view on the lateral radiograph, and the thoracic spine is not completely included in the field-of-view on the submitted AP radiograph. Advanced osteopenic changes are present. CABG changes are present. Pacemaker lead is seen extending to the level of the right ventricle.     Impression: 1. Multilevel chronic-appearing thoracic and lumbar compression deformities. Exact numbering cannot be ascertained on the images submitted, for reasons described above. 2. Osteopenia. 3. Lumbar levoscoliosis. Thoracic kyphotic accentuation. Lumbar lordotic accentuation Electronically Signed: Amber Candelaria MD  2024 2:11 PM EDT   Workstation ID: [id number]       Assessment & Plan     MDM: Laura Mejia is a 68 y.o. female who presents to clinic after being seen in the hospital for compression fractures.  Patient unfortunately continues to have bad back pain.  Patient states that when she is sitting, standing or walking her pain ranges from a 6-8 out of 10.  Patient states she is really only comfortable when she is laying down.  Patient has good strength in her lower extremities and has no bowel or bladder incontinence or saddle anesthesia this time.  However, due to the amount of pain she continues to be and I am referring her to pain management for consultation for kyphoplasty.  I explained to the patient that this is just a consultation to see if she would be a good candidate for kyphoplasty.    The patient to please let the office know if she will be having the kyphoplasty.  If she is not a candidate for the kyphoplasty she is to continue wearing her TLSO brace for another 6 to 8 weeks and obtain new x-rays to see how her fractures are healing.  Patient is understanding this and is agreeable.  Patient is to call the office with any questions or concerns.       Diagnosis Plan   1. Compression fracture of T12 vertebra, initial encounter  Ambulatory Referral to Pain Management      2. Closed wedge compression fracture of T10 vertebra, initial encounter  Ambulatory Referral to Pain Management          No follow-ups on file.          BMI is within normal parameters. No other follow-up for BMI required.         This patient was examined wearing appropriate personal protective equipment.            Nela Christensen PA-C    08/26/24  14:35 EDT      Part of this note may be an electronic transcription/translation of spoken language to printed text using the Dragon Dictation System.

## 2024-09-09 ENCOUNTER — OFFICE VISIT (OUTPATIENT)
Dept: PAIN MEDICINE | Facility: CLINIC | Age: 69
End: 2024-09-09
Payer: MEDICARE

## 2024-09-09 VITALS
HEART RATE: 72 BPM | WEIGHT: 148 LBS | SYSTOLIC BLOOD PRESSURE: 118 MMHG | RESPIRATION RATE: 16 BRPM | DIASTOLIC BLOOD PRESSURE: 47 MMHG | OXYGEN SATURATION: 97 % | BODY MASS INDEX: 22.5 KG/M2

## 2024-09-09 DIAGNOSIS — M54.50 ACUTE MIDLINE LOW BACK PAIN WITHOUT SCIATICA: ICD-10-CM

## 2024-09-09 DIAGNOSIS — S22.070G COMPRESSION FRACTURE OF T10 VERTEBRA WITH DELAYED HEALING: Primary | ICD-10-CM

## 2024-09-09 PROCEDURE — 1159F MED LIST DOCD IN RCRD: CPT | Performed by: STUDENT IN AN ORGANIZED HEALTH CARE EDUCATION/TRAINING PROGRAM

## 2024-09-09 PROCEDURE — 3074F SYST BP LT 130 MM HG: CPT | Performed by: STUDENT IN AN ORGANIZED HEALTH CARE EDUCATION/TRAINING PROGRAM

## 2024-09-09 PROCEDURE — 99204 OFFICE O/P NEW MOD 45 MIN: CPT | Performed by: STUDENT IN AN ORGANIZED HEALTH CARE EDUCATION/TRAINING PROGRAM

## 2024-09-09 PROCEDURE — 3078F DIAST BP <80 MM HG: CPT | Performed by: STUDENT IN AN ORGANIZED HEALTH CARE EDUCATION/TRAINING PROGRAM

## 2024-09-09 PROCEDURE — 1125F AMNT PAIN NOTED PAIN PRSNT: CPT | Performed by: STUDENT IN AN ORGANIZED HEALTH CARE EDUCATION/TRAINING PROGRAM

## 2024-09-09 PROCEDURE — 1160F RVW MEDS BY RX/DR IN RCRD: CPT | Performed by: STUDENT IN AN ORGANIZED HEALTH CARE EDUCATION/TRAINING PROGRAM

## 2024-09-09 RX ORDER — HYDROCODONE BITARTRATE AND ACETAMINOPHEN 5; 325 MG/1; MG/1
1 TABLET ORAL EVERY 8 HOURS PRN
Qty: 30 TABLET | Refills: 0 | Status: SHIPPED | OUTPATIENT
Start: 2024-09-09

## 2024-09-09 RX ORDER — HYDROCODONE BITARTRATE AND ACETAMINOPHEN 5; 325 MG/1; MG/1
1 TABLET ORAL EVERY 6 HOURS PRN
COMMUNITY
End: 2024-09-09 | Stop reason: SDUPTHER

## 2024-09-10 ENCOUNTER — PATIENT ROUNDING (BHMG ONLY) (OUTPATIENT)
Dept: PAIN MEDICINE | Facility: CLINIC | Age: 69
End: 2024-09-10
Payer: MEDICARE

## 2024-09-10 ENCOUNTER — DOCUMENTATION (OUTPATIENT)
Dept: PAIN MEDICINE | Facility: CLINIC | Age: 69
End: 2024-09-10
Payer: MEDICARE

## 2024-09-10 NOTE — PROGRESS NOTES
CHIEF COMPLAINT  Chief Complaint   Patient presents with    Back Pain     ?Hydrocodone last week    Extremity Pain     L hip       Primary Care  Luan Joseph, ALEXANDER Rodríguez   Laura Mejia is a 68 y.o. female  who presents for chronic midline low back pain.    History of Present Illness  The patient presents for evaluation of back pain. She is accompanied by an adult male.    Approximately 10 weeks ago, she experienced a fall, landing directly on her buttocks. Although she did not feel any immediate discomfort, she woke up the following morning in severe pain. Emergency medical services were called, and she was assisted to their ambulance.     She has a history of chronic back pain and sciatica, which she has managed with minimal medication. Her pain intensifies when she stands or sits for extended periods, as evidenced by a recent shopping trip that left her in significant discomfort after standing for 1 to 2 hours. She has been using hydrocodone sparingly, but it only provides minimal relief. Morphine has been the most effective at alleviating her pain. She also reports an allergic reaction to oxycodone, which causes severe itching. Her pain subsides when she lies down and intensifies upon standing or sitting.    She is currently on Eliquis due to a past heart attack and subsequent quadruple bypass surgery in 2017. She also had seven blood clots in her left leg, which migrated to her left lung. She was previously on warfarin and monitored her own INR levels.    Supplemental Information:  She had a reverse shoulder replacement. She has clips from her tubal ligation.    ALLERGIES  She is allergic to OXYCODONE.    Patient or patient representative verbalized consent for the use of Ambient Listening during the visit with  Cliff Juarez MD for chart documentation. 9/10/2024  13:20 EDT    History of Present Illness     Location: Midline lumbar spine  Onset: Several weeks ago  Duration: Slightly  improved  Timing: Constant throughout the day  Quality: Aching, sharp, stabbing  Severity: Today: 4       Last Week: 4       Worst: 7  Modifying Factors: The pain is worse with any type of walking and standing and significantly improves with sitting and resting  Functional Deficit: The pain makes it difficult for her to perform her activities of daily living    Physical Therapy: She is only minimally able to participate in physical therapy due to pain    Interval Update 09/09/2024:     The following portions of the patient's history were reviewed and updated as appropriate: allergies, current medications, past family history, past medical history, past social history, past surgical history, and problem list.    Procedures:        Current Outpatient Medications:     apixaban (ELIQUIS) 5 MG tablet tablet, Take 1 tablet by mouth 2 (Two) Times a Day. Indications: Atrial Fibrillation, Disp: , Rfl:     ferrous sulfate 324 (65 Fe) MG tablet delayed-release EC tablet, Take 1 tablet by mouth Daily With Breakfast., Disp: 30 tablet, Rfl: 0    folic acid (FOLVITE) 1 MG tablet, Take 1 tablet by mouth Daily., Disp: 30 tablet, Rfl: 1    HYDROcodone-acetaminophen (NORCO) 5-325 MG per tablet, Take 1 tablet by mouth Every 8 (Eight) Hours As Needed for Moderate Pain., Disp: 30 tablet, Rfl: 0    levothyroxine (SYNTHROID, LEVOTHROID) 75 MCG tablet, Take 1 tablet by mouth Daily. Indications: Underactive Thyroid, Disp: , Rfl:     metoprolol tartrate (LOPRESSOR) 25 MG tablet, Take 0.5 tablets by mouth Every 12 (Twelve) Hours., Disp: 30 tablet, Rfl: 5    nitrofurantoin, macrocrystal-monohydrate, (Macrobid) 100 MG capsule, Take 1 capsule by mouth Every Night. Start after completing treatment for current UTI., Disp: 30 capsule, Rfl: 5    ondansetron (ZOFRAN) 4 MG tablet, Take 1 tablet by mouth Every 8 (Eight) Hours As Needed for Nausea or Vomiting. Indications: Nausea and Vomiting, Disp: , Rfl:     oxybutynin XL (DITROPAN-XL) 10 MG 24 hr  tablet, Take 1 tablet by mouth Daily. Indications: Frequent Urination, Disp: , Rfl:     pantoprazole (PROTONIX) 40 MG EC tablet, Take 1 tablet by mouth Every Morning., Disp: 30 tablet, Rfl: 0    polyethylene glycol (MIRALAX) 17 g packet, Take 17 g by mouth Daily As Needed (Use if senna-docusate is ineffective)., Disp: 30 each, Rfl: 1    potassium chloride ER (K-TAB) 20 MEQ tablet controlled-release ER tablet, Take 1 tablet by mouth Daily. Indications: Low Amount of Potassium in the Blood, Disp: , Rfl:     risperiDONE (risperDAL M-TABS) 0.25 MG tablet dispersible disintegrating tablet, Place 1 tablet on the tongue Every Night., Disp: 30 tablet, Rfl: 1    sennosides-docusate (PERICOLACE) 8.6-50 MG per tablet, Take 2 tablets by mouth 2 (Two) Times a Day As Needed for Constipation., Disp: 60 tablet, Rfl: 1    sertraline (ZOLOFT) 50 MG tablet, Take 1 tablet by mouth Every Night., Disp: 30 tablet, Rfl: 1    Review of Systems   Musculoskeletal:  Positive for arthralgias, back pain, gait problem, joint swelling and myalgias. Negative for neck pain and neck stiffness.   Neurological:  Negative for weakness and numbness.       Vitals:    09/09/24 0944   BP: 118/47   Pulse: 72   Resp: 16   SpO2: 97%   Weight: 67.1 kg (148 lb)   PainSc:   4       Urine Drug Screen:   Appropriate: Deferred    Objective   Physical Exam  Vitals and nursing note reviewed. Exam conducted with a chaperone present.   Constitutional:       General: She is not in acute distress.     Appearance: Normal appearance. She is well-developed and normal weight.   Neck:      Trachea: No tracheal deviation.   Musculoskeletal:      Cervical back: Normal range of motion and neck supple. No tenderness.      Comments: Lumbar Spine Exam:  Tender to palpation over the lumbar paraspinal musculature Yes  Limited range of motion secondary to pain Yes  Facet loading positive: Deferred  Facets tender to palpation: bilateral  Straight leg raise test positive: Negative    Her  midline lower thoracic and upper lumbar spine is significant tender to palpation and percussion consistent with her compression fracture     Neurological:      General: No focal deficit present.      Mental Status: She is alert.      Sensory: No sensory deficit.      Motor: No weakness.           Assessment & Plan   Problems Addressed this Visit    None  Visit Diagnoses       Compression fracture of T10 vertebra with delayed healing    -  Primary    Relevant Medications    HYDROcodone-acetaminophen (NORCO) 5-325 MG per tablet    Other Relevant Orders    MRI Thoracic Spine Without Contrast    Acute midline low back pain without sciatica              Diagnoses         Codes Comments    Compression fracture of T10 vertebra with delayed healing    -  Primary ICD-10-CM: S22.070G  ICD-9-CM: V54.17     Acute midline low back pain without sciatica     ICD-10-CM: M54.50  ICD-9-CM: 724.2             Plan:  She does have a recent CT scan showing several wedge fractures of T7 and T8 as well as a likely acute T10 compression fracture  The T10 compression fracture would certainly explain her acute pain  We will order an MRI of the thoracic spine to assess for her compression fracture acuity.  If there is bone edema seen on [facility] imaging, we can plan for a T10 balloon kyphoplasty  Given the the previous fractures are likely old, we could potentially consider LESI versus L MBB if it is found that her T10 fracture is also chronic  Reorder hydrocodone 5mg as it does provide her some benefit.  She is taking fairly minimal doses.  I can see her back after imaging and come up with a more definitive interventional plan.  --- Follow-up after MRI            INSPECT REPORT    As part of the patient's treatment plan, I may be prescribing controlled substances. The patient has been made aware of appropriate use of such medications, including potential risk of somnolence, limited ability to drive and/or work safely, and the potential for  dependence or overdose. It has also bee made clear that these medications are for use by this patient only, without concomitant use of alcohol or other substances unless prescribed.     Patient has completed prescribing agreement detailing terms of continued prescribing of controlled substances, including monitoring NALLELY reports, urine drug screening, and pill counts if necessary. The patient is aware that inappropriate use will results in cessation of prescribing such medications.    INSPECT report has been reviewed and scanned into the patient's chart.    As the clinician, I personally reviewed the INSPECT from 9/6/24 .    History and physical exam exhibit continued safe and appropriate use of controlled substances.      EMR Dragon/Transcription disclaimer:   Much of this encounter note is an electronic transcription/translation of spoken language to printed text. The electronic translation of spoken language may permit erroneous, or at times, nonsensical words or phrases to be inadvertently transcribed; Although I have reviewed the note for such errors, some may still exist.

## 2024-09-10 NOTE — PROGRESS NOTES
Dr. Juarez ordered a thoracic spine MRI without contrast on Ms. Mejia.  [facility] couldn't schedule her until mid October.  Pt has a [employer] pacemaker/defibrillator, so can't go elsewhere.  I spoke to Anuradha Lindsey at [facility] who is going to try and get her in next Tuesday or Wednesday.  She will call me or the patient tomorrow with a definite day and time once they can coordinate with [employer].

## 2024-09-10 NOTE — PROGRESS NOTES
September 10, 2024    Hello, may I speak with Laura Mejia?    My name is Consuelo Ozuna      I am  with [facility]  [address] [place] [address].    Before we get started may I verify your date of birth? 1955    I am calling to officially welcome you to our practice and ask about your recent visit. Is this a good time to talk? no    Tell me about your visit with us. What things went well?  Really liked Dr. Juarez.  He answered all my questions       We're always looking for ways to make our patients' experiences even better. Do you have recommendations on ways we may improve?  no    Overall were you satisfied with your first visit to our practice? yes       I appreciate you taking the time to speak with me today. Is there anything else I can do for you? no      Thank you, and have a great day.

## 2024-09-16 ENCOUNTER — TELEPHONE (OUTPATIENT)
Dept: PAIN MEDICINE | Facility: CLINIC | Age: 69
End: 2024-09-16
Payer: MEDICARE

## 2024-09-19 ENCOUNTER — HOSPITAL ENCOUNTER (OUTPATIENT)
Dept: MRI IMAGING | Facility: HOSPITAL | Age: 69
Discharge: HOME | End: 2024-09-19
Admitting: STUDENT IN AN ORGANIZED HEALTH CARE EDUCATION/TRAINING PROGRAM
Payer: MEDICARE

## 2024-09-19 DIAGNOSIS — S22.070G COMPRESSION FRACTURE OF T10 VERTEBRA WITH DELAYED HEALING: ICD-10-CM

## 2024-09-19 PROCEDURE — 72146 MRI CHEST SPINE W/O DYE: CPT

## 2024-09-26 ENCOUNTER — TELEPHONE (OUTPATIENT)
Dept: PAIN MEDICINE | Facility: CLINIC | Age: 69
End: 2024-09-26
Payer: MEDICARE

## 2024-10-09 ENCOUNTER — OFFICE VISIT (OUTPATIENT)
Dept: PAIN MEDICINE | Facility: CLINIC | Age: 69
End: 2024-10-09
Payer: MEDICARE

## 2024-10-09 VITALS
RESPIRATION RATE: 16 BRPM | BODY MASS INDEX: 22.5 KG/M2 | WEIGHT: 148 LBS | DIASTOLIC BLOOD PRESSURE: 52 MMHG | OXYGEN SATURATION: 97 % | HEART RATE: 85 BPM | SYSTOLIC BLOOD PRESSURE: 102 MMHG

## 2024-10-09 DIAGNOSIS — S22.080A COMPRESSION FRACTURE OF T12 VERTEBRA, INITIAL ENCOUNTER: Primary | ICD-10-CM

## 2024-10-09 DIAGNOSIS — R42 DIZZINESS: ICD-10-CM

## 2024-10-09 RX ORDER — MELOXICAM 15 MG/1
15 TABLET ORAL DAILY
Qty: 15 TABLET | Refills: 0 | Status: SHIPPED | OUTPATIENT
Start: 2024-10-09

## 2024-10-09 NOTE — PROGRESS NOTES
CHIEF COMPLAINT  Chief Complaint   Patient presents with    Back Pain     Hydrocodone LD 2 wks ago       Primary Care  Luan Joseph APRN Subjective   Laura Mejia is a 68 y.o. female  who presents for chronic midline low back pain.    History of Present Illness  The patient presents for evaluation of back pain. She is accompanied by an adult male.    Approximately 10 weeks ago, she experienced a fall, landing directly on her buttocks. Although she did not feel any immediate discomfort, she woke up the following morning in severe pain. Emergency medical services were called, and she was assisted to their ambulance.     She has a history of chronic back pain and sciatica, which she has managed with minimal medication. Her pain intensifies when she stands or sits for extended periods, as evidenced by a recent shopping trip that left her in significant discomfort after standing for 1 to 2 hours. She has been using hydrocodone sparingly, but it only provides minimal relief. Morphine has been the most effective at alleviating her pain. She also reports an allergic reaction to oxycodone, which causes severe itching. Her pain subsides when she lies down and intensifies upon standing or sitting.    She is currently on Eliquis due to a past heart attack and subsequent quadruple bypass surgery in 2017. She also had seven blood clots in her left leg, which migrated to her left lung. She was previously on warfarin and monitored her own INR levels.    Supplemental Information:  She had a reverse shoulder replacement. She has clips from her tubal ligation.    ALLERGIES  She is allergic to OXYCODONE.    Patient or patient representative verbalized consent for the use of Ambient Listening during the visit with  Cliff Juarez MD for chart documentation. 10/9/2024  13:20 EDT    History of Present Illness     Location: Midline lumbar spine  Onset: Several weeks ago  Duration: Slightly improved  Timing: Constant  throughout the day  Quality: Aching, sharp, stabbing  Severity: Today: 4       Last Week: 4       Worst: 7  Modifying Factors: The pain is worse with any type of walking and standing and significantly improves with sitting and resting  Functional Deficit: The pain makes it difficult for her to perform her activities of daily living    Physical Therapy: She is only minimally able to participate in physical therapy due to pain    Interval Update 10/09/2024: Here today for MRI review.  Initially, we were discussing a T10 compression fracture.  This appears to be healing however she has also developed an acute T12 compression fracture.  I discussed with her the possibility of doing vertebroplasty versus kyphoplasty however I will defer this as there is risk of damage to the T10 vertebrae.  We can try for T12 thoracic epidural for more symptomatic relief.    The following portions of the patient's history were reviewed and updated as appropriate: allergies, current medications, past family history, past medical history, past social history, past surgical history, and problem list.    Procedures:        Current Outpatient Medications:     apixaban (ELIQUIS) 5 MG tablet tablet, Take 1 tablet by mouth 2 (Two) Times a Day. Indications: Atrial Fibrillation, Disp: , Rfl:     ferrous sulfate 324 (65 Fe) MG tablet delayed-release EC tablet, Take 1 tablet by mouth Daily With Breakfast., Disp: 30 tablet, Rfl: 0    folic acid (FOLVITE) 1 MG tablet, Take 1 tablet by mouth Daily., Disp: 30 tablet, Rfl: 1    levothyroxine (SYNTHROID, LEVOTHROID) 75 MCG tablet, Take 1 tablet by mouth Daily. Indications: Underactive Thyroid, Disp: , Rfl:     metoprolol tartrate (LOPRESSOR) 25 MG tablet, Take 0.5 tablets by mouth Every 12 (Twelve) Hours., Disp: 30 tablet, Rfl: 5    nitrofurantoin, macrocrystal-monohydrate, (Macrobid) 100 MG capsule, Take 1 capsule by mouth Every Night. Start after completing treatment for current UTI., Disp: 30 capsule,  Rfl: 5    ondansetron (ZOFRAN) 4 MG tablet, Take 1 tablet by mouth Every 8 (Eight) Hours As Needed for Nausea or Vomiting. Indications: Nausea and Vomiting, Disp: , Rfl:     oxybutynin XL (DITROPAN-XL) 10 MG 24 hr tablet, Take 1 tablet by mouth Daily. Indications: Frequent Urination, Disp: , Rfl:     pantoprazole (PROTONIX) 40 MG EC tablet, Take 1 tablet by mouth Every Morning., Disp: 30 tablet, Rfl: 0    polyethylene glycol (MIRALAX) 17 g packet, Take 17 g by mouth Daily As Needed (Use if senna-docusate is ineffective)., Disp: 30 each, Rfl: 1    potassium chloride ER (K-TAB) 20 MEQ tablet controlled-release ER tablet, Take 1 tablet by mouth Daily. Indications: Low Amount of Potassium in the Blood, Disp: , Rfl:     risperiDONE (risperDAL M-TABS) 0.25 MG tablet dispersible disintegrating tablet, Place 1 tablet on the tongue Every Night., Disp: 30 tablet, Rfl: 1    sennosides-docusate (PERICOLACE) 8.6-50 MG per tablet, Take 2 tablets by mouth 2 (Two) Times a Day As Needed for Constipation., Disp: 60 tablet, Rfl: 1    sertraline (ZOLOFT) 50 MG tablet, Take 1 tablet by mouth Every Night., Disp: 30 tablet, Rfl: 1    meloxicam (MOBIC) 15 MG tablet, Take 1 tablet by mouth Daily., Disp: 15 tablet, Rfl: 0    Review of Systems   Musculoskeletal:  Positive for arthralgias, back pain, gait problem, joint swelling and myalgias. Negative for neck pain and neck stiffness.   Neurological:  Negative for weakness and numbness.       Vitals:    10/09/24 1415   BP: 102/52   Pulse: 85   Resp: 16   SpO2: 97%   Weight: 67.1 kg (148 lb)   PainSc:   3       Urine Drug Screen:   Appropriate: Deferred    Objective   Physical Exam  Vitals and nursing note reviewed. Exam conducted with a chaperone present.   Constitutional:       General: She is not in acute distress.     Appearance: Normal appearance. She is well-developed and normal weight.   Neck:      Trachea: No tracheal deviation.   Musculoskeletal:      Cervical back: Normal range of  motion and neck supple. No tenderness.      Comments: Lumbar Spine Exam:  Tender to palpation over the lumbar paraspinal musculature Yes  Limited range of motion secondary to pain Yes  Facet loading positive: Deferred  Facets tender to palpation: bilateral  Straight leg raise test positive: Negative    Her midline lower thoracic and upper lumbar spine is significant tender to palpation and percussion consistent with her compression fracture     Neurological:      General: No focal deficit present.      Mental Status: She is alert.      Sensory: No sensory deficit.      Motor: No weakness.           Assessment & Plan   Problems Addressed this Visit    None  Visit Diagnoses       Compression fracture of T12 vertebra, initial encounter    -  Primary    Relevant Orders    Epidural Block    Dizziness        Relevant Orders    Ambulatory Referral to Neurology (Completed)          Diagnoses         Codes Comments    Compression fracture of T12 vertebra, initial encounter    -  Primary ICD-10-CM: S22.080A  ICD-9-CM: 805.2     Dizziness     ICD-10-CM: R42  ICD-9-CM: 780.4             Plan:  Plan for T12 thoracic epidural for acute compression fracture  Will need to hold Eliquis for 3 days  Refer to neurology for complaints of dizziness.  She states she was previously seeing a neurologist however he has booked up for many months  We can try meloxicam for breakthrough.  She reports minimal benefit with hydrocodone but significant benefit with ibuprofen.  --- Follow-up next available for thoracic epidural at T12           INSPECT REPORT    As part of the patient's treatment plan, I may be prescribing controlled substances. The patient has been made aware of appropriate use of such medications, including potential risk of somnolence, limited ability to drive and/or work safely, and the potential for dependence or overdose. It has also bee made clear that these medications are for use by this patient only, without concomitant use  of alcohol or other substances unless prescribed.     Patient has completed prescribing agreement detailing terms of continued prescribing of controlled substances, including monitoring NALLELY reports, urine drug screening, and pill counts if necessary. The patient is aware that inappropriate use will results in cessation of prescribing such medications.    INSPECT report has been reviewed and scanned into the patient's chart.    As the clinician, I personally reviewed the INSPECT from 10/8/2024.    History and physical exam exhibit continued safe and appropriate use of controlled substances.      EMR Dragon/Transcription disclaimer:   Much of this encounter note is an electronic transcription/translation of spoken language to printed text. The electronic translation of spoken language may permit erroneous, or at times, nonsensical words or phrases to be inadvertently transcribed; Although I have reviewed the note for such errors, some may still exist.

## 2024-10-16 ENCOUNTER — HOSPITAL ENCOUNTER (OUTPATIENT)
Dept: PAIN MEDICINE | Facility: HOSPITAL | Age: 69
Discharge: HOME | End: 2024-10-16
Payer: MEDICARE

## 2024-10-16 DIAGNOSIS — R52 PAIN: ICD-10-CM

## 2024-10-16 DIAGNOSIS — S22.080A COMPRESSION FRACTURE OF T12 VERTEBRA, INITIAL ENCOUNTER: Primary | ICD-10-CM

## 2024-10-16 RX ORDER — BUPIVACAINE HYDROCHLORIDE 2.5 MG/ML
10 INJECTION, SOLUTION EPIDURAL; INFILTRATION; INTRACAUDAL ONCE
Status: DISCONTINUED | OUTPATIENT
Start: 2024-10-16 | End: 2024-10-17 | Stop reason: HOSPADM

## 2024-10-16 RX ORDER — IOPAMIDOL 408 MG/ML
3 INJECTION, SOLUTION INTRATHECAL
Status: DISCONTINUED | OUTPATIENT
Start: 2024-10-16 | End: 2024-10-17 | Stop reason: HOSPADM

## 2024-10-16 RX ORDER — METHYLPREDNISOLONE ACETATE 40 MG/ML
40 INJECTION, SUSPENSION INTRA-ARTICULAR; INTRALESIONAL; INTRAMUSCULAR; SOFT TISSUE ONCE
Status: DISCONTINUED | OUTPATIENT
Start: 2024-10-16 | End: 2024-10-17 | Stop reason: HOSPADM

## 2024-10-21 ENCOUNTER — HOSPITAL ENCOUNTER (OUTPATIENT)
Dept: PAIN MEDICINE | Facility: HOSPITAL | Age: 69
Discharge: HOME | End: 2024-10-21
Payer: MEDICARE

## 2024-10-21 VITALS
OXYGEN SATURATION: 97 % | DIASTOLIC BLOOD PRESSURE: 70 MMHG | WEIGHT: 146 LBS | TEMPERATURE: 97.3 F | SYSTOLIC BLOOD PRESSURE: 101 MMHG | RESPIRATION RATE: 16 BRPM | HEIGHT: 68 IN | BODY MASS INDEX: 22.13 KG/M2 | HEART RATE: 77 BPM

## 2024-10-21 DIAGNOSIS — S22.080A COMPRESSION FRACTURE OF T12 VERTEBRA, INITIAL ENCOUNTER: Primary | ICD-10-CM

## 2024-10-21 DIAGNOSIS — R52 PAIN: ICD-10-CM

## 2024-10-21 PROCEDURE — 25010000002 METHYLPREDNISOLONE PER 40 MG: Performed by: STUDENT IN AN ORGANIZED HEALTH CARE EDUCATION/TRAINING PROGRAM

## 2024-10-21 PROCEDURE — 77003 FLUOROGUIDE FOR SPINE INJECT: CPT

## 2024-10-21 PROCEDURE — 25510000001 IOPAMIDOL 41 % SOLUTION: Performed by: STUDENT IN AN ORGANIZED HEALTH CARE EDUCATION/TRAINING PROGRAM

## 2024-10-21 PROCEDURE — 25010000002 BUPIVACAINE (PF) 0.25 % SOLUTION: Performed by: STUDENT IN AN ORGANIZED HEALTH CARE EDUCATION/TRAINING PROGRAM

## 2024-10-21 RX ORDER — METHYLPREDNISOLONE ACETATE 40 MG/ML
40 INJECTION, SUSPENSION INTRA-ARTICULAR; INTRALESIONAL; INTRAMUSCULAR; SOFT TISSUE ONCE
Status: COMPLETED | OUTPATIENT
Start: 2024-10-21 | End: 2024-10-21

## 2024-10-21 RX ORDER — BUPIVACAINE HYDROCHLORIDE 2.5 MG/ML
10 INJECTION, SOLUTION EPIDURAL; INFILTRATION; INTRACAUDAL ONCE
Status: COMPLETED | OUTPATIENT
Start: 2024-10-21 | End: 2024-10-21

## 2024-10-21 RX ORDER — IOPAMIDOL 408 MG/ML
3 INJECTION, SOLUTION INTRATHECAL
Status: COMPLETED | OUTPATIENT
Start: 2024-10-21 | End: 2024-10-21

## 2024-10-21 RX ADMIN — BUPIVACAINE HYDROCHLORIDE 4 ML: 2.5 INJECTION, SOLUTION EPIDURAL; INFILTRATION; INTRACAUDAL; PERINEURAL at 14:42

## 2024-10-21 RX ADMIN — METHYLPREDNISOLONE ACETATE 40 MG: 40 INJECTION, SUSPENSION INTRA-ARTICULAR; INTRALESIONAL; INTRAMUSCULAR; INTRASYNOVIAL; SOFT TISSUE at 14:42

## 2024-10-21 RX ADMIN — IOPAMIDOL 3 ML: 408 INJECTION, SOLUTION INTRATHECAL at 14:42

## 2024-10-21 NOTE — DISCHARGE INSTRUCTIONS

## 2024-10-22 ENCOUNTER — TELEPHONE (OUTPATIENT)
Dept: PAIN MEDICINE | Facility: HOSPITAL | Age: 69
End: 2024-10-22
Payer: MEDICARE

## 2024-10-22 NOTE — TELEPHONE ENCOUNTER
Post procedure phone call completed.  Pt states they are doing good and denies questions or concerns. Reported pain level of 2.

## 2024-10-27 NOTE — PROCEDURES
Lumbar Epidural Steroid Injection with catheter  [facility]    PREOPERATIVE DIAGNOSIS:   Thoracic compression fracture, Thoracic radiculopathy, lumbar radiculopathy  POSTOPERATIVE DIAGNOSIS:  Same as preop diagnosis    PROCEDURE:  Lumbar Epidural Steroid Injection, Therapeutic Translaminar Injection, with epidurogram, at L4/5 level with catheter to the L1 level    PRE-PROCEDURE DISCUSSION WITH PATIENT:    Risks and complications were discussed with the patient prior to starting the procedure and informed consent was obtained.  We discussed various topics including but not limited to bleeding, infection, injury, paralysis, nerve injury, dural puncture, coma, death, worsening of clinical picture, lack of pain relief, and postprocedural soreness.    SURGEON:  Mainor Juarez MD    REASON FOR PROCEDURE:   Interlaminar approach is not possible or advised due to pathology, Compression fracture in this area, and Thoracic and lumbar radiculopathy    SEDATION:  Patient declined administration of moderate sedation    ANESTHETIC:  injectable LAs: Marcaine 0.25%  STEROID:   Methylprednisolone (DEPO MEDROL) 40mg/ml    DESCRIPTON OF PROCEDURE:  After obtaining informed consent, I.V. was not started in the preop area.   The patient was taken to the operating room and placed in the prone position.   All pressure points were well padded.  The thoracolumbar spine area was prepped with Chloraprep and draped in a sterile fashion.  Under fluoroscopic guidance, the aforementioned  interlaminar space was identified. Skin and subcutaneous tissues were anesthetized with 1% lidocaine in the middle of the space.  Initially, the T11/T12 interspace was identified under fluoroscopy.  A 20G Tuohy was then inserted and directed towards the lamina from a lateral to midline approach.  Due to significant degenerative pathology, th needle was unable to be advanced into the epidural space despite multiple redirection attempts.  After discussion  with the patient, it was decided to attempt a catheter-assisted LESI.  A 18G Tuohy needle was introduced through the skin and advanced to this interlaminar space and into the epidural space under fluoroscopic guidance and verified with loss-of-resistance technique to air.  After confirming the position of the needle with the fluoroscope with all the views, a 20G radiopaque catheter was advanced through the needle into the epidural space and placement was again verified under fluoroscopy.  The catheter was easily advanced midline until the tip reached the body of L1 at which point significant epidural adhesions prevented further advancement..  Aspiration was again negative for blood and CSF.  Next, 1.5cc of contrast was injected under real-time fluoroscopy demonstrating adequate epidural spread without intravascular or intrathecal spread.   After seeing appropriate epidurogram with lateral and PA views, a total of 4 cc solution was injected, consisting of 3cc of local anesthetic as above, and injectable steroid as above.     ESTIMATED BLOOD LOSS:  <5 mL  SPECIMENS:  None    COMPLICATIONS:     There was no indication of vascular uptake on live injection of contrast dye., There was no indication of intrathecal uptake on live injection of contrast dye., There was not any evidence of dural puncture.  , and See above.    TOLERANCE & DISCHARGE CONDITION:    The patient tolerated the procedure well.  The patient was transported to the recovery area without difficulties.  The patient was discharged to home under the care of family in stable and satisfactory condition.    PLAN OF CARE:  The patient was given our standard instruction sheet.        2.   The patient will Return to clinic 3-4 wks.  3.    The patient will resume all medications as per the medication reconciliation sheet.

## 2024-11-22 NOTE — TELEPHONE ENCOUNTER
"Called patients spouse Lamont and Left a Voicemail for patient to please call the office.  \"Patient needs to complete X-Rays before follow up appointment\"  \"Tried to call patient's phone and the mailbox was full\"  " 2 seconds or less

## 2024-12-11 ENCOUNTER — TELEPHONE (OUTPATIENT)
Dept: PAIN MEDICINE | Facility: CLINIC | Age: 69
End: 2024-12-11
Payer: MEDICARE

## 2024-12-11 DIAGNOSIS — R52 PAIN: Primary | ICD-10-CM

## 2024-12-11 RX ORDER — HYDROCODONE BITARTRATE AND ACETAMINOPHEN 10; 325 MG/1; MG/1
1 TABLET ORAL EVERY 6 HOURS PRN
Qty: 10 TABLET | Refills: 0 | Status: SHIPPED | OUTPATIENT
Start: 2024-12-11

## 2024-12-11 RX ORDER — CYCLOBENZAPRINE HCL 10 MG
10 TABLET ORAL 3 TIMES DAILY
Qty: 10 TABLET | Refills: 0 | Status: SHIPPED | OUTPATIENT
Start: 2024-12-11

## 2024-12-11 NOTE — TELEPHONE ENCOUNTER
Pt left voicemail stating that she is going to [place] [facility] tonight and is requesting a stronger pain med and a muscle relaxer so that sitting won't hurt her and she can enjoy herself.   Pharm [facility] [place], [place]

## 2025-01-03 NOTE — PROGRESS NOTES
Chief Complaint  NEW PATIENT (ANNIE)    Subjective          Laura Mejia presents to [facility]UR[facility]  History of Present Illness  NEW ANNIE patient currently on cpap machine... no recent data since 2022..    . patient states she has not used machine x's 3 weeks due to bronchitis, patient states she is benefiting from pap therapy     she uses a [facility] and goes through  [employer] for supplies, patient states she gets a lot of leakage,     last sleep study was done 2 yrs ago in [place]. HAD  split night in lab, without and then with CPAP  Told had severe annie, no recommended changes that she knows of.  Previously had gone to [facility]     The patient c/o daytime sleepiness issues:      Over past summer and fall had some mental status change, with poor memory, and irritability     Pt and  are taking care of her mother in law who has dementia with sun downing syndrome     The patient complains of snoring loud in all sleeping positions W/O MACHINE.     awakened with gastric discomfort or burning sensation in the chest or sour taste.     The patient complains of problems with insomnia:  difficulty falling asleep.    Sleep schedule: Bedtime:VARIES , gets out of bed at VARIES, sleep latency: 2HR OR LONGER, Gets about 8 hours of sleep.    She has ocular migraine. Brought on by stress.     EPWORTH SLEEPINESS SCALE  Sitting and reading JS [place] Sleepiness: 1 WatchingTV JS [place] Sleepiness: 1  Sitting, inactive, in a public place JS [place] Sleepiness: 0 As a passenger in a car for 1 hour w/o a break  JS [place] Sleepiness: 1  Lying down to rest in the afternoon  JS [place] Sleepiness: 1  Sitting and talking to someone  JS [place] Sleepiness: 0  Sitting quietly after a lunch  JS [place] Sleepiness: 0  In a car, while stopped for traffic or a light  JS [place] Sleepiness: 0  Total 4    Review of Systems   Constitutional:  Negative for fatigue.   Respiratory:  Positive for apnea. Negative for  "shortness of breath.    Psychiatric/Behavioral:  Positive for sleep disturbance.      There is no H/O sleep paralysis, hypnagogic hallucinations or cataplexy..      Objective   Vital Signs:   /67   Pulse 77   Resp 16   Ht 172.7 cm (68\")   Wt 69.9 kg (154 lb)   BMI 23.42 kg/m²     Physical Exam  Vitals reviewed.   Constitutional:       Appearance: Normal appearance.   Pulmonary:      Effort: Pulmonary effort is normal. No respiratory distress.   Neurological:      Mental Status: She is alert and oriented to person, place, and time.   Psychiatric:         Mood and Affect: Mood normal.        Result Review :                 Assessment and Plan    Diagnoses and all orders for this visit:    1. Obstructive sleep apnea syndrome (Primary)  Overview:  Seipel      2. Insomnia, unspecified type      Will review sleep study from [place] when available, and adjust pressure if needed  For insomnia will rx trazodone.     INSPIRE device probably not indicated           Follow Up   Return in about 1 year (around 1/7/2026).  Patient was given instructions and counseling regarding her condition or for health maintenance advice. Please see specific information pulled into the AVS if appropriate.       This document has been electronically signed by Joseph [place]ipel, MD on January 7, 2025 12:40 EST  "

## 2025-01-07 ENCOUNTER — OFFICE VISIT (OUTPATIENT)
Dept: NEUROLOGY | Facility: CLINIC | Age: 70
End: 2025-01-07
Payer: MEDICARE

## 2025-01-07 VITALS
SYSTOLIC BLOOD PRESSURE: 105 MMHG | HEART RATE: 77 BPM | HEIGHT: 68 IN | WEIGHT: 154 LBS | DIASTOLIC BLOOD PRESSURE: 67 MMHG | BODY MASS INDEX: 23.34 KG/M2 | RESPIRATION RATE: 16 BRPM

## 2025-01-07 DIAGNOSIS — G47.00 INSOMNIA, UNSPECIFIED TYPE: ICD-10-CM

## 2025-01-07 DIAGNOSIS — G47.33 OBSTRUCTIVE SLEEP APNEA SYNDROME: Primary | ICD-10-CM

## 2025-01-07 RX ORDER — OMEPRAZOLE 40 MG/1
CAPSULE, DELAYED RELEASE ORAL
COMMUNITY
Start: 2024-09-29

## 2025-01-07 RX ORDER — NITROGLYCERIN 0.4 MG/1
0.4 TABLET SUBLINGUAL
COMMUNITY
Start: 2024-12-10

## 2025-01-07 RX ORDER — MECLIZINE HYDROCHLORIDE 25 MG/1
25 TABLET ORAL
COMMUNITY
Start: 2024-12-10

## 2025-01-07 RX ORDER — FUROSEMIDE 40 MG/1
40 TABLET ORAL DAILY
COMMUNITY
Start: 2024-12-10

## 2025-01-07 RX ORDER — TRAZODONE HYDROCHLORIDE 50 MG/1
50 TABLET, FILM COATED ORAL NIGHTLY
Qty: 30 TABLET | Refills: 5 | Status: SHIPPED | OUTPATIENT
Start: 2025-01-07

## 2025-01-10 ENCOUNTER — PATIENT ROUNDING (BHMG ONLY) (OUTPATIENT)
Dept: NEUROLOGY | Facility: CLINIC | Age: 70
End: 2025-01-10
Payer: MEDICARE

## 2025-03-02 DIAGNOSIS — Z12.31 BREAST CANCER SCREENING BY MAMMOGRAM: Primary | ICD-10-CM

## 2025-03-26 ENCOUNTER — TRANSCRIBE ORDERS (OUTPATIENT)
Dept: ADMINISTRATIVE | Facility: HOSPITAL | Age: 70
End: 2025-03-26
Payer: MEDICARE

## 2025-03-26 DIAGNOSIS — M54.16 CHRONIC RADICULAR PAIN OF LOWER BACK: ICD-10-CM

## 2025-03-26 DIAGNOSIS — M54.6 LOWER THORACIC BACK PAIN: Primary | ICD-10-CM

## 2025-03-26 DIAGNOSIS — G89.29 CHRONIC RADICULAR PAIN OF LOWER BACK: ICD-10-CM

## 2025-05-30 ENCOUNTER — TRANSCRIBE ORDERS (OUTPATIENT)
Dept: ADMINISTRATIVE | Facility: HOSPITAL | Age: 70
End: 2025-05-30
Payer: MEDICARE

## 2025-06-19 ENCOUNTER — HOSPITAL ENCOUNTER (OUTPATIENT)
Dept: MRI IMAGING | Facility: HOSPITAL | Age: 70
Discharge: HOME | End: 2025-06-19
Payer: MEDICARE

## 2025-06-19 DIAGNOSIS — G89.29 CHRONIC RADICULAR PAIN OF LOWER BACK: ICD-10-CM

## 2025-06-19 DIAGNOSIS — M54.16 CHRONIC RADICULAR PAIN OF LOWER BACK: ICD-10-CM

## 2025-06-19 DIAGNOSIS — M54.6 LOWER THORACIC BACK PAIN: ICD-10-CM

## 2025-06-19 PROCEDURE — 72148 MRI LUMBAR SPINE W/O DYE: CPT

## 2025-06-19 PROCEDURE — 76014 MR SFTY IMPLT&/FB ASMT STF 1: CPT

## 2025-06-19 PROCEDURE — 72146 MRI CHEST SPINE W/O DYE: CPT

## 2025-06-19 PROCEDURE — 76018 MR SAFETY IMPLANT ELEC PREPJ: CPT
